# Patient Record
Sex: MALE | Race: WHITE
[De-identification: names, ages, dates, MRNs, and addresses within clinical notes are randomized per-mention and may not be internally consistent; named-entity substitution may affect disease eponyms.]

---

## 2019-01-14 ENCOUNTER — RESULT REVIEW (OUTPATIENT)
Age: 74
End: 2019-01-14

## 2019-01-14 ENCOUNTER — OUTPATIENT (OUTPATIENT)
Dept: OUTPATIENT SERVICES | Facility: HOSPITAL | Age: 74
LOS: 1 days | Discharge: HOME | End: 2019-01-14

## 2019-01-14 ENCOUNTER — TRANSCRIPTION ENCOUNTER (OUTPATIENT)
Age: 74
End: 2019-01-14

## 2019-01-14 VITALS
HEART RATE: 53 BPM | HEIGHT: 69 IN | SYSTOLIC BLOOD PRESSURE: 98 MMHG | DIASTOLIC BLOOD PRESSURE: 67 MMHG | WEIGHT: 169.98 LBS | RESPIRATION RATE: 20 BRPM | TEMPERATURE: 98 F

## 2019-01-14 VITALS
HEART RATE: 68 BPM | DIASTOLIC BLOOD PRESSURE: 57 MMHG | SYSTOLIC BLOOD PRESSURE: 98 MMHG | OXYGEN SATURATION: 100 % | RESPIRATION RATE: 18 BRPM

## 2019-01-14 DIAGNOSIS — Z98.890 OTHER SPECIFIED POSTPROCEDURAL STATES: Chronic | ICD-10-CM

## 2019-01-14 DIAGNOSIS — H26.9 UNSPECIFIED CATARACT: Chronic | ICD-10-CM

## 2019-01-14 NOTE — ASU PATIENT PROFILE, ADULT - PMH
BPH (benign prostatic hyperplasia)    CAD (coronary artery disease)    Hypercholesteremia    Kidney stones

## 2019-01-14 NOTE — ASU DISCHARGE PLAN (ADULT/PEDIATRIC). - NOTIFY
Inability to Tolerate Liquids or Foods/Bleeding that does not stop/Pain not relieved by Medications/Persistent Nausea and Vomiting/Fever greater than 101/Excessive Diarrhea

## 2019-01-15 LAB — SURGICAL PATHOLOGY STUDY: SIGNIFICANT CHANGE UP

## 2019-01-23 DIAGNOSIS — R93.2 ABNORMAL FINDINGS ON DIAGNOSTIC IMAGING OF LIVER AND BILIARY TRACT: ICD-10-CM

## 2019-01-23 DIAGNOSIS — K29.60 OTHER GASTRITIS WITHOUT BLEEDING: ICD-10-CM

## 2019-01-23 DIAGNOSIS — K44.9 DIAPHRAGMATIC HERNIA WITHOUT OBSTRUCTION OR GANGRENE: ICD-10-CM

## 2019-01-23 DIAGNOSIS — K20.9 ESOPHAGITIS, UNSPECIFIED: ICD-10-CM

## 2019-01-23 DIAGNOSIS — E78.00 PURE HYPERCHOLESTEROLEMIA, UNSPECIFIED: ICD-10-CM

## 2019-01-23 DIAGNOSIS — N28.1 CYST OF KIDNEY, ACQUIRED: ICD-10-CM

## 2019-11-20 ENCOUNTER — INPATIENT (INPATIENT)
Facility: HOSPITAL | Age: 74
LOS: 5 days | Discharge: HOME | End: 2019-11-26
Attending: HOSPITALIST | Admitting: HOSPITALIST
Payer: MEDICARE

## 2019-11-20 VITALS
HEART RATE: 45 BPM | SYSTOLIC BLOOD PRESSURE: 103 MMHG | DIASTOLIC BLOOD PRESSURE: 60 MMHG | OXYGEN SATURATION: 95 % | TEMPERATURE: 96 F | RESPIRATION RATE: 18 BRPM

## 2019-11-20 DIAGNOSIS — H26.9 UNSPECIFIED CATARACT: Chronic | ICD-10-CM

## 2019-11-20 DIAGNOSIS — Z98.890 OTHER SPECIFIED POSTPROCEDURAL STATES: Chronic | ICD-10-CM

## 2019-11-20 DIAGNOSIS — R00.1 BRADYCARDIA, UNSPECIFIED: ICD-10-CM

## 2019-11-20 PROBLEM — N20.0 CALCULUS OF KIDNEY: Chronic | Status: ACTIVE | Noted: 2019-01-14

## 2019-11-20 PROBLEM — I25.10 ATHEROSCLEROTIC HEART DISEASE OF NATIVE CORONARY ARTERY WITHOUT ANGINA PECTORIS: Chronic | Status: ACTIVE | Noted: 2019-01-14

## 2019-11-20 PROBLEM — E78.00 PURE HYPERCHOLESTEROLEMIA, UNSPECIFIED: Chronic | Status: ACTIVE | Noted: 2019-01-14

## 2019-11-20 PROBLEM — N40.0 BENIGN PROSTATIC HYPERPLASIA WITHOUT LOWER URINARY TRACT SYMPTOMS: Chronic | Status: ACTIVE | Noted: 2019-01-14

## 2019-11-20 LAB
ALBUMIN SERPL ELPH-MCNC: 4 G/DL — SIGNIFICANT CHANGE UP (ref 3.5–5.2)
ALP SERPL-CCNC: 75 U/L — SIGNIFICANT CHANGE UP (ref 30–115)
ALT FLD-CCNC: 15 U/L — SIGNIFICANT CHANGE UP (ref 0–41)
ANION GAP SERPL CALC-SCNC: 17 MMOL/L — HIGH (ref 7–14)
AST SERPL-CCNC: 18 U/L — SIGNIFICANT CHANGE UP (ref 0–41)
BILIRUB SERPL-MCNC: 1.7 MG/DL — HIGH (ref 0.2–1.2)
BUN SERPL-MCNC: 35 MG/DL — HIGH (ref 10–20)
CALCIUM SERPL-MCNC: 9.3 MG/DL — SIGNIFICANT CHANGE UP (ref 8.5–10.1)
CHLORIDE SERPL-SCNC: 102 MMOL/L — SIGNIFICANT CHANGE UP (ref 98–110)
CO2 SERPL-SCNC: 18 MMOL/L — SIGNIFICANT CHANGE UP (ref 17–32)
CREAT SERPL-MCNC: 2.1 MG/DL — HIGH (ref 0.7–1.5)
GLUCOSE SERPL-MCNC: 98 MG/DL — SIGNIFICANT CHANGE UP (ref 70–99)
HCT VFR BLD CALC: 44.5 % — SIGNIFICANT CHANGE UP (ref 42–52)
HGB BLD-MCNC: 15.2 G/DL — SIGNIFICANT CHANGE UP (ref 14–18)
MCHC RBC-ENTMCNC: 31.1 PG — HIGH (ref 27–31)
MCHC RBC-ENTMCNC: 34.2 G/DL — SIGNIFICANT CHANGE UP (ref 32–37)
MCV RBC AUTO: 91 FL — SIGNIFICANT CHANGE UP (ref 80–94)
NRBC # BLD: 0 /100 WBCS — SIGNIFICANT CHANGE UP (ref 0–0)
PLATELET # BLD AUTO: 197 K/UL — SIGNIFICANT CHANGE UP (ref 130–400)
POTASSIUM SERPL-MCNC: 4.8 MMOL/L — SIGNIFICANT CHANGE UP (ref 3.5–5)
POTASSIUM SERPL-SCNC: 4.8 MMOL/L — SIGNIFICANT CHANGE UP (ref 3.5–5)
PROT SERPL-MCNC: 6.5 G/DL — SIGNIFICANT CHANGE UP (ref 6–8)
RBC # BLD: 4.89 M/UL — SIGNIFICANT CHANGE UP (ref 4.7–6.1)
RBC # FLD: 13.1 % — SIGNIFICANT CHANGE UP (ref 11.5–14.5)
SODIUM SERPL-SCNC: 137 MMOL/L — SIGNIFICANT CHANGE UP (ref 135–146)
TROPONIN T SERPL-MCNC: <0.01 NG/ML — SIGNIFICANT CHANGE UP
TROPONIN T SERPL-MCNC: <0.01 NG/ML — SIGNIFICANT CHANGE UP
WBC # BLD: 10.07 K/UL — SIGNIFICANT CHANGE UP (ref 4.8–10.8)
WBC # FLD AUTO: 10.07 K/UL — SIGNIFICANT CHANGE UP (ref 4.8–10.8)

## 2019-11-20 PROCEDURE — 71045 X-RAY EXAM CHEST 1 VIEW: CPT | Mod: 26

## 2019-11-20 PROCEDURE — 99223 1ST HOSP IP/OBS HIGH 75: CPT | Mod: AI

## 2019-11-20 PROCEDURE — 71046 X-RAY EXAM CHEST 2 VIEWS: CPT | Mod: 26

## 2019-11-20 PROCEDURE — 70450 CT HEAD/BRAIN W/O DYE: CPT | Mod: 26

## 2019-11-20 PROCEDURE — 99285 EMERGENCY DEPT VISIT HI MDM: CPT

## 2019-11-20 PROCEDURE — 93880 EXTRACRANIAL BILAT STUDY: CPT | Mod: 26

## 2019-11-20 RX ORDER — ASPIRIN/CALCIUM CARB/MAGNESIUM 324 MG
81 TABLET ORAL DAILY
Refills: 0 | Status: DISCONTINUED | OUTPATIENT
Start: 2019-11-20 | End: 2019-11-20

## 2019-11-20 RX ORDER — LOSARTAN POTASSIUM 100 MG/1
25 TABLET, FILM COATED ORAL DAILY
Refills: 0 | Status: DISCONTINUED | OUTPATIENT
Start: 2019-11-20 | End: 2019-11-21

## 2019-11-20 RX ORDER — ATORVASTATIN CALCIUM 80 MG/1
40 TABLET, FILM COATED ORAL AT BEDTIME
Refills: 0 | Status: DISCONTINUED | OUTPATIENT
Start: 2019-11-20 | End: 2019-11-23

## 2019-11-20 RX ORDER — TAMSULOSIN HYDROCHLORIDE 0.4 MG/1
0.4 CAPSULE ORAL AT BEDTIME
Refills: 0 | Status: DISCONTINUED | OUTPATIENT
Start: 2019-11-20 | End: 2019-11-23

## 2019-11-20 RX ORDER — SODIUM CHLORIDE 9 MG/ML
1000 INJECTION INTRAMUSCULAR; INTRAVENOUS; SUBCUTANEOUS
Refills: 0 | Status: DISCONTINUED | OUTPATIENT
Start: 2019-11-20 | End: 2019-11-21

## 2019-11-20 RX ORDER — IRBESARTAN 75 MG/1
1 TABLET ORAL
Qty: 0 | Refills: 0 | DISCHARGE

## 2019-11-20 RX ORDER — SIMVASTATIN 20 MG/1
80 TABLET, FILM COATED ORAL AT BEDTIME
Refills: 0 | Status: DISCONTINUED | OUTPATIENT
Start: 2019-11-20 | End: 2019-11-20

## 2019-11-20 RX ORDER — FINASTERIDE 5 MG/1
5 TABLET, FILM COATED ORAL DAILY
Refills: 0 | Status: DISCONTINUED | OUTPATIENT
Start: 2019-11-20 | End: 2019-11-23

## 2019-11-20 RX ORDER — CLOPIDOGREL BISULFATE 75 MG/1
75 TABLET, FILM COATED ORAL DAILY
Refills: 0 | Status: DISCONTINUED | OUTPATIENT
Start: 2019-11-20 | End: 2019-11-23

## 2019-11-20 RX ORDER — ASPIRIN/CALCIUM CARB/MAGNESIUM 324 MG
81 TABLET ORAL
Refills: 0 | Status: DISCONTINUED | OUTPATIENT
Start: 2019-11-20 | End: 2019-11-23

## 2019-11-20 RX ORDER — MECLIZINE HCL 12.5 MG
25 TABLET ORAL ONCE
Refills: 0 | Status: COMPLETED | OUTPATIENT
Start: 2019-11-20 | End: 2019-11-20

## 2019-11-20 RX ADMIN — SODIUM CHLORIDE 50 MILLILITER(S): 9 INJECTION INTRAMUSCULAR; INTRAVENOUS; SUBCUTANEOUS at 15:33

## 2019-11-20 RX ADMIN — TAMSULOSIN HYDROCHLORIDE 0.4 MILLIGRAM(S): 0.4 CAPSULE ORAL at 21:14

## 2019-11-20 RX ADMIN — Medication 25 MILLIGRAM(S): at 10:50

## 2019-11-20 RX ADMIN — ATORVASTATIN CALCIUM 40 MILLIGRAM(S): 80 TABLET, FILM COATED ORAL at 21:14

## 2019-11-20 NOTE — H&P ADULT - HISTORY OF PRESENT ILLNESS
75 yo M hx of cardiac stents, HTN c/o dizziness since yesterday.  Patient states he has been feeling dizzy, described as lightheaded intermittently, occurs when he stands and walks. Better when laying down. Patient has similar symptoms a few weeks ago and saw his PMD and had has his BP meds decreased. 75 yo M hx of CAD/Cardiac Stents presented to ER following persistent dizziness since yesterday. Pt reports he had similar episodes 2 weeks ago, resulting in falls x 2. Denies head trauma. He was evaluated by his PMD, who subsequently decreased dose of Diovan from 80mg to 40mg/day. Pt reports onset of dizziness again yesterday and did not resolved, which prompted visit to ER.   Pt denies chest pain, SOB, dizziness, or additional associated complaints.  Pt currently denies any complaints. However HR 44-47 in ER. --115mmhg.      Cardiologist-Dr Navas, Last echo 2 months ago, reportedly unremarkable.

## 2019-11-20 NOTE — ED ADULT NURSE NOTE - NSIMPLEMENTINTERV_GEN_ALL_ED
Implemented All Fall with Harm Risk Interventions:  Pinch to call system. Call bell, personal items and telephone within reach. Instruct patient to call for assistance. Room bathroom lighting operational. Non-slip footwear when patient is off stretcher. Physically safe environment: no spills, clutter or unnecessary equipment. Stretcher in lowest position, wheels locked, appropriate side rails in place. Provide visual cue, wrist band, yellow gown, etc. Monitor gait and stability. Monitor for mental status changes and reorient to person, place, and time. Review medications for side effects contributing to fall risk. Reinforce activity limits and safety measures with patient and family. Provide visual clues: red socks.

## 2019-11-20 NOTE — H&P ADULT - NSHPLABSRESULTS_GEN_ALL_CORE
15.2   10.07 )-----------( 197      ( 20 Nov 2019 09:31 )             44.5       11-20    137  |  102  |  35<H>  ----------------------------<  98  4.8   |  18  |  2.1<H>    Ca    9.3      20 Nov 2019 09:31    TPro  6.5  /  Alb  4.0  /  TBili  1.7<H>  /  DBili  x   /  AST  18  /  ALT  15  /  AlkPhos  75  11-20              CARDIAC MARKERS ( 20 Nov 2019 09:31 )  x     / <0.01 ng/mL / x     / x     / x              < from: Xray Chest 1 View- PORTABLE-Urgent (11.20.19 @ 10:02) >    Impression:      No radiographic evidence of acute cardiopulmonary disease.        < end of copied text >      < from: CT Head No Cont (11.20.19 @ 09:57) >    IMPRESSION:     1.  No evidence of acute intracranial pathology.    2.  Dolichoectatic basilar artery is noted.        < end of copied text >

## 2019-11-20 NOTE — ED PROVIDER NOTE - NS ED ROS FT
Review of Systems    Constitutional: (-) fever, (-) chills  Eyes/ENT: (-) blurry vision, (-) epistaxis, (-) sore throat  Cardiovascular: (-) chest pain, (-) syncope  Respiratory: (-) cough, (-) shortness of breath  Gastrointestinal: (-) pain, (-) nausea, (-) vomiting, (-) diarrhea  Musculoskeletal: (-) neck pain, (-) back pain, (-) joint pain  Integumentary: (-) rash, (-) edema  Neurological: (-) headache, (-) altered mental status, (+) dizzy  Psychiatric: (-) hallucinations  Allergic/Immunologic: (-) pruritus

## 2019-11-20 NOTE — H&P ADULT - NSHPPHYSICALEXAM_GEN_ALL_CORE
Gen NAD, A&Ox3  CV +S1 and S2, RR  Resp +air entry B/L  Abd soft, NT, ND  Ext no edema, no CT      Vital Signs Last 24 Hrs  T(C): 36.9 (20 Nov 2019 13:00), Max: 37.2 (20 Nov 2019 09:38)  T(F): 98.5 (20 Nov 2019 13:00), Max: 98.9 (20 Nov 2019 09:38)  HR: 49 (20 Nov 2019 13:00) (44 - 49)  BP: 115/61 (20 Nov 2019 13:00) (103/60 - 115/61)  BP(mean): --  RR: 18 (20 Nov 2019 10:51) (17 - 18)  SpO2: 95% (20 Nov 2019 13:00) (95% - 96%)

## 2019-11-20 NOTE — ED PROVIDER NOTE - OBJECTIVE STATEMENT
75 yo M hx of cardiac stents, HTN c/o dizziness since yesterday.  Patient states he has been feeling dizzy, described as lightheaded intermittently, occurs when he stands and walks. Better when laying down. Patient has similar symptoms a few weeks ago and saw his PMD and had has his BP meds decreased. No CP, SOB, HA, n/v, or abdominal pains.

## 2019-11-20 NOTE — H&P ADULT - NSICDXPASTSURGICALHX_GEN_ALL_CORE_FT
PAST SURGICAL HISTORY:  Bilateral cataracts     History of lithotripsy     Status post cardiac surgery cardiac stents

## 2019-11-20 NOTE — ED PROVIDER NOTE - CLINICAL SUMMARY MEDICAL DECISION MAKING FREE TEXT BOX
pt presented with dizzyness/lightheadness.  ct head wnl. sinus damaris in the 40's, not orthostatic. admit to tele for neuro/cards further workup

## 2019-11-20 NOTE — ED PROVIDER NOTE - PHYSICAL EXAMINATION
Gen: Alert, NAD, well appearing  Head: NC, AT, PERRL, EOMI, normal lids/conjunctiva  ENT: normal hearing, patent oropharynx   Neck: +supple, no tenderness/meningismus,  Pulm: Bilateral BS, normal resp effort, no wheeze/stridor/retractions  CV: S1S2, damaris  Abd: soft, NT/ND  Mskel: no edema/erythema/cyanosis  Skin: no rash, warm/dry  Neuro: AAOx3, no sensory/motor deficits

## 2019-11-20 NOTE — H&P ADULT - NSICDXPASTMEDICALHX_GEN_ALL_CORE_FT
PAST MEDICAL HISTORY:  BPH (benign prostatic hyperplasia)     CAD (coronary artery disease)     Hypercholesteremia     Kidney stones

## 2019-11-20 NOTE — H&P ADULT - ASSESSMENT
Pt is 75 y/o male with Symptomatic Bradycardia/Hx Cardiac Stents    Admit to low risk telemetry  Cardiology Consult  Hold B Blocker  Continue ARB, however with parameters in place  Repeat ekg in am  Cont ASA q48h/Plavix Pt is 73 y/o male with Symptomatic Bradycardia/Dehydration/Hx Cardiac Stents    Admit to low risk telemetry  Cardiology Consult  Hold B Blocker  Continue ARB, however with parameters in place  Repeat ekg in am  Light IV Hydration  Monitor Labs  Cont ASA q48h/Plavix

## 2019-11-20 NOTE — ED PROVIDER NOTE - ATTENDING CONTRIBUTION TO CARE
73 yo M hx of cardiac stents, HTN here for eval of dizzyness/ pt reports dizzyness since yesterday. pt characterizes it as feeling lightheaded, which is better when walks. no dizzyness with neck mvt. pt recently had his bp meds decreased. CON: ao x 3, HENMT: neck supple,  CV: rrr, equal pulses b/l, RESP: cta b/l, GI:  soft, nontender, no rebound, no guarding, SKIN: no rash, MSK: no deformities, NEURO: cn2-12 intact, normal finger to nose, motor and sensation grossly intact Psychiatric: appropriate mood, appropriate affect

## 2019-11-21 LAB
ALBUMIN SERPL ELPH-MCNC: 4.3 G/DL — SIGNIFICANT CHANGE UP (ref 3.5–5.2)
ALP SERPL-CCNC: 78 U/L — SIGNIFICANT CHANGE UP (ref 30–115)
ALT FLD-CCNC: 14 U/L — SIGNIFICANT CHANGE UP (ref 0–41)
ANION GAP SERPL CALC-SCNC: 12 MMOL/L — SIGNIFICANT CHANGE UP (ref 7–14)
AST SERPL-CCNC: 18 U/L — SIGNIFICANT CHANGE UP (ref 0–41)
BILIRUB SERPL-MCNC: 1.8 MG/DL — HIGH (ref 0.2–1.2)
BUN SERPL-MCNC: 29 MG/DL — HIGH (ref 10–20)
CALCIUM SERPL-MCNC: 9.6 MG/DL — SIGNIFICANT CHANGE UP (ref 8.5–10.1)
CHLORIDE SERPL-SCNC: 106 MMOL/L — SIGNIFICANT CHANGE UP (ref 98–110)
CO2 SERPL-SCNC: 22 MMOL/L — SIGNIFICANT CHANGE UP (ref 17–32)
CREAT SERPL-MCNC: 1.4 MG/DL — SIGNIFICANT CHANGE UP (ref 0.7–1.5)
GAS PNL BLDA: SIGNIFICANT CHANGE UP
GLUCOSE SERPL-MCNC: 91 MG/DL — SIGNIFICANT CHANGE UP (ref 70–99)
HCT VFR BLD CALC: 46.3 % — SIGNIFICANT CHANGE UP (ref 42–52)
HGB BLD-MCNC: 15.7 G/DL — SIGNIFICANT CHANGE UP (ref 14–18)
LACTATE SERPL-SCNC: 1 MMOL/L — SIGNIFICANT CHANGE UP (ref 0.5–2.2)
MAGNESIUM SERPL-MCNC: 2.1 MG/DL — SIGNIFICANT CHANGE UP (ref 1.8–2.4)
MCHC RBC-ENTMCNC: 31.3 PG — HIGH (ref 27–31)
MCHC RBC-ENTMCNC: 33.9 G/DL — SIGNIFICANT CHANGE UP (ref 32–37)
MCV RBC AUTO: 92.2 FL — SIGNIFICANT CHANGE UP (ref 80–94)
NRBC # BLD: 0 /100 WBCS — SIGNIFICANT CHANGE UP (ref 0–0)
PLATELET # BLD AUTO: 189 K/UL — SIGNIFICANT CHANGE UP (ref 130–400)
POTASSIUM SERPL-MCNC: 4.8 MMOL/L — SIGNIFICANT CHANGE UP (ref 3.5–5)
POTASSIUM SERPL-SCNC: 4.8 MMOL/L — SIGNIFICANT CHANGE UP (ref 3.5–5)
PROT SERPL-MCNC: 6.7 G/DL — SIGNIFICANT CHANGE UP (ref 6–8)
RBC # BLD: 5.02 M/UL — SIGNIFICANT CHANGE UP (ref 4.7–6.1)
RBC # FLD: 13.1 % — SIGNIFICANT CHANGE UP (ref 11.5–14.5)
SODIUM SERPL-SCNC: 140 MMOL/L — SIGNIFICANT CHANGE UP (ref 135–146)
TROPONIN T SERPL-MCNC: <0.01 NG/ML — SIGNIFICANT CHANGE UP
WBC # BLD: 8.18 K/UL — SIGNIFICANT CHANGE UP (ref 4.8–10.8)
WBC # FLD AUTO: 8.18 K/UL — SIGNIFICANT CHANGE UP (ref 4.8–10.8)

## 2019-11-21 PROCEDURE — 99222 1ST HOSP IP/OBS MODERATE 55: CPT

## 2019-11-21 PROCEDURE — 99233 SBSQ HOSP IP/OBS HIGH 50: CPT

## 2019-11-21 RX ORDER — SODIUM CHLORIDE 9 MG/ML
1500 INJECTION INTRAMUSCULAR; INTRAVENOUS; SUBCUTANEOUS
Refills: 0 | Status: COMPLETED | OUTPATIENT
Start: 2019-11-21 | End: 2019-11-21

## 2019-11-21 RX ADMIN — LOSARTAN POTASSIUM 25 MILLIGRAM(S): 100 TABLET, FILM COATED ORAL at 06:22

## 2019-11-21 RX ADMIN — TAMSULOSIN HYDROCHLORIDE 0.4 MILLIGRAM(S): 0.4 CAPSULE ORAL at 21:46

## 2019-11-21 RX ADMIN — SODIUM CHLORIDE 50 MILLILITER(S): 9 INJECTION INTRAMUSCULAR; INTRAVENOUS; SUBCUTANEOUS at 10:21

## 2019-11-21 RX ADMIN — SODIUM CHLORIDE 125 MILLILITER(S): 9 INJECTION INTRAMUSCULAR; INTRAVENOUS; SUBCUTANEOUS at 12:49

## 2019-11-21 RX ADMIN — CLOPIDOGREL BISULFATE 75 MILLIGRAM(S): 75 TABLET, FILM COATED ORAL at 11:22

## 2019-11-21 RX ADMIN — Medication 81 MILLIGRAM(S): at 10:21

## 2019-11-21 RX ADMIN — ATORVASTATIN CALCIUM 40 MILLIGRAM(S): 80 TABLET, FILM COATED ORAL at 21:46

## 2019-11-21 RX ADMIN — FINASTERIDE 5 MILLIGRAM(S): 5 TABLET, FILM COATED ORAL at 11:23

## 2019-11-21 NOTE — CONSULT NOTE ADULT - SUBJECTIVE AND OBJECTIVE BOX
Neurology Consultation note    Name  RADHA RAMON    HPI:  75 yo M hx of CAD/Cardiac Stents presented to ER following persistent dizziness since yesterday. Pt reports he had similar episodes 2 weeks ago, resulting in falls x 2. Denies head trauma. He was evaluated by his PMD, who subsequently decreased dose of Diovan from 80mg to 40mg/day. Pt reports onset of dizziness again yesterday and did not resolved, which prompted visit to ER.   Pt denies chest pain, SOB, dizziness, or additional associated complaints.  Pt currently denies any complaints. However HR 44-47 in ER. --115mmhg.      Cardiologist-Dr Navas, Last echo 2 months ago, reportedly unremarkable. (20 Nov 2019 13:17)    NEURO  PATIENT IS A 73 YO MAN WITH HX AS ABOVE WHO PRESENTS WITH DIZZINESS IN SETTING OF BRADYCARDIA  PATIENT REPORTS       Vital Signs Last 24 Hrs  T(C): 36.2 (21 Nov 2019 05:17), Max: 37.2 (20 Nov 2019 09:38)  T(F): 97.2 (21 Nov 2019 05:17), Max: 98.9 (20 Nov 2019 09:38)  HR: 66 (21 Nov 2019 05:17) (44 - 66)  BP: 106/72 (21 Nov 2019 05:17) (103/60 - 130/73)  BP(mean): --  RR: 16 (21 Nov 2019 05:17) (16 - 18)  SpO2: 95% (20 Nov 2019 13:00) (95% - 96%)    Neurological Exam:   Mental status: Awake, alert and oriented x3.  Recent and remote memory intact.  Naming, repetition and comprehension intact.  Attention/concentration intact.  No dysarthria, no aphasia.  Fund of knowledge appropriate.    Cranial nerves: Pupils equally round and reactive to light, visual fields full, no nystagmus, extraocular muscles intact, V1 through V3 intact bilaterally and symmetric, face symmetric, hearing intact to finger rub, palate elevation symmetric, tongue was midline.  Motor:  MRC grading 5/5 b/l UE/LE.   strength 5/5.  Normal tone and bulk.  No abnormal movements.    Sensation: Intact to light touch, proprioception, and pinprick.   Coordination: No dysmetria on finger-to-nose and heel-to-shin.  No dysdiadokinesia.  Reflexes: 2+ in bilateral UE/LE, downgoing toes bilaterally. (-) Zuniga.      Medications  aspirin  chewable 81 milliGRAM(s) Oral <User Schedule>  atorvastatin 40 milliGRAM(s) Oral at bedtime  clopidogrel Tablet 75 milliGRAM(s) Oral daily  finasteride 5 milliGRAM(s) Oral daily  sodium chloride 0.9%. 1000 milliLiter(s) IV Continuous <Continuous>  tamsulosin 0.4 milliGRAM(s) Oral at bedtime      Lab  11-21    140  |  106  |  29<H>  ----------------------------<  91  4.8   |  22  |  1.4    Ca    9.6      21 Nov 2019 06:47  Mg     2.1     11-21    TPro  6.7  /  Alb  4.3  /  TBili  1.8<H>  /  DBili  x   /  AST  18  /  ALT  14  /  AlkPhos  78  11-21                          15.7   8.18  )-----------( 189      ( 21 Nov 2019 06:47 )             46.3     LIVER FUNCTIONS - ( 21 Nov 2019 06:47 )  Alb: 4.3 g/dL / Pro: 6.7 g/dL / ALK PHOS: 78 U/L / ALT: 14 U/L / AST: 18 U/L / GGT: x             2.1        Radiology  CT Head No Cont:   EXAM:  CT BRAIN            PROCEDURE DATE:  11/20/2019            INTERPRETATION:  Clinical History / Reason for exam: Dizziness.    Technique: Noncontrast head CT.  Contiguous unenhanced CT axial images of   the head from the base to the vertex with coronal and sagittal reformats.    Comparison: None available    Findings:    The ventricles and cortical sulci are compatible a moderate degree of   parenchymal volume loss.     There are patchy white matter lucencies consistent with mild chronic   microvascular change.     There is no acute intracranial hemorrhage, extra-axial fluid collection   or midline shift.  Gray-white matter differentiation is maintained.     Basilar artery is dolichoectatic and tortuous.    The visualized paranasal sinuses and mastoids are well-aerated.   Subcutaneous nodule within the left occipital scalp measuring about 1 cm   with punctate calcifications likely reflecting a sebaceous cyst.    IMPRESSION:     1.  No evidence of acute intracranial pathology.    2.  Dolichoectatic basilar artery is noted.        Assessment: 73 YO MAN WITH DIZZINESS IN THE SETTING OF BRADYCARDIA  EXAM IS  NODULE IN THE OCCIPITAL REGION IS IN THE SCALP AND IS A BENIGN CYST.  THERE IS NO CONCERN FOR NEUROLOGIC ETIOLOGY    Plan: Neurology Consultation note    Name  RADHA RAMON    HPI:  73 yo M hx of CAD/Cardiac Stents presented to ER following persistent dizziness since yesterday. Pt reports he had similar episodes 2 weeks ago, resulting in falls x 2. Denies head trauma. He was evaluated by his PMD, who subsequently decreased dose of Diovan from 80mg to 40mg/day. Pt reports onset of dizziness again yesterday and did not resolved, which prompted visit to ER.   Pt denies chest pain, SOB, dizziness, or additional associated complaints.  Pt currently denies any complaints. However HR 44-47 in ER. --115mmhg.      Cardiologist-Dr Navas, Last echo 2 months ago, reportedly unremarkable. (20 Nov 2019 13:17)    NEURO  PATIENT IS A 75 YO MAN WITH HX AS ABOVE WHO PRESENTS WITH DIZZINESS IN SETTING OF BRADYCARDIA  PATIENT REPORTS THAT HE FELT LIGHTHEADED AND PRE SYNCOPAL  DENIES VERTIGO  DENIES FOCAL NEURO COMPLAINTS  FEELS WELL AT PRESENT TIME      Vital Signs Last 24 Hrs  T(C): 36.2 (21 Nov 2019 05:17), Max: 37.2 (20 Nov 2019 09:38)  T(F): 97.2 (21 Nov 2019 05:17), Max: 98.9 (20 Nov 2019 09:38)  HR: 66 (21 Nov 2019 05:17) (44 - 66)  BP: 106/72 (21 Nov 2019 05:17) (103/60 - 130/73)  BP(mean): --  RR: 16 (21 Nov 2019 05:17) (16 - 18)  SpO2: 95% (20 Nov 2019 13:00) (95% - 96%)    Neurological Exam:   Mental status: Awake, alert and oriented x3.  Recent and remote memory intact.  Naming, repetition and comprehension intact.  Attention/concentration intact.  No dysarthria, no aphasia.  Fund of knowledge appropriate.    Cranial nerves: Pupils equally round and reactive to light, visual fields full, no nystagmus, extraocular muscles intact, V1 through V3 intact bilaterally and symmetric, face symmetric, hearing intact to finger rub, palate elevation symmetric, tongue was midline.  Motor:  MRC grading 5/5 b/l UE/LE.   strength 5/5.  Normal tone and bulk.  No abnormal movements.    Sensation: Intact to light touch, proprioception, and pinprick.   Coordination: No dysmetria on finger-to-nose and heel-to-shin.  No dysdiadokinesia.  Reflexes: 2+ in bilateral UE/LE, downgoing toes bilaterally. (-) Zuniga.      Medications  aspirin  chewable 81 milliGRAM(s) Oral <User Schedule>  atorvastatin 40 milliGRAM(s) Oral at bedtime  clopidogrel Tablet 75 milliGRAM(s) Oral daily  finasteride 5 milliGRAM(s) Oral daily  sodium chloride 0.9%. 1000 milliLiter(s) IV Continuous <Continuous>  tamsulosin 0.4 milliGRAM(s) Oral at bedtime      Lab  11-21    140  |  106  |  29<H>  ----------------------------<  91  4.8   |  22  |  1.4    Ca    9.6      21 Nov 2019 06:47  Mg     2.1     11-21    TPro  6.7  /  Alb  4.3  /  TBili  1.8<H>  /  DBili  x   /  AST  18  /  ALT  14  /  AlkPhos  78  11-21                          15.7   8.18  )-----------( 189      ( 21 Nov 2019 06:47 )             46.3     LIVER FUNCTIONS - ( 21 Nov 2019 06:47 )  Alb: 4.3 g/dL / Pro: 6.7 g/dL / ALK PHOS: 78 U/L / ALT: 14 U/L / AST: 18 U/L / GGT: x             2.1        Radiology  CT Head No Cont:   EXAM:  CT BRAIN            PROCEDURE DATE:  11/20/2019            INTERPRETATION:  Clinical History / Reason for exam: Dizziness.    Technique: Noncontrast head CT.  Contiguous unenhanced CT axial images of   the head from the base to the vertex with coronal and sagittal reformats.    Comparison: None available    Findings:    The ventricles and cortical sulci are compatible a moderate degree of   parenchymal volume loss.     There are patchy white matter lucencies consistent with mild chronic   microvascular change.     There is no acute intracranial hemorrhage, extra-axial fluid collection   or midline shift.  Gray-white matter differentiation is maintained.     Basilar artery is dolichoectatic and tortuous.    The visualized paranasal sinuses and mastoids are well-aerated.   Subcutaneous nodule within the left occipital scalp measuring about 1 cm   with punctate calcifications likely reflecting a sebaceous cyst.    IMPRESSION:     1.  No evidence of acute intracranial pathology.    2.  Dolichoectatic basilar artery is noted.        Assessment: 75 YO MAN WITH DIZZINESS IN THE SETTING OF BRADYCARDIA  EXAM IS NON FOCAL  DIZZINESS QUALITY NOT SUSPICIOUS FOR NEURO ETIOLOGY  NODULE IN THE OCCIPITAL REGION IS IN THE SCALP AND IS A BENIGN CYST.  THERE IS NO CONCERN FOR NEUROLOGIC ETIOLOGY    Plan:  NO FURTHER W/U NEEDED FROM MY STANDPOINT  PLEASE CALL WITH ANY QUESTIONS

## 2019-11-21 NOTE — CONSULT NOTE ADULT - SUBJECTIVE AND OBJECTIVE BOX
CARDIOLOGY CONSULT NOTE     CHIEF COMPLAINT/REASON FOR CONSULT:    HPI:  73 yo M hx of CAD/Cardiac Stents presented to ER following persistent dizziness since yesterday. Pt reports he had similar episodes 2 weeks ago, resulting in falls x 2. Denies head trauma. He was evaluated by his PMD, who subsequently decreased dose of Diovan from 80mg to 40mg/day. Pt reports onset of dizziness again yesterday and did not resolved, which prompted visit to ER.   Pt denies chest pain, SOB.         Cardiologist-Dr Mansfield, Last echo 2 months ago, reportedly unremarkable. (20 Nov 2019 13:17)      PAST MEDICAL & SURGICAL HISTORY:  Kidney stones  BPH (benign prostatic hyperplasia)  Hypercholesteremia  CAD (coronary artery disease)  History of lithotripsy  Bilateral cataracts  Status post cardiac surgery: cardiac stents      Cardiac Risks:   [x ]HTN, [ ] DM, [ ] Smoking, [ ] FH,  [ x] Lipids        MEDICATIONS:  MEDICATIONS  (STANDING):  aspirin  chewable 81 milliGRAM(s) Oral <User Schedule>  atorvastatin 40 milliGRAM(s) Oral at bedtime  clopidogrel Tablet 75 milliGRAM(s) Oral daily  finasteride 5 milliGRAM(s) Oral daily  sodium chloride 0.9%. 1000 milliLiter(s) (50 mL/Hr) IV Continuous <Continuous>  tamsulosin 0.4 milliGRAM(s) Oral at bedtime      FAMILY HISTORY:      SOCIAL HISTORY:      [ ] Marital status divourced  Allergies    No Known Allergies      	    REVIEW OF SYSTEMS:  CONSTITUTIONAL: No fever, weight loss, or fatigue  EYES: No eye pain, visual disturbances, or discharge  ENMT:  No difficulty hearing, tinnitus, vertigo; No sinus or throat pain  NECK: No pain or stiffness  RESPIRATORY: No cough, wheezing, chills or hemoptysis; No Shortness of Breath  CARDIOVASCULAR: See above  GASTROINTESTINAL: No abdominal or epigastric pain. No nausea, vomiting, or hematemesis; No diarrhea or constipation. No melena or hematochezia.  GENITOURINARY: No dysuria, frequency, hematuria, or incontinence  NEUROLOGICAL: No headaches, memory loss, loss of strength, numbness, or tremors  SKIN: No itching, burning, rashes, or lesions   	    PHYSICAL EXAM:  T(C): 36.2 (11-21-19 @ 05:17), Max: 36.9 (11-20-19 @ 13:00)  HR: 66 (11-21-19 @ 05:17) (44 - 66)  BP: 106/72 (11-21-19 @ 05:17) (106/72 - 130/73)  RR: 16 (11-21-19 @ 05:17) (16 - 18)  SpO2: 95% (11-20-19 @ 13:00) (95% - 96%)  Wt(kg): --  I&O's Summary      Appearance: Normal	  Psychiatry: A & O x 3, Mood & affect appropriate  HEENT:   Normal oral mucosa, PERRL, EOMI	  Lymphatic: No lymphadenopathy  Cardiovascular: Normal S1 S2,RRR, No JVD, No murmurs  Respiratory: Lungs clear to auscultation	  Gastrointestinal:  Soft, Non-tender, + BS	  Skin: No rashes, No ecchymoses, No cyanosis	  Neurologic: Non-focal  Extremities: Normal range of motion, No clubbing, cyanosis or edema  Vascular: Peripheral pulses palpable 2+ bilaterally      ECG:  	  < from: 12 Lead ECG (11.20.19 @ 09:00) >  Diagnosis Line Marked sinus bradycardia  Right bundle branch block  Abnormal ECG    Confirmed by FRANKLIN SUGGS TIAGO ([id number]) on 11/20/2019 1:00:48 PM    < end of copied text >    	  LABS:	 	    CARDIAC MARKERS:                                    15.7   8.18  )-----------( 189      ( 21 Nov 2019 06:47 )             46.3     11-21    140  |  106  |  29<H>  ----------------------------<  91  4.8   |  22  |  1.4    Ca    9.6      21 Nov 2019 06:47  Mg     2.1     11-21    TPro  6.7  /  Alb  4.3  /  TBili  1.8<H>  /  DBili  x   /  AST  18  /  ALT  14  /  AlkPhos  78  11-21

## 2019-11-21 NOTE — PROGRESS NOTE ADULT - ASSESSMENT
73yo M c PMHx HTN, CAD on DAPT, nephrolithiasis s/p urosurgical intervention remotely here with dizziness either secondary to borderline hypotension vs bradycardia that may be medication potentiated, oliguria/ VAISHNAVI vs CKD    c/w DAPT  hold parameters for antihypertensives  hold ccb/bblockers  echo- prelim report unremarkable, carotid duplex- unremarkable  orthostatic vs q shift- this morning was positive  give another 1.5L of IVF now  ambulate with assistance  bedside bladderscan has r/o post obstructive uropathy/ overflow  obtain outpatient Cr for baseline- today's cr is improving with gentle IVH  check blood gas r/o acidosis- compensated  follow CE's- r/o acs although presentation is not consistent with acs- negative thus far  tele for now, cardio miles mccarty, follows with Dr. Goncalves as outpatient  appreciated cardio recs. possible outpatient event monitor      #Progress Note Handoff    Pending (specify):  Consults________, Tests________, Test Results_______, Other____tele for events, repeat orthostatics after IVH_____    Family discussion: plan of care discussed with patient    Disposition: Home probably tomorrow morning if orthostatics negative after IVH with antihypertensives held

## 2019-11-21 NOTE — CONSULT NOTE ADULT - ASSESSMENT
Patient with hx stent. He had sensation light headed. He was bradycardic. Now not. Check ortho bp. Ambulate. Echo. Possible out patient event monitor. F/U Dr Mansfield

## 2019-11-21 NOTE — CHART NOTE - NSCHARTNOTEFT_GEN_A_CORE
Patient seen at bedside - resting comfortably.    Discussed with patient about current treatment plan - repeat orthostatics. Fluids on.  Echo done and resulted - grossly normal. Patient likely to be d/c later today or tomorrow AM.     Patient without questions or concerns at this time.     Patient encouraged to contact PA with any further questions or concerns.

## 2019-11-21 NOTE — PROGRESS NOTE ADULT - SUBJECTIVE AND OBJECTIVE BOX
TRISTEN RADHA  74y  Male      Patient is a 74y old  Male who presents with a chief complaint of Dizziness/Bradycardia (21 Nov 2019 09:56)      INTERVAL HPI/OVERNIGHT EVENTS: no recurrence of dizziness today when got up to use the restroom      REVIEW OF SYSTEMS:  as above  All other review of systems negative    T(C): 36.2 (11-21-19 @ 05:17), Max: 36.4 (11-20-19 @ 14:29)  HR: 66 (11-21-19 @ 05:17) (56 - 66)  BP: 106/72 (11-21-19 @ 05:17) (106/72 - 130/73)  RR: 16 (11-21-19 @ 05:17) (16 - 16)  SpO2: --  Wt(kg): --Vital Signs Last 24 Hrs  T(C): 36.2 (21 Nov 2019 05:17), Max: 36.4 (20 Nov 2019 14:29)  T(F): 97.2 (21 Nov 2019 05:17), Max: 97.6 (20 Nov 2019 14:29)  HR: 66 (21 Nov 2019 05:17) (56 - 66)  BP: 106/72 (21 Nov 2019 05:17) (106/72 - 130/73)  BP(mean): --  RR: 16 (21 Nov 2019 05:17) (16 - 16)  SpO2: --        PHYSICAL EXAM:  GENERAL: NAD  PSYCH: no agitation, baseline mentation  NERVOUS SYSTEM:  Alert & Oriented X3, no new focal deficits  PULMONARY: Clear to percussion bilaterally; No rales, rhonchi, wheezing, or rubs  CARDIOVASCULAR: Regular rate and rhythm; No murmurs, rubs, or gallops  GI: Soft, Nontender, Nondistended; Bowel sounds present  EXTREMITIES:  2+ Peripheral Pulses, No clubbing, cyanosis, or edema  SKIN a bit dry    Consultant(s) Notes Reviewed:  [x ] YES  [ ] NO    Discussed with Consultants/Other Providers [ x] YES     LABS                          15.7   8.18  )-----------( 189      ( 21 Nov 2019 06:47 )             46.3     11-21    140  |  106  |  29<H>  ----------------------------<  91  4.8   |  22  |  1.4    Ca    9.6      21 Nov 2019 06:47  Mg     2.1     11-21    TPro  6.7  /  Alb  4.3  /  TBili  1.8<H>  /  DBili  x   /  AST  18  /  ALT  14  /  AlkPhos  78  11-21    ABG - ( 21 Nov 2019 05:10 )  pH, Arterial: 7.47  pH, Blood: x     /  pCO2: 27    /  pO2: 66    / HCO3: 19    / Base Excess: -2.6  /  SaO2: 95                    Lactate Trend  11-21 @ 06:47 Lactate:1.0     CARDIAC MARKERS ( 21 Nov 2019 06:47 )  x     / <0.01 ng/mL / x     / x     / x      CARDIAC MARKERS ( 20 Nov 2019 15:34 )  x     / <0.01 ng/mL / x     / x     / x      CARDIAC MARKERS ( 20 Nov 2019 09:31 )  x     / <0.01 ng/mL / x     / x     / x          CAPILLARY BLOOD GLUCOSE            RADIOLOGY & ADDITIONAL TESTS:    Imaging Personally Reviewed:  [ ] YES  [ ] NO    HEALTH ISSUES - PROBLEM Dx:  Bradycardia: Bradycardia

## 2019-11-22 ENCOUNTER — TRANSCRIPTION ENCOUNTER (OUTPATIENT)
Age: 74
End: 2019-11-22

## 2019-11-22 LAB
ANION GAP SERPL CALC-SCNC: 15 MMOL/L — HIGH (ref 7–14)
BUN SERPL-MCNC: 20 MG/DL — SIGNIFICANT CHANGE UP (ref 10–20)
CALCIUM SERPL-MCNC: 9.2 MG/DL — SIGNIFICANT CHANGE UP (ref 8.5–10.1)
CHLORIDE SERPL-SCNC: 107 MMOL/L — SIGNIFICANT CHANGE UP (ref 98–110)
CO2 SERPL-SCNC: 20 MMOL/L — SIGNIFICANT CHANGE UP (ref 17–32)
CREAT SERPL-MCNC: 1.4 MG/DL — SIGNIFICANT CHANGE UP (ref 0.7–1.5)
GLUCOSE SERPL-MCNC: 82 MG/DL — SIGNIFICANT CHANGE UP (ref 70–99)
HCT VFR BLD CALC: 44.9 % — SIGNIFICANT CHANGE UP (ref 42–52)
HGB BLD-MCNC: 15.5 G/DL — SIGNIFICANT CHANGE UP (ref 14–18)
MAGNESIUM SERPL-MCNC: 1.9 MG/DL — SIGNIFICANT CHANGE UP (ref 1.8–2.4)
MCHC RBC-ENTMCNC: 31.4 PG — HIGH (ref 27–31)
MCHC RBC-ENTMCNC: 34.5 G/DL — SIGNIFICANT CHANGE UP (ref 32–37)
MCV RBC AUTO: 91.1 FL — SIGNIFICANT CHANGE UP (ref 80–94)
NRBC # BLD: 0 /100 WBCS — SIGNIFICANT CHANGE UP (ref 0–0)
PLATELET # BLD AUTO: 183 K/UL — SIGNIFICANT CHANGE UP (ref 130–400)
POTASSIUM SERPL-MCNC: 4 MMOL/L — SIGNIFICANT CHANGE UP (ref 3.5–5)
POTASSIUM SERPL-SCNC: 4 MMOL/L — SIGNIFICANT CHANGE UP (ref 3.5–5)
RBC # BLD: 4.93 M/UL — SIGNIFICANT CHANGE UP (ref 4.7–6.1)
RBC # FLD: 13.1 % — SIGNIFICANT CHANGE UP (ref 11.5–14.5)
SODIUM SERPL-SCNC: 142 MMOL/L — SIGNIFICANT CHANGE UP (ref 135–146)
WBC # BLD: 8.11 K/UL — SIGNIFICANT CHANGE UP (ref 4.8–10.8)
WBC # FLD AUTO: 8.11 K/UL — SIGNIFICANT CHANGE UP (ref 4.8–10.8)

## 2019-11-22 PROCEDURE — 99232 SBSQ HOSP IP/OBS MODERATE 35: CPT

## 2019-11-22 PROCEDURE — 99233 SBSQ HOSP IP/OBS HIGH 50: CPT

## 2019-11-22 RX ORDER — VALSARTAN 80 MG/1
0 TABLET ORAL
Qty: 0 | Refills: 0 | DISCHARGE

## 2019-11-22 RX ORDER — METOPROLOL TARTRATE 50 MG
1 TABLET ORAL
Qty: 0 | Refills: 0 | DISCHARGE

## 2019-11-22 RX ORDER — TAMSULOSIN HYDROCHLORIDE 0.4 MG/1
1 CAPSULE ORAL
Qty: 0 | Refills: 0 | DISCHARGE

## 2019-11-22 RX ADMIN — CLOPIDOGREL BISULFATE 75 MILLIGRAM(S): 75 TABLET, FILM COATED ORAL at 11:02

## 2019-11-22 RX ADMIN — ATORVASTATIN CALCIUM 40 MILLIGRAM(S): 80 TABLET, FILM COATED ORAL at 21:03

## 2019-11-22 RX ADMIN — FINASTERIDE 5 MILLIGRAM(S): 5 TABLET, FILM COATED ORAL at 11:02

## 2019-11-22 NOTE — PHYSICAL THERAPY INITIAL EVALUATION ADULT - GAIT DEVIATIONS NOTED, PT EVAL
decreased weight-shifting ability/decreased usama/stooped posture, dec heel strike/pushoff/decreased step length

## 2019-11-22 NOTE — DISCHARGE NOTE PROVIDER - CARE PROVIDERS DIRECT ADDRESSES
,DirectAddress_Unknown,DirectAddress_Unknown ,DirectAddress_Unknown,DirectAddress_Unknown,[email or web address]

## 2019-11-22 NOTE — DISCHARGE NOTE PROVIDER - NSDCCPCAREPLAN_GEN_ALL_CORE_FT
PRINCIPAL DISCHARGE DIAGNOSIS  Diagnosis: Orthostatic hypotension  Assessment and Plan of Treatment: Maintain Hydration  Continue with EUGENIO stockings  Follow Up Outpatient for possible Adrenal Insufficiency      SECONDARY DISCHARGE DIAGNOSES  Diagnosis: Bradycardia  Assessment and Plan of Treatment: Follow Up outpatient medicine PRINCIPAL DISCHARGE DIAGNOSIS  Diagnosis: Orthostatic hypotension  Assessment and Plan of Treatment: Maintain Hydration  Continue with thigh high EUGENIO stockings  pursure further outpatient workup for orthostatic hypotension  for example for possible Adrenal Insufficiency with your PMD  metoprolol and ARB were discontinued. follow with your pmd/ or cardiologist Dr. Goncalves      SECONDARY DISCHARGE DIAGNOSES  Diagnosis: BPH (benign prostatic hyperplasia)  Assessment and Plan of Treatment: on flomax and proscar. flomax may need to be discontinued if orthostatics persist despite thigh high compression stockings. outpatient pmd or urology follow up. monitor for urinary retention    Diagnosis: VAISHNAVI (acute kidney injury)  Assessment and Plan of Treatment: on suspected ckd 3. multifactorial. likely prerenal azotemia with possible component of post obstructive diuresis. improved after hydration, then patient ambulated and urinated    Diagnosis: Bradycardia  Assessment and Plan of Treatment: Follow Up outpatient medicine. metoprolol was held with resolution of bradycardia PRINCIPAL DISCHARGE DIAGNOSIS  Diagnosis: Orthostatic hypotension  Assessment and Plan of Treatment: Maintain Hydration  Continue with thigh high EUGENIO stockings  pursue further outpatient workup for orthostatic hypotension  for example for possible Adrenal Insufficiency with your PMD  metoprolol and ARB were discontinued. follow with your pmd/ or cardiologist Dr. Goncalves      SECONDARY DISCHARGE DIAGNOSES  Diagnosis: BPH (benign prostatic hyperplasia)  Assessment and Plan of Treatment: on flomax and proscar. flomax may need to be discontinued if orthostatics persist despite thigh high compression stockings. outpatient pmd or urology follow up. monitor for urinary retention    Diagnosis: VAISHNAVI (acute kidney injury)  Assessment and Plan of Treatment: on suspected ckd 3. multifactorial. likely prerenal azotemia with possible component of post obstructive diuresis. improved after hydration, then patient ambulated and urinated    Diagnosis: Bradycardia  Assessment and Plan of Treatment: Follow Up outpatient medicine. metoprolol was held with resolution of bradycardia PRINCIPAL DISCHARGE DIAGNOSIS  Diagnosis: Orthostatic hypotension  Assessment and Plan of Treatment: Maintain Hydration  Continue with thigh high EUGENIO stockings  pursue further outpatient workup for orthostatic hypotension  for example for possible Adrenal Insufficiency with your PMD  metoprolol and ARB were discontinued. follow with your pmd/ or cardiologist Dr. Goncalves      SECONDARY DISCHARGE DIAGNOSES  Diagnosis: Urinary retention  Assessment and Plan of Treatment: underwent inpatient cystoscopy with dilitation of identified urethral stricture    Diagnosis: BPH (benign prostatic hyperplasia)  Assessment and Plan of Treatment: on flomax and proscar.  monitor for urinary retention    Diagnosis: VAISHNAVI (acute kidney injury)  Assessment and Plan of Treatment: on suspected ckd 3. multifactorial. prerenal azotemia and post obstructive nephropathy. improved with hydration and cystoscopy/ with dilitation of urethral stricture. passed trial of voiding    Diagnosis: Bradycardia  Assessment and Plan of Treatment: Follow Up outpatient medicine. metoprolol was held with resolution of bradycardia PRINCIPAL DISCHARGE DIAGNOSIS  Diagnosis: Orthostatic hypotension  Assessment and Plan of Treatment: Maintain Hydration  Continue with thigh high EUGENIO stockings  pursue further outpatient workup for orthostatic hypotension  for example for possible Adrenal Insufficiency with your PMD  metoprolol and ARB were discontinued. follow with your pmd/ or cardiologist Dr. Goncalves      SECONDARY DISCHARGE DIAGNOSES  Diagnosis: Dysuria  Assessment and Plan of Treatment: dysuria after cystoscopy. patient requested urinalysis to rule out infection. UA a bit bloody, moderate bacteria, trace leukocyte esterace. will empirically treat a cystitis, although I am not sure if this is definitely infectious. course of oral cephalosporin. outpatient pmd and urologic follow up. patient is not septic    Diagnosis: Urinary retention  Assessment and Plan of Treatment: underwent inpatient cystoscopy with dilitation of identified urethral stricture    Diagnosis: BPH (benign prostatic hyperplasia)  Assessment and Plan of Treatment: on flomax and proscar.  monitor for urinary retention    Diagnosis: VAISHNAVI (acute kidney injury)  Assessment and Plan of Treatment: on suspected ckd 3. multifactorial. prerenal azotemia and post obstructive nephropathy. improved with hydration and cystoscopy/ with dilitation of urethral stricture. passed trial of voiding    Diagnosis: Bradycardia  Assessment and Plan of Treatment: Follow Up outpatient medicine. metoprolol was held with resolution of bradycardia

## 2019-11-22 NOTE — CONSULT NOTE ADULT - ASSESSMENT
A&P:  BPH  ON FLOMAX  URINARY  RETENTION  UNABLE  TO  INSERT MCGREGOR  CATHETER  FOR  CYSTOSCOPY, URETHRAL CATHETER  INSERTION  IN OR  NPO  HOLD  ASA /PLAVIX  WILL FOLLOW A&P:  BPH  ON FLOMAX  1. URINARY  RETENTION  UNABLE  TO  INSERT MCGREGOR  CATHETER    plan=   FOR  CYSTOSCOPY, URETHRAL CATHETER  INSERTION  IN OR  NPO  HOLD  ASA /PLAVIX  WILL FOLLOW

## 2019-11-22 NOTE — CONSULT NOTE ADULT - ATTENDING COMMENTS
I was physically present for the key portions of the evaluation and management [ E/M] service provided and agree with the plan which i have reviewed and edited where appropriate

## 2019-11-22 NOTE — PROGRESS NOTE ADULT - SUBJECTIVE AND OBJECTIVE BOX
DALINIMESH RADHA  74y  Male      Patient is a 74y old  Male who presents with a chief complaint of Dizziness/Bradycardia (22 Nov 2019 13:33)      INTERVAL HPI/OVERNIGHT EVENTS: feels better. was not dizzy when standing despite blood pressures being lower. still oliguric      REVIEW OF SYSTEMS:  as above  All other review of systems negative    T(C): 36.7 (11-22-19 @ 13:49), Max: 36.7 (11-21-19 @ 22:12)  HR: 82 (11-22-19 @ 13:49) (75 - 82)  BP: 138/87 (11-22-19 @ 13:49) (131/80 - 138/87)  RR: 16 (11-22-19 @ 13:49) (16 - 16)  SpO2: --  Wt(kg): --Vital Signs Last 24 Hrs  T(C): 36.7 (22 Nov 2019 13:49), Max: 36.7 (21 Nov 2019 22:12)  T(F): 98 (22 Nov 2019 13:49), Max: 98.1 (21 Nov 2019 22:12)  HR: 82 (22 Nov 2019 13:49) (75 - 82)  BP: 138/87 (22 Nov 2019 13:49) (131/80 - 138/87)  BP(mean): --  RR: 16 (22 Nov 2019 13:49) (16 - 16)  SpO2: --        PHYSICAL EXAM:  GENERAL: NAD  PSYCH: no agitation, baseline mentation  NERVOUS SYSTEM:  Alert & Oriented X3, no new focal deficits  PULMONARY: Clear to percussion bilaterally; No rales, rhonchi, wheezing, or rubs  CARDIOVASCULAR: Regular rate and rhythm; No murmurs, rubs, or gallops  GI: Soft, Nontender, Nondistended; Bowel sounds present  EXTREMITIES:  2+ Peripheral Pulses, No clubbing, cyanosis, or edema    Consultant(s) Notes Reviewed:  [x ] YES  [ ] NO    Discussed with Consultants/Other Providers [ x] YES     LABS                          15.5   8.11  )-----------( 183      ( 22 Nov 2019 11:01 )             44.9     11-22    142  |  107  |  20  ----------------------------<  82  4.0   |  20  |  1.4    Ca    9.2      22 Nov 2019 11:01  Mg     1.9     11-22    TPro  6.7  /  Alb  4.3  /  TBili  1.8<H>  /  DBili  x   /  AST  18  /  ALT  14  /  AlkPhos  78  11-21    ABG - ( 21 Nov 2019 05:10 )  pH, Arterial: 7.47  pH, Blood: x     /  pCO2: 27    /  pO2: 66    / HCO3: 19    / Base Excess: -2.6  /  SaO2: 95                    Lactate Trend  11-21 @ 06:47 Lactate:1.0     CARDIAC MARKERS ( 21 Nov 2019 06:47 )  x     / <0.01 ng/mL / x     / x     / x      CARDIAC MARKERS ( 20 Nov 2019 15:34 )  x     / <0.01 ng/mL / x     / x     / x          CAPILLARY BLOOD GLUCOSE            RADIOLOGY & ADDITIONAL TESTS:    Imaging Personally Reviewed:  [ ] YES  [ ] NO    HEALTH ISSUES - PROBLEM Dx:  Bradycardia: Bradycardia

## 2019-11-22 NOTE — DISCHARGE NOTE PROVIDER - HOSPITAL COURSE
To be completed by attending. 75yo M c PMHx HTN, CAD on DAPT, nephrolithiasis s/p urosurgical intervention remotely, BPH on flomax, here with dizziness and presyncope/ recurrent. Patient was initially found to be bradycardic but this resolved during inpatient stay- he received no rate control medications throughout his hospital stay. He was found to have acute kidney injury as well as profound orthostatic hypotension. We held his antihypertensives and hydrated patient intravenously. His creatinine improved to 1.4 from a value of 2.1 upon presentation to the hospital. Despite hydration, orthostatic hypotension persisted. Patient however responded favorably to thigh high compression stockings. He was evaluated by inhouse cardiologist who recommended patient to also pursue outpatient event monitor with his cardiologist Dr. Goncalves if dizziness episodes recur despite improvement in orthostatic hypotension. echocardiogram performed in-house was unremarkable with preserved EF, no obvious wall motion abnormality, no overt valvular disease. If orthostatics recur despite thigh high compression stockings patient may need to STOP flomax and monitor for recurrent urinary retention. He should follow with his urologist should he need to do this. 73yo M c PMHx HTN, CAD on DAPT, nephrolithiasis s/p urosurgical intervention remotely, BPH on flomax, here with dizziness and presyncope/ recurrent. Patient was initially found to be bradycardic but this resolved during inpatient stay- he received no rate control medications throughout his hospital stay. He was found to have acute kidney injury as well as profound orthostatic hypotension. We held his antihypertensives and hydrated patient intravenously. His creatinine improved to 1.4 from a value of 2.1 upon presentation to the hospital. Despite hydration, orthostatic hypotension persisted. Patient however responded favorably to thigh high compression stockings. He was evaluated by inhouse cardiologist who recommended patient to also pursue outpatient event monitor with his cardiologist Dr. Goncalves if dizziness episodes recur despite improvement in orthostatic hypotension. echocardiogram performed in-house was unremarkable with preserved EF, no obvious wall motion abnormality, no overt valvular disease. His hospital course was complicated by urinary retention. He required inpatient cystoscopy which identified urethral stricture which was dilated. He had subsequent hematuria which cleared. He then passed his voiding trial. He is now stable for outpatient follow up

## 2019-11-22 NOTE — DISCHARGE NOTE PROVIDER - NSDCMRMEDTOKEN_GEN_ALL_CORE_FT
aspirin 81 mg oral tablet, chewable: 1 tab(s) orally once a day  Coenzyme Q10 10 mg oral capsule: orally once a day  finasteride 5 mg oral tablet: 1 tab(s) orally once a day  Metoprolol Succinate ER 25 mg oral tablet, extended release: 1 tab(s) orally once a day  Plavix 75 mg oral tablet: 1 tab(s) orally once a day  simvastatin 80 mg oral tablet: 1 tab(s) orally once a day (at bedtime)  tamsulosin 0.4 mg oral capsule: 1 cap(s) orally once a day  valsartan 40 mg oral tablet: orally once a day aspirin 81 mg oral tablet, chewable: 1 tab(s) orally once a day  Coenzyme Q10 10 mg oral capsule: orally once a day  finasteride 5 mg oral tablet: 1 tab(s) orally once a day  Plavix 75 mg oral tablet: 1 tab(s) orally once a day  simvastatin 80 mg oral tablet: 1 tab(s) orally once a day (at bedtime)  tamsulosin 0.4 mg oral capsule: 1 cap(s) orally once a day (at bedtime)  - if you continue to have your blood pressures drop when you stand, then this medication will need to be STOPPED aspirin 81 mg oral tablet, chewable: 1 tab(s) orally once a day  cefpodoxime 200 mg oral tablet: 1 tab(s) orally every 12 hours  Coenzyme Q10 10 mg oral capsule: orally once a day  finasteride 5 mg oral tablet: 1 tab(s) orally once a day  Plavix 75 mg oral tablet: 1 tab(s) orally once a day  simvastatin 80 mg oral tablet: 1 tab(s) orally once a day (at bedtime)  tamsulosin 0.4 mg oral capsule: 1 cap(s) orally once a day (at bedtime)  - if you continue to have your blood pressures drop when you stand, then this medication will need to be STOPPED

## 2019-11-22 NOTE — CONSULT NOTE ADULT - SUBJECTIVE AND OBJECTIVE BOX
75 yo M hx of CAD/Cardiac Stents presented to ER following persistent dizziness, with  h/o  bph on flomax,with  decreased  urine  output , failed  attempted  blair  catheter  insertion  x  3 , for  cystoscopy, insertion of  urethral  catheter . in am  Pt denies chest pain, SOB, dizziness, or additional associated complaints.  Pt currently denies any complaints. However HR 44-47 in ER. --115mmhg.      Cardiologist-Dr Navas, Last echo 2 months ago, reportedly unremarkable.     Review of Systems:  Other Review of Systems: All other review of systems negative, except as noted in HPI	      Allergies and Intolerances:        Allergies:  	No Known Allergies:     Home Medications:   * Incomplete Medication History as of 20-Nov-2019 09:44 documented in Structured Notes  · 	simvastatin 80 mg oral tablet: Last Dose Taken:  , 1 tab(s) orally once a day (at bedtime)  · 	aspirin 81 mg oral tablet, chewable: Last Dose Taken:  , 1 tab(s) orally once a day  · 	Coenzyme Q10 10 mg oral capsule: Last Dose Taken:  , orally once a day  · 	finasteride 5 mg oral tablet: Last Dose Taken:  , 1 tab(s) orally once a day  · 	tamsulosin 0.4 mg oral capsule: Last Dose Taken:  , 1 cap(s) orally once a day  · 	Plavix 75 mg oral tablet: Last Dose Taken:  , 1 tab(s) orally once a day  · 	Metoprolol Succinate ER 25 mg oral tablet, extended release: Last Dose Taken:  , 1 tab(s) orally once a day  · 	valsartan 40 mg oral tablet: Last Dose Taken:  , orally once a day    .    Patient History:    Past Medical, Past Surgical, and Family History:  PAST MEDICAL HISTORY:  BPH (benign prostatic hyperplasia)     CAD (coronary artery disease)     Hypercholesteremia     Kidney stones.     PAST SURGICAL HISTORY:  Bilateral cataracts     History of lithotripsy     Status post cardiac surgery cardiac stents.     Social History:  Social History (marital status, living situation, occupation, tobacco use, alcohol and drug use, and sexual history): Denies ETOH, tobacco, drug use.	     Tobacco Screening:  · Core Measure Site	No	  · Has the patient used tobacco in the past 30 days?	No	    Risk Assessment:    Present on Admission:  Deep Venous Thrombosis	no	  Pulmonary Embolus	no	     Heart Failure:  Does this patient have a history of or has been diagnosed with heart failure? no.       Physical Exam:  Physical Exam: Gen NAD, A&Ox3  CV +S1 and S2, RR  Resp +air entry B/L  Abd soft, NT, ND  Ext no edema, no CT : + VOIDING SCANT-- BLADDER  SONO  800CC   Vital Signs Last 24 Hrs  T(C): 36.9 (20 Nov 2019 13:00), Max: 37.2 (20 Nov 2019 09:38)  T(F): 98.5 (20 Nov 2019 13:00), Max: 98.9 (20 Nov 2019 09:38)  HR: 49 (20 Nov 2019 13:00) (44 - 49)  BP: 115/61 (20 Nov 2019 13:00) (103/60 - 115/61)  BP(mean): --  RR: 18 (20 Nov 2019 10:51) (17 - 18) SpO2: 95% (20 Nov 2019 13:00) (95% - 96%) 75 yo M hx of CAD/Cardiac Stents presented to ER following persistent dizziness, with  h/o  bph on flomax,with  decreased  urine  output , failed  attempted  blair  catheter  insertion  x  3 , for  cystoscopy, insertion of  urethral  catheter . in am  Pt denies chest pain, SOB, dizziness, or additional associated complaints.  Pt currently denies any complaints.       Cardiologist-Dr Navas, Last echo 2 months ago, reportedly unremarkable.     Review of Systems:  Other Review of Systems: All other review of systems negative, except as noted in HPI	      Allergies and Intolerances:        Allergies:  	No Known Allergies:     Home Medications:   * Incomplete Medication History as of 20-Nov-2019 09:44 documented in Structured Notes  · 	simvastatin 80 mg oral tablet: Last Dose Taken:  , 1 tab(s) orally once a day (at bedtime)  · 	aspirin 81 mg oral tablet, chewable: Last Dose Taken:  , 1 tab(s) orally once a day  · 	Coenzyme Q10 10 mg oral capsule: Last Dose Taken:  , orally once a day  · 	finasteride 5 mg oral tablet: Last Dose Taken:  , 1 tab(s) orally once a day  · 	tamsulosin 0.4 mg oral capsule: Last Dose Taken:  , 1 cap(s) orally once a day  · 	Plavix 75 mg oral tablet: Last Dose Taken:  , 1 tab(s) orally once a day  · 	Metoprolol Succinate ER 25 mg oral tablet, extended release: Last Dose Taken:  , 1 tab(s) orally once a day  · 	valsartan 40 mg oral tablet: Last Dose Taken:  , orally once a day    .    Patient History:    Past Medical, Past Surgical, and Family History:  PAST MEDICAL HISTORY:  BPH (benign prostatic hyperplasia)     CAD (coronary artery disease)     Hypercholesteremia     Kidney stones.     PAST SURGICAL HISTORY:  Bilateral cataracts     History of lithotripsy     Status post cardiac surgery cardiac stents.     Social History:  Social History (marital status, living situation, occupation, tobacco use, alcohol and drug use, and sexual history): Denies ETOH, tobacco, drug use.	     Tobacco Screening:  · Core Measure Site	No	  · Has the patient used tobacco in the past 30 days?	No	    Risk Assessment:    Present on Admission:  Deep Venous Thrombosis	no	  Pulmonary Embolus	no	     Heart Failure:  Does this patient have a history of or has been diagnosed with heart failure? no.       Physical Exam:  Physical Exam: Gen NAD, A&Ox3  CV +S1 and S2, RR  Resp +air entry B/L  Abd soft, NT, ND  Ext no edema, no CT : + VOIDING SCANT-- BLADDER  SONO  800CC   Vital Signs  t 97.3 /72 HR 89 RR 16	                        15.5   8.11  )-----------( 183      ( 22 Nov 2019 11:01 )             44.9   11-22    142  |  107  |  20  ----------------------------<  82  4.0   |  20  |  1.4    Ca    9.2      22 Nov 2019 11:01  Mg     1.9     11-22    TPro  6.7  /  Alb  4.3  /  TBili  1.8<H>  /  DBili  x   /  AST  18  /  ALT  14  /  AlkPhos  78  11-21

## 2019-11-22 NOTE — DISCHARGE NOTE PROVIDER - CARE PROVIDER_API CALL
Joaquín Ko ()  Infectious Disease; Internal Medicine  [address] [place] [address]  Phone: [phone]  Fax: [phone]  Established Patient  Follow Up Time: 1 week    Ed Mansfield)  Cardiovascular Disease; Internal Medicine; Interventional Cardiology; Nuclear Cardiology  [address] [place] [address]  Phone: [phone]  Fax: [phone]  Follow Up Time: 1 month Joaquín Ko ()  Infectious Disease; Internal Medicine  [address] [place] [address]  Phone: [phone]  Fax: [phone]  Established Patient  Follow Up Time: 1 week    Ed Mansfield)  Cardiovascular Disease; Internal Medicine; Interventional Cardiology; Nuclear Cardiology  [address] [place] [address]  Phone: [phone]  Fax: [phone]  Follow Up Time: 1 month    Han Curiel)  Urology  [address] [place] [address]  Phone: [phone]  Fax: [phone]  Follow Up Time: 2 weeks

## 2019-11-22 NOTE — CHART NOTE - NSCHARTNOTEFT_GEN_A_CORE
Patient seen at bedside resting comfortably.    T(C): 36.7 (11-22-19 @ 13:49), Max: 36.7 (11-21-19 @ 22:12)  HR: 82 (11-22-19 @ 13:49) (75 - 82)  BP: 138/87 (11-22-19 @ 13:49) (131/80 - 138/87)  RR: 16 (11-22-19 @ 13:49) (16 - 16)    Pt as per RN as urinary retention with bladder scan post void.    Patient without questions of concerns at this time.    As per Dr Angel, ordered indwelling blair to ascertain output.    Patient encouraged to contact PA with any further questions of concerns.

## 2019-11-22 NOTE — DISCHARGE NOTE PROVIDER - PROVIDER TOKENS
PROVIDER:[TOKEN:[[id number]],FOLLOWUP:[1 week],ESTABLISHEDPATIENT:[T]],PROVIDER:[TOKEN:[[id number]],FOLLOWUP:[1 month]] PROVIDER:[TOKEN:[[id number]],FOLLOWUP:[1 week],ESTABLISHEDPATIENT:[T]],PROVIDER:[TOKEN:[[id number]],FOLLOWUP:[1 month]],PROVIDER:[TOKEN:[[id number]],FOLLOWUP:[2 weeks]]

## 2019-11-22 NOTE — PHYSICAL THERAPY INITIAL EVALUATION ADULT - GENERAL OBSERVATIONS, REHAB EVAL
14:05-14:35 Chart reviewed. Pt encountered supine in bed,  may be seen by Physical Therapist as confirmed with Nurse. Patient denied pain at rest and ready to walk' +tele

## 2019-11-22 NOTE — PROGRESS NOTE ADULT - ASSESSMENT
75yo M c PMHx HTN, CAD on DAPT, nephrolithiasis s/p urosurgical intervention remotely, BPH on flomax, here with dizziness and presyncope/ recurrent. Patient was initially found to be bradycardic but this resolved during inpatient stay- he received no rate control medications throughout his hospital stay. He was found to have acute kidney injury as well as profound orthostatic hypotension. We held his antihypertensives and hydrated patient intravenously. His creatinine improved to 1.4 from a value of 2.1 upon presentation to the hospital. Despite hydration, orthostatic hypotension persisted. Patient however responded favorably to thigh high compression stockings. He was evaluated by inhouse cardiologist who recommended patient to also pursue outpatient event monitor with his cardiologist Dr. Goncalves if dizziness episodes recur despite improvement in orthostatic hypotension. echocardiogram performed in-house was unremarkable with preserved EF, no obvious wall motion abnormality, no overt valvular disease. If orthostatics recur despite thigh high compression stockings patient may need to STOP flomax and monitor for recurrent urinary retention. He should follow with his urologist should he need to do this.        #Progress Note Handoff    Pending (specify):  Consults_________, Tests________, Test Results_______, Other___walk patient, repeat bladder scan for pvr after urinated, if <300cc,then discharge home_____    Family discussion: patient aao x3, aware of plan    Disposition: home probably today

## 2019-11-23 PROBLEM — Z00.00 ENCOUNTER FOR PREVENTIVE HEALTH EXAMINATION: Status: ACTIVE | Noted: 2019-11-23

## 2019-11-23 PROCEDURE — 99233 SBSQ HOSP IP/OBS HIGH 50: CPT

## 2019-11-23 PROCEDURE — 52281 CYSTOSCOPY AND TREATMENT: CPT

## 2019-11-23 RX ORDER — OXYCODONE AND ACETAMINOPHEN 5; 325 MG/1; MG/1
1 TABLET ORAL ONCE
Refills: 0 | Status: DISCONTINUED | OUTPATIENT
Start: 2019-11-23 | End: 2019-11-23

## 2019-11-23 RX ORDER — HYDROMORPHONE HYDROCHLORIDE 2 MG/ML
0.5 INJECTION INTRAMUSCULAR; INTRAVENOUS; SUBCUTANEOUS
Refills: 0 | Status: DISCONTINUED | OUTPATIENT
Start: 2019-11-23 | End: 2019-11-23

## 2019-11-23 RX ORDER — FINASTERIDE 5 MG/1
5 TABLET, FILM COATED ORAL DAILY
Refills: 0 | Status: DISCONTINUED | OUTPATIENT
Start: 2019-11-23 | End: 2019-11-26

## 2019-11-23 RX ORDER — ASPIRIN/CALCIUM CARB/MAGNESIUM 324 MG
81 TABLET ORAL DAILY
Refills: 0 | Status: DISCONTINUED | OUTPATIENT
Start: 2019-11-23 | End: 2019-11-26

## 2019-11-23 RX ORDER — ACETAMINOPHEN 500 MG
650 TABLET ORAL ONCE
Refills: 0 | Status: DISCONTINUED | OUTPATIENT
Start: 2019-11-23 | End: 2019-11-23

## 2019-11-23 RX ORDER — CLOPIDOGREL BISULFATE 75 MG/1
75 TABLET, FILM COATED ORAL DAILY
Refills: 0 | Status: DISCONTINUED | OUTPATIENT
Start: 2019-11-23 | End: 2019-11-26

## 2019-11-23 RX ORDER — SODIUM CHLORIDE 9 MG/ML
1000 INJECTION, SOLUTION INTRAVENOUS
Refills: 0 | Status: DISCONTINUED | OUTPATIENT
Start: 2019-11-23 | End: 2019-11-23

## 2019-11-23 RX ORDER — ATORVASTATIN CALCIUM 80 MG/1
40 TABLET, FILM COATED ORAL AT BEDTIME
Refills: 0 | Status: DISCONTINUED | OUTPATIENT
Start: 2019-11-23 | End: 2019-11-26

## 2019-11-23 RX ADMIN — ATORVASTATIN CALCIUM 40 MILLIGRAM(S): 80 TABLET, FILM COATED ORAL at 22:02

## 2019-11-23 RX ADMIN — SODIUM CHLORIDE 100 MILLILITER(S): 9 INJECTION, SOLUTION INTRAVENOUS at 12:56

## 2019-11-23 RX ADMIN — FINASTERIDE 5 MILLIGRAM(S): 5 TABLET, FILM COATED ORAL at 14:26

## 2019-11-23 RX ADMIN — CLOPIDOGREL BISULFATE 75 MILLIGRAM(S): 75 TABLET, FILM COATED ORAL at 18:16

## 2019-11-23 NOTE — PRE-ANESTHESIA EVALUATION ADULT - NSANTHOSAYNRD_GEN_A_CORE
No. NA screening performed.  STOP BANG Legend: 0-2 = LOW Risk; 3-4 = INTERMEDIATE Risk; 5-8 = HIGH Risk

## 2019-11-23 NOTE — PROGRESS NOTE ADULT - SUBJECTIVE AND OBJECTIVE BOX
S:  Pt has not urinated all night long and feels like his bladder is very full  O; Vital Signs Last 24 Hrs  T(C): 35.6 (23 Nov 2019 05:27), Max: 36.7 (22 Nov 2019 13:49)  T(F): 96.1 (23 Nov 2019 05:27), Max: 98 (22 Nov 2019 13:49)  HR: 90 (23 Nov 2019 05:27) (82 - 90)  BP: 131/86 (23 Nov 2019 05:27) (116/72 - 138/87)  BP(mean): --  RR: 16 (23 Nov 2019 05:27) (16 - 16)  SpO2: --      MEDICATIONS  (STANDING):  aspirin  chewable 81 milliGRAM(s) Oral <User Schedule>  atorvastatin 40 milliGRAM(s) Oral at bedtime  clopidogrel Tablet 75 milliGRAM(s) Oral daily  finasteride 5 milliGRAM(s) Oral daily  tamsulosin 0.4 milliGRAM(s) Oral at bedtime    MEDICATIONS  (PRN):    EXAM:  abd: soft, +suprapubic fullness and tenderness    LABS: pending

## 2019-11-23 NOTE — PROGRESS NOTE ADULT - ASSESSMENT
A&P:  BPH  ON FLOMAX    URINARY  RETENTION  UNABLE  TO  INSERT MCGREGOR  CATHETER  FOR  CYSTOSCOPY, URETHRAL CATHETER  INSERTION  IN OR THIS AM  NPO  ON ASA/PLAVIX  WILL FOLLOW A&P:  BPH  ON FLOMAX    1. URINARY  RETENTION  UNABLE  TO  INSERT MCGREGOR  CATHETER      FOR  CYSTOSCOPY, URETHRAL CATHETER  INSERTION  IN OR THIS AM  NPO  ON ASA/PLAVIX  WILL FOLLOW

## 2019-11-23 NOTE — PROGRESS NOTE ADULT - SUBJECTIVE AND OBJECTIVE BOX
75 yo M hx of CAD/Cardiac Stents presented to ER following persistent dizziness, with  h/o  bph on flomax,with  decreased  urine  output , failed  attempted  blair  catheter  insertion  x  3 S/P   cystoscopy,  insertion of  urethral  catheter . seen and  examined in bed, no complaints, blair catheter  in place , draining bloody  urine     V/S 75 yo M hx of CAD/Cardiac Stents presented to ER following persistent dizziness, with  h/o  bph on flomax,with  decreased  urine  output , failed  attempted  blair  catheter  insertion  x  3 S/P   cystoscopy,  insertion of  urethral  catheter . seen and  examined in bed, no complaints, blair catheter  in place , draining bloody  urine     V/S  T 98.7, /81,  HR  89, RR16  PE: ABD  + BS  SOFT    BLAIR  IN PLACE + HEMATURIA    A&P: 73 yo M hx of CAD/Cardiac Stents presented to ER following persistent dizziness, with  h/o  bph on flomax,with  decreased  urine  output , failed  attempted  blair  catheter  insertion  x  3 S/P   cystoscopy,  insertion of  urethral  catheter . seen and  examined in bed, no complaints, blair catheter  in place , draining bloody  urine     V/S  T 98.7, /81,  HR  89, RR16  PE: ABD  + BS  SOFT    BLAIR  IN PLACE + HEMATURIA    A&P: Urinary retention     Membranous urethral stricture   ·  PROCEDURES:  s/p  Cystoscopy, with urethral dilation ,insertion urethral catheter   reg  diet  ivf  pain mgmt    monitor  output  will follow

## 2019-11-23 NOTE — PROGRESS NOTE ADULT - ASSESSMENT
75yo M c PMHx HTN, CAD on DAPT, nephrolithiasis s/p urosurgical intervention remotely, BPH on flomax, here with dizziness and presyncope/ recurrent. Patient was initially found to be bradycardic but this resolved during inpatient stay- His home beta blocker was held. He was found to have acute kidney injury as well as profound orthostatic hypotension. We held his antihypertensives (ARB)/beta blocker and hydrated patient intravenously. His creatinine improved to 1.4 from a value of 2.1 upon presentation to the hospital. Despite hydration, orthostatic hypotension persisted. Patient however responded favorably to thigh high compression stockings. He was evaluated by inhouse cardiologist who recommended patient to also pursue outpatient event monitor with his cardiologist Dr. Goncalves if dizziness episodes recur despite improvement in orthostatic hypotension. echocardiogram performed in-house was unremarkable with preserved EF, no obvious wall motion abnormality, no overt valvular disease.    patient's course was complicated by urinary retention that was not improved with further ambulation and chronic home regimen of flomax and proscar. bedside attempts for blair insertion had failed  he noted some clots passing during his last urination yesterday  he is going to or for cystoscopy and blair placement  will stop flomax after blair is placed as flomax may have been exacerbated orthostasis- discussed with urosurgical team      #Progress Note Handoff    Pending (specify):  Consults_________, Tests________, Test Results_______, Other_ OR for blair, monitor urine output  Family discussion: patient aao x3, aware of plan    Disposition: home probably today

## 2019-11-23 NOTE — BRIEF OPERATIVE NOTE - NSICDXBRIEFPROCEDURE_GEN_ALL_CORE_FT
PROCEDURES:  Cystoscopy, with urethral dilation 23-Nov-2019 12:35:44 insertion urethral catheter Han LEHMAN

## 2019-11-23 NOTE — PROGRESS NOTE ADULT - SUBJECTIVE AND OBJECTIVE BOX
DALINIMESH RADHA  74y  Male      Patient is a 74y old  Male who presents with a chief complaint of Dizziness/Bradycardia (23 Nov 2019 08:44)      INTERVAL HPI/OVERNIGHT EVENTS: patient with PVR yesterday and subsequently no urination overnight. this morning having suprapubic discomfort unable to void, awaiting or for blair insertion. per patient last urination with some clots. bedside blair attempts x 3 failed yesterday evening      REVIEW OF SYSTEMS:  as above  All other review of systems negative    T(C): 35.6 (11-23-19 @ 11:23), Max: 36.7 (11-22-19 @ 13:49)  HR: 90 (11-23-19 @ 11:23) (82 - 90)  BP: 131/86 (11-23-19 @ 11:23) (116/72 - 138/87)  RR: 16 (11-23-19 @ 11:23) (16 - 16)  SpO2: 97% (11-23-19 @ 11:23) (97% - 97%)  Wt(kg): --Vital Signs Last 24 Hrs  T(C): 35.6 (23 Nov 2019 11:23), Max: 36.7 (22 Nov 2019 13:49)  T(F): 96.1 (23 Nov 2019 10:25), Max: 98 (22 Nov 2019 13:49)  HR: 90 (23 Nov 2019 11:23) (82 - 90)  BP: 131/86 (23 Nov 2019 11:23) (116/72 - 138/87)  BP(mean): --  RR: 16 (23 Nov 2019 11:23) (16 - 16)  SpO2: 97% (23 Nov 2019 11:23) (97% - 97%)        PHYSICAL EXAM:  GENERAL: uncomfortable  PSYCH: no agitation, baseline mentation  NERVOUS SYSTEM:  Alert & Oriented X3, no new focal deficits  PULMONARY: Clear to percussion bilaterally; No rales, rhonchi, wheezing, or rubs  CARDIOVASCULAR: Regular rate and rhythm; No murmurs, rubs, or gallops  GI: Soft, Nontender, Nondistended; Bowel sounds present   suprapubic fullness and tenderness  EXTREMITIES:  2+ Peripheral Pulses, No clubbing, cyanosis, or edema    Consultant(s) Notes Reviewed:  [x ] YES  [ ] NO    Discussed with Consultants/Other Providers [ x] YES     LABS                          15.5   8.11  )-----------( 183      ( 22 Nov 2019 11:01 )             44.9     11-22    142  |  107  |  20  ----------------------------<  82  4.0   |  20  |  1.4    Ca    9.2      22 Nov 2019 11:01  Mg     1.9     11-22            Lactate Trend  11-21 @ 06:47 Lactate:1.0         CAPILLARY BLOOD GLUCOSE            RADIOLOGY & ADDITIONAL TESTS:    Imaging Personally Reviewed:  [ ] YES  [ ] NO    HEALTH ISSUES - PROBLEM Dx:  Bradycardia: Bradycardia

## 2019-11-24 LAB
ALBUMIN SERPL ELPH-MCNC: 3.7 G/DL — SIGNIFICANT CHANGE UP (ref 3.5–5.2)
ALP SERPL-CCNC: 79 U/L — SIGNIFICANT CHANGE UP (ref 30–115)
ALT FLD-CCNC: 9 U/L — SIGNIFICANT CHANGE UP (ref 0–41)
ANION GAP SERPL CALC-SCNC: 12 MMOL/L — SIGNIFICANT CHANGE UP (ref 7–14)
AST SERPL-CCNC: 15 U/L — SIGNIFICANT CHANGE UP (ref 0–41)
BILIRUB SERPL-MCNC: 1.7 MG/DL — HIGH (ref 0.2–1.2)
BUN SERPL-MCNC: 19 MG/DL — SIGNIFICANT CHANGE UP (ref 10–20)
CALCIUM SERPL-MCNC: 9.2 MG/DL — SIGNIFICANT CHANGE UP (ref 8.5–10.1)
CHLORIDE SERPL-SCNC: 107 MMOL/L — SIGNIFICANT CHANGE UP (ref 98–110)
CO2 SERPL-SCNC: 22 MMOL/L — SIGNIFICANT CHANGE UP (ref 17–32)
CREAT SERPL-MCNC: 1.3 MG/DL — SIGNIFICANT CHANGE UP (ref 0.7–1.5)
GLUCOSE SERPL-MCNC: 92 MG/DL — SIGNIFICANT CHANGE UP (ref 70–99)
HCT VFR BLD CALC: 42.4 % — SIGNIFICANT CHANGE UP (ref 42–52)
HGB BLD-MCNC: 14.5 G/DL — SIGNIFICANT CHANGE UP (ref 14–18)
MAGNESIUM SERPL-MCNC: 1.7 MG/DL — LOW (ref 1.8–2.4)
MCHC RBC-ENTMCNC: 31.3 PG — HIGH (ref 27–31)
MCHC RBC-ENTMCNC: 34.2 G/DL — SIGNIFICANT CHANGE UP (ref 32–37)
MCV RBC AUTO: 91.4 FL — SIGNIFICANT CHANGE UP (ref 80–94)
NRBC # BLD: 0 /100 WBCS — SIGNIFICANT CHANGE UP (ref 0–0)
PLATELET # BLD AUTO: 204 K/UL — SIGNIFICANT CHANGE UP (ref 130–400)
POTASSIUM SERPL-MCNC: 4.7 MMOL/L — SIGNIFICANT CHANGE UP (ref 3.5–5)
POTASSIUM SERPL-SCNC: 4.7 MMOL/L — SIGNIFICANT CHANGE UP (ref 3.5–5)
PROT SERPL-MCNC: 6.1 G/DL — SIGNIFICANT CHANGE UP (ref 6–8)
RBC # BLD: 4.64 M/UL — LOW (ref 4.7–6.1)
RBC # FLD: 13.2 % — SIGNIFICANT CHANGE UP (ref 11.5–14.5)
SODIUM SERPL-SCNC: 141 MMOL/L — SIGNIFICANT CHANGE UP (ref 135–146)
WBC # BLD: 10.5 K/UL — SIGNIFICANT CHANGE UP (ref 4.8–10.8)
WBC # FLD AUTO: 10.5 K/UL — SIGNIFICANT CHANGE UP (ref 4.8–10.8)

## 2019-11-24 PROCEDURE — 76770 US EXAM ABDO BACK WALL COMP: CPT | Mod: 26

## 2019-11-24 PROCEDURE — 99233 SBSQ HOSP IP/OBS HIGH 50: CPT

## 2019-11-24 PROCEDURE — 99232 SBSQ HOSP IP/OBS MODERATE 35: CPT

## 2019-11-24 RX ADMIN — FINASTERIDE 5 MILLIGRAM(S): 5 TABLET, FILM COATED ORAL at 11:29

## 2019-11-24 RX ADMIN — CLOPIDOGREL BISULFATE 75 MILLIGRAM(S): 75 TABLET, FILM COATED ORAL at 11:29

## 2019-11-24 RX ADMIN — ATORVASTATIN CALCIUM 40 MILLIGRAM(S): 80 TABLET, FILM COATED ORAL at 21:52

## 2019-11-24 RX ADMIN — Medication 81 MILLIGRAM(S): at 11:29

## 2019-11-24 NOTE — PROGRESS NOTE ADULT - ASSESSMENT
73yo M c PMHx HTN, CAD on DAPT, nephrolithiasis s/p urosurgical intervention remotely, BPH on flomax, here with dizziness and presyncope/ recurrent. Patient was initially found to be bradycardic but this resolved during inpatient stay- His home beta blocker was held. He was found to have acute kidney injury as well as profound orthostatic hypotension. We held his antihypertensives (ARB)/beta blocker and hydrated patient intravenously. His creatinine improved to 1.4 from a value of 2.1 upon presentation to the hospital. Despite hydration, orthostatic hypotension persisted. Patient however responded favorably to thigh high compression stockings. He was evaluated by inhouse cardiologist who recommended patient to also pursue outpatient event monitor with his cardiologist Dr. Goncalves if dizziness episodes recur despite improvement in orthostatic hypotension. echocardiogram performed in-house was unremarkable with preserved EF, no obvious wall motion abnormality, no overt valvular disease.    patient's course was complicated by urinary retention. patient underwent emergency cystoscopy and OR blair placement for decompression. found to have urethral stricture  POD #1 uretheral stricture dilitation and blair insertion  urine output still bloody, monitor output, irrigate PRN  possible TOV when clears      #Progress Note Handoff    Pending (specify):  Consults_________, Tests________, Test Results_______, Other_ monitor urine output  Family discussion: patient aao x3, aware of plan    Disposition: home probably 24-48hrs

## 2019-11-24 NOTE — PROGRESS NOTE ADULT - SUBJECTIVE AND OBJECTIVE BOX
CHARGATITORADHA BEAVERS  74y  Male      Patient is a 74y old  Male who presents with a chief complaint of Dizziness/Bradycardia (24 Nov 2019 11:48)      INTERVAL HPI/OVERNIGHT EVENTS: feeling much better after blair/ stricture surgery. no dizziness today but has not walked yet      REVIEW OF SYSTEMS:  as above  All other review of systems negative    T(C): 36.7 (11-24-19 @ 05:44), Max: 37.1 (11-23-19 @ 13:57)  HR: 73 (11-24-19 @ 01:59) (73 - 94)  BP: 109/67 (11-24-19 @ 01:59) (109/67 - 135/77)  RR: 16 (11-24-19 @ 05:44) (16 - 19)  SpO2: 96% (11-23-19 @ 13:00) (95% - 96%)  Wt(kg): --Vital Signs Last 24 Hrs  T(C): 36.7 (24 Nov 2019 05:44), Max: 37.1 (23 Nov 2019 13:57)  T(F): 98 (24 Nov 2019 05:44), Max: 98.7 (23 Nov 2019 13:57)  HR: 73 (24 Nov 2019 01:59) (73 - 94)  BP: 109/67 (24 Nov 2019 01:59) (109/67 - 135/77)  BP(mean): --  RR: 16 (24 Nov 2019 05:44) (16 - 19)  SpO2: 96% (23 Nov 2019 13:00) (95% - 96%)      11-23-19 @ 07:01  -  11-24-19 @ 07:00  --------------------------------------------------------  IN: 100 mL / OUT: 1450 mL / NET: -1350 mL        PHYSICAL EXAM:  GENERAL: NAD  PSYCH: no agitation, baseline mentation  NERVOUS SYSTEM:  Alert & Oriented X3, no new focal deficits  PULMONARY: Clear to percussion bilaterally; No rales, rhonchi, wheezing, or rubs  CARDIOVASCULAR: Regular rate and rhythm; No murmurs, rubs, or gallops  GI: Soft, Nontender, Nondistended; Bowel sounds present   blair dark urine no pyuria/ bloody no clots  EXTREMITIES:  2+ Peripheral Pulses, No clubbing, cyanosis, or edema    Consultant(s) Notes Reviewed:  [x ] YES  [ ] NO    Discussed with Consultants/Other Providers [ x] YES     LABS                          14.5   10.50 )-----------( 204      ( 24 Nov 2019 07:04 )             42.4     11-24    141  |  107  |  19  ----------------------------<  92  4.7   |  22  |  1.3    Ca    9.2      24 Nov 2019 07:04  Mg     1.7     11-24    TPro  6.1  /  Alb  3.7  /  TBili  1.7<H>  /  DBili  x   /  AST  15  /  ALT  9   /  AlkPhos  79  11-24          Lactate Trend  11-21 @ 06:47 Lactate:1.0         CAPILLARY BLOOD GLUCOSE            RADIOLOGY & ADDITIONAL TESTS:    Imaging Personally Reviewed:  [ ] YES  [ ] NO    HEALTH ISSUES - PROBLEM Dx:  Bradycardia: Bradycardia          #Progress Note Handoff    Pending (specify):  Consults_________, Tests________, Test Results_______, Other_________    Family discussion:    Disposition: Home___/SNF___/Other________/Unknown at this time________

## 2019-11-24 NOTE — PROGRESS NOTE ADULT - ASSESSMENT
POD # 1 s/p urethral stricture, s/p dilatation & blair placement    Pt seen & examined with Dr. Curiel:   - pt still with dark colored urine (indicative of old blood) but not clear enough today for TOV   - will flush blair PRN and if urine is lighter in color by tomorrow morning, will remove blair for TOV   - will follow

## 2019-11-24 NOTE — CHART NOTE - NSCHARTNOTEFT_GEN_A_CORE
Pt still with hemturia. Blair flushed - minimal clots removed and urine now light pink in tubing. If urine remains light pink or yellow in AM, will D/C blair for TOV

## 2019-11-24 NOTE — PROGRESS NOTE ADULT - SUBJECTIVE AND OBJECTIVE BOX
S; Pt without complaints; blair in place with maroon colored urine in tubing. Resumed ASA & plavix today  O; Vital Signs Last 24 Hrs  T(C): 36.7 (24 Nov 2019 05:44), Max: 37.1 (23 Nov 2019 12:12)  T(F): 98 (24 Nov 2019 05:44), Max: 98.8 (23 Nov 2019 12:12)  HR: 73 (24 Nov 2019 01:59) (73 - 94)  BP: 109/67 (24 Nov 2019 01:59) (109/67 - 137/75)  BP(mean): 100 (23 Nov 2019 12:12) (100 - 100)  RR: 16 (24 Nov 2019 05:44) (15 - 19)  SpO2: 96% (23 Nov 2019 13:00) (95% - 96%)      I&O's Detail    23 Nov 2019 07:01  -  24 Nov 2019 07:00  --------------------------------------------------------  IN:    lactated ringers.: 100 mL  Total IN: 100 mL    OUT:    Indwelling Catheter - Urethral: 1450 mL  Total OUT: 1450 mL    Total NET: -1350 mL        MEDICATIONS  (STANDING):  aspirin  chewable 81 milliGRAM(s) Oral daily  atorvastatin 40 milliGRAM(s) Oral at bedtime  clopidogrel Tablet 75 milliGRAM(s) Oral daily  finasteride 5 milliGRAM(s) Oral daily    EXAM:    abd: soft nt/nd. Blair in place, maroon colored urine    Labs:                        14.5   10.50 )-----------( 204      ( 24 Nov 2019 07:04 )             42.4         11-24    141  |  107  |  19  ----------------------------<  92  4.7   |  22  |  1.3      Calcium, Total Serum: 9.2 mg/dL (11-24-19 @ 07:04)    RADIOLOGY:  US Kidney and Bladder (11.24.19 @ 09:50) >    IMPRESSION:  No hydronephrosis or stone in either kidney.    Bilateral renal cysts, stable.    Decompressed urinary bladder, collapsed around Blair catheter balloon.    Prostatomegaly (71 cc). S; 75 y/o male  == Pt without complaints; blair in place with maroon colored urine in tubing. Resumed ASA & plavix today  O; Vital Signs Last 24 Hrs  T(C): 36.7 (24 Nov 2019 05:44), Max: 37.1 (23 Nov 2019 12:12)  T(F): 98 (24 Nov 2019 05:44), Max: 98.8 (23 Nov 2019 12:12)  HR: 73 (24 Nov 2019 01:59) (73 - 94)  BP: 109/67 (24 Nov 2019 01:59) (109/67 - 137/75)  BP(mean): 100 (23 Nov 2019 12:12) (100 - 100)  RR: 16 (24 Nov 2019 05:44) (15 - 19)  SpO2: 96% (23 Nov 2019 13:00) (95% - 96%)      I&O's Detail    23 Nov 2019 07:01  -  24 Nov 2019 07:00  --------------------------------------------------------  IN:    lactated ringers.: 100 mL  Total IN: 100 mL    OUT:    Indwelling Catheter - Urethral: 1450 mL  Total OUT: 1450 mL    Total NET: -1350 mL        MEDICATIONS  (STANDING):  aspirin  chewable 81 milliGRAM(s) Oral daily  atorvastatin 40 milliGRAM(s) Oral at bedtime  clopidogrel Tablet 75 milliGRAM(s) Oral daily  finasteride 5 milliGRAM(s) Oral daily    EXAM:    abd: soft nt/nd. Blair in place, maroon colored urine    Labs:                        14.5   10.50 )-----------( 204      ( 24 Nov 2019 07:04 )             42.4         11-24    141  |  107  |  19  ----------------------------<  92  4.7   |  22  |  1.3      Calcium, Total Serum: 9.2 mg/dL (11-24-19 @ 07:04)    RADIOLOGY:  US Kidney and Bladder (11.24.19 @ 09:50) >    IMPRESSION:  No hydronephrosis or stone in either kidney.    Bilateral renal cysts, stable.    Decompressed urinary bladder, collapsed around Blair catheter balloon.    Prostatomegaly (71 cc).

## 2019-11-25 LAB
ANION GAP SERPL CALC-SCNC: 14 MMOL/L — SIGNIFICANT CHANGE UP (ref 7–14)
BUN SERPL-MCNC: 23 MG/DL — HIGH (ref 10–20)
CALCIUM SERPL-MCNC: 9.4 MG/DL — SIGNIFICANT CHANGE UP (ref 8.5–10.1)
CHLORIDE SERPL-SCNC: 103 MMOL/L — SIGNIFICANT CHANGE UP (ref 98–110)
CO2 SERPL-SCNC: 22 MMOL/L — SIGNIFICANT CHANGE UP (ref 17–32)
CREAT SERPL-MCNC: 1.3 MG/DL — SIGNIFICANT CHANGE UP (ref 0.7–1.5)
GLUCOSE SERPL-MCNC: 84 MG/DL — SIGNIFICANT CHANGE UP (ref 70–99)
HCT VFR BLD CALC: 44 % — SIGNIFICANT CHANGE UP (ref 42–52)
HGB BLD-MCNC: 15 G/DL — SIGNIFICANT CHANGE UP (ref 14–18)
MAGNESIUM SERPL-MCNC: 1.5 MG/DL — LOW (ref 1.8–2.4)
MCHC RBC-ENTMCNC: 31.1 PG — HIGH (ref 27–31)
MCHC RBC-ENTMCNC: 34.1 G/DL — SIGNIFICANT CHANGE UP (ref 32–37)
MCV RBC AUTO: 91.1 FL — SIGNIFICANT CHANGE UP (ref 80–94)
NRBC # BLD: 0 /100 WBCS — SIGNIFICANT CHANGE UP (ref 0–0)
PLATELET # BLD AUTO: 198 K/UL — SIGNIFICANT CHANGE UP (ref 130–400)
POTASSIUM SERPL-MCNC: 4.4 MMOL/L — SIGNIFICANT CHANGE UP (ref 3.5–5)
POTASSIUM SERPL-SCNC: 4.4 MMOL/L — SIGNIFICANT CHANGE UP (ref 3.5–5)
RBC # BLD: 4.83 M/UL — SIGNIFICANT CHANGE UP (ref 4.7–6.1)
RBC # FLD: 13.2 % — SIGNIFICANT CHANGE UP (ref 11.5–14.5)
SODIUM SERPL-SCNC: 139 MMOL/L — SIGNIFICANT CHANGE UP (ref 135–146)
WBC # BLD: 8.83 K/UL — SIGNIFICANT CHANGE UP (ref 4.8–10.8)
WBC # FLD AUTO: 8.83 K/UL — SIGNIFICANT CHANGE UP (ref 4.8–10.8)

## 2019-11-25 PROCEDURE — 99232 SBSQ HOSP IP/OBS MODERATE 35: CPT

## 2019-11-25 RX ADMIN — Medication 81 MILLIGRAM(S): at 11:26

## 2019-11-25 RX ADMIN — CLOPIDOGREL BISULFATE 75 MILLIGRAM(S): 75 TABLET, FILM COATED ORAL at 11:27

## 2019-11-25 RX ADMIN — ATORVASTATIN CALCIUM 40 MILLIGRAM(S): 80 TABLET, FILM COATED ORAL at 21:32

## 2019-11-25 RX ADMIN — FINASTERIDE 5 MILLIGRAM(S): 5 TABLET, FILM COATED ORAL at 11:26

## 2019-11-25 NOTE — PROGRESS NOTE ADULT - ASSESSMENT
75yo M c PMHx HTN, CAD on DAPT, nephrolithiasis s/p urosurgical intervention remotely, BPH on flomax, here with dizziness and presyncope/ recurrent. Patient was initially found to be bradycardic but this resolved during inpatient stay- His home beta blocker was held. He was found to have acute kidney injury as well as profound orthostatic hypotension. We held his antihypertensives (ARB)/beta blocker and hydrated patient intravenously. His creatinine improved to 1.4 from a value of 2.1 upon presentation to the hospital. Despite hydration, orthostatic hypotension persisted. Patient however responded favorably to thigh high compression stockings. He was evaluated by inhouse cardiologist who recommended patient to also pursue outpatient event monitor with his cardiologist Dr. Goncalves if dizziness episodes recur despite improvement in orthostatic hypotension. echocardiogram performed in-house was unremarkable with preserved EF, no obvious wall motion abnormality, no overt valvular disease.    patient's course was complicated by urinary retention. patient underwent emergency cystoscopy and OR blair placement for decompression. found to have urethral stricture  POD #2 uretheral stricture dilitation and blair insertion  urine more clear, trial of void underway  ambulating well with thigh high compression stockings today, no orthostatic symptoms      if voids well without PVR, then discharge home later today with outpatient pmd, urology, cardio follow ups

## 2019-11-25 NOTE — PROGRESS NOTE ADULT - SUBJECTIVE AND OBJECTIVE BOX
NUARMANDONIMESH RADHA  74y  Male      Patient is a 74y old  Male who presents with a chief complaint of Dizziness/Bradycardia (25 Nov 2019 08:59)      INTERVAL HPI/OVERNIGHT EVENTS: walking well, no dizziness. trial of void underway, no suprapubic pain noted      REVIEW OF SYSTEMS:  as above  All other review of systems negative    T(C): 35.8 (11-25-19 @ 14:34), Max: 36.9 (11-24-19 @ 21:58)  HR: 85 (11-25-19 @ 14:34) (69 - 85)  BP: 120/75 (11-25-19 @ 14:34) (111/59 - 120/75)  RR: 16 (11-25-19 @ 14:34) (16 - 16)  SpO2: --  Wt(kg): --Vital Signs Last 24 Hrs  T(C): 35.8 (25 Nov 2019 14:34), Max: 36.9 (24 Nov 2019 21:58)  T(F): 96.5 (25 Nov 2019 14:34), Max: 98.5 (24 Nov 2019 21:58)  HR: 85 (25 Nov 2019 14:34) (69 - 85)  BP: 120/75 (25 Nov 2019 14:34) (111/59 - 120/75)  BP(mean): --  RR: 16 (25 Nov 2019 14:34) (16 - 16)  SpO2: --      11-24-19 @ 07:01  -  11-25-19 @ 07:00  --------------------------------------------------------  IN: 680 mL / OUT: 1300 mL / NET: -620 mL        PHYSICAL EXAM:  GENERAL: NAD  PSYCH: no agitation, baseline mentation  NERVOUS SYSTEM:  Alert & Oriented X3, no new focal deficits  PULMONARY: Clear to percussion bilaterally; No rales, rhonchi, wheezing, or rubs  CARDIOVASCULAR: Regular rate and rhythm; No murmurs, rubs, or gallops  GI: Soft, Nontender, Nondistended; Bowel sounds present   no blair now, no suprapubic tenderness  EXTREMITIES:  2+ Peripheral Pulses, No clubbing, cyanosis, or edema    Consultant(s) Notes Reviewed:  [x ] YES  [ ] NO    Discussed with Consultants/Other Providers [ x] YES     LABS                          15.0   8.83  )-----------( 198      ( 25 Nov 2019 11:36 )             44.0     11-25    139  |  103  |  23<H>  ----------------------------<  84  4.4   |  22  |  1.3    Ca    9.4      25 Nov 2019 11:36  Mg     1.5     11-25    TPro  6.1  /  Alb  3.7  /  TBili  1.7<H>  /  DBili  x   /  AST  15  /  ALT  9   /  AlkPhos  79  11-24          Lactate Trend  11-21 @ 06:47 Lactate:1.0         CAPILLARY BLOOD GLUCOSE            RADIOLOGY & ADDITIONAL TESTS:    Imaging Personally Reviewed:  [ ] YES  [ ] NO    HEALTH ISSUES - PROBLEM Dx:  Bradycardia: Bradycardia

## 2019-11-25 NOTE — PROGRESS NOTE ADULT - ASSESSMENT
POD # 1 s/p urethral stricture, s/p dilatation & blair placement     - pt with improved hematuria: blair flushed by me -- pink tinged, no clots   - Blair removed by me for TOV   - pt instructed to get OOB/ambulate   - will follow

## 2019-11-25 NOTE — PROGRESS NOTE ADULT - SUBJECTIVE AND OBJECTIVE BOX
S; No new complaints. Garrison in place - pink tinged urine with few clots in tubing  O; Vital Signs Last 24 Hrs  T(C): 36.9 (25 Nov 2019 05:10), Max: 36.9 (24 Nov 2019 21:58)  T(F): 98.5 (25 Nov 2019 05:10), Max: 98.5 (24 Nov 2019 21:58)  HR: 69 (25 Nov 2019 05:10) (69 - 92)  BP: 111/59 (25 Nov 2019 05:10) (111/59 - 116/61)  BP(mean): --  RR: 16 (25 Nov 2019 05:10) (16 - 16)  SpO2: --    I&O's Detail    24 Nov 2019 07:01  -  25 Nov 2019 07:00  --------------------------------------------------------  IN:    Oral Fluid: 680 mL  Total IN: 680 mL    OUT:    Indwelling Catheter - Urethral: 1300 mL  Total OUT: 1300 mL    Total NET: -620 mL        EXAM:  abd: soft, NT/ND. Garrison in place, pink tinged urine

## 2019-11-26 ENCOUNTER — TRANSCRIPTION ENCOUNTER (OUTPATIENT)
Age: 74
End: 2019-11-26

## 2019-11-26 VITALS
TEMPERATURE: 97 F | HEART RATE: 72 BPM | RESPIRATION RATE: 16 BRPM | DIASTOLIC BLOOD PRESSURE: 88 MMHG | SYSTOLIC BLOOD PRESSURE: 167 MMHG

## 2019-11-26 LAB
APPEARANCE UR: CLEAR — SIGNIFICANT CHANGE UP
BACTERIA # UR AUTO: ABNORMAL
BILIRUB UR-MCNC: NEGATIVE — SIGNIFICANT CHANGE UP
COLOR SPEC: YELLOW — SIGNIFICANT CHANGE UP
DIFF PNL FLD: ABNORMAL
GLUCOSE UR QL: NEGATIVE MG/DL — SIGNIFICANT CHANGE UP
KETONES UR-MCNC: NEGATIVE — SIGNIFICANT CHANGE UP
LEUKOCYTE ESTERASE UR-ACNC: ABNORMAL
NITRITE UR-MCNC: NEGATIVE — SIGNIFICANT CHANGE UP
PH UR: 6 — SIGNIFICANT CHANGE UP (ref 5–8)
PROT UR-MCNC: ABNORMAL MG/DL
RBC CASTS # UR COMP ASSIST: ABNORMAL /HPF
SP GR SPEC: 1.01 — SIGNIFICANT CHANGE UP (ref 1.01–1.03)
UROBILINOGEN FLD QL: 0.2 MG/DL — SIGNIFICANT CHANGE UP (ref 0.2–0.2)
WBC UR QL: ABNORMAL /HPF

## 2019-11-26 PROCEDURE — 99233 SBSQ HOSP IP/OBS HIGH 50: CPT

## 2019-11-26 RX ORDER — CEFPODOXIME PROXETIL 100 MG
200 TABLET ORAL EVERY 12 HOURS
Refills: 0 | Status: DISCONTINUED | OUTPATIENT
Start: 2019-11-26 | End: 2019-11-26

## 2019-11-26 RX ORDER — PHENAZOPYRIDINE HCL 100 MG
200 TABLET ORAL ONCE
Refills: 0 | Status: COMPLETED | OUTPATIENT
Start: 2019-11-26 | End: 2019-11-26

## 2019-11-26 RX ORDER — CEFPODOXIME PROXETIL 100 MG
1 TABLET ORAL
Qty: 20 | Refills: 0
Start: 2019-11-26 | End: 2019-12-05

## 2019-11-26 RX ORDER — MAGNESIUM OXIDE 400 MG ORAL TABLET 241.3 MG
400 TABLET ORAL
Refills: 0 | Status: DISCONTINUED | OUTPATIENT
Start: 2019-11-26 | End: 2019-11-26

## 2019-11-26 RX ORDER — SODIUM CHLORIDE 0.65 %
1 AEROSOL, SPRAY (ML) NASAL EVERY 6 HOURS
Refills: 0 | Status: DISCONTINUED | OUTPATIENT
Start: 2019-11-26 | End: 2019-11-26

## 2019-11-26 RX ADMIN — CLOPIDOGREL BISULFATE 75 MILLIGRAM(S): 75 TABLET, FILM COATED ORAL at 13:18

## 2019-11-26 RX ADMIN — MAGNESIUM OXIDE 400 MG ORAL TABLET 400 MILLIGRAM(S): 241.3 TABLET ORAL at 13:18

## 2019-11-26 RX ADMIN — Medication 200 MILLIGRAM(S): at 13:18

## 2019-11-26 RX ADMIN — MAGNESIUM OXIDE 400 MG ORAL TABLET 400 MILLIGRAM(S): 241.3 TABLET ORAL at 09:45

## 2019-11-26 RX ADMIN — Medication 1 SPRAY(S): at 09:45

## 2019-11-26 RX ADMIN — FINASTERIDE 5 MILLIGRAM(S): 5 TABLET, FILM COATED ORAL at 13:18

## 2019-11-26 RX ADMIN — Medication 81 MILLIGRAM(S): at 13:18

## 2019-11-26 NOTE — PROGRESS NOTE ADULT - ATTENDING COMMENTS
Patient seen and examined independently of PA. My addendum supersedes PA notation. Case discussed with housestaff, nursing and patient
Patient seen and examined independently of PA. My addendum supersedes PA notation. Case discussed with housestaff, nursing and patient
Patient seen and examined independently of PA. My addendum supersedes PA notation. Case discussed with housestaff, nursing and patient    >30 minutes spent coordinating discharge planning, medicine reconciliation, follow up plan, and direct patient encounter  see discharge summary for further recommendation
Patient seen and examined independently of PA. My addendum supersedes resident notation. Case discussed with housestaff, nursing and patient
I was physically present for the key portions of the evaluation and management [ E/M] service provided and agree with the plan which i have reviewed and edited where appropriate     -anticipate TOV and discharge  tomorrow
I was physically present for the key portions of the evaluation and management [ E/M] service provided and agree with the plan which i have reviewed and edited where appropriate   informed consent obtained including not limiting to pain, bleeding, risks, postop course, success,, failures, catheters, stents,, UTI sepsis,, future procedures, transfusions,, alternatives, unforeseen complications such as MI, CVA, pulmonary embolus, DVT., secondary procedures,  post-op course, expectations, methodology  of the procedure.

## 2019-11-26 NOTE — PROGRESS NOTE ADULT - REASON FOR ADMISSION
Dizziness/Bradycardia

## 2019-11-26 NOTE — PROGRESS NOTE ADULT - ASSESSMENT
75yo M c PMHx HTN, CAD on DAPT, nephrolithiasis s/p urosurgical intervention remotely, BPH on flomax, here with dizziness and presyncope/ recurrent. Patient was initially found to be bradycardic but this resolved during inpatient stay- His home beta blocker was held. He was found to have acute kidney injury as well as profound orthostatic hypotension. We held his antihypertensives (ARB)/beta blocker and hydrated patient intravenously. His creatinine improved to 1.4 from a value of 2.1 upon presentation to the hospital. Despite hydration, orthostatic hypotension persisted. Patient however responded favorably to thigh high compression stockings. He was evaluated by inhouse cardiologist who recommended patient to also pursue outpatient event monitor with his cardiologist Dr. Goncalves if dizziness episodes recur despite improvement in orthostatic hypotension. echocardiogram performed in-house was unremarkable with preserved EF, no obvious wall motion abnormality, no overt valvular disease.    patient's course was complicated by urinary retention. patient underwent emergency cystoscopy and OR blair placement for decompression. found to have urethral stricture  POD #3 uretheral stricture dilitation and blair insertion  passed voiding trial  had a bit of dysuria, started on empiric antibiotic which patient will complete therapy orally at home  ambulating well with thigh high compression stockings today, no orthostatic symptoms    medically stable for discharge home today  will need outpatient follow up with his pmd Dr. Ko, and urologist Dr. Curiel

## 2019-11-26 NOTE — PROGRESS NOTE ADULT - SUBJECTIVE AND OBJECTIVE BOX
TRISTEN RADHA  74y  Male      Patient is a 74y old  Male who presents with a chief complaint of Dizziness/Bradycardia (2019 14:41)      INTERVAL HPI/OVERNIGHT EVENTS: dizziness resolved. mentions a bit of dysuria today. passed tov. walked with compression stockings, not dizzy      REVIEW OF SYSTEMS:  as above  All other review of systems negative    T(C): 35.6 (19 @ 05:57), Max: 36.1 (19 @ 22:00)  HR: 83 (19 @ 05:57) (64 - 85)  BP: 144/86 (19 @ 05:57) (120/75 - 171/89)  RR: 16 (19 @ 05:57) (16 - 16)  SpO2: --  Wt(kg): --Vital Signs Last 24 Hrs  T(C): 35.6 (2019 05:57), Max: 36.1 (2019 22:00)  T(F): 96.1 (2019 05:57), Max: 97 (2019 22:00)  HR: 83 (2019 05:57) (64 - 85)  BP: 144/86 (2019 05:57) (120/75 - 171/89)  BP(mean): --  RR: 16 (2019 05:57) (16 - 16)  SpO2: --      19 @ 07:01  -  19 @ 07:00  --------------------------------------------------------  IN: 200 mL / OUT: 755 mL / NET: -555 mL        PHYSICAL EXAM:  GENERAL: NAD  PSYCH: no agitation, baseline mentation  NERVOUS SYSTEM:  Alert & Oriented X3, no new focal deficits  PULMONARY: Clear to percussion bilaterally; No rales, rhonchi, wheezing, or rubs  CARDIOVASCULAR: Regular rate and rhythm; No murmurs, rubs, or gallops  GI: Soft, Nontender, Nondistended; Bowel sounds present   no blair, mild suprapubic tenderness  EXTREMITIES:  2+ Peripheral Pulses, No clubbing, cyanosis, or edema    Consultant(s) Notes Reviewed:  [x ] YES  [ ] NO    Discussed with Consultants/Other Providers [ x] YES     LABS                          15.0   8.83  )-----------( 198      ( 2019 11:36 )             44.0         139  |  103  |  23<H>  ----------------------------<  84  4.4   |  22  |  1.3    Ca    9.4      2019 11:36  Mg     1.5             Urinalysis Basic - ( 2019 07:19 )    Color: Yellow / Appearance: Clear / S.015 / pH: x  Gluc: x / Ketone: Negative  / Bili: Negative / Urobili: 0.2 mg/dL   Blood: x / Protein: Trace mg/dL / Nitrite: Negative   Leuk Esterase: Trace / RBC: 26-50 /HPF / WBC 10-25 /HPF   Sq Epi: x / Non Sq Epi: x / Bacteria: Moderate        Lactate Trend        CAPILLARY BLOOD GLUCOSE            RADIOLOGY & ADDITIONAL TESTS:    Imaging Personally Reviewed:  [ ] YES  [ ] NO    HEALTH ISSUES - PROBLEM Dx:  Bradycardia: Bradycardia

## 2019-11-26 NOTE — DISCHARGE NOTE NURSING/CASE MANAGEMENT/SOCIAL WORK - PATIENT PORTAL LINK FT
You can access the FollowMyHealth Patient Portal offered by [facility] by registering at the following website: [email or web address]. By joining [facility]’s FollowMyHealth portal, you will also be able to view your health information using other applications (apps) compatible with our system.

## 2019-11-27 ENCOUNTER — MESSAGE (OUTPATIENT)
Age: 74
End: 2019-11-27

## 2019-11-27 ENCOUNTER — EMERGENCY (EMERGENCY)
Facility: HOSPITAL | Age: 74
LOS: 0 days | Discharge: HOME | End: 2019-11-27
Attending: EMERGENCY MEDICINE | Admitting: EMERGENCY MEDICINE
Payer: MEDICARE

## 2019-11-27 VITALS
DIASTOLIC BLOOD PRESSURE: 87 MMHG | SYSTOLIC BLOOD PRESSURE: 133 MMHG | OXYGEN SATURATION: 96 % | HEART RATE: 100 BPM | RESPIRATION RATE: 18 BRPM | TEMPERATURE: 96 F

## 2019-11-27 VITALS
HEART RATE: 102 BPM | DIASTOLIC BLOOD PRESSURE: 91 MMHG | OXYGEN SATURATION: 98 % | TEMPERATURE: 97 F | RESPIRATION RATE: 20 BRPM | SYSTOLIC BLOOD PRESSURE: 143 MMHG

## 2019-11-27 DIAGNOSIS — H26.9 UNSPECIFIED CATARACT: Chronic | ICD-10-CM

## 2019-11-27 DIAGNOSIS — R19.8 OTHER SPECIFIED SYMPTOMS AND SIGNS INVOLVING THE DIGESTIVE SYSTEM AND ABDOMEN: ICD-10-CM

## 2019-11-27 DIAGNOSIS — Z98.890 OTHER SPECIFIED POSTPROCEDURAL STATES: Chronic | ICD-10-CM

## 2019-11-27 DIAGNOSIS — R33.9 RETENTION OF URINE, UNSPECIFIED: ICD-10-CM

## 2019-11-27 LAB
ANION GAP SERPL CALC-SCNC: 17 MMOL/L — HIGH (ref 7–14)
APPEARANCE UR: ABNORMAL
BACTERIA # UR AUTO: ABNORMAL
BILIRUB UR-MCNC: NEGATIVE — SIGNIFICANT CHANGE UP
BUN SERPL-MCNC: 16 MG/DL — SIGNIFICANT CHANGE UP (ref 10–20)
CALCIUM SERPL-MCNC: 9.5 MG/DL — SIGNIFICANT CHANGE UP (ref 8.5–10.1)
CHLORIDE SERPL-SCNC: 103 MMOL/L — SIGNIFICANT CHANGE UP (ref 98–110)
CO2 SERPL-SCNC: 19 MMOL/L — SIGNIFICANT CHANGE UP (ref 17–32)
COLOR SPEC: YELLOW — SIGNIFICANT CHANGE UP
CREAT SERPL-MCNC: 1.2 MG/DL — SIGNIFICANT CHANGE UP (ref 0.7–1.5)
DIFF PNL FLD: ABNORMAL
EPI CELLS # UR: ABNORMAL /HPF
GLUCOSE SERPL-MCNC: 107 MG/DL — HIGH (ref 70–99)
GLUCOSE UR QL: NEGATIVE MG/DL — SIGNIFICANT CHANGE UP
HCT VFR BLD CALC: 44.5 % — SIGNIFICANT CHANGE UP (ref 42–52)
HGB BLD-MCNC: 15.7 G/DL — SIGNIFICANT CHANGE UP (ref 14–18)
KETONES UR-MCNC: ABNORMAL
LEUKOCYTE ESTERASE UR-ACNC: NEGATIVE — SIGNIFICANT CHANGE UP
MCHC RBC-ENTMCNC: 31.3 PG — HIGH (ref 27–31)
MCHC RBC-ENTMCNC: 35.3 G/DL — SIGNIFICANT CHANGE UP (ref 32–37)
MCV RBC AUTO: 88.6 FL — SIGNIFICANT CHANGE UP (ref 80–94)
NITRITE UR-MCNC: NEGATIVE — SIGNIFICANT CHANGE UP
NRBC # BLD: 0 /100 WBCS — SIGNIFICANT CHANGE UP (ref 0–0)
PH UR: 6 — SIGNIFICANT CHANGE UP (ref 5–8)
PLATELET # BLD AUTO: 218 K/UL — SIGNIFICANT CHANGE UP (ref 130–400)
POTASSIUM SERPL-MCNC: 5 MMOL/L — SIGNIFICANT CHANGE UP (ref 3.5–5)
POTASSIUM SERPL-SCNC: 5 MMOL/L — SIGNIFICANT CHANGE UP (ref 3.5–5)
PROT UR-MCNC: 100 MG/DL
RBC # BLD: 5.02 M/UL — SIGNIFICANT CHANGE UP (ref 4.7–6.1)
RBC # FLD: 13.1 % — SIGNIFICANT CHANGE UP (ref 11.5–14.5)
RBC CASTS # UR COMP ASSIST: ABNORMAL /HPF
SODIUM SERPL-SCNC: 139 MMOL/L — SIGNIFICANT CHANGE UP (ref 135–146)
SP GR SPEC: 1.02 — SIGNIFICANT CHANGE UP (ref 1.01–1.03)
UROBILINOGEN FLD QL: 0.2 MG/DL — SIGNIFICANT CHANGE UP (ref 0.2–0.2)
WBC # BLD: 11.07 K/UL — HIGH (ref 4.8–10.8)
WBC # FLD AUTO: 11.07 K/UL — HIGH (ref 4.8–10.8)
WBC UR QL: ABNORMAL /HPF

## 2019-11-27 PROCEDURE — 51705 CHANGE OF BLADDER TUBE: CPT

## 2019-11-27 PROCEDURE — 99284 EMERGENCY DEPT VISIT MOD MDM: CPT | Mod: 25

## 2019-11-27 NOTE — ED PROVIDER NOTE - NS ED ROS FT
Review of Systems    Constitutional: (-) fever, (-) chills  Eyes/ENT: (-) blurry vision, (-) epistaxis, (-) sore throat  Cardiovascular: (-) chest pain, (-) syncope  Respiratory: (-) cough, (-) shortness of breath  Gastrointestinal: (+) abdominal fullness, (-) nausea, (-) vomiting, (-) diarrhea  : (+) urinary retention  Musculoskeletal: (-) neck pain, (-) back pain, (-) joint pain  Integumentary: (-) rash, (-) edema  Neurological: (-) headache, (-) altered mental status

## 2019-11-27 NOTE — ED PROVIDER NOTE - OBJECTIVE STATEMENT
73 yo M  c/o urinary retention. Patient had procedure last week to have blair placed by Dr. Curiel. Blair was removed 2 days ago and patient has been unable to urinate since 5pm yesterday. +abdominal fullness/discomfort.  No fevers, n/v, or back pains.

## 2019-11-27 NOTE — ED PROVIDER NOTE - CARE PROVIDER_API CALL
your PMD,   Phone: (   )    -  Fax: (   )    -  Follow Up Time: 1-3 Days    Han Curiel)  Urology  [address] [place] [address]  Phone: [phone]  Fax: [phone]  Follow Up Time: 1-3 Days

## 2019-11-27 NOTE — ED PROVIDER NOTE - PROVIDER TOKENS
FREE:[LAST:[your PMD],PHONE:[(   )    -],FAX:[(   )    -],FOLLOWUP:[1-3 Days]],PROVIDER:[TOKEN:[[id number]],FOLLOWUP:[1-3 Days]]

## 2019-11-27 NOTE — ED PROVIDER NOTE - PHYSICAL EXAMINATION
Gen: Alert, NAD, well appearing  Head: NC, AT, PERRL, EOMI, normal lids/conjunctiva  ENT: normal hearing, patent oropharynx  Neck: +supple, no tenderness/meningismus,  Pulm: Bilateral BS, normal resp effort, no wheeze/stridor/retractions  CV: RRR, no murmer  Abd: +suprapubic fullness/discomfort. No guarding or rebound  Mskel: no edema/erythema/cyanosis  Skin: no rash, warm/dry  Neuro: AAOx3, no sensory/motor deficits

## 2019-11-27 NOTE — ED PROVIDER NOTE - CLINICAL SUMMARY MEDICAL DECISION MAKING FREE TEXT BOX
Patient improved now asymptomatic at this time blair placed I will discharge patient tolerated well I will discharge for folllow up to urology

## 2019-11-27 NOTE — ED PROVIDER NOTE - PROGRESS NOTE DETAILS
Garrison placed, patient feels much better, abdomen soft, NT. 1500 cc urine output ATTENDING NOTE: 73 y/o M had a procedure done two days ago to open a stricter. Last night Pt was d/c from the hospital and is here today because he is still not able to urinate. Pt has no fevers, chills, n/v/d, cough, CP or SOB, but is c/o ABD fullness and pain. On exam: NCAT. PERRLA, EOMI. OP clear. Lungs CTAB. RRR, S1S2 noted. Radial pulses 2+ and equal, pedal pulses 2+ and equal. Abdomen soft, NT/ND, no rebound or guarding. FROM x4 extremities. No focal neuro deficits. Plan: Will place Garrison.

## 2019-11-27 NOTE — ED PROVIDER NOTE - ATTENDING CONTRIBUTION TO CARE
I was present for and supervised the key and critical aspects of the procedures performed during the care of the patient. ATTENDING NOTE: 73 y/o M had a procedure done two days ago to open a stricture. Last night Pt was d/c from the hospital and is here today because he is still not able to urinate. Pt has no fevers, chills, n/v/d, cough, CP or SOB, but is c/o ABD fullness and pain. On exam: NCAT. PERRLA, EOMI. OP clear. Lungs CTAB. RRR, S1S2 noted. Radial pulses 2+ and equal, pedal pulses 2+ and equal. Abdomen soft, NT/ND, no rebound or guarding. FROM x4 extremities. No focal neuro deficits. Plan: Will place Garrison.

## 2019-11-27 NOTE — ED PROVIDER NOTE - PATIENT PORTAL LINK FT
You can access the FollowMyHealth Patient Portal offered by [facility] by registering at the following website: [email or web address]. By joining [facility]’s FollowMyHealth portal, you will also be able to view your health information using other applications (apps) compatible with our system.

## 2019-11-27 NOTE — ED PROVIDER NOTE - NSFOLLOWUPINSTRUCTIONS_ED_ALL_ED_FT
Urinary Retention    Acute urinary retention is the temporary inability to urinate. This is a common problem in older men. As men age their prostates become larger and block the flow of urine from the bladder. If you are sent home with a blair catheter and a drainage system make sure to keep the drainage bag emptied and lower than your catheter. Keep the blair catheter in until you follow up with a urologist.    There are two main types of drainage bags. One is a large bag that usually is used at night. It has a good capacity that will allow you to sleep through the night without having to empty it. The second type is called a leg bag. It has a smaller capacity, so it needs to be emptied more frequently. However, the main advantage is that it can be attached by a leg strap and can go underneath your clothing, allowing you the freedom to move about or leave your home.     SEEK IMMEDIATE MEDICAL CARE IF YOU DEVELOP THE FOLLOWING SYMPTOMS: the catheter stops draining urine, the catheter falls out, abdominal pain, nausea/vomiting, or chills/fever.

## 2019-11-28 LAB
CULTURE RESULTS: NO GROWTH — SIGNIFICANT CHANGE UP
SPECIMEN SOURCE: SIGNIFICANT CHANGE UP

## 2019-12-02 ENCOUNTER — MESSAGE (OUTPATIENT)
Age: 74
End: 2019-12-02

## 2019-12-02 DIAGNOSIS — G93.0 CEREBRAL CYSTS: ICD-10-CM

## 2019-12-02 DIAGNOSIS — Z79.02 LONG TERM (CURRENT) USE OF ANTITHROMBOTICS/ANTIPLATELETS: ICD-10-CM

## 2019-12-02 DIAGNOSIS — I12.9 HYPERTENSIVE CHRONIC KIDNEY DISEASE WITH STAGE 1 THROUGH STAGE 4 CHRONIC KIDNEY DISEASE, OR UNSPECIFIED CHRONIC KIDNEY DISEASE: ICD-10-CM

## 2019-12-02 DIAGNOSIS — E86.0 DEHYDRATION: ICD-10-CM

## 2019-12-02 DIAGNOSIS — N40.1 BENIGN PROSTATIC HYPERPLASIA WITH LOWER URINARY TRACT SYMPTOMS: ICD-10-CM

## 2019-12-02 DIAGNOSIS — I95.1 ORTHOSTATIC HYPOTENSION: ICD-10-CM

## 2019-12-02 DIAGNOSIS — R00.1 BRADYCARDIA, UNSPECIFIED: ICD-10-CM

## 2019-12-02 DIAGNOSIS — N18.3 CHRONIC KIDNEY DISEASE, STAGE 3 (MODERATE): ICD-10-CM

## 2019-12-02 DIAGNOSIS — Z87.891 PERSONAL HISTORY OF NICOTINE DEPENDENCE: ICD-10-CM

## 2019-12-02 DIAGNOSIS — I25.10 ATHEROSCLEROTIC HEART DISEASE OF NATIVE CORONARY ARTERY WITHOUT ANGINA PECTORIS: ICD-10-CM

## 2019-12-02 DIAGNOSIS — N35.819 OTHER URETHRAL STRICTURE, MALE, UNSPECIFIED SITE: ICD-10-CM

## 2019-12-02 DIAGNOSIS — Z95.5 PRESENCE OF CORONARY ANGIOPLASTY IMPLANT AND GRAFT: ICD-10-CM

## 2019-12-02 DIAGNOSIS — H26.9 UNSPECIFIED CATARACT: ICD-10-CM

## 2019-12-02 DIAGNOSIS — R42 DIZZINESS AND GIDDINESS: ICD-10-CM

## 2019-12-02 DIAGNOSIS — N17.9 ACUTE KIDNEY FAILURE, UNSPECIFIED: ICD-10-CM

## 2019-12-03 ENCOUNTER — APPOINTMENT (OUTPATIENT)
Dept: UROLOGY | Facility: CLINIC | Age: 74
End: 2019-12-03
Payer: MEDICARE

## 2019-12-03 DIAGNOSIS — Z87.448 PERSONAL HISTORY OF OTHER DISEASES OF URINARY SYSTEM: ICD-10-CM

## 2019-12-03 PROCEDURE — 99213 OFFICE O/P EST LOW 20 MIN: CPT

## 2019-12-04 ENCOUNTER — APPOINTMENT (OUTPATIENT)
Dept: UROLOGY | Facility: CLINIC | Age: 74
End: 2019-12-04
Payer: MEDICARE

## 2019-12-04 PROCEDURE — 51703 INSERT BLADDER CATH COMPLEX: CPT

## 2019-12-04 PROCEDURE — 51798 US URINE CAPACITY MEASURE: CPT

## 2019-12-10 ENCOUNTER — APPOINTMENT (OUTPATIENT)
Dept: UROLOGY | Facility: CLINIC | Age: 74
End: 2019-12-10
Payer: MEDICARE

## 2019-12-10 PROCEDURE — 99213 OFFICE O/P EST LOW 20 MIN: CPT | Mod: 25

## 2019-12-10 PROCEDURE — 51798 US URINE CAPACITY MEASURE: CPT

## 2019-12-10 PROCEDURE — 51701 INSERT BLADDER CATHETER: CPT

## 2019-12-11 ENCOUNTER — MESSAGE (OUTPATIENT)
Age: 74
End: 2019-12-11

## 2020-01-21 ENCOUNTER — APPOINTMENT (OUTPATIENT)
Dept: UROLOGY | Facility: CLINIC | Age: 75
End: 2020-01-21
Payer: MEDICARE

## 2020-01-21 PROCEDURE — 93975 VASCULAR STUDY: CPT

## 2020-01-28 ENCOUNTER — APPOINTMENT (OUTPATIENT)
Dept: UROLOGY | Facility: CLINIC | Age: 75
End: 2020-01-28
Payer: MEDICARE

## 2020-01-28 PROCEDURE — 51798 US URINE CAPACITY MEASURE: CPT

## 2020-01-28 PROCEDURE — 99213 OFFICE O/P EST LOW 20 MIN: CPT

## 2020-02-04 ENCOUNTER — APPOINTMENT (OUTPATIENT)
Dept: UROLOGY | Facility: CLINIC | Age: 75
End: 2020-02-04

## 2020-07-28 ENCOUNTER — APPOINTMENT (OUTPATIENT)
Dept: UROLOGY | Facility: CLINIC | Age: 75
End: 2020-07-28
Payer: MEDICARE

## 2020-07-28 VITALS — TEMPERATURE: 98 F | BODY MASS INDEX: 25.83 KG/M2 | WEIGHT: 155 LBS | HEIGHT: 65 IN

## 2020-07-28 PROCEDURE — 99213 OFFICE O/P EST LOW 20 MIN: CPT

## 2020-07-28 PROCEDURE — 51798 US URINE CAPACITY MEASURE: CPT

## 2020-07-28 RX ORDER — ASPIRIN 81 MG
81 TABLET, DELAYED RELEASE (ENTERIC COATED) ORAL
Refills: 0 | Status: ACTIVE | COMMUNITY

## 2020-07-28 RX ORDER — UBIDECARENONE/VIT E ACET 100MG-5
CAPSULE ORAL
Refills: 0 | Status: ACTIVE | COMMUNITY

## 2021-02-16 ENCOUNTER — APPOINTMENT (OUTPATIENT)
Dept: UROLOGY | Facility: CLINIC | Age: 76
End: 2021-02-16
Payer: MEDICARE

## 2021-02-16 VITALS — WEIGHT: 155 LBS | TEMPERATURE: 97 F | HEIGHT: 65 IN | BODY MASS INDEX: 25.83 KG/M2

## 2021-02-16 PROCEDURE — 99214 OFFICE O/P EST MOD 30 MIN: CPT

## 2021-02-16 PROCEDURE — 99072 ADDL SUPL MATRL&STAF TM PHE: CPT

## 2021-06-30 ENCOUNTER — APPOINTMENT (OUTPATIENT)
Dept: UROLOGY | Facility: CLINIC | Age: 76
End: 2021-06-30
Payer: MEDICARE

## 2021-06-30 VITALS — WEIGHT: 155 LBS | HEIGHT: 69 IN | BODY MASS INDEX: 22.96 KG/M2

## 2021-06-30 PROCEDURE — 99213 OFFICE O/P EST LOW 20 MIN: CPT

## 2021-06-30 NOTE — HISTORY OF PRESENT ILLNESS
[Urinary Frequency] : urinary frequency [Nocturia] : nocturia [None] : None [FreeTextEntry1] : 75 y.o male   is here for a follow up for his elevated PSA. Pt states he is active. Pt reports he is voiding well- stream good.\par maintained on flomax and finasteride\par \par 2020 bladder scan- 93ml (pt voided 1 1/2 hrs ago)TRUS- 62gms\par * did not have PSA done\par \par 12/20 psa = 7.2    %fpsa = 11  // Bladder scan = 150cc --1 hr void. \par 5/21 psa = 11.6  // PSAD =.12 // UA = neg  [Urinary Retention] : no urinary retention [Urinary Urgency] : no urinary urgency [Straining] : no straining [Weak Stream] : no weak stream [Dysuria] : no dysuria [Hematuria - Gross] : no gross hematuria [Fever] : no fever

## 2021-06-30 NOTE — ASSESSMENT
[FreeTextEntry1] : 1. s/p AUR during hospitalization for dizziness\par 2. urethral stricture requiring , urethral dilation and catheter placement in the OR\par 3. New elevated psa\par 4. BPH \par \par \par Plan= \par -continue meds\par -Discussed options of PSA elevation.; Proceed with prostate biopsy, surveillance and repeat PSA in 6 months, or proceed with MP MRI and then decide on prostate biopsy. We discussed risks, benefits, presence of prostate carcinoma, pain, UTI, possible complications of each decision.\par Patient has elected to repeat PSA in 6 months. \par -Discussed PSA and PSAD results with pt\par -mpMRI --pt agrees\par -BMP\par -rto 4 weeks

## 2021-06-30 NOTE — PHYSICAL EXAM
[General Appearance - Well Developed] : well developed [General Appearance - Well Nourished] : well nourished [Normal Appearance] : normal appearance [Well Groomed] : well groomed [General Appearance - In No Acute Distress] : no acute distress [Abdomen Soft] : soft [Abdomen Tenderness] : non-tender [Costovertebral Angle Tenderness] : no ~M costovertebral angle tenderness [Urethral Meatus] : meatus normal [Scrotum] : the scrotum was normal [Skin Color & Pigmentation] : normal skin color and pigmentation [Edema] : no peripheral edema [] : no respiratory distress [Respiration, Rhythm And Depth] : normal respiratory rhythm and effort [Exaggerated Use Of Accessory Muscles For Inspiration] : no accessory muscle use [Oriented To Time, Place, And Person] : oriented to person, place, and time [Affect] : the affect was normal [Mood] : the mood was normal [Not Anxious] : not anxious [Normal Station and Gait] : the gait and station were normal for the patient's age [No Focal Deficits] : no focal deficits [No Palpable Adenopathy] : no palpable adenopathy

## 2021-06-30 NOTE — END OF VISIT
[FreeTextEntry3] : Patient notes was transcribed by ela East     under the supervision of Dr. Curiel.\par And I have   reviewed the patient's chart and agree that it alines  with my medical decisions.\par

## 2021-08-05 ENCOUNTER — RX RENEWAL (OUTPATIENT)
Age: 76
End: 2021-08-05

## 2021-08-19 ENCOUNTER — OUTPATIENT (OUTPATIENT)
Dept: OUTPATIENT SERVICES | Facility: HOSPITAL | Age: 76
LOS: 1 days | Discharge: HOME | End: 2021-08-19
Payer: MEDICARE

## 2021-08-19 ENCOUNTER — RESULT REVIEW (OUTPATIENT)
Age: 76
End: 2021-08-19

## 2021-08-19 DIAGNOSIS — Z98.890 OTHER SPECIFIED POSTPROCEDURAL STATES: Chronic | ICD-10-CM

## 2021-08-19 DIAGNOSIS — R97.20 ELEVATED PROSTATE SPECIFIC ANTIGEN [PSA]: ICD-10-CM

## 2021-08-19 DIAGNOSIS — H26.9 UNSPECIFIED CATARACT: Chronic | ICD-10-CM

## 2021-08-19 PROCEDURE — 72197 MRI PELVIS W/O & W/DYE: CPT | Mod: 26

## 2021-08-24 ENCOUNTER — APPOINTMENT (OUTPATIENT)
Dept: UROLOGY | Facility: CLINIC | Age: 76
End: 2021-08-24
Payer: MEDICARE

## 2021-08-24 VITALS — WEIGHT: 155 LBS | HEIGHT: 69 IN | BODY MASS INDEX: 22.96 KG/M2

## 2021-08-24 LAB
BILIRUB UR QL STRIP: NORMAL
COLLECTION METHOD: NORMAL
GLUCOSE UR-MCNC: NORMAL
HCG UR QL: 1 EU/DL
HGB UR QL STRIP.AUTO: NORMAL
KETONES UR-MCNC: NORMAL
LEUKOCYTE ESTERASE UR QL STRIP: NORMAL
NITRITE UR QL STRIP: NORMAL
PH UR STRIP: 7
PROT UR STRIP-MCNC: 30
SP GR UR STRIP: 1.02

## 2021-08-24 PROCEDURE — 99214 OFFICE O/P EST MOD 30 MIN: CPT

## 2021-08-30 ENCOUNTER — NON-APPOINTMENT (OUTPATIENT)
Age: 76
End: 2021-08-30

## 2021-08-30 LAB — BACTERIA UR CULT: NORMAL

## 2021-08-31 RX ORDER — ENEMA 19; 7 G/133ML; G/133ML
7-19 ENEMA RECTAL
Qty: 1 | Refills: 0 | Status: ACTIVE | COMMUNITY
Start: 2021-08-31 | End: 1900-01-01

## 2021-09-21 ENCOUNTER — LABORATORY RESULT (OUTPATIENT)
Age: 76
End: 2021-09-21

## 2021-09-21 ENCOUNTER — APPOINTMENT (OUTPATIENT)
Dept: UROLOGY | Facility: CLINIC | Age: 76
End: 2021-09-21
Payer: MEDICARE

## 2021-09-21 PROCEDURE — 76872 US TRANSRECTAL: CPT

## 2021-09-21 PROCEDURE — 55700: CPT

## 2021-10-05 ENCOUNTER — APPOINTMENT (OUTPATIENT)
Dept: UROLOGY | Facility: CLINIC | Age: 76
End: 2021-10-05
Payer: MEDICARE

## 2021-10-05 PROCEDURE — 99214 OFFICE O/P EST MOD 30 MIN: CPT

## 2021-10-11 ENCOUNTER — NON-APPOINTMENT (OUTPATIENT)
Age: 76
End: 2021-10-11

## 2021-10-12 ENCOUNTER — INPATIENT (INPATIENT)
Facility: HOSPITAL | Age: 76
LOS: 3 days | Discharge: HOME | End: 2021-10-16
Attending: INTERNAL MEDICINE | Admitting: INTERNAL MEDICINE
Payer: MEDICARE

## 2021-10-12 VITALS
RESPIRATION RATE: 18 BRPM | HEIGHT: 69 IN | WEIGHT: 154.98 LBS | OXYGEN SATURATION: 100 % | HEART RATE: 103 BPM | DIASTOLIC BLOOD PRESSURE: 83 MMHG | SYSTOLIC BLOOD PRESSURE: 113 MMHG

## 2021-10-12 DIAGNOSIS — Z98.890 OTHER SPECIFIED POSTPROCEDURAL STATES: Chronic | ICD-10-CM

## 2021-10-12 DIAGNOSIS — H26.9 UNSPECIFIED CATARACT: Chronic | ICD-10-CM

## 2021-10-12 LAB
ALBUMIN SERPL ELPH-MCNC: 4 G/DL — SIGNIFICANT CHANGE UP (ref 3.5–5.2)
ALP SERPL-CCNC: 91 U/L — SIGNIFICANT CHANGE UP (ref 30–115)
ALT FLD-CCNC: 9 U/L — SIGNIFICANT CHANGE UP (ref 0–41)
ANION GAP SERPL CALC-SCNC: 15 MMOL/L — HIGH (ref 7–14)
APTT BLD: 29.4 SEC — SIGNIFICANT CHANGE UP (ref 27–39.2)
AST SERPL-CCNC: 17 U/L — SIGNIFICANT CHANGE UP (ref 0–41)
BASE EXCESS BLDV CALC-SCNC: -4.1 MMOL/L — LOW (ref -2–3)
BASOPHILS # BLD AUTO: 0.02 K/UL — SIGNIFICANT CHANGE UP (ref 0–0.2)
BASOPHILS NFR BLD AUTO: 0.2 % — SIGNIFICANT CHANGE UP (ref 0–1)
BILIRUB SERPL-MCNC: 1.7 MG/DL — HIGH (ref 0.2–1.2)
BLD GP AB SCN SERPL QL: SIGNIFICANT CHANGE UP
BLD GP AB SCN SERPL QL: SIGNIFICANT CHANGE UP
BUN SERPL-MCNC: 26 MG/DL — HIGH (ref 10–20)
CA-I SERPL-SCNC: 1.24 MMOL/L — SIGNIFICANT CHANGE UP (ref 1.15–1.33)
CALCIUM SERPL-MCNC: 9.7 MG/DL — SIGNIFICANT CHANGE UP (ref 8.5–10.1)
CHLORIDE SERPL-SCNC: 105 MMOL/L — SIGNIFICANT CHANGE UP (ref 98–110)
CO2 SERPL-SCNC: 19 MMOL/L — SIGNIFICANT CHANGE UP (ref 17–32)
CREAT SERPL-MCNC: 1.4 MG/DL — SIGNIFICANT CHANGE UP (ref 0.7–1.5)
EOSINOPHIL # BLD AUTO: 0.04 K/UL — SIGNIFICANT CHANGE UP (ref 0–0.7)
EOSINOPHIL NFR BLD AUTO: 0.4 % — SIGNIFICANT CHANGE UP (ref 0–8)
GAS PNL BLDV: 137 MMOL/L — SIGNIFICANT CHANGE UP (ref 136–145)
GAS PNL BLDV: SIGNIFICANT CHANGE UP
GLUCOSE SERPL-MCNC: 116 MG/DL — HIGH (ref 70–99)
HCO3 BLDV-SCNC: 21 MMOL/L — LOW (ref 22–29)
HCT VFR BLD CALC: 39 % — LOW (ref 42–52)
HCT VFR BLD CALC: 39.1 % — LOW (ref 42–52)
HCT VFR BLD CALC: 44.6 % — SIGNIFICANT CHANGE UP (ref 42–52)
HCT VFR BLDA CALC: 47 % — SIGNIFICANT CHANGE UP (ref 39–51)
HGB BLD CALC-MCNC: 15.8 G/DL — SIGNIFICANT CHANGE UP (ref 12.6–17.4)
HGB BLD-MCNC: 13 G/DL — LOW (ref 14–18)
HGB BLD-MCNC: 13.4 G/DL — LOW (ref 14–18)
HGB BLD-MCNC: 15.1 G/DL — SIGNIFICANT CHANGE UP (ref 14–18)
IMM GRANULOCYTES NFR BLD AUTO: 0.5 % — HIGH (ref 0.1–0.3)
INR BLD: 1.05 RATIO — SIGNIFICANT CHANGE UP (ref 0.65–1.3)
LACTATE BLDV-MCNC: 2.3 MMOL/L — HIGH (ref 0.5–2)
LYMPHOCYTES # BLD AUTO: 0.9 K/UL — LOW (ref 1.2–3.4)
LYMPHOCYTES # BLD AUTO: 8.7 % — LOW (ref 20.5–51.1)
MAGNESIUM SERPL-MCNC: 1.9 MG/DL — SIGNIFICANT CHANGE UP (ref 1.8–2.4)
MCHC RBC-ENTMCNC: 30.8 PG — SIGNIFICANT CHANGE UP (ref 27–31)
MCHC RBC-ENTMCNC: 31 PG — SIGNIFICANT CHANGE UP (ref 27–31)
MCHC RBC-ENTMCNC: 31.6 PG — HIGH (ref 27–31)
MCHC RBC-ENTMCNC: 33.3 G/DL — SIGNIFICANT CHANGE UP (ref 32–37)
MCHC RBC-ENTMCNC: 33.9 G/DL — SIGNIFICANT CHANGE UP (ref 32–37)
MCHC RBC-ENTMCNC: 34.3 G/DL — SIGNIFICANT CHANGE UP (ref 32–37)
MCV RBC AUTO: 91 FL — SIGNIFICANT CHANGE UP (ref 80–94)
MCV RBC AUTO: 92.2 FL — SIGNIFICANT CHANGE UP (ref 80–94)
MCV RBC AUTO: 93.1 FL — SIGNIFICANT CHANGE UP (ref 80–94)
MONOCYTES # BLD AUTO: 0.85 K/UL — HIGH (ref 0.1–0.6)
MONOCYTES NFR BLD AUTO: 8.3 % — SIGNIFICANT CHANGE UP (ref 1.7–9.3)
NEUTROPHILS # BLD AUTO: 8.44 K/UL — HIGH (ref 1.4–6.5)
NEUTROPHILS NFR BLD AUTO: 81.9 % — HIGH (ref 42.2–75.2)
NRBC # BLD: 0 /100 WBCS — SIGNIFICANT CHANGE UP (ref 0–0)
NT-PROBNP SERPL-SCNC: 185 PG/ML — SIGNIFICANT CHANGE UP (ref 0–300)
PCO2 BLDV: 38 MMHG — LOW (ref 42–55)
PH BLDV: 7.35 — SIGNIFICANT CHANGE UP (ref 7.32–7.43)
PLATELET # BLD AUTO: 227 K/UL — SIGNIFICANT CHANGE UP (ref 130–400)
PLATELET # BLD AUTO: 240 K/UL — SIGNIFICANT CHANGE UP (ref 130–400)
PLATELET # BLD AUTO: 252 K/UL — SIGNIFICANT CHANGE UP (ref 130–400)
PO2 BLDV: 27 MMHG — SIGNIFICANT CHANGE UP
POTASSIUM BLDV-SCNC: 4.4 MMOL/L — SIGNIFICANT CHANGE UP (ref 3.5–5.1)
POTASSIUM SERPL-MCNC: 4.6 MMOL/L — SIGNIFICANT CHANGE UP (ref 3.5–5)
POTASSIUM SERPL-SCNC: 4.6 MMOL/L — SIGNIFICANT CHANGE UP (ref 3.5–5)
PROT SERPL-MCNC: 6.5 G/DL — SIGNIFICANT CHANGE UP (ref 6–8)
PROTHROM AB SERPL-ACNC: 12.1 SEC — SIGNIFICANT CHANGE UP (ref 9.95–12.87)
RBC # BLD: 4.19 M/UL — LOW (ref 4.7–6.1)
RBC # BLD: 4.24 M/UL — LOW (ref 4.7–6.1)
RBC # BLD: 4.9 M/UL — SIGNIFICANT CHANGE UP (ref 4.7–6.1)
RBC # FLD: 13.6 % — SIGNIFICANT CHANGE UP (ref 11.5–14.5)
RBC # FLD: 13.8 % — SIGNIFICANT CHANGE UP (ref 11.5–14.5)
RBC # FLD: 13.9 % — SIGNIFICANT CHANGE UP (ref 11.5–14.5)
SAO2 % BLDV: 48 % — SIGNIFICANT CHANGE UP
SARS-COV-2 RNA SPEC QL NAA+PROBE: SIGNIFICANT CHANGE UP
SODIUM SERPL-SCNC: 139 MMOL/L — SIGNIFICANT CHANGE UP (ref 135–146)
TROPONIN T SERPL-MCNC: <0.01 NG/ML — SIGNIFICANT CHANGE UP
WBC # BLD: 10.3 K/UL — SIGNIFICANT CHANGE UP (ref 4.8–10.8)
WBC # BLD: 13.35 K/UL — HIGH (ref 4.8–10.8)
WBC # BLD: 13.48 K/UL — HIGH (ref 4.8–10.8)
WBC # FLD AUTO: 10.3 K/UL — SIGNIFICANT CHANGE UP (ref 4.8–10.8)
WBC # FLD AUTO: 13.35 K/UL — HIGH (ref 4.8–10.8)
WBC # FLD AUTO: 13.48 K/UL — HIGH (ref 4.8–10.8)

## 2021-10-12 PROCEDURE — 71045 X-RAY EXAM CHEST 1 VIEW: CPT | Mod: 26

## 2021-10-12 PROCEDURE — 99223 1ST HOSP IP/OBS HIGH 75: CPT

## 2021-10-12 PROCEDURE — 99285 EMERGENCY DEPT VISIT HI MDM: CPT

## 2021-10-12 PROCEDURE — 74174 CTA ABD&PLVS W/CONTRAST: CPT | Mod: 26,MA

## 2021-10-12 PROCEDURE — 93010 ELECTROCARDIOGRAM REPORT: CPT

## 2021-10-12 PROCEDURE — 99222 1ST HOSP IP/OBS MODERATE 55: CPT

## 2021-10-12 RX ORDER — TAMSULOSIN HYDROCHLORIDE 0.4 MG/1
0.4 CAPSULE ORAL AT BEDTIME
Refills: 0 | Status: DISCONTINUED | OUTPATIENT
Start: 2021-10-12 | End: 2021-10-16

## 2021-10-12 RX ORDER — FINASTERIDE 5 MG/1
5 TABLET, FILM COATED ORAL DAILY
Refills: 0 | Status: DISCONTINUED | OUTPATIENT
Start: 2021-10-12 | End: 2021-10-16

## 2021-10-12 RX ORDER — ONDANSETRON 8 MG/1
4 TABLET, FILM COATED ORAL EVERY 8 HOURS
Refills: 0 | Status: DISCONTINUED | OUTPATIENT
Start: 2021-10-12 | End: 2021-10-16

## 2021-10-12 RX ORDER — ASPIRIN/CALCIUM CARB/MAGNESIUM 324 MG
1 TABLET ORAL
Qty: 0 | Refills: 0 | DISCHARGE

## 2021-10-12 RX ORDER — ACETAMINOPHEN 500 MG
650 TABLET ORAL EVERY 6 HOURS
Refills: 0 | Status: DISCONTINUED | OUTPATIENT
Start: 2021-10-12 | End: 2021-10-16

## 2021-10-12 RX ORDER — PANTOPRAZOLE SODIUM 20 MG/1
40 TABLET, DELAYED RELEASE ORAL EVERY 12 HOURS
Refills: 0 | Status: DISCONTINUED | OUTPATIENT
Start: 2021-10-12 | End: 2021-10-16

## 2021-10-12 RX ORDER — SODIUM CHLORIDE 9 MG/ML
1000 INJECTION INTRAMUSCULAR; INTRAVENOUS; SUBCUTANEOUS
Refills: 0 | Status: DISCONTINUED | OUTPATIENT
Start: 2021-10-12 | End: 2021-10-13

## 2021-10-12 RX ORDER — LANOLIN ALCOHOL/MO/W.PET/CERES
3 CREAM (GRAM) TOPICAL AT BEDTIME
Refills: 0 | Status: DISCONTINUED | OUTPATIENT
Start: 2021-10-12 | End: 2021-10-16

## 2021-10-12 RX ORDER — UBIDECARENONE 100 MG
0 CAPSULE ORAL
Qty: 0 | Refills: 0 | DISCHARGE

## 2021-10-12 RX ORDER — PANTOPRAZOLE SODIUM 20 MG/1
80 TABLET, DELAYED RELEASE ORAL ONCE
Refills: 0 | Status: COMPLETED | OUTPATIENT
Start: 2021-10-12 | End: 2021-10-12

## 2021-10-12 RX ORDER — ATORVASTATIN CALCIUM 80 MG/1
40 TABLET, FILM COATED ORAL AT BEDTIME
Refills: 0 | Status: DISCONTINUED | OUTPATIENT
Start: 2021-10-12 | End: 2021-10-16

## 2021-10-12 RX ORDER — INFLUENZA VIRUS VACCINE 15; 15; 15; 15 UG/.5ML; UG/.5ML; UG/.5ML; UG/.5ML
0.5 SUSPENSION INTRAMUSCULAR ONCE
Refills: 0 | Status: COMPLETED | OUTPATIENT
Start: 2021-10-12 | End: 2021-10-16

## 2021-10-12 RX ADMIN — SODIUM CHLORIDE 75 MILLILITER(S): 9 INJECTION INTRAMUSCULAR; INTRAVENOUS; SUBCUTANEOUS at 20:26

## 2021-10-12 RX ADMIN — PANTOPRAZOLE SODIUM 40 MILLIGRAM(S): 20 TABLET, DELAYED RELEASE ORAL at 18:38

## 2021-10-12 RX ADMIN — TAMSULOSIN HYDROCHLORIDE 0.4 MILLIGRAM(S): 0.4 CAPSULE ORAL at 21:53

## 2021-10-12 RX ADMIN — PANTOPRAZOLE SODIUM 80 MILLIGRAM(S): 20 TABLET, DELAYED RELEASE ORAL at 06:30

## 2021-10-12 RX ADMIN — ATORVASTATIN CALCIUM 40 MILLIGRAM(S): 80 TABLET, FILM COATED ORAL at 21:53

## 2021-10-12 RX ADMIN — SODIUM CHLORIDE 75 MILLILITER(S): 9 INJECTION INTRAMUSCULAR; INTRAVENOUS; SUBCUTANEOUS at 15:18

## 2021-10-12 NOTE — ED PROVIDER NOTE - OBJECTIVE STATEMENT
76yoM w/ pmhx of prostate Ca (recently diagnosed 1mo ago), cardiac stents, htn, dm, who present with rectal bleeding. for past 2 days he has been having intermittent loyda red blood pooling from his rectum. initially it was few drips of blood but now it has been pooling. he felt lightheaded. no cp sob abd pain. he does not remember when his last colonoscopy was.   per ems, he was hypotensive 80/60 in the field, 1unit of fluids was hung, still ongoing here in the ED, about 200cc received by time of arrival.

## 2021-10-12 NOTE — H&P ADULT - NSHPPHYSICALEXAM_GEN_ALL_CORE
PHYSICAL EXAM:  Vital Signs Last 24 Hrs  T(F): 98.4 (10-12-21 @ 11:41), Max: 98.4 (10-12-21 @ 11:41)  HR: 100 (10-12-21 @ 11:41) (100 - 103)  BP: 114/74 (10-12-21 @ 11:41)  RR: 16 (10-12-21 @ 11:41) (16 - 18)  SpO2: 95% (10-12-21 @ 11:41) (95% - 100%)    Constitutional: NAD, A&O x3. Non-diaphoretic, non-toxic appearing. Speaking in full sentences.   Eyes: PERRLA. EOMI. Sclera white.   Respiratory: +air entry, no rales, no rhonchi, no wheezes  Cardiovascular: +S1 and S2, regular rate and rhythm. No murmurs, rubs, gallops.  Gastrointestinal: +BS, soft, non-tender, not distended. No surgical scars. Old ecchymosis on chest and abdomen. No petechiae.   Extremities:  no edema, no calf tenderness. Old ecchymosis.   Vascular: +dorsal pedis and radial pulses, no extremity cyanosis  Neurological: sensation intact, ROM equal B/L, CN II-XII intact  Skin: no rashes, normal turgor

## 2021-10-12 NOTE — ED PROVIDER NOTE - CLINICAL SUMMARY MEDICAL DECISION MAKING FREE TEXT BOX
pt s/o to me from Dr. Parker- pt with history of HTN DM CAD PCI prostate CA presenting with rectal bleeding, BRBPR- labs imaging reviewed. BP improved. will admit

## 2021-10-12 NOTE — CONSULT NOTE ADULT - ATTENDING COMMENTS
Patient needs completion of GIB work up, including EGD and colonoscopy.  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

## 2021-10-12 NOTE — ED PROVIDER NOTE - NS ED ROS FT
Constitutional: No fever   Eyes:  No visual changes  Ears:  No hearing changes  Neck: No neck pain  Cardiac:  No chest pain  Respiratory:  No SOB   GI:  No abdominal pain, nausea, or vomiting +rectal bleeding  :  No dysuria  MS:  No back pain  Neuro:  No headache or weakness.  No LOC +lightheadedness  Skin:  No skin rash

## 2021-10-12 NOTE — ED PROVIDER NOTE - PHYSICAL EXAMINATION
CONSTITUTIONAL: Well-developed; well-nourished; in no acute distress.   SKIN: warm, dry  HEAD: Normocephalic  EYES: no conjunctival erythema, no sclera icterus  ENT: No nasal discharge; airway clear  NECK: non tender full rom  CARD: tachycardic regular rhythm   RESP: no wheezing rales, appears short of breath and tachypneic  ABD: soft non-tender non-distended  RECTAL: loyda red blood per rectum, mix of blood and urine soaked all over his underwear and home gown,   EXT: Normal ROM  NEURO: Alert, oriented, grossly unremarkable  PSYCH: Cooperative, appropriate

## 2021-10-12 NOTE — H&P ADULT - HISTORY OF PRESENT ILLNESS
Pt is a 75 yo M PMH Prostate CA (recently diagnosed s/p prostate bx), CAD s/p stents, HTN, DM, nephrolithiasis who presented with rectal bleeding x 1.5 days. Pt reports BRBPR since yesterday that has gradually gotten worse. Pt reports similar episode ~20 years ago which was attributed to hemorrhoids. Last colonoscopy was 3-5 years ago, which was negative. Pt was due for a colonoscopy last month, however was postponed due to new diagnosis of prostate CA. Pt underwent prostate Bx on 9/19 and has not started any treatment yet. Pt's urologist Dr. Curiel. Pt reports a mechanical fall about a week ago. Pt takes plavix daily- did not take medications this morning though. Pt lives with his children.     Patient was found to be hypotensive to 80/60 in the field but responded to 1 unit IVF. BP in ER was 113/83.     Denies syncope, lightheadedness, palpitations, CP, SOB, abdominal pain, N/V/D, HA, neck pain, leg pain/edema, fever/chills, cough, hematuria.

## 2021-10-12 NOTE — H&P ADULT - NSICDXPASTMEDICALHX_GEN_ALL_CORE_FT
PAST MEDICAL HISTORY:  BPH (benign prostatic hyperplasia)     CAD (coronary artery disease)     Diabetes mellitus     Hypercholesteremia     Hypertension     Kidney stones

## 2021-10-12 NOTE — H&P ADULT - ASSESSMENT
75 yo M PMH Prostate CA (recently diagnosed s/p prostate bx), CAD s/p stents, HTN, DM, nephrolithiasis admitted for GI bleed.     #GI Bleed  -f/u GI consult  -Last colonoscopy 3-5 years ago.   -H/H: 15.1/44.6  -BP 80/60 in the field. S/p 1 L bolus IVF. VSS now   -Monitor for bleeding. Plavix stopped. Pt's pharmacy called- only antiplatelet patient takes is Plavix. No anticoags  -daily CBC  -Plts, coags wnl   -Mom had hx of colon CA  -Keep active T/S  -< from: CT Angio Abdomen and Pelvis w/ IV Cont (10.12.21 @ 09:06) >  No definite site of active intraluminal arterial contrast extravasation is identified to localize a site of active GI bleeding.  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  Borderline urinary bladder wall thickening. Correlation with urinalysis can be made.  -Protonix 40 mg BID   -NPO   -DVT ppx held for now. SCDs    #Incidental 7 mm gastroduodenal artery pseudoaneurysm  -Vascular consult     #Prostate CA  -s/p bx 9/19. Has not started any treatment yet.   -Urologist: Dr. Curiel  -F/u dr. Curiel outpatient     #DM  -Not on any antihyperglycemics at home  -Serum glucose 116  -Will monitor     #HTN  -VSS     #CAD  -s/p stents 2018. Plavix stopped   -continue statin    #Prostate CA  #BPH   -Continue flomax, finasteride   -Monitor BP    D/w Dr. Manzo    75 yo M PMH Prostate CA (recently diagnosed s/p prostate bx), CAD s/p stents, HTN, DM, nephrolithiasis admitted for GI bleed.     #GI Bleed  -f/u GI consult  -Last colonoscopy 3-5 years ago.   -H/H: 15.1/44.6  -BP stable   -Monitor for bleeding. Plavix stopped. Pt's pharmacy called- only antiplatelet patient takes is Plavix. No anticoags  -daily CBC  -Plts, coags wnl   -Mom had hx of colon CA  -Keep active T/S  -< from: CT Angio Abdomen and Pelvis w/ IV Cont (10.12.21 @ 09:06) >  No definite site of active intraluminal arterial contrast extravasation is identified to localize a site of active GI bleeding.  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  Borderline urinary bladder wall thickening. Correlation with urinalysis can be made.  -Protonix 40 mg BID   -NPO   -DVT ppx held for now. SCDs    #Incidental 7 mm gastroduodenal artery pseudoaneurysm  -Vascular consult     #Prostate CA  -s/p bx 9/19. Has not started any treatment yet.   -Urologist: Dr. Curiel  -F/u dr. Curiel outpatient     #DM  -Not on any antihyperglycemics at home  -Serum glucose 116  -Will monitor     #HTN  -VSS     #CAD  -s/p stents 2018. Plavix stopped   -continue statin    #Prostate CA  #BPH   -Continue flomax, finasteride   -Monitor BP  #Progress Note Handoff  Pending (specify):  as above  Family discussion:  plan of care was discussed with patient   details.  all questions were answered.  seems to understand, and in agreement  Disposition:  unknown

## 2021-10-12 NOTE — ED ADULT TRIAGE NOTE - PAIN RATING/NUMBER SCALE (0-10): ACTIVITY
[place] ambulatory encounter  FAMILY PRACTICE PREOPERATIVE HISTORY AND PHYSICAL  FOR MEDICAL CLEARANCE    PRIMARY CARE PHYSICIAN:  Flakito Darling MD  REQUESTING PHYSICIAN:  Dr. Tu Stanford  LOCATION: [facility] [place]    CHIEF COMPLAINT:  Pre-Op Exam (right shoulder arthroplasty revision on 10.25.2019 with Dr. Tu Stanford at [facility] [place] [facility])      HISTORY OF PRESENT ILLNESS:  Jayden Macdonald is a 62 year old male who presented for preoperative medical clearance. The planned procedure is a right shoulder arthroplasty revision and is scheduled on 10/25/19.    1. Right shoulder osteoarthritis: The patient presents for preoperative clearance of this medical problem. Patient reports he is doing well overall. He reports no new symptoms. Patient has been evaluated by a surgeon and after careful consideration and evaluation, he will undergo surgical intervention.   2. Type 2 diabetes mellitus without complication, with long-term insulin use: The patient is here for follow-up on this medical problem. Patient states he is doing well. He continues with current treatment regimen of Janumet  mg twice daily, Lantus 55 units every morning and 50 units every evening, and Novolog 15 units with breakfast, 10 units with lunch, and 15 units with dinner. Patient denies any complaints with this regimen and states he is tolerating it well. He has no new concerns or symptoms at this time.   3. Hypertensive heart disease: The patient presents for a routine follow-up for this medical problem. He is doing well, clinically stable. Denies any chest pain, headache, nausea, or shortness of breath. He is tolerating current medication of lisinopril 20 mg daily and prazosin 2 mg nightly with no concerns. Patient has no new problems at this time.  4. Chronic back pain: The patient is here for follow-up on this medical problem. He is followed by pain management as well. Patient states he is doing well overall. He is tolerating Percocet   mg three times daily, cyclobenzaprine 10 mg three times daily, fentanyl 50 mcg/hr patch every 72 hours per pain management, and gabapentin 600 three times daily without concern. Patient denies any new concerns at this time.   5. Hypercholesterolemia: The patient is here for a follow-up on this chronic problem. Patient states that he is doing well. He is tolerating the current medication regimen of fenofibrate 145 mg daily, simvastatin 40 mg nightly, and Omega 3 Fatty Acids 2000 mg daily with no problems. Patient has no new concerns at this time.   6. Restless leg syndrome: The patient presents for follow-up on this medical problem. Patient states he is doing well. Patient is managing this condition well. He is tolerating ropinirole 2 mg nightly without concern. Patient denies any new problems at this time.  7. Mood disorder: The patient is here for follow-up on this medical problem. Patient states he is doing well and managing this condition with questions. He is tolerating current medication regimen of trazodone 150 mg nightly and duloxetine 60 mg daily without concern. Patient denies any new problems at this time.     ACTIVE PROBLEMS:  Patient Active Problem List   Diagnosis   • Essential hypertension   • Chronic pain   • Type II or unspecified type diabetes mellitus with neurological manifestations, uncontrolled(250.62) (CMS/HCC)   • COPD (chronic obstructive pulmonary disease) with chronic bronchitis (CMS/HCC)   • GERD (gastroesophageal reflux disease)   • History of lumbar spinal fusion   • Hypercholesterolemia   • Sleep apnea   • Umbilical hernia       PAST HISTORIES:  I have reviewed the past medical history, family history, social history, medications and allergies listed in the medical record as obtained by my nursing staff and support staff and agree with their documentation.  ALLERGIES:   Allergen Reactions   • Albuterol      TABLETS. OK TO HAVE INHALERS.    • Levaquin SWELLING   • Morphine Other  (See Comments)     Confusion     • Niacin (Antihyperlipidemic) Other (See Comments)     BODY FLUSHING     • Sulfa Antibiotics SWELLING     Current Outpatient Medications   Medication Sig Dispense Refill   • oxyCODONE-acetaminophen (PERCOCET)  MG per tablet Take 1 tablet by mouth 3 times daily as needed for Pain.  0   • docusate calcium (SURFAK) 240 MG capsule Take 1 capsule by mouth daily. 90 capsule 1   • gabapentin (NEURONTIN) 600 MG tablet Take 1 tablet by mouth 3 times daily. 270 tablet 0   • calcium-vitamin D (OSCAL-500) 500-200 MG-UNIT per tablet Take 1 tablet by mouth 2 times daily (with meals). 180 tablet 1   • sitaGLIPTIN-metFORMIN (JANUMET)  MG per tablet Take 1 tablet by mouth 2 times daily (with meals). 180 tablet 0   • aspirin 81 MG tablet Take 1 tablet by mouth daily. 90 tablet 0   • lisinopril (PRINIVIL,ZESTRIL) 40 MG tablet Take 0.5 tablets by mouth daily. 90 tablet 3   • DULoxetine (CYMBALTA) 60 MG capsule Take 60 mg by mouth daily.      • simvastatin (ZOCOR) 40 MG tablet Take 1 tablet by mouth nightly. 90 tablet 3   • fenofibrate (TRICOR) 145 MG tablet Take 1 tablet by mouth daily. 90 tablet 1   • rOPINIRole (REQUIP) 2 MG tablet Take 1 tablet by mouth nightly. 90 tablet 1   • ferrous sulfate 325 (65 FE) MG tablet Take 1 tablet by mouth daily (with breakfast). 90 tablet 1   • fexofenadine (ALLEGRA) 180 MG tablet Take 1 tablet by mouth daily. 90 tablet 1   • insulin glargine (LANTUS SOLOSTAR) 100 UNIT/ML pen-injector Inject 55 units subcutaneously every morning and 50 units in the evening. 15 mL 12   • mesalamine (LIALDA) 1.2 g EC tablet Take 1 tablet by mouth daily (with breakfast). 90 tablet 1   • Omega-3 Fatty Acids (OMEGA-3 FISH OIL) 1000 MG capsule Take 2 capsules by mouth daily. 180 capsule 1   • pantoprazole (PROTONIX) 40 MG tablet Take 1 tablet by mouth daily. 90 tablet 3   • traZODone (DESYREL) 100 MG tablet Take 1.5 tablets by mouth nightly. (Patient taking differently: Take  200 mg by mouth nightly. ) 135 tablet 1   • insulin aspart (NOVOLOG FLEXPEN) 100 UNIT/ML pen-injector Inject 15 units with breakfast, 10 units with lunch, and 15 units with dinner. 15 mL 3   • prazosin (MINIPRESS) 1 MG capsule Take 2 capsules by mouth nightly. 30 capsule 0   • Insulin Pen Needle 30G X 8 MM Misc Use to inject insulin 3 times daily. Remove needle cover(s) to expose needle before injecting. 100 each 6   • fluticasone (FLONASE) 50 MCG/ACT nasal spray Spray in each nostril daily as needed.      • fentaNYL (DURAGESIC) 50 MCG/HR patch Place 1 patch onto the skin every 72 hours. Indications: Chronic Pain Total of 62.5 mcg used     • cyclobenzaprine (FLEXERIL) 10 MG tablet Take 10 mg by mouth 3 times daily as needed for Muscle spasms.     • Multiple Vitamins-Minerals (MULTIVITAMIN PO) Take 1 tablet by mouth daily.      • Ipratropium-Albuterol (COMBIVENT IN) Inhale  into the lungs 3 times daily.       No current facility-administered medications for this visit.      Immunization History   Administered Date(s) Administered   • Influenza, Unspecified Formulation 10/18/2006, 10/01/2010, 10/01/2011   • Influenza, injectable, quadrivalent, preservative-free 10/23/2018, 10/16/2019   • Pneumococcal polysaccharide, adult, 23 valent 10/01/2008, 07/02/2018   • Shingles Zoster (Shingrix) 04/25/2019, 10/16/2019   • Tdap 05/31/2018     Past Medical History:   Diagnosis Date   • Anemia    • Anxiety    • Arm pain, chronic     LEFT   • Back pain, chronic    • Bronchitis    • Cataract     both eyes, left eye cataract was removed   • Claustrophobia    • COPD (chronic obstructive pulmonary disease) (CMS/[facility])    • Depressive disorder    • Diabetes mellitus (CMS/[facility])    • Essential (primary) hypertension    • Fracture    • GERD (gastroesophageal reflux disease)    • Hyperlipidemia    • Obesities, morbid (CMS/[facility])    • Pneumonia    • Positive PPD, 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   • PTSD (post-traumatic stress disorder)    • Seasonal allergies    •  Sleep apnea     uses BiPaP   • Therapeutic opioid-induced constipation (OIC)    • Tuberculosis     pt has tested positive in the past     Past Surgical History:   Procedure Laterality Date   • Back surgery      12 surgies    • Carpal tunnel release Right    • Cataract extraction w/  intraocular lens implant Left    • Cervical fusion     • Eye surgery Left     cataract extraction with ioli   • Inguinal hernia repair      Right side   • Joint replacement Left 04/01/2016    Reverse Total Shoulder   • Lung biopsy Left     results were ok per patient   • Shoulder surgery  05-    right   • Spinal fusion     • Total shoulder arthroplasty Bilateral    • Umbilical hernia repair     • Vasectomy       Social History     Tobacco Use   • Smoking status: Current Every Day Smoker     Packs/day: 1.00     Types: Cigarettes     Start date: 6/9/1975   • Smokeless tobacco: Never Used   • Tobacco comment: pt isn't ready to quit, 1-1.5 ppd depending on pain   Substance Use Topics   • Alcohol use: No     Alcohol/week: 0.0 standard drinks     Frequency: Never     Drinks per session: 1 or 2     Binge frequency: Never   • Drug use: No     Social History     Tobacco Use   Smoking Status Current Every Day Smoker   • Packs/day: 1.00   • Types: Cigarettes   • Start date: 6/9/1975   Smokeless Tobacco Never Used   Tobacco Comment    pt isn't ready to quit, 1-1.5 ppd depending on pain     Social History     Substance and Sexual Activity   Alcohol Use No   • Alcohol/week: 0.0 standard drinks   • Frequency: Never   • Drinks per session: 1 or 2   • Binge frequency: Never     Family History   Problem Relation Age of Onset   • Psychiatric Mother    • Cancer Father    • Hypertension Father    • Cancer Paternal Grandfather      Health Maintenance   Topic Date Due   • Shingles Vaccine (2 of 2) 06/20/2019   • Influenza Vaccine (1) 09/01/2019   • Diabetes A1C  11/08/2019   • Diabetes Foot Exam  04/25/2020   • Depression Screening  04/25/2020   •  Diabetes Eye Exam  05/31/2020   • DM/CKD GFR  07/22/2020   • Colorectal Cancer Screening-Colonoscopy  05/10/2026   • DTaP/Tdap/Td Vaccine (2 - Td) 05/31/2028   • Hepatitis C Screening  Completed   • Pneumococcal Vaccine 0-64  Completed   • Meningococcal Vaccine  Aged Out       SURGICAL/ANESTHESIA HISTORY:    []  YES    [x]  NO     []  UNKNOWN   History of problematic/difficult intubations.  []  YES    [x]  NO     []  UNKNOWN   History of prior anesthesia reactions.  []  YES    [x]  NO     []  UNKNOWN   Family history of anesthesia reactions.  []  YES    [x]  NO     []  UNKNOWN   History of bleeding or clotting disorders.  []  YES    [x]  NO     []  UNKNOWN   Family history of bleeding/clotting disorders.  []  YES    [x]  NO     []  UNKNOWN   Past history of blood transfusions.  []  YES    [x]  NO     []  UNKNOWN   History of exposure/treatment for hepatitis.  []  YES    [x]  NO     []  UNKNOWN   History of exposure to or treatment for TB.  []  YES    [x]  NO     []  UNKNOWN   History of AIDS related complex.    Information related to the above positive findings include: N/A.    ADVANCE DIRECTIVE:  not discussed    REVIEW OF SYSTEMS:  I have reviewed the ROS done by the MA and harshal.    PHYSICAL EXAM:  Physical Exam   Constitutional: He is oriented to person, place, and time and well-developed, well-nourished, and in no distress. No distress.   HENT:   Head: Normocephalic and atraumatic.   Mouth/Throat: Oropharynx is clear and moist.   Eyes: Conjunctivae are normal. Right eye exhibits no discharge. Left eye exhibits no discharge. No scleral icterus.   Neck: Neck supple. Normal carotid pulses, no hepatojugular reflux and no JVD present. Carotid bruit is not present. No tracheal deviation present. No thyromegaly present.   Cardiovascular: Normal rate, regular rhythm and intact distal pulses. Exam reveals no gallop and no friction rub.   No murmur heard.  Pulmonary/Chest: Effort normal and breath sounds normal. No  0 respiratory distress. He has no wheezes. He has no rales.   Abdominal: Soft. He exhibits no distension and no mass. There is no tenderness. There is no rebound and no guarding.   Lymphadenopathy:     He has no cervical adenopathy.   Neurological: He is alert and oriented to person, place, and time. No cranial nerve deficit.   Skin: Skin is warm and dry. He is not diaphoretic.   Psychiatric: Mood, memory, affect and judgment normal.       ECG RESULTS:  No change from previous: Normal Sinus Rhythm, Nonspecific T wave abnormality, Abnormal Electrocardiogram.     RADIOLOGY AND LAB RESULTS:  Reviewed per Epic.    ASSESSMENT:  This is a 62 year old male who is medically stable with no contraindications to the planned surgery pending review of the final results of all preoperative labs and diagnostic studies.  The anesthesia plan will be determined by the anesthesiologist in consultation with the surgeon.    DIAGNOSIS:  1. Need for influenza vaccination    2. Osteoarthritis of right shoulder, unspecified osteoarthritis type    3. Type 2 diabetes mellitus without complication, with long-term current use of insulin (CMS/[facility])    4. Hypertensive heart disease    5. Chronic back pain    6. Hypercholesterolemia    7. Restless legs syndrome    8. Mood disorder (CMS/[facility])    9. Need for shingles vaccine    10. Pre-operative clearance        PLAN:  Orders Placed This Encounter   • XR Chest PA and Lateral   • INFLUENZA QUADRIVALENT SPLIT PRES FREE 0.5 ML VACC, IM (FLULAVAL,FLUARIX,FLUZONE)   • ZOSTER SHINGLES RECOMBINANT ADJUVANTED IM(SHINGRIX)   • Glycohemoglobin   • CBC with Automated Differential   • COMPREHENSIVE METABOLIC PANEL   • Prothrombin Time   • URINALYSIS WITH MICRO & CULTURE IF INDICATED   • Electrocardiogram 12-Lead       Return as scheduled .    1. Perioperative management and restrictions as per the recommendation of the surgeon.  2. The patient is to avoid nonsteroidal anti-inflammatory drugs, aspirin and alcohol  for the week preceding surgery.  3. All other chronic medications are to be continued unless an alternative plan was advised.  4. Patient is to stop diabetes medications the night before and the day of surgery.   5. The following labs and images were ordered for this preoperative examination: complete blood count, complete metabolic panel, prothrombin time, urinalysis, glycohemoglobin, and electrocardiogram, and chest X-ray.  6. A copy of this note will be sent to Dr. Tu Stanford.    On 10/16/2019, Rustam MCMILLAN scribed the services personally performed by Flakito Darling MD.    The documentation recorded by the scribe accurately and completely reflects the service(s) I personally performed and the decisions made by me.  Flakito Darling MD

## 2021-10-12 NOTE — H&P ADULT - NSHPLABSRESULTS_GEN_ALL_CORE
15.1   10.30 )-----------( 227      ( 12 Oct 2021 06:45 )             44.6       10-12    139  |  105  |  26<H>  ----------------------------<  116<H>  4.6   |  19  |  1.4    Ca    9.7      12 Oct 2021 06:45  Mg     1.9     10-12    TPro  6.5  /  Alb  4.0  /  TBili  1.7<H>  /  DBili  x   /  AST  17  /  ALT  9   /  AlkPhos  91  10-12              PT/INR - ( 12 Oct 2021 06:45 )   PT: 12.10 sec;   INR: 1.05 ratio         PTT - ( 12 Oct 2021 06:45 )  PTT:29.4 sec    Lactate Trend      CARDIAC MARKERS ( 12 Oct 2021 06:45 )  x     / <0.01 ng/mL / x     / x     / x            CAPILLARY BLOOD GLUCOSE    < from: CT Angio Abdomen and Pelvis w/ IV Cont (10.12.21 @ 09:06) >    No definite site of active intraluminal arterial contrast extravasation is identified to localize a site of active GI bleeding.    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    Borderline urinary bladder wall thickening. Correlation with urinalysis can be made.    Additional findings as above.    < end of copied text >      < from: 12 Lead ECG (10.12.21 @ 06:12) >    Diagnosis Line Sinus tachycardia  Right bundle branch block  Abnormal ECG    < end of copied text >

## 2021-10-13 ENCOUNTER — TRANSCRIPTION ENCOUNTER (OUTPATIENT)
Age: 76
End: 2021-10-13

## 2021-10-13 LAB
ANION GAP SERPL CALC-SCNC: 12 MMOL/L — SIGNIFICANT CHANGE UP (ref 7–14)
BASOPHILS # BLD AUTO: 0.03 K/UL — SIGNIFICANT CHANGE UP (ref 0–0.2)
BASOPHILS NFR BLD AUTO: 0.3 % — SIGNIFICANT CHANGE UP (ref 0–1)
BUN SERPL-MCNC: 37 MG/DL — HIGH (ref 10–20)
CALCIUM SERPL-MCNC: 9 MG/DL — SIGNIFICANT CHANGE UP (ref 8.5–10.1)
CHLORIDE SERPL-SCNC: 112 MMOL/L — HIGH (ref 98–110)
CO2 SERPL-SCNC: 17 MMOL/L — SIGNIFICANT CHANGE UP (ref 17–32)
CREAT SERPL-MCNC: 1.4 MG/DL — SIGNIFICANT CHANGE UP (ref 0.7–1.5)
EOSINOPHIL # BLD AUTO: 0.08 K/UL — SIGNIFICANT CHANGE UP (ref 0–0.7)
EOSINOPHIL NFR BLD AUTO: 0.8 % — SIGNIFICANT CHANGE UP (ref 0–8)
GLUCOSE SERPL-MCNC: 106 MG/DL — HIGH (ref 70–99)
HCT VFR BLD CALC: 32.1 % — LOW (ref 42–52)
HCT VFR BLD CALC: 32.9 % — LOW (ref 42–52)
HCT VFR BLD CALC: 33.5 % — LOW (ref 42–52)
HGB BLD-MCNC: 11.1 G/DL — LOW (ref 14–18)
HGB BLD-MCNC: 11.2 G/DL — LOW (ref 14–18)
HGB BLD-MCNC: 11.6 G/DL — LOW (ref 14–18)
IMM GRANULOCYTES NFR BLD AUTO: 0.4 % — HIGH (ref 0.1–0.3)
LYMPHOCYTES # BLD AUTO: 0.98 K/UL — LOW (ref 1.2–3.4)
LYMPHOCYTES # BLD AUTO: 10.2 % — LOW (ref 20.5–51.1)
MCHC RBC-ENTMCNC: 31.2 PG — HIGH (ref 27–31)
MCHC RBC-ENTMCNC: 31.5 PG — HIGH (ref 27–31)
MCHC RBC-ENTMCNC: 31.8 PG — HIGH (ref 27–31)
MCHC RBC-ENTMCNC: 34 G/DL — SIGNIFICANT CHANGE UP (ref 32–37)
MCHC RBC-ENTMCNC: 34.6 G/DL — SIGNIFICANT CHANGE UP (ref 32–37)
MCHC RBC-ENTMCNC: 34.6 G/DL — SIGNIFICANT CHANGE UP (ref 32–37)
MCV RBC AUTO: 91.2 FL — SIGNIFICANT CHANGE UP (ref 80–94)
MCV RBC AUTO: 91.6 FL — SIGNIFICANT CHANGE UP (ref 80–94)
MCV RBC AUTO: 91.8 FL — SIGNIFICANT CHANGE UP (ref 80–94)
MONOCYTES # BLD AUTO: 1.1 K/UL — HIGH (ref 0.1–0.6)
MONOCYTES NFR BLD AUTO: 11.4 % — HIGH (ref 1.7–9.3)
NEUTROPHILS # BLD AUTO: 7.41 K/UL — HIGH (ref 1.4–6.5)
NEUTROPHILS NFR BLD AUTO: 76.9 % — HIGH (ref 42.2–75.2)
NRBC # BLD: 0 /100 WBCS — SIGNIFICANT CHANGE UP (ref 0–0)
PLATELET # BLD AUTO: 173 K/UL — SIGNIFICANT CHANGE UP (ref 130–400)
PLATELET # BLD AUTO: 181 K/UL — SIGNIFICANT CHANGE UP (ref 130–400)
PLATELET # BLD AUTO: 208 K/UL — SIGNIFICANT CHANGE UP (ref 130–400)
POTASSIUM SERPL-MCNC: 4.8 MMOL/L — SIGNIFICANT CHANGE UP (ref 3.5–5)
POTASSIUM SERPL-SCNC: 4.8 MMOL/L — SIGNIFICANT CHANGE UP (ref 3.5–5)
RBC # BLD: 3.52 M/UL — LOW (ref 4.7–6.1)
RBC # BLD: 3.59 M/UL — LOW (ref 4.7–6.1)
RBC # BLD: 3.65 M/UL — LOW (ref 4.7–6.1)
RBC # FLD: 13.7 % — SIGNIFICANT CHANGE UP (ref 11.5–14.5)
RBC # FLD: 13.8 % — SIGNIFICANT CHANGE UP (ref 11.5–14.5)
RBC # FLD: 13.8 % — SIGNIFICANT CHANGE UP (ref 11.5–14.5)
SODIUM SERPL-SCNC: 141 MMOL/L — SIGNIFICANT CHANGE UP (ref 135–146)
WBC # BLD: 12.48 K/UL — HIGH (ref 4.8–10.8)
WBC # BLD: 9.11 K/UL — SIGNIFICANT CHANGE UP (ref 4.8–10.8)
WBC # BLD: 9.64 K/UL — SIGNIFICANT CHANGE UP (ref 4.8–10.8)
WBC # FLD AUTO: 12.48 K/UL — HIGH (ref 4.8–10.8)
WBC # FLD AUTO: 9.11 K/UL — SIGNIFICANT CHANGE UP (ref 4.8–10.8)
WBC # FLD AUTO: 9.64 K/UL — SIGNIFICANT CHANGE UP (ref 4.8–10.8)

## 2021-10-13 PROCEDURE — 99291 CRITICAL CARE FIRST HOUR: CPT

## 2021-10-13 PROCEDURE — 99233 SBSQ HOSP IP/OBS HIGH 50: CPT

## 2021-10-13 RX ORDER — SODIUM CHLORIDE 9 MG/ML
1000 INJECTION INTRAMUSCULAR; INTRAVENOUS; SUBCUTANEOUS ONCE
Refills: 0 | Status: COMPLETED | OUTPATIENT
Start: 2021-10-13 | End: 2021-10-13

## 2021-10-13 RX ORDER — NOREPINEPHRINE BITARTRATE/D5W 8 MG/250ML
0.05 PLASTIC BAG, INJECTION (ML) INTRAVENOUS
Qty: 8 | Refills: 0 | Status: DISCONTINUED | OUTPATIENT
Start: 2021-10-13 | End: 2021-10-13

## 2021-10-13 RX ORDER — SODIUM CHLORIDE 9 MG/ML
1000 INJECTION, SOLUTION INTRAVENOUS
Refills: 0 | Status: DISCONTINUED | OUTPATIENT
Start: 2021-10-13 | End: 2021-10-15

## 2021-10-13 RX ORDER — SODIUM CHLORIDE 9 MG/ML
500 INJECTION INTRAMUSCULAR; INTRAVENOUS; SUBCUTANEOUS ONCE
Refills: 0 | Status: DISCONTINUED | OUTPATIENT
Start: 2021-10-13 | End: 2021-10-13

## 2021-10-13 RX ADMIN — SODIUM CHLORIDE 1000 MILLILITER(S): 9 INJECTION INTRAMUSCULAR; INTRAVENOUS; SUBCUTANEOUS at 09:32

## 2021-10-13 RX ADMIN — ONDANSETRON 4 MILLIGRAM(S): 8 TABLET, FILM COATED ORAL at 03:41

## 2021-10-13 RX ADMIN — SODIUM CHLORIDE 75 MILLILITER(S): 9 INJECTION, SOLUTION INTRAVENOUS at 20:15

## 2021-10-13 RX ADMIN — TAMSULOSIN HYDROCHLORIDE 0.4 MILLIGRAM(S): 0.4 CAPSULE ORAL at 21:54

## 2021-10-13 RX ADMIN — PANTOPRAZOLE SODIUM 40 MILLIGRAM(S): 20 TABLET, DELAYED RELEASE ORAL at 17:07

## 2021-10-13 RX ADMIN — FINASTERIDE 5 MILLIGRAM(S): 5 TABLET, FILM COATED ORAL at 17:07

## 2021-10-13 RX ADMIN — SODIUM CHLORIDE 75 MILLILITER(S): 9 INJECTION INTRAMUSCULAR; INTRAVENOUS; SUBCUTANEOUS at 17:07

## 2021-10-13 RX ADMIN — PANTOPRAZOLE SODIUM 40 MILLIGRAM(S): 20 TABLET, DELAYED RELEASE ORAL at 05:39

## 2021-10-13 RX ADMIN — ATORVASTATIN CALCIUM 40 MILLIGRAM(S): 80 TABLET, FILM COATED ORAL at 21:54

## 2021-10-13 NOTE — PROGRESS NOTE ADULT - ASSESSMENT
75 yo M PMH Prostate CA (recently diagnosed s/p prostate bx), CAD s/p stents, HTN, DM, nephrolithiasis admitted for GI bleed.     #GI Bleed  Per GI consult,   -f/u GI consult  -Last colonoscopy 3-5 years ago.   -H/H: 15.1/44.6  -BP stable   -Monitor for bleeding. Plavix stopped. Pt's pharmacy called- only antiplatelet patient takes is Plavix. No anticoags  -daily CBC  -Plts, coags wnl   -Mom had hx of colon CA  -Keep active T/S  -< from: CT Angio Abdomen and Pelvis w/ IV Cont (10.12.21 @ 09:06) >  No definite site of active intraluminal arterial contrast extravasation is identified to localize a site of active GI bleeding.  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  Borderline urinary bladder wall thickening. Correlation with urinalysis can be made.  -Protonix 40 mg BID   -NPO   -DVT ppx held for now. SCDs    #Incidental 7 mm gastroduodenal artery pseudoaneurysm  -Vascular consult     #Prostate CA  -s/p bx 9/19. Has not started any treatment yet.   -Urologist: Dr. Curiel  -F/u dr. Curiel outpatient     #DM  -Not on any antihyperglycemics at home  -Serum glucose 116  -Will monitor     #HTN  -VSS     #CAD  -s/p stents 2018. Plavix stopped   -continue statin    #Prostate CA  #BPH   -Continue flomax, finasteride   -Monitor BP  #Progress Note Handoff  Pending (specify):  as above  Family discussion:  plan of care was discussed with patient   details.  all questions were answered.  seems to understand, and in agreement  Disposition:  unknown   77 yo M PMH Prostate CA (recently diagnosed s/p prostate bx), CAD s/p stents, HTN, DM, nephrolithiasis admitted for GI bleed.     #GI Bleed  Per GI consult, cont monitoring Hg, possible colonoscopy inpatient  Hypotensive today morning to 80/50, . 1L bolus given, BP improved to 90/60.   - Cardio eval requested for risk stratification  - Hold Plavix  - NPO  - Monitor CBC q8h   -Keep active T/S  -< from: CT Angio Abdomen and Pelvis w/ IV Cont (10.12.21 @ 09:06) >  No definite site of active intraluminal arterial contrast extravasation is identified to localize a site of active GI bleeding.  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  Borderline urinary bladder wall thickening. Correlation with urinalysis can be made.  -IV Protonix 40 mg BID   - Transfer to ICU due to hypotension and persistent hematochezia    #Incidental 7 mm gastroduodenal artery pseudoaneurysm  -Vascular consult to see    #Prostate CA  -s/p bx 9/19. Has not started any treatment yet.   -Urologist: Dr. Curiel  -F/u dr. Curiel outpatient     #DM  -Not on any antihyperglycemics at home  -Serum glucose 116  -Will monitor     #HTN  -VSS     #CAD  -s/p stents 2018. Plavix stopped   -continue statin    #Prostate CA  #BPH   -Continue flomax, finasteride   -Monitor BP    #Progress Note Handoff  Pending (specify):   Family discussion:  Disposition:    77 yo M PMH Prostate CA (recently diagnosed s/p prostate bx), CAD s/p stents, HTN, DM, nephrolithiasis admitted for GI bleed.     #GI Bleed  Per GI consult, cont monitoring Hg, possible colonoscopy inpatient  Hypotensive today morning to 80/50, . 1L bolus given, BP improved to 90/60. 1U PRBC ordered and transfer approved to ICU for closer monitoring   - Cardio eval requested for risk stratification  - Hold Plavix  - NPO  - Monitor CBC q8h   -Keep active T/S  -< from: CT Angio Abdomen and Pelvis w/ IV Cont (10.12.21 @ 09:06) >  No definite site of active intraluminal arterial contrast extravasation is identified to localize a site of active GI bleeding.  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  Borderline urinary bladder wall thickening. Correlation with urinalysis can be made.  -IV Protonix 40 mg BID   - ICU transfer    #Incidental 7 mm gastroduodenal artery pseudoaneurysm  -Vascular consult to see    #Prostate CA  -s/p bx 9/19. Has not started any treatment yet.   -Urologist: Dr. Curiel  -F/u dr. Curiel outpatient     #DM  -Not on any antihyperglycemics at home  -Serum glucose 116  -Will monitor     #HTN  -VSS     #CAD  -s/p stents 2018. Plavix stopped   -continue statin    #Prostate CA  #BPH   -Continue flomax, finasteride   -Monitor BP    #Progress Note Handoff  Pending (specify): Monitoring Hg, possibly colonoscopy inpatient  Family discussion: d/w pt regarding transfer to ICU for closer monitoring  Disposition: Home

## 2021-10-13 NOTE — CONSULT NOTE ADULT - ATTENDING COMMENTS
Attending Statement: I have personally performed a face to face diagnostic evaluation on this patient. The patient is suffering from Hemorrhagic shock secondary to GIB.  I have reviewed the above note and agree with the history, exam and suggestions for care, except as I have noted in the text. I have amended the treatment plans as necessary.

## 2021-10-13 NOTE — PROGRESS NOTE ADULT - SUBJECTIVE AND OBJECTIVE BOX
TRISTENRADHA  76y, Male  Allergy: No Known Allergies    Hospital Day: 1d    Patient seen and examined earlier today. Hypotensive today AM, reported several episodes of BRBPR overnight. Patient himself states he feels fine.    PMH/PSH:  PAST MEDICAL & SURGICAL HISTORY:  CAD (coronary artery disease)    Hypercholesteremia    BPH (benign prostatic hyperplasia)    Kidney stones    Hypertension    Diabetes mellitus    Status post cardiac surgery  cardiac stents    Bilateral cataracts    History of lithotripsy        VITALS:  T(F): 96.6 (10-13-21 @ 05:15), Max: 98.7 (10-12-21 @ 15:07)  HR: 102 (10-13-21 @ 09:45)  BP: 99/64 (10-13-21 @ 09:45) (85/50 - 114/74)  RR: 16 (10-13-21 @ 05:15)  SpO2: 99% (10-13-21 @ 09:46)    TESTS & MEASUREMENTS:  Weight (Kg): 70.3 (10-12-21 @ 06:04)  BMI (kg/m2): 22.9 (10-12)                          11.6   12.48 )-----------( 208      ( 13 Oct 2021 08:32 )             33.5     PT/INR - ( 12 Oct 2021 06:45 )   PT: 12.10 sec;   INR: 1.05 ratio         PTT - ( 12 Oct 2021 06:45 )  PTT:29.4 sec  10-13    141  |  112<H>  |  37<H>  ----------------------------<  106<H>  4.8   |  17  |  1.4    Ca    9.0      13 Oct 2021 08:32  Mg     1.9     10-12    TPro  6.5  /  Alb  4.0  /  TBili  1.7<H>  /  DBili  x   /  AST  17  /  ALT  9   /  AlkPhos  91  10-12    LIVER FUNCTIONS - ( 12 Oct 2021 06:45 )  Alb: 4.0 g/dL / Pro: 6.5 g/dL / ALK PHOS: 91 U/L / ALT: 9 U/L / AST: 17 U/L / GGT: x           CARDIAC MARKERS ( 12 Oct 2021 06:45 )  x     / <0.01 ng/mL / x     / x     / x                RADIOLOGY & ADDITIONAL TESTS:    RECENT DIAGNOSTIC ORDERS:  Packed Red Cells Order:  1 Unit   Indication: Symptomatic Anemia - e.g. Chest Pain, Orthostasis, Tach  Infuse Unit : 2 Hours (10-13-21 @ 09:28)  COVID-19 Thomas Domain Antibody: AM Sched. Collection: 13-Oct-2021 04:30 (10-12-21 @ 15:04)  ABO Rh Confirmatory Specimen: 19:00  Addl Info: Conditional: ABO Rh Confirmatory Specimen (10-12-21 @ 12:56)  Diet, NPO:   Except Medications (10-12-21 @ 12:56)      MEDICATIONS:  MEDICATIONS  (STANDING):  atorvastatin 40 milliGRAM(s) Oral at bedtime  finasteride 5 milliGRAM(s) Oral daily  influenza   Vaccine 0.5 milliLiter(s) IntraMuscular once  pantoprazole  Injectable 40 milliGRAM(s) IV Push every 12 hours  sodium chloride 0.9%. 1000 milliLiter(s) (75 mL/Hr) IV Continuous <Continuous>  tamsulosin 0.4 milliGRAM(s) Oral at bedtime    MEDICATIONS  (PRN):  acetaminophen   Tablet .. 650 milliGRAM(s) Oral every 6 hours PRN Temp greater or equal to 38C (100.4F), Mild Pain (1 - 3)  aluminum hydroxide/magnesium hydroxide/simethicone Suspension 30 milliLiter(s) Oral every 4 hours PRN Dyspepsia  melatonin 3 milliGRAM(s) Oral at bedtime PRN Insomnia  ondansetron Injectable 4 milliGRAM(s) IV Push every 8 hours PRN Nausea and/or Vomiting      HOME MEDICATIONS:  finasteride 5 mg oral tablet (10-12)  Plavix 75 mg oral tablet (10-12)  simvastatin 80 mg oral tablet (10-12)  tamsulosin 0.4 mg oral capsule (10-12)      REVIEW OF SYSTEMS:  All other review of systems is negative unless indicated above.     PHYSICAL EXAM:  GENERAL: NAD  HEENT: No Swelling  CHEST/LUNG: Good air entry, No wheezing  HEART: RRR, No murmurs  ABDOMEN: Soft, Bowel sounds present  EXTREMITIES:  No clubbing         DALIRADHA BEAVERS  76y, Male  Allergy: No Known Allergies    Hospital Day: 1d    Patient seen and examined earlier today. Hypotensive today AM, reported several episodes of BRBPR overnight. Patient states he feels okay.    PMH/PSH:  PAST MEDICAL & SURGICAL HISTORY:  CAD (coronary artery disease)    Hypercholesteremia    BPH (benign prostatic hyperplasia)    Kidney stones    Hypertension    Diabetes mellitus    Status post cardiac surgery  cardiac stents    Bilateral cataracts    History of lithotripsy        VITALS:  T(F): 96.6 (10-13-21 @ 05:15), Max: 98.7 (10-12-21 @ 15:07)  HR: 102 (10-13-21 @ 09:45)  BP: 99/64 (10-13-21 @ 09:45) (85/50 - 114/74)  RR: 16 (10-13-21 @ 05:15)  SpO2: 99% (10-13-21 @ 09:46)    TESTS & MEASUREMENTS:  Weight (Kg): 70.3 (10-12-21 @ 06:04)  BMI (kg/m2): 22.9 (10-12)                          11.6   12.48 )-----------( 208      ( 13 Oct 2021 08:32 )             33.5     PT/INR - ( 12 Oct 2021 06:45 )   PT: 12.10 sec;   INR: 1.05 ratio         PTT - ( 12 Oct 2021 06:45 )  PTT:29.4 sec  10-13    141  |  112<H>  |  37<H>  ----------------------------<  106<H>  4.8   |  17  |  1.4    Ca    9.0      13 Oct 2021 08:32  Mg     1.9     10-12    TPro  6.5  /  Alb  4.0  /  TBili  1.7<H>  /  DBili  x   /  AST  17  /  ALT  9   /  AlkPhos  91  10-12    LIVER FUNCTIONS - ( 12 Oct 2021 06:45 )  Alb: 4.0 g/dL / Pro: 6.5 g/dL / ALK PHOS: 91 U/L / ALT: 9 U/L / AST: 17 U/L / GGT: x           CARDIAC MARKERS ( 12 Oct 2021 06:45 )  x     / <0.01 ng/mL / x     / x     / x                RADIOLOGY & ADDITIONAL TESTS:    RECENT DIAGNOSTIC ORDERS:  Packed Red Cells Order:  1 Unit   Indication: Symptomatic Anemia - e.g. Chest Pain, Orthostasis, Tach  Infuse Unit : 2 Hours (10-13-21 @ 09:28)  COVID-19 Thomas Domain Antibody: AM Sched. Collection: 13-Oct-2021 04:30 (10-12-21 @ 15:04)  ABO Rh Confirmatory Specimen: 19:00  Addl Info: Conditional: ABO Rh Confirmatory Specimen (10-12-21 @ 12:56)  Diet, NPO:   Except Medications (10-12-21 @ 12:56)      MEDICATIONS:  MEDICATIONS  (STANDING):  atorvastatin 40 milliGRAM(s) Oral at bedtime  finasteride 5 milliGRAM(s) Oral daily  influenza   Vaccine 0.5 milliLiter(s) IntraMuscular once  pantoprazole  Injectable 40 milliGRAM(s) IV Push every 12 hours  sodium chloride 0.9%. 1000 milliLiter(s) (75 mL/Hr) IV Continuous <Continuous>  tamsulosin 0.4 milliGRAM(s) Oral at bedtime    MEDICATIONS  (PRN):  acetaminophen   Tablet .. 650 milliGRAM(s) Oral every 6 hours PRN Temp greater or equal to 38C (100.4F), Mild Pain (1 - 3)  aluminum hydroxide/magnesium hydroxide/simethicone Suspension 30 milliLiter(s) Oral every 4 hours PRN Dyspepsia  melatonin 3 milliGRAM(s) Oral at bedtime PRN Insomnia  ondansetron Injectable 4 milliGRAM(s) IV Push every 8 hours PRN Nausea and/or Vomiting      HOME MEDICATIONS:  finasteride 5 mg oral tablet (10-12)  Plavix 75 mg oral tablet (10-12)  simvastatin 80 mg oral tablet (10-12)  tamsulosin 0.4 mg oral capsule (10-12)      REVIEW OF SYSTEMS:  All other review of systems is negative unless indicated above.     PHYSICAL EXAM:  GENERAL: NAD  HEENT: No Swelling  CHEST/LUNG: Good air entry, No wheezing  HEART: RRR, No murmurs  ABDOMEN: Soft, Bowel sounds present  EXTREMITIES:  No clubbing

## 2021-10-13 NOTE — CONSULT NOTE ADULT - ASSESSMENT
Imp:  Lower gi bleed, pt reports being on "plavix" like drug due to cardiac stents, if so, should be held  need cardiology input for planning  as for now  etiology possible diverticular vs hemorrhodis, rule out AVM's    keep npo tonight serial hemoglobins and T+C 1-2 units  iv access  iv fluids    will review last colonoscopy as pt will need repeat.  if hgb stable consider colonoscopy in 1 -2 days if stable. will consider electively if as well depending on hospital course  
IMPRESSION:    Hemorrhagic shock secondary to GIB  SP 1U PRBC  Gastroduodenal Artery Pseudoaneurysm 7mm  Recent diagnosis of Prostate CA SP biopsy      PLAN:    CNS:  Avoid depressants    HEENT: Oral care    PULMONARY:  HOB @ 45 degrees.  Aspiration precautions.    CARDIOVASCULAR:  D5NS @ 75cc/hr.  Target MAP 65.  Low dose Norepinephrine if needed.  Keep I = O.  ECHO.    GI: GI prophylaxis.  NPO for now.  Bowel regimen.    GI eval.   Possible IR if -ve EGD/Colonoscopy findings.    RENAL:  Check Lactic Acid.  Follow up lytes.  Correct as needed.  Monitor UO    INFECTIOUS DISEASE:   Monitor off ABX.  Follow up cultures    HEMATOLOGICAL:  Trend CBC q6h.  Transfuse if active bleed & hemodynamically unstable.  Keep Hb > 8.  SCDs for DVT prophylaxis.    ENDOCRINE:  Follow up FS.  Insulin protocol if needed.    MUSCULOSKELETAL:  bed rest    Monitor in MICU
75 yo M PMH Prostate CA (recently diagnosed s/p prostate bx), CAD s/p stents, HTN, DM, nephrolithiasis who presented with rectal bleeding x 1.5 days is consulted for 7 mm gastroduodenal artery pseudoaneurysm.    A/P:  -Will discuss with vascular

## 2021-10-13 NOTE — CONSULT NOTE ADULT - SUBJECTIVE AND OBJECTIVE BOX
77 yo M PMH Prostate CA (recently diagnosed s/p prostate bx), CAD s/p stents, HTN, DM, nephrolithiasis who presented with rectal bleeding x 1.5 days. Pt reports BRBPR since yesterday that has gradually gotten worse. Pt reports similar episode ~20 years ago which was attributed to hemorrhoids. Last colonoscopy was 3-5 years ago, which was negative. Pt was due for a colonoscopy last month, however was postponed due to new diagnosis of prostate CA. Pt underwent prostate Bx on 9/19 and has not started any treatment yet. Pt's urologist Dr. Curiel. Pt reports a mechanical fall about a week ago. Pt takes plavix daily- Last taken was yesterday.  Pt lives with his children. Pt denies abdominal pain, CP, SOB, N/V/D and palpitations.     CTA A/P showed:  No definite site of active intraluminal arterial contrast extravasation is identified to localize a site of active GI bleeding.  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  Borderline urinary bladder wall thickening. Correlation with urinalysis can be made.        Vital Signs Last 24 Hrs  T(C): 35.9 (12 Oct 2021 20:05), Max: 37.1 (12 Oct 2021 15:07)  T(F): 96.7 (12 Oct 2021 20:05), Max: 98.7 (12 Oct 2021 15:07)  HR: 103 (12 Oct 2021 20:05) (100 - 108)  BP: 106/64 (12 Oct 2021 20:05) (106/64 - 114/74)  BP(mean): --  RR: 16 (12 Oct 2021 20:05) (16 - 18)  SpO2: 97% (12 Oct 2021 15:07) (95% - 100%)      PHYSICAL EXAM:      Constitutional: NAD, A&O x3    Eyes: PERRLA, no conjuctivitis    Neck: no lymphadenopathy    Respiratory: +air entry, no rales, no rhonchi, no wheezes    Cardiovascular: +S1 and S2, regular rate and rhythm    Gastrointestinal: +BS, soft, non-tender, not distended    Extremities:  no edema, no calf tenderness    Vascular: +dorsal pedis and radial pulses, no extremity cyanosis    Neurological: sensation intact, ROM equal B/L, CN II-XII intact    Skin: no rashes, normal turgor                            13.0   13.35 )-----------( 240      ( 12 Oct 2021 21:22 )             39.0     10-12    139  |  105  |  26<H>  ----------------------------<  116<H>  4.6   |  19  |  1.4    Ca    9.7      12 Oct 2021 06:45  Mg     1.9     10-12    TPro  6.5  /  Alb  4.0  /  TBili  1.7<H>  /  DBili  x   /  AST  17  /  ALT  9   /  AlkPhos  91  10-12            PT/INR - ( 12 Oct 2021 06:45 )   PT: 12.10 sec;   INR: 1.05 ratio         PTT - ( 12 Oct 2021 06:45 )  PTT:29.4 sec  CARDIAC MARKERS ( 12 Oct 2021 06:45 )  x     / <0.01 ng/mL / x     / x     / x          CAPILLARY BLOOD GLUCOSE                      < from: CT Angio Abdomen and Pelvis w/ IV Cont (10.12.21 @ 09:06) >  IMPRESSION:    No definite site of active intraluminal arterial contrast extravasation is identified to localize a site of active GI bleeding.    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    Borderline urinary bladder wall thickening. Correlation with urinalysis can be made.    Additional findings as above.    --- End of Report ---    < end of copied text >    
Patient is a 76y old  Male who presents with a chief complaint of GI Bleed       HPI:  Pt is a 77 yo M PMH Prostate CA (recently diagnosed s/p prostate bx), CAD s/p stents, HTN, DM, nephrolithiasis who presented with rectal bleeding x 1.5 days. Pt reports BRBPR since yesterday that has gradually gotten worse. Pt reports similar episode ~20 years ago which was attributed to hemorrhoids. Last colonoscopy was 3-5 years ago, which was negative. Pt was due for a colonoscopy last month, however was postponed due to new diagnosis of prostate CA. Pt underwent prostate Bx on 9/19 and has not started any treatment yet. Pt's urologist Dr. Curiel. Pt reports a mechanical fall about a week ago. Pt takes plavix daily- did not take medications this morning though. Pt lives with his children.     Patient was found to be hypotensive to 80/60 in the field but responded to 1 unit IVF. BP in ER was 113/83.     Denies syncope, lightheadedness, palpitations, CP, SOB, abdominal pain, N/V/D, HA, neck pain, leg pain/edema, fever/chills, cough, hematuria.  (12 Oct 2021 12:16)      PAST MEDICAL & SURGICAL HISTORY:  CAD (coronary artery disease)    Hypercholesteremia    BPH (benign prostatic hyperplasia)    Kidney stones    Hypertension    Diabetes mellitus    Status post cardiac surgery  cardiac stents    Bilateral cataracts    History of lithotripsy        MEDICATIONS  (STANDING):  atorvastatin 40 milliGRAM(s) Oral at bedtime  finasteride 5 milliGRAM(s) Oral daily  influenza   Vaccine 0.5 milliLiter(s) IntraMuscular once  pantoprazole  Injectable 40 milliGRAM(s) IV Push every 12 hours  sodium chloride 0.9%. 1000 milliLiter(s) (75 mL/Hr) IV Continuous <Continuous>  tamsulosin 0.4 milliGRAM(s) Oral at bedtime    MEDICATIONS  (PRN):  acetaminophen   Tablet .. 650 milliGRAM(s) Oral every 6 hours PRN Temp greater or equal to 38C (100.4F), Mild Pain (1 - 3)  aluminum hydroxide/magnesium hydroxide/simethicone Suspension 30 milliLiter(s) Oral every 4 hours PRN Dyspepsia  melatonin 3 milliGRAM(s) Oral at bedtime PRN Insomnia  ondansetron Injectable 4 milliGRAM(s) IV Push every 8 hours PRN Nausea and/or Vomiting      Allergies    No Known Allergies            SOCIAL HISTORY: no tobacco or etoh    FAMILY HISTORY:  FH: colon cancer (Mother)        REVIEW OF SYSTEMS:    CONSTITUTIONAL: No weakness, fevers or chills  EYES/ENT: No visual changes;  No vertigo or throat pain   NECK: No pain or stiffness  RESPIRATORY: No cough, wheezing, hemoptysis; No shortness of breath  CARDIOVASCULAR: No chest pain or palpitations  GASTROINTESTINAL: No abdominal or epigastric pain. No nausea, vomiting, or hematemesis; No diarrhea or constipation. No melena or hematochezia.  GENITOURINARY: No dysuria, frequency or hematuria  NEUROLOGICAL: No numbness or weakness  SKIN: No itching, burning, rashes, or lesions   PSYCH: Normal mood and affect  All other review of systems is negative unless indicated above.    Vital Signs Last 24 Hrs  T(C): 37.1 (12 Oct 2021 15:07), Max: 37.1 (12 Oct 2021 15:07)  T(F): 98.7 (12 Oct 2021 15:07), Max: 98.7 (12 Oct 2021 15:07)  HR: 108 (12 Oct 2021 15:07) (100 - 108)  BP: 110/67 (12 Oct 2021 15:07) (110/67 - 114/74)  BP(mean): --  RR: 16 (12 Oct 2021 15:07) (16 - 18)  SpO2: 97% (12 Oct 2021 15:07) (95% - 100%)    PHYSICAL EXAM:    Constitutional: NAD, well-developed  HEENT: MMM  Neck: No LAD  Respiratory: CTAB  Cardiovascular: S1 and S2, RRR, no M/G/R  Gastrointestinal: BS+, soft, NT/ND  Extremities: No peripheral edema  Vascular: 2+ peripheral pulses  Neurological: A/O x 3, no focal deficits  Psychiatric: Normal mood, normal affect  Skin: No rashes    LABS:                        13.4   13.48 )-----------( 252      ( 12 Oct 2021 18:58 )             39.1     10-12    139  |  105  |  26<H>  ----------------------------<  116<H>  4.6   |  19  |  1.4    Ca    9.7      12 Oct 2021 06:45  Mg     1.9     10-12    TPro  6.5  /  Alb  4.0  /  TBili  1.7<H>  /  DBili  x   /  AST  17  /  ALT  9   /  AlkPhos  91  10-12    PT/INR - ( 12 Oct 2021 06:45 )   PT: 12.10 sec;   INR: 1.05 ratio         PTT - ( 12 Oct 2021 06:45 )  PTT:29.4 sec  LIVER FUNCTIONS - ( 12 Oct 2021 06:45 )  Alb: 4.0 g/dL / Pro: 6.5 g/dL / ALK PHOS: 91 U/L / ALT: 9 U/L / AST: 17 U/L / GGT: x             RADIOLOGY & ADDITIONAL STUDIES: CTA negative
Patient is a 76y old  Male who presents with a chief complaint of GI Bleed (13 Oct 2021 10:17)    Pt is a 75 yo M PMH Prostate CA (recently diagnosed s/p prostate bx), CAD s/p stents, HTN, DM, nephrolithiasis who presented with rectal bleeding x 1.5 days. Pt reports BRBPR since yesterday that has gradually gotten worse. Pt reports similar episode ~20 years ago which was attributed to hemorrhoids. Last colonoscopy was 3-5 years ago, which was negative. Pt was due for a colonoscopy last month, however was postponed due to new diagnosis of prostate CA. Pt underwent prostate Bx on 9/19 and has not started any treatment yet. Pt's urologist Dr. Curiel. Pt reports a mechanical fall about a week ago. Pt takes plavix daily- did not take medications this morning though. Pt lives with his children.   Patient was found to be hypotensive to 80/60 in the field but responded to 1 unit IVF. BP in ER was 113/83.   Denies syncope, lightheadedness, palpitations, CP, SOB, abdominal pain, N/V/D, HA, neck pain, leg pain/edema, fever/chills, cough, hematuria.    This morning patient was hypotensive and tachycardic.  Patient has been having bright red blood in rectum with clots.  Received 1L NS & 1U PRBC.    PAST MEDICAL & SURGICAL HISTORY:  CAD (coronary artery disease)  Hypercholesteremia  BPH (benign prostatic hyperplasia)  Kidney stones  Hypertension  Diabetes mellitus  Status post cardiac surgery  cardiac stents  Bilateral cataracts  History of lithotripsy    SOCIAL HX:   Non smoker   No EtOH abuse    No illicit drug use      FAMILY HISTORY:  FH: colon cancer (Mother)    Review Of Systems:   All ROS are negative except per HPI     Allergies  No Known Allergies    Intolerances    PHYSICAL EXAM  ICU Vital Signs Last 24 Hrs  T(C): 36.7 (13 Oct 2021 10:09), Max: 37.1 (12 Oct 2021 15:07)  T(F): 98.1 (13 Oct 2021 10:09), Max: 98.7 (12 Oct 2021 15:07)  HR: 95 (13 Oct 2021 11:23) (95 - 111)  BP: 92/51 (13 Oct 2021 11:23) (83/50 - 114/74)  BP(mean): 64 (13 Oct 2021 11:23) (63 - 64)  RR: 20 (13 Oct 2021 11:23) (16 - 20)  SpO2: 98% (13 Oct 2021 11:23) (95% - 99%)      CONSTITUTIONAL:  Well nourished.  NAD    ENT:   Airway patent,   Mouth with normal mucosa.   No thrush    EYES:   pupils equal,   round and reactive to light.    CARDIAC:   Normal rate,   Regular rhythm.    Heart sounds S1, S2.   No edema    RESPIRATORY:   No wheezing   Normal chest expansion  No use of accessory muscles    GASTROINTESTINAL:  Abdomen soft   Non-tender,   No guarding,   + BS    MUSCULOSKELETAL:   Range of motion is not limited,  No clubbing, cyanosis    NEUROLOGICAL:   Alert and oriented   No motor or sensory deficits.  Pertinent DTRs normal    SKIN:   Skin normal color for race,   Warm and dry  No evidence of rash.  Pale    PSYCHIATRIC:   Normal mood and affect.   No apparent risk to self or others.    HEME LYMPH:   No cervical  lymphadenopathy.  No inguinal lymphadenopathy            10-13-21 @ 07:01  -  10-13-21 @ 11:39  --------------------------------------------------------  IN:    sodium chloride 0.9%: 225 mL  Total IN: 225 mL    OUT:    Voided (mL): 100 mL  Total OUT: 100 mL    Total NET: 125 mL        LABS:                          11.6   12.48 )-----------( 208      ( 13 Oct 2021 08:32 )             33.5     141  |  112<H>  |  37<H>  ----------------------------<  106<H>  4.8   |  17  |  1.4    Ca    9.0      13 Oct 2021 08:32  Mg     1.9     10-12    TPro  6.5  /  Alb  4.0  /  TBili  1.7<H>  /  DBili  x   /  AST  17  /  ALT  9   /  AlkPhos  91  10-12    PT/INR - ( 12 Oct 2021 06:45 )   PT: 12.10 sec;   INR: 1.05 ratio    PTT - ( 12 Oct 2021 06:45 )  PTT:29.4 sec                                           CARDIAC MARKERS ( 12 Oct 2021 06:45 )  x     / <0.01 ng/mL / x     / x     / x        LIVER FUNCTIONS - ( 12 Oct 2021 06:45 )  Alb: 4.0 g/dL / Pro: 6.5 g/dL / ALK PHOS: 91 U/L / ALT: 9 U/L / AST: 17 U/L / GGT: x                                                X-Rays reviewed:                                                                                    ECHO    CXR interpreted by me:  no acute cardiopulmonary pathology    MEDICATIONS  (STANDING):  atorvastatin 40 milliGRAM(s) Oral at bedtime  finasteride 5 milliGRAM(s) Oral daily  influenza   Vaccine 0.5 milliLiter(s) IntraMuscular once  pantoprazole  Injectable 40 milliGRAM(s) IV Push every 12 hours  sodium chloride 0.9%. 1000 milliLiter(s) (75 mL/Hr) IV Continuous <Continuous>  tamsulosin 0.4 milliGRAM(s) Oral at bedtime    MEDICATIONS  (PRN):  acetaminophen   Tablet .. 650 milliGRAM(s) Oral every 6 hours PRN Temp greater or equal to 38C (100.4F), Mild Pain (1 - 3)  aluminum hydroxide/magnesium hydroxide/simethicone Suspension 30 milliLiter(s) Oral every 4 hours PRN Dyspepsia  melatonin 3 milliGRAM(s) Oral at bedtime PRN Insomnia  ondansetron Injectable 4 milliGRAM(s) IV Push every 8 hours PRN Nausea and/or Vomiting

## 2021-10-14 LAB
ANION GAP SERPL CALC-SCNC: 8 MMOL/L — SIGNIFICANT CHANGE UP (ref 7–14)
BASOPHILS # BLD AUTO: 0.04 K/UL — SIGNIFICANT CHANGE UP (ref 0–0.2)
BASOPHILS NFR BLD AUTO: 0.4 % — SIGNIFICANT CHANGE UP (ref 0–1)
BUN SERPL-MCNC: 28 MG/DL — HIGH (ref 10–20)
CALCIUM SERPL-MCNC: 8 MG/DL — LOW (ref 8.5–10.1)
CHLORIDE SERPL-SCNC: 115 MMOL/L — HIGH (ref 98–110)
CO2 SERPL-SCNC: 17 MMOL/L — SIGNIFICANT CHANGE UP (ref 17–32)
COVID-19 SPIKE DOMAIN AB INTERP: POSITIVE
COVID-19 SPIKE DOMAIN ANTIBODY RESULT: 88.7 U/ML — HIGH
CREAT SERPL-MCNC: 1.3 MG/DL — SIGNIFICANT CHANGE UP (ref 0.7–1.5)
EOSINOPHIL # BLD AUTO: 0.12 K/UL — SIGNIFICANT CHANGE UP (ref 0–0.7)
EOSINOPHIL NFR BLD AUTO: 1.3 % — SIGNIFICANT CHANGE UP (ref 0–8)
GLUCOSE SERPL-MCNC: 75 MG/DL — SIGNIFICANT CHANGE UP (ref 70–99)
HCT VFR BLD CALC: 31.2 % — LOW (ref 42–52)
HCT VFR BLD CALC: 31.8 % — LOW (ref 42–52)
HGB BLD-MCNC: 10.7 G/DL — LOW (ref 14–18)
HGB BLD-MCNC: 10.9 G/DL — LOW (ref 14–18)
IMM GRANULOCYTES NFR BLD AUTO: 0.6 % — HIGH (ref 0.1–0.3)
LYMPHOCYTES # BLD AUTO: 1.04 K/UL — LOW (ref 1.2–3.4)
LYMPHOCYTES # BLD AUTO: 11.1 % — LOW (ref 20.5–51.1)
MCHC RBC-ENTMCNC: 31.2 PG — HIGH (ref 27–31)
MCHC RBC-ENTMCNC: 31.3 PG — HIGH (ref 27–31)
MCHC RBC-ENTMCNC: 34.3 G/DL — SIGNIFICANT CHANGE UP (ref 32–37)
MCHC RBC-ENTMCNC: 34.3 G/DL — SIGNIFICANT CHANGE UP (ref 32–37)
MCV RBC AUTO: 91 FL — SIGNIFICANT CHANGE UP (ref 80–94)
MCV RBC AUTO: 91.4 FL — SIGNIFICANT CHANGE UP (ref 80–94)
MONOCYTES # BLD AUTO: 0.94 K/UL — HIGH (ref 0.1–0.6)
MONOCYTES NFR BLD AUTO: 10.1 % — HIGH (ref 1.7–9.3)
NEUTROPHILS # BLD AUTO: 7.14 K/UL — HIGH (ref 1.4–6.5)
NEUTROPHILS NFR BLD AUTO: 76.5 % — HIGH (ref 42.2–75.2)
NRBC # BLD: 0 /100 WBCS — SIGNIFICANT CHANGE UP (ref 0–0)
NRBC # BLD: 0 /100 WBCS — SIGNIFICANT CHANGE UP (ref 0–0)
PLATELET # BLD AUTO: 161 K/UL — SIGNIFICANT CHANGE UP (ref 130–400)
PLATELET # BLD AUTO: 172 K/UL — SIGNIFICANT CHANGE UP (ref 130–400)
POTASSIUM SERPL-MCNC: 4 MMOL/L — SIGNIFICANT CHANGE UP (ref 3.5–5)
POTASSIUM SERPL-SCNC: 4 MMOL/L — SIGNIFICANT CHANGE UP (ref 3.5–5)
RBC # BLD: 3.43 M/UL — LOW (ref 4.7–6.1)
RBC # BLD: 3.48 M/UL — LOW (ref 4.7–6.1)
RBC # FLD: 13.8 % — SIGNIFICANT CHANGE UP (ref 11.5–14.5)
RBC # FLD: 13.9 % — SIGNIFICANT CHANGE UP (ref 11.5–14.5)
SARS-COV-2 IGG+IGM SERPL QL IA: 88.7 U/ML — HIGH
SARS-COV-2 IGG+IGM SERPL QL IA: POSITIVE
SODIUM SERPL-SCNC: 140 MMOL/L — SIGNIFICANT CHANGE UP (ref 135–146)
WBC # BLD: 8.12 K/UL — SIGNIFICANT CHANGE UP (ref 4.8–10.8)
WBC # BLD: 9.34 K/UL — SIGNIFICANT CHANGE UP (ref 4.8–10.8)
WBC # FLD AUTO: 8.12 K/UL — SIGNIFICANT CHANGE UP (ref 4.8–10.8)
WBC # FLD AUTO: 9.34 K/UL — SIGNIFICANT CHANGE UP (ref 4.8–10.8)

## 2021-10-14 PROCEDURE — 99233 SBSQ HOSP IP/OBS HIGH 50: CPT

## 2021-10-14 RX ORDER — SOD SULF/SODIUM/NAHCO3/KCL/PEG
4000 SOLUTION, RECONSTITUTED, ORAL ORAL ONCE
Refills: 0 | Status: COMPLETED | OUTPATIENT
Start: 2021-10-14 | End: 2021-10-14

## 2021-10-14 RX ORDER — SODIUM CHLORIDE 9 MG/ML
1000 INJECTION INTRAMUSCULAR; INTRAVENOUS; SUBCUTANEOUS ONCE
Refills: 0 | Status: COMPLETED | OUTPATIENT
Start: 2021-10-14 | End: 2021-10-14

## 2021-10-14 RX ADMIN — Medication 20 MILLIGRAM(S): at 21:16

## 2021-10-14 RX ADMIN — FINASTERIDE 5 MILLIGRAM(S): 5 TABLET, FILM COATED ORAL at 12:10

## 2021-10-14 RX ADMIN — SODIUM CHLORIDE 75 MILLILITER(S): 9 INJECTION, SOLUTION INTRAVENOUS at 21:18

## 2021-10-14 RX ADMIN — SODIUM CHLORIDE 75 MILLILITER(S): 9 INJECTION, SOLUTION INTRAVENOUS at 05:51

## 2021-10-14 RX ADMIN — SODIUM CHLORIDE 1000 MILLILITER(S): 9 INJECTION INTRAMUSCULAR; INTRAVENOUS; SUBCUTANEOUS at 02:36

## 2021-10-14 RX ADMIN — ATORVASTATIN CALCIUM 40 MILLIGRAM(S): 80 TABLET, FILM COATED ORAL at 21:16

## 2021-10-14 RX ADMIN — TAMSULOSIN HYDROCHLORIDE 0.4 MILLIGRAM(S): 0.4 CAPSULE ORAL at 21:15

## 2021-10-14 RX ADMIN — PANTOPRAZOLE SODIUM 40 MILLIGRAM(S): 20 TABLET, DELAYED RELEASE ORAL at 05:51

## 2021-10-14 RX ADMIN — PANTOPRAZOLE SODIUM 40 MILLIGRAM(S): 20 TABLET, DELAYED RELEASE ORAL at 17:15

## 2021-10-14 RX ADMIN — Medication 4000 MILLILITER(S): at 16:22

## 2021-10-14 NOTE — PROGRESS NOTE ADULT - ASSESSMENT
77 yo M PMH Prostate CA (recently diagnosed s/p prostate bx), CAD s/p stents, HTN, DM, nephrolithiasis admitted for GI bleed.      - EGD negative   - upgraded to ICU yesterday for low BP   - NPO after midnight and prep for colonoscopy in am   - Protonix   - hold Plavix   - monitor H/H

## 2021-10-14 NOTE — PROGRESS NOTE ADULT - SUBJECTIVE AND OBJECTIVE BOX
Patient is a 76y old  Male who presents with a chief complaint of GI Bleed (14 Oct 2021 08:05)    Over Night Events:  No episodes of bleeding.  SP EGD gastritis    ROS:   All ROS are negative except HPI     PHYSICAL EXAM  ICU Vital Signs Last 24 Hrs  T(C): 36.5 (14 Oct 2021 07:02), Max: 36.7 (13 Oct 2021 10:09)  T(F): 97.7 (14 Oct 2021 07:02), Max: 98.1 (13 Oct 2021 10:09)  HR: 82 (14 Oct 2021 07:00) (73 - 106)  BP: 102/59 (14 Oct 2021 07:00) (76/50 - 124/65)  BP(mean): 75 (14 Oct 2021 07:00) (54 - 104)  RR: 23 (14 Oct 2021 07:02) (14 - 28)  SpO2: 95% (14 Oct 2021 07:00) (91% - 99%)      CONSTITUTIONAL:  Well nourished.  NAD    ENT:   Airway patent,   Mouth with normal mucosa.   No thrush    EYES:   pupils equal,   round and reactive to light.    CARDIAC:   Normal rate,   Regular rhythm.    Heart sounds S1, S2.   No edema    RESPIRATORY:   No wheezing   Normal chest expansion  No use of accessory muscles    GASTROINTESTINAL:  Abdomen soft   Non-tender,   No guarding,   + BS    MUSCULOSKELETAL:   Range of motion is not limited,  No clubbing, cyanosis    NEUROLOGICAL:   Alert and oriented   No motor or sensory deficits.  Pertinent DTRs normal    SKIN:   Skin normal color for race,   Warm and dry  No evidence of rash.    PSYCHIATRIC:   Normal mood and affect.   No apparent risk to self or others.    HEME LYMPH:   No cervical  lymphadenopathy.  No inguinal lymphadenopathy      10-13-21 @ 07:01  -  10-14-21 @ 07:00  --------------------------------------------------------  IN:    PRBCs (Packed Red Blood Cells): 319 mL    sodium chloride 0.45%: 675 mL    sodium chloride 0.9%: 825 mL    Sodium Chloride 0.9% Bolus: 1000 mL  Total IN: 2819 mL    OUT:    Voided (mL): 1545 mL  Total OUT: 1545 mL    Total NET: 1274 mL      LABS:                            10.7   8.12  )-----------( 161      ( 14 Oct 2021 06:42 )             31.2       141  |  112<H>  |  37<H>  ----------------------------<  106<H>  4.8   |  17  |  1.4    Ca    9.0      13 Oct 2021 08:32                                            MEDICATIONS  (STANDING):  atorvastatin 40 milliGRAM(s) Oral at bedtime  finasteride 5 milliGRAM(s) Oral daily  influenza   Vaccine 0.5 milliLiter(s) IntraMuscular once  pantoprazole  Injectable 40 milliGRAM(s) IV Push every 12 hours  sodium chloride 0.45%. 1000 milliLiter(s) (75 mL/Hr) IV Continuous <Continuous>  tamsulosin 0.4 milliGRAM(s) Oral at bedtime    MEDICATIONS  (PRN):  acetaminophen   Tablet .. 650 milliGRAM(s) Oral every 6 hours PRN Temp greater or equal to 38C (100.4F), Mild Pain (1 - 3)  aluminum hydroxide/magnesium hydroxide/simethicone Suspension 30 milliLiter(s) Oral every 4 hours PRN Dyspepsia  melatonin 3 milliGRAM(s) Oral at bedtime PRN Insomnia  ondansetron Injectable 4 milliGRAM(s) IV Push every 8 hours PRN Nausea and/or Vomiting

## 2021-10-14 NOTE — PROGRESS NOTE ADULT - ASSESSMENT
IMPRESSION:    Hemorrhagic shock secondary to GIB, resolved  Anemia secondary to GIB SP 1U PRBC SP EGD no acute UGIB, stable  Gastroduodenal Artery Pseudoaneurysm 7mm  Recent diagnosis of Prostate CA SP biopsy      PLAN:    CNS:  Avoid depressants    HEENT: Oral care    PULMONARY:  HOB @ 45 degrees.  Aspiration precautions.    CARDIOVASCULAR:  D5NS @ 75cc/hr.  Target MAP 65. Keep I = O.  ECHO.    GI: GI prophylaxis.  NPO for possible colonoscopy today or in AM.  Bowel regimen.    GI eval.   Possible IR if -ve EGD/Colonoscopy findings.    RENAL: Follow up lytes.  Correct as needed.  Monitor UO    INFECTIOUS DISEASE:   Monitor off ABX.  Follow up cultures    HEMATOLOGICAL:  Trend CBC.  Transfuse if active bleed & hemodynamically unstable.  Keep Hb > 8.  SCDs for DVT prophylaxis.    ENDOCRINE:  Follow up FS.  Insulin protocol if needed.    MUSCULOSKELETAL:  bed rest    Monitor in MICU IMPRESSION:    Hemorrhagic shock secondary to GIB, resolved  Anemia secondary to GIB SP 1U PRBC SP EGD no acute UGIB, stable  Gastroduodenal Artery Pseudoaneurysm 7mm  Recent diagnosis of Prostate CA SP biopsy      PLAN:    CNS:  Avoid depressants    HEENT: Oral care    PULMONARY:  HOB @ 45 degrees.  Aspiration precautions.    CARDIOVASCULAR:  D5-1/2 NS @ 75cc/hr.  Target MAP 65. Keep I = O.  ECHO.    GI: GI prophylaxis.  NPO for possible colonoscopy today or in AM.  Bowel regimen.    GI eval.   Possible IR if -ve EGD/Colonoscopy findings.    RENAL: Follow up lytes.  Correct as needed.  Monitor UO    INFECTIOUS DISEASE:   Monitor off ABX.  Follow up cultures    HEMATOLOGICAL:  Trend CBC.  Transfuse if active bleed & hemodynamically unstable.  Keep Hb > 8.  SCDs for DVT prophylaxis.    ENDOCRINE:  Follow up FS.  Insulin protocol if needed.    MUSCULOSKELETAL:  bed rest    Monitor in MICU IMPRESSION:    Hemorrhagic shock secondary to GIB, resolved  Anemia secondary to GIB SP 1U PRBC SP EGD no acute UGIB, stable  Gastroduodenal Artery Pseudoaneurysm 7mm  Recent diagnosis of Prostate CA SP biopsy      PLAN:    CNS:  Avoid depressants    HEENT: Oral care    PULMONARY:  HOB @ 45 degrees.  Aspiration precautions.    CARDIOVASCULAR:  D5-1/2 NS @ 75cc/hr.  Target MAP 65. Keep I = O.  ECHO.    GI: GI prophylaxis.  NPO for possible colonoscopy today or in AM.  Bowel regimen.      GI eval.     Possible IR if -ve EGD/Colonoscopy findings.    RENAL: Follow up lytes.  Correct as needed.  Monitor UO    INFECTIOUS DISEASE:   Monitor off ABX.  Follow up cultures    HEMATOLOGICAL:  Trend CBC.    Transfuse if active bleed & hemodynamically unstable.    Keep Hb > 8.    SCDs for DVT prophylaxis.    ENDOCRINE:  Follow up FS.  Insulin protocol if needed.    MUSCULOSKELETAL:  bed rest    Monitor in MICU

## 2021-10-14 NOTE — PROGRESS NOTE ADULT - SUBJECTIVE AND OBJECTIVE BOX
no new events overnight  status post EGD with no sign of gi bleeding from above  hgb 10.9          MEDICATIONS  (STANDING):  atorvastatin 40 milliGRAM(s) Oral at bedtime  bisacodyl 20 milliGRAM(s) Oral at bedtime  finasteride 5 milliGRAM(s) Oral daily  influenza   Vaccine 0.5 milliLiter(s) IntraMuscular once  pantoprazole  Injectable 40 milliGRAM(s) IV Push every 12 hours  sodium chloride 0.45%. 1000 milliLiter(s) (75 mL/Hr) IV Continuous <Continuous>  tamsulosin 0.4 milliGRAM(s) Oral at bedtime    MEDICATIONS  (PRN):  acetaminophen   Tablet .. 650 milliGRAM(s) Oral every 6 hours PRN Temp greater or equal to 38C (100.4F), Mild Pain (1 - 3)  aluminum hydroxide/magnesium hydroxide/simethicone Suspension 30 milliLiter(s) Oral every 4 hours PRN Dyspepsia  melatonin 3 milliGRAM(s) Oral at bedtime PRN Insomnia  ondansetron Injectable 4 milliGRAM(s) IV Push every 8 hours PRN Nausea and/or Vomiting          REVIEW OF SYSTEMS:    CONSTITUTIONAL: No weakness, fevers or chills  EYES/ENT: No visual changes;  No vertigo or throat pain   NECK: No pain or stiffness  RESPIRATORY: No cough, wheezing, hemoptysis; No shortness of breath  CARDIOVASCULAR: No chest pain or palpitations  GASTROINTESTINAL: No abdominal or epigastric pain. No nausea, vomiting, or hematemesis; No diarrhea or constipation. No melena or hematochezia.  GENITOURINARY: No dysuria, frequency or hematuria  NEUROLOGICAL: No numbness or weakness  SKIN: No itching, burning, rashes, or lesions   PSYCH: Normal mood and affect  All other review of systems is negative unless indicated above.    Vital Signs Last 24 Hrs  T(C): 36.2 (14 Oct 2021 19:05), Max: 36.5 (13 Oct 2021 23:00)  T(F): 97.1 (14 Oct 2021 19:05), Max: 97.7 (13 Oct 2021 23:00)  HR: 76 (14 Oct 2021 19:05) (73 - 92)  BP: 133/62 (14 Oct 2021 19:00) (77/43 - 133/75)  BP(mean): 89 (14 Oct 2021 19:00) (54 - 97)  RR: 25 (14 Oct 2021 19:05) (18 - 28)  SpO2: 97% (14 Oct 2021 19:05) (91% - 99%)    PHYSICAL EXAM:    Constitutional: NAD, well-developed  HEENT: MMM  Neck: No LAD  Respiratory: CTAB  Cardiovascular: S1 and S2, RRR, no M/G/R  Gastrointestinal: BS+, soft, NT/ND  Extremities: No peripheral edema  Vascular: 2+ peripheral pulses  Neurological: A/O x 3, no focal deficits  Psychiatric: Normal mood, normal affect  Skin: No rashes    LABS:                        10.9   9.34  )-----------( 172      ( 14 Oct 2021 19:15 )             31.8     10-14    140  |  115<H>  |  28<H>  ----------------------------<  75  4.0   |  17  |  1.3    Ca    8.0<L>      14 Oct 2021 06:42            RADIOLOGY & ADDITIONAL STUDIES:

## 2021-10-14 NOTE — PROGRESS NOTE ADULT - ASSESSMENT
IMP likely diverticular bleeding no active bleeding   on golyte prep now and old blood with PEG solution evacuating  for colonoscopy in AM  NPO at midnight

## 2021-10-14 NOTE — PROGRESS NOTE ADULT - SUBJECTIVE AND OBJECTIVE BOX
Patient is a 76y old  Male who presents with a chief complaint of GI Bleed (14 Oct 2021 08:15)      T(F): 97.4 (10-14-21 @ 11:00), Max: 98.1 (10-13-21 @ 19:13)  HR: 92 (10-14-21 @ 12:45)  BP: 105/68 (10-14-21 @ 12:45)  RR: 21 (10-14-21 @ 13:00)  SpO2: 93% (10-14-21 @ 12:45) (91% - 98%)    PHYSICAL EXAM:  GENERAL: NAD  HEAD:  Atraumatic, Normocephalic  EYES: EOMI, PERRLA, conjunctiva and sclera clear  NERVOUS SYSTEM:  Alert & Oriented X3, no focal deficits   CHEST/LUNG: Clear to percussion bilaterally; No rales, rhonchi, wheezing, or rubs  HEART: Regular rate and rhythm; No murmurs, rubs, or gallops  ABDOMEN: Soft, Nontender, Nondistended; Bowel sounds present  EXTREMITIES:  2+ Peripheral Pulses, No clubbing, cyanosis, or edema    LABS  10-14    140  |  115<H>  |  28<H>  ----------------------------<  75  4.0   |  17  |  1.3    Ca    8.0<L>      14 Oct 2021 06:42                            10.7   8.12  )-----------( 161      ( 14 Oct 2021 06:42 )             31.2       CARDIAC ENZYMES    Troponin T, Serum: <0.01 ng/mL (10-12-21 @ 06:45)    RADIOLOGY  < from: CT Angio Abdomen and Pelvis w/ IV Cont (10.12.21 @ 09:06) >  IMPRESSION:    No definite site of active intraluminal arterial contrast extravasation is identified to localize a site of active GI bleeding.    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    Borderline urinary bladder wall thickening. Correlation with urinalysis can be made.      < end of copied text >    MEDICATIONS  (STANDING):  atorvastatin 40 milliGRAM(s) Oral at bedtime  bisacodyl 20 milliGRAM(s) Oral at bedtime  finasteride 5 milliGRAM(s) Oral daily  influenza   Vaccine 0.5 milliLiter(s) IntraMuscular once  pantoprazole  Injectable 40 milliGRAM(s) IV Push every 12 hours  polyethylene glycol/electrolyte Solution. 4000 milliLiter(s) Oral once  sodium chloride 0.45%. 1000 milliLiter(s) (75 mL/Hr) IV Continuous <Continuous>  tamsulosin 0.4 milliGRAM(s) Oral at bedtime    MEDICATIONS  (PRN):  acetaminophen   Tablet .. 650 milliGRAM(s) Oral every 6 hours PRN Temp greater or equal to 38C (100.4F), Mild Pain (1 - 3)  aluminum hydroxide/magnesium hydroxide/simethicone Suspension 30 milliLiter(s) Oral every 4 hours PRN Dyspepsia  melatonin 3 milliGRAM(s) Oral at bedtime PRN Insomnia  ondansetron Injectable 4 milliGRAM(s) IV Push every 8 hours PRN Nausea and/or Vomiting

## 2021-10-14 NOTE — PROGRESS NOTE ADULT - SUBJECTIVE AND OBJECTIVE BOX
RADHA RAMON 76y Male  MRN#: [id number]   CODE STATUS :FULL CODE    Hospital Day: 2d    Pt is currently admitted with the primary diagnosis of GI Bleed with multiple BRRBPR   S/P 1U of RBC with Hgb remaining stable, S/P EGD 10/13 with findings of gastritis and  hiatal hernia  Will await further plan per GI                                             ----------------------------------------------------------  OBJECTIVE  PAST MEDICAL & SURGICAL HISTORY  CAD (coronary artery disease)    Hypercholesteremia    BPH (benign prostatic hyperplasia)    Kidney stones    Hypertension    Diabetes mellitus    Status post cardiac surgery  cardiac stents    Bilateral cataracts    History of lithotripsy                                              -----------------------------------------------------------  ALLERGIES:  No Known Allergies                                          ------------------------------------------------------------    HOME MEDICATIONS  Home Medications:  finasteride 5 mg oral tablet: 1 tab(s) orally once a day (12 Oct 2021 12:30)  Plavix 75 mg oral tablet: 1 tab(s) orally once a day (12 Oct 2021 12:30)  simvastatin 80 mg oral tablet: 1 tab(s) orally once a day (at bedtime) (12 Oct 2021 12:30)  tamsulosin 0.4 mg oral capsule: 1 cap(s) orally once a day (at bedtime)  - if you continue to have your blood pressures drop when you stand, then this medication will need to be STOPPED (12 Oct 2021 12:30)                           MEDICATIONS:  STANDING MEDICATIONS  atorvastatin 40 milliGRAM(s) Oral at bedtime  finasteride 5 milliGRAM(s) Oral daily  influenza   Vaccine 0.5 milliLiter(s) IntraMuscular once  pantoprazole  Injectable 40 milliGRAM(s) IV Push every 12 hours  sodium chloride 0.45%. 1000 milliLiter(s) IV Continuous <Continuous>  tamsulosin 0.4 milliGRAM(s) Oral at bedtime    PRN MEDICATIONS  acetaminophen   Tablet .. 650 milliGRAM(s) Oral every 6 hours PRN  aluminum hydroxide/magnesium hydroxide/simethicone Suspension 30 milliLiter(s) Oral every 4 hours PRN  melatonin 3 milliGRAM(s) Oral at bedtime PRN  ondansetron Injectable 4 milliGRAM(s) IV Push every 8 hours PRN                                            ------------------------------------------------------------  VITAL SIGNS: Last 24 Hours  T(C): 35.8 (14 Oct 2021 03:00), Max: 36.7 (13 Oct 2021 10:09)  T(F): 96.4 (14 Oct 2021 03:00), Max: 98.1 (13 Oct 2021 10:09)  HR: 82 (14 Oct 2021 07:00) (73 - 106)  BP: 102/59 (14 Oct 2021 07:00) (76/50 - 124/65)  BP(mean): 75 (14 Oct 2021 07:00) (54 - 104)  RR: 24 (14 Oct 2021 07:00) (14 - 28)  SpO2: 95% (14 Oct 2021 07:00) (91% - 99%)      10-13-21 @ 07:01  -  10-14-21 @ 07:00  --------------------------------------------------------  IN: 2819 mL / OUT: 1545 mL / NET: 1274 mL                                             --------------------------------------------------------------  LABS:                        10.7   8.12  )-----------( 161      ( 14 Oct 2021 06:42 )             31.2     10-13    141  |  112<H>  |  37<H>  ----------------------------<  106<H>  4.8   |  17  |  1.4    Ca    9.0      13 Oct 2021 08:32                            PHYSICAL EXAM:  General: NAD, resting comfortably in bed  LUNGS: CTA b/l, no adventitious sounds   HEART: S1/S2 appreciated RRR, no murmurs   ABDOMEN: Soft, NT, ND, +BS   EXT: Atraumatic, no LE edema, no skin color changes   NEURO: AO x 3, n o focal neurological deficits                                              --------------------------------------------------------------    ASSESSMENT & PLAN                       RADHA RAMON 76y Male  MRN#: [id number]   CODE STATUS :FULL CODE    Hospital Day: 2d    Pt is currently admitted with the primary diagnosis of GI Bleed with multiple Melana   S/P 1U of RBC with Hgb remaining stable, S/P EGD 10/13 with findings of gastritis and  hiatal hernia, Will await further plan per GI                                             ----------------------------------------------------------  OBJECTIVE  PAST MEDICAL & SURGICAL HISTORY  CAD (coronary artery disease)    Hypercholesteremia    BPH (benign prostatic hyperplasia)    Kidney stones    Hypertension    Diabetes mellitus    Status post cardiac surgery  cardiac stents    Bilateral cataracts    History of lithotripsy                                              -----------------------------------------------------------  ALLERGIES:  No Known Allergies                                          ------------------------------------------------------------    HOME MEDICATIONS  Home Medications:  finasteride 5 mg oral tablet: 1 tab(s) orally once a day (12 Oct 2021 12:30)  Plavix 75 mg oral tablet: 1 tab(s) orally once a day (12 Oct 2021 12:30)  simvastatin 80 mg oral tablet: 1 tab(s) orally once a day (at bedtime) (12 Oct 2021 12:30)  tamsulosin 0.4 mg oral capsule: 1 cap(s) orally once a day (at bedtime)  - if you continue to have your blood pressures drop when you stand, then this medication will need to be STOPPED (12 Oct 2021 12:30)                           MEDICATIONS:  STANDING MEDICATIONS  atorvastatin 40 milliGRAM(s) Oral at bedtime  finasteride 5 milliGRAM(s) Oral daily  influenza   Vaccine 0.5 milliLiter(s) IntraMuscular once  pantoprazole  Injectable 40 milliGRAM(s) IV Push every 12 hours  sodium chloride 0.45%. 1000 milliLiter(s) IV Continuous <Continuous>  tamsulosin 0.4 milliGRAM(s) Oral at bedtime    PRN MEDICATIONS  acetaminophen   Tablet .. 650 milliGRAM(s) Oral every 6 hours PRN  aluminum hydroxide/magnesium hydroxide/simethicone Suspension 30 milliLiter(s) Oral every 4 hours PRN  melatonin 3 milliGRAM(s) Oral at bedtime PRN  ondansetron Injectable 4 milliGRAM(s) IV Push every 8 hours PRN                                            ------------------------------------------------------------  VITAL SIGNS: Last 24 Hours  T(C): 35.8 (14 Oct 2021 03:00), Max: 36.7 (13 Oct 2021 10:09)  T(F): 96.4 (14 Oct 2021 03:00), Max: 98.1 (13 Oct 2021 10:09)  HR: 82 (14 Oct 2021 07:00) (73 - 106)  BP: 102/59 (14 Oct 2021 07:00) (76/50 - 124/65)  BP(mean): 75 (14 Oct 2021 07:00) (54 - 104)  RR: 24 (14 Oct 2021 07:00) (14 - 28)  SpO2: 95% (14 Oct 2021 07:00) (91% - 99%)      10-13-21 @ 07:01  -  10-14-21 @ 07:00  --------------------------------------------------------  IN: 2819 mL / OUT: 1545 mL / NET: 1274 mL                                             --------------------------------------------------------------  LABS:                        10.7   8.12  )-----------( 161      ( 14 Oct 2021 06:42 )             31.2     10-13    141  |  112<H>  |  37<H>  ----------------------------<  106<H>  4.8   |  17  |  1.4    Ca    9.0      13 Oct 2021 08:32                    PHYSICAL EXAM:  General: NAD, resting comfortably in bed  LUNGS: CTA b/l, no adventitious sounds   HEART: S1/S2 appreciated RRR, no murmurs   ABDOMEN: Soft, NT, ND, +BS   EXT: Atraumatic, no LE edema, no skin color changes   NEURO: AO x 3, n o focal neurological deficits                                              --------------------------------------------------------------    ASSESSMENT & PLAN    75 yo M PMH Prostate CA (recently diagnosed s/p prostate bx), CAD s/p stents, HTN, DM, nephrolithiasis admitted for GI bleed. Had Multiple episodes of melena and hypotensive to 80's systolic and was transferred to ICU       GI Bleed, Possibly LGIB   H/O External Hemorrhoids   -Multiple episodes of melena and hypotensive to 80's systolic  -CT Angio Abdomen and Pelvis w/ IV Cont: Unremarkable for Active Bleed  -S/P 1U RBC, Hemodynamically stable   -S/P EGD 10/13:  gastritis and  hiatal hernia  -c/w IV Protonix 40 mg BID, Keep Active T&S   -F/up with GI for Further recs   -Serial Cbc's     #Incidental 7 mm gastroduodenal artery pseudoaneurysm  -Vascular Following: Pending Plan     Prostate CA + BPH  -s/p bx 9/19. Has not started any treatment yet.   -Urologist: Dr. Curiel  -F/u dr. Curiel outpatient   -c/w Flomax and Finasteride     Type II DM   -Not on any antihyperglycemics at home  -FS BID, start insulin regimen if persistently above 180     HTN: BP is soft  -Hold antihypertensive during acute blood loss     #CAD  -s/p stents 2018. Plavix stopped   -continue statin    DVT PPX: SCD's   GI PPX: PPI  Full code   Dispo: from Home  -Pending Further recs from GI

## 2021-10-15 ENCOUNTER — TRANSCRIPTION ENCOUNTER (OUTPATIENT)
Age: 76
End: 2021-10-15

## 2021-10-15 LAB
ANION GAP SERPL CALC-SCNC: 12 MMOL/L — SIGNIFICANT CHANGE UP (ref 7–14)
BASOPHILS # BLD AUTO: 0.02 K/UL — SIGNIFICANT CHANGE UP (ref 0–0.2)
BASOPHILS # BLD AUTO: 0.03 K/UL — SIGNIFICANT CHANGE UP (ref 0–0.2)
BASOPHILS NFR BLD AUTO: 0.2 % — SIGNIFICANT CHANGE UP (ref 0–1)
BASOPHILS NFR BLD AUTO: 0.3 % — SIGNIFICANT CHANGE UP (ref 0–1)
BLD GP AB SCN SERPL QL: SIGNIFICANT CHANGE UP
BUN SERPL-MCNC: 17 MG/DL — SIGNIFICANT CHANGE UP (ref 10–20)
CALCIUM SERPL-MCNC: 8.3 MG/DL — LOW (ref 8.5–10.1)
CHLORIDE SERPL-SCNC: 107 MMOL/L — SIGNIFICANT CHANGE UP (ref 98–110)
CO2 SERPL-SCNC: 19 MMOL/L — SIGNIFICANT CHANGE UP (ref 17–32)
CREAT SERPL-MCNC: 1 MG/DL — SIGNIFICANT CHANGE UP (ref 0.7–1.5)
EOSINOPHIL # BLD AUTO: 0.14 K/UL — SIGNIFICANT CHANGE UP (ref 0–0.7)
EOSINOPHIL # BLD AUTO: 0.15 K/UL — SIGNIFICANT CHANGE UP (ref 0–0.7)
EOSINOPHIL NFR BLD AUTO: 1.5 % — SIGNIFICANT CHANGE UP (ref 0–8)
EOSINOPHIL NFR BLD AUTO: 1.7 % — SIGNIFICANT CHANGE UP (ref 0–8)
GLUCOSE BLDC GLUCOMTR-MCNC: 74 MG/DL — SIGNIFICANT CHANGE UP (ref 70–99)
GLUCOSE SERPL-MCNC: 66 MG/DL — LOW (ref 70–99)
HCT VFR BLD CALC: 31.4 % — LOW (ref 42–52)
HCT VFR BLD CALC: 33.6 % — LOW (ref 42–52)
HGB BLD-MCNC: 10.8 G/DL — LOW (ref 14–18)
HGB BLD-MCNC: 11.8 G/DL — LOW (ref 14–18)
IMM GRANULOCYTES NFR BLD AUTO: 0.3 % — SIGNIFICANT CHANGE UP (ref 0.1–0.3)
IMM GRANULOCYTES NFR BLD AUTO: 0.4 % — HIGH (ref 0.1–0.3)
INR BLD: 1.13 RATIO — SIGNIFICANT CHANGE UP (ref 0.65–1.3)
LYMPHOCYTES # BLD AUTO: 0.76 K/UL — LOW (ref 1.2–3.4)
LYMPHOCYTES # BLD AUTO: 0.86 K/UL — LOW (ref 1.2–3.4)
LYMPHOCYTES # BLD AUTO: 8.5 % — LOW (ref 20.5–51.1)
LYMPHOCYTES # BLD AUTO: 9.2 % — LOW (ref 20.5–51.1)
MCHC RBC-ENTMCNC: 31.4 PG — HIGH (ref 27–31)
MCHC RBC-ENTMCNC: 31.4 PG — HIGH (ref 27–31)
MCHC RBC-ENTMCNC: 34.4 G/DL — SIGNIFICANT CHANGE UP (ref 32–37)
MCHC RBC-ENTMCNC: 35.1 G/DL — SIGNIFICANT CHANGE UP (ref 32–37)
MCV RBC AUTO: 89.4 FL — SIGNIFICANT CHANGE UP (ref 80–94)
MCV RBC AUTO: 91.3 FL — SIGNIFICANT CHANGE UP (ref 80–94)
MONOCYTES # BLD AUTO: 0.79 K/UL — HIGH (ref 0.1–0.6)
MONOCYTES # BLD AUTO: 0.89 K/UL — HIGH (ref 0.1–0.6)
MONOCYTES NFR BLD AUTO: 8.5 % — SIGNIFICANT CHANGE UP (ref 1.7–9.3)
MONOCYTES NFR BLD AUTO: 9.9 % — HIGH (ref 1.7–9.3)
NEUTROPHILS # BLD AUTO: 7.09 K/UL — HIGH (ref 1.4–6.5)
NEUTROPHILS # BLD AUTO: 7.49 K/UL — HIGH (ref 1.4–6.5)
NEUTROPHILS NFR BLD AUTO: 79.2 % — HIGH (ref 42.2–75.2)
NEUTROPHILS NFR BLD AUTO: 80.3 % — HIGH (ref 42.2–75.2)
NRBC # BLD: 0 /100 WBCS — SIGNIFICANT CHANGE UP (ref 0–0)
NRBC # BLD: 0 /100 WBCS — SIGNIFICANT CHANGE UP (ref 0–0)
PLATELET # BLD AUTO: 169 K/UL — SIGNIFICANT CHANGE UP (ref 130–400)
PLATELET # BLD AUTO: 192 K/UL — SIGNIFICANT CHANGE UP (ref 130–400)
POTASSIUM SERPL-MCNC: 3.6 MMOL/L — SIGNIFICANT CHANGE UP (ref 3.5–5)
POTASSIUM SERPL-SCNC: 3.6 MMOL/L — SIGNIFICANT CHANGE UP (ref 3.5–5)
PROTHROM AB SERPL-ACNC: 13 SEC — HIGH (ref 9.95–12.87)
RBC # BLD: 3.44 M/UL — LOW (ref 4.7–6.1)
RBC # BLD: 3.76 M/UL — LOW (ref 4.7–6.1)
RBC # FLD: 13.7 % — SIGNIFICANT CHANGE UP (ref 11.5–14.5)
RBC # FLD: 13.7 % — SIGNIFICANT CHANGE UP (ref 11.5–14.5)
SODIUM SERPL-SCNC: 138 MMOL/L — SIGNIFICANT CHANGE UP (ref 135–146)
WBC # BLD: 8.96 K/UL — SIGNIFICANT CHANGE UP (ref 4.8–10.8)
WBC # BLD: 9.33 K/UL — SIGNIFICANT CHANGE UP (ref 4.8–10.8)
WBC # FLD AUTO: 8.96 K/UL — SIGNIFICANT CHANGE UP (ref 4.8–10.8)
WBC # FLD AUTO: 9.33 K/UL — SIGNIFICANT CHANGE UP (ref 4.8–10.8)

## 2021-10-15 PROCEDURE — 99232 SBSQ HOSP IP/OBS MODERATE 35: CPT

## 2021-10-15 PROCEDURE — 93306 TTE W/DOPPLER COMPLETE: CPT | Mod: 26

## 2021-10-15 PROCEDURE — 99233 SBSQ HOSP IP/OBS HIGH 50: CPT

## 2021-10-15 RX ORDER — SODIUM CHLORIDE 9 MG/ML
1000 INJECTION, SOLUTION INTRAVENOUS
Refills: 0 | Status: DISCONTINUED | OUTPATIENT
Start: 2021-10-15 | End: 2021-10-15

## 2021-10-15 RX ADMIN — TAMSULOSIN HYDROCHLORIDE 0.4 MILLIGRAM(S): 0.4 CAPSULE ORAL at 21:14

## 2021-10-15 RX ADMIN — PANTOPRAZOLE SODIUM 40 MILLIGRAM(S): 20 TABLET, DELAYED RELEASE ORAL at 17:47

## 2021-10-15 RX ADMIN — ATORVASTATIN CALCIUM 40 MILLIGRAM(S): 80 TABLET, FILM COATED ORAL at 21:14

## 2021-10-15 RX ADMIN — FINASTERIDE 5 MILLIGRAM(S): 5 TABLET, FILM COATED ORAL at 12:13

## 2021-10-15 RX ADMIN — PANTOPRAZOLE SODIUM 40 MILLIGRAM(S): 20 TABLET, DELAYED RELEASE ORAL at 05:48

## 2021-10-15 NOTE — PROGRESS NOTE ADULT - SUBJECTIVE AND OBJECTIVE BOX
Patient is a 76y old  Male who presents with a chief complaint of GI Bleed (15 Oct 2021 07:50)    Over Night Events:  No overnight events.  No episodes of bleeding.  Scheduled for colonoscopy today.    ROS:   All ROS are negative except HPI     PHYSICAL EXAM  ICU Vital Signs Last 24 Hrs  T(C): 36.1 (15 Oct 2021 07:10), Max: 36.4 (15 Oct 2021 05:00)  T(F): 97 (15 Oct 2021 07:10), Max: 97.6 (15 Oct 2021 05:00)  HR: 98 (15 Oct 2021 07:00) (73 - 98)  BP: 153/86 (15 Oct 2021 07:00) (105/68 - 168/87)  BP(mean): 113 (15 Oct 2021 07:00) (81 - 119)  RR: 20 (15 Oct 2021 07:10) (16 - 28)  SpO2: 96% (15 Oct 2021 07:00) (93% - 99%)      CONSTITUTIONAL:  Well nourished.  NAD    ENT:   Airway patent,   Mouth with normal mucosa.   No thrush    EYES:   pupils equal,   round and reactive to light.    CARDIAC:   Normal rate,   Regular rhythm.    Heart sounds S1, S2.   No edema    RESPIRATORY:   No wheezing   Normal chest expansion  No use of accessory muscles    GASTROINTESTINAL:  Abdomen soft   Non-tender,   No guarding,   + BS    MUSCULOSKELETAL:   Range of motion is not limited,  No clubbing, cyanosis    NEUROLOGICAL:   Alert and oriented   No motor or sensory deficits.  Pertinent DTRs normal    SKIN:   Skin normal color for race,   Warm and dry  No evidence of rash.    PSYCHIATRIC:   Normal mood and affect.   No apparent risk to self or others.    HEME LYMPH:   No cervical  lymphadenopathy.  No inguinal lymphadenopathy      10-14-21 @ 07:01  -  10-15-21 @ 07:00  --------------------------------------------------------  IN:    sodium chloride 0.45%: 1875 mL  Total IN: 1875 mL    OUT:    Voided (mL): 1300 mL  Total OUT: 1300 mL    Total NET: 575 mL      10-15-21 @ 07:01  -  10-15-21 @ 08:09  --------------------------------------------------------  IN:    sodium chloride 0.45%: 75 mL  Total IN: 75 mL    OUT:    Voided (mL): 200 mL  Total OUT: 200 mL    Total NET: -125 mL        LABS:                            10.8   8.96  )-----------( 169      ( 15 Oct 2021 05:44 )             31.4     140  |  115<H>  |  28<H>  ----------------------------<  75  4.0   |  17  |  1.3    Ca    8.0<L>      14 Oct 2021 06:42    PT/INR - ( 15 Oct 2021 05:44 )   PT: 13.00 sec;   INR: 1.13 ratio                                                   MEDICATIONS  (STANDING):  atorvastatin 40 milliGRAM(s) Oral at bedtime  bisacodyl 20 milliGRAM(s) Oral at bedtime  finasteride 5 milliGRAM(s) Oral daily  influenza   Vaccine 0.5 milliLiter(s) IntraMuscular once  pantoprazole  Injectable 40 milliGRAM(s) IV Push every 12 hours  sodium chloride 0.45%. 1000 milliLiter(s) (75 mL/Hr) IV Continuous <Continuous>  tamsulosin 0.4 milliGRAM(s) Oral at bedtime    MEDICATIONS  (PRN):  acetaminophen   Tablet .. 650 milliGRAM(s) Oral every 6 hours PRN Temp greater or equal to 38C (100.4F), Mild Pain (1 - 3)  aluminum hydroxide/magnesium hydroxide/simethicone Suspension 30 milliLiter(s) Oral every 4 hours PRN Dyspepsia  melatonin 3 milliGRAM(s) Oral at bedtime PRN Insomnia  ondansetron Injectable 4 milliGRAM(s) IV Push every 8 hours PRN Nausea and/or Vomiting   Patient is a 76y old  Male who presents with a chief complaint of GI Bleed (15 Oct 2021 07:50)    Over Night Events:  No overnight events.  No episodes of bleeding.  Scheduled for colonoscopy today.    ROS:   All ROS are negative except HPI     PHYSICAL EXAM  ICU Vital Signs Last 24 Hrs  T(C): 36.1 (15 Oct 2021 07:10), Max: 36.4 (15 Oct 2021 05:00)  T(F): 97 (15 Oct 2021 07:10), Max: 97.6 (15 Oct 2021 05:00)  HR: 98 (15 Oct 2021 07:00) (73 - 98)  BP: 153/86 (15 Oct 2021 07:00) (105/68 - 168/87)  BP(mean): 113 (15 Oct 2021 07:00) (81 - 119)  RR: 20 (15 Oct 2021 07:10) (16 - 28)  SpO2: 96% (15 Oct 2021 07:00) (93% - 99%)      CONSTITUTIONAL:  Well nourished.  NAD    ENT:   Airway patent,   Mouth with normal mucosa.   No thrush    EYES:   pupils equal,   round and reactive to light.    CARDIAC:   Normal rate,   Regular rhythm.    Heart sounds S1, S2.   No edema    RESPIRATORY:   No wheezing   Normal chest expansion  No use of accessory muscles    GASTROINTESTINAL:  Abdomen soft   Non-tender,   No guarding,   + BS    MUSCULOSKELETAL:   Range of motion is not limited,  No clubbing, cyanosis    NEUROLOGICAL:   Alert and oriented   No motor or sensory deficits.  Pertinent DTRs normal    SKIN:   Skin normal color for race,   Warm and dry  No evidence of rash.    PSYCHIATRIC:   Normal mood and affect.   No apparent risk to self or others.    HEME LYMPH:   No cervical  lymphadenopathy.  No inguinal lymphadenopathy      10-14-21 @ 07:01  -  10-15-21 @ 07:00  --------------------------------------------------------  IN:    sodium chloride 0.45%: 1875 mL  Total IN: 1875 mL    OUT:    Voided (mL): 1300 mL  Total OUT: 1300 mL    Total NET: 575 mL      10-15-21 @ 07:01  -  10-15-21 @ 08:09  --------------------------------------------------------  IN:    sodium chloride 0.45%: 75 mL  Total IN: 75 mL    OUT:    Voided (mL): 200 mL  Total OUT: 200 mL    Total NET: -125 mL        LABS:                            10.8   8.96  )-----------( 169      ( 15 Oct 2021 05:44 )             31.4     10-15    138  |  107  |  17  ----------------------------<  66<L>  3.6   |  19  |  1.0    Ca    8.3<L>      15 Oct 2021 05:44      PT/INR - ( 15 Oct 2021 05:44 )   PT: 13.00 sec;   INR: 1.13 ratio                                                   MEDICATIONS  (STANDING):  atorvastatin 40 milliGRAM(s) Oral at bedtime  bisacodyl 20 milliGRAM(s) Oral at bedtime  finasteride 5 milliGRAM(s) Oral daily  influenza   Vaccine 0.5 milliLiter(s) IntraMuscular once  pantoprazole  Injectable 40 milliGRAM(s) IV Push every 12 hours  sodium chloride 0.45%. 1000 milliLiter(s) (75 mL/Hr) IV Continuous <Continuous>  tamsulosin 0.4 milliGRAM(s) Oral at bedtime    MEDICATIONS  (PRN):  acetaminophen   Tablet .. 650 milliGRAM(s) Oral every 6 hours PRN Temp greater or equal to 38C (100.4F), Mild Pain (1 - 3)  aluminum hydroxide/magnesium hydroxide/simethicone Suspension 30 milliLiter(s) Oral every 4 hours PRN Dyspepsia  melatonin 3 milliGRAM(s) Oral at bedtime PRN Insomnia  ondansetron Injectable 4 milliGRAM(s) IV Push every 8 hours PRN Nausea and/or Vomiting

## 2021-10-15 NOTE — PROGRESS NOTE ADULT - ASSESSMENT
77 yo M PMH Prostate CA (recently diagnosed s/p prostate bx), CAD s/p stents, HTN, DM, nephrolithiasis admitted for GI bleed.      - EGD negative   - upgraded to ICU for low BP   - NPO  for colonoscopy    - Protonix   - hold Plavix   - monitor H/H

## 2021-10-15 NOTE — PROGRESS NOTE ADULT - SUBJECTIVE AND OBJECTIVE BOX
Patient is comfortable in bed, s/p colonoscopy prep overnight       T(F): 96.1 (10-15-21 @ 11:03), Max: 97.6 (10-15-21 @ 05:00)  HR: 90 (10-15-21 @ 11:00)  BP: 123/74 (10-15-21 @ 11:00)  RR: 21 (10-15-21 @ 11:03)  SpO2: 100% (10-15-21 @ 11:00) (93% - 100%)    PHYSICAL EXAM:  GENERAL: NAD  HEAD:  Atraumatic, Normocephalic  EYES: EOMI, PERRLA, conjunctiva and sclera clear  NERVOUS SYSTEM:  Alert & Oriented X3, no focal deficits   CHEST/LUNG: Clear to percussion bilaterally; No rales, rhonchi, wheezing, or rubs  HEART: Regular rate and rhythm; No murmurs, rubs, or gallops  ABDOMEN: Soft, Nontender, Nondistended; Bowel sounds present  EXTREMITIES:  2+ Peripheral Pulses, No clubbing, cyanosis, or edema    LABS  10-15    138  |  107  |  17  ----------------------------<  66<L>  3.6   |  19  |  1.0    Ca    8.3<L>      15 Oct 2021 05:44                          10.8   8.96  )-----------( 169      ( 15 Oct 2021 05:44 )             31.4     PT/INR - ( 15 Oct 2021 05:44 )   PT: 13.00 sec;   INR: 1.13 ratio          RADIOLOGY  < from: CT Angio Abdomen and Pelvis w/ IV Cont (10.12.21 @ 09:06) >  IMPRESSION:    No definite site of active intraluminal arterial contrast extravasation is identified to localize a site of active GI bleeding.    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    Borderline urinary bladder wall thickening. Correlation with urinalysis can be made.    Additional findings as above.      < end of copied text >    MEDICATIONS  (STANDING):  atorvastatin 40 milliGRAM(s) Oral at bedtime  bisacodyl 20 milliGRAM(s) Oral at bedtime  dextrose 5% + sodium chloride 0.9%. 1000 milliLiter(s) (75 mL/Hr) IV Continuous <Continuous>  finasteride 5 milliGRAM(s) Oral daily  influenza   Vaccine 0.5 milliLiter(s) IntraMuscular once  pantoprazole  Injectable 40 milliGRAM(s) IV Push every 12 hours  tamsulosin 0.4 milliGRAM(s) Oral at bedtime    MEDICATIONS  (PRN):  acetaminophen   Tablet .. 650 milliGRAM(s) Oral every 6 hours PRN Temp greater or equal to 38C (100.4F), Mild Pain (1 - 3)  aluminum hydroxide/magnesium hydroxide/simethicone Suspension 30 milliLiter(s) Oral every 4 hours PRN Dyspepsia  melatonin 3 milliGRAM(s) Oral at bedtime PRN Insomnia  ondansetron Injectable 4 milliGRAM(s) IV Push every 8 hours PRN Nausea and/or Vomiting

## 2021-10-15 NOTE — PROGRESS NOTE ADULT - ASSESSMENT
IMPRESSION:    Hemorrhagic shock secondary to GIB, resolved  Anemia secondary to GIB SP 1U PRBC SP EGD no acute UGIB, stable  Gastroduodenal Artery Pseudoaneurysm 7mm  Recent diagnosis of Prostate CA SP biopsy      PLAN:    CNS:  Avoid depressants    HEENT: Oral care    PULMONARY:  HOB @ 45 degrees.  Aspiration precautions.    CARDIOVASCULAR:  D5-1/2 NS @ 75cc/hr.  Target MAP 65. Keep I = O.  ECHO.    GI: GI prophylaxis.  NPO for colonoscopy today.  Bowel regimen.  GI FU.   Possible IR if -ve EGD/Colonoscopy findings.    RENAL: Follow up lytes.  Correct as needed.  Monitor UO    INFECTIOUS DISEASE:   Monitor off ABX.  Follow up cultures    HEMATOLOGICAL:  Trend CBC.  SCDs for DVT prophylaxis.  Transfuse if active bleed & hemodynamically unstable.  Keep Hb > 8.      ENDOCRINE:  Follow up FS.  Insulin protocol if needed.    MUSCULOSKELETAL:  OOB to chair    Monitor in MICU, possible downgrade IMPRESSION:    Hemorrhagic shock secondary to GIB, resolved  Anemia secondary to GIB SP 1U PRBC SP EGD no acute UGIB, stable  Gastroduodenal Artery Pseudoaneurysm 7mm  Recent diagnosis of Prostate CA SP biopsy      PLAN:    CNS:  Avoid depressants    HEENT: Oral care    PULMONARY:  HOB @ 45 degrees.  Aspiration precautions.    CARDIOVASCULAR:  D5-NS @ 75cc/hr.  Target MAP 65. Keep I = O.  ECHO.    GI: GI prophylaxis.  NPO for colonoscopy today.  Bowel regimen.  GI FU.   Possible IR if -ve EGD/Colonoscopy findings.    RENAL: Follow up lytes.  Correct as needed.  Monitor UO    INFECTIOUS DISEASE:   Monitor off ABX.  Follow up cultures    HEMATOLOGICAL:  Trend CBC.  SCDs for DVT prophylaxis.  Transfuse if active bleed & hemodynamically unstable.  Keep Hb > 8.      ENDOCRINE:  Follow up FS.  Insulin protocol if needed.    MUSCULOSKELETAL:  OOB to chair    Monitor in MICU, possible downgrade IMPRESSION:    Hemorrhagic shock secondary to GIB, resolved  Anemia secondary to GIB SP 1U PRBC SP EGD no acute UGIB, stable  Gastroduodenal Artery Pseudoaneurysm 7mm  Recent diagnosis of Prostate CA SP biopsy      PLAN:    CNS:  Avoid depressants    HEENT: Oral care    PULMONARY:  HOB @ 45 degrees.  Aspiration precautions.    CARDIOVASCULAR:  D5-NS @ 75cc/hr.  Target MAP 65. Keep I = O.  ECHO.    GI: GI prophylaxis.  NPO for colonoscopy today.  Bowel regimen.  GI FU.   Possible IR if -ve EGD/Colonoscopy findings.    RENAL: Follow up lytes.  Correct as needed.  Monitor UO    INFECTIOUS DISEASE:   Monitor off ABX.  Follow up cultures    HEMATOLOGICAL:  Trend CBC.  SCDs for DVT prophylaxis.  Transfuse if active bleed & hemodynamically unstable.  Keep Hb > 8.      ENDOCRINE:  Follow up FS.  Insulin protocol if needed.    MUSCULOSKELETAL:  OOB to chair    Monitor in MICU

## 2021-10-15 NOTE — PROGRESS NOTE ADULT - SUBJECTIVE AND OBJECTIVE BOX
RADHA RAMON 76y Male  MRN#: [id number]   CODE STATUS: FULL CODE     Hospital Day: 3d    Pt is currently admitted with the primary diagnosis of GI Bleed with multiple Melana. S/P 1U of RBC with Hgb remaining stable, S/P EGD 10/13 with findings of gastritis and hiatal hernia. Planned for colonoscopy today                                             ----------------------------------------------------------  OBJECTIVE  PAST MEDICAL & SURGICAL HISTORY  CAD (coronary artery disease)    Hypercholesteremia    BPH (benign prostatic hyperplasia)    Kidney stones    Hypertension    Diabetes mellitus    Status post cardiac surgery  cardiac stents    Bilateral cataracts    History of lithotripsy                                              -----------------------------------------------------------  ALLERGIES:  No Known Allergies                                            ------------------------------------------------------------    HOME MEDICATIONS  Home Medications:  finasteride 5 mg oral tablet: 1 tab(s) orally once a day (12 Oct 2021 12:30)  Plavix 75 mg oral tablet: 1 tab(s) orally once a day (12 Oct 2021 12:30)  simvastatin 80 mg oral tablet: 1 tab(s) orally once a day (at bedtime) (12 Oct 2021 12:30)  tamsulosin 0.4 mg oral capsule: 1 cap(s) orally once a day (at bedtime)  - if you continue to have your blood pressures drop when you stand, then this medication will need to be STOPPED (12 Oct 2021 12:30)                           MEDICATIONS:  STANDING MEDICATIONS  atorvastatin 40 milliGRAM(s) Oral at bedtime  bisacodyl 20 milliGRAM(s) Oral at bedtime  finasteride 5 milliGRAM(s) Oral daily  influenza   Vaccine 0.5 milliLiter(s) IntraMuscular once  pantoprazole  Injectable 40 milliGRAM(s) IV Push every 12 hours  sodium chloride 0.45%. 1000 milliLiter(s) IV Continuous <Continuous>  tamsulosin 0.4 milliGRAM(s) Oral at bedtime    PRN MEDICATIONS  acetaminophen   Tablet .. 650 milliGRAM(s) Oral every 6 hours PRN  aluminum hydroxide/magnesium hydroxide/simethicone Suspension 30 milliLiter(s) Oral every 4 hours PRN  melatonin 3 milliGRAM(s) Oral at bedtime PRN  ondansetron Injectable 4 milliGRAM(s) IV Push every 8 hours PRN                                            ------------------------------------------------------------  VITAL SIGNS: Last 24 Hours  T(C): 36.1 (15 Oct 2021 07:10), Max: 36.4 (15 Oct 2021 05:00)  T(F): 97 (15 Oct 2021 07:10), Max: 97.6 (15 Oct 2021 05:00)  HR: 98 (15 Oct 2021 07:00) (73 - 98)  BP: 153/86 (15 Oct 2021 07:00) (105/68 - 168/87)  BP(mean): 113 (15 Oct 2021 07:00) (81 - 119)  RR: 20 (15 Oct 2021 07:10) (16 - 28)  SpO2: 96% (15 Oct 2021 07:00) (93% - 99%)      10-14-21 @ 07:01  -  10-15-21 @ 07:00  --------------------------------------------------------  IN: 1875 mL / OUT: 1300 mL / NET: 575 mL    10-15-21 @ 07:01  -  10-15-21 @ 07:50  --------------------------------------------------------  IN: 75 mL / OUT: 200 mL / NET: -125 mL                                             --------------------------------------------------------------  LABS:                        10.9   9.34  )-----------( 172      ( 14 Oct 2021 19:15 )             31.8     10-14    140  |  115<H>  |  28<H>  ----------------------------<  75  4.0   |  17  |  1.3    Ca    8.0<L>      14 Oct 2021 06:42                                              --------------------------------------------------------------    PHYSICAL EXAM:  General: NAD, resting comfortably in bed  LUNGS: CTA b/l, no adventitious sounds   HEART: S1/S2 appreciated RRR, no murmurs   ABDOMEN: Soft, NT, ND, +BS   EXT: Atraumatic, no LE edema, no skin color changes   NEURO: AO x 3, n o focal neurological deficits                                            --------------------------------------------------------------    ASSESSMENT & PLAN    77 yo M PMH Prostate CA (recently diagnosed s/p prostate bx), CAD s/p stents, HTN, DM, nephrolithiasis admitted for GI bleed. Had Multiple episodes of melena and hypotensive to 80's systolic and was transferred to ICU       GI Bleed, Possibly LGIB   H/O External Hemorrhoids   -Multiple episodes of melena and hypotensive to 80's systolic  -CT Angio Abdomen and Pelvis w/ IV Cont: Unremarkable for Active Bleed  -S/P 1U RBC, Hemodynamically stable   -S/P EGD 10/13: gastritis and  hiatal hernia  -c/w IV Protonix 40 mg BID, Keep Active T&S   -colonoscopy today     #Incidental 7 mm gastroduodenal artery pseudoaneurysm  -Vascular Following: Pending Plan     Prostate CA + BPH  -s/p bx 9/19. Has not started any treatment yet.   -Urologist: Dr. Curiel  -F/u dr. Curiel outpatient   -c/w Flomax and Finasteride     Type II DM   -Not on any antihyperglycemics at home  -FS BID, start insulin regimen if persistently above 180     HTN: BP is soft  -Hold antihypertensive during acute blood loss     #CAD  -s/p stents 2018. Plavix stopped   -continue statin    DVT PPX: SCD's   GI PPX: PPI  Full code   Dispo: from Home  -Pending Further recs from GI

## 2021-10-15 NOTE — PROGRESS NOTE ADULT - ATTENDING COMMENTS
Attending Statement: I have personally performed a face to face diagnostic evaluation on this patient. The patient is suffering from Hemorrhagic shock secondary to GIB.  I have reviewed the above note and agree with the history, exam and suggestions for care, except as I have noted in the text. I have amended the treatment plans as necessary.
Attending Statement: I have personally performed a face to face diagnostic evaluation on this patient. The patient is suffering from Hemorrhagic shock secondary to GIB.  I have reviewed the above note and agree with the history, exam and suggestions for care, except as I have noted in the text. I have amended the treatment plans as necessary.

## 2021-10-16 ENCOUNTER — TRANSCRIPTION ENCOUNTER (OUTPATIENT)
Age: 76
End: 2021-10-16

## 2021-10-16 VITALS — HEART RATE: 108 BPM | SYSTOLIC BLOOD PRESSURE: 96 MMHG | OXYGEN SATURATION: 98 % | DIASTOLIC BLOOD PRESSURE: 63 MMHG

## 2021-10-16 LAB
ANION GAP SERPL CALC-SCNC: 13 MMOL/L — SIGNIFICANT CHANGE UP (ref 7–14)
BASOPHILS # BLD AUTO: 0.02 K/UL — SIGNIFICANT CHANGE UP (ref 0–0.2)
BASOPHILS NFR BLD AUTO: 0.2 % — SIGNIFICANT CHANGE UP (ref 0–1)
BUN SERPL-MCNC: 12 MG/DL — SIGNIFICANT CHANGE UP (ref 10–20)
CALCIUM SERPL-MCNC: 8.4 MG/DL — LOW (ref 8.5–10.1)
CHLORIDE SERPL-SCNC: 106 MMOL/L — SIGNIFICANT CHANGE UP (ref 98–110)
CO2 SERPL-SCNC: 20 MMOL/L — SIGNIFICANT CHANGE UP (ref 17–32)
CREAT SERPL-MCNC: 1.2 MG/DL — SIGNIFICANT CHANGE UP (ref 0.7–1.5)
EOSINOPHIL # BLD AUTO: 0.1 K/UL — SIGNIFICANT CHANGE UP (ref 0–0.7)
EOSINOPHIL NFR BLD AUTO: 1 % — SIGNIFICANT CHANGE UP (ref 0–8)
GLUCOSE SERPL-MCNC: 67 MG/DL — LOW (ref 70–99)
HCT VFR BLD CALC: 34.6 % — LOW (ref 42–52)
HGB BLD-MCNC: 12.2 G/DL — LOW (ref 14–18)
IMM GRANULOCYTES NFR BLD AUTO: 0.5 % — HIGH (ref 0.1–0.3)
LYMPHOCYTES # BLD AUTO: 0.67 K/UL — LOW (ref 1.2–3.4)
LYMPHOCYTES # BLD AUTO: 6.5 % — LOW (ref 20.5–51.1)
MCHC RBC-ENTMCNC: 31.7 PG — HIGH (ref 27–31)
MCHC RBC-ENTMCNC: 35.3 G/DL — SIGNIFICANT CHANGE UP (ref 32–37)
MCV RBC AUTO: 89.9 FL — SIGNIFICANT CHANGE UP (ref 80–94)
MONOCYTES # BLD AUTO: 0.9 K/UL — HIGH (ref 0.1–0.6)
MONOCYTES NFR BLD AUTO: 8.7 % — SIGNIFICANT CHANGE UP (ref 1.7–9.3)
NEUTROPHILS # BLD AUTO: 8.64 K/UL — HIGH (ref 1.4–6.5)
NEUTROPHILS NFR BLD AUTO: 83.1 % — HIGH (ref 42.2–75.2)
NRBC # BLD: 0 /100 WBCS — SIGNIFICANT CHANGE UP (ref 0–0)
PLATELET # BLD AUTO: 194 K/UL — SIGNIFICANT CHANGE UP (ref 130–400)
POTASSIUM SERPL-MCNC: 3.9 MMOL/L — SIGNIFICANT CHANGE UP (ref 3.5–5)
POTASSIUM SERPL-SCNC: 3.9 MMOL/L — SIGNIFICANT CHANGE UP (ref 3.5–5)
RBC # BLD: 3.85 M/UL — LOW (ref 4.7–6.1)
RBC # FLD: 13.8 % — SIGNIFICANT CHANGE UP (ref 11.5–14.5)
SODIUM SERPL-SCNC: 139 MMOL/L — SIGNIFICANT CHANGE UP (ref 135–146)
WBC # BLD: 10.38 K/UL — SIGNIFICANT CHANGE UP (ref 4.8–10.8)
WBC # FLD AUTO: 10.38 K/UL — SIGNIFICANT CHANGE UP (ref 4.8–10.8)

## 2021-10-16 PROCEDURE — 99232 SBSQ HOSP IP/OBS MODERATE 35: CPT

## 2021-10-16 PROCEDURE — 99238 HOSP IP/OBS DSCHRG MGMT 30/<: CPT

## 2021-10-16 RX ORDER — ASPIRIN/CALCIUM CARB/MAGNESIUM 324 MG
1 TABLET ORAL
Qty: 90 | Refills: 0
Start: 2021-10-16 | End: 2022-01-13

## 2021-10-16 RX ORDER — CLOPIDOGREL BISULFATE 75 MG/1
1 TABLET, FILM COATED ORAL
Qty: 0 | Refills: 0 | DISCHARGE

## 2021-10-16 RX ADMIN — INFLUENZA VIRUS VACCINE 0.5 MILLILITER(S): 15; 15; 15; 15 SUSPENSION INTRAMUSCULAR at 15:19

## 2021-10-16 RX ADMIN — FINASTERIDE 5 MILLIGRAM(S): 5 TABLET, FILM COATED ORAL at 15:09

## 2021-10-16 RX ADMIN — PANTOPRAZOLE SODIUM 40 MILLIGRAM(S): 20 TABLET, DELAYED RELEASE ORAL at 05:20

## 2021-10-16 NOTE — DISCHARGE NOTE PROVIDER - CARE PROVIDER_API CALL
Rajeev Munoz  GASTROENTEROLOGY  [address] [place] [address]  Phone: [phone]  Fax: [phone]  Follow Up Time: 2 weeks

## 2021-10-16 NOTE — DISCHARGE NOTE NURSING/CASE MANAGEMENT/SOCIAL WORK - PATIENT PORTAL LINK FT
You can access the FollowMyHealth Patient Portal offered by [facility] by registering at the following website: [email or web address]. By joining [employer]’s FollowMyHealth portal, you will also be able to view your health information using other applications (apps) compatible with our system.

## 2021-10-16 NOTE — DISCHARGE NOTE PROVIDER - HOSPITAL COURSE
77 yo M PMH Prostate CA (recently diagnosed s/p prostate bx), CAD s/p stents, HTN, DM, nephrolithiasis admitted for GI bleed. Hospital course was complicated by hypotension which required ICU upgrade. He  was stabilized with blood transfusion and IVF boluses. Eventually hg became stable and BP h EGD was negative. Colonoscopy was showing diverticulosis which was likely source of bleed. Pt is medically stable for discharge with outpatient GI follow up. 75 yo M PMH Prostate CA (recently diagnosed s/p prostate bx), CAD s/p stents, HTN, DM, nephrolithiasis admitted for GI bleed. Hospital course was complicated by hypotension which required ICU upgrade. He  was stabilized with blood transfusion and IVF boluses. GIB stopped and hg stable during ICU stay. EGD was negative. Colonoscopy was showing diverticulosis which was likely source of bleed. Pt is medically stable for discharge with outpatient GI follow up.

## 2021-10-16 NOTE — PROGRESS NOTE ADULT - PROVIDER SPECIALTY LIST ADULT
Critical Care
Hospitalist
Internal Medicine
Critical Care
Gastroenterology
Hospitalist
Internal Medicine
Hospitalist
Pulmonology

## 2021-10-16 NOTE — DISCHARGE NOTE NURSING/CASE MANAGEMENT/SOCIAL WORK - NSDCVIVACCINE_GEN_ALL_CORE_FT
influenza, injectable, quadrivalent, preservative free; 16-Oct-2021 15:19; Froilan Leung (RN); [employer]; [id number] (Exp. Date: 31-Jan-2022); IntraMuscular; Deltoid Right.; 0.5 milliLiter(s); VIS (VIS Published: 06-Aug-2021, VIS Presented: 16-Oct-2021);

## 2021-10-16 NOTE — DISCHARGE NOTE PROVIDER - NSDCMRMEDTOKEN_GEN_ALL_CORE_FT
finasteride 5 mg oral tablet: 1 tab(s) orally once a day  Plavix 75 mg oral tablet: 1 tab(s) orally once a day  simvastatin 80 mg oral tablet: 1 tab(s) orally once a day (at bedtime)  tamsulosin 0.4 mg oral capsule: 1 cap(s) orally once a day (at bedtime)  - if you continue to have your blood pressures drop when you stand, then this medication will need to be STOPPED   Aspirin Enteric Coated 81 mg oral delayed release tablet: 1 tab(s) orally once a day   finasteride 5 mg oral tablet: 1 tab(s) orally once a day  simvastatin 80 mg oral tablet: 1 tab(s) orally once a day (at bedtime)  tamsulosin 0.4 mg oral capsule: 1 cap(s) orally once a day (at bedtime)  - if you continue to have your blood pressures drop when you stand, then this medication will need to be STOPPED

## 2021-10-16 NOTE — PROGRESS NOTE ADULT - SUBJECTIVE AND OBJECTIVE BOX
Patient is a 76y old  Male who presents with a chief complaint of GI Bleed (15 Oct 2021 12:36)      Over Night Events:  Patient seen and examined.       ROS:  See HPI    PHYSICAL EXAM    ICU Vital Signs Last 24 Hrs  T(C): 36.6 (16 Oct 2021 05:00), Max: 36.6 (16 Oct 2021 05:00)  T(F): 97.9 (16 Oct 2021 05:00), Max: 97.9 (16 Oct 2021 05:00)  HR: 91 (16 Oct 2021 05:00) (76 - 98)  BP: 129/69 (16 Oct 2021 05:00) (110/59 - 153/86)  BP(mean): 93 (16 Oct 2021 05:00) (78 - 113)  ABP: --  ABP(mean): --  RR: 20 (16 Oct 2021 05:00) (17 - 22)  SpO2: 98% (16 Oct 2021 05:00) (94% - 100%)      General: awake   HEENT:  cinda             Lymph Nodes: NO cervical LN   Lungs: Bilateral BS  Cardiovascular: Regular   Abdomen: Soft, Positive BS  Extremities: No clubbing   Skin: warm   Neurological: no focal   Musculoskeletal: move all ext     I&O's Detail    14 Oct 2021 07:01  -  15 Oct 2021 07:00  --------------------------------------------------------  IN:    sodium chloride 0.45%: 1875 mL  Total IN: 1875 mL    OUT:    Voided (mL): 1300 mL  Total OUT: 1300 mL    Total NET: 575 mL      15 Oct 2021 07:01  -  16 Oct 2021 06:57  --------------------------------------------------------  IN:    dextrose 5% + sodium chloride 0.9%: 600 mL    sodium chloride 0.45%: 75 mL  Total IN: 675 mL    OUT:    Voided (mL): 2635 mL  Total OUT: 2635 mL    Total NET: -1960 mL          LABS:                          11.8   9.33  )-----------( 192      ( 15 Oct 2021 19:21 )             33.6         15 Oct 2021 05:44    138    |  107    |  17     ----------------------------<  66     3.6     |  19     |  1.0      Ca    8.3        15 Oct 2021 05:44                                               PT/INR - ( 15 Oct 2021 05:44 )   PT: 13.00 sec;   INR: 1.13 ratio                                                                                                                                                                                                 MEDICATIONS  (STANDING):  atorvastatin 40 milliGRAM(s) Oral at bedtime  finasteride 5 milliGRAM(s) Oral daily  influenza   Vaccine 0.5 milliLiter(s) IntraMuscular once  pantoprazole  Injectable 40 milliGRAM(s) IV Push every 12 hours  tamsulosin 0.4 milliGRAM(s) Oral at bedtime    MEDICATIONS  (PRN):  acetaminophen   Tablet .. 650 milliGRAM(s) Oral every 6 hours PRN Temp greater or equal to 38C (100.4F), Mild Pain (1 - 3)  aluminum hydroxide/magnesium hydroxide/simethicone Suspension 30 milliLiter(s) Oral every 4 hours PRN Dyspepsia  melatonin 3 milliGRAM(s) Oral at bedtime PRN Insomnia  ondansetron Injectable 4 milliGRAM(s) IV Push every 8 hours PRN Nausea and/or Vomiting          Xrays:  TLC:  OG:  ET tube:                                                                                       ECHO:  CAM ICU:

## 2021-10-16 NOTE — PROGRESS NOTE ADULT - ASSESSMENT
IMPRESSION:    Hemorrhagic shock secondary to GIB, resolved  Anemia secondary to GIB SP 1U PRBC SP EGD no acute UGIB, stable  Gastroduodenal Artery Pseudoaneurysm 7mm  Recent diagnosis of Prostate CA SP biopsy      PLAN:    CNS:  Avoid depressants    HEENT: Oral care    PULMONARY:  HOB @ 45 degrees.  Aspiration precautions.    CARDIOVASCULAR:  D5-NS @ 75cc/hr.  Target MAP 65. Keep I = O.  ECHO.    GI: GI prophylaxis.  NPO follow GI for colonoscopy result   GI FU.   Possible IR if -ve EGD/Colonoscopy findings.    RENAL: Follow up lytes.  Correct as needed.  Monitor UO    INFECTIOUS DISEASE:   Monitor off ABX.  Follow up cultures    HEMATOLOGICAL:  Trend CBC.  SCDs for DVT prophylaxis.  Transfuse if active bleed & hemodynamically unstable.  Keep Hb > 8.    follow morning h/h   ENDOCRINE:  Follow up FS.  Insulin protocol if needed.    MUSCULOSKELETAL:  OOB to chair    Monitor in MICU

## 2021-10-16 NOTE — DISCHARGE NOTE PROVIDER - NSDCCPCAREPLAN_GEN_ALL_CORE_FT
PRINCIPAL DISCHARGE DIAGNOSIS  Diagnosis: GI bleed  Assessment and Plan of Treatment: s/p EGD/colonoscopy this admission. Please follow up with Dr. Munoz within 2 weeks of discharge. Take aspirin 81mg once a day instead of plavix. Follow up with your cardiologist.

## 2021-10-17 NOTE — CHART NOTE - NSCHARTNOTEFT_GEN_A_CORE
spoke to the patient about the 7mm gda pseudo and in need of intervention   gi found diverticulosis source of bleeding   patient was discharged without notification to the vascular fellow   called the patient this morning regarding the intervention again will call office on monday to make an appointment
PACU ANESTHESIA ADMISSION NOTE      Procedure: EGD  Post op diagnosis:  Mild Gastritis, Hiatal Hernia    ____  Intubated  TV:______       Rate: ______      FiO2: ______    __x__  Patent Airway    __x__  Full return of protective reflexes    __x__  Full recovery from anesthesia / back to baseline     Vitals:   T:    98       R:     22             BP:   110/68               Sat:     99              P: 68      Mental Status:  __x__ Awake   _____ Alert   _____ Drowsy   _____ Sedated    Nausea/Vomiting:  __x__ NO  ______Yes,   See Post - Op Orders          Pain Scale (0-10):  _0____    Treatment: ____ None    ___x_ See Post - Op/PCA Orders    Post - Operative Fluids:   ____ Oral   ___x_ See Post - Op Orders    Plan: Discharge:   ____Home       _____Floor     ___x__Critical Care    _____  Other:_________________    Comments:

## 2021-10-22 DIAGNOSIS — Z79.02 LONG TERM (CURRENT) USE OF ANTITHROMBOTICS/ANTIPLATELETS: ICD-10-CM

## 2021-10-22 DIAGNOSIS — K44.9 DIAPHRAGMATIC HERNIA WITHOUT OBSTRUCTION OR GANGRENE: ICD-10-CM

## 2021-10-22 DIAGNOSIS — K62.5 HEMORRHAGE OF ANUS AND RECTUM: ICD-10-CM

## 2021-10-22 DIAGNOSIS — Z79.82 LONG TERM (CURRENT) USE OF ASPIRIN: ICD-10-CM

## 2021-10-22 DIAGNOSIS — E11.9 TYPE 2 DIABETES MELLITUS WITHOUT COMPLICATIONS: ICD-10-CM

## 2021-10-22 DIAGNOSIS — K29.70 GASTRITIS, UNSPECIFIED, WITHOUT BLEEDING: ICD-10-CM

## 2021-10-22 DIAGNOSIS — I72.8 ANEURYSM OF OTHER SPECIFIED ARTERIES: ICD-10-CM

## 2021-10-22 DIAGNOSIS — C61 MALIGNANT NEOPLASM OF PROSTATE: ICD-10-CM

## 2021-10-22 DIAGNOSIS — Z95.5 PRESENCE OF CORONARY ANGIOPLASTY IMPLANT AND GRAFT: ICD-10-CM

## 2021-10-22 DIAGNOSIS — I95.9 HYPOTENSION, UNSPECIFIED: ICD-10-CM

## 2021-10-22 DIAGNOSIS — I10 ESSENTIAL (PRIMARY) HYPERTENSION: ICD-10-CM

## 2021-10-22 DIAGNOSIS — I25.10 ATHEROSCLEROTIC HEART DISEASE OF NATIVE CORONARY ARTERY WITHOUT ANGINA PECTORIS: ICD-10-CM

## 2021-10-22 DIAGNOSIS — R57.8 OTHER SHOCK: ICD-10-CM

## 2021-10-22 DIAGNOSIS — K57.91 DIVERTICULOSIS OF INTESTINE, PART UNSPECIFIED, WITHOUT PERFORATION OR ABSCESS WITH BLEEDING: ICD-10-CM

## 2021-10-22 DIAGNOSIS — E78.00 PURE HYPERCHOLESTEROLEMIA, UNSPECIFIED: ICD-10-CM

## 2021-10-22 DIAGNOSIS — K64.3 FOURTH DEGREE HEMORRHOIDS: ICD-10-CM

## 2021-10-22 DIAGNOSIS — D50.0 IRON DEFICIENCY ANEMIA SECONDARY TO BLOOD LOSS (CHRONIC): ICD-10-CM

## 2021-10-25 ENCOUNTER — APPOINTMENT (OUTPATIENT)
Dept: VASCULAR SURGERY | Facility: CLINIC | Age: 76
End: 2021-10-25
Payer: MEDICARE

## 2021-10-25 VITALS — TEMPERATURE: 98 F | HEIGHT: 69.5 IN | BODY MASS INDEX: 22.15 KG/M2 | WEIGHT: 153 LBS

## 2021-10-25 VITALS — SYSTOLIC BLOOD PRESSURE: 132 MMHG | HEART RATE: 78 BPM | DIASTOLIC BLOOD PRESSURE: 88 MMHG

## 2021-10-25 PROCEDURE — 99214 OFFICE O/P EST MOD 30 MIN: CPT

## 2021-10-25 NOTE — END OF VISIT
[FreeTextEntry3] : 76 years old male with GDA pseudoaneurysm (7 mm) found on the CTA while being\par worked up for GIB. He was found to have diverticular bleeding. \par He is asymptomatic in the examination. I explained about the anatomy of his PSA\par and that because it is a PSA, it needs coiling.\par He expressed understanding and agrees to undergo the procedure.\par Scheduled for Celiac angiogram and coiling of GDA pseudoaneurysm next week.\par He had all work up including an echo (normal EF) last week.\par

## 2021-10-25 NOTE — ASSESSMENT
[FreeTextEntry1] : 75 y/o gentleman with 7 mm gastroduodenal artery pseudoaneurysm on CT scan on 10/12/21.\par \par I have offered him coil embolization for treatment and he is in agreement to proceed on 11/2/21.

## 2021-10-25 NOTE — HISTORY OF PRESENT ILLNESS
[FreeTextEntry1] : 75 y/o gentleman who was admitted for GI bleed, underwent EGD and colonoscopy and found to have diverticular bleed, bleeding resolved now, H/H was 12 / 34 on discharge, now on Aspirin 81 mg daily. He was found to have pseudoaneurysm of the gastroduodenal artery measuring 7 mm.

## 2021-10-30 ENCOUNTER — OUTPATIENT (OUTPATIENT)
Dept: OUTPATIENT SERVICES | Facility: HOSPITAL | Age: 76
LOS: 1 days | Discharge: HOME | End: 2021-10-30

## 2021-10-30 ENCOUNTER — LABORATORY RESULT (OUTPATIENT)
Age: 76
End: 2021-10-30

## 2021-10-30 DIAGNOSIS — Z98.890 OTHER SPECIFIED POSTPROCEDURAL STATES: Chronic | ICD-10-CM

## 2021-10-30 DIAGNOSIS — H26.9 UNSPECIFIED CATARACT: Chronic | ICD-10-CM

## 2021-10-30 DIAGNOSIS — Z11.59 ENCOUNTER FOR SCREENING FOR OTHER VIRAL DISEASES: ICD-10-CM

## 2021-11-02 ENCOUNTER — OUTPATIENT (OUTPATIENT)
Dept: OUTPATIENT SERVICES | Facility: HOSPITAL | Age: 76
LOS: 1 days | Discharge: HOME | End: 2021-11-02
Payer: MEDICARE

## 2021-11-02 VITALS
WEIGHT: 149.91 LBS | OXYGEN SATURATION: 99 % | TEMPERATURE: 98 F | HEART RATE: 89 BPM | RESPIRATION RATE: 14 BRPM | DIASTOLIC BLOOD PRESSURE: 75 MMHG | HEIGHT: 69 IN | SYSTOLIC BLOOD PRESSURE: 122 MMHG

## 2021-11-02 VITALS
SYSTOLIC BLOOD PRESSURE: 113 MMHG | RESPIRATION RATE: 17 BRPM | TEMPERATURE: 98 F | DIASTOLIC BLOOD PRESSURE: 72 MMHG | OXYGEN SATURATION: 98 % | HEART RATE: 98 BPM

## 2021-11-02 DIAGNOSIS — H26.9 UNSPECIFIED CATARACT: Chronic | ICD-10-CM

## 2021-11-02 DIAGNOSIS — Z98.890 OTHER SPECIFIED POSTPROCEDURAL STATES: Chronic | ICD-10-CM

## 2021-11-02 LAB — BLD GP AB SCN SERPL QL: SIGNIFICANT CHANGE UP

## 2021-11-02 PROCEDURE — 36247 INS CATH ABD/L-EXT ART 3RD: CPT | Mod: GC

## 2021-11-02 PROCEDURE — 37242 VASC EMBOLIZE/OCCLUDE ARTERY: CPT | Mod: GC

## 2021-11-02 PROCEDURE — 76937 US GUIDE VASCULAR ACCESS: CPT | Mod: 26,GC

## 2021-11-02 RX ORDER — MEPERIDINE HYDROCHLORIDE 50 MG/ML
12.5 INJECTION INTRAMUSCULAR; INTRAVENOUS; SUBCUTANEOUS
Refills: 0 | Status: DISCONTINUED | OUTPATIENT
Start: 2021-11-02 | End: 2021-11-02

## 2021-11-02 RX ORDER — SODIUM CHLORIDE 9 MG/ML
1000 INJECTION, SOLUTION INTRAVENOUS
Refills: 0 | Status: DISCONTINUED | OUTPATIENT
Start: 2021-11-02 | End: 2021-11-02

## 2021-11-02 RX ORDER — ONDANSETRON 8 MG/1
4 TABLET, FILM COATED ORAL ONCE
Refills: 0 | Status: DISCONTINUED | OUTPATIENT
Start: 2021-11-02 | End: 2021-11-02

## 2021-11-02 RX ORDER — HYDROMORPHONE HYDROCHLORIDE 2 MG/ML
0.5 INJECTION INTRAMUSCULAR; INTRAVENOUS; SUBCUTANEOUS
Refills: 0 | Status: DISCONTINUED | OUTPATIENT
Start: 2021-11-02 | End: 2021-11-02

## 2021-11-02 RX ORDER — HYDROMORPHONE HYDROCHLORIDE 2 MG/ML
1 INJECTION INTRAMUSCULAR; INTRAVENOUS; SUBCUTANEOUS
Refills: 0 | Status: DISCONTINUED | OUTPATIENT
Start: 2021-11-02 | End: 2021-11-02

## 2021-11-02 NOTE — BRIEF OPERATIVE NOTE - NSICDXBRIEFPROCEDURE_GEN_ALL_CORE_FT
PROCEDURES:  Aortogram, abdominal 02-Nov-2021 12:20:50  Peter Hughes  Selective catheterization of first-order branch of celiac vessel 02-Nov-2021 12:21:02  Peter Hughes

## 2021-11-02 NOTE — BRIEF OPERATIVE NOTE - OPERATION/FINDINGS
Pre-Op: Gastroduodenal artery pseudoaneurysm  Findings: 2mm GDA PSA  Procedure: Selective catheterization of second order visceral (celiac) artery w/ coil embolization of gastroduodenal artery PSA Pre-Op: Gastroduodenal artery pseudoaneurysm  Findings: 7 mm GDA PSA  Procedure: Selective catheterization of second order visceral (celiac) artery w/ coil embolization of gastroduodenal artery PSA

## 2021-11-02 NOTE — ASU PATIENT PROFILE, ADULT - NSICDXPASTSURGICALHX_GEN_ALL_CORE_FT
None known in lifetime PAST SURGICAL HISTORY:  Bilateral cataracts     History of lithotripsy     Status post cardiac surgery cardiac stents

## 2021-11-02 NOTE — CHART NOTE - NSCHARTNOTEFT_GEN_A_CORE
ANESTHESIA to PACU NOTE      ____ Intubated  TV:______       Rate: ______      FiO2: ______    _x___ Patent Airway    _x___ Full return of protective reflexes    ____ Full recovery from anesthesia / sedation to baseline status    Vitals:    BP 98/60  RR 15  O2sat. 97%  Temp: 36.6C      Mental Status:  _x___ Awake   _____ Alert   ___x__ Drowsy   _____ Sedated    Nausea/Vomiting: ____ Yes, See Post - Op Orders      _x___ No    Pain Scale (0-10): _____    Treatment: _x___ None    ____ See Post - Op/PCA Orders    Post - Operative Fluids:   ____ Oral   _x___ See Post - Op Orders    Plan:  Discharge to:   __x__Home       _____Floor      _____Critical Care    _____ Other:_________________    Comments: s/p general anesthesia with ETT. No anesthesia complications. Pt's condition is stable in PACU. Full report is given to PACU RN.

## 2021-11-02 NOTE — ASU DISCHARGE PLAN (ADULT/PEDIATRIC) - CARE PROVIDER_API CALL
Shahla Valencia (MD)  Surgery  [address] [place] [address]  Phone: [phone]  Fax: [phone]  Established Patient  Follow Up Time: 2 weeks

## 2021-11-02 NOTE — H&P PST ADULT - HISTORY OF PRESENT ILLNESS
75 yo M PMH Prostate CA (recently diagnosed s/p prostate bx), CAD s/p stents, HTN, DM, nephrolithiasis recently 10/12/21 admitted with rectal bleeding x 1.5 days. Pt reported BRBPR since yesterday that has gradually gotten worse. Pt reports similar episode ~20 years ago which was attributed to hemorrhoids. On admission his work up included CTA A/P revealing Incidental note is made of a 7 mm gastroduodenal artery pseudoaneurysm. Hospital course was complicated by hypotension which required ICU upgrade. He  was stabilized with blood transfusion and IVF boluses. GIB stopped and hg stable during ICU stay. EGD was negative. Colonoscopy was showing diverticulosis which was likely source of bleed.  Pt was discharged 10/16/21. Pt now presents for coil embolization of 7 mm gastroduodenal artery pseudoaneurysm.

## 2021-11-02 NOTE — H&P PST ADULT - ASSESSMENT
75 yo M PMH Prostate CA (recently diagnosed s/p prostate bx), CAD s/p stents, HTN, DM, nephrolithiasis recently 10/12/21 admitted with rectal bleeding x 1.5 days, found incidental 7 mm gastroduodenal artery pseudoaneurysm presents for coil embolization gastroduodenal artery pseudoaneurysm.    Keep NPO

## 2021-11-03 ENCOUNTER — NON-APPOINTMENT (OUTPATIENT)
Age: 76
End: 2021-11-03

## 2021-11-05 ENCOUNTER — EMERGENCY (EMERGENCY)
Facility: HOSPITAL | Age: 76
LOS: 0 days | Discharge: HOME | End: 2021-11-05
Attending: EMERGENCY MEDICINE | Admitting: EMERGENCY MEDICINE
Payer: MEDICARE

## 2021-11-05 VITALS
OXYGEN SATURATION: 99 % | HEIGHT: 69 IN | TEMPERATURE: 98 F | SYSTOLIC BLOOD PRESSURE: 100 MMHG | RESPIRATION RATE: 18 BRPM | DIASTOLIC BLOOD PRESSURE: 60 MMHG | HEART RATE: 105 BPM

## 2021-11-05 VITALS
HEART RATE: 88 BPM | DIASTOLIC BLOOD PRESSURE: 70 MMHG | SYSTOLIC BLOOD PRESSURE: 110 MMHG | TEMPERATURE: 98 F | OXYGEN SATURATION: 97 % | RESPIRATION RATE: 18 BRPM

## 2021-11-05 DIAGNOSIS — R06.6 HICCOUGH: ICD-10-CM

## 2021-11-05 DIAGNOSIS — R11.0 NAUSEA: ICD-10-CM

## 2021-11-05 DIAGNOSIS — R10.13 EPIGASTRIC PAIN: ICD-10-CM

## 2021-11-05 DIAGNOSIS — Z79.82 LONG TERM (CURRENT) USE OF ASPIRIN: ICD-10-CM

## 2021-11-05 DIAGNOSIS — H26.9 UNSPECIFIED CATARACT: Chronic | ICD-10-CM

## 2021-11-05 DIAGNOSIS — R63.0 ANOREXIA: ICD-10-CM

## 2021-11-05 DIAGNOSIS — Z98.890 OTHER SPECIFIED POSTPROCEDURAL STATES: Chronic | ICD-10-CM

## 2021-11-05 DIAGNOSIS — I10 ESSENTIAL (PRIMARY) HYPERTENSION: ICD-10-CM

## 2021-11-05 DIAGNOSIS — Z86.79 PERSONAL HISTORY OF OTHER DISEASES OF THE CIRCULATORY SYSTEM: ICD-10-CM

## 2021-11-05 DIAGNOSIS — I25.10 ATHEROSCLEROTIC HEART DISEASE OF NATIVE CORONARY ARTERY WITHOUT ANGINA PECTORIS: ICD-10-CM

## 2021-11-05 DIAGNOSIS — Z95.5 PRESENCE OF CORONARY ANGIOPLASTY IMPLANT AND GRAFT: ICD-10-CM

## 2021-11-05 DIAGNOSIS — E11.9 TYPE 2 DIABETES MELLITUS WITHOUT COMPLICATIONS: ICD-10-CM

## 2021-11-05 LAB
ALBUMIN SERPL ELPH-MCNC: 3.9 G/DL — SIGNIFICANT CHANGE UP (ref 3.5–5.2)
ALP SERPL-CCNC: 94 U/L — SIGNIFICANT CHANGE UP (ref 30–115)
ALT FLD-CCNC: 9 U/L — SIGNIFICANT CHANGE UP (ref 0–41)
ANION GAP SERPL CALC-SCNC: 14 MMOL/L — SIGNIFICANT CHANGE UP (ref 7–14)
AST SERPL-CCNC: 15 U/L — SIGNIFICANT CHANGE UP (ref 0–41)
BILIRUB SERPL-MCNC: 1.1 MG/DL — SIGNIFICANT CHANGE UP (ref 0.2–1.2)
BUN SERPL-MCNC: 16 MG/DL — SIGNIFICANT CHANGE UP (ref 10–20)
CALCIUM SERPL-MCNC: 9.2 MG/DL — SIGNIFICANT CHANGE UP (ref 8.5–10.1)
CHLORIDE SERPL-SCNC: 106 MMOL/L — SIGNIFICANT CHANGE UP (ref 98–110)
CO2 SERPL-SCNC: 19 MMOL/L — SIGNIFICANT CHANGE UP (ref 17–32)
CREAT SERPL-MCNC: 1.1 MG/DL — SIGNIFICANT CHANGE UP (ref 0.7–1.5)
GLUCOSE SERPL-MCNC: 96 MG/DL — SIGNIFICANT CHANGE UP (ref 70–99)
HCT VFR BLD CALC: 32.5 % — LOW (ref 42–52)
HGB BLD-MCNC: 10.7 G/DL — LOW (ref 14–18)
MCHC RBC-ENTMCNC: 30.2 PG — SIGNIFICANT CHANGE UP (ref 27–31)
MCHC RBC-ENTMCNC: 32.9 G/DL — SIGNIFICANT CHANGE UP (ref 32–37)
MCV RBC AUTO: 91.8 FL — SIGNIFICANT CHANGE UP (ref 80–94)
NRBC # BLD: 0 /100 WBCS — SIGNIFICANT CHANGE UP (ref 0–0)
PLATELET # BLD AUTO: 265 K/UL — SIGNIFICANT CHANGE UP (ref 130–400)
POTASSIUM SERPL-MCNC: 4.6 MMOL/L — SIGNIFICANT CHANGE UP (ref 3.5–5)
POTASSIUM SERPL-SCNC: 4.6 MMOL/L — SIGNIFICANT CHANGE UP (ref 3.5–5)
PROT SERPL-MCNC: 6.4 G/DL — SIGNIFICANT CHANGE UP (ref 6–8)
RBC # BLD: 3.54 M/UL — LOW (ref 4.7–6.1)
RBC # FLD: 13.6 % — SIGNIFICANT CHANGE UP (ref 11.5–14.5)
SODIUM SERPL-SCNC: 139 MMOL/L — SIGNIFICANT CHANGE UP (ref 135–146)
WBC # BLD: 9.07 K/UL — SIGNIFICANT CHANGE UP (ref 4.8–10.8)
WBC # FLD AUTO: 9.07 K/UL — SIGNIFICANT CHANGE UP (ref 4.8–10.8)

## 2021-11-05 PROCEDURE — 99284 EMERGENCY DEPT VISIT MOD MDM: CPT

## 2021-11-05 RX ORDER — BACLOFEN 100 %
10 POWDER (GRAM) MISCELLANEOUS ONCE
Refills: 0 | Status: COMPLETED | OUTPATIENT
Start: 2021-11-05 | End: 2021-11-05

## 2021-11-05 RX ORDER — SODIUM CHLORIDE 9 MG/ML
1000 INJECTION INTRAMUSCULAR; INTRAVENOUS; SUBCUTANEOUS
Refills: 0 | Status: DISCONTINUED | OUTPATIENT
Start: 2021-11-05 | End: 2021-11-05

## 2021-11-05 RX ORDER — CHLORPROMAZINE HCL 10 MG
25 TABLET ORAL ONCE
Refills: 0 | Status: COMPLETED | OUTPATIENT
Start: 2021-11-05 | End: 2021-11-05

## 2021-11-05 RX ADMIN — Medication 25 MILLIGRAM(S): at 16:05

## 2021-11-05 RX ADMIN — SODIUM CHLORIDE 125 MILLILITER(S): 9 INJECTION INTRAMUSCULAR; INTRAVENOUS; SUBCUTANEOUS at 14:26

## 2021-11-05 RX ADMIN — Medication 10 MILLIGRAM(S): at 14:24

## 2021-11-05 NOTE — ED PROVIDER NOTE - CARE PROVIDER_API CALL
Shahla Valencia (MD)  Surgery  [address] [place] [address]  Phone: [phone]  Fax: [phone]  Follow Up Time: 1-3 Days

## 2021-11-05 NOTE — CONSULT NOTE ADULT - SUBJECTIVE AND OBJECTIVE BOX
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 He went home the same day. Now pt presented to ED c/o hiccups and belching since the procedure.         PAST MEDICAL & SURGICAL HISTORY:  CAD (coronary artery disease)    Hypercholesteremia    BPH (benign prostatic hyperplasia)    Kidney stones    Hypertension    Diabetes mellitus    Status post cardiac surgery  cardiac stents    Bilateral cataracts    History of lithotripsy      No Known Allergies    Home Medications:  Co Q-10: orally once a day (02 Nov 2021 07:33)  finasteride 5 mg oral tablet: 1 tab(s) orally once a day (02 Nov 2021 07:33)  simvastatin 80 mg oral tablet: 1 tab(s) orally once a day (at bedtime) (02 Nov 2021 07:33)  tamsulosin 0.4 mg oral capsule: 1 cap(s) orally once a day (at bedtime)  - if you continue to have your blood pressures drop when you stand, then this medication will need to be STOPPED (02 Nov 2021 07:33)  Vitamin D2:  (02 Nov 2021 07:33)    No permtinent family history of PVD    REVIEW OF SYSTEMS:  GENERAL:                                         negative  SKIN:                                                 negative  OPTHALMOLOGIC:                          negative  ENMT:                                               negative  RESPIRATORY AND THORAX:        negative  CARDIOVASCULAR:                         see HPI  GASTROINTESTINAL:                       see HPI  NEPHROLOGY:                                  negative  MUSCULOSKELETAL:                       negative  NEUROLOGIC:                                   negative  PSYCHIATRIC:                                    negative  HEMATOLOGY/LYMPHATICS:         negative  ENDOCRINE:                                     negative  ALLERGIC/IMMUNOLOGIC:            negative    12 point ROS otherwise normal except as stated in HPI    PHYSICAL EXAM  Vital Signs Last 24 Hrs  T(C): 36.8 (05 Nov 2021 11:45), Max: 36.8 (05 Nov 2021 11:45)  T(F): 98.3 (05 Nov 2021 11:45), Max: 98.3 (05 Nov 2021 11:45)  HR: 105 (05 Nov 2021 11:45) (105 - 105)  BP: 100/60 (05 Nov 2021 11:45) (100/60 - 100/60)  BP(mean): --  RR: 18 (05 Nov 2021 11:45) (18 - 18)  SpO2: 99% (05 Nov 2021 11:45) (99% - 99%)    Appearance: Normal	  HEENT:   Normal oral mucosa, PERRL, EOMI	  Neck: Supple, - JVD;   Cardiovascular: Normal S1 S2, No JVD, No murmurs,   Respiratory: Lungs clear to auscultation, No Rales, Rhonchi, Wheezing	  Gastrointestinal:  Soft, Non-tender, positive BS	  Skin: No rashes, No ecchymoses, No cyanosis  Extremities: Normal range of motion, No clubbing, cyanosis or edema  Neurologic: Non-focal  Psychiatry: A & O x 3, Mood & affect appropriate          MEDICATIONS:   MEDICATIONS  (STANDING):  baclofen 10 milliGRAM(s) Oral Once    MEDICATIONS  (PRN):      LAB/STUDIES:                                IMAGING:

## 2021-11-05 NOTE — ED PROVIDER NOTE - PATIENT PORTAL LINK FT
You can access the FollowMyHealth Patient Portal offered by [facility] by registering at the following website: [email or web address]. By joining [employer]’s FollowMyHealth portal, you will also be able to view your health information using other applications (apps) compatible with our system.

## 2021-11-05 NOTE — CONSULT NOTE ADULT - ASSESSMENT
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 He went home the same day. Now pt presented to ED c/o hiccups and belching since the procedure.   Likely effect from anesthesia    Please, treat with Thorazine  consider dyspepsia management as well    Follow up as outpt with us if hiccups resolve today with treatment    SPECTRA [id number]

## 2021-11-05 NOTE — ED PROVIDER NOTE - NSFOLLOWUPINSTRUCTIONS_ED_ALL_ED_FT
- Please follow up with Dr Guadarrama office      What are hiccups?    Hiccups happen when your diaphragm, the muscle that separates your chest from your belly, suddenly contracts. When that happens, you take in air, and then your voice box closes shut, which is what causes the "hic" sound.    Most bouts of hiccups last less than 48 hours. Rarely, they can last longer. Hiccups that last longer than a month are called "intractable hiccups."    In most cases, hiccups go away on their own with no lasting effects. But in some people, they can last a long time and lead to problems such as poor nutrition, tiredness, and reduced appetite.    What causes hiccups?  The most common causes include:    ?Having an overly full stomach    ?Drinking soda or other fizzy drinks    ?Swallowing air (for example when you are chewing gum)    In rare situations, hiccups can have more serious causes, such as infections or irritation of a nerve in the neck or diaphragm. Intractable hiccups are unusual but could indicate another medical problem, such as acid reflux or cancer.    Is there anything I can do on my own to get rid of the hiccups?  Yes, there are a few things you can try:    ?Hold your breath for several seconds or longer    ?Breathe out against your closed mouth and nose (the way you might do on an airplane to pop your ears)    ?Stimulate the back of your throat, for example, by sipping cold water, gargling with water, or swallowing a teaspoon of sugar    ?Pull your knees up to your chest and lean forward    Should I see a doctor or nurse?  See your doctor or nurse if you have hiccups that last longer than 48 hours. He or she will ask about your symptoms and do an exam. Most cases of hiccups are harmless. But the doctor or nurse might want to do a few tests, depending on your symptoms, medical history, and what the exam shows.    How are hiccups treated?  If the self-help steps listed above do not work, there are a number of prescription medicines that can help. Acupuncture can also help some people. Surgery might be needed to treat some people with extreme symptoms who do not get better with other treatments, but this is rare.

## 2021-11-05 NOTE — ED PROVIDER NOTE - CLINICAL SUMMARY MEDICAL DECISION MAKING FREE TEXT BOX
Pt feeling better after thorazine and wants to go home. Seen by vascular surgery and cleared for d/c. Return precautions discussed.

## 2021-11-05 NOTE — ED PROVIDER NOTE - OBJECTIVE STATEMENT
75 yo M with PMH gastroduodenal artery pseudoaneurysm s/p coil embolization with Dr Guadarrama on 11/2/21, Prostate CA (recently diagnosed s/p prostate bx), CAD s/p stents, HTN, DM, nephrolithiasis, Diverticulosis who presents with hiccups and epigastric pain since surgery x3 days. Pt has been tolerating po but decreased appetite.  Pt called [facility] office and told to come to ER.  Pt denies other sx.  Pt denies n/v/d, chest pain, SOB, diaphoresis, hematochezia, hematuria, dysuria, back pain, headache, fevers, chills.

## 2021-11-05 NOTE — ED ADULT NURSE NOTE - CAS TRG GEN SKIN COLOR
Eduar Nelson MD at 11/18/2020 11:03 AM    Status: Signed      Spoke to Gastroenterology, Dr. Cecily Moe. Â   Patient currently undergoing workup for pancytopenia. Â   CBC and CMP were reviewed from 11/17/2020. Hemoglobin of 6.4. Hematocrit of 22.2. Â   Patient did have a type and screen, he is s/p 1 unit PRBC at 10 AM on 11/18/20. H&H repeated at 7.2/24. 3. Â   Patient scheduled for colonoscopy by Dr. Cecily Moe today. Â   Patient did test positive for COVID-19. Â   Spoke with Dr. Cecliy Moe for coordination of care. Planning on performing colonoscopy today, will plan to assess if there is acute blood loss from lower GI tract and treat accordingly. If possible source found, he will determine if hospitalization is needed. After speaking with Hospitalist service, as patient is asymptomatic with positive COVID 19 test, out patient treatment was recommended. Â   Unfortunately I can't see patient in the office for follow up but if no bleeding source is found, patient can have a stat CBC on 11/19/20 and will coordinate care based on results. Â   CBC ordered to be performed on 11/19/20. Please call patient later today after colonoscopy to schedule.
Patient is admitted.
Normal for race

## 2021-11-05 NOTE — ED ADULT TRIAGE NOTE - CHIEF COMPLAINT QUOTE
s/p Coil embolization of Gastroduodenal artery pseudoaneurysm on 11/2/21, patient c/o "hiccups" and abdominal pain x 3 days

## 2021-11-05 NOTE — CONSULT NOTE ADULT - CONSULT REQUESTED BY NAME
INDICATION: Shortness of breath, wheezing, sore throat.



COMPARISON: No relevant prior exams available on the [facility] PACS for comparison.



TECHNIQUE:  Dual energy PA and routine lateral views of the chest were obtained.



REPORT: Mild central airway wall thickening and perihilar streaky opacities. Negative for

peripheral alveolar consolidation. Negative for pleural effusion or pneumothorax. The

heart, pulmonary vasculature, and mediastinal contours are Unremarkable. 



IMPRESSION: 

#. The constellation of finding is most consistent with reactive airways disease. Negative

for peripheral alveolar consolidation to favor a bacterial pneumonia.



R4
ED

## 2021-11-05 NOTE — ED PROVIDER NOTE - ATTENDING CONTRIBUTION TO CARE
75yo man h/o recent gastroduodenal artery pseudoaneurysm coil embolization with Dr Waters (11/2/21), prostate CAD, HTN, DM, CAD s/p stent c/o hiccups and epigastric discomfort x 3 days; he was told he could expect hiccups after the procedure but they have persisted and pt is feeling increasing discomfort and nausea, poor appetite. He called Dr Guadarrama's office and was told to come to the ED. Otherwise feeling well, no fever or chills, chest pain, SOB. On exam he is nontoxic appearing but frequent hiccups noted, lungs CTA, CVS1S2 RRR abd soft, minimal epigastric tenderness on palpation, no peritoneal signs. Spoke with vascular, will try thorazine and reassess.

## 2021-11-05 NOTE — ED PROVIDER NOTE - NS ED ROS FT
Review of Systems:  CONSTITUTIONAL: No fever, No diaphoresis  SKIN: No rash  HEMATOLOGIC: No abnormal bleeding or bruising  RESPIRATORY: No shortness of breath, No cough  CARDIAC: No chest pain, No palpitations  GI: No abdominal pain, No nausea, No vomiting, No diarrhea, No constipation, No bright red blood per rectum or melena. No flank pain, + hiccups  : No dysuria, frequency, hematuria  MUSCULOSKELETAL: No joint paint, No swelling, No back pain  NEUROLOGIC: No numbness, No focal weakness, No headache, No dizziness  All other systems negative, unless specified in HPI

## 2021-11-05 NOTE — ED PROVIDER NOTE - PROGRESS NOTE DETAILS
AH - Vascular team notified, already aware of pt, will see pt AH - hiccups resolved with Thorazine, pt stable, no acute complaints, notified Vascular, okay with outpatient f/u with [facility];   Patient to be discharged from ED. Any available test results were discussed with patient and/or family. Verbal instructions given, including instructions to return to ED immediately for any new, worsening, or concerning symptoms. Strict return precautions given. Patient endorsed understanding. Written discharge instructions additionally given, including follow-up plan

## 2021-11-09 DIAGNOSIS — I72.8 ANEURYSM OF OTHER SPECIFIED ARTERIES: ICD-10-CM

## 2021-11-11 ENCOUNTER — APPOINTMENT (OUTPATIENT)
Dept: UROLOGY | Facility: CLINIC | Age: 76
End: 2021-11-11

## 2021-11-15 ENCOUNTER — EMERGENCY (EMERGENCY)
Facility: HOSPITAL | Age: 76
LOS: 0 days | Discharge: ANOTHER HOSPITAL | End: 2021-11-15
Attending: STUDENT IN AN ORGANIZED HEALTH CARE EDUCATION/TRAINING PROGRAM | Admitting: STUDENT IN AN ORGANIZED HEALTH CARE EDUCATION/TRAINING PROGRAM
Payer: MEDICARE

## 2021-11-15 VITALS
HEART RATE: 78 BPM | OXYGEN SATURATION: 96 % | SYSTOLIC BLOOD PRESSURE: 93 MMHG | RESPIRATION RATE: 18 BRPM | DIASTOLIC BLOOD PRESSURE: 64 MMHG

## 2021-11-15 VITALS
DIASTOLIC BLOOD PRESSURE: 48 MMHG | RESPIRATION RATE: 18 BRPM | SYSTOLIC BLOOD PRESSURE: 73 MMHG | WEIGHT: 169.98 LBS | OXYGEN SATURATION: 98 % | TEMPERATURE: 97 F | HEIGHT: 69 IN | HEART RATE: 80 BPM

## 2021-11-15 DIAGNOSIS — E11.9 TYPE 2 DIABETES MELLITUS WITHOUT COMPLICATIONS: ICD-10-CM

## 2021-11-15 DIAGNOSIS — N39.0 URINARY TRACT INFECTION, SITE NOT SPECIFIED: ICD-10-CM

## 2021-11-15 DIAGNOSIS — R51.9 HEADACHE, UNSPECIFIED: ICD-10-CM
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
## 2021-11-15 DIAGNOSIS — Z95.5 PRESENCE OF CORONARY ANGIOPLASTY IMPLANT AND GRAFT: ICD-10-CM

## 2021-11-15 DIAGNOSIS — Z20.822 CONTACT WITH AND (SUSPECTED) EXPOSURE TO COVID-19: ICD-10-CM

## 2021-11-15 DIAGNOSIS — Z87.442 PERSONAL HISTORY OF URINARY CALCULI: ICD-10-CM

## 2021-11-15 DIAGNOSIS — S02.2XXA FRACTURE OF NASAL BONES, INITIAL ENCOUNTER FOR CLOSED FRACTURE: ICD-10-CM

## 2021-11-15 DIAGNOSIS — H53.8 OTHER VISUAL DISTURBANCES: ICD-10-CM

## 2021-11-15 DIAGNOSIS — S05.32XA OCULAR LACERATION WITHOUT PROLAPSE OR LOSS OF INTRAOCULAR TISSUE, LEFT EYE, INITIAL ENCOUNTER: ICD-10-CM

## 2021-11-15 DIAGNOSIS — E78.00 PURE HYPERCHOLESTEROLEMIA, UNSPECIFIED: ICD-10-CM

## 2021-11-15 DIAGNOSIS — I10 ESSENTIAL (PRIMARY) HYPERTENSION: ICD-10-CM

## 2021-11-15 DIAGNOSIS — Z79.82 LONG TERM (CURRENT) USE OF ASPIRIN: ICD-10-CM

## 2021-11-15 DIAGNOSIS — S02.32XA FRACTURE OF ORBITAL FLOOR, LEFT SIDE, INITIAL ENCOUNTER FOR CLOSED FRACTURE: ICD-10-CM

## 2021-11-15 DIAGNOSIS — H26.9 UNSPECIFIED CATARACT: Chronic | ICD-10-CM
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
## 2021-11-15 DIAGNOSIS — I25.10 ATHEROSCLEROTIC HEART DISEASE OF NATIVE CORONARY ARTERY WITHOUT ANGINA PECTORIS: ICD-10-CM

## 2021-11-15 DIAGNOSIS — C61 MALIGNANT NEOPLASM OF PROSTATE: ICD-10-CM

## 2021-11-15 DIAGNOSIS — Z98.890 OTHER SPECIFIED POSTPROCEDURAL STATES: Chronic | ICD-10-CM

## 2021-11-15 DIAGNOSIS — Z23 ENCOUNTER FOR IMMUNIZATION: ICD-10-CM

## 2021-11-15 DIAGNOSIS — N40.0 BENIGN PROSTATIC HYPERPLASIA WITHOUT LOWER URINARY TRACT SYMPTOMS: ICD-10-CM

## 2021-11-15 LAB
ALBUMIN SERPL ELPH-MCNC: 3.6 G/DL — SIGNIFICANT CHANGE UP (ref 3.5–5.2)
ALP SERPL-CCNC: 111 U/L — SIGNIFICANT CHANGE UP (ref 30–115)
ALT FLD-CCNC: 9 U/L — SIGNIFICANT CHANGE UP (ref 0–41)
ANION GAP SERPL CALC-SCNC: 20 MMOL/L — HIGH (ref 7–14)
APPEARANCE UR: CLEAR — SIGNIFICANT CHANGE UP
APTT BLD: 31.8 SEC — SIGNIFICANT CHANGE UP (ref 27–39.2)
AST SERPL-CCNC: 16 U/L — SIGNIFICANT CHANGE UP (ref 0–41)
BACTERIA # UR AUTO: NEGATIVE — SIGNIFICANT CHANGE UP
BASOPHILS # BLD AUTO: 0.03 K/UL — SIGNIFICANT CHANGE UP (ref 0–0.2)
BASOPHILS NFR BLD AUTO: 0.2 % — SIGNIFICANT CHANGE UP (ref 0–1)
BILIRUB SERPL-MCNC: 0.8 MG/DL — SIGNIFICANT CHANGE UP (ref 0.2–1.2)
BILIRUB UR-MCNC: NEGATIVE — SIGNIFICANT CHANGE UP
BLD GP AB SCN SERPL QL: SIGNIFICANT CHANGE UP
BUN SERPL-MCNC: 13 MG/DL — SIGNIFICANT CHANGE UP (ref 10–20)
CALCIUM SERPL-MCNC: 8.7 MG/DL — SIGNIFICANT CHANGE UP (ref 8.5–10.1)
CHLORIDE SERPL-SCNC: 103 MMOL/L — SIGNIFICANT CHANGE UP (ref 98–110)
CK SERPL-CCNC: 46 U/L — SIGNIFICANT CHANGE UP (ref 0–225)
CO2 SERPL-SCNC: 14 MMOL/L — LOW (ref 17–32)
COLOR SPEC: SIGNIFICANT CHANGE UP
CREAT SERPL-MCNC: 1.2 MG/DL — SIGNIFICANT CHANGE UP (ref 0.7–1.5)
DIFF PNL FLD: ABNORMAL
EOSINOPHIL # BLD AUTO: 0.02 K/UL — SIGNIFICANT CHANGE UP (ref 0–0.7)
EOSINOPHIL NFR BLD AUTO: 0.1 % — SIGNIFICANT CHANGE UP (ref 0–8)
EPI CELLS # UR: 1 /HPF — SIGNIFICANT CHANGE UP (ref 0–5)
ETHANOL SERPL-MCNC: <10 MG/DL — SIGNIFICANT CHANGE UP
GAS PNL BLDV: SIGNIFICANT CHANGE UP
GLUCOSE SERPL-MCNC: 134 MG/DL — HIGH (ref 70–99)
GLUCOSE UR QL: NEGATIVE — SIGNIFICANT CHANGE UP
HCT VFR BLD CALC: 36.6 % — LOW (ref 42–52)
HGB BLD-MCNC: 12 G/DL — LOW (ref 14–18)
HYALINE CASTS # UR AUTO: 2 /LPF — SIGNIFICANT CHANGE UP (ref 0–7)
IMM GRANULOCYTES NFR BLD AUTO: 0.6 % — HIGH (ref 0.1–0.3)
INR BLD: 1.02 RATIO — SIGNIFICANT CHANGE UP (ref 0.65–1.3)
KETONES UR-MCNC: NEGATIVE — SIGNIFICANT CHANGE UP
LACTATE SERPL-SCNC: 2.3 MMOL/L — HIGH (ref 0.7–2)
LEUKOCYTE ESTERASE UR-ACNC: ABNORMAL
LIDOCAIN IGE QN: 14 U/L — SIGNIFICANT CHANGE UP (ref 7–60)
LYMPHOCYTES # BLD AUTO: 0.87 K/UL — LOW (ref 1.2–3.4)
LYMPHOCYTES # BLD AUTO: 6 % — LOW (ref 20.5–51.1)
MCHC RBC-ENTMCNC: 29.8 PG — SIGNIFICANT CHANGE UP (ref 27–31)
MCHC RBC-ENTMCNC: 32.8 G/DL — SIGNIFICANT CHANGE UP (ref 32–37)
MCV RBC AUTO: 90.8 FL — SIGNIFICANT CHANGE UP (ref 80–94)
MONOCYTES # BLD AUTO: 0.92 K/UL — HIGH (ref 0.1–0.6)
MONOCYTES NFR BLD AUTO: 6.4 % — SIGNIFICANT CHANGE UP (ref 1.7–9.3)
NEUTROPHILS # BLD AUTO: 12.47 K/UL — HIGH (ref 1.4–6.5)
NEUTROPHILS NFR BLD AUTO: 86.7 % — HIGH (ref 42.2–75.2)
NITRITE UR-MCNC: NEGATIVE — SIGNIFICANT CHANGE UP
NRBC # BLD: 0 /100 WBCS — SIGNIFICANT CHANGE UP (ref 0–0)
PH UR: 7 — SIGNIFICANT CHANGE UP (ref 5–8)
PLATELET # BLD AUTO: 351 K/UL — SIGNIFICANT CHANGE UP (ref 130–400)
POTASSIUM SERPL-MCNC: 4.4 MMOL/L — SIGNIFICANT CHANGE UP (ref 3.5–5)
POTASSIUM SERPL-SCNC: 4.4 MMOL/L — SIGNIFICANT CHANGE UP (ref 3.5–5)
PROT SERPL-MCNC: 5.9 G/DL — LOW (ref 6–8)
PROT UR-MCNC: SIGNIFICANT CHANGE UP
PROTHROM AB SERPL-ACNC: 11.7 SEC — SIGNIFICANT CHANGE UP (ref 9.95–12.87)
RBC # BLD: 4.03 M/UL — LOW (ref 4.7–6.1)
RBC # FLD: 13.2 % — SIGNIFICANT CHANGE UP (ref 11.5–14.5)
RBC CASTS # UR COMP ASSIST: 103 /HPF — HIGH (ref 0–4)
SARS-COV-2 RNA SPEC QL NAA+PROBE: SIGNIFICANT CHANGE UP
SODIUM SERPL-SCNC: 137 MMOL/L — SIGNIFICANT CHANGE UP (ref 135–146)
SP GR SPEC: 1.01 — SIGNIFICANT CHANGE UP (ref 1.01–1.03)
TROPONIN T SERPL-MCNC: <0.01 NG/ML — SIGNIFICANT CHANGE UP
UROBILINOGEN FLD QL: SIGNIFICANT CHANGE UP
WBC # BLD: 14.4 K/UL — HIGH (ref 4.8–10.8)
WBC # FLD AUTO: 14.4 K/UL — HIGH (ref 4.8–10.8)
WBC UR QL: 35 /HPF — HIGH (ref 0–5)

## 2021-11-15 PROCEDURE — 70486 CT MAXILLOFACIAL W/O DYE: CPT | Mod: 26,MA

## 2021-11-15 PROCEDURE — 70450 CT HEAD/BRAIN W/O DYE: CPT | Mod: 26,MA

## 2021-11-15 PROCEDURE — 71045 X-RAY EXAM CHEST 1 VIEW: CPT | Mod: 26

## 2021-11-15 PROCEDURE — 93010 ELECTROCARDIOGRAM REPORT: CPT

## 2021-11-15 PROCEDURE — 99291 CRITICAL CARE FIRST HOUR: CPT

## 2021-11-15 PROCEDURE — 72170 X-RAY EXAM OF PELVIS: CPT | Mod: 26

## 2021-11-15 PROCEDURE — 99284 EMERGENCY DEPT VISIT MOD MDM: CPT

## 2021-11-15 PROCEDURE — 74174 CTA ABD&PLVS W/CONTRAST: CPT | Mod: 26,MA

## 2021-11-15 PROCEDURE — 72125 CT NECK SPINE W/O DYE: CPT | Mod: 26,MA

## 2021-11-15 PROCEDURE — 71275 CT ANGIOGRAPHY CHEST: CPT | Mod: 26,MA

## 2021-11-15 RX ORDER — CEFEPIME 1 G/1
2000 INJECTION, POWDER, FOR SOLUTION INTRAMUSCULAR; INTRAVENOUS ONCE
Refills: 0 | Status: COMPLETED | OUTPATIENT
Start: 2021-11-15 | End: 2021-11-15

## 2021-11-15 RX ORDER — NOREPINEPHRINE BITARTRATE/D5W 8 MG/250ML
0.05 PLASTIC BAG, INJECTION (ML) INTRAVENOUS
Qty: 8 | Refills: 0 | Status: DISCONTINUED | OUTPATIENT
Start: 2021-11-15 | End: 2021-11-15

## 2021-11-15 RX ORDER — ONDANSETRON 8 MG/1
4 TABLET, FILM COATED ORAL ONCE
Refills: 0 | Status: DISCONTINUED | OUTPATIENT
Start: 2021-11-15 | End: 2021-11-15

## 2021-11-15 RX ORDER — SODIUM CHLORIDE 9 MG/ML
250 INJECTION INTRAMUSCULAR; INTRAVENOUS; SUBCUTANEOUS ONCE
Refills: 0 | Status: COMPLETED | OUTPATIENT
Start: 2021-11-15 | End: 2021-11-15

## 2021-11-15 RX ORDER — SODIUM CHLORIDE 9 MG/ML
2750 INJECTION, SOLUTION INTRAVENOUS ONCE
Refills: 0 | Status: COMPLETED | OUTPATIENT
Start: 2021-11-15 | End: 2021-11-15

## 2021-11-15 RX ORDER — TETANUS TOXOID, REDUCED DIPHTHERIA TOXOID AND ACELLULAR PERTUSSIS VACCINE, ADSORBED 5; 2.5; 8; 8; 2.5 [IU]/.5ML; [IU]/.5ML; UG/.5ML; UG/.5ML; UG/.5ML
0.5 SUSPENSION INTRAMUSCULAR ONCE
Refills: 0 | Status: COMPLETED | OUTPATIENT
Start: 2021-11-15 | End: 2021-11-15

## 2021-11-15 RX ADMIN — SODIUM CHLORIDE 250 MILLILITER(S): 9 INJECTION INTRAMUSCULAR; INTRAVENOUS; SUBCUTANEOUS at 07:00

## 2021-11-15 RX ADMIN — CEFEPIME 100 MILLIGRAM(S): 1 INJECTION, POWDER, FOR SOLUTION INTRAMUSCULAR; INTRAVENOUS at 11:21

## 2021-11-15 RX ADMIN — Medication 7.23 MICROGRAM(S)/KG/MIN: at 07:48

## 2021-11-15 RX ADMIN — SODIUM CHLORIDE 2750 MILLILITER(S): 9 INJECTION, SOLUTION INTRAVENOUS at 08:11

## 2021-11-15 RX ADMIN — TETANUS TOXOID, REDUCED DIPHTHERIA TOXOID AND ACELLULAR PERTUSSIS VACCINE, ADSORBED 0.5 MILLILITER(S): 5; 2.5; 8; 8; 2.5 SUSPENSION INTRAMUSCULAR at 07:31

## 2021-11-15 NOTE — ED ADULT TRIAGE NOTE - CHIEF COMPLAINT QUOTE
pt biba from home, sts he woke up to use the pt with laceration and ecchymosis with bleeding to left eye; pt is hypotensive in triage (73/48)

## 2021-11-15 NOTE — ED PROVIDER NOTE - ATTENDING CONTRIBUTION TO CARE
77 y/o m w/ pmhx of cataract surgery, Prostate CA, CAD s/p stents, HTN, DM, nephrolithiasis. GI bleed in octber of 2021 (EGD was negative. Colonoscopy was showing diverticulosis which was likely source of bleed), gastroduodenal artery pseudoaneurysm s/p coil embolization with Dr Guadarrama on 11/2/21, presents s/p trauma. reports mechanical fall tried to get out of bed and hit L side of face onto dress, falling to ground this morning. pt reports L eye pain with decreased vision. pain is sharp, constant, non - radiating, associated with swelling. pt called son to help him, no loc. on baby asa.  CODE TRAUMA.  Recalls all events. No fever, chills, n/v, cp, pleuritic cp, sob, palpitations, diaphoresis, cough, chest wall/rib pain, clavicular pain, ankle pain, knee pain, shoulder pain, wrist pain, no back pain, no hip pain, no ha/lh/dizziness, numbness/tingling, neck pain/ stiffness, abd pain,  melena/brbpr, urinary symptoms, edema, calf pain/swelling/erythema, sick contacts, recent travel . GCS 15.    On Exam:  Vital Signs: I have reviewed the initial vital signs.  Constitutional: WDWN in nad.  HEAD: No signs of basilar skull fracture.  Integumentary: No rash.   EYES: (+) L eye periorbital swelling and ecchymoses with traumatic hyphema noted obscuring pupil, and medial and lateral canthus lacerations- reports notices ligth to eye but unable to full see from L eye- decreased vision.   ENT: MMM. No rhinorrhea/otorrhea. No epistaxis,  No septal hematoma. No mastoid ecchymoses. No intraoral bleeding, No loose or cracked teeth, no active bleeding. No malocclusion. No TMJ pain.  NECK: Supple, non-tender, No palpable shelves or step-offs.  BACK: No spinous tenderness. No palpable shelves or step-offs.  Cardiovascular: RRR, radial pulses 2/4 b/l. dp and pt pulses 2/4/ b/l. No pain to palpation to chest wall.  Respiratory: BS present b/l, ctabl, no wheezing or crackles, no stridor. No pain to palpation to ribs b/l. No crepitus. No subq emphysema.   Gastrointestinal: BS present throughout all 4 quadrants, soft, nd, nt. no r/g.  RECTAL as noted by Resident: No melena or brbpr.  Musculoskeletal: FROM of all extremities. No bony tenderness. No pain to palpation to clavicles, no obvious bony deformities. No hip pain. No short leg. No internal or external rotation of LE  Neurologic: GCS 15. CN II-XII intact, no facial droop or slurring of speech. Motor 5/5 and sensation intact throughout upper and lower extremities. (-) Pronator. NIHSS 0.

## 2021-11-15 NOTE — ED PROVIDER NOTE - CARE PLAN
Assessment and plan of treatment:	Plan: CODE Trauma, EKG, CXR, pelvic xray, labs, pan scan, ct maxio facial, tdap, optho consult, reassess.   1 Principal Discharge DX:	Orbital floor fracture  Assessment and plan of treatment:	Plan: CODE Trauma, EKG, CXR, pelvic xray, labs, pan scan, ct maxio facial, tdap, optho consult, reassess.  Secondary Diagnosis:	Rupture of globe  Secondary Diagnosis:	Nasal fracture  Secondary Diagnosis:	Acute UTI

## 2021-11-15 NOTE — ED PROVIDER NOTE - OBJECTIVE STATEMENT
75 y/o M PMHx gastroduodenal artery pseudoaneurysm s/p coil embolization with Dr Guadarrama on 11/2/21, Prostate CA (recently diagnosed s/p prostate bx), CAD s/p stents, HTN, DM, nephrolithiasis, Diverticulosis, on ASA 81mg daily no other AC presents to ED s/p trip and fall hitting face onto dresser and falling on ground this morning. Pt c/o left eye pain, swelling and blood. No LOC. Pt hypotensive in triage, code trauma on arrival. Never smoker

## 2021-11-15 NOTE — ED PROVIDER NOTE - PLAN OF CARE
Plan: CODE Trauma, EKG, CXR, pelvic xray, labs, pan scan, ct maxio facial, tdap, optho consult, reassess.

## 2021-11-15 NOTE — CONSULT NOTE ADULT - ATTENDING COMMENTS
. 77 y/o male, S/P Fall.  Closed Head Injury.  Left Occular Globe Rupture.  Left Orbital Floor Fracture.  Left Nasal bone Fracture.  Hypotension.    PLAN:  CT chest, abdomen, pelvis were reviewed and shows no injury.  Patient placed on Levophed by ED to keep MAP>65.  Urgent OMFS evaluation was performed and recommended Unasyn  Urgent Ophthalmology evaluation was performed and recommended urgent transfer to [facility] as ophthalmologist there needs to do Urgent left eye exploration and possible repair of Left Occular Globe Rupture.  Discussed with ED Attending and recommended to arrange urgent transfer as per Ophthalmology recommendations.  Keep MAP>65. 77 y/o male, S/P Fall.  Closed Head Injury.  Left Eye Globe Rupture.  Left Orbital Floor Fracture.  Left Nasal bone Fracture.  Hypotension.    PLAN:  CT chest, abdomen, pelvis were reviewed and shows no injury.  Patient placed on Levophed by ED to keep MAP>65.  Urgent OMFS evaluation was performed and recommended Unasyn  Urgent Ophthalmology evaluation was performed and recommended urgent transfer to [facility] as ophthalmologist there needs to do Urgent left eye exploration and possible repair of Left Occular Globe Rupture.  Discussed with ED Attending and recommended to arrange urgent transfer as per Ophthalmology recommendations.  Keep MAP>65.

## 2021-11-15 NOTE — CONSULT NOTE ADULT - ASSESSMENT
ASSESSMENT:  76y Male  w/ PMHx of *** seen as (Code Trauma / Trauma Alert / Trauma Consult) s/p ****** with complaint of *** , external signs of trauma include *** . Trauma assessment in ED: ABCs intact , GCS 15 , AAOx3,  CORRAL.     Injuries identified:   -   -   -     PLAN:   - Trauma Labs: (CBC, BMP, Coags, T&S, UA, EtOH level)  Additional studies: EKG    Trauma Imaging to include the following:  - CXR, Pelvic Xray  - CT Head,  CT C-spine, CT Max/Face, CT Chest, CT Abd/Pelvis  - Extremity films: None    Additional consultations:  - Opthalmology     Disposition pending results of above labs and imaging  Above plan discussed with Trauma attending, Dr. Cardona  , patient, patient family, and ED team  --------------------------------------------------------------------------------------  11-15-21 @ 07:44 ASSESSMENT:  gastroduodenal artery pseudoaneurysm s/p coil embolization with Dr Guadarrama on 11/2/21, Prostate CA (recently diagnosed s/p prostate bx), CAD s/p stents, HTN, DM, nephrolithiasis, Diverticulosis, on ASA 81mg daily seen as Code Trauma s/p fall out of bed +HT, -LOC, -AC . Trauma assessment in ED: ABCs intact , GCS 15 , AAOx3,  CORRAL.     Injuries identified:   - Depressed left orbital floor fracture with globe rupture as well as intraocular hemorrhage    PLAN:   - Trauma Labs: (CBC, BMP, Coags, T&S, UA, EtOH level)  Additional studies: EKG    Trauma Imaging to include the following:  - CXR, Pelvic Xray  - CT Head,  CT C-spine, CT Max/Face, CT Chest, CT Abd/Pelvis  - Extremity films: None    Additional consultations:  - Opthalmology     ·	Cleared from trauma standpoint  ·	Opthalmology consult- left globe rupture, requesting transfer to [facility] for exploration and repair  ·	OMFS consult - unasyn 7 days, sinus precautions, defer to opthalmology for globe repair  ·	Transfer to [facility] per ED     Above plan discussed with Trauma attending, Dr. Cardona / Dr. Rodriguez, patient, patient family, and ED team  --------------------------------------------------------------------------------------  11-15-21 @ 07:44

## 2021-11-15 NOTE — ED PROCEDURE NOTE - POC US FAST ED US FINDINGS
retroperitoneal left anechoic structure, not continuous with spleen or kidney, enlarged prostate/No intra-abdominal or pericardial free fluid

## 2021-11-15 NOTE — CONSULT NOTE ADULT - SUBJECTIVE AND OBJECTIVE BOX
HPI: 76 M PMhx HTN, CAD s/p stent on ASA 81, prostate CA, h/o GI anyuerism presents to [facility] ED after fall and L eye trauma. Pt was getting up from bed around 4 am and fell when reaching for bedside table, hitting his head on the bedside table. No LOC. After trauma, pt called his son who helped him get back into bed and noticed he was bleeding from his eye and it was red and painful. Pt denies, nausea, vomiting, changes in mental status.    POHx:  - CEIOL OU >10 years ago  - no gtt      VA: 20/25 OD; LP OS  Pressure: Tpalp WNL OD; deferred OS  Pupils: RR OD; dilated OS; no APD by reverse  EOMs: full OD; -.5 supraduction OS      PLE:   Ext: WNL OD; ecchymosis OS 1.5 cm superficial laceration 3 cm below LLL medially; another laterally. Enophthalmos  L/L: WNL OD; ecchymosis OS  C/S: WNL OD; 360 hemorrhagic chemosis; irregular contour superotemporally   K: WNL OD; DM folds OS; Ryan negative  I: RR OD; poor view OS ? iris tear nasally  AC: WNL OD; 80% hyphema OS  L: PCIOL OD; poor view OS    DFE:  OD: c/d .3 s/p Macula flat; trace ERM; periphery WNL   OS: no view      CT: CT BRAIN:    There is no evidence for acute intracranial hemorrhage, mass effect or midline shift.    There is cortical volume loss. The basal cisterns are patent. There is no hydrocephalus.    There is no extra-axial collection.    The calvarium is intact, without depressed fracture.    There is similar dolichoectasia of the vertebrobasilar system.    The visualized mastoid air cells are clear.    MAXILLOFACIAL BONES:    There is left globe rupture with associated intraocular hemorrhage layering in the posterior chamber with mild enophthalmos as well as significant periorbital swelling. There is air noted in the region of the medial canthus possibly representing a laceration.    There is a left orbital floor fracture with approximately 8 mm inferior displacement with herniation of extraconal fat.    There is a nondisplaced fracture involving the base of the left nasal bone.    There is an air-fluid level within the left maxillary sinus likely representing hemorrhage. There is mild polypoid mucosal thickening of the right maxillary sinus.    There is a 1.3 cm subcutaneous nodule involving the right facial soft tissues likely representing a sebaceous cyst.      A/P    1. Likely ruptured globe OS. Traumatic. Cornea ryan negative but CT evidence of RG  and clinical evidence of fluid rush from supertemporal scleral region, along with abnormal appearance of globe, presume zone 3/posterior rupture. No e/o IOFB on CT.   VA LP with no APD by reverse; 80% hyphema and DM folds  Plan:  - NPO  - IV abx (cefepime per ED)  - COVID test  - antiemetics and pain control  - eye shield placed- maintain at all times  - 2 lower lid lacerations repaired with 6-0 plain gut at bedside  - transfer to [facility] ED for exploration and repair - discussed with petra Zuleta   - rest of care per ED       Reyna Nevarez PGY3  Discussed with Dr. GABRIEL Narayanan ([facility]), And JESSICA Degroot ([facility])       HPI: 76 M PMhx HTN, CAD s/p stent on ASA 81, prostate CA, h/o GI anyuerism presents to [facility] ED after fall and L eye trauma. Pt was getting up from bed around 4 am and fell when reaching for bedside table, hitting his head on the bedside table. No LOC. After trauma, pt called his son who helped him get back into bed and noticed he was bleeding from his eye and it was red and painful. Pt denies, nausea, vomiting, changes in mental status.    POHx:  - CEIOL OU >10 years ago  - no gtt      VA: 20/25 OD; LP OS  Pressure: Tpalp WNL OD; deferred OS  Pupils: RR OD; dilated OS; no APD by reverse  EOMs: full OD; -.5 supraduction OS      PLE:   Ext: WNL OD; ecchymosis OS 1.5 cm superficial laceration 3 cm below LLL medially; another laterally. Enophthalmos  L/L: WNL OD; ecchymosis OS  C/S: WNL OD; 360 hemorrhagic chemosis; irregular contour superotemporally   K: WNL OD; DM folds OS; Ryan negative  I: RR OD; poor view OS ? iris tear nasally  AC: WNL OD; 80% hyphema OS  L: PCIOL OD; poor view OS    DFE:  OD: c/d .3 s/p Macula flat; trace ERM; periphery WNL   OS: no view      CT: CT BRAIN:    There is no evidence for acute intracranial hemorrhage, mass effect or midline shift.    There is cortical volume loss. The basal cisterns are patent. There is no hydrocephalus.    There is no extra-axial collection.    The calvarium is intact, without depressed fracture.    There is similar dolichoectasia of the vertebrobasilar system.    The visualized mastoid air cells are clear.    MAXILLOFACIAL BONES:    There is left globe rupture with associated intraocular hemorrhage layering in the posterior chamber with mild enophthalmos as well as significant periorbital swelling. There is air noted in the region of the medial canthus possibly representing a laceration.    There is a left orbital floor fracture with approximately 8 mm inferior displacement with herniation of extraconal fat.    There is a nondisplaced fracture involving the base of the left nasal bone.    There is an air-fluid level within the left maxillary sinus likely representing hemorrhage. There is mild polypoid mucosal thickening of the right maxillary sinus.    There is a 1.3 cm subcutaneous nodule involving the right facial soft tissues likely representing a sebaceous cyst.      A/P    1. Likely ruptured globe OS. Traumatic. Cornea ryan negative but CT evidence of RG  and clinical evidence of fluid rush from supertemporal scleral region, along with abnormal appearance of globe, presume zone 3/posterior rupture. No e/o IOFB on CT.   VA LP with no APD by reverse; 80% hyphema and DM folds  Plan:  - NPO  - IV abx (cefepime per ED)  - COVID test  - antiemetics and pain control  - eye shield placed- maintain at all times  - 2 lower lid lacerations repaired with 6-0 plain gut at bedside  - transfer to [facility] ED for exploration and repair - discussed with petra Zuleta   - rest of care per ED     3. L orbital floor fracture  - no oculocardiac reflex, nausea or vomiting  - recommend fracture protocol with ice packs 20 min on 20 min off x 3 days, afrin nasal spray x3 days, elevate HOB no nose blowing and open  mouth sneezing  - further eval after RG repair.      Reyna Nevarez PGY3  Discussed with Dr. GABRIEL Narayanan ([facility]), And JESSICA Degroot ([facility])

## 2021-11-15 NOTE — CONSULT NOTE ADULT - SUBJECTIVE AND OBJECTIVE BOX
****************THIS IS AN INCOMPLETE NOTE / FULL NOTE TO FOLLOW ****************      TRAUMA ACTIVATION LEVEL:  CODE / ALERT  / CONSULT  ACTIVATED BY: EMS**  /  ED  INTUBATED: YES** / NO      MECHANISM OF INJURY:   [] Blunt     [] MVC	  [] Fall	  [] Pedestrian Struck	  [] Motorcycle     [] Assault     [] Bicycle collision    [] Sports injury    [] Penetrating    [] Gun Shot Wound      [] Stab Wound    GCS: 15 	E: 4	V: 5	M: 6    HPI:  76yM w/ PMHx of *** seen as (Code Trauma / Trauma Alert / Trauma Consult) s/p ******.  Trauma assessment in ED: ABCs intact , GCS 15 , AAOx3.        PAST MEDICAL & SURGICAL HISTORY:  CAD (coronary artery disease)    Hypercholesteremia    BPH (benign prostatic hyperplasia)    Kidney stones    Hypertension    Diabetes mellitus    Status post cardiac surgery  cardiac stents    Bilateral cataracts    History of lithotripsy        Allergies    No Known Allergies    Intolerances        Home Medications:  Co Q-10: orally once a day (02 Nov 2021 07:33)  finasteride 5 mg oral tablet: 1 tab(s) orally once a day (02 Nov 2021 07:33)  simvastatin 80 mg oral tablet: 1 tab(s) orally once a day (at bedtime) (02 Nov 2021 07:33)  tamsulosin 0.4 mg oral capsule: 1 cap(s) orally once a day (at bedtime)  - if you continue to have your blood pressures drop when you stand, then this medication will need to be STOPPED (02 Nov 2021 07:33)  Vitamin D2:  (02 Nov 2021 07:33)      ROS: 10-system review is otherwise negative except HPI above.      Primary Survey:    A - airway intact  B - bilateral breath sounds and good chest rise  C - palpable pulses in all extremities  D - GCS 15 on arrival, CORRAL  Exposure obtained    Vital Signs Last 24 Hrs  T(C): 36.7 (15 Nov 2021 07:34), Max: 36.7 (15 Nov 2021 07:34)  T(F): 98 (15 Nov 2021 07:34), Max: 98 (15 Nov 2021 07:34)  HR: 76 (15 Nov 2021 07:34) (76 - 82)  BP: 77/47 (15 Nov 2021 07:34) (73/48 - 82/44)  BP(mean): 62 (15 Nov 2021 07:19) (62 - 62)  RR: 18 (15 Nov 2021 07:34) (18 - 18)  SpO2: 95% (15 Nov 2021 07:34) (95% - 98%)    Secondary Survey:   General: NAD  HEENT: Normocephalic, left infraorbital 2cm laceration, surrounding ecchymosis, Left pupil 4cm, right pupil 2cm, PEERLA. no scalp lacerations   Neck: Soft, midline trachea. no c-spine tenderness  Chest: No chest wall tenderness, no subcutaneous emphysema   Cardiac: S1, S2, RRR  Respiratory: Bilateral breath sounds, clear and equal bilaterally  Abdomen: Soft, non-distended, non-tender, no rebound, no guarding.  Groin: Normal appearing, pelvis stable   Ext:  Moving b/l upper and lower extremities. Palpable Radial b/l UE, b/l DP palpable in LE.   Back: No T/L/S spine tenderness, No palpable runoff/stepoff/deformity  Rectal: No loyda blood, RADHA with good tone    FAST: Negative    ACCESS / DEVICES:  [x] Peripheral IV  [ ] Central Venous Line	[ ] R	[ ] L	[ ] IJ	[ ] Fem	[ ] SC	Placed:   [ ] Arterial Line		[ ] R	[ ] L	[ ] Fem	[ ] Rad	[ ] Ax	Placed:   [ ] PICC:					[ ] Mediport  [ ] Urinary Catheter,  Date Placed:   [ ] Chest tube: [ ] Right, [ ] Left  [ ] ROSENDO/Hakeem Drains    Labs:  CAPILLARY BLOOD GLUCOSE                              12.0   14.40 )-----------( 351      ( 15 Nov 2021 06:45 )             36.6       Auto Neutrophil %: 86.7 % (11-15-21 @ 06:45)  Auto Immature Granulocyte %: 0.6 % (11-15-21 @ 06:45)            LFTs:         Coags:     11.70  ----< 1.02    ( 15 Nov 2021 06:45 )     31.8                            RADIOLOGY & ADDITIONAL STUDIES:  ---------------------------------------------------------------------------------------   TRAUMA ACTIVATION LEVEL:  CODE / ALERT  / CONSULT  ACTIVATED BY: EMS**  /  ED  INTUBATED: YES** / NO      MECHANISM OF INJURY:   [] Blunt     [] MVC	  [x] Fall	  [] Pedestrian Struck	  [] Motorcycle     [] Assault     [] Bicycle collision    [] Sports injury    [] Penetrating    [] Gun Shot Wound      [] Stab Wound    GCS: 15 	E: 4	V: 5	M: 6  HPI:  76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  Patient states was getting out of bed, and hit his left eye on the corner of a night stand with associated vision changes in his left eye. Code trauma was called for hypotension upon arrival. Patients BP in trauma bay 80's/50's despite 1 L ivf.  Trauma assessment in ED: ABCs intact , GCS 15 , AAOx3. Fast negative. Patient received an additional 2L IVF's with improvement of BP and taken to CT - significant for left globe rupture with associated intraocular hemorrhage layering in the posterior chamber with mild enophthalmos as well as significant periorbital swelling. There is air noted in the region of the medial canthus possibly representing a laceration. There is a left orbital floor fracture with approximately 8 mm inferior displacement with herniation of extraconal fat. Denies any other injuries or pain.      PAST MEDICAL & SURGICAL HISTORY:  CAD (coronary artery disease)  Hypercholesteremia  BPH (benign prostatic hyperplasia)  Kidney stones  Hypertension  Diabetes mellitus  Status post cardiac surgery  cardiac stents  Bilateral cataracts  History of lithotripsy    Allergies  No Known Allergies  Intolerances    Home Medications:  Co Q-10: orally once a day (2021 07:33)  finasteride 5 mg oral tablet: 1 tab(s) orally once a day (2021 07:33)  simvastatin 80 mg oral tablet: 1 tab(s) orally once a day (at bedtime) (2021 07:33)  tamsulosin 0.4 mg oral capsule: 1 cap(s) orally once a day (at bedtime)  - if you continue to have your blood pressures drop when you stand, then this medication will need to be STOPPED (2021 07:33)  Vitamin D2:  (2021 07:33)    ROS: 10-system review is otherwise negative except HPI above.      Primary Survey:    A - airway intact  B - bilateral breath sounds and good chest rise  C - palpable pulses in all extremities  D - GCS 15 on arrival, CORRAL  Exposure obtained    Vital Signs Last 24 Hrs  T(C): 36.7 (15 Nov 2021 07:34), Max: 36.7 (15 Nov 2021 07:34)  T(F): 98 (15 Nov 2021 07:34), Max: 98 (15 Nov 2021 07:34)  HR: 76 (15 Nov 2021 07:34) (76 - 82)  BP: 77/47 (15 Nov 2021 07:34) (73/48 - 82/44)  BP(mean): 62 (15 Nov 2021 07:19) (62 - 62)  RR: 18 (15 Nov 2021 07:34) (18 - 18)  SpO2: 95% (15 Nov 2021 07:34) (95% - 98%)    Secondary Survey:   General: NAD  HEENT: Normocephalic, left infraorbital 2cm laceration, surrounding ecchymosis, Left pupil 4cm, right pupil 2cm, no scalp lacerations   Neck: Soft, midline trachea. no c-spine tenderness  Chest: No chest wall tenderness, no subcutaneous emphysema   Cardiac: S1, S2, RRR  Respiratory: Bilateral breath sounds, clear and equal bilaterally  Abdomen: Soft, non-distended, non-tender, no rebound, no guarding.  Groin: Normal appearing, pelvis stable   Ext:  Moving b/l upper and lower extremities. Palpable Radial b/l UE, b/l DP palpable in LE.   Back: No T/L/S spine tenderness, No palpable runoff/stepoff/deformity  Rectal: No loyda blood, RADHA with good tone    FAST: Negative    ACCESS / DEVICES:  [x] Peripheral IV  [ ] Central Venous Line	[ ] R	[ ] L	[ ] IJ	[ ] Fem	[ ] SC	Placed:   [ ] Arterial Line		[ ] R	[ ] L	[ ] Fem	[ ] Rad	[ ] Ax	Placed:   [ ] PICC:					[ ] Mediport  [ ] Urinary Catheter,  Date Placed:   [ ] Chest tube: [ ] Right, [ ] Left  [ ] ROSENDO/Hakeem Drains    Labs:  CAPILLARY BLOOD GLUCOSE                              12.0   14.40 )-----------( 351      ( 15 Nov 2021 06:45 )             36.6       11-15    137  |  103  |  13  ----------------------------<  134<H>  4.4   |  14<L>  |  1.2    Ca    8.7      15 Nov 2021 06:45    TPro  5.9<L>  /  Alb  3.6  /  TBili  0.8  /  DBili  x   /  AST  16  /  ALT  9   /  AlkPhos  111  11-15              Urinalysis Basic - ( 15 Nov 2021 09:56 )    Color: Light Yellow / Appearance: Clear / S.014 / pH: x  Gluc: x / Ketone: Negative  / Bili: Negative / Urobili: <2 mg/dL   Blood: x / Protein: Trace / Nitrite: Negative   Leuk Esterase: Small / RBC: 103 /HPF / WBC 35 /HPF   Sq Epi: x / Non Sq Epi: 1 /HPF / Bacteria: Negative        PT/INR - ( 15 Nov 2021 06:45 )   PT: 11.70 sec;   INR: 1.02 ratio         PTT - ( 15 Nov 2021 06:45 )  PTT:31.8 sec    Lactate Trend  11-15 @ 06:45 Lactate:2.3       CARDIAC MARKERS ( 15 Nov 2021 06:45 )  x     / <0.01 ng/mL / 46 U/L / x     / x            CAPILLARY BLOOD GLUCOSE        RADIOLOGY & ADDITIONAL STUDIES:  ---------------------------------------------------------------------------------------  < from: Xray Chest 1 View AP/PA (11.15.21 @ 07:13) >    Impression:    Hazy right lung opacification. Follow-up as needed.      < end of copied text >  < from: CT Head No Cont (11.15.21 @ 07:36) >  IMPRESSION:    CT HEAD:  No acute intracranial pathology or hemorrhage. No acute calvarial fracture.    Similar dolichoectasia of the vertebrobasilar system.    MAXILLOFACIAL BONES:  Depressed left orbital floor fracturewith globe rupture as well as intraocular hemorrhage.    Nondisplaced fracture at the base of the left nasal bone.    Left periorbital soft tissue swelling with air in the region of the medial canthus, correlate clinically to exclude laceration.    CT CERVICAL SPINE:  No acute fracture or subluxation.    < end of copied text >  < from: CT Angio Chest Aorta w/wo IV Cont (11.15.21 @ 07:37) >    IMPRESSION:  1.  No CT evidence for acute intrathoracic or abdominopelvic traumatic pathology  2.   Asymmetrical anterior bladder wall thickening, correlation with urinalysis and/or cystoscopy is recommended.      < end of copied text >

## 2021-11-15 NOTE — ED PROVIDER NOTE - NS ED ROS FT
Review of Systems:  CONSTITUTIONAL: No fever   SKIN: No rash  HEMATOLOGIC: No abnormal bleeding   EYES: +eye pain, +blurred vision  ENT: No sore throat, No neck pain, No rhinorrhea   RESPIRATORY: No shortness of breath, No cough  CARDIAC: No chest pain, No palpitations  GI: No abdominal pain, No nausea, No vomiting, No diarrhea, No constipation, No bright red blood per rectum or melena. No flank pain.   : No dysuria, frequency, hematuria.   MUSCULOSKELETAL: No joint paint, No swelling, No back pain  NEUROLOGIC: No numbness, No focal weakness, No headache, No dizziness  All other systems negative, unless specified in HPI

## 2021-11-15 NOTE — CONSULT NOTE ADULT - SUBJECTIVE AND OBJECTIVE BOX
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  Patient states was getting out of bed, and hit his left eye on the corner of a night stand with associated vision changes in his left eye. Code trauma was called for hypotension upon arrival. Patients BP in trauma bay 80's/50's despite 1 L ivf.  Trauma assessment in ED: ABCs intact , GCS 15 , AAOx3. Fast negative. Patient received an additional 2L IVF's with improvement of BP and taken to CT - significant for left globe rupture with associated intraocular hemorrhage layering in the posterior chamber with mild enophthalmos as well as significant periorbital swelling. There is air noted in the region of the medial canthus possibly representing a laceration. There is a left orbital floor fracture with approximately 8 mm inferior displacement with herniation of extraconal fat. Denies any other injuries or pain.    PAST MEDICAL & SURGICAL HISTORY:  CAD (coronary artery disease)  Hypercholesteremia  BPH (benign prostatic hyperplasia)  Kidney stones  Hypertension  Diabetes mellitus  Status post cardiac surgery  cardiac stents  Bilateral cataracts  History of lithotripsy      Home Medications:  Co Q-10: orally once a day (2021 07:33)  finasteride 5 mg oral tablet: 1 tab(s) orally once a day (2021 07:33)  simvastatin 80 mg oral tablet: 1 tab(s) orally once a day (at bedtime) (2021 07:33)  tamsulosin 0.4 mg oral capsule: 1 cap(s) orally once a day (at bedtime)  - if you continue to have your blood pressures drop when you stand, then this medication will need to be STOPPED (2021 07:33)  Vitamin D2:  (2021 07:33)      VITALS:  T(F): 98 (11-15-21 @ 07:34), Max: 98 (11-15-21 @ 07:34)  HR: 93 (11-15-21 @ 10:49) (76 - 93)  BP: 93/58 (11-15-21 @ 10:49) (73/48 - 105/60)  RR: 18 (11-15-21 @ 10:49) (18 - 18)  SpO2: 95% (11-15-21 @ 10:49) (94% - 98%)    PHYSICAL EXAM:  General: NAD, AAOx3, calm and cooperative  HEENT: Left infraorbital abrasion 2cm, surrounding erythema and ecchymosis, decreased visual acuity, dilated left pupil 6mm non-reactive to light, hyphema. Right orbit normal. No septal hematoma. Trachea ML, Neck supple  Cardiac: RRR S1, S2, no Murmurs, rubs or gallops  Respiratory: CTAB, normal respiratory effort, breath sounds equal BL, no wheeze, rhonchi or crackles  Abdomen: Soft, non-distended, non-tender, no rebound, no guarding. +BS.  Rectal: Good tone, +stool  Musculoskeletal: Strength 5/5 BL UE/LE, ROM intact, compartments soft  Neuro: Sensation grossly intact and equal throughout, no focal deficits  Vascular: Pulses 2+ throughout, extremities well perfused  Skin: Warm/dry, normal color, no jaundice    MEDICATIONS  (STANDING):  cefepime   IVPB 2000 milliGRAM(s) IV Intermittent once  norepinephrine Infusion 0.05 MICROgram(s)/kG/Min (7.23 mL/Hr) IV Continuous <Continuous>  ondansetron Injectable 4 milliGRAM(s) IV Push once    MEDICATIONS  (PRN):      LAB/STUDIES:                        12.0   14.40 )-----------( 351      ( 15 Nov 2021 06:45 )             36.6     -15    137  |  103  |  13  ----------------------------<  134<H>  4.4   |  14<L>  |  1.2    Ca    8.7      15 Nov 2021 06:45    TPro  5.9<L>  /  Alb  3.6  /  TBili  0.8  /  DBili  x   /  AST  16  /  ALT  9   /  AlkPhos  111  11-15    PT/INR - ( 15 Nov 2021 06:45 )   PT: 11.70 sec;   INR: 1.02 ratio         PTT - ( 15 Nov 2021 06:45 )  PTT:31.8 sec  LIVER FUNCTIONS - ( 15 Nov 2021 06:45 )  Alb: 3.6 g/dL / Pro: 5.9 g/dL / ALK PHOS: 111 U/L / ALT: 9 U/L / AST: 16 U/L / GGT: x           Urinalysis Basic - ( 15 Nov 2021 09:56 )    Color: Light Yellow / Appearance: Clear / S.014 / pH: x  Gluc: x / Ketone: Negative  / Bili: Negative / Urobili: <2 mg/dL   Blood: x / Protein: Trace / Nitrite: Negative   Leuk Esterase: Small / RBC: 103 /HPF / WBC 35 /HPF   Sq Epi: x / Non Sq Epi: 1 /HPF / Bacteria: Negative      CARDIAC MARKERS ( 15 Nov 2021 06:45 )  x     / <0.01 ng/mL / 46 U/L / x     / x          IMAGING:    < from: CT Maxillofacial No Cont (11.15.21 @ 07:36) >    MAXILLOFACIAL BONES:    There is left globe rupture with associated intraocular hemorrhage layering in the posterior chamber with mild enophthalmos as well as significant periorbital swelling. There is air noted in the region of the medial canthus possibly representing a laceration.    There is a left orbital floor fracture with approximately 8 mm inferior displacement with herniation of extraconal fat.    There is a nondisplaced fracture involving the base of the left nasal bone.    There is an air-fluid level within the left maxillary sinus likely representing hemorrhage. There is mild polypoid mucosal thickening of the right maxillary sinus.    There is a 1.3 cm subcutaneous nodule involving the right facial soft tissues likely representing a sebaceous cyst.      < end of copied text >    ACCESS DEVICES:  [x] Peripheral IV  [x] Central Venous Line	[ ] R	[ ] L	[ ] IJ	[ ] Fem	[ ] SC	Placed:   [ ] Arterial Line		[ ] R	[ ] L	[ ] Fem	[ ] Rad	[ ] Ax	Placed:   [ ] PICC:					[ ] Mediport  [ ] Urinary Catheter, Date Placed:

## 2021-11-15 NOTE — CONSULT NOTE ADULT - ASSESSMENT
ASSESSMENT:  76yM w/ PMHx as above who presented with traumatic left globe rupture with associated left orbital floor fracture. Physical exam findings, imaging, and labs as documented above.     PLAN:  - Patient would benefit from orbital floor plating - patient being transferred to [facility] for Opthalmic emergency   - Sinus precautions (HOB elevation, no nose blowing, no straws, sneezing with open mouth)  - Unasyn 7days    Above plan discussed with Attending Surgeon Dr. Tucker, patient, and Primary team  11-15-21 @ 11:16

## 2021-11-15 NOTE — ED PROVIDER NOTE - PROGRESS NOTE DETAILS
ED Attending EZRA Schmitz  Pt CODE TRAUMA, hypotensive s/p trauma, optho immediately made aware of pt and pt with inability to visualize from L eye with traumatic hypemea and lateral canthus laceration, e fast negative. gcs 15, pt being taken to ct. ED Attending EZRA Schmitz  Pt CODE TRAUMA, hypotensive s/p trauma, optho immediately made aware of pt and pt with inability to visualize from L eye with traumatic hypemea and medial and lateral canthus laceration, e fast negative. gcs 15, pt being taken to ct. Patient seen and evaluated.  Primary survery intact. GCS 15. VS reviewed. Bedside FAST exam done and negative.  Large bore IV placed. Portable CXR and pelvis x-rays show no acute traumatic pathology.  Secondary survery shows L eye traumatic injury.  Will send appropriate labs and getting appropriate trauma imaging.  Will follow up work up and recommendations from trauma. Patient seen and evaluated.  Primary survery intact. GCS 15. VS reviewed. Bedside FAST exam done and negative.  Large bore IV placed. Portable CXR and pelvis x-rays show no acute traumatic pathology.  Secondary survey shows L eye traumatic injury.  Will send appropriate labs and getting appropriate trauma imaging.  Will follow up work up and recommendations from trauma. ED Attending EZRA Schmitz  Pt endorsed to Dr. RUTHIE Jordan  Please follow up labs, imaging, trauma and optho consult, reassess. nildak: received signout from Dr Schmitz. mmp as above. s/p fall. currently in CT, pending ophtho eval. will closely monitor AG: received signout from Dr Florentino. Pt persistently hypotensive s/p 3L LR, peripheral levo started. CT w/ globe rupture, updated ophtho. Pending ophtho and further CT results. nildak: signed out to Dr Phan. pt with globe rupture on CT, ophtho eval pending. no other signs of traumatic injury so likely metabolic/medical etiololgoy fo hypotension. has AGMA/LA, given fluids. BP imprioving slightly on norepi. trop neg. dispo pending rpt CT requested by trauma for incomplete eval of spleen and ophtho recs. awake and alert. SR: s/o received from dr. singh, patient pending ophto eval, rpt ct and pending trauma re-eval. SR: rpt ct was not required as they were able to pull up the rest of the imaging - ophto resident evaluated patient, recommending antibiotics which we will be giving for UTI as well, requesting transfer to [facility] as optho attending dr. chavez does not perform these surgeries anymore. SR: spoke with dr. barnes, aware and agrees with transfer. Dr. guo spoke with ED at [facility] accepting dr. charo posey.

## 2021-11-15 NOTE — CONSULT NOTE ADULT - TIME BILLING
CODE TRAUMA. FALL OUT OF BED. HYPOTENSIVE IN TRAUMA BAY 70/30.. IMPROVED TO 85/50 WITH 1 LITER BOLUS. NO OBVIOUS SIGNS OF TRAUMA BESIDES SOME FACIAL ABRASIONS. ABCS INTACT. GCS 15. CORRAL.       CXR- NO PNEUMOTHORAX, LIKELY CHRONIC LUNG DISEASE  PXR- NO OBVIOUS FRACTURE  CT H/CS/CAP - LOOK GROSSLY NEGATIVE. FOLLOW UP FINAL READ

## 2021-11-15 NOTE — ED ADULT NURSE NOTE - OBJECTIVE STATEMENT
pt biba from home, sts he woke up to use the pt with laceration and ecchymosis with bleeding to left eye; pt is hypotensive code trauma activated

## 2021-11-15 NOTE — ED PROVIDER NOTE - CLINICAL SUMMARY MEDICAL DECISION MAKING FREE TEXT BOX
76 year old male w pmh of cataract sx , prostate ca ,  cad s/p stents, kidney stones & gi bleed biba s/p fall. Patient was a code trauma as he was hypotensive s/p fall. VS reviewed. LAbs imaging ekg obtained and reviewed. Tetanas and antibiotics given. Imaging demonstrated orbital fx w/ globe rupture, optho consulted recommended transfer to [facility], trauma made aware. Spoke with ED attending Dr. posey at [facility] who accepted the transfer.

## 2021-11-15 NOTE — ED PROVIDER NOTE - PHYSICAL EXAMINATION
CONSTITUTIONAL: Well-developed; well-nourished; in no acute distress.   SKIN: warm, dry  HEAD: Normocephalic; atraumatic.  EYES: +traumatic hyphema to left eye, unable to visualize left pupil, decreased vision, multiple lacerations to medial and lateral canthus and periorbital ecchymoses   ENT: No nasal discharge; airway clear.  NECK: Supple; non tender.  CARD: S1, S2 normal; Regular rate and rhythm.   RESP: No wheezes, rales or rhonchi.  ABD: soft ntnd.   EXT: Normal ROM.  No lower extremity edema.   LYMPH: No acute cervical adenopathy.  NEURO: Alert, oriented, grossly unremarkable. No FND.   PSYCH: Cooperative, appropriate.

## 2021-11-16 LAB
CULTURE RESULTS: SIGNIFICANT CHANGE UP
SPECIMEN SOURCE: SIGNIFICANT CHANGE UP

## 2021-11-23 ENCOUNTER — OUTPATIENT (OUTPATIENT)
Dept: OUTPATIENT SERVICES | Facility: HOSPITAL | Age: 76
LOS: 1 days | Discharge: HOME | End: 2021-11-23
Payer: MEDICARE

## 2021-11-23 ENCOUNTER — APPOINTMENT (OUTPATIENT)
Dept: OPHTHALMOLOGY | Facility: CLINIC | Age: 76
End: 2021-11-23

## 2021-11-23 DIAGNOSIS — H26.9 UNSPECIFIED CATARACT: Chronic | ICD-10-CM

## 2021-11-23 DIAGNOSIS — Z98.890 OTHER SPECIFIED POSTPROCEDURAL STATES: Chronic | ICD-10-CM

## 2021-11-23 PROCEDURE — 92004 COMPRE OPH EXAM NEW PT 1/>: CPT

## 2021-12-14 ENCOUNTER — OUTPATIENT (OUTPATIENT)
Dept: OUTPATIENT SERVICES | Facility: HOSPITAL | Age: 76
LOS: 1 days | Discharge: HOME | End: 2021-12-14
Payer: MEDICARE

## 2021-12-14 ENCOUNTER — APPOINTMENT (OUTPATIENT)
Dept: OPHTHALMOLOGY | Facility: CLINIC | Age: 76
End: 2021-12-14

## 2021-12-14 DIAGNOSIS — H21.02 HYPHEMA, LEFT EYE: ICD-10-CM

## 2021-12-14 DIAGNOSIS — Z98.890 OTHER SPECIFIED POSTPROCEDURAL STATES: Chronic | ICD-10-CM

## 2021-12-14 DIAGNOSIS — H26.9 UNSPECIFIED CATARACT: Chronic | ICD-10-CM

## 2021-12-14 PROCEDURE — 92012 INTRM OPH EXAM EST PATIENT: CPT

## 2022-01-01 ENCOUNTER — OUTPATIENT (OUTPATIENT)
Dept: OUTPATIENT SERVICES | Facility: HOSPITAL | Age: 77
LOS: 1 days | Discharge: HOME | End: 2022-01-01

## 2022-01-01 ENCOUNTER — APPOINTMENT (OUTPATIENT)
Dept: OPHTHALMOLOGY | Facility: CLINIC | Age: 77
End: 2022-01-01

## 2022-01-01 ENCOUNTER — NON-APPOINTMENT (OUTPATIENT)
Age: 77
End: 2022-01-01

## 2022-01-01 ENCOUNTER — APPOINTMENT (OUTPATIENT)
Dept: UROLOGY | Facility: CLINIC | Age: 77
End: 2022-01-01

## 2022-01-01 ENCOUNTER — OUTPATIENT (OUTPATIENT)
Dept: OUTPATIENT SERVICES | Facility: HOSPITAL | Age: 77
LOS: 1 days | Discharge: HOME | End: 2022-01-01
Payer: MEDICARE

## 2022-01-01 ENCOUNTER — INPATIENT (INPATIENT)
Facility: HOSPITAL | Age: 77
LOS: 6 days | Discharge: HOME HEALTH | End: 2022-05-10
Attending: INTERNAL MEDICINE | Admitting: INTERNAL MEDICINE
Payer: MEDICARE

## 2022-01-01 ENCOUNTER — APPOINTMENT (OUTPATIENT)
Dept: CARDIOLOGY | Facility: CLINIC | Age: 77
End: 2022-01-01
Payer: MEDICARE

## 2022-01-01 ENCOUNTER — TRANSCRIPTION ENCOUNTER (OUTPATIENT)
Age: 77
End: 2022-01-01

## 2022-01-01 ENCOUNTER — RX RENEWAL (OUTPATIENT)
Age: 77
End: 2022-01-01

## 2022-01-01 ENCOUNTER — APPOINTMENT (OUTPATIENT)
Dept: CARDIOLOGY | Facility: CLINIC | Age: 77
End: 2022-01-01

## 2022-01-01 VITALS
WEIGHT: 143 LBS | TEMPERATURE: 97.9 F | HEIGHT: 69 IN | HEART RATE: 86 BPM | SYSTOLIC BLOOD PRESSURE: 142 MMHG | OXYGEN SATURATION: 98 % | BODY MASS INDEX: 21.18 KG/M2 | RESPIRATION RATE: 14 BRPM | DIASTOLIC BLOOD PRESSURE: 89 MMHG

## 2022-01-01 VITALS
HEIGHT: 69 IN | WEIGHT: 143 LBS | DIASTOLIC BLOOD PRESSURE: 87 MMHG | SYSTOLIC BLOOD PRESSURE: 120 MMHG | BODY MASS INDEX: 21.18 KG/M2

## 2022-01-01 VITALS
DIASTOLIC BLOOD PRESSURE: 63 MMHG | HEIGHT: 69 IN | WEIGHT: 149.91 LBS | RESPIRATION RATE: 18 BRPM | SYSTOLIC BLOOD PRESSURE: 131 MMHG | OXYGEN SATURATION: 98 % | TEMPERATURE: 97 F | HEART RATE: 62 BPM

## 2022-01-01 VITALS — SYSTOLIC BLOOD PRESSURE: 122 MMHG | DIASTOLIC BLOOD PRESSURE: 76 MMHG

## 2022-01-01 VITALS — SYSTOLIC BLOOD PRESSURE: 141 MMHG | TEMPERATURE: 97 F | DIASTOLIC BLOOD PRESSURE: 67 MMHG | HEART RATE: 48 BPM

## 2022-01-01 VITALS
HEIGHT: 69 IN | BODY MASS INDEX: 20.59 KG/M2 | DIASTOLIC BLOOD PRESSURE: 90 MMHG | WEIGHT: 139 LBS | SYSTOLIC BLOOD PRESSURE: 126 MMHG

## 2022-01-01 DIAGNOSIS — E78.5 HYPERLIPIDEMIA, UNSPECIFIED: ICD-10-CM

## 2022-01-01 DIAGNOSIS — R97.20 ELEVATED PROSTATE, SPECIFIC ANTIGEN [PSA]: ICD-10-CM

## 2022-01-01 DIAGNOSIS — S02.32XD FRACTURE OF ORBITAL FLOOR, LEFT SIDE, SUBSEQUENT ENCOUNTER FOR FRACTURE WITH ROUTINE HEALING: ICD-10-CM

## 2022-01-01 DIAGNOSIS — H26.9 UNSPECIFIED CATARACT: Chronic | ICD-10-CM

## 2022-01-01 DIAGNOSIS — I21.4 NON-ST ELEVATION (NSTEMI) MYOCARDIAL INFARCTION: ICD-10-CM

## 2022-01-01 DIAGNOSIS — Z98.890 OTHER SPECIFIED POSTPROCEDURAL STATES: Chronic | ICD-10-CM

## 2022-01-01 DIAGNOSIS — N35.919 UNSPECIFIED URETHRAL STRICTURE, MALE, UNSPECIFIED SITE: ICD-10-CM

## 2022-01-01 DIAGNOSIS — R00.1 BRADYCARDIA, UNSPECIFIED: ICD-10-CM
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
## 2022-01-01 DIAGNOSIS — G93.41 METABOLIC ENCEPHALOPATHY: ICD-10-CM

## 2022-01-01 DIAGNOSIS — I25.10 ATHEROSCLEROTIC HEART DISEASE OF NATIVE CORONARY ARTERY WITHOUT ANGINA PECTORIS: ICD-10-CM

## 2022-01-01 DIAGNOSIS — Z95.5 PRESENCE OF CORONARY ANGIOPLASTY IMPLANT AND GRAFT: ICD-10-CM
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
## 2022-01-01 DIAGNOSIS — S52.92XA UNSPECIFIED FRACTURE OF LEFT FOREARM, INITIAL ENCOUNTER FOR CLOSED FRACTURE: ICD-10-CM

## 2022-01-01 DIAGNOSIS — N13.8 BENIGN PROSTATIC HYPERPLASIA WITH LOWER URINARY TRACT SYMPMS: ICD-10-CM

## 2022-01-01 DIAGNOSIS — Z87.891 PERSONAL HISTORY OF NICOTINE DEPENDENCE: ICD-10-CM
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
## 2022-01-01 DIAGNOSIS — N40.1 BENIGN PROSTATIC HYPERPLASIA WITH LOWER URINARY TRACT SYMPMS: ICD-10-CM

## 2022-01-01 DIAGNOSIS — B35.1 TINEA UNGUIUM: ICD-10-CM

## 2022-01-01 DIAGNOSIS — R41.0 DISORIENTATION, UNSPECIFIED: ICD-10-CM

## 2022-01-01 DIAGNOSIS — N40.0 BENIGN PROSTATIC HYPERPLASIA WITHOUT LOWER URINARY TRACT SYMPTOMS: ICD-10-CM

## 2022-01-01 DIAGNOSIS — C61 MALIGNANT NEOPLASM OF PROSTATE: ICD-10-CM

## 2022-01-01 DIAGNOSIS — N39.0 URINARY TRACT INFECTION, SITE NOT SPECIFIED: ICD-10-CM

## 2022-01-01 DIAGNOSIS — H21.02 HYPHEMA, LEFT EYE: ICD-10-CM

## 2022-01-01 DIAGNOSIS — Z91.81 HISTORY OF FALLING: ICD-10-CM

## 2022-01-01 DIAGNOSIS — I72.8 ANEURYSM OF OTHER SPECIFIED ARTERIES: ICD-10-CM

## 2022-01-01 LAB
A1C WITH ESTIMATED AVERAGE GLUCOSE RESULT: 5.8 % — HIGH (ref 4–5.6)
ALBUMIN SERPL ELPH-MCNC: 3.5 G/DL — SIGNIFICANT CHANGE UP (ref 3.5–5.2)
ALBUMIN SERPL ELPH-MCNC: 3.7 G/DL — SIGNIFICANT CHANGE UP (ref 3.5–5.2)
ALBUMIN SERPL ELPH-MCNC: 3.9 G/DL — SIGNIFICANT CHANGE UP (ref 3.5–5.2)
ALP SERPL-CCNC: 106 U/L — SIGNIFICANT CHANGE UP (ref 30–115)
ALP SERPL-CCNC: 130 U/L — HIGH (ref 30–115)
ALP SERPL-CCNC: 99 U/L — SIGNIFICANT CHANGE UP (ref 30–115)
ALT FLD-CCNC: 14 U/L — SIGNIFICANT CHANGE UP (ref 0–41)
ALT FLD-CCNC: 18 U/L — SIGNIFICANT CHANGE UP (ref 0–41)
ALT FLD-CCNC: 21 U/L — SIGNIFICANT CHANGE UP (ref 0–41)
ANION GAP SERPL CALC-SCNC: 10 MMOL/L — SIGNIFICANT CHANGE UP (ref 7–14)
ANION GAP SERPL CALC-SCNC: 12 MMOL/L — SIGNIFICANT CHANGE UP (ref 7–14)
ANION GAP SERPL CALC-SCNC: 12 MMOL/L — SIGNIFICANT CHANGE UP (ref 7–14)
ANION GAP SERPL CALC-SCNC: 13 MMOL/L — SIGNIFICANT CHANGE UP (ref 7–14)
ANION GAP SERPL CALC-SCNC: 13 MMOL/L — SIGNIFICANT CHANGE UP (ref 7–14)
ANION GAP SERPL CALC-SCNC: 14 MMOL/L — SIGNIFICANT CHANGE UP (ref 7–14)
ANION GAP SERPL CALC-SCNC: 15 MMOL/L — HIGH (ref 7–14)
ANION GAP SERPL CALC-SCNC: 18 MMOL/L — HIGH (ref 7–14)
APPEARANCE UR: CLEAR — SIGNIFICANT CHANGE UP
APTT BLD: 100.7 SEC — CRITICAL HIGH (ref 27–39.2)
APTT BLD: 32 SEC — SIGNIFICANT CHANGE UP (ref 27–39.2)
APTT BLD: 50.4 SEC — HIGH (ref 27–39.2)
APTT BLD: 57.2 SEC — HIGH (ref 27–39.2)
APTT BLD: 57.2 SEC — HIGH (ref 27–39.2)
APTT BLD: 65.5 SEC — HIGH (ref 27–39.2)
AST SERPL-CCNC: 24 U/L — SIGNIFICANT CHANGE UP (ref 0–41)
AST SERPL-CCNC: 25 U/L — SIGNIFICANT CHANGE UP (ref 0–41)
AST SERPL-CCNC: 57 U/L — HIGH (ref 0–41)
BACTERIA # UR AUTO: ABNORMAL
BASOPHILS # BLD AUTO: 0.02 K/UL — SIGNIFICANT CHANGE UP (ref 0–0.2)
BASOPHILS # BLD AUTO: 0.03 K/UL — SIGNIFICANT CHANGE UP (ref 0–0.2)
BASOPHILS # BLD AUTO: 0.04 K/UL — SIGNIFICANT CHANGE UP (ref 0–0.2)
BASOPHILS NFR BLD AUTO: 0.3 % — SIGNIFICANT CHANGE UP (ref 0–1)
BASOPHILS NFR BLD AUTO: 0.3 % — SIGNIFICANT CHANGE UP (ref 0–1)
BASOPHILS NFR BLD AUTO: 0.6 % — SIGNIFICANT CHANGE UP (ref 0–1)
BILIRUB SERPL-MCNC: 0.6 MG/DL — SIGNIFICANT CHANGE UP (ref 0.2–1.2)
BILIRUB SERPL-MCNC: 1 MG/DL — SIGNIFICANT CHANGE UP (ref 0.2–1.2)
BILIRUB SERPL-MCNC: 1.2 MG/DL — SIGNIFICANT CHANGE UP (ref 0.2–1.2)
BILIRUB UR-MCNC: NEGATIVE — SIGNIFICANT CHANGE UP
BUN SERPL-MCNC: 15 MG/DL — SIGNIFICANT CHANGE UP (ref 10–20)
BUN SERPL-MCNC: 17 MG/DL — SIGNIFICANT CHANGE UP (ref 10–20)
BUN SERPL-MCNC: 20 MG/DL — SIGNIFICANT CHANGE UP (ref 10–20)
BUN SERPL-MCNC: 20 MG/DL — SIGNIFICANT CHANGE UP (ref 10–20)
BUN SERPL-MCNC: 21 MG/DL — HIGH (ref 10–20)
BUN SERPL-MCNC: 22 MG/DL — HIGH (ref 10–20)
CALCIUM SERPL-MCNC: 8.7 MG/DL — SIGNIFICANT CHANGE UP (ref 8.5–10.1)
CALCIUM SERPL-MCNC: 9.1 MG/DL — SIGNIFICANT CHANGE UP (ref 8.5–10.1)
CALCIUM SERPL-MCNC: 9.1 MG/DL — SIGNIFICANT CHANGE UP (ref 8.5–10.1)
CALCIUM SERPL-MCNC: 9.3 MG/DL — SIGNIFICANT CHANGE UP (ref 8.5–10.1)
CALCIUM SERPL-MCNC: 9.5 MG/DL — SIGNIFICANT CHANGE UP (ref 8.5–10.1)
CALCIUM SERPL-MCNC: 9.8 MG/DL — SIGNIFICANT CHANGE UP (ref 8.5–10.1)
CHLORIDE SERPL-SCNC: 105 MMOL/L — SIGNIFICANT CHANGE UP (ref 98–110)
CHLORIDE SERPL-SCNC: 105 MMOL/L — SIGNIFICANT CHANGE UP (ref 98–110)
CHLORIDE SERPL-SCNC: 106 MMOL/L — SIGNIFICANT CHANGE UP (ref 98–110)
CHLORIDE SERPL-SCNC: 106 MMOL/L — SIGNIFICANT CHANGE UP (ref 98–110)
CHLORIDE SERPL-SCNC: 107 MMOL/L — SIGNIFICANT CHANGE UP (ref 98–110)
CHLORIDE SERPL-SCNC: 108 MMOL/L — SIGNIFICANT CHANGE UP (ref 98–110)
CHLORIDE SERPL-SCNC: 108 MMOL/L — SIGNIFICANT CHANGE UP (ref 98–110)
CHLORIDE SERPL-SCNC: 109 MMOL/L — SIGNIFICANT CHANGE UP (ref 98–110)
CHOLEST SERPL-MCNC: 142 MG/DL — SIGNIFICANT CHANGE UP
CK SERPL-CCNC: 157 U/L — SIGNIFICANT CHANGE UP (ref 0–225)
CK SERPL-CCNC: 97 U/L — SIGNIFICANT CHANGE UP (ref 0–225)
CO2 SERPL-SCNC: 16 MMOL/L — LOW (ref 17–32)
CO2 SERPL-SCNC: 17 MMOL/L — SIGNIFICANT CHANGE UP (ref 17–32)
CO2 SERPL-SCNC: 18 MMOL/L — SIGNIFICANT CHANGE UP (ref 17–32)
CO2 SERPL-SCNC: 18 MMOL/L — SIGNIFICANT CHANGE UP (ref 17–32)
CO2 SERPL-SCNC: 19 MMOL/L — SIGNIFICANT CHANGE UP (ref 17–32)
CO2 SERPL-SCNC: 20 MMOL/L — SIGNIFICANT CHANGE UP (ref 17–32)
CO2 SERPL-SCNC: 22 MMOL/L — SIGNIFICANT CHANGE UP (ref 17–32)
CO2 SERPL-SCNC: 24 MMOL/L — SIGNIFICANT CHANGE UP (ref 17–32)
COLOR SPEC: YELLOW — SIGNIFICANT CHANGE UP
COVID-19 NUCLEOCAPSID GAM AB INTERP: NEGATIVE — SIGNIFICANT CHANGE UP
COVID-19 NUCLEOCAPSID TOTAL GAM ANTIBODY RESULT: 0.06 INDEX — SIGNIFICANT CHANGE UP
CREAT SERPL-MCNC: 0.9 MG/DL — SIGNIFICANT CHANGE UP (ref 0.7–1.5)
CREAT SERPL-MCNC: 0.9 MG/DL — SIGNIFICANT CHANGE UP (ref 0.7–1.5)
CREAT SERPL-MCNC: 1.1 MG/DL — SIGNIFICANT CHANGE UP (ref 0.7–1.5)
CREAT SERPL-MCNC: 1.2 MG/DL — SIGNIFICANT CHANGE UP (ref 0.7–1.5)
CREAT SERPL-MCNC: 1.4 MG/DL — SIGNIFICANT CHANGE UP (ref 0.7–1.5)
CULTURE RESULTS: SIGNIFICANT CHANGE UP
DIFF PNL FLD: NEGATIVE — SIGNIFICANT CHANGE UP
EGFR: 51 ML/MIN/1.73M2 — LOW
EGFR: 62 ML/MIN/1.73M2 — SIGNIFICANT CHANGE UP
EGFR: 69 ML/MIN/1.73M2 — SIGNIFICANT CHANGE UP
EGFR: 87 ML/MIN/1.73M2 — SIGNIFICANT CHANGE UP
EGFR: 87 ML/MIN/1.73M2 — SIGNIFICANT CHANGE UP
EOSINOPHIL # BLD AUTO: 0.14 K/UL — SIGNIFICANT CHANGE UP (ref 0–0.7)
EOSINOPHIL # BLD AUTO: 0.14 K/UL — SIGNIFICANT CHANGE UP (ref 0–0.7)
EOSINOPHIL # BLD AUTO: 0.22 K/UL — SIGNIFICANT CHANGE UP (ref 0–0.7)
EOSINOPHIL NFR BLD AUTO: 1.5 % — SIGNIFICANT CHANGE UP (ref 0–8)
EOSINOPHIL NFR BLD AUTO: 1.9 % — SIGNIFICANT CHANGE UP (ref 0–8)
EOSINOPHIL NFR BLD AUTO: 3.4 % — SIGNIFICANT CHANGE UP (ref 0–8)
EPI CELLS # UR: 2 /HPF — SIGNIFICANT CHANGE UP (ref 0–5)
ESTIMATED AVERAGE GLUCOSE: 120 MG/DL — HIGH (ref 68–114)
FERRITIN SERPL-MCNC: 69 NG/ML — SIGNIFICANT CHANGE UP (ref 30–400)
GLUCOSE BLDC GLUCOMTR-MCNC: 99 MG/DL — SIGNIFICANT CHANGE UP (ref 70–99)
GLUCOSE SERPL-MCNC: 102 MG/DL — HIGH (ref 70–99)
GLUCOSE SERPL-MCNC: 102 MG/DL — HIGH (ref 70–99)
GLUCOSE SERPL-MCNC: 105 MG/DL — HIGH (ref 70–99)
GLUCOSE SERPL-MCNC: 107 MG/DL — HIGH (ref 70–99)
GLUCOSE SERPL-MCNC: 108 MG/DL — HIGH (ref 70–99)
GLUCOSE SERPL-MCNC: 110 MG/DL — HIGH (ref 70–99)
GLUCOSE SERPL-MCNC: 96 MG/DL — SIGNIFICANT CHANGE UP (ref 70–99)
GLUCOSE SERPL-MCNC: 99 MG/DL — SIGNIFICANT CHANGE UP (ref 70–99)
GLUCOSE UR QL: NEGATIVE — SIGNIFICANT CHANGE UP
HCT VFR BLD CALC: 35.5 % — LOW (ref 42–52)
HCT VFR BLD CALC: 36.2 % — LOW (ref 42–52)
HCT VFR BLD CALC: 36.5 % — LOW (ref 42–52)
HCT VFR BLD CALC: 37.9 % — LOW (ref 42–52)
HCT VFR BLD CALC: 38.7 % — LOW (ref 42–52)
HCT VFR BLD CALC: 39.9 % — LOW (ref 42–52)
HCT VFR BLD CALC: 40.2 % — LOW (ref 42–52)
HCT VFR BLD CALC: 40.3 % — LOW (ref 42–52)
HCT VFR BLD CALC: 40.6 % — LOW (ref 42–52)
HCT VFR BLD CALC: 41.5 % — LOW (ref 42–52)
HCT VFR BLD CALC: 43.4 % — SIGNIFICANT CHANGE UP (ref 42–52)
HDLC SERPL-MCNC: 50 MG/DL — SIGNIFICANT CHANGE UP
HGB BLD-MCNC: 11.8 G/DL — LOW (ref 14–18)
HGB BLD-MCNC: 11.8 G/DL — LOW (ref 14–18)
HGB BLD-MCNC: 12 G/DL — LOW (ref 14–18)
HGB BLD-MCNC: 12.3 G/DL — LOW (ref 14–18)
HGB BLD-MCNC: 12.9 G/DL — LOW (ref 14–18)
HGB BLD-MCNC: 13 G/DL — LOW (ref 14–18)
HGB BLD-MCNC: 13 G/DL — LOW (ref 14–18)
HGB BLD-MCNC: 13.3 G/DL — LOW (ref 14–18)
HGB BLD-MCNC: 13.4 G/DL — LOW (ref 14–18)
HGB BLD-MCNC: 13.6 G/DL — LOW (ref 14–18)
HGB BLD-MCNC: 13.7 G/DL — LOW (ref 14–18)
HYALINE CASTS # UR AUTO: 13 /LPF — HIGH (ref 0–7)
IMM GRANULOCYTES NFR BLD AUTO: 0.4 % — HIGH (ref 0.1–0.3)
IMM GRANULOCYTES NFR BLD AUTO: 0.5 % — HIGH (ref 0.1–0.3)
IMM GRANULOCYTES NFR BLD AUTO: 0.5 % — HIGH (ref 0.1–0.3)
INR BLD: 1.01 RATIO — SIGNIFICANT CHANGE UP (ref 0.65–1.3)
KETONES UR-MCNC: SIGNIFICANT CHANGE UP
LACTATE SERPL-SCNC: 1.3 MMOL/L — SIGNIFICANT CHANGE UP (ref 0.7–2)
LEUKOCYTE ESTERASE UR-ACNC: ABNORMAL
LIPID PNL WITH DIRECT LDL SERPL: 74 MG/DL — SIGNIFICANT CHANGE UP
LYMPHOCYTES # BLD AUTO: 0.83 K/UL — LOW (ref 1.2–3.4)
LYMPHOCYTES # BLD AUTO: 0.86 K/UL — LOW (ref 1.2–3.4)
LYMPHOCYTES # BLD AUTO: 0.99 K/UL — LOW (ref 1.2–3.4)
LYMPHOCYTES # BLD AUTO: 11.4 % — LOW (ref 20.5–51.1)
LYMPHOCYTES # BLD AUTO: 15.3 % — LOW (ref 20.5–51.1)
LYMPHOCYTES # BLD AUTO: 9.2 % — LOW (ref 20.5–51.1)
MAGNESIUM SERPL-MCNC: 1.7 MG/DL — LOW (ref 1.8–2.4)
MAGNESIUM SERPL-MCNC: 1.8 MG/DL — SIGNIFICANT CHANGE UP (ref 1.8–2.4)
MAGNESIUM SERPL-MCNC: 1.9 MG/DL — SIGNIFICANT CHANGE UP (ref 1.8–2.4)
MAGNESIUM SERPL-MCNC: 1.9 MG/DL — SIGNIFICANT CHANGE UP (ref 1.8–2.4)
MAGNESIUM SERPL-MCNC: 2 MG/DL — SIGNIFICANT CHANGE UP (ref 1.8–2.4)
MAGNESIUM SERPL-MCNC: 2.1 MG/DL — SIGNIFICANT CHANGE UP (ref 1.8–2.4)
MAGNESIUM SERPL-MCNC: 2.2 MG/DL — SIGNIFICANT CHANGE UP (ref 1.8–2.4)
MCHC RBC-ENTMCNC: 24.5 PG — LOW (ref 27–31)
MCHC RBC-ENTMCNC: 24.8 PG — LOW (ref 27–31)
MCHC RBC-ENTMCNC: 25 PG — LOW (ref 27–31)
MCHC RBC-ENTMCNC: 25.4 PG — LOW (ref 27–31)
MCHC RBC-ENTMCNC: 25.4 PG — LOW (ref 27–31)
MCHC RBC-ENTMCNC: 25.5 PG — LOW (ref 27–31)
MCHC RBC-ENTMCNC: 25.5 PG — LOW (ref 27–31)
MCHC RBC-ENTMCNC: 25.6 PG — LOW (ref 27–31)
MCHC RBC-ENTMCNC: 25.6 PG — LOW (ref 27–31)
MCHC RBC-ENTMCNC: 25.7 PG — LOW (ref 27–31)
MCHC RBC-ENTMCNC: 25.7 PG — LOW (ref 27–31)
MCHC RBC-ENTMCNC: 31.6 G/DL — LOW (ref 32–37)
MCHC RBC-ENTMCNC: 32 G/DL — SIGNIFICANT CHANGE UP (ref 32–37)
MCHC RBC-ENTMCNC: 32 G/DL — SIGNIFICANT CHANGE UP (ref 32–37)
MCHC RBC-ENTMCNC: 32.5 G/DL — SIGNIFICANT CHANGE UP (ref 32–37)
MCHC RBC-ENTMCNC: 32.6 G/DL — SIGNIFICANT CHANGE UP (ref 32–37)
MCHC RBC-ENTMCNC: 32.8 G/DL — SIGNIFICANT CHANGE UP (ref 32–37)
MCHC RBC-ENTMCNC: 32.9 G/DL — SIGNIFICANT CHANGE UP (ref 32–37)
MCHC RBC-ENTMCNC: 33.1 G/DL — SIGNIFICANT CHANGE UP (ref 32–37)
MCHC RBC-ENTMCNC: 33.2 G/DL — SIGNIFICANT CHANGE UP (ref 32–37)
MCHC RBC-ENTMCNC: 33.6 G/DL — SIGNIFICANT CHANGE UP (ref 32–37)
MCHC RBC-ENTMCNC: 33.6 G/DL — SIGNIFICANT CHANGE UP (ref 32–37)
MCV RBC AUTO: 75.7 FL — LOW (ref 80–94)
MCV RBC AUTO: 76.3 FL — LOW (ref 80–94)
MCV RBC AUTO: 76.4 FL — LOW (ref 80–94)
MCV RBC AUTO: 77.2 FL — LOW (ref 80–94)
MCV RBC AUTO: 77.4 FL — LOW (ref 80–94)
MCV RBC AUTO: 77.5 FL — LOW (ref 80–94)
MCV RBC AUTO: 77.6 FL — LOW (ref 80–94)
MCV RBC AUTO: 78.2 FL — LOW (ref 80–94)
MCV RBC AUTO: 78.2 FL — LOW (ref 80–94)
MCV RBC AUTO: 78.5 FL — LOW (ref 80–94)
MCV RBC AUTO: 79.5 FL — LOW (ref 80–94)
MONOCYTES # BLD AUTO: 0.64 K/UL — HIGH (ref 0.1–0.6)
MONOCYTES # BLD AUTO: 0.88 K/UL — HIGH (ref 0.1–0.6)
MONOCYTES # BLD AUTO: 1.02 K/UL — HIGH (ref 0.1–0.6)
MONOCYTES NFR BLD AUTO: 10.9 % — HIGH (ref 1.7–9.3)
MONOCYTES NFR BLD AUTO: 12.1 % — HIGH (ref 1.7–9.3)
MONOCYTES NFR BLD AUTO: 9.9 % — HIGH (ref 1.7–9.3)
NEUTROPHILS # BLD AUTO: 4.56 K/UL — SIGNIFICANT CHANGE UP (ref 1.4–6.5)
NEUTROPHILS # BLD AUTO: 5.4 K/UL — SIGNIFICANT CHANGE UP (ref 1.4–6.5)
NEUTROPHILS # BLD AUTO: 7.25 K/UL — HIGH (ref 1.4–6.5)
NEUTROPHILS NFR BLD AUTO: 70.3 % — SIGNIFICANT CHANGE UP (ref 42.2–75.2)
NEUTROPHILS NFR BLD AUTO: 73.9 % — SIGNIFICANT CHANGE UP (ref 42.2–75.2)
NEUTROPHILS NFR BLD AUTO: 77.6 % — HIGH (ref 42.2–75.2)
NITRITE UR-MCNC: POSITIVE
NON HDL CHOLESTEROL: 92 MG/DL — SIGNIFICANT CHANGE UP
NRBC # BLD: 0 /100 WBCS — SIGNIFICANT CHANGE UP (ref 0–0)
PH UR: 6 — SIGNIFICANT CHANGE UP (ref 5–8)
PLATELET # BLD AUTO: 202 K/UL — SIGNIFICANT CHANGE UP (ref 130–400)
PLATELET # BLD AUTO: 204 K/UL — SIGNIFICANT CHANGE UP (ref 130–400)
PLATELET # BLD AUTO: 210 K/UL — SIGNIFICANT CHANGE UP (ref 130–400)
PLATELET # BLD AUTO: 211 K/UL — SIGNIFICANT CHANGE UP (ref 130–400)
PLATELET # BLD AUTO: 213 K/UL — SIGNIFICANT CHANGE UP (ref 130–400)
PLATELET # BLD AUTO: 216 K/UL — SIGNIFICANT CHANGE UP (ref 130–400)
PLATELET # BLD AUTO: 228 K/UL — SIGNIFICANT CHANGE UP (ref 130–400)
PLATELET # BLD AUTO: 231 K/UL — SIGNIFICANT CHANGE UP (ref 130–400)
PLATELET # BLD AUTO: 241 K/UL — SIGNIFICANT CHANGE UP (ref 130–400)
PLATELET # BLD AUTO: 245 K/UL — SIGNIFICANT CHANGE UP (ref 130–400)
PLATELET # BLD AUTO: 249 K/UL — SIGNIFICANT CHANGE UP (ref 130–400)
POTASSIUM SERPL-MCNC: 4 MMOL/L — SIGNIFICANT CHANGE UP (ref 3.5–5)
POTASSIUM SERPL-MCNC: 4.1 MMOL/L — SIGNIFICANT CHANGE UP (ref 3.5–5)
POTASSIUM SERPL-MCNC: 4.2 MMOL/L — SIGNIFICANT CHANGE UP (ref 3.5–5)
POTASSIUM SERPL-MCNC: 4.3 MMOL/L — SIGNIFICANT CHANGE UP (ref 3.5–5)
POTASSIUM SERPL-MCNC: 4.4 MMOL/L — SIGNIFICANT CHANGE UP (ref 3.5–5)
POTASSIUM SERPL-MCNC: 5.6 MMOL/L — HIGH (ref 3.5–5)
POTASSIUM SERPL-SCNC: 4 MMOL/L — SIGNIFICANT CHANGE UP (ref 3.5–5)
POTASSIUM SERPL-SCNC: 4.1 MMOL/L — SIGNIFICANT CHANGE UP (ref 3.5–5)
POTASSIUM SERPL-SCNC: 4.2 MMOL/L — SIGNIFICANT CHANGE UP (ref 3.5–5)
POTASSIUM SERPL-SCNC: 4.3 MMOL/L — SIGNIFICANT CHANGE UP (ref 3.5–5)
POTASSIUM SERPL-SCNC: 4.4 MMOL/L — SIGNIFICANT CHANGE UP (ref 3.5–5)
POTASSIUM SERPL-SCNC: 5.6 MMOL/L — HIGH (ref 3.5–5)
PROCALCITONIN SERPL-MCNC: 0.16 NG/ML — HIGH (ref 0.02–0.1)
PROT SERPL-MCNC: 5.8 G/DL — LOW (ref 6–8)
PROT SERPL-MCNC: 6.4 G/DL — SIGNIFICANT CHANGE UP (ref 6–8)
PROT SERPL-MCNC: 7.2 G/DL — SIGNIFICANT CHANGE UP (ref 6–8)
PROT UR-MCNC: ABNORMAL
PROTHROM AB SERPL-ACNC: 11.6 SEC — SIGNIFICANT CHANGE UP (ref 9.95–12.87)
PSA SERPL-MCNC: 0.41 NG/ML
PSA SERPL-MCNC: 1.59 NG/ML
RBC # BLD: 4.6 M/UL — LOW (ref 4.7–6.1)
RBC # BLD: 4.63 M/UL — LOW (ref 4.7–6.1)
RBC # BLD: 4.67 M/UL — LOW (ref 4.7–6.1)
RBC # BLD: 4.83 M/UL — SIGNIFICANT CHANGE UP (ref 4.7–6.1)
RBC # BLD: 5.07 M/UL — SIGNIFICANT CHANGE UP (ref 4.7–6.1)
RBC # BLD: 5.11 M/UL — SIGNIFICANT CHANGE UP (ref 4.7–6.1)
RBC # BLD: 5.19 M/UL — SIGNIFICANT CHANGE UP (ref 4.7–6.1)
RBC # BLD: 5.21 M/UL — SIGNIFICANT CHANGE UP (ref 4.7–6.1)
RBC # BLD: 5.27 M/UL — SIGNIFICANT CHANGE UP (ref 4.7–6.1)
RBC # BLD: 5.43 M/UL — SIGNIFICANT CHANGE UP (ref 4.7–6.1)
RBC # BLD: 5.59 M/UL — SIGNIFICANT CHANGE UP (ref 4.7–6.1)
RBC # FLD: 18 % — HIGH (ref 11.5–14.5)
RBC # FLD: 18.1 % — HIGH (ref 11.5–14.5)
RBC # FLD: 18.4 % — HIGH (ref 11.5–14.5)
RBC # FLD: 18.4 % — HIGH (ref 11.5–14.5)
RBC # FLD: 18.5 % — HIGH (ref 11.5–14.5)
RBC # FLD: 18.8 % — HIGH (ref 11.5–14.5)
RBC # FLD: 18.8 % — HIGH (ref 11.5–14.5)
RBC # FLD: 18.9 % — HIGH (ref 11.5–14.5)
RBC # FLD: 18.9 % — HIGH (ref 11.5–14.5)
RBC # FLD: 19.1 % — HIGH (ref 11.5–14.5)
RBC # FLD: 19.2 % — HIGH (ref 11.5–14.5)
RBC CASTS # UR COMP ASSIST: 0 /HPF — SIGNIFICANT CHANGE UP (ref 0–4)
SARS-COV-2 IGG+IGM SERPL QL IA: 0.06 INDEX — SIGNIFICANT CHANGE UP
SARS-COV-2 IGG+IGM SERPL QL IA: NEGATIVE — SIGNIFICANT CHANGE UP
SARS-COV-2 RNA SPEC QL NAA+PROBE: SIGNIFICANT CHANGE UP
SARS-COV-2 RNA SPEC QL NAA+PROBE: SIGNIFICANT CHANGE UP
SODIUM SERPL-SCNC: 139 MMOL/L — SIGNIFICANT CHANGE UP (ref 135–146)
SODIUM SERPL-SCNC: 140 MMOL/L — SIGNIFICANT CHANGE UP (ref 135–146)
SP GR SPEC: 1.02 — SIGNIFICANT CHANGE UP (ref 1.01–1.03)
SPECIMEN SOURCE: SIGNIFICANT CHANGE UP
TESTOST SERPL-MCNC: 17.9 NG/DL
TESTOST SERPL-MCNC: 3.8 NG/DL
TRIGL SERPL-MCNC: 88 MG/DL — SIGNIFICANT CHANGE UP
TROPONIN T SERPL-MCNC: 2.53 NG/ML — CRITICAL HIGH
TROPONIN T SERPL-MCNC: 2.9 NG/ML — CRITICAL HIGH
TROPONIN T SERPL-MCNC: 2.96 NG/ML — CRITICAL HIGH
TROPONIN T SERPL-MCNC: 2.99 NG/ML — CRITICAL HIGH
TSH SERPL-MCNC: 0.71 UIU/ML — SIGNIFICANT CHANGE UP (ref 0.27–4.2)
UROBILINOGEN FLD QL: SIGNIFICANT CHANGE UP
WBC # BLD: 6.01 K/UL — SIGNIFICANT CHANGE UP (ref 4.8–10.8)
WBC # BLD: 6.48 K/UL — SIGNIFICANT CHANGE UP (ref 4.8–10.8)
WBC # BLD: 6.87 K/UL — SIGNIFICANT CHANGE UP (ref 4.8–10.8)
WBC # BLD: 6.91 K/UL — SIGNIFICANT CHANGE UP (ref 4.8–10.8)
WBC # BLD: 7.19 K/UL — SIGNIFICANT CHANGE UP (ref 4.8–10.8)
WBC # BLD: 7.29 K/UL — SIGNIFICANT CHANGE UP (ref 4.8–10.8)
WBC # BLD: 7.3 K/UL — SIGNIFICANT CHANGE UP (ref 4.8–10.8)
WBC # BLD: 7.35 K/UL — SIGNIFICANT CHANGE UP (ref 4.8–10.8)
WBC # BLD: 7.63 K/UL — SIGNIFICANT CHANGE UP (ref 4.8–10.8)
WBC # BLD: 7.84 K/UL — SIGNIFICANT CHANGE UP (ref 4.8–10.8)
WBC # BLD: 9.35 K/UL — SIGNIFICANT CHANGE UP (ref 4.8–10.8)
WBC # FLD AUTO: 6.01 K/UL — SIGNIFICANT CHANGE UP (ref 4.8–10.8)
WBC # FLD AUTO: 6.48 K/UL — SIGNIFICANT CHANGE UP (ref 4.8–10.8)
WBC # FLD AUTO: 6.87 K/UL — SIGNIFICANT CHANGE UP (ref 4.8–10.8)
WBC # FLD AUTO: 6.91 K/UL — SIGNIFICANT CHANGE UP (ref 4.8–10.8)
WBC # FLD AUTO: 7.19 K/UL — SIGNIFICANT CHANGE UP (ref 4.8–10.8)
WBC # FLD AUTO: 7.29 K/UL — SIGNIFICANT CHANGE UP (ref 4.8–10.8)
WBC # FLD AUTO: 7.3 K/UL — SIGNIFICANT CHANGE UP (ref 4.8–10.8)
WBC # FLD AUTO: 7.35 K/UL — SIGNIFICANT CHANGE UP (ref 4.8–10.8)
WBC # FLD AUTO: 7.63 K/UL — SIGNIFICANT CHANGE UP (ref 4.8–10.8)
WBC # FLD AUTO: 7.84 K/UL — SIGNIFICANT CHANGE UP (ref 4.8–10.8)
WBC # FLD AUTO: 9.35 K/UL — SIGNIFICANT CHANGE UP (ref 4.8–10.8)
WBC UR QL: 50 /HPF — HIGH (ref 0–5)

## 2022-01-01 PROCEDURE — 99223 1ST HOSP IP/OBS HIGH 75: CPT

## 2022-01-01 PROCEDURE — 73110 X-RAY EXAM OF WRIST: CPT | Mod: 26,LT

## 2022-01-01 PROCEDURE — 99222 1ST HOSP IP/OBS MODERATE 55: CPT

## 2022-01-01 PROCEDURE — 99231 SBSQ HOSP IP/OBS SF/LOW 25: CPT

## 2022-01-01 PROCEDURE — 93306 TTE W/DOPPLER COMPLETE: CPT | Mod: 26

## 2022-01-01 PROCEDURE — 99213 OFFICE O/P EST LOW 20 MIN: CPT

## 2022-01-01 PROCEDURE — 93010 ELECTROCARDIOGRAM REPORT: CPT

## 2022-01-01 PROCEDURE — 99221 1ST HOSP IP/OBS SF/LOW 40: CPT

## 2022-01-01 PROCEDURE — 93000 ELECTROCARDIOGRAM COMPLETE: CPT

## 2022-01-01 PROCEDURE — 71045 X-RAY EXAM CHEST 1 VIEW: CPT | Mod: 26

## 2022-01-01 PROCEDURE — 99233 SBSQ HOSP IP/OBS HIGH 50: CPT

## 2022-01-01 PROCEDURE — 92014 COMPRE OPH EXAM EST PT 1/>: CPT

## 2022-01-01 PROCEDURE — 93010 ELECTROCARDIOGRAM REPORT: CPT | Mod: 77

## 2022-01-01 PROCEDURE — 70486 CT MAXILLOFACIAL W/O DYE: CPT | Mod: 26,MA

## 2022-01-01 PROCEDURE — 99285 EMERGENCY DEPT VISIT HI MDM: CPT | Mod: 25

## 2022-01-01 PROCEDURE — 99291 CRITICAL CARE FIRST HOUR: CPT

## 2022-01-01 PROCEDURE — 99232 SBSQ HOSP IP/OBS MODERATE 35: CPT

## 2022-01-01 PROCEDURE — 72170 X-RAY EXAM OF PELVIS: CPT | Mod: 26

## 2022-01-01 PROCEDURE — 72125 CT NECK SPINE W/O DYE: CPT | Mod: 26,MA

## 2022-01-01 PROCEDURE — 93458 L HRT ARTERY/VENTRICLE ANGIO: CPT | Mod: 26

## 2022-01-01 PROCEDURE — 92134 CPTRZ OPH DX IMG PST SGM RTA: CPT | Mod: 26

## 2022-01-01 PROCEDURE — 70450 CT HEAD/BRAIN W/O DYE: CPT | Mod: 26,MA

## 2022-01-01 PROCEDURE — 12013 RPR F/E/E/N/L/M 2.6-5.0 CM: CPT

## 2022-01-01 RX ORDER — CEFTRIAXONE 500 MG/1
1000 INJECTION, POWDER, FOR SOLUTION INTRAMUSCULAR; INTRAVENOUS EVERY 24 HOURS
Refills: 0 | Status: DISCONTINUED | OUTPATIENT
Start: 2022-01-01 | End: 2022-01-01

## 2022-01-01 RX ORDER — TAMSULOSIN HYDROCHLORIDE 0.4 MG/1
0.4 CAPSULE ORAL
Refills: 0 | Status: COMPLETED | COMMUNITY
End: 2022-01-01

## 2022-01-01 RX ORDER — ASPIRIN/CALCIUM CARB/MAGNESIUM 324 MG
81 TABLET ORAL DAILY
Refills: 0 | Status: DISCONTINUED | OUTPATIENT
Start: 2022-01-01 | End: 2022-01-01

## 2022-01-01 RX ORDER — ASPIRIN/CALCIUM CARB/MAGNESIUM 324 MG
324 TABLET ORAL ONCE
Refills: 0 | Status: COMPLETED | OUTPATIENT
Start: 2022-01-01 | End: 2022-01-01

## 2022-01-01 RX ORDER — CLOPIDOGREL BISULFATE 75 MG/1
1 TABLET, FILM COATED ORAL
Qty: 30 | Refills: 0
Start: 2022-01-01 | End: 2022-01-01

## 2022-01-01 RX ORDER — MAGNESIUM SULFATE 500 MG/ML
2 VIAL (ML) INJECTION ONCE
Refills: 0 | Status: COMPLETED | OUTPATIENT
Start: 2022-01-01 | End: 2022-01-01

## 2022-01-01 RX ORDER — CEFPODOXIME PROXETIL 100 MG
1 TABLET ORAL
Qty: 0 | Refills: 0 | DISCHARGE

## 2022-01-01 RX ORDER — ENOXAPARIN SODIUM 100 MG/ML
65 INJECTION SUBCUTANEOUS EVERY 12 HOURS
Refills: 0 | Status: DISCONTINUED | OUTPATIENT
Start: 2022-01-01 | End: 2022-01-01

## 2022-01-01 RX ORDER — LISINOPRIL 2.5 MG/1
20 TABLET ORAL DAILY
Refills: 0 | Status: DISCONTINUED | OUTPATIENT
Start: 2022-01-01 | End: 2022-01-01

## 2022-01-01 RX ORDER — METOPROLOL TARTRATE 50 MG
25 TABLET ORAL
Refills: 0 | Status: DISCONTINUED | OUTPATIENT
Start: 2022-01-01 | End: 2022-01-01

## 2022-01-01 RX ORDER — ERYTHROMYCIN 5 MG/G
5 OINTMENT OPHTHALMIC
Qty: 3 | Refills: 0 | Status: DISCONTINUED | COMMUNITY
Start: 2022-01-26 | End: 2022-01-01

## 2022-01-01 RX ORDER — POLYETHYLENE GLYCOL 3350 17 G/17G
17 POWDER, FOR SOLUTION ORAL
Refills: 0 | Status: DISCONTINUED | OUTPATIENT
Start: 2022-01-01 | End: 2022-01-01

## 2022-01-01 RX ORDER — HEPARIN SODIUM 5000 [USP'U]/ML
INJECTION INTRAVENOUS; SUBCUTANEOUS
Qty: 25000 | Refills: 0 | Status: DISCONTINUED | OUTPATIENT
Start: 2022-01-01 | End: 2022-01-01

## 2022-01-01 RX ORDER — METOPROLOL TARTRATE 50 MG
12.5 TABLET ORAL EVERY 6 HOURS
Refills: 0 | Status: DISCONTINUED | OUTPATIENT
Start: 2022-01-01 | End: 2022-01-01

## 2022-01-01 RX ORDER — LISINOPRIL 20 MG/1
20 TABLET ORAL
Qty: 30 | Refills: 0 | Status: COMPLETED | COMMUNITY
Start: 2022-01-01 | End: 2022-01-01

## 2022-01-01 RX ORDER — TAMSULOSIN HYDROCHLORIDE 0.4 MG/1
0.4 CAPSULE ORAL
Qty: 90 | Refills: 3 | Status: ACTIVE | COMMUNITY
Start: 2020-07-28 | End: 1900-01-01

## 2022-01-01 RX ORDER — ASPIRIN 81 MG/1
81 TABLET, CHEWABLE ORAL
Qty: 30 | Refills: 0 | Status: ACTIVE | COMMUNITY
Start: 2022-01-01

## 2022-01-01 RX ORDER — OFLOXACIN 0.3 %
1 DROPS OPHTHALMIC (EYE) AT BEDTIME
Refills: 0 | Status: DISCONTINUED | OUTPATIENT
Start: 2022-01-01 | End: 2022-01-01

## 2022-01-01 RX ORDER — SODIUM CHLORIDE 9 MG/ML
1000 INJECTION INTRAMUSCULAR; INTRAVENOUS; SUBCUTANEOUS
Refills: 0 | Status: DISCONTINUED | OUTPATIENT
Start: 2022-01-01 | End: 2022-01-01

## 2022-01-01 RX ORDER — TAMSULOSIN HYDROCHLORIDE 0.4 MG/1
0.4 CAPSULE ORAL AT BEDTIME
Refills: 0 | Status: DISCONTINUED | OUTPATIENT
Start: 2022-01-01 | End: 2022-01-01

## 2022-01-01 RX ORDER — CEFPODOXIME PROXETIL 200 MG/1
200 TABLET, FILM COATED ORAL
Qty: 14 | Refills: 0 | Status: DISCONTINUED | COMMUNITY
Start: 2022-04-06 | End: 2022-01-01

## 2022-01-01 RX ORDER — SIMVASTATIN 80 MG/1
80 TABLET, FILM COATED ORAL
Qty: 90 | Refills: 3 | Status: ACTIVE | COMMUNITY
Start: 2022-01-01 | End: 1900-01-01

## 2022-01-01 RX ORDER — ATORVASTATIN CALCIUM 80 MG/1
80 TABLET, FILM COATED ORAL AT BEDTIME
Refills: 0 | Status: DISCONTINUED | OUTPATIENT
Start: 2022-01-01 | End: 2022-01-01

## 2022-01-01 RX ORDER — FINASTERIDE 5 MG/1
5 TABLET, FILM COATED ORAL
Refills: 0 | Status: DISCONTINUED | COMMUNITY
End: 2022-01-01

## 2022-01-01 RX ORDER — ATORVASTATIN CALCIUM 80 MG/1
1 TABLET, FILM COATED ORAL
Qty: 30 | Refills: 0
Start: 2022-01-01 | End: 2022-01-01

## 2022-01-01 RX ORDER — CEFAZOLIN SODIUM 1 G
1000 VIAL (EA) INJECTION ONCE
Refills: 0 | Status: COMPLETED | OUTPATIENT
Start: 2022-01-01 | End: 2022-01-01

## 2022-01-01 RX ORDER — HEPARIN SODIUM 5000 [USP'U]/ML
1000 INJECTION INTRAVENOUS; SUBCUTANEOUS
Qty: 25000 | Refills: 0 | Status: DISCONTINUED | OUTPATIENT
Start: 2022-01-01 | End: 2022-01-01

## 2022-01-01 RX ORDER — MORPHINE SULFATE 50 MG/1
2 CAPSULE, EXTENDED RELEASE ORAL EVERY 4 HOURS
Refills: 0 | Status: DISCONTINUED | OUTPATIENT
Start: 2022-01-01 | End: 2022-01-01

## 2022-01-01 RX ORDER — HEPARIN SODIUM 5000 [USP'U]/ML
4100 INJECTION INTRAVENOUS; SUBCUTANEOUS ONCE
Refills: 0 | Status: COMPLETED | OUTPATIENT
Start: 2022-01-01 | End: 2022-01-01

## 2022-01-01 RX ORDER — SIMVASTATIN 20 MG/1
80 TABLET, FILM COATED ORAL AT BEDTIME
Refills: 0 | Status: DISCONTINUED | OUTPATIENT
Start: 2022-01-01 | End: 2022-01-01

## 2022-01-01 RX ORDER — LISINOPRIL 2.5 MG/1
10 TABLET ORAL DAILY
Refills: 0 | Status: DISCONTINUED | OUTPATIENT
Start: 2022-01-01 | End: 2022-01-01

## 2022-01-01 RX ORDER — TAMSULOSIN HYDROCHLORIDE 0.4 MG/1
0.4 CAPSULE ORAL
Qty: 180 | Refills: 3 | Status: ACTIVE | COMMUNITY
Start: 2022-01-01 | End: 1900-01-01

## 2022-01-01 RX ORDER — ATROPINE SULFATE 10 MG/ML
1 SOLUTION OPHTHALMIC
Qty: 1 | Refills: 5 | Status: ACTIVE | COMMUNITY
Start: 2022-01-01 | End: 1900-01-01

## 2022-01-01 RX ORDER — SIMVASTATIN 40 MG/1
40 TABLET, FILM COATED ORAL
Refills: 0 | Status: COMPLETED | COMMUNITY
End: 2022-01-01

## 2022-01-01 RX ORDER — PREDNISOLONE SODIUM PHOSPHATE 1 %
1 DROPS OPHTHALMIC (EYE) EVERY 6 HOURS
Refills: 0 | Status: DISCONTINUED | OUTPATIENT
Start: 2022-01-01 | End: 2022-01-01

## 2022-01-01 RX ORDER — LANOLIN ALCOHOL/MO/W.PET/CERES
5 CREAM (GRAM) TOPICAL AT BEDTIME
Refills: 0 | Status: DISCONTINUED | OUTPATIENT
Start: 2022-01-01 | End: 2022-01-01

## 2022-01-01 RX ORDER — PREDNISOLONE ACETATE 10 MG/ML
1 SUSPENSION/ DROPS OPHTHALMIC TWICE DAILY
Qty: 1 | Refills: 5 | Status: ACTIVE | COMMUNITY
Start: 2022-01-01 | End: 1900-01-01

## 2022-01-01 RX ORDER — HEPARIN SODIUM 5000 [USP'U]/ML
5500 INJECTION INTRAVENOUS; SUBCUTANEOUS ONCE
Refills: 0 | Status: COMPLETED | OUTPATIENT
Start: 2022-01-01 | End: 2022-01-01

## 2022-01-01 RX ORDER — CHLORHEXIDINE GLUCONATE 213 G/1000ML
1 SOLUTION TOPICAL
Refills: 0 | Status: DISCONTINUED | OUTPATIENT
Start: 2022-01-01 | End: 2022-01-01

## 2022-01-01 RX ORDER — HALOPERIDOL DECANOATE 100 MG/ML
2 INJECTION INTRAMUSCULAR ONCE
Refills: 0 | Status: COMPLETED | OUTPATIENT
Start: 2022-01-01 | End: 2022-01-01

## 2022-01-01 RX ORDER — ATORVASTATIN CALCIUM 80 MG/1
80 TABLET, FILM COATED ORAL
Qty: 30 | Refills: 0 | Status: DISCONTINUED | COMMUNITY
Start: 2022-01-01 | End: 2022-01-01

## 2022-01-01 RX ORDER — ASPIRIN/CALCIUM CARB/MAGNESIUM 324 MG
1 TABLET ORAL
Qty: 30 | Refills: 0
Start: 2022-01-01 | End: 2022-01-01

## 2022-01-01 RX ORDER — CEFTRIAXONE 500 MG/1
1000 INJECTION, POWDER, FOR SOLUTION INTRAMUSCULAR; INTRAVENOUS EVERY 24 HOURS
Refills: 0 | Status: COMPLETED | OUTPATIENT
Start: 2022-01-01 | End: 2022-01-01

## 2022-01-01 RX ORDER — SIMVASTATIN 20 MG/1
1 TABLET, FILM COATED ORAL
Qty: 0 | Refills: 0 | DISCHARGE

## 2022-01-01 RX ORDER — QUETIAPINE FUMARATE 200 MG/1
25 TABLET, FILM COATED ORAL AT BEDTIME
Refills: 0 | Status: DISCONTINUED | OUTPATIENT
Start: 2022-01-01 | End: 2022-01-01

## 2022-01-01 RX ORDER — ERYTHROMYCIN 5 MG/G
5 OINTMENT OPHTHALMIC
Qty: 15 | Refills: 5 | Status: ACTIVE | COMMUNITY
Start: 2022-01-01 | End: 1900-01-01

## 2022-01-01 RX ORDER — SENNA PLUS 8.6 MG/1
1 TABLET ORAL DAILY
Refills: 0 | Status: DISCONTINUED | OUTPATIENT
Start: 2022-01-01 | End: 2022-01-01

## 2022-01-01 RX ORDER — CLOPIDOGREL BISULFATE 75 MG/1
75 TABLET, FILM COATED ORAL
Qty: 90 | Refills: 3 | Status: ACTIVE | COMMUNITY
Start: 1900-01-01 | End: 1900-01-01

## 2022-01-01 RX ORDER — PREDNISONE 10 MG/1
10 TABLET ORAL
Qty: 70 | Refills: 0 | Status: COMPLETED | COMMUNITY
Start: 2021-11-23 | End: 2022-01-01

## 2022-01-01 RX ORDER — ATROPINE SULFATE 1 %
1 DROPS OPHTHALMIC (EYE)
Refills: 0 | Status: DISCONTINUED | OUTPATIENT
Start: 2022-01-01 | End: 2022-01-01

## 2022-01-01 RX ORDER — HEPARIN SODIUM 5000 [USP'U]/ML
4000 INJECTION INTRAVENOUS; SUBCUTANEOUS EVERY 6 HOURS
Refills: 0 | Status: DISCONTINUED | OUTPATIENT
Start: 2022-01-01 | End: 2022-01-01

## 2022-01-01 RX ORDER — SIMVASTATIN 80 MG/1
80 TABLET, FILM COATED ORAL
Qty: 90 | Refills: 0 | Status: COMPLETED | COMMUNITY
Start: 2020-10-29 | End: 2022-01-01

## 2022-01-01 RX ORDER — LISINOPRIL 2.5 MG/1
1 TABLET ORAL
Qty: 30 | Refills: 0
Start: 2022-01-01 | End: 2022-01-01

## 2022-01-01 RX ORDER — HEPARIN SODIUM 5000 [USP'U]/ML
4100 INJECTION INTRAVENOUS; SUBCUTANEOUS EVERY 6 HOURS
Refills: 0 | Status: DISCONTINUED | OUTPATIENT
Start: 2022-01-01 | End: 2022-01-01

## 2022-01-01 RX ORDER — CLOPIDOGREL BISULFATE 75 MG/1
75 TABLET, FILM COATED ORAL DAILY
Refills: 0 | Status: DISCONTINUED | OUTPATIENT
Start: 2022-01-01 | End: 2022-01-01

## 2022-01-01 RX ORDER — DIAZEPAM 10 MG/1
10 TABLET ORAL
Qty: 1 | Refills: 0 | Status: DISCONTINUED | COMMUNITY
Start: 2021-08-31 | End: 2022-01-01

## 2022-01-01 RX ADMIN — Medication 1 DROP(S): at 05:20

## 2022-01-01 RX ADMIN — CEFTRIAXONE 100 MILLIGRAM(S): 500 INJECTION, POWDER, FOR SOLUTION INTRAMUSCULAR; INTRAVENOUS at 21:10

## 2022-01-01 RX ADMIN — CEFTRIAXONE 100 MILLIGRAM(S): 500 INJECTION, POWDER, FOR SOLUTION INTRAMUSCULAR; INTRAVENOUS at 23:23

## 2022-01-01 RX ADMIN — Medication 1 DROP(S): at 17:10

## 2022-01-01 RX ADMIN — Medication 25 GRAM(S): at 06:23

## 2022-01-01 RX ADMIN — SODIUM CHLORIDE 75 MILLILITER(S): 9 INJECTION INTRAMUSCULAR; INTRAVENOUS; SUBCUTANEOUS at 06:01

## 2022-01-01 RX ADMIN — CLOPIDOGREL BISULFATE 75 MILLIGRAM(S): 75 TABLET, FILM COATED ORAL at 11:47

## 2022-01-01 RX ADMIN — Medication 12.5 MILLIGRAM(S): at 05:24

## 2022-01-01 RX ADMIN — CHLORHEXIDINE GLUCONATE 1 APPLICATION(S): 213 SOLUTION TOPICAL at 05:25

## 2022-01-01 RX ADMIN — Medication 1 DROP(S): at 05:25

## 2022-01-01 RX ADMIN — ATORVASTATIN CALCIUM 80 MILLIGRAM(S): 80 TABLET, FILM COATED ORAL at 21:53

## 2022-01-01 RX ADMIN — HEPARIN SODIUM 5500 UNIT(S): 5000 INJECTION INTRAVENOUS; SUBCUTANEOUS at 14:18

## 2022-01-01 RX ADMIN — Medication 1 DROP(S): at 23:13

## 2022-01-01 RX ADMIN — Medication 1 DROP(S): at 17:19

## 2022-01-01 RX ADMIN — CHLORHEXIDINE GLUCONATE 1 APPLICATION(S): 213 SOLUTION TOPICAL at 05:24

## 2022-01-01 RX ADMIN — Medication 1 DROP(S): at 21:11

## 2022-01-01 RX ADMIN — Medication 1 DROP(S): at 23:37

## 2022-01-01 RX ADMIN — Medication 1 DROP(S): at 05:24

## 2022-01-01 RX ADMIN — Medication 1 DROP(S): at 23:40

## 2022-01-01 RX ADMIN — CHLORHEXIDINE GLUCONATE 1 APPLICATION(S): 213 SOLUTION TOPICAL at 06:02

## 2022-01-01 RX ADMIN — Medication 1 DROP(S): at 21:12

## 2022-01-01 RX ADMIN — Medication 1 DROP(S): at 05:23

## 2022-01-01 RX ADMIN — Medication 12.5 MILLIGRAM(S): at 17:19

## 2022-01-01 RX ADMIN — CLOPIDOGREL BISULFATE 75 MILLIGRAM(S): 75 TABLET, FILM COATED ORAL at 11:11

## 2022-01-01 RX ADMIN — Medication 81 MILLIGRAM(S): at 12:32

## 2022-01-01 RX ADMIN — CEFTRIAXONE 100 MILLIGRAM(S): 500 INJECTION, POWDER, FOR SOLUTION INTRAMUSCULAR; INTRAVENOUS at 00:04

## 2022-01-01 RX ADMIN — Medication 1 DROP(S): at 23:58

## 2022-01-01 RX ADMIN — CLOPIDOGREL BISULFATE 75 MILLIGRAM(S): 75 TABLET, FILM COATED ORAL at 18:27

## 2022-01-01 RX ADMIN — HEPARIN SODIUM 800 UNIT(S)/HR: 5000 INJECTION INTRAVENOUS; SUBCUTANEOUS at 15:59

## 2022-01-01 RX ADMIN — Medication 81 MILLIGRAM(S): at 12:46

## 2022-01-01 RX ADMIN — TAMSULOSIN HYDROCHLORIDE 0.4 MILLIGRAM(S): 0.4 CAPSULE ORAL at 21:19

## 2022-01-01 RX ADMIN — Medication 1 DROP(S): at 22:18

## 2022-01-01 RX ADMIN — POLYETHYLENE GLYCOL 3350 17 GRAM(S): 17 POWDER, FOR SOLUTION ORAL at 17:09

## 2022-01-01 RX ADMIN — ATORVASTATIN CALCIUM 80 MILLIGRAM(S): 80 TABLET, FILM COATED ORAL at 21:19

## 2022-01-01 RX ADMIN — ENOXAPARIN SODIUM 65 MILLIGRAM(S): 100 INJECTION SUBCUTANEOUS at 17:12

## 2022-01-01 RX ADMIN — Medication 1 DROP(S): at 21:10

## 2022-01-01 RX ADMIN — ENOXAPARIN SODIUM 65 MILLIGRAM(S): 100 INJECTION SUBCUTANEOUS at 05:02

## 2022-01-01 RX ADMIN — Medication 1 DROP(S): at 23:38

## 2022-01-01 RX ADMIN — Medication 100 MILLIGRAM(S): at 14:17

## 2022-01-01 RX ADMIN — SENNA PLUS 1 TABLET(S): 8.6 TABLET ORAL at 11:47

## 2022-01-01 RX ADMIN — Medication 1 DROP(S): at 06:02

## 2022-01-01 RX ADMIN — Medication 25 GRAM(S): at 09:19

## 2022-01-01 RX ADMIN — Medication 1 DROP(S): at 18:23

## 2022-01-01 RX ADMIN — Medication 5 MILLIGRAM(S): at 21:19

## 2022-01-01 RX ADMIN — CLOPIDOGREL BISULFATE 75 MILLIGRAM(S): 75 TABLET, FILM COATED ORAL at 11:27

## 2022-01-01 RX ADMIN — Medication 1 DROP(S): at 00:26

## 2022-01-01 RX ADMIN — Medication 12.5 MILLIGRAM(S): at 12:31

## 2022-01-01 RX ADMIN — CHLORHEXIDINE GLUCONATE 1 APPLICATION(S): 213 SOLUTION TOPICAL at 05:20

## 2022-01-01 RX ADMIN — Medication 81 MILLIGRAM(S): at 12:10

## 2022-01-01 RX ADMIN — Medication 1 DROP(S): at 06:31

## 2022-01-01 RX ADMIN — Medication 1 DROP(S): at 21:19

## 2022-01-01 RX ADMIN — Medication 1 DROP(S): at 11:11

## 2022-01-01 RX ADMIN — Medication 1 DROP(S): at 11:47

## 2022-01-01 RX ADMIN — Medication 1 DROP(S): at 18:09

## 2022-01-01 RX ADMIN — Medication 81 MILLIGRAM(S): at 11:13

## 2022-01-01 RX ADMIN — Medication 1 DROP(S): at 23:42

## 2022-01-01 RX ADMIN — Medication 1 DROP(S): at 17:11

## 2022-01-01 RX ADMIN — Medication 1 DROP(S): at 00:01

## 2022-01-01 RX ADMIN — Medication 1 DROP(S): at 21:34

## 2022-01-01 RX ADMIN — Medication 1 DROP(S): at 05:19

## 2022-01-01 RX ADMIN — Medication 81 MILLIGRAM(S): at 11:47

## 2022-01-01 RX ADMIN — Medication 5 MILLIGRAM(S): at 21:34

## 2022-01-01 RX ADMIN — Medication 1 DROP(S): at 23:23

## 2022-01-01 RX ADMIN — Medication 1 DROP(S): at 05:02

## 2022-01-01 RX ADMIN — CLOPIDOGREL BISULFATE 75 MILLIGRAM(S): 75 TABLET, FILM COATED ORAL at 12:10

## 2022-01-01 RX ADMIN — Medication 1 DROP(S): at 17:12

## 2022-01-01 RX ADMIN — Medication 12.5 MILLIGRAM(S): at 11:26

## 2022-01-01 RX ADMIN — Medication 1 DROP(S): at 23:41

## 2022-01-01 RX ADMIN — Medication 1 DROP(S): at 23:24

## 2022-01-01 RX ADMIN — Medication 5 MILLIGRAM(S): at 21:11

## 2022-01-01 RX ADMIN — ENOXAPARIN SODIUM 65 MILLIGRAM(S): 100 INJECTION SUBCUTANEOUS at 06:02

## 2022-01-01 RX ADMIN — ENOXAPARIN SODIUM 65 MILLIGRAM(S): 100 INJECTION SUBCUTANEOUS at 06:15

## 2022-01-01 RX ADMIN — CLOPIDOGREL BISULFATE 75 MILLIGRAM(S): 75 TABLET, FILM COATED ORAL at 12:32

## 2022-01-01 RX ADMIN — QUETIAPINE FUMARATE 25 MILLIGRAM(S): 200 TABLET, FILM COATED ORAL at 21:11

## 2022-01-01 RX ADMIN — HEPARIN SODIUM 1000 UNIT(S)/HR: 5000 INJECTION INTRAVENOUS; SUBCUTANEOUS at 02:52

## 2022-01-01 RX ADMIN — TAMSULOSIN HYDROCHLORIDE 0.4 MILLIGRAM(S): 0.4 CAPSULE ORAL at 21:11

## 2022-01-01 RX ADMIN — HEPARIN SODIUM 1000 UNIT(S)/HR: 5000 INJECTION INTRAVENOUS; SUBCUTANEOUS at 06:07

## 2022-01-01 RX ADMIN — Medication 1 DROP(S): at 12:48

## 2022-01-01 RX ADMIN — ATORVASTATIN CALCIUM 80 MILLIGRAM(S): 80 TABLET, FILM COATED ORAL at 21:34

## 2022-01-01 RX ADMIN — Medication 81 MILLIGRAM(S): at 11:11

## 2022-01-01 RX ADMIN — Medication 81 MILLIGRAM(S): at 11:26

## 2022-01-01 RX ADMIN — CHLORHEXIDINE GLUCONATE 1 APPLICATION(S): 213 SOLUTION TOPICAL at 05:01

## 2022-01-01 RX ADMIN — Medication 5 MILLIGRAM(S): at 21:20

## 2022-01-01 RX ADMIN — CEFTRIAXONE 100 MILLIGRAM(S): 500 INJECTION, POWDER, FOR SOLUTION INTRAMUSCULAR; INTRAVENOUS at 21:20

## 2022-01-01 RX ADMIN — SENNA PLUS 1 TABLET(S): 8.6 TABLET ORAL at 11:10

## 2022-01-01 RX ADMIN — Medication 1 DROP(S): at 17:24

## 2022-01-01 RX ADMIN — HEPARIN SODIUM 1000 UNIT(S)/HR: 5000 INJECTION INTRAVENOUS; SUBCUTANEOUS at 10:17

## 2022-01-01 RX ADMIN — HALOPERIDOL DECANOATE 2 MILLIGRAM(S): 100 INJECTION INTRAMUSCULAR at 21:10

## 2022-01-01 RX ADMIN — Medication 1 DROP(S): at 18:08

## 2022-01-01 RX ADMIN — HEPARIN SODIUM 1000 UNIT(S)/HR: 5000 INJECTION INTRAVENOUS; SUBCUTANEOUS at 21:55

## 2022-01-01 RX ADMIN — Medication 1 DROP(S): at 21:53

## 2022-01-01 RX ADMIN — Medication 1 DROP(S): at 12:10

## 2022-01-01 RX ADMIN — ENOXAPARIN SODIUM 65 MILLIGRAM(S): 100 INJECTION SUBCUTANEOUS at 18:39

## 2022-01-01 RX ADMIN — Medication 1 DROP(S): at 21:06

## 2022-01-01 RX ADMIN — HEPARIN SODIUM 4100 UNIT(S): 5000 INJECTION INTRAVENOUS; SUBCUTANEOUS at 06:13

## 2022-01-01 RX ADMIN — Medication 1 DROP(S): at 12:31

## 2022-01-01 RX ADMIN — Medication 324 MILLIGRAM(S): at 14:17

## 2022-01-01 RX ADMIN — HEPARIN SODIUM 1000 UNIT(S)/HR: 5000 INJECTION INTRAVENOUS; SUBCUTANEOUS at 12:55

## 2022-01-01 RX ADMIN — CEFTRIAXONE 100 MILLIGRAM(S): 500 INJECTION, POWDER, FOR SOLUTION INTRAMUSCULAR; INTRAVENOUS at 21:19

## 2022-01-01 RX ADMIN — Medication 1 DROP(S): at 11:26

## 2022-01-01 RX ADMIN — CLOPIDOGREL BISULFATE 75 MILLIGRAM(S): 75 TABLET, FILM COATED ORAL at 11:13

## 2022-01-01 RX ADMIN — Medication 25 MILLIGRAM(S): at 18:27

## 2022-01-01 RX ADMIN — Medication 1 DROP(S): at 12:09

## 2022-01-01 RX ADMIN — TAMSULOSIN HYDROCHLORIDE 0.4 MILLIGRAM(S): 0.4 CAPSULE ORAL at 21:34

## 2022-01-01 RX ADMIN — ATORVASTATIN CALCIUM 80 MILLIGRAM(S): 80 TABLET, FILM COATED ORAL at 21:11

## 2022-01-01 RX ADMIN — TAMSULOSIN HYDROCHLORIDE 0.4 MILLIGRAM(S): 0.4 CAPSULE ORAL at 22:18

## 2022-01-01 RX ADMIN — Medication 1 DROP(S): at 05:01

## 2022-01-01 RX ADMIN — CLOPIDOGREL BISULFATE 75 MILLIGRAM(S): 75 TABLET, FILM COATED ORAL at 12:47

## 2022-01-01 RX ADMIN — ENOXAPARIN SODIUM 65 MILLIGRAM(S): 100 INJECTION SUBCUTANEOUS at 18:24

## 2022-01-01 RX ADMIN — ENOXAPARIN SODIUM 65 MILLIGRAM(S): 100 INJECTION SUBCUTANEOUS at 21:12

## 2022-01-01 RX ADMIN — HEPARIN SODIUM 1200 UNIT(S)/HR: 5000 INJECTION INTRAVENOUS; SUBCUTANEOUS at 14:18

## 2022-01-25 ENCOUNTER — APPOINTMENT (OUTPATIENT)
Dept: OPHTHALMOLOGY | Facility: CLINIC | Age: 77
End: 2022-01-25

## 2022-01-25 ENCOUNTER — OUTPATIENT (OUTPATIENT)
Dept: OUTPATIENT SERVICES | Facility: HOSPITAL | Age: 77
LOS: 1 days | Discharge: HOME | End: 2022-01-25
Payer: MEDICARE

## 2022-01-25 DIAGNOSIS — Z98.890 OTHER SPECIFIED POSTPROCEDURAL STATES: Chronic | ICD-10-CM

## 2022-01-25 DIAGNOSIS — H26.9 UNSPECIFIED CATARACT: Chronic | ICD-10-CM

## 2022-01-25 PROCEDURE — 92012 INTRM OPH EXAM EST PATIENT: CPT | Mod: GC

## 2022-02-25 ENCOUNTER — RESULT REVIEW (OUTPATIENT)
Age: 77
End: 2022-02-25

## 2022-02-25 ENCOUNTER — OUTPATIENT (OUTPATIENT)
Dept: OUTPATIENT SERVICES | Facility: HOSPITAL | Age: 77
LOS: 1 days | Discharge: HOME | End: 2022-02-25
Payer: MEDICARE

## 2022-02-25 DIAGNOSIS — R07.9 CHEST PAIN, UNSPECIFIED: ICD-10-CM

## 2022-02-25 DIAGNOSIS — H26.9 UNSPECIFIED CATARACT: Chronic | ICD-10-CM

## 2022-02-25 DIAGNOSIS — Z98.890 OTHER SPECIFIED POSTPROCEDURAL STATES: Chronic | ICD-10-CM

## 2022-02-25 DIAGNOSIS — C61 MALIGNANT NEOPLASM OF PROSTATE: ICD-10-CM

## 2022-02-25 PROCEDURE — 71046 X-RAY EXAM CHEST 2 VIEWS: CPT | Mod: 26

## 2022-02-25 PROCEDURE — 74176 CT ABD & PELVIS W/O CONTRAST: CPT | Mod: 26

## 2022-03-02 ENCOUNTER — RESULT REVIEW (OUTPATIENT)
Age: 77
End: 2022-03-02

## 2022-03-02 ENCOUNTER — OUTPATIENT (OUTPATIENT)
Dept: OUTPATIENT SERVICES | Facility: HOSPITAL | Age: 77
LOS: 1 days | Discharge: HOME | End: 2022-03-02
Payer: MEDICARE

## 2022-03-02 DIAGNOSIS — H26.9 UNSPECIFIED CATARACT: Chronic | ICD-10-CM

## 2022-03-02 DIAGNOSIS — Z98.890 OTHER SPECIFIED POSTPROCEDURAL STATES: Chronic | ICD-10-CM

## 2022-03-02 DIAGNOSIS — C61 MALIGNANT NEOPLASM OF PROSTATE: ICD-10-CM

## 2022-03-02 PROCEDURE — 78803 RP LOCLZJ TUM SPECT 1 AREA: CPT | Mod: 26

## 2022-03-02 PROCEDURE — 78306 BONE IMAGING WHOLE BODY: CPT | Mod: 26

## 2022-03-22 ENCOUNTER — APPOINTMENT (OUTPATIENT)
Dept: UROLOGY | Facility: CLINIC | Age: 77
End: 2022-03-22
Payer: MEDICARE

## 2022-03-22 PROCEDURE — 96402 CHEMO HORMON ANTINEOPL SQ/IM: CPT

## 2022-03-22 PROCEDURE — 99214 OFFICE O/P EST MOD 30 MIN: CPT | Mod: 25

## 2022-03-22 RX ORDER — PREDNISOLONE ACETATE 10 MG/ML
1 SUSPENSION/ DROPS OPHTHALMIC
Qty: 5 | Refills: 0 | Status: ACTIVE | COMMUNITY
Start: 2022-01-25

## 2022-03-22 RX ORDER — OFLOXACIN 3 MG/ML
0.3 SOLUTION/ DROPS OPHTHALMIC
Qty: 10 | Refills: 0 | Status: ACTIVE | COMMUNITY
Start: 2021-11-23

## 2022-03-22 RX ORDER — ATROPINE SULFATE 10 MG/ML
1 SOLUTION OPHTHALMIC
Qty: 2 | Refills: 0 | Status: ACTIVE | COMMUNITY
Start: 2021-11-19

## 2022-03-23 LAB — PSA SERPL-MCNC: 13.5 NG/ML

## 2022-03-23 NOTE — ANESTHESIA FOLLOW-UP NOTE - NSINTERVIEW_GEN_ALL_CORE
See plan of care for details and updated goals.    MARIA GUADALUPE Sommer, [place]/L, CEAS-I  3/23/2022       
Interviewed and evaluated

## 2022-04-05 ENCOUNTER — LABORATORY RESULT (OUTPATIENT)
Age: 77
End: 2022-04-05

## 2022-04-06 ENCOUNTER — NON-APPOINTMENT (OUTPATIENT)
Age: 77
End: 2022-04-06

## 2022-04-06 LAB
APPEARANCE: ABNORMAL
BILIRUBIN URINE: NEGATIVE
BLOOD URINE: ABNORMAL
COLOR: YELLOW
GLUCOSE QUALITATIVE U: NEGATIVE
KETONES URINE: NEGATIVE
LEUKOCYTE ESTERASE URINE: ABNORMAL
NITRITE URINE: POSITIVE
PH URINE: 6.5
PROTEIN URINE: ABNORMAL
SPECIFIC GRAVITY URINE: 1.01
UROBILINOGEN URINE: NORMAL

## 2022-04-07 ENCOUNTER — TRANSCRIPTION ENCOUNTER (OUTPATIENT)
Age: 77
End: 2022-04-07

## 2022-04-07 ENCOUNTER — NON-APPOINTMENT (OUTPATIENT)
Age: 77
End: 2022-04-07

## 2022-04-07 ENCOUNTER — APPOINTMENT (OUTPATIENT)
Dept: UROLOGY | Facility: CLINIC | Age: 77
End: 2022-04-07

## 2022-04-07 ENCOUNTER — INPATIENT (INPATIENT)
Facility: HOSPITAL | Age: 77
LOS: 0 days | Discharge: HOME | End: 2022-04-08
Attending: UROLOGY | Admitting: UROLOGY
Payer: MEDICARE

## 2022-04-07 VITALS
RESPIRATION RATE: 18 BRPM | HEIGHT: 69 IN | HEART RATE: 67 BPM | TEMPERATURE: 97 F | DIASTOLIC BLOOD PRESSURE: 74 MMHG | SYSTOLIC BLOOD PRESSURE: 118 MMHG | WEIGHT: 149.91 LBS | OXYGEN SATURATION: 95 %

## 2022-04-07 DIAGNOSIS — N39.0 URINARY TRACT INFECTION, SITE NOT SPECIFIED: ICD-10-CM

## 2022-04-07 DIAGNOSIS — Z98.890 OTHER SPECIFIED POSTPROCEDURAL STATES: Chronic | ICD-10-CM

## 2022-04-07 DIAGNOSIS — H26.9 UNSPECIFIED CATARACT: Chronic | ICD-10-CM

## 2022-04-07 DIAGNOSIS — R33.9 RETENTION OF URINE, UNSPECIFIED: ICD-10-CM

## 2022-04-07 LAB
ALBUMIN SERPL ELPH-MCNC: 4.4 G/DL — SIGNIFICANT CHANGE UP (ref 3.5–5.2)
ALP SERPL-CCNC: 120 U/L — HIGH (ref 30–115)
ALT FLD-CCNC: 11 U/L — SIGNIFICANT CHANGE UP (ref 0–41)
ANION GAP SERPL CALC-SCNC: 16 MMOL/L — HIGH (ref 7–14)
APTT BLD: 34.8 SEC — SIGNIFICANT CHANGE UP (ref 27–39.2)
AST SERPL-CCNC: 26 U/L — SIGNIFICANT CHANGE UP (ref 0–41)
BASOPHILS # BLD AUTO: 0.04 K/UL — SIGNIFICANT CHANGE UP (ref 0–0.2)
BASOPHILS NFR BLD AUTO: 0.4 % — SIGNIFICANT CHANGE UP (ref 0–1)
BILIRUB SERPL-MCNC: 0.9 MG/DL — SIGNIFICANT CHANGE UP (ref 0.2–1.2)
BLD GP AB SCN SERPL QL: SIGNIFICANT CHANGE UP
BUN SERPL-MCNC: 29 MG/DL — HIGH (ref 10–20)
CALCIUM SERPL-MCNC: 10.4 MG/DL — HIGH (ref 8.5–10.1)
CHLORIDE SERPL-SCNC: 105 MMOL/L — SIGNIFICANT CHANGE UP (ref 98–110)
CO2 SERPL-SCNC: 22 MMOL/L — SIGNIFICANT CHANGE UP (ref 17–32)
CREAT SERPL-MCNC: 1.7 MG/DL — HIGH (ref 0.7–1.5)
EGFR: 41 ML/MIN/1.73M2 — LOW
EOSINOPHIL # BLD AUTO: 0.03 K/UL — SIGNIFICANT CHANGE UP (ref 0–0.7)
EOSINOPHIL NFR BLD AUTO: 0.3 % — SIGNIFICANT CHANGE UP (ref 0–8)
GLUCOSE BLDC GLUCOMTR-MCNC: 115 MG/DL — HIGH (ref 70–99)
GLUCOSE SERPL-MCNC: 111 MG/DL — HIGH (ref 70–99)
HCT VFR BLD CALC: 46.9 % — SIGNIFICANT CHANGE UP (ref 42–52)
HGB BLD-MCNC: 14.4 G/DL — SIGNIFICANT CHANGE UP (ref 14–18)
IMM GRANULOCYTES NFR BLD AUTO: 0.4 % — HIGH (ref 0.1–0.3)
INR BLD: 1.04 RATIO — SIGNIFICANT CHANGE UP (ref 0.65–1.3)
LYMPHOCYTES # BLD AUTO: 1.16 K/UL — LOW (ref 1.2–3.4)
LYMPHOCYTES # BLD AUTO: 11.8 % — LOW (ref 20.5–51.1)
MCHC RBC-ENTMCNC: 23.8 PG — LOW (ref 27–31)
MCHC RBC-ENTMCNC: 30.7 G/DL — LOW (ref 32–37)
MCV RBC AUTO: 77.6 FL — LOW (ref 80–94)
MONOCYTES # BLD AUTO: 1.11 K/UL — HIGH (ref 0.1–0.6)
MONOCYTES NFR BLD AUTO: 11.3 % — HIGH (ref 1.7–9.3)
NEUTROPHILS # BLD AUTO: 7.48 K/UL — HIGH (ref 1.4–6.5)
NEUTROPHILS NFR BLD AUTO: 75.8 % — HIGH (ref 42.2–75.2)
NRBC # BLD: 0 /100 WBCS — SIGNIFICANT CHANGE UP (ref 0–0)
PLATELET # BLD AUTO: 322 K/UL — SIGNIFICANT CHANGE UP (ref 130–400)
POTASSIUM SERPL-MCNC: 4.8 MMOL/L — SIGNIFICANT CHANGE UP (ref 3.5–5)
POTASSIUM SERPL-SCNC: 4.8 MMOL/L — SIGNIFICANT CHANGE UP (ref 3.5–5)
PROT SERPL-MCNC: 7.9 G/DL — SIGNIFICANT CHANGE UP (ref 6–8)
PROTHROM AB SERPL-ACNC: 11.9 SEC — SIGNIFICANT CHANGE UP (ref 9.95–12.87)
RBC # BLD: 6.04 M/UL — SIGNIFICANT CHANGE UP (ref 4.7–6.1)
RBC # FLD: 18.3 % — HIGH (ref 11.5–14.5)
SARS-COV-2 RNA SPEC QL NAA+PROBE: SIGNIFICANT CHANGE UP
SODIUM SERPL-SCNC: 143 MMOL/L — SIGNIFICANT CHANGE UP (ref 135–146)
WBC # BLD: 9.86 K/UL — SIGNIFICANT CHANGE UP (ref 4.8–10.8)
WBC # FLD AUTO: 9.86 K/UL — SIGNIFICANT CHANGE UP (ref 4.8–10.8)

## 2022-04-07 PROCEDURE — 99285 EMERGENCY DEPT VISIT HI MDM: CPT

## 2022-04-07 PROCEDURE — 99214 OFFICE O/P EST MOD 30 MIN: CPT | Mod: 1L

## 2022-04-07 PROCEDURE — 52000 CYSTOURETHROSCOPY: CPT

## 2022-04-07 PROCEDURE — 93010 ELECTROCARDIOGRAM REPORT: CPT

## 2022-04-07 RX ORDER — ONDANSETRON 8 MG/1
4 TABLET, FILM COATED ORAL ONCE
Refills: 0 | Status: DISCONTINUED | OUTPATIENT
Start: 2022-04-07 | End: 2022-04-08

## 2022-04-07 RX ORDER — HYDROMORPHONE HYDROCHLORIDE 2 MG/ML
0.5 INJECTION INTRAMUSCULAR; INTRAVENOUS; SUBCUTANEOUS
Refills: 0 | Status: DISCONTINUED | OUTPATIENT
Start: 2022-04-07 | End: 2022-04-08

## 2022-04-07 RX ORDER — SIMVASTATIN 20 MG/1
80 TABLET, FILM COATED ORAL ONCE
Refills: 0 | Status: DISCONTINUED | OUTPATIENT
Start: 2022-04-07 | End: 2022-04-07

## 2022-04-07 RX ORDER — TAMSULOSIN HYDROCHLORIDE 0.4 MG/1
0.4 CAPSULE ORAL AT BEDTIME
Refills: 0 | Status: DISCONTINUED | OUTPATIENT
Start: 2022-04-07 | End: 2022-04-08

## 2022-04-07 RX ORDER — SODIUM CHLORIDE 9 MG/ML
1000 INJECTION, SOLUTION INTRAVENOUS
Refills: 0 | Status: DISCONTINUED | OUTPATIENT
Start: 2022-04-07 | End: 2022-04-08

## 2022-04-07 RX ORDER — TAMSULOSIN HYDROCHLORIDE 0.4 MG/1
0.4 CAPSULE ORAL AT BEDTIME
Refills: 0 | Status: DISCONTINUED | OUTPATIENT
Start: 2022-04-07 | End: 2022-04-07

## 2022-04-07 RX ORDER — HYDROMORPHONE HYDROCHLORIDE 2 MG/ML
0.5 INJECTION INTRAMUSCULAR; INTRAVENOUS; SUBCUTANEOUS
Refills: 0 | Status: DISCONTINUED | OUTPATIENT
Start: 2022-04-07 | End: 2022-04-07

## 2022-04-07 RX ORDER — UBIDECARENONE 100 MG
0 CAPSULE ORAL
Qty: 0 | Refills: 0 | DISCHARGE

## 2022-04-07 RX ORDER — ONDANSETRON 8 MG/1
4 TABLET, FILM COATED ORAL ONCE
Refills: 0 | Status: DISCONTINUED | OUTPATIENT
Start: 2022-04-07 | End: 2022-04-07

## 2022-04-07 RX ORDER — PREDNISOLONE SODIUM PHOSPHATE 1 %
1 DROPS OPHTHALMIC (EYE) EVERY 6 HOURS
Refills: 0 | Status: DISCONTINUED | OUTPATIENT
Start: 2022-04-07 | End: 2022-04-08

## 2022-04-07 RX ORDER — ATROPINE SULFATE 1 %
1 DROPS OPHTHALMIC (EYE)
Refills: 0 | Status: DISCONTINUED | OUTPATIENT
Start: 2022-04-07 | End: 2022-04-08

## 2022-04-07 RX ORDER — ACETAMINOPHEN 500 MG
650 TABLET ORAL ONCE
Refills: 0 | Status: DISCONTINUED | OUTPATIENT
Start: 2022-04-07 | End: 2022-04-08

## 2022-04-07 RX ORDER — ATORVASTATIN CALCIUM 80 MG/1
40 TABLET, FILM COATED ORAL AT BEDTIME
Refills: 0 | Status: DISCONTINUED | OUTPATIENT
Start: 2022-04-07 | End: 2022-04-07

## 2022-04-07 RX ORDER — SODIUM CHLORIDE 9 MG/ML
1000 INJECTION, SOLUTION INTRAVENOUS
Refills: 0 | Status: DISCONTINUED | OUTPATIENT
Start: 2022-04-07 | End: 2022-04-07

## 2022-04-07 RX ORDER — FINASTERIDE 5 MG/1
1 TABLET, FILM COATED ORAL
Qty: 0 | Refills: 0 | DISCHARGE

## 2022-04-07 RX ORDER — ATORVASTATIN CALCIUM 80 MG/1
40 TABLET, FILM COATED ORAL AT BEDTIME
Refills: 0 | Status: DISCONTINUED | OUTPATIENT
Start: 2022-04-07 | End: 2022-04-08

## 2022-04-07 RX ORDER — ERYTHROMYCIN BASE 5 MG/GRAM
1 OINTMENT (GRAM) OPHTHALMIC (EYE)
Refills: 0 | Status: DISCONTINUED | OUTPATIENT
Start: 2022-04-07 | End: 2022-04-08

## 2022-04-07 RX ORDER — ACETAMINOPHEN 500 MG
650 TABLET ORAL ONCE
Refills: 0 | Status: DISCONTINUED | OUTPATIENT
Start: 2022-04-07 | End: 2022-04-07

## 2022-04-07 RX ORDER — ERGOCALCIFEROL 1.25 MG/1
0 CAPSULE ORAL
Qty: 0 | Refills: 0 | DISCHARGE

## 2022-04-07 RX ADMIN — TAMSULOSIN HYDROCHLORIDE 0.4 MILLIGRAM(S): 0.4 CAPSULE ORAL at 21:18

## 2022-04-07 RX ADMIN — Medication 1 APPLICATION(S): at 22:49

## 2022-04-07 RX ADMIN — SODIUM CHLORIDE 100 MILLILITER(S): 9 INJECTION, SOLUTION INTRAVENOUS at 16:46

## 2022-04-07 RX ADMIN — ATORVASTATIN CALCIUM 40 MILLIGRAM(S): 80 TABLET, FILM COATED ORAL at 21:17

## 2022-04-07 RX ADMIN — SODIUM CHLORIDE 100 MILLILITER(S): 9 INJECTION, SOLUTION INTRAVENOUS at 21:18

## 2022-04-07 RX ADMIN — Medication 1 DROP(S): at 22:00

## 2022-04-07 NOTE — DISCHARGE NOTE PROVIDER - NSDCFUSCHEDAPPT_GEN_ALL_CORE_FT
RADHA RAMON ; 04/26/2022 ; NPP Urology [facility]  RADHA RAMON ; 05/24/2022 ; NPP Ophthal  [address]

## 2022-04-07 NOTE — DISCHARGE NOTE PROVIDER - CARE PROVIDER_API CALL
Han Curiel)  Urology  [address] [place] [address]  Phone: [phone]  Fax: [phone]  Follow Up Time: 1 week

## 2022-04-07 NOTE — H&P ADULT - NSHPPHYSICALEXAM_GEN_ALL_CORE
Vital Signs Last 24 Hrs  T(C): 36.1 (07 Apr 2022 13:07), Max: 36.1 (07 Apr 2022 13:07)  T(F): 96.9 (07 Apr 2022 13:07), Max: 96.9 (07 Apr 2022 13:07)  HR: 67 (07 Apr 2022 13:07) (67 - 67)  BP: 118/74 (07 Apr 2022 13:07) (118/74 - 118/74)  RR: 18 (07 Apr 2022 13:07) (18 - 18)  SpO2: 95% (07 Apr 2022 13:07) (95% - 95%)    PHYSICAL EXAM:      Constitutional: A&Ox4  Respiratory: cta b/l  Cardiovascular: s1 s2 rrr  Gastrointestinal: soft nt  nd + bs no rebound or guarding  Genitourinary: no cva tenderness  Extremities: normal rom, no edema, calf tenderness  Neurological:no focal deficits  Skin: no rash

## 2022-04-07 NOTE — H&P ADULT - NSHPADDITIONALINFOADULT_GEN_ALL_CORE
Urethral stricture and urinary retention.  catheter could not be passed.  pt for cysto,dilation, catheter placement.  risks, benefits, alternatives discussed including failure, need for sp tube, infection, recurrence, cardiopulm side effects of anesthesia

## 2022-04-07 NOTE — PHYSICAL EXAM
[General Appearance - Well Developed] : well developed [General Appearance - Well Nourished] : well nourished [Normal Appearance] : normal appearance [Well Groomed] : well groomed [General Appearance - In No Acute Distress] : no acute distress [Abdomen Soft] : soft [Abdomen Tenderness] : non-tender [Costovertebral Angle Tenderness] : no ~M costovertebral angle tenderness [Urethral Meatus] : meatus normal [Scrotum] : the scrotum was normal [Skin Color & Pigmentation] : normal skin color and pigmentation [Edema] : no peripheral edema [] : no respiratory distress [Respiration, Rhythm And Depth] : normal respiratory rhythm and effort [Exaggerated Use Of Accessory Muscles For Inspiration] : no accessory muscle use [Oriented To Time, Place, And Person] : oriented to person, place, and time [Affect] : the affect was normal [Mood] : the mood was normal [Not Anxious] : not anxious [Normal Station and Gait] : the gait and station were normal for the patient's age [No Focal Deficits] : no focal deficits [No Palpable Adenopathy] : no palpable adenopathy [FreeTextEntry1] : Firmagon 240mg given deep sub-cut to bilateral LQ- pt tolerated well-  batch-[id number], exp- /5/2023//reducible ,large LIH [Penis Abnormality] : normal circumcised penis

## 2022-04-07 NOTE — ASSESSMENT
[FreeTextEntry1] : 1. Prostate Carcinoma,stage  pT2C ,GG4 - high risk (HR)\par 2. bladder stones  \par 3.  New acute urinary retention == assumably related to recurrence of urethral stricture.\par \par Plan:\par -Patient advised to go to [facility] emergency room.  Discussed case with Dr. Alvarez who is in the operating room suggesting the patient should require cystoscopy with dilation of urethral stricture and insertion of urethral catheter under general anesthesia.  He in fact agrees.  Patient was instructed to go to the emergency room immediately.  Patient understands the procedure as this was similarly performed in 2019 for the similar problem.\par -Patient will return to the office next week with indwelling Garrison catheter\par -He will resume his ADT therapy towards the end of April.\par \par \par ****I saw and examined patient for approximately 35 minutes which included urethral catheterization .  For urinary retention****\par

## 2022-04-07 NOTE — ED ADULT NURSE NOTE - NSIMPLEMENTINTERV_GEN_ALL_ED
none Implemented All Universal Safety Interventions:  [place] to call system. Call bell, personal items and telephone within reach. Instruct patient to call for assistance. Room bathroom lighting operational. Non-slip footwear when patient is off stretcher. Physically safe environment: no spills, clutter or unnecessary equipment. Stretcher in lowest position, wheels locked, appropriate side rails in place.

## 2022-04-07 NOTE — PATIENT PROFILE ADULT - FALL HARM RISK - HARM RISK INTERVENTIONS
Assistance with ambulation/Assistance OOB with selected safe patient handling equipment/Communicate Risk of Fall with Harm to all staff/Discuss with provider need for PT consult/Monitor gait and stability/Provide patient with walking aids - walker, cane, crutches/Reinforce activity limits and safety measures with patient and family/Sit up slowly, dangle for a short time, stand at bedside before walking/Tailored Fall Risk Interventions/Use of alarms - bed, chair and/or voice tab/Visual Cue: Yellow wristband and red socks/Bed in lowest position, wheels locked, appropriate side rails in place/Call bell, personal items and telephone in reach/Instruct patient to call for assistance before getting out of bed or chair/Non-slip footwear when patient is out of bed/[place] to call system/Physically safe environment - no spills, clutter or unnecessary equipment/Purposeful Proactive Rounding/Room/bathroom lighting operational, light cord in reach

## 2022-04-07 NOTE — ED PROVIDER NOTE - ATTENDING CONTRIBUTION TO CARE
77 yo M PMHx noted including prostate cancer presents from Urologist office secondary to unable to pass Garrison.  Pt has h/o stricture and required dilation in the past. P with decreased stream and uniary frequency, Plan is t o take pt to OR for Garrison placement, On exam pt in NAD AAO x 3, non toxic, abd soft nt nd, Lungs ct ab/l

## 2022-04-07 NOTE — DISCHARGE NOTE PROVIDER - NSDCCPCAREPLAN_GEN_ALL_CORE_FT
PRINCIPAL DISCHARGE DIAGNOSIS  Diagnosis: Ureteral stricture  Assessment and Plan of Treatment: STATUS POST DILATION BY CYSTOSCOPY. FOLLOW UP WITH DR LEHMAN FOR MCGREGOR CATH REMOVAL. MCGREGOR CATH CARE--EMPTY BAG DAILY AND AS NEEDED, CONTINUE ANTIBIOTICS FOR UTI, CALL DOCTOR IF FEVER, BLOOD IN URINE, ABDOMINAL PAIN.       PRINCIPAL DISCHARGE DIAGNOSIS  Diagnosis: Ureteral stricture  Assessment and Plan of Treatment: STATUS POST DILATION BY CYSTOSCOPY. FOLLOW UP WITH DR LEHMAN FOR MCGREGOR CATH REMOVAL. MCGREGOR CATH CARE--EMPTY BAG DAILY AND AS NEEDED, CONTINUE ANTIBIOTICS FOR UTI, CALL DOCTOR IF FEVER, BLOOD IN URINE, ABDOMINAL PAIN.      SECONDARY DISCHARGE DIAGNOSES  Diagnosis: Constipation  Assessment and Plan of Treatment: miralax daily as needed. Follow up with primary care doctor.    Diagnosis: Prostate cancer  Assessment and Plan of Treatment: Follow up with urology.

## 2022-04-07 NOTE — H&P ADULT - ASSESSMENT
Patient is a 77yo m with a pmhx as below including prostate cancer, currently being treated with hormone and radiation therapy, urethral stricture which has been dilated in past, whom is sent to ed By Dr Curiel from his office as he was unable to pass blair cath secondary to worsening urethral stricture. Patient c/o of urinary frequency, decrease stream, incontinence and urgency for the last week. Patient denies abd pain, fever, chills, hematuria, dysuria, or n/v. patient sent to ed for OR urethral dilation today. Pt ate or drank last nite.  -preop lab, ekg, covid  -npo  -OR today  -d/w Dr Kirkland

## 2022-04-07 NOTE — H&P ADULT - HISTORY OF PRESENT ILLNESS
Patient is a 75yo m with a pmhx as below including prostate cancer, currently being treated with hormone and radiation therapy, urethral stricture which has been dilated in past, whom is sent to ed By Dr Curiel from his office as he was unable to pass blair cath secondary to worsening urethral stricture. Patient c/o of urinary frequency, decrease stream, incontinemce, and urgency for the last week. Patient denies abd pain, fever, chills, hematuria, dysuria, or n/v.

## 2022-04-07 NOTE — DISCHARGE NOTE PROVIDER - NSDCMRMEDTOKEN_GEN_ALL_CORE_FT
cefpodoxime 200 mg oral tablet: 1 tab(s) orally every 12 hours  simvastatin 80 mg oral tablet: 1 tab(s) orally once a day (at bedtime)  tamsulosin 0.4 mg oral capsule: 1 cap(s) orally once a day (at bedtime)  - if you continue to have your blood pressures drop when you stand, then this medication will need to be STOPPED   atropine 1% ophthalmic solution: 1 drop(s) to each affected eye 2 times a day  cefpodoxime 200 mg oral tablet: 1 tab(s) orally every 12 hours  erythromycin 0.5% ophthalmic ointment: 1 application to each affected eye 2 times a day  prednisoLONE acetate 1% ophthalmic suspension: 1 drop(s) to each affected eye every 6 hours  simvastatin 80 mg oral tablet: 1 tab(s) orally once a day (at bedtime)  tamsulosin 0.4 mg oral capsule: 1 cap(s) orally once a day (at bedtime)  - if you continue to have your blood pressures drop when you stand, then this medication will need to be STOPPED   atropine 1% ophthalmic solution: 1 drop(s) to each affected eye 2 times a day  cefpodoxime 200 mg oral tablet: 1 tab(s) orally every 12 hours  erythromycin 0.5% ophthalmic ointment: 1 application to each affected eye 2 times a day  polyethylene glycol 3350 oral powder for reconstitution: 17 gram(s) orally once a day, As Needed for constipation  prednisoLONE acetate 1% ophthalmic suspension: 1 drop(s) to each affected eye every 6 hours  simvastatin 80 mg oral tablet: 1 tab(s) orally once a day (at bedtime)  tamsulosin 0.4 mg oral capsule: 1 cap(s) orally once a day (at bedtime)  - if you continue to have your blood pressures drop when you stand, then this medication will need to be STOPPED

## 2022-04-07 NOTE — ED PROVIDER NOTE - NS ED ROS FT
CONST: No fever, chills or bodyaches  EYES: No pain, redness, drainage or visual changes.  ENT: No ear pain or discharge, nasal discharge or congestion. No sore throat  CARD: No chest pain, palpitations  RESP: No SOB, cough, hemoptysis. No hx of asthma or COPD  GI: No abdominal pain, N/V/D  : (+) urinary retention  MS: No joint pain, back pain or extremity pain/injury  SKIN: No rashes  NEURO: No headache, dizziness, paresthesias or LOC

## 2022-04-07 NOTE — PRE-ANESTHESIA EVALUATION ADULT - NSANTHOSAYNRD_GEN_A_CORE
denies/No. NA screening performed.  STOP BANG Legend: 0-2 = LOW Risk; 3-4 = INTERMEDIATE Risk; 5-8 = HIGH Risk

## 2022-04-07 NOTE — ED PROVIDER NOTE - NS ED ATTENDING STATEMENT MOD
This was a shared visit with the BOYD. I reviewed and verified the documentation and independently performed the documented:

## 2022-04-07 NOTE — ED PROVIDER NOTE - PHYSICAL EXAMINATION
Physical Exam    Vital Signs: I have reviewed the initial vital signs.  Constitutional: well-nourished, appears stated age, no acute distress  Eyes: Conjunctiva pink, Sclera clear   Cardiovascular: S1 and S2, regular rate, regular rhythm, well-perfused extremities, radial pulses equal and 2+ b/l.   Respiratory: unlabored respiratory effort, clear to auscultation bilaterally no wheezing, rales and rhonchi. pt is speaking full sentences. no accessory muscle use.   Gastrointestinal: soft, non-tender, nondistended abdomen, no pulsatile mass, normal bowl sounds, no rebound, no guarding  Musculoskeletal: supple neck, no lower extremity edema, no calf tenderness  Integumentary: warm, dry, no rash  Neurologic: awake, alert, cranial nerves II-XII grossly intact, extremities’ motor and sensory functions grossly intact.   Psychiatric: appropriate mood, appropriate affect

## 2022-04-07 NOTE — ED PROVIDER NOTE - OBJECTIVE STATEMENT
75 y/o male with a PMH of gastroduodenal artery pseudoaneurysm s/p coil embolization with Dr Guadarrama on 11/2/21, Prostate CA (recently diagnosed s/p prostate bx), CAD s/p stents, HTN, DM, nephrolithiasis, and diverticulosis presents to the ED sent in by Dr. Curiel for urinary retention and need for blair catheter, but has ureteral stricture so will go to the OR with Dr. Kirkland. pt denies fever, chills, cough, abdominal pain, n/v/d/c, back pain, weakness, numbness, tingling, dizziness, headache, rashes, or recent trauma.

## 2022-04-07 NOTE — HISTORY OF PRESENT ILLNESS
[Urinary Frequency] : urinary frequency [Nocturia] : nocturia [None] : None [FreeTextEntry1] : 76 y.o male here s/p US guided TP biopsy on 9/21/2021.\par pathology= LT posterior medial base, targeted lesion, adenocarcinoma 3+4, 75% // LT posterior lateral apex, targeted lesion, adenocarcinoma Ernesto 4+4, 30% // LT anterior adenocarcinoma 4+3, 30%. \par See pathology report for details. \par maintained on flomax and finasteride\par denies hematruia or dysuria\par \par Here toady : Patient called reporting an increase in frequency and urgency with incontinence.  Previous to this he called with similar symptoms earlier this week and because of a positive urine analysis he was started on cephalosporin antibiotic for suspect bacteriuria.  We suggested he come into the office today suspecting urinary retention. \par -4 /2022 bladder scanning= greater than 252 cc.. 16 Slov[place] coudé catheter attempted however resistance met at mid urethra.\par \par all data reviewed:\par 2020 bladder scan- 93ml (pt voided 1 1/2 hrs ago)TRUS- 62gms\par \par 12/20 psa = 7.2    %fpsa = .11  *****on finasteride  // Bladder scan = 150cc --1 hr void. \par 5/21  psa = 11.6  // PSAD =.12  **** on fnasteride // UA = neg \par \par Bone scan- neg for met disease in the bone   3/2022\par ct abd/pelvic- neg for met disease- layering calculi in the urinary bladder- re demonstration of ant bladder wall thickening on the left   3/2022 [Urinary Retention] : no urinary retention [Urinary Urgency] : no urinary urgency [Straining] : no straining [Weak Stream] : no weak stream [Dysuria] : no dysuria [Hematuria - Gross] : no gross hematuria [Fever] : no fever

## 2022-04-07 NOTE — CHART NOTE - NSCHARTNOTEFT_GEN_A_CORE
PACU ANESTHESIA ADMISSION NOTE      Procedure: Cystoscopy  urethral dilation with scope      Post op diagnosis:  Urinary retention        ____  Intubated  TV:______       Rate: ______      FiO2: ______    _x___  Patent Airway    _x___  Full return of protective reflexes    _x___  Full recovery from anesthesia / back to baseline status    Vitals:  T(C): 37  HR: 62  BP: 145/72  RR: 20   SpO2: 95%     Mental Status:  _x___ Awake   _____ Alert   _____ Drowsy   _____ Sedated    Nausea/Vomiting:  _x___  NO       ______Yes,   See Post - Op Orders         Pain Scale (0-10):  __0___    Treatment: _x___ None    ____ See Post - Op/PCA Orders    Post - Operative Fluids:   __x__ Oral   ____ See Post - Op Orders    Plan: Discharge:   _x___Home       _____Floor     _____Critical Care    _____  Other:_________________    Comments:  No anesthesia issues or complications noted.  Discharge when criteria met.

## 2022-04-07 NOTE — DISCHARGE NOTE PROVIDER - HOSPITAL COURSE
Patient is a 77yo m with a pmhx as below including prostate cancer, currently being treated with hormone and radiation therapy, urethral stricture which has been dilated in past, whom is sent to ed By Dr Curiel from his office as he was unable to pass blair cath secondary to worsening urethral stricture. Patient c/o of urinary frequency, decrease stream, incontinemce, and urgency for the last week. Patient denies abd pain, fever, chills, hematuria, dysuria, or n/v.   PATIENT ADMITTED AND UNDERWENT urethral dilation with scope.  DC HOME WITH BLAIR CLINCALLY IMPROVED Patient is a 77yo m with a pmhx as below including prostate cancer, currently being treated with hormone and radiation therapy, urethral stricture which has been dilated in past, whom is sent to ed By Dr Curiel from his office as he was unable to pass blair cath secondary to worsening urethral stricture. Patient c/o of urinary frequency, decrease stream, incontinemce, and urgency for the last week. Patient denies abd pain, fever, chills, hematuria, dysuria, or n/v.   PATIENT ADMITTED AND UNDERWENT urethral dilation with scope.  DC HOME WITH BLAIR CLINCALLY IMPROVED    Pt seen and examined on day of dc.  Vital Signs Last 24 Hrs  T(F): 97.7 (08 Apr 2022 05:00), Max: 97.7 (08 Apr 2022 05:00)  HR: 89 (08 Apr 2022 05:00) (55 - 97)  BP: 138/72 (08 Apr 2022 05:00) (118/74 - 181/89)  RR: 18 (08 Apr 2022 05:00) (18 - 20)  SpO2: 97% (07 Apr 2022 17:15) (95% - 98%)    PHYSICAL EXAM:      Constitutional: A&Ox4  Respiratory: cta b/l  Cardiovascular: s1 s2 rrr  Gastrointestinal: soft nt  nd + bs no rebound or guarding  Genitourinary: no cva tenderness, blair in place draining clear urine  Extremities: normal rom, no edema, calf tenderness  Neurological:no focal deficits  Skin: no rash

## 2022-04-08 ENCOUNTER — TRANSCRIPTION ENCOUNTER (OUTPATIENT)
Age: 77
End: 2022-04-08

## 2022-04-08 VITALS
TEMPERATURE: 98 F | SYSTOLIC BLOOD PRESSURE: 138 MMHG | HEART RATE: 89 BPM | RESPIRATION RATE: 18 BRPM | DIASTOLIC BLOOD PRESSURE: 72 MMHG

## 2022-04-08 LAB
BACTERIA UR CULT: ABNORMAL
GLUCOSE BLDC GLUCOMTR-MCNC: 101 MG/DL — HIGH (ref 70–99)

## 2022-04-08 RX ORDER — ATROPINE SULFATE 1 %
1 DROPS OPHTHALMIC (EYE)
Qty: 0 | Refills: 0 | DISCHARGE
Start: 2022-04-08

## 2022-04-08 RX ORDER — PREDNISOLONE SODIUM PHOSPHATE 1 %
1 DROPS OPHTHALMIC (EYE)
Qty: 0 | Refills: 0 | DISCHARGE
Start: 2022-04-08

## 2022-04-08 RX ORDER — POLYETHYLENE GLYCOL 3350 17 G/17G
17 POWDER, FOR SOLUTION ORAL ONCE
Refills: 0 | Status: DISCONTINUED | OUTPATIENT
Start: 2022-04-08 | End: 2022-04-08

## 2022-04-08 RX ORDER — POLYETHYLENE GLYCOL 3350 17 G/17G
17 POWDER, FOR SOLUTION ORAL
Qty: 0 | Refills: 0 | DISCHARGE
Start: 2022-04-08

## 2022-04-08 RX ORDER — ERYTHROMYCIN BASE 5 MG/GRAM
1 OINTMENT (GRAM) OPHTHALMIC (EYE)
Qty: 0 | Refills: 0 | DISCHARGE
Start: 2022-04-08

## 2022-04-08 RX ADMIN — Medication 1 DROP(S): at 05:40

## 2022-04-08 RX ADMIN — SODIUM CHLORIDE 100 MILLILITER(S): 9 INJECTION, SOLUTION INTRAVENOUS at 05:41

## 2022-04-08 RX ADMIN — Medication 1 APPLICATION(S): at 05:40

## 2022-04-08 RX ADMIN — Medication 1 DROP(S): at 00:47

## 2022-04-08 NOTE — DISCHARGE NOTE NURSING/CASE MANAGEMENT/SOCIAL WORK - NSDCVIVACCINE_GEN_ALL_CORE_FT
influenza, injectable, quadrivalent, preservative free; 16-Oct-2021 15:19; Froilan Leung (RN); [employer]; [id number] (Exp. Date: 31-Jan-2022); IntraMuscular; Deltoid Right.; 0.5 milliLiter(s); VIS (VIS Published: 06-Aug-2021, VIS Presented: 16-Oct-2021);   Tdap; 15-Nov-2021 07:31; Behtanie Aguirre (RN); [employer]; [id number] (Exp. Date: 09-Sep-2023); IntraMuscular; Deltoid Left.; 0.5 milliLiter(s); VIS (VIS Published: 09-May-2013, VIS Presented: 15-Nov-2021);

## 2022-04-08 NOTE — DISCHARGE NOTE NURSING/CASE MANAGEMENT/SOCIAL WORK - PATIENT PORTAL LINK FT
You can access the FollowMyHealth Patient Portal offered by [facility] by registering at the following website: [email or web address]. By joining [email or web address] FollowMyHealth portal, you will also be able to view your health information using other applications (apps) compatible with our system.

## 2022-04-08 NOTE — DISCHARGE NOTE NURSING/CASE MANAGEMENT/SOCIAL WORK - NSDCPEFALRISK_GEN_ALL_CORE
For information on Fall & Injury Prevention, visit: [email or web address] OR  [email or web address] OR  https://www.cdc.gov/steadi/patient.html

## 2022-04-10 NOTE — ED PROVIDER NOTE - EKG #1 DATE/TIME
Principal Discharge DX:	Chest pain  Secondary Diagnosis:	Abnormal EKG  Secondary Diagnosis:	COVID-19   1 15-Nov-2021 06:30

## 2022-04-12 ENCOUNTER — NON-APPOINTMENT (OUTPATIENT)
Age: 77
End: 2022-04-12

## 2022-04-13 ENCOUNTER — APPOINTMENT (OUTPATIENT)
Dept: UROLOGY | Facility: CLINIC | Age: 77
End: 2022-04-13
Payer: MEDICARE

## 2022-04-13 VITALS — BODY MASS INDEX: 22.66 KG/M2 | HEIGHT: 69 IN | WEIGHT: 153 LBS

## 2022-04-13 DIAGNOSIS — E11.9 TYPE 2 DIABETES MELLITUS WITHOUT COMPLICATIONS: ICD-10-CM

## 2022-04-13 DIAGNOSIS — K59.00 CONSTIPATION, UNSPECIFIED: ICD-10-CM

## 2022-04-13 DIAGNOSIS — C61 MALIGNANT NEOPLASM OF PROSTATE: ICD-10-CM

## 2022-04-13 DIAGNOSIS — N13.5 CROSSING VESSEL AND STRICTURE OF URETER WITHOUT HYDRONEPHROSIS: ICD-10-CM

## 2022-04-13 DIAGNOSIS — R33.8 OTHER RETENTION OF URINE: ICD-10-CM

## 2022-04-13 DIAGNOSIS — I10 ESSENTIAL (PRIMARY) HYPERTENSION: ICD-10-CM

## 2022-04-13 DIAGNOSIS — N40.1 BENIGN PROSTATIC HYPERPLASIA WITH LOWER URINARY TRACT SYMPTOMS: ICD-10-CM

## 2022-04-13 PROCEDURE — 99213 OFFICE O/P EST LOW 20 MIN: CPT

## 2022-04-22 LAB — PSA SERPL-MCNC: 2.13 NG/ML

## 2022-04-26 ENCOUNTER — APPOINTMENT (OUTPATIENT)
Dept: UROLOGY | Facility: CLINIC | Age: 77
End: 2022-04-26
Payer: MEDICARE

## 2022-04-26 PROCEDURE — 99213 OFFICE O/P EST LOW 20 MIN: CPT

## 2022-05-03 NOTE — H&P ADULT - NSHPLABSRESULTS_GEN_ALL_CORE
LABS  05-03    139  |  105  |  17  ----------------------------<  110<H>  5.6<H>   |  24  |  1.4    Ca    9.8      03 May 2022 11:30  Mg     2.2     05-03    TPro  7.2  /  Alb  3.9  /  TBili  1.0  /  DBili  x   /  AST  57<H>  /  ALT  21  /  AlkPhos  106  05-03                          13.7   9.35  )-----------( 228      ( 03 May 2022 11:30 )             43.4       PT/INR - ( 03 May 2022 11:30 )   PT: 11.60 sec;   INR: 1.01 ratio         PTT - ( 03 May 2022 11:30 )  PTT:32.0 sec    CARDIAC ENZYMES    Troponin T, Serum (05.03.22 @ 11:30)   Troponin T, Serum: 2.53    < from: 12 Lead ECG (05.03.22 @ 11:44) >      Diagnosis Line Normal sinus rhythm  Right bundle branch block  Left anterior fascicular block  *** Bifascicular block ***  Anteroseptal infarct , age undetermined  Abnormal ECG    < end of copied text >    < from: Xray Wrist 3 Views, Left (05.03.22 @ 11:43) >      Impression:  Chronic appearing left distal radius fracture deformity with dorsal   angulation (no prior x-ray available). No definite acute fracture   identified.    < end of copied text >    < from: CT Head No Cont (05.03.22 @ 12:44) >    IMPRESSION:    CT HEAD:  No acute intracranial pathology or hemorrhage. No acute calvarial   fracture.    MAXILLOFACIAL BONES:  No evidence of acute maxillofacial fracture or soft tissue injury.    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    CT CERVICAL SPINE:  No acute fracture or subluxation.    < end of copied text >

## 2022-05-03 NOTE — ED ADULT TRIAGE NOTE - HISTORY OF COVID-19 VACCINATION
Procedure was performed by me.  Sterile technique was performed in the RLQ, lidocaine was used as a local anesthetic.  3 liters of chylous fluid removed for therapeutic purposes.  Pt tolerated the procedure well without immediate complications.  Please see radiologist report for details. F/u with PCP and/or ordering physician.      Vaccine status unknown

## 2022-05-03 NOTE — PATIENT PROFILE ADULT - FALL HARM RISK - HARM RISK INTERVENTIONS
Assistance with ambulation/Assistance OOB with selected safe patient handling equipment/Communicate Risk of Fall with Harm to all staff/Discuss with provider need for PT consult/Monitor gait and stability/Provide patient with walking aids - walker, cane, crutches/Reinforce activity limits and safety measures with patient and family/Sit up slowly, dangle for a short time, stand at bedside before walking/Tailored Fall Risk Interventions/Use of alarms - bed, chair and/or voice tab/Visual Cue: Yellow wristband and red socks/Bed in lowest position, wheels locked, appropriate side rails in place/Call bell, personal items and telephone in reach/Instruct patient to call for assistance before getting out of bed or chair/Non-slip footwear when patient is out of bed/[place] to call system/Physically safe environment - no spills, clutter or unnecessary equipment/Purposeful Proactive Rounding/Room/bathroom lighting operational, light cord in reach

## 2022-05-03 NOTE — ED PROVIDER NOTE - OBJECTIVE STATEMENT
Patient BIBA for fall 3 days ago, Hit head and left arm < C/o left wrist pain, family states patient refused to come to ED at time of fall, Unknown LOC, unknown down time. 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

## 2022-05-03 NOTE — CHART NOTE - NSCHARTNOTEFT_GEN_A_CORE
Code STEMI called in [facility], I responded immediately. History obtained from [facility] ED attending. Pt hx CAD . BPH, DM, HTN presenting with fall. Pt denies any chest pain. Found to have elevated trop, L wrist fx and ecchymosis of L eye. EKG and history discussed with interventional cardiologist. Code STEMI cancelled. Pt likely had prior MI. Medical therapy once cleared from trauma standpoint for any possible bleed.

## 2022-05-03 NOTE — PATIENT PROFILE ADULT - NSPROMEDSADMININFO_GEN_A_NUR
Therapy orders received.  Spoke with RN regarding current status who notes pt up ad jaxon with no mobility or ADL deficits currently.  Given that pt is functioning at baseline will complete orders at this time.    Win Ng DPT  Pager [phone]  E-mail [email or web address]     no concerns

## 2022-05-03 NOTE — ED PROVIDER NOTE - GASTROINTESTINAL NEGATIVE STATEMENT, MLM
[de-identified] : Mr. Galindo is a pleasant 69 -year-old gentleman here for follow up.  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 His postoperative course was notable for a wound infection, which resolved over time with antiobiotic and dressing changes. 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 Margins are negative, T1NxMx.\par \par He was seen on 10/8/20 at which time he reported his wound had healed entirely.  He had returned to baseline activities.  He was visiting his daughter in [place] and had a syncopal episode.  He was seen and evaluated at a local hospital, and was discharged the next day.  He denied any additional episodes.  He did report a bulge along the right side of his midline incision.\par \par He returned on 11/16/20 with concerns regarding an incisional hernia.  He noted a bulge to the right of his midline incision that was more prominent with standing.  He denied any obstructive symptoms.  We discussed options regarding his incisional hernia including observation and operative repair. He agreed to short term surveillance in 6 months with a repeat CT.\par \par I spoke to him on 11/30/20 and he reported nausea, migraines and dull epigastric pain improved with Tums.  I renewed his PPI and asked him to follow up with Dr. Mohr. \par \par Pertinent History:vilma Schrader is a pleasant 69 year-old male here for an initial consultation. He began to experience intermittent epigastric pain and on 1/6/20 Dr. Ryan Tripathi performed an EGD which revealed a large nodule in the gastric body. Biopsy was performed and pathology demonstrated a Gastric GIST.\par \par He was then referred for a CT of the abdomen and pelvis on 2/5/20 which demonstrated a 1.9 cm soft tissue attenuation mass along the lesser curvature of the stomach without a significant exophytic component. An EUS evaluation was performed by Dr. Eric Mohr on 2/7/20 and revealed a subepithelial lesion in the lesser curvature of the stomach. It was encountered at 5 cm distal to the gastroesophageal junction. It measured 17.5 mm x 12.3 mm in diameter. Differentials included carcinoid, GIST or leiomyoma. FNA was suspicious for neoplasm and favored a GIST. 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 He has a family history of breast cancer involving his mother. He does not smoke or drink alcohol.
no abdominal pain, no bloating, no constipation, no diarrhea, no nausea and no vomiting.

## 2022-05-03 NOTE — CONSULT NOTE ADULT - SUBJECTIVE AND OBJECTIVE BOX
HPI:  76 year old male with a past medical history of cad s/p stents, prostate ca,  BPH, hyperlipidemia and frequent falls - recent fall ~ one month ago with L orbital floor fracture and ruptured globe ...fell 2 days ago - slipped leaving the bathroom  and hurt L wrist with pain which didn't resolve so  he came to ED today for persistent L wrist pain -  No chest pain or shortness of breath (03 May 2022 14:18)      HPI-Cardiology   Pt evaluated at bedside.  Admitted to ICU. Radiology tests and hospital records, were reviewed, as well as previous notes on this patient.   Pt currently......      PAST MEDICAL & SURGICAL HISTORY  CAD (coronary artery disease)  Hypercholesteremia  BPH (benign prostatic hyperplasia)  Kidney stones  Prostate cancer  Status post cardiac surgery cardiac stents  Bilateral cataracts  History of lithotripsy      FAMILY HISTORY:  colon cancer (Mother)    SOCIAL HISTORY:  []smoker  []Alcohol - denies  []Drug - denies    ALLERGIES:  No Known Allergies      MEDICATIONS:  MEDICATIONS  (STANDING):  artificial  tears Solution 1 Drop(s) Left EYE four times a day  aspirin  chewable 81 milliGRAM(s) Oral daily  atropine 1% Solution 1 Drop(s) Left EYE two times a day  heparin  Infusion.  Unit(s)/Hr (8 mL/Hr) IV Continuous <Continuous>  metoprolol tartrate 25 milliGRAM(s) Oral two times a day  ofloxacin 0.3% Solution 1 Drop(s) Left EYE at bedtime  prednisoLONE acetate 1% Suspension 1 Drop(s) Left EYE every 6 hours  simvastatin Oral Tab/Cap - Peds 80 milliGRAM(s) Oral at bedtime  tamsulosin 0.4 milliGRAM(s) Oral at bedtime  MEDICATIONS  (PRN):  heparin   Injectable 4000 Unit(s) IV Push every 6 hours PRN For aPTT less than 40  morphine  - Injectable 2 milliGRAM(s) IV Push every 4 hours PRN Mild Pain (1 - 3)      HOME MEDICATIONS:  atropine 1% ophthalmic solution: 1 drop(s) to each affected eye 2 times a day (03 May 2022 14:38)  erythromycin 0.5% ophthalmic ointment: 1 application to each affected eye 2 times a day (03 May 2022 14:38)  polyethylene glycol 3350 oral powder for reconstitution: 17 gram(s) orally once a day, As Needed for constipation (03 May 2022 14:38)  prednisoLONE acetate 1% ophthalmic suspension: 1 drop(s) to each affected eye every 6 hours (03 May 2022 14:38)  simvastatin 80 mg oral tablet: 1 tab(s) orally once a day (at bedtime) (03 May 2022 14:38)  tamsulosin 0.4 mg oral capsule: 1 cap(s) orally once a day (at bedtime) (03 May 2022 14:38)      VITALS:   T(F): 97.3 (05-03 @ 10:47), Max: 97.3 (05-03 @ 10:47)  HR: 55 (05-03 @ 14:43) (55 - 62)  BP: 120/67 (05-03 @ 14:43) (120/67 - 134/74)  BP(mean): --  RR: 18 (05-03 @ 14:43) (18 - 18)  SpO2: 98% (05-03 @ 14:43) (98% - 99%)  I&O's Summary    REVIEW OF SYSTEMS:  CONSTITUTIONAL: No weakness, fevers or chills  EYES: No visual changes  ENT: No vertigo or throat pain   NECK: No pain or stiffness  RESPIRATORY: No cough, wheezing, hemoptysis; No shortness of breath  CARDIOVASCULAR: No chest pain or palpitations  GASTROINTESTINAL: No abdominal or epigastric pain. No nausea, vomiting, or hematemesis; No diarrhea or constipation. No melena or hematochezia.  GENITOURINARY: No dysuria, frequency or hematuria  NEUROLOGICAL: No numbness or weakness  SKIN: No itching, no rashes  MSK: no joint swelling or pain    PHYSICAL EXAM:  GEN: Not in acute distress, resting comfortably  NECK: no thyroid enlargement, no cervical adenopathy  LUNGS: Clear to auscultation bilaterally, no retractions, no nasal flaring  CARDIOVASCULAR: S1/S2 present, RRR , no murmurs or rubs, no carotid bruits, no JVD,  + PP bilaterally  GI: Soft, non-tender, non-distended, +BS  NEURO: patient is awake , alert and oriented, no weakness, no facial drooping  Vascular: No LE edema, pedal pulses 2+  SKIN: Intact    LABS:                        13.7   9.35  )-----------( 228      ( 03 May 2022 11:30 )             43.4   05-03  139  |  105  |  17  ----------------------------<  110<H>  5.6<H>   |  24  |  1.4  Ca    9.8      03 May 2022 11:30  Mg     2.2     05-03  TPro  7.2  /  Alb  3.9  /  TBili  1.0  /  DBili  x   /  AST  57<H>  /  ALT  21  /  AlkPhos  106  05-03  PT/INR - ( 03 May 2022 11:30 )   PT: 11.60 sec;   INR: 1.01 ratio    PTT - ( 03 May 2022 11:30 )  PTT:32.0 sec    Troponin T, Serum: 2.53 ng/mL *HH* (05-03-22 @ 11:30)  Creatine Kinase, Serum: 157 U/L (05-03-22 @ 11:30)  Lactate, Blood: 1.3 mmol/L (05-03-22 @ 11:30)  CARDIAC MARKERS ( 03 May 2022 11:30 )  x     / 2.53 ng/mL / 157 U/L / x     / x        RADIOLOGY:  tte< from: TTE Echo Complete w/o Contrast w/ Doppler (10.15.21 @ 10:52) >  Summary:   1. Left ventricular ejection fraction, by visual estimation, is 60 to 65%.   2. Moderate left ventricular hypertrophy.   3. Normal left atrial size.   4. There is no evidence of pericardial effusion.   5. Mild mitral annular calcification.   6. Mild mitral valve regurgitation.   7. With valsalva mod lv out flow gradient.   8. Sclerotic aortic valve with normal opening.  < end of copied text >    ecg< from: 12 Lead ECG (05.03.22 @ 11:44) >  Diagnosis Line Normal sinus rhythm  Right bundle branch block  Left anterior fascicular block  *** Bifascicular block ***  Anteroseptal infarct , age undetermined  Abnormal ECG  < end of copied text >    cxr< from: Xray Chest 1 View- PORTABLE-Urgent (05.03.22 @ 11:39) >  Impression:  No radiographic evidence of acute cardiopulmonary disease.  --- End of Report ---  < end of copied text >         HPI:  76 year old male with a past medical history of cad s/p stents, prostate ca,  BPH, hyperlipidemia and frequent falls - recent fall ~ one month ago with L orbital floor fracture and ruptured globe ...fell 2 days ago - slipped leaving the bathroom  and hurt L wrist with pain which didn't resolve so  he came to ED today for persistent L wrist pain -  No chest pain or shortness of breath (03 May 2022 14:18)      HPI-Cardiology   Pt evaluated at bedside. Pt was walking to the bathroom, slipped on a wet floor and fell. Sustained left supraorbital laceration and injured left wrist. Refused to come to the hospital right after the fall, but came only after 3 days since pain was not getting better. November 2021 pt fell and sustained right orbital fracture with ruptured globe, blind on left eye. Pt was found to have elevated troponin, ecg changes and left radial fracture in ED. He denies chest pains, palpitations dizziness or sob. Admitted to ICU. Placed on Heparin gtt, left forearm splinted. Radiology tests and hospital records, were reviewed, as well as previous notes on this patient.       PAST MEDICAL & SURGICAL HISTORY  CAD (coronary artery disease)  Hypercholesteremia  BPH (benign prostatic hyperplasia)  Kidney stones  Prostate cancer  Status post cardiac surgery cardiac stents  Bilateral cataracts  History of lithotripsy      FAMILY HISTORY:  colon cancer (Mother)    SOCIAL HISTORY:  []smoker  []Alcohol - denies  []Drug - denies    ALLERGIES:  No Known Allergies      MEDICATIONS:  MEDICATIONS  (STANDING):  artificial  tears Solution 1 Drop(s) Left EYE four times a day  aspirin  chewable 81 milliGRAM(s) Oral daily  atropine 1% Solution 1 Drop(s) Left EYE two times a day  heparin  Infusion.  Unit(s)/Hr (8 mL/Hr) IV Continuous <Continuous>  metoprolol tartrate 25 milliGRAM(s) Oral two times a day  ofloxacin 0.3% Solution 1 Drop(s) Left EYE at bedtime  prednisoLONE acetate 1% Suspension 1 Drop(s) Left EYE every 6 hours  simvastatin Oral Tab/Cap - Peds 80 milliGRAM(s) Oral at bedtime  tamsulosin 0.4 milliGRAM(s) Oral at bedtime  MEDICATIONS  (PRN):  heparin   Injectable 4000 Unit(s) IV Push every 6 hours PRN For aPTT less than 40  morphine  - Injectable 2 milliGRAM(s) IV Push every 4 hours PRN Mild Pain (1 - 3)      HOME MEDICATIONS:  atropine 1% ophthalmic solution: 1 drop(s) to each affected eye 2 times a day (03 May 2022 14:38)  erythromycin 0.5% ophthalmic ointment: 1 application to each affected eye 2 times a day (03 May 2022 14:38)  polyethylene glycol 3350 oral powder for reconstitution: 17 gram(s) orally once a day, As Needed for constipation (03 May 2022 14:38)  prednisoLONE acetate 1% ophthalmic suspension: 1 drop(s) to each affected eye every 6 hours (03 May 2022 14:38)  simvastatin 80 mg oral tablet: 1 tab(s) orally once a day (at bedtime) (03 May 2022 14:38)  tamsulosin 0.4 mg oral capsule: 1 cap(s) orally once a day (at bedtime) (03 May 2022 14:38)      VITALS:   T(F): 97.3 (05-03 @ 10:47), Max: 97.3 (05-03 @ 10:47)  HR: 55 (05-03 @ 14:43) (55 - 62)  BP: 120/67 (05-03 @ 14:43) (120/67 - 134/74)  BP(mean): --  RR: 18 (05-03 @ 14:43) (18 - 18)  SpO2: 98% (05-03 @ 14:43) (98% - 99%)  I&O's Summary    REVIEW OF SYSTEMS:  CONSTITUTIONAL: No weakness, fevers or chills  EYES: No visual changes  ENT: No vertigo or throat pain   NECK: No pain or stiffness  RESPIRATORY: No cough, wheezing, hemoptysis; No shortness of breath  CARDIOVASCULAR: see hpi  GASTROINTESTINAL: No abdominal or epigastric pain. No nausea, vomiting, or hematemesis; No diarrhea or constipation. No melena or hematochezia.  GENITOURINARY: No dysuria, frequency or hematuria  NEUROLOGICAL: No numbness or weakness  SKIN: No itching, no rashes  MSK: no joint swelling or pain    PHYSICAL EXAM:  GEN: Not in acute distress, resting comfortably  EYES: left periorbital ecchymosis and edema, left supraorbital repaired laceration  NECK: no thyroid enlargement, no cervical adenopathy  LUNGS: Clear to auscultation bilaterally, no retractions, no nasal flaring  CARDIOVASCULAR: S1/S2 present, RRR , no murmurs or rubs, no carotid bruits, no JVD,  + PP bilaterally  GI: Soft, non-tender, non-distended, +BS  NEURO: patient is awake , alert and oriented, no weakness, no facial drooping  Vascular: No LE edema, pedal pulses 2+  SKIN: Intact    LABS:                        13.7   9.35  )-----------( 228      ( 03 May 2022 11:30 )             43.4   05-03  139  |  105  |  17  ----------------------------<  110<H>  5.6<H>   |  24  |  1.4  Ca    9.8      03 May 2022 11:30  Mg     2.2     05-03  TPro  7.2  /  Alb  3.9  /  TBili  1.0  /  DBili  x   /  AST  57<H>  /  ALT  21  /  AlkPhos  106  05-03  PT/INR - ( 03 May 2022 11:30 )   PT: 11.60 sec;   INR: 1.01 ratio    PTT - ( 03 May 2022 11:30 )  PTT:32.0 sec  Lactate, Blood: 1.3 mmol/L (05-03-22 @ 11:30    Troponin T, Serum: 2.53 ng/mL *HH* (05-03-22 @ 11:30)  Creatine Kinase, Serum: 157 U/L (05-03-22 @ 11:30)  CARDIAC MARKERS ( 03 May 2022 11:30 )  x     / 2.53 ng/mL / 157 U/L / x     / x        RADIOLOGY:  tte< from: TTE Echo Complete w/o Contrast w/ Doppler (10.15.21 @ 10:52) >  Summary:   1. Left ventricular ejection fraction, by visual estimation, is 60 to 65%.   2. Moderate left ventricular hypertrophy.   3. Normal left atrial size.   4. There is no evidence of pericardial effusion.   5. Mild mitral annular calcification.   6. Mild mitral valve regurgitation.   7. With valsalva mod lv out flow gradient.   8. Sclerotic aortic valve with normal opening.  < end of copied text >    ecg< from: 12 Lead ECG (05.03.22 @ 11:44) >  Diagnosis Line Normal sinus rhythm  Right bundle branch block  Left anterior fascicular block  *** Bifascicular block ***  Anteroseptal infarct , age undetermined  Abnormal ECG  < end of copied text >    cxr< from: Xray Chest 1 View- PORTABLE-Urgent (05.03.22 @ 11:39) >  Impression:  No radiographic evidence of acute cardiopulmonary disease.  --- End of Report ---  < end of copied text >    ct spine< from: CT Cervical Spine No Cont (05.03.22 @ 12:52) >  IMPRESSION:  CT HEAD:  No acute intracranial pathology or hemorrhage. No acute calvarial   fracture.  MAXILLOFACIAL BONES:  No evidence of acute maxillofacial fracture or soft tissue injury.  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  CT CERVICAL SPINE:  No acute fracture or subluxation.  --- End of Report ---  < end of copied text >    xr wrist left< from: Xray Wrist 3 Views, Left (05.03.22 @ 11:43) >  Impression:  Chronic appearing left distal radius fracture deformity with dorsal   angulation (no prior x-ray available). No definite acute fracture   identified.  --- End of Report ---  < end of copied text >

## 2022-05-03 NOTE — H&P ADULT - HISTORY OF PRESENT ILLNESS
· 76 year old male with a past medical history of cad s/p stents, prostate ca,  BPH, hyperlipidemia and frequent falls - recent fall ~ one month ago with L orbital floor fracture and ruptured globe ...fell 2 days ago - slipped leaving the bathroom  and hurt L wrist with pain which didn't resolve so  he came to ED today for persistent L wrist pain -  No chest pain or shortness of breath

## 2022-05-03 NOTE — H&P ADULT - ASSESSMENT
76 year old male with a past medical history of cad s/p stents, prostate ca,  BPH, hyperlipidemia and frequent falls - recent fall ~ one month ago with L orbital floor fracture and ruptured globe ...fell 2 days ago - slipped leaving the bathroom  and hurt L wrist with pain which didn't resolve so  he came to ED today for persistent L wrist pain -  No chest pain or shortness of breath    acute MI  / L radial fracture s/p fall     - stay at [facility] for medical management as per cardiology team   - aspirin, statin and IV heparin - check ptt and adjust   - low dose b-blockers with BP parameters (held in past for low BP)   - check 2DECHO and repeat cardiac enzymes   - s/p splint of L wrist   - continue home eye drops and meds   - cardiology consult 76 year old male with a past medical history of cad s/p stents, prostate ca,  BPH, hyperlipidemia and frequent falls - recent fall ~ one month ago with L orbital floor fracture and ruptured globe ...fell 2 days ago - slipped leaving the bathroom  and hurt L wrist with pain which didn't resolve so  he came to ED today for persistent L wrist pain -  No chest pain or shortness of breath    acute MI  / L radial fracture s/p fall     - stay at [facility] for medical management as per cardiology team   - aspirin, statin and IV heparin - check ptt and adjust   - low dose b-blockers with BP parameters (held in past for low BP)   - check 2DECHO and repeat cardiac enzymes   - s/p splint of L wrist - analgesia - morphine prn   - continue home eye drops and meds   - cardiology consult

## 2022-05-03 NOTE — ED PROVIDER NOTE - ATTENDING APP SHARED VISIT CONTRIBUTION OF CARE
68-year-old male past medical history of diabetes, HTN, hypercholesterolemia, coronary artery disease, prostate cancer presents from home for evaluation of fall.  Patient complaining of left wrist pain.  Patient fell few days ago.  Refused to come at that time.  Patient currently with black eye telling me he has ruptured globe in November.  Patient denies neck pain or back pain, no chest pain no shortness of breath.  On exam patient in NAD, AAO x3, positive left-sided periorbital ecchymosis with laceration to left brow, with dried blood and scabbing, no midline vertebral tenderness, abdomen is soft nontender, no edema, lungs CTA B/L, equal air entry, hips nontender, knees nontender, positive tender with swelling to left wrist, positive pain with range of motion positive radial pulses intact

## 2022-05-03 NOTE — H&P ADULT - NSICDXPASTMEDICALHX_GEN_ALL_CORE_FT
PAST MEDICAL HISTORY:  BPH (benign prostatic hyperplasia)     CAD (coronary artery disease)     Hypercholesteremia     Kidney stones     Prostate cancer

## 2022-05-03 NOTE — ED PROCEDURE NOTE - NS ED ATTENDING STATEMENT MOD
This was a shared visit with the BOYD. I reviewed and verified the documentation and independently performed the documented:
This was a shared visit with the BOYD. I reviewed and verified the documentation and independently performed the documented:

## 2022-05-03 NOTE — ED PROVIDER NOTE - CLINICAL SUMMARY MEDICAL DECISION MAKING FREE TEXT BOX
Attending Statement: I have personally provided the amount of critical care time documented below excluding time spent on separate procedures.   Critical Care Time Spent (min) Must be 30 or more minutes to qualify: 45.    Pt with chief complaint of fall.  Given fall few days ago and question if patient was on ground further work up obtained.  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  Lab called for positive troponin.  I called STEMI at that time given pts high likelihood of lad lesion. I discussed case in detail with Dr Hamilton of cardiology who spoke with Dr Carrasco and rec medical management at [facility].  I discussed case with Dr Hollins of cardiology at  who rec CCU, anti-platelet therapy and Heparin. After offical reads of imaging treatment initiated,  Pt wound repaired and abx given since wound at least 3 days old.  Fracture placed in splint.

## 2022-05-03 NOTE — ED PROVIDER NOTE - CARE PLAN
Principal Discharge DX:	Acute MI  Secondary Diagnosis:	Fall  Secondary Diagnosis:	Radial fracture  Secondary Diagnosis:	Laceration of face  Secondary Diagnosis:	Contusion of face   1

## 2022-05-03 NOTE — CONSULT NOTE ADULT - ASSESSMENT
75yo male with hx CAD PCI x2 (2018), hld, bph, prostate cancer, orbital fracture presents to ED post fall Sunday with c/o left wrist pain. Found to have elevated troponin and ecg changes in ED, chronic appearing left radial fracture. Placed on Heparin gtt, left forearm splinted, left brow laceration repaired, admitted to ICU.    trop 2.53  ck 157  tte 10/2021: E 60-65%  ecg: nsr, bifascicular block, RBBB    Impression:  *nstemi    Plan:  - c/w Heparin gtt and titrate per protocol  - trend troponin, ck/ckmb  - TTE  - Start on Metoprolol 25mg bid with holding parameters  - c/w Simvastatin  - serial ecg  - ICU/telemetry  - high fall risk 77yo male with hx CAD PCI x2 (2018), hld, bph, prostate cancer, orbital fracture presents to ED post fall Sunday with c/o left wrist pain. Found to have elevated troponin and ecg changes in ED, chronic appearing left radial fracture. Placed on Heparin gtt, left forearm splinted, left brow laceration repaired, admitted to ICU.    trop 2.53  ck 157  tte 10/2021: E 60-65%  ecg: nsr, bifascicular block, RBBB    Impression:  *nstemi    Plan:  - c/w Heparin gtt and titrate per protocol  - trend troponin, ck/ckmb  - TTE  - Start on Metoprolol 25mg bid with holding parameters  - will add Plavix 75mg PO daily  - will changed Simvastatin to Atorvastatin 80mg daily, has more potent action  - serial ecg  - ICU/telemetry  - high fall risk  - when patient is stable, will need LHC, will discuss with patient    Discussed with

## 2022-05-03 NOTE — CONSULT NOTE ADULT - NS ATTEND AMEND GEN_ALL_CORE FT
Patient seen and examined. Pertinent labs, imaging and telemetry reviewed. I agree with the above:    Patient feeling well. Denies pain in his eye or wrist.   On further questioning, patient denies any CP, SOB, palpitations within the last few days or weeks. He also states he did not pass out when he fell.   Patient has stents in the past but unsure what vessels they were placed in. He has followed with Dr. Mansfield in the past.   EKG with NSR, RBBB, LAFB and also DESTINI in V3-5 and TWI, consistent with recent MI. Trop 2.5 initially and awaiting further trend.     77yo male with hx CAD PCI x2 (2018), hld, bph, prostate cancer, orbital fracture presents to ED post fall Sunday with c/o left wrist pain. Found to have elevated troponin and ecg changes in ED, chronic appearing left radial fracture.   -Treat as NSTEMI  -ASA, Plavix and heparin infusion.   -Continue to trend troponins.  -HR is borderline and can hold BB at this time.   -Hold ACE/ARB in setting of possible LHC this admission.  -Check TTE.  -Check TSH, lipids and HA1c.   -Change simvastatin to atorvastatin 80mg PO daily.   -LHC this admission, with clearance from orthopedics/trauma.   -Discussed with patient and son.

## 2022-05-03 NOTE — H&P ADULT - NSHPPHYSICALEXAM_GEN_ALL_CORE
PHYSICAL EXAM:  Vital Signs Last 24 Hrs  T(C): 36.3 (03 May 2022 10:47), Max: 36.3 (03 May 2022 10:47)  T(F): 97.3 (03 May 2022 10:47), Max: 97.3 (03 May 2022 10:47)  HR: 58 (03 May 2022 13:06) (58 - 62)  BP: 134/74 (03 May 2022 13:06) (131/63 - 134/74)  RR: 18 (03 May 2022 13:06) (18 - 18)  SpO2: 99% (03 May 2022 13:06) (98% - 99%)    GENERAL: NAD  HEAD:  L eye patch  EYES: R eye - EOMI, PERRLA, conjunctiva and sclera clear  ENMT: No tonsillar erythema, exudates, or enlargement; Moist mucous membranes, Good dentition, No lesions  NECK: Supple, No JVD, Normal thyroid  NERVOUS SYSTEM:  no focal deficits   CHEST/LUNG: Clear to percussion bilaterally; No rales, rhonchi, wheezing, or rubs  HEART: Regular rate and rhythm; No murmurs, rubs, or gallops  ABDOMEN: Soft, Nontender, Nondistended; Bowel sounds present  EXTREMITIES: L wrist splint  LYMPH: No lymphadenopathy noted  SKIN: No rashes or lesions

## 2022-05-03 NOTE — ED ADULT NURSE REASSESSMENT NOTE - NS ED NURSE REASSESS COMMENT FT1
Pt returned from CT scan- L wrist fractured and splint applied by ANUP Tejeda and elevated on a pillow. Wound over L eye sutured by ANUP Tejeda. Stemi code called and cardiology contacted pt will stay at this site for now

## 2022-05-03 NOTE — ED ADULT TRIAGE NOTE - PAIN: PRESENCE, MLM
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 Positive LVSI.     Recommendation:    I discussed the benefits and risks associated with the treatment, including all the potential acute side effects and possible late complications that can accompany the treatment as outlined on the consent form.    I informed ** that the intent of treatment is **.    Thank you for allowing me to see this very pleasant patient.  Please do not hesitate to contact me with any questions or concerns.  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 Positive LVSI.     The patient's clinical course began in early April of this year, with complaints of post coital bleeding. She was seen by GRANT Wall on 4/30/19. Cervical bleeding was noted on examination. PAP was unsatisfactory. She was then seen by Dr. Gold on 5/3/19. Her examination showed that the cervix was enlarged and extremely friable.There was a friable mass encompassing the majority of the cervix. There were abnormal vessels throughout the entirety of the cervix and the mass. Repeat PAP revealed atypical glandular cells, HPV positive. Cervical biopsy at 2 o'clock revealed endocervical adenocarcinoma in situ; cervix, 6 o'clock, biops; At least endocervical adenocarcinoma in situ, focally suspicious for     Invasion; cervix, 11  o'clock, biopsy: - Endocervical adenocarcinoma in situ.      She was seen in consultation by Dr. Teran on 5/15/19. Her examination revealed an enlarged cervix, 3-4 cm in diameter. The majority from the 12 oclock position around to the 8 oclock position was involved with friable, red tissue, concerning for exophytic tumor. Biopsy was taken at the 12 oclock position. On bimanual exam, there was noted to be no discrete evidence of parametrial involvement; however, she had quite a bit of discomfort at her left side so the left parametria was not fully able to be examined. Cytology at 1 o'clock revealed endocervical adenocarcinoma in situ. No definitive invasive adenocarcinoma was identified.    MRI of the pelvis on 5/16/19 showed 2.8 cm cervical mass without evidence for parametrial invasion or vaginal wall invasion. MRI stage 1b1. No pelvic lymphadenopathy was seen. PET/CT followed on 5/21/19 and showed the patient's biopsy-proven cervical cancer was intensely hypermetabolic. No discrete hypermetabolic lymph nodes were evident within the abdomen or pelvis.  Negative for FDG avid distant metastatic disease. There was strandy nodular opacity measuring approximately 8 mm maximal dimension within the left lower lobe of the lung that was not FDG avid. Chest CT performed on 5/28/19 showed a 9 x 5 mm oval low marginated lateral left lower lobe nodule corresponding to the PET CT finding, as well as several small punctate nodules that were adjacent to and peripheral to this nodule. No other indeterminate nodules were identified within the chest. There was no adenopathy or other evidence of metastatic disease.    The patient's case was discussed at the Gyn-Onc Case Conference on 5/29/19:  Dx: Stage 1B1 versus 1B2  Discussion: Reviewed recent MRI as well as PET scan, and CT scan of the chest. Upon further discussion the plan is to proceed with an exploratory laparotomy, radical abdominal hysterectomy, bilateral  salpingo-oophorectomy and sentinel lymph nodes as well as pelvic and aortic lymph node dissection secondary to size of the cervical mass.   Recommendations to be presented to patient: Plan will be to proceed with original discussion with the patient, to include open radical hysterectomy, bilateral salpingo-oophorectomy, sentinel nodes as well as pelvic and aortic lymph node dissection.    The patient was seen in consultation by Dr. Morrison and on 6/27/19, proceeded to the OR with him and Dr. Teran for rigid cystoscopy, bilateral ureteral catheter placement, exploratory laparotomy, radical abdominal hysterectomy, bilateral salpingectomy, bilateral pelvic sentinel lymph node dissection, bilateral total pelvic and para aortic lymph node dissection. 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 Tumor invades into the outer third of the cervical wall. Focal lymphovascular space invasion was identified. Invasive carcinoma was 3 mm from the 3 o'clock vaginal cuff margin. Parametrial margins were free of tumor. Bilateral fallopian tubes were uninvolved. Four right pelvic sentinel lymph nodes, two left pelvic sentinel lymph nodes, one right periaortic lymph nodes were negative for malignancy. One left periaortic lymph node showed no lymph node tissue and was benign.     The patient's case was discussed again at the Gyn-Onc Case Conference on 7/10/19:  Dx:  Stage 1B1 cervical cancer  Discussion: Invasive cancer 3 mm from the vaginal cuff margin. Positive LVSI. She falls into the high intermediate risk category. Discussed Chemo potentiated radiation thearpy   Recommendations to be presented to patient: Cisplatin based chemoradiation    She spoke to Dr. Teran on 7/10/19 and asked for a second look at pathology to confirm her diagnosis. On 7/16/19, she expressed that she may seek a second opinion, but did have chemo teaching for cisplatin on 7/16/19.     Pertinent  Family History:   Family History   Problem Relation Age of Onset   • Depression Mother    • Cancer Maternal Grandfather         colon cancer   • Diabetes Maternal Grandfather    • Stroke Maternal Grandfather    • Heart Other         Maternal cousin w/ heart defect       Obstetric History: G5, P4, L4    Current Pregnancy Status: NA  Patient with history of hysterectomy.             Previous Radiation: none    Implantable Cardiac Device:  none    Social Narrative: The patient is single and the mother of four children. She is a former smoker --quit I . She is employed by [employer].     Laboratory Data:  Pathology Report 2019  9:03 PM ACL   Name: ORQUIDEA LARSEN            MRN:     [id number]     /Age:1979 (Age: 39)             Visit#:  [id number]     Sex: F                             Pathology Report         Client: [facility]                         Submitting Physician: Maris Gold MD         Date Specimen Collected: 19           Accession #:  [id number]     Date Specimen Received:  19           Requisition #:[id number]     Date Reported:           5/10/2019 11:21    Location: [facility]           ______________________________________________________________________________     Pathologic Diagnosis :     A: Cervix, 2 o'clock, biopsy:     - Endocervical adenocarcinoma in situ.         B: Cervix, 6 o'clock, biopsy:     - At least endocervical adenocarcinoma in situ, focally suspicious for     invasion.         C: Cervix, 11 o'clock, biopsy:     - Endocervical adenocarcinoma in situ.         Rikki Escobar MD     ** Electronic Signature ([facility]) 5/10/2019 11:21 **     ______________________________________________________________________________               PAPHPV w Genotype Pats over 30 2019  9:03 PM ACL   Name: SUZIEORQUIDEATanvir            MRN:     [id number]     :  1979                     Visit#:   [id number]                             Cytology Report         Client: [facility]                         Submitting Physician:Maris Gold         Date Specimen Collected: 19            Accession #:  [id number]     Date Specimen Received:  19            Requisition #:[id number]     Date Reported:           5/10/2019 11:11     Location: [facility]                                 * Addendum Present *     ______________________________________________________________________________     Cytologic Interpretation :         Epithelial cell abnormality: GLANDULAR     ATYPICAL GLANDULAR CELLS.         Case reviewed by the pathologist.         For assistance in current consensus management guidelines, refer to the     American Society for Colposcopy and Cervical Pathology website at     www.asccp.org.         Satisfactory for evaluation.  Presence of endocervical/transformation zone     component.     Partially obscuring blood.     This specimen has been reprocessed using a glacial acetic acid wash due to the     quantity of blood present.             NATY Diggs([facility])     Ramez Choi MD            Pathology Report 05/15/2019  3:20 PM ACL   Name: ORQUIDEA LARSEN            MRN:     [id number]     /Age:1979 (Age: 39)             Visit#:  [id number]     Sex: F                             Pathology Report         Client: [facility]                         Submitting Physician: GRANT Elder         Additional Physician(s): Donna Fowler MD         Date Specimen Collected: 05/15/19           Accession #:  [id number]     Date Specimen Received:  05/15/19           Requisition #:[id number]     Date Reported:           2019 10:04    Location: [facility]           ______________________________________________________________________________     Pathologic Diagnosis :     Cervix, 1 o'clock, biopsy:      - Endocervical adenocarcinoma in situ.     - No definitive invasive adenocarcinoma identified.         Initial findings were communicated to Dr. Jeffry Fowler on 2019.         Natasha Gerber MD     ** Electronic Signature ([facility]) 2019 10:04 **     ______________________________________________________________________________            Pathology Report 2019 10:31 AM ACL   Name: ORQUIDEA LARSEN            MRN:     [id number]     /Age:1979 (Age: 39)             Visit#:  [id number]     Sex: F                             Pathology Report         Client: [facility]                         Submitting Physician: Donna Fowler MD         Date Specimen Collected: 19           Accession #:  [id number]     Date Specimen Received:  19               Date Reported:           2019 12:25    Location: [facility]                               * Amended *     ______________________________________________________________________________     Pathologic Diagnosis :                                  ** FINAL DIAGNOSIS **         A: Lymph node, right pelvic, sentinel #1; excision:     - One benign lymph node (0/1).         B: Lymph node, right pelvic, sentinel #2; excision:     - One benign lymph node (0/1).         C: Lymph node, left pelvic, sentinel #1; excision:     - One benign lymph node (0/1).         D: Lymph node, right pelvic; excision:     - Two benign lymph nodes (0/2).         E: Lymph node, right periaortic; excision:     -  One benign lymph node (0/1).         F: Lymph node, left pelvic; excision:     - One benign lymph node (0/1).         G: Lymph node, left periaortic; excision:     - Benign fibroadipose tissue with prominent vasculature.     - No lymph node present.         H: Bilateral fallopian tubes; bilateral salpingectomy:     - Two fallopian tubes without histologic abnormality.         I: Uterus, cervix, bilateral  parametria and upper vagina; radical     hysterectomy:     - Invasive well to focally moderately differentiated endocervical     adenocarcinoma, 3.5 cm (pT1b1); see synoptic summary.     - Tumor invades into the outer third of the cervical wall.     - Focal lymphovascular space invasion is identified.     - Invasive carcinoma is 3 mm from the 3 o'clock vaginal cuff margin.     - Parametrial margins are free of tumor.         Diagnosis Comment:     UTERINE CERVIX, Resection     SPECIMEN          Procedure:   Radical hysterectomy     TUMOR          Tumor Size:   3.5 Centimeters (cm)     Histologic Type:   Endocervical adenocarcinoma, usual type     Histologic Grade:   G1-2: Well to focally moderately differentiated     Tumor Extent          Depth of Stromal Invasion in Millimeters:    11 Millimeters (mm)                       Cervical wall thickness:   13 Millimeters (mm)                           Percent invasion:   85% (invasion into outer third of     cervical wall)     Horizontal Extent Longitudinal / Length of Stromal Invasion in Millimeters:       35 Millimeters (mm)     Horizontal Extent Circumferential / Width of Stromal Invasion in Millimeters:      25 Millimeters (mm)     Other Tissue / Organ Involvement:   Not identified     Accessory Findings          Lymphovascular Invasion:   Present     MARGINS          Ectocervical Margin:   Uninvolved by invasive carcinoma           Status of Intraepithelial Neoplasia at Margin:   Uninvolved by     intraepithelial neoplasia          Radial (Circumferential) Margin:   Uninvolved by invasive carcinoma           Distance of Invasive Carcinoma from Margin in Millimeters:   2     Millimeters (mm) at approximately 1 o'clock          Vaginal Cuff Margin:   Uninvolved by invasive carcinoma           Distance of Invasive Carcinoma from Margin in Millimeters:   3     Millimeters (mm) at 3 o'clock      Status of Intraepithelial Neoplasia at Margin:   Uninvolved by      intraepithelial neoplasia     Parametrial Margin:       Uninvolved by invasive carcinoma         LYMPH NODES          Regional Lymph Nodes:  All lymph nodes negative for tumor cells     Total Number of Node(s) Examined:   7     Number of [place] Nodes Examined:   3     PATHOLOGIC STAGE CLASSIFICATION (pTNM, AJCC 8th Edition)          Primary Tumor (pT):   pT1b1: Clinically visible lesion 4.0 cm or less in     greatest dimension     Regional Lymph Nodes (pN)          Category (pN):   pN0:  No regional lymph node metastasis     ADDITIONAL FINDINGS          Additional Pathologic Findings:   High-grade squamous intraepithelial     lesion (MAZIN 2 or 3)                                Endocervical adenocarcinoma in situ             Rikki Escobar MD     ** Electronic Signature ([facility]) 7/1/2019 12:25 **     ______________________________________________________________________________                Imaging Studies Summarized Below:   Pelvic MRI 5/16/19:    Impression:     1. 2.8 cm cervical mass without evidence for parametrial invasion or  vaginal wall invasion. MRI stage 1b1.     2. No pelvic lymphadenopathy.    PET/CT 5/21/19:    IMPRESSION:      1.  The patient's biopsy-proven cervical cancer is intensely  hypermetabolic.   Physiologic endometrial and ovarian activity, as above.   No discrete hypermetabolic lymph nodes are evident within the abdomen or  pelvis.  Negative for FDG avid distant metastatic disease.     2.  Strandy nodular opacity measuring approximately 8 mm maximal dimension  within the left lower lobe of the lung.  Could represent an irregular  parenchymal nodule versus atelectasis.  Not significantly FDG avid and not  further characterized.  Follow-up diagnostic chest CT may be considered to  confirm the persistence of this finding.    CT chest 5/28/19:    IMPRESSION:  9 x 5 mm oval low marginated lateral left lower lobe nodule is  again seen corresponding to the PET CT finding, as well as several  small  punctate nodules that are adjacent to an peripheral to this nodule. Overall  appearance questions inflammation as a likely etiology possibly a mucous  filled bronchus, although neoplasm is not entirely excluded. Further CT  follow-up is recommended     No other indeterminate nodules are identified within the chest. No  adenopathy or other evidence of metastatic disease.           denies pain/discomfort

## 2022-05-04 NOTE — PROVIDER CONTACT NOTE (CHANGE IN STATUS NOTIFICATION) - SITUATION
pt was aox3 at start of shift, Is now disoriented to place and situation, talking to self and restless

## 2022-05-04 NOTE — PROVIDER CONTACT NOTE (CHANGE IN STATUS NOTIFICATION) - ACTION/TREATMENT ORDERED:
No new orders, will monitor pt and neurological status more closely and escalate if needed
No new orders at this time, splint reapplied, will monitor patient closely

## 2022-05-04 NOTE — PROGRESS NOTE ADULT - SUBJECTIVE AND OBJECTIVE BOX
INTERVAL EVENTS: Patient currently waxing/waning mental status. Had become agitated overnight.   -Upon discussion of [facility] he states he would like to think about it and go home. I discussed risks and benefits and wishes to go home.   -Uptrending troponins to 2.99 this morning.     PAST MEDICAL & SURGICAL HISTORY:  CAD (coronary artery disease)    Hypercholesteremia    BPH (benign prostatic hyperplasia)    Kidney stones    Hypertension    Diabetes mellitus    Prostate cancer    Status post cardiac surgery  cardiac stents    Bilateral cataracts    History of lithotripsy        MEDICATIONS  (STANDING):  artificial  tears Solution 1 Drop(s) Left EYE four times a day  aspirin  chewable 81 milliGRAM(s) Oral daily  atorvastatin 80 milliGRAM(s) Oral at bedtime  atropine 1% Solution 1 Drop(s) Left EYE two times a day  clopidogrel Tablet 75 milliGRAM(s) Oral daily  heparin  Infusion. 1000 Unit(s)/Hr (10 mL/Hr) IV Continuous <Continuous>  ofloxacin 0.3% Solution 1 Drop(s) Left EYE at bedtime  prednisoLONE acetate 1% Suspension 1 Drop(s) Left EYE every 6 hours  tamsulosin 0.4 milliGRAM(s) Oral at bedtime    MEDICATIONS  (PRN):  heparin   Injectable 4000 Unit(s) IV Push every 6 hours PRN For aPTT less than 40  morphine  - Injectable 2 milliGRAM(s) IV Push every 4 hours PRN Mild Pain (1 - 3)      Vital Signs Last 24 Hrs  T(C): 36.7 (04 May 2022 07:01), Max: 37 (03 May 2022 19:18)  T(F): 98 (04 May 2022 07:01), Max: 98.6 (03 May 2022 19:18)  HR: 50 (04 May 2022 09:00) (44 - 62)  BP: 123/60 (04 May 2022 07:01) (104/59 - 142/67)  BP(mean): 86 (04 May 2022 07:01) (75 - 96)  RR: 22 (04 May 2022 09:00) (18 - 41)  SpO2: 97% (04 May 2022 09:00) (94% - 99%)     PHYSICAL EXAM:  GEN: Awake, alert. NAD.   HEENT: Left eye with patch over and dressing over laceration.   RESP: CTA b/l  CV: Bradycardic. Normal S1/S2. No m/r/g.  ABD: Soft. NT/ND. BS+  EXT: Warm. No edema, clubbing, or cyanosis.   NEURO: AAOx3, with waxing and waning.     LABS:                        13.3   7.29  )-----------( 245      ( 04 May 2022 05:29 )             40.2     05-04    140  |  106  |  17  ----------------------------<  102<H>  4.4   |  22  |  1.1    Ca    9.5      04 May 2022 05:29  Mg     2.2     05-03    TPro  6.4  /  Alb  3.7  /  TBili  1.2  /  DBili  x   /  AST  25  /  ALT  14  /  AlkPhos  99  05-04    CARDIAC MARKERS ( 04 May 2022 05:29 )  x     / 2.99 ng/mL / x     / x     / x      CARDIAC MARKERS ( 04 May 2022 02:20 )  x     / 2.96 ng/mL / x     / x     / x      CARDIAC MARKERS ( 03 May 2022 19:33 )  x     / 2.90 ng/mL / 97 U/L / x     / x      CARDIAC MARKERS ( 03 May 2022 11:30 )  x     / 2.53 ng/mL / 157 U/L / x     / x          PT/INR - ( 03 May 2022 11:30 )   PT: 11.60 sec;   INR: 1.01 ratio         PTT - ( 04 May 2022 08:30 )  PTT:57.2 sec    I&O's Summary    03 May 2022 07:01  -  04 May 2022 07:00  --------------------------------------------------------  IN: 92 mL / OUT: 0 mL / NET: 92 mL    04 May 2022 07:01  -  04 May 2022 10:35  --------------------------------------------------------  IN: 210 mL / OUT: 0 mL / NET: 210 mL      BNP  RADIOLOGY & ADDITIONAL STUDIES:    TELEMETRY: Sinus bradycardia    EKG:< from: 12 Lead ECG (05.03.22 @ 20:53) >  Sinus bradycardia  Left axis deviation  Anterolateral infarct , age undetermined  ST & T wave abnormality, consider inferior ischemia  Abnormal ECG    < end of copied text >

## 2022-05-04 NOTE — PROVIDER CONTACT NOTE (CHANGE IN STATUS NOTIFICATION) - BACKGROUND
pt s/p fall and on heparin gtt. had prior change in mental status that was notified to eusebio calvert

## 2022-05-04 NOTE — PROGRESS NOTE ADULT - ASSESSMENT
75yo male with hx CAD PCI x2 (2018), hld, bph, prostate cancer, orbital fracture presents to ED post fall Sunday with c/o left wrist pain. Found to have elevated troponin and ecg changes in ED, chronic appearing left radial fracture. Placed on Heparin gtt, left forearm splinted, left brow laceration repaired, admitted to ICU.    NSTEMI: Patient with evolving infarct on EKG with uptrending troponins. Patient should have LHC while inpatient due to possible complications of arrhythmias and structural damage. Patient wishes to go home but unclear if he has capacity to make medical decisions at this point.   -Continue with DAPT and heparin.   -Will need to discuss with son, Song, to aid in medical decision making.   -Continue Atorvastatin.   -Check TTE.   -Hold metoprolol due to bradycardia at this time.   -Continue to monitor on telemetry.   -If patient becomes amenable, will transfer to [facility] for LHC.     Discussed with ICU team and patient.

## 2022-05-04 NOTE — PROVIDER CONTACT NOTE (CHANGE IN STATUS NOTIFICATION) - BACKGROUND
pt aox3, but forgetful but no history of dementia noted. pt here on heparin gtt for acute MI, s/p fall with facial and arm fx.

## 2022-05-04 NOTE — PROGRESS NOTE ADULT - ASSESSMENT
76 year old male with a past medical history of cad s/p stents, prostate ca,  BPH, hyperlipidemia and frequent falls - recent fall ~ one month ago with L orbital floor fracture and ruptured globe ...fell 3 days ago - slipped leaving the bathroom  and hurt L wrist with pain which didn't resolve so  he came to ED  for persistent L wrist pain -  No chest pain or shortness of breath    acute MI  / L radial fracture s/p fall     - cardiac cath    - aspirin, statin and IV heparin - check ptt and adjust   -  b-blockers held secondary to low hr    - cardiac enzymes higher today    - s/p splint of L wrist - analgesia - morphine prn   - continue home eye drops and meds   - ortho consult

## 2022-05-04 NOTE — PROVIDER CONTACT NOTE (CHANGE IN STATUS NOTIFICATION) - ASSESSMENT
pt aox2 and extremely agitated. No focal neuro deficits identified, but assessment difficult due to agitation. Pt bradycardic, rest of VSS. finger stick of 99.

## 2022-05-04 NOTE — CONSULT NOTE ADULT - SUBJECTIVE AND OBJECTIVE BOX
HPI:  · 76 year old male with a past medical history of cad s/p stents, prostate ca,  BPH, hyperlipidemia and frequent falls - recent fall ~ one month ago with L orbital floor fracture and ruptured globe ...fell 2 days ago - slipped leaving the bathroom  and hurt L wrist with pain which didn't resolve so  he came to ED today for persistent L wrist pain -  No chest pain or shortness of breath   (03 May 2022 14:18)    The patient states  HPI:  · 76 year old male with a past medical history of cad s/p stents, prostate ca,  BPH, hyperlipidemia and frequent falls - recent fall ~ one month ago with L orbital floor fracture and ruptured globe ...fell 2 days ago - slipped leaving the bathroom  and hurt L wrist with pain which didn't resolve so  he came to ED today for persistent L wrist pain -  No chest pain or shortness of breath   (03 May 2022 14:18)    The patient states he fell weeks ago and injured his wrist, it doesn't really bother him now. he is unclear where he was seen for the wrist fracture.     PAST MEDICAL & SURGICAL HISTORY:  CAD (coronary artery disease)    Hypercholesteremia    BPH (benign prostatic hyperplasia)    Kidney stones    Prostate cancer    Status post cardiac surgery  cardiac stents    Bilateral cataracts    History of lithotripsy    MEDICATIONS  (STANDING):  artificial  tears Solution 1 Drop(s) Left EYE four times a day  aspirin  chewable 81 milliGRAM(s) Oral daily  atorvastatin 80 milliGRAM(s) Oral at bedtime  atropine 1% Solution 1 Drop(s) Left EYE two times a day  clopidogrel Tablet 75 milliGRAM(s) Oral daily  heparin  Infusion. 1000 Unit(s)/Hr (10 mL/Hr) IV Continuous <Continuous>  ofloxacin 0.3% Solution 1 Drop(s) Left EYE at bedtime  prednisoLONE acetate 1% Suspension 1 Drop(s) Left EYE every 6 hours  tamsulosin 0.4 milliGRAM(s) Oral at bedtime    MEDICATIONS  (PRN):  heparin   Injectable 4000 Unit(s) IV Push every 6 hours PRN For aPTT less than 40  morphine  - Injectable 2 milliGRAM(s) IV Push every 4 hours PRN Mild Pain (1 - 3)      < from: Xray Wrist 3 Views, Left (05.03.22 @ 11:43) >    ACC: [id number] EXAM:  XR WRIST COMP MIN 3 VIEWS LT                          PROCEDURE DATE:  05/03/2022          INTERPRETATION:  Clinical History / Reason for exam: Wrist injury.    Technique:  3 radiographs of the left wrist are obtained.    Comparison:  No studies are available for comparison.    Findings:  Chronic appearing left distal radius fracture deformity with dorsal   angulation. No definite acute fracture identified.  No radiographic evidence of acute fracture or dislocation.  Lunate  alignment is normal.    Impression:  Chronic appearing left distal radius fracture deformity with dorsal   angulation (no prior x-ray available). No definite acute fracture   identified.    --- End of Report ---            JEANIE MALONE MD; AttendingRadiologist  This document has been electronically signed. May  3 2022 11:39AM    < end of copied text >      XRAYS were reviewed by myself and show a subacute wrist fracture    On physical examination the patient is awake, alert and cooperative  Left wrist painless active ROM , no ttp , fingers warm sensate, good cap refill      Assessment: subacute wrist fx unclear how old it is as he has documented fall 4 weeks ago and pt unclear of when wrist was broken, however the patient has no pain and full rom    Plan:   recommend getting a removable cock up splint - can order from [facility]   orthopedic follow up after discharge

## 2022-05-04 NOTE — PROVIDER CONTACT NOTE (CHANGE IN STATUS NOTIFICATION) - ASSESSMENT
pt aox2 disoriented to place and situation and forgetful after reorientation. upper and lower extremity strength 5/5 bilaterally. only right eye is able to assess do to left eye injury/blindness, Right eye round and reactive to light. facial symmetry on smile, pt follows commands, denies pain. Restless, bradycardic, rest of VSS. CAM +. ANUP Nevarez notified and aware.

## 2022-05-04 NOTE — PROGRESS NOTE ADULT - SUBJECTIVE AND OBJECTIVE BOX
Patient was confused overnight and this am, denies chest pain or SOB this am      T(F): 97.9 (05-04-22 @ 13:04), Max: 98.6 (05-03-22 @ 19:18)  HR: 50 (05-04-22 @ 11:00)  BP: 138/69 (05-04-22 @ 11:00)  RR: 24 (05-04-22 @ 13:04)  SpO2: 95% (05-04-22 @ 11:00) (94% - 98%)    PHYSICAL EXAM:  GENERAL: NAD  HEAD:  Atraumatic, Normocephalic  EYES: EOMI, PERRLA, conjunctiva and sclera clear  NERVOUS SYSTEM:  no focal deficits   CHEST/LUNG: Clear to percussion bilaterally; No rales, rhonchi, wheezing, or rubs  HEART: Regular rate and rhythm; No murmurs, rubs, or gallops  ABDOMEN: Soft, Nontender, Nondistended; Bowel sounds present  EXTREMITIES:  L wrist splint    LABS  05-04    140  |  106  |  17  ----------------------------<  102<H>  4.4   |  22  |  1.1    Ca    9.5      04 May 2022 05:29  Mg     2.2     05-03    TPro  6.4  /  Alb  3.7  /  TBili  1.2  /  DBili  x   /  AST  25  /  ALT  14  /  AlkPhos  99  05-04                          13.3   7.29  )-----------( 245      ( 04 May 2022 05:29 )             40.2     PT/INR - ( 03 May 2022 11:30 )   PT: 11.60 sec;   INR: 1.01 ratio         PTT - ( 04 May 2022 08:30 )  PTT:57.2 sec    CARDIAC ENZYMES  Creatine Kinase, Serum: 97 (05-03 @ 19:33)  Creatine Kinase, Serum: 157 (05-03 @ 11:30)      Troponin T, Serum: 2.99 ng/mL (05-04-22 @ 05:29)  Troponin T, Serum: 2.96 ng/mL (05-04-22 @ 02:20)  Troponin T, Serum: 2.90 ng/mL (05-03-22 @ 19:33)  Troponin T, Serum: 2.53 ng/mL (05-03-22 @ 11:30)    < from: 12 Lead ECG (05.03.22 @ 20:53) >    Diagnosis Line Sinus bradycardia  Left axis deviation  Anterolateral infarct , age undetermined  ST & T wave abnormality, consider inferior ischemia  Abnormal ECG      < end of copied text >      RADIOLOGY  < from: CT Head No Cont (05.03.22 @ 12:44) >  IMPRESSION:    CT HEAD:  No acute intracranial pathology or hemorrhage. No acute calvarial   fracture.    MAXILLOFACIAL BONES:  No evidence of acute maxillofacial fracture or soft tissue injury.    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    CT CERVICAL SPINE:  No acute fracture or subluxation.    < end of copied text >  < from: Xray Wrist 3 Views, Left (05.03.22 @ 11:43) >  Impression:  Chronic appearing left distal radius fracture deformity with dorsal   angulation (no prior x-ray available). No definite acute fracture   identified.    < end of copied text >    MEDICATIONS  (STANDING):  artificial  tears Solution 1 Drop(s) Left EYE four times a day  aspirin  chewable 81 milliGRAM(s) Oral daily  atorvastatin 80 milliGRAM(s) Oral at bedtime  atropine 1% Solution 1 Drop(s) Left EYE two times a day  clopidogrel Tablet 75 milliGRAM(s) Oral daily  heparin  Infusion. 1000 Unit(s)/Hr (10 mL/Hr) IV Continuous <Continuous>  ofloxacin 0.3% Solution 1 Drop(s) Left EYE at bedtime  prednisoLONE acetate 1% Suspension 1 Drop(s) Left EYE every 6 hours  tamsulosin 0.4 milliGRAM(s) Oral at bedtime    MEDICATIONS  (PRN):  heparin   Injectable 4000 Unit(s) IV Push every 6 hours PRN For aPTT less than 40  morphine  - Injectable 2 milliGRAM(s) IV Push every 4 hours PRN Mild Pain (1 - 3)

## 2022-05-04 NOTE — PROGRESS NOTE ADULT - SUBJECTIVE AND OBJECTIVE BOX
Pt is an add on for LHC for today with .   Will set up a transport to  a patient now  Pt agrees and aware

## 2022-05-05 NOTE — BH CONSULTATION LIAISON ASSESSMENT NOTE - NSBHCHARTREVIEWVS_PSY_A_CORE FT
Vital Signs Last 24 Hrs  T(C): 36.3 (05 May 2022 08:00), Max: 36.7 (04 May 2022 15:00)  T(F): 97.4 (05 May 2022 08:00), Max: 98 (04 May 2022 15:00)  HR: 63 (05 May 2022 09:00) (43 - 95)  BP: 126/75 (05 May 2022 08:00) (104/61 - 166/75)  BP(mean): 96 (05 May 2022 08:00) (76 - 106)  RR: 38 (05 May 2022 09:00) (22 - 40)  SpO2: 96% (05 May 2022 08:00) (96% - 98%)

## 2022-05-05 NOTE — BH CONSULTATION LIAISON ASSESSMENT NOTE - CASE SUMMARY
76 year old male with no prior psychiatric history  with a past medical history of cad s/p stents, prostate ca, BPH, hyperlipidemia and frequent unwitnessed falls, he presented to [facility] for persistent Left wrist pain and eye edema following a fall at home, found to have elevated troponin, ecg changes and left healed radial fracture,,  he is transferred to [facility]  for resolving MI, pending Cath, psychiatry is consulted for agitation management and hallucinatory behavior.  On evaluation, patient was able to answer questions coherently occasionally with episode of fluctuation of consciousness. He was also actively hallucinating anf further described an entire event that was occurring in the room while the psychiatry team was interviewing him. He described a woman in the room that was reading a book and later opening an item. Patient even answered directly to that woman. He did not find the experiences as distress, they were not commanding in nature. At the time of the evaluation, the hallucination was more comforting in nature, but risk of agitation is very high. This behavior is all related to delirium, with possible underlying dementia (further dementia work up would be recommended following medical stabilization). These behaviors are not indicatives of a primary psychotic disorder. He is not depressed, he is not manic and he is not exhibiting any suicidal or homicidal ideation. Given the intensity of the hallucinatory behavior, patient is at high risk of agitation, particularly at night, would recommend standing seroquel 50mg po hs at night for now, while in the hospital for underlying delirium, no indication to discharge this patient on seroquel. If behavior worsens and patient became at harm risk to self or others as a result of the agitation, consider haldol 5mg po/IM q 12 hrs, prn, QTc on 5/5/22 is QTC Calculation(Bazett) 421 ms. If QTc >500ms, please hold all psychotropic medications.

## 2022-05-05 NOTE — PROGRESS NOTE ADULT - ATTENDING COMMENTS
Seen / examined and above reviewed.    CAD s/p PCI  Comorbidities as above.    Late presentation MI.  Transferred from [facility] for possible cath.    No chest pain.  Hemodynamics stable.  Compensated.  Moderately elevated troponin with minimal change.  EKG: Evolving anterior MI.  ECHO: Apical hypokinesis / overall preserved EF.    Recent fall / orbital trauma.    Overnight events noted.  Waxing / waning cognition consistent with delirium.  Baseline function reportedly OK.  Upon my exam this morning, patient oriented, recounts fall, and conversing with knowledge of current events upon my exam.    UA consistent with UTI.    - ASA / Plavix.  - Change Heparin to Lovenox (no immediate plan for cath).  - Contrast ECHO  - Empiric Abx for UTI pending cultures.  - Low-dose Haldol prn for agitation. Seen / examined and above reviewed.    CAD s/p PCI  Comorbidities as above.    Late presentation MI.  Transferred from [facility] for possible cath.    No chest pain.  Mild bradycardia / hemodynamics stable.  Compensated.  Moderately elevated troponin with minimal change.  EKG: Evolving anterior MI.  ECHO: Apical hypokinesis / overall preserved EF.    Recent fall / orbital trauma.    Overnight events noted.  Waxing / waning cognition consistent with delirium.  Baseline function reportedly OK.  Upon my exam this morning, patient oriented, recounts fall, and conversing with knowledge of current events upon my exam.    UA consistent with UTI.  Possible etiology of delirium.    - ASA / Plavix.  - Change Heparin to Lovenox (no immediate plan for cath).  - Contrast ECHO  - Empiric Abx for UTI pending cultures.  - Low-dose Haldol prn for agitation.  - Cath pending clinical course Seen / examined and above reviewed.    CAD s/p PCI  Comorbidities as above.    Late presentation MI.  Transferred from [facility] for possible cath.    No chest pain.  Mild bradycardia / hemodynamics stable.  Compensated.  Moderately elevated troponin with minimal change.  EKG: Evolving anterior MI.  ECHO: Apical hypokinesis / overall preserved EF.    Recent fall / orbital trauma.    Overnight events noted.  Waxing / waning cognition consistent with delirium.  Baseline function reportedly OK.  Upon my exam this morning, patient oriented, recounts fall, and conversing with knowledge of current events upon my exam.    UA consistent with UTI.  Possible etiology of delirium.    - ASA / Plavix.  - Change Heparin to Lovenox (no immediate plan for cath).  - Contrast ECHO  - Empiric Abx for UTI pending cultures.  - Low-dose Haldol prn for agitation.  - Cath pending clinical course    Critically ill patient with life threatening condition.

## 2022-05-05 NOTE — BH CONSULTATION LIAISON ASSESSMENT NOTE - NSBHCONSULTMEDPRNREASON_PSY_A_CORE
See MA's notes below, pt has met protocol, OV last month, entered as historical   severe agitation...

## 2022-05-05 NOTE — BH CONSULTATION LIAISON ASSESSMENT NOTE - SUMMARY
Pt. is a 76 year olf , , retired, living with son, grandchildren, and son with no psychiatric history. Pt. has no previous psychiatric admissions.On evaluation there is notable visual hallucinations and illusions that the patient expresses. There is also notable confusion as well. Pt. reports that he has trouble sleeping at night. Pt. was able to repeat two out of the three words said to him. At this time pt. is not exhibiting any psychosis. Pt. is not in an acute danger to himself or to others.     Will recommend Haldol 2mg IV PRN and Seroquel 50mg to be taken in the evening Pt. is a 76 year olf , , retired, living with son, grandchildren, and son with no psychiatric history. Pt. has no previous psychiatric admissions.On evaluation there is notable visual hallucinations and illusions that the patient expresses. There is also notable confusion as well. Pt. reports that he has trouble sleeping at night. Pt. was able to repeat two out of the three words said to him. At this time pt. is not exhibiting any psychosis. Pt. is not in an acute danger to himself or to others.     Will recommend Haldol 5mg q8h IV PRN and Seroquel 50mg to be taken in the evening Pt. is a 76 year olf , , retired, living with son, grandchildren, and son with no psychiatric history. Pt. has no previous psychiatric admissions. On evaluation there is notable visual hallucinations and illusions that the patient expresses. There is also notable confusion as well. Pt. reports that he has trouble sleeping at night. Pt. was able to repeat two out of the three words said to him. At this time pt. is not exhibiting any psychosis. Pt. is not in an acute danger to himself or to others.   For underlying agitation, we recommend standing seroquel 50mg po hs for now, given high risk of further agitation in evening.  If patient is not responding to seroquel and he remains a risk of harm to self and others and there is no contraindication to use haldol, such as prolonged QTc consider haldol 5mg po/IM  q12hrs prn.   -There is no indication for psychiatric 1:1 but patient will benefit from nursing 1:1 given active intense hallucinatory behavior.   No psychiatric contraindication to discharge this patient once medically stable Pt. is a 76 year olf , , retired, living with son, grandchildren, and son with no psychiatric history. Pt. has no previous psychiatric admissions. On evaluation there is notable visual hallucinations and illusions that the patient expresses. There is also notable confusion as well. Pt. reports that he has trouble sleeping at night. Pt. was able to repeat two out of the three words said to him. At this time pt. is not exhibiting any psychosis. Pt. is not in an acute danger to himself or to others.   For underlying agitation, we recommend standing seroquel 50mg po hs for now, given high risk of further agitation in evening.  If patient is not responding to seroquel and he remains a risk of harm to self and others and there is no contraindication to use haldol, such as prolonged QTc consider haldol 5mg po/IM  q12hrs prn.   -There is no indication for psychiatric 1:1 but patient will benefit from nursing 1:1 given active intense hallucinatory behaviors.   No psychiatric contraindication to discharge this patient once medically stable

## 2022-05-05 NOTE — BH CONSULTATION LIAISON ASSESSMENT NOTE - CURRENT MEDICATION
MEDICATIONS  (STANDING):  artificial  tears Solution 1 Drop(s) Left EYE four times a day  aspirin  chewable 81 milliGRAM(s) Oral daily  atorvastatin 80 milliGRAM(s) Oral at bedtime  atropine 1% Solution 1 Drop(s) Left EYE two times a day  chlorhexidine 4% Liquid 1 Application(s) Topical <User Schedule>  clopidogrel Tablet 75 milliGRAM(s) Oral daily  heparin  Infusion. 1000 Unit(s)/Hr (10 mL/Hr) IV Continuous <Continuous>  ofloxacin 0.3% Solution 1 Drop(s) Left EYE at bedtime  prednisoLONE acetate 1% Suspension 1 Drop(s) Left EYE every 6 hours  tamsulosin 0.4 milliGRAM(s) Oral at bedtime    MEDICATIONS  (PRN):  heparin   Injectable 4100 Unit(s) IV Push every 6 hours PRN For aPTT less than 40  morphine  - Injectable 2 milliGRAM(s) IV Push every 4 hours PRN Mild Pain (1 - 3)

## 2022-05-05 NOTE — BH CONSULTATION LIAISON ASSESSMENT NOTE - OTHER
blind in the left eye Pt. reports that there were other people in the room other than the Attending, resident, and 1:1. At the time of the evaluation, patient was clam, but overnight report stated patient was very agitated.  Impacted by active delirium.

## 2022-05-05 NOTE — PROGRESS NOTE ADULT - SUBJECTIVE AND OBJECTIVE BOX
LENGTH OF HOSPITAL STAY: 2d    CHIEF COMPLAINT:    Patient is a 76y old  Male who presents with a chief complaint of NSTEMI (04 May 2022 13:07)    OVERNIGHT EVENTS:    - pt paranoid, agitated overnight  - refused cath    FOLLOW UP:    - cath?    HOSPITAL COURSE:    Pt was found to have elevated troponin, ecg changes and left radial fracture in ED. He denies chest pains, palpitations dizziness or sob. Admitted to ICU. Placed on Heparin gtt, left forearm splinted.    HPI:    76 year old male with a past medical history of cad s/p stents, prostate ca,  BPH, hyperlipidemia and frequent falls - recent fall ~ one month ago with L orbital floor fracture and ruptured globe ...fell 2 days ago - slipped leaving the bathroom  and hurt L wrist with pain which didn't resolve so  he came to ED today for persistent L wrist pain -  No chest pain or shortness of breath   (03 May 2022 14:18)      ALLERGIES:    Allergies    No Known Allergies    Intolerances        PMHx:    PAST MEDICAL & SURGICAL HISTORY:  CAD (coronary artery disease)    Hypercholesteremia    BPH (benign prostatic hyperplasia)    Kidney stones    Prostate cancer    Status post cardiac surgery  cardiac stents    Bilateral cataracts    History of lithotripsy        SOCIAL Hx:    PHYSICAL EXAM:    Gen: NAD, resting in bed  HEENT: Normocephalic, atraumatic  Neck: supple, no lymphadenopathy  CV: Regular rate & regular rhythm  Lungs: decreased BS at bases, no fremitus  Abdomen: Soft, BS present  Ext: Warm, well perfused  Neuro: non focal, awake  Skin: no rash, no erythema   LENGTH OF HOSPITAL STAY: 2d    CHIEF COMPLAINT:    Patient is a 76y old  Male who presents with a chief complaint of NSTEMI (04 May 2022 13:07)    OVERNIGHT EVENTS:    - pt paranoid, agitated overnight; s/p haldol  - refused cath  - pt examined at bedside, denies CP, sob, villanueva, n/v/d; of note, pt was responding to internal stimuli - endorsed 4 other people being in the room with him (there was no one else in the room.)    FOLLOW UP:    - cath?    HOSPITAL COURSE:    ED [place]:  Pt was found to have elevated troponin, ecg changes and left radial fracture in ED. He denies chest pains, palpitations dizziness or sob. Admitted to ICU. Placed on Heparin gtt, left forearm splinted.    Pt was initially Code stemi, code stemi cancelled. Pt medically managed NSTEMI. Pt transferred to [place] for cath. Pt refused cath.     Pt stay complicated by ?acute on chronic dementia/delerium v underlying psych issue iso multiple recent falls. Pt was 1:1 sit w/ haldol overnight.       HPI:    76 year old male with a past medical history of cad s/p stents, prostate ca,  BPH, hyperlipidemia and frequent falls - recent fall ~ one month ago with L orbital floor fracture and ruptured globe ...fell 2 days ago - slipped leaving the bathroom  and hurt L wrist with pain which didn't resolve so  he came to ED today for persistent L wrist pain -  No chest pain or shortness of breath   (03 May 2022 14:18)      ALLERGIES:    No Known Allergies      PMHx:    CAD (coronary artery disease); Hypercholesteremia; BPH (benign prostatic hyperplasia); Kidney stones; Prostate cancer; Status post cardiac surgery; cardiac stents; Bilateral cataracts; History of lithotripsy    SOCIAL Hx:    - confused, pt's son denies hx dementia/behavioral issues, iso multiple falls    MEDICATIONS:  STANDING MEDICATIONS  artificial  tears Solution 1 Drop(s) Left EYE four times a day  aspirin  chewable 81 milliGRAM(s) Oral daily  atorvastatin 80 milliGRAM(s) Oral at bedtime  atropine 1% Solution 1 Drop(s) Left EYE two times a day  chlorhexidine 4% Liquid 1 Application(s) Topical <User Schedule>  clopidogrel Tablet 75 milliGRAM(s) Oral daily  heparin  Infusion. 1000 Unit(s)/Hr IV Continuous <Continuous>  ofloxacin 0.3% Solution 1 Drop(s) Left EYE at bedtime  prednisoLONE acetate 1% Suspension 1 Drop(s) Left EYE every 6 hours  tamsulosin 0.4 milliGRAM(s) Oral at bedtime    PRN MEDICATIONS  heparin   Injectable 4100 Unit(s) IV Push every 6 hours PRN  morphine  - Injectable 2 milliGRAM(s) IV Push every 4 hours PRN      LABS:                        13.4   7.63  )-----------( 241      ( 05 May 2022 05:15 )             39.9     05-05    139  |  105  |  20  ----------------------------<  107<H>  4.1   |  16<L>  |  1.1    Ca    9.5      05 May 2022 05:15  Mg     2.0     05-05    TPro  6.4  /  Alb  3.7  /  TBili  1.2  /  DBili  x   /  AST  25  /  ALT  14  /  AlkPhos  99  05-04    PT/INR - ( 03 May 2022 11:30 )   PT: 11.60 sec;   INR: 1.01 ratio         PTT - ( 05 May 2022 05:15 )  PTT:50.4 sec          CARDIAC MARKERS ( 04 May 2022 05:29 )  x     / 2.99 ng/mL / x     / x     / x      CARDIAC MARKERS ( 04 May 2022 02:20 )  x     / 2.96 ng/mL / x     / x     / x      CARDIAC MARKERS ( 03 May 2022 19:33 )  x     / 2.90 ng/mL / 97 U/L / x     / x      CARDIAC MARKERS ( 03 May 2022 11:30 )  x     / 2.53 ng/mL / 157 U/L / x     / x              VITALS:   T(F): 97.5  HR: 54  BP: 126/77  RR: 40  SpO2: 97%        PHYSICAL EXAM:    Gen: resting in bed in chair mode, restrained BL UE  HEENT: Normocephalic, left orbital ecchymosis, traumatic left eye,   Neck: supple, no lymphadenopathy  CV: Regular rate & regular rhythm, damaris  Lungs: decreased BS at bases, no fremitus  Abdomen: Soft, BS present, nontender  Ext: Warm, well perfused  Neuro: responding to internal stimuli, moves all extremities against resistance, LUE strength limited by pain, AOx2 (self and time, not situation)  Skin: no rash, no erythema   LENGTH OF HOSPITAL STAY: 2d    CHIEF COMPLAINT:    Patient is a 76y old  Male who presents with a chief complaint of NSTEMI (04 May 2022 13:07)    OVERNIGHT EVENTS:    - pt paranoid, agitated overnight; s/p haldol  - refused cath  - pt examined at bedside, denies CP, sob, villanueva, n/v/d; of note, pt was responding to internal stimuli - endorsed 4 other people being in the room with him (there was no one else in the room.)    FOLLOW UP:    - cath schedule    HOSPITAL COURSE:    ED [place]:  Pt was found to have elevated troponin, ecg changes and left healed radial fracture in ED. He denies chest pains, palpitations dizziness or sob. Admitted to ICU. Placed on Heparin gtt, left forearm splinted.    Pt was initially Code stemi w/ trops and st elevations, code stemi cancelled as ekg more indicative of a maturing MI. Pt medically managed for resolving MI. Pt transferred to [place] for cath.     Pt stay complicated by ?acute on chronic dementia/delerium iso multiple recent unwitnessed falls. Pt was 1:1 sit w/ haldol overnight.     Pt was visited by family at bedside.       HPI:    76 year old male with a past medical history of cad s/p stents, prostate ca,  BPH, hyperlipidemia and frequent unwitnessed falls     1 month ago pt family found pt awake and down w/ blood. Family wanted to call 911 but pt refused. Eventually family convinced pt to go to hospital considering significant eye swelling. He was transferred [facility] where he had surgery, as per pt he was intubated for surgery.    Recently pt slipped leaving the bathroom  and hurt L wrist with pain which didn't resolve. Again pt initially refused to go to hospital but came to ED today for persistent L wrist pain -  No chest pain or shortness of breath  (03 May 2022 14:18)    Pt lives in two family home with his adult adopted son Ousmane [phone]. Pt also has a brother in [facility] (Grzegorz) [phone] who isn't close with Ousmane. Rachelle Gold is a neice of both Grzegorz and pt from a 3rd () brother whom the pt endorses being very close w/ and trusting to make medical decisions.       ALLERGIES:    No Known Allergies      PMHx:    CAD (coronary artery disease); Hypercholesteremia; BPH (benign prostatic hyperplasia); Kidney stones; Prostate cancer; Status post cardiac surgery; cardiac stents; Bilateral cataracts; History of lithotripsy    SOCIAL Hx:    - confused, pt's son denies hx dementia/behavioral issues, iso multiple falls    MEDICATIONS:  STANDING MEDICATIONS  artificial  tears Solution 1 Drop(s) Left EYE four times a day  aspirin  chewable 81 milliGRAM(s) Oral daily  atorvastatin 80 milliGRAM(s) Oral at bedtime  atropine 1% Solution 1 Drop(s) Left EYE two times a day  chlorhexidine 4% Liquid 1 Application(s) Topical <User Schedule>  clopidogrel Tablet 75 milliGRAM(s) Oral daily  heparin  Infusion. 1000 Unit(s)/Hr IV Continuous <Continuous>  ofloxacin 0.3% Solution 1 Drop(s) Left EYE at bedtime  prednisoLONE acetate 1% Suspension 1 Drop(s) Left EYE every 6 hours  tamsulosin 0.4 milliGRAM(s) Oral at bedtime    PRN MEDICATIONS  heparin   Injectable 4100 Unit(s) IV Push every 6 hours PRN  morphine  - Injectable 2 milliGRAM(s) IV Push every 4 hours PRN      LABS:                        13.4   7.63  )-----------( 241      ( 05 May 2022 05:15 )             39.9     05-05    139  |  105  |  20  ----------------------------<  107<H>  4.1   |  16<L>  |  1.1    Ca    9.5      05 May 2022 05:15  Mg     2.0     05-    TPro  6.4  /  Alb  3.7  /  TBili  1.2  /  DBili  x   /  AST  25  /  ALT  14  /  AlkPhos  99  05-04    PT/INR - ( 03 May 2022 11:30 )   PT: 11.60 sec;   INR: 1.01 ratio         PTT - ( 05 May 2022 05:15 )  PTT:50.4 sec          CARDIAC MARKERS ( 04 May 2022 05:29 )  x     / 2.99 ng/mL / x     / x     / x      CARDIAC MARKERS ( 04 May 2022 02:20 )  x     / 2.96 ng/mL / x     / x     / x      CARDIAC MARKERS ( 03 May 2022 19:33 )  x     / 2.90 ng/mL / 97 U/L / x     / x      CARDIAC MARKERS ( 03 May 2022 11:30 )  x     / 2.53 ng/mL / 157 U/L / x     / x              VITALS:   T(F): 97.5  HR: 54  BP: 126/77  RR: 40  SpO2: 97%        PHYSICAL EXAM:    Gen: resting in bed in chair mode, restrained BL UE  HEENT: Normocephalic, left orbital ecchymosis, traumatic left eye,   Neck: supple, no lymphadenopathy  CV: Regular rate & regular rhythm, damaris  Lungs: decreased BS at bases, no fremitus  Abdomen: Soft, BS present, nontender  Ext: Warm, well perfused  Neuro: responding to internal stimuli, moves all extremities against resistance, LUE strength limited by pain, AOx2 (self and time, not situation)  Skin: no rash, no erythema   LENGTH OF HOSPITAL STAY: 2d    CHIEF COMPLAINT:    Patient is a 76y old  Male who presents with a chief complaint of NSTEMI (04 May 2022 13:07)    OVERNIGHT EVENTS:    - pt paranoid, agitated overnight; s/p haldol  - refused cath  - pt examined at bedside, denies CP, sob, villanueva, n/v/d; of note, pt was responding to internal stimuli - endorsed 4 other people being in the room with him (there was no one else in the room.)    HOSPITAL COURSE:    ED [place]:  Pt was found to have elevated troponin, ecg changes and left healed radial fracture in ED. He denies chest pains, palpitations dizziness or sob. Admitted to ICU. Placed on Heparin gtt, left forearm splinted.    Pt was initially Code stemi w/ trops and st elevations, code stemi cancelled as ekg more indicative of a maturing MI. Pt medically managed for resolving MI. Pt transferred to [place] for cath.     Pt stay complicated by ?acute on chronic dementia/delerium iso multiple recent unwitnessed falls. Pt was 1:1 sit w/ haldol overnight.     Pt was visited by family at bedside.       HPI:    76 year old male with a past medical history of cad s/p stents, prostate ca,  BPH, hyperlipidemia and frequent unwitnessed falls     1 month ago pt family found pt awake and down w/ blood. Family wanted to call 911 but pt refused. Eventually family convinced pt to go to hospital considering significant eye swelling. He was transferred [facility] where he had surgery, as per pt he was intubated for surgery.    Recently pt slipped leaving the bathroom  and hurt L wrist with pain which didn't resolve. Again pt initially refused to go to hospital but came to ED today for persistent L wrist pain -  No chest pain or shortness of breath  (03 May 2022 14:18)    Pt lives in two family home with his adult adopted son Ousmane [phone]. Pt also has a brother in [facility] (Grzegorz) [phone] who isn't close with Ousmane. Rachelle Gold is a neice of both Grzegorz and pt from a 3rd () brother whom the pt endorses being very close w/ and trusting to make medical decisions.       ALLERGIES:    No Known Allergies      PMHx:    CAD (coronary artery disease); Hypercholesteremia; BPH (benign prostatic hyperplasia); Kidney stones; Prostate cancer; Status post cardiac surgery; cardiac stents; Bilateral cataracts; History of lithotripsy    SOCIAL Hx:    - confused, pt's son denies hx dementia/behavioral issues, iso multiple falls    MEDICATIONS:  STANDING MEDICATIONS  artificial  tears Solution 1 Drop(s) Left EYE four times a day  aspirin  chewable 81 milliGRAM(s) Oral daily  atorvastatin 80 milliGRAM(s) Oral at bedtime  atropine 1% Solution 1 Drop(s) Left EYE two times a day  chlorhexidine 4% Liquid 1 Application(s) Topical <User Schedule>  clopidogrel Tablet 75 milliGRAM(s) Oral daily  heparin  Infusion. 1000 Unit(s)/Hr IV Continuous <Continuous>  ofloxacin 0.3% Solution 1 Drop(s) Left EYE at bedtime  prednisoLONE acetate 1% Suspension 1 Drop(s) Left EYE every 6 hours  tamsulosin 0.4 milliGRAM(s) Oral at bedtime    PRN MEDICATIONS  heparin   Injectable 4100 Unit(s) IV Push every 6 hours PRN  morphine  - Injectable 2 milliGRAM(s) IV Push every 4 hours PRN      LABS:                        13.4   7.63  )-----------( 241      ( 05 May 2022 05:15 )             39.9     05-05    139  |  105  |  20  ----------------------------<  107<H>  4.1   |  16<L>  |  1.1    Ca    9.5      05 May 2022 05:15  Mg     2.0     05-05    TPro  6.4  /  Alb  3.7  /  TBili  1.2  /  DBili  x   /  AST  25  /  ALT  14  /  AlkPhos  99  05-04    PT/INR - ( 03 May 2022 11:30 )   PT: 11.60 sec;   INR: 1.01 ratio         PTT - ( 05 May 2022 05:15 )  PTT:50.4 sec          CARDIAC MARKERS ( 04 May 2022 05:29 )  x     / 2.99 ng/mL / x     / x     / x      CARDIAC MARKERS ( 04 May 2022 02:20 )  x     / 2.96 ng/mL / x     / x     / x      CARDIAC MARKERS ( 03 May 2022 19:33 )  x     / 2.90 ng/mL / 97 U/L / x     / x      CARDIAC MARKERS ( 03 May 2022 11:30 )  x     / 2.53 ng/mL / 157 U/L / x     / x              VITALS:   T(F): 97.5  HR: 54  BP: 126/77  RR: 40  SpO2: 97%        PHYSICAL EXAM:    Gen: resting in bed in chair mode, restrained BL UE  HEENT: Normocephalic, left orbital ecchymosis, traumatic left eye,   Neck: supple, no lymphadenopathy  CV: Regular rate & regular rhythm, damaris  Lungs: decreased BS at bases, no fremitus  Abdomen: Soft, BS present, nontender  Ext: Warm, well perfused  Neuro: responding to internal stimuli, moves all extremities against resistance, LUE strength limited by pain, AOx2 (self and time, not situation)  Skin: no rash, no erythema

## 2022-05-05 NOTE — PROGRESS NOTE ADULT - ASSESSMENT
76 year old male with a past medical history of cad s/p stents, prostate ca,  BPH, hyperlipidemia and frequent falls - recent fall ~ one month ago with L orbital floor fracture and ruptured globe ...fell 2 days ago - slipped leaving the bathroom  and hurt L wrist with pain which didn't resolve so  he came to ED today for persistent L wrist pain -  No chest pain or shortness of breath    Impression:  NSTEMI  L Fx  Psychosis    HO BPH  HO HLD  HO recent falls w/ orbital fx and ruptured globe    CNS  - AOx2, haldol prn for agitation pending psych w/u, CT Head noncon negative for gross pathology    Cardio  - LDL 74  - ACS heparin gtt, f/u cath, tte 5/4 normal EF, normal diastolic function, damaris holding BB for now  - f/u cath    HEENT  - supportive care for left lac and left orbital fx c/b globe rupture    Pulm  - satting well RA, cxr no acute pathology     ID  - afebrile, wbc ~7.5    Endo  - TSH 0.71  - a1c pending    Heme  - f/u folate, b12, iron, ferr      GI      Renal    MSK    Dispo CCU  DVT ACS heparin gtt  Diet dash/tlc     - stay at [facility] for medical management as per cardiology team   - aspirin, statin and IV heparin - check ptt and adjust   - low dose b-blockers with BP parameters (held in past for low BP)   - check 2DECHO and repeat cardiac enzymes   - s/p splint of L wrist - analgesia - morphine prn   - continue home eye drops and meds   - cardiology consult 76 year old male with a past medical history of cad s/p stents, prostate ca,  BPH, hyperlipidemia and frequent falls - recent fall ~ one month ago with L orbital floor fracture and ruptured globe ...fell 2 days ago - slipped leaving the bathroom  and hurt L wrist with pain which didn't resolve so  he came to ED today for persistent L wrist pain -  No chest pain or shortness of breath    Impression:  NSTEMI  L Fx  Psychosis    HO BPH  HO HLD  HO recent falls w/ orbital fx and ruptured globe    CNS  - AOx2, haldol prn for agitation pending psych w/u, CT Head noncon negative for gross pathology    Cardio  - LDL 74  - ACS heparin gtt, f/u cath, tte 5/4 normal EF, normal diastolic function, damaris holding BB for now  - f/u cath    HEENT  - supportive care for left lac and left orbital fx c/b globe rupture    Pulm  - satting well RA, cxr no acute pathology     ID  - afebrile, wbc ~7.5    Endo  - TSH 0.71  - a1c pending    Heme  - f/u folate, b12, iron, ferr      GI  - feed    Renal  - watch is os, replete lytes    MSK  - pt    Dispo CCU  DVT ACS heparin gtt  Diet dash/tlc   76 year old male with a past medical history of cad s/p stents, prostate ca,  BPH, hyperlipidemia and frequent falls - recent fall ~ one month ago with L orbital floor fracture and ruptured globe ...fell 2 days ago - slipped leaving the bathroom  and hurt L wrist with pain which didn't resolve so  he came to ED today for persistent L wrist pain -  No chest pain or shortness of breath    Impression:  NSTEMI likely late presentation MI  L wrist Fx  Psychosis  HO BPH  HO HLD  HO recent falls w/ orbital fx and ruptured globe    CNS  - AOx2, haldol prn for agitation pending psych w/u, CT Head noncon negative for gross pathology    Cardio  - LDL 74  - tte 5/4 normal EF apical septal hypokinesis normal diastolic function, damaris holding BB for now  - Cath on hold until mental status improves  - Switch to Lovenox from heparin dirp    HEENT  - supportive care for left lac and left orbital fx c/b globe rupture    Pulm  - satting well RA, cxr no acute pathology     ID  - afebrile, wbc ~7.5  - UA positive 05/05, culture pending  - Start Ceftriaxone 05/05    Endo  - TSH 0.71  - a1c pending    Heme  - f/u folate, b12, iron, ferr      GI  - feed    Renal  - watch is os, replete lylevi    MSK  - pt    Dispo CCU  DVT ACS Lovenox  Diet dash/tlc   76 year old male with a past medical history of cad s/p stents, prostate ca,  BPH, hyperlipidemia and frequent falls - recent fall ~ one month ago with L orbital floor fracture and ruptured globe ...fell 2 days ago - slipped leaving the bathroom  and hurt L wrist with pain which didn't resolve so  he came to ED today for persistent L wrist pain -  No chest pain or shortness of breath    Impression:  NSTEMI likely late presentation MI  L wrist Fx  Psychosis  HO BPH  HO HLD  HO recent falls w/ orbital fx and ruptured globe    CNS  - AOx2, haldol prn for agitation pending psych w/u, CT Head noncon negative for gross pathology    Cardio  - LDL 74  - tte 5/4 normal EF apical septal hypokinesis normal diastolic function, damaris holding BB for now  - Cath on hold until mental status improves  - Switch to Lovenox from heparin dirp  - DAPT    HEENT  - supportive care for left lac and left orbital fx c/b globe rupture    Pulm  - satting well RA, cxr no acute pathology     ID  - afebrile, wbc ~7.5  - UA positive 05/05, culture pending  - Start Ceftriaxone 05/05    Endo  - TSH 0.71  - a1c pending    Heme  - f/u folate, b12, iron, ferr      GI  - feed    Renal  - watch is os, replete lylevi    MSK  - pt    Dispo CCU  DVT ACS Lovenox  Diet dash/tlc

## 2022-05-05 NOTE — BH CONSULTATION LIAISON ASSESSMENT NOTE - NSBHCONSULTFOLLOWAFTERCARE_PSY_A_CORE FT
Pt. can f/u as o/p to Outpatient Mental Health at [address] [place] [address]. [place] [address] [phone]. Please re-consult Psychiatry as needed/  Pt. can f/u as outpatient to Outpatient Mental Health at [address] [place] [address]. [place] [address] [phone]. Please re-consult Psychiatry as needed/

## 2022-05-05 NOTE — BH CONSULTATION LIAISON ASSESSMENT NOTE - DESCRIPTION
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 Pt. denies any illicit drug abuse or alcohol use. Pt. is  with children.

## 2022-05-05 NOTE — BH CONSULTATION LIAISON ASSESSMENT NOTE - HPI (INCLUDE ILLNESS QUALITY, SEVERITY, DURATION, TIMING, CONTEXT, MODIFYING FACTORS, ASSOCIATED SIGNS AND SYMPTOMS)
Pt. is a 76 year olf , , retired, living with son, grandchildren, and son with no psychiatric history. Psych was consulted for severe agitation. He denies any tyshawn, suicidal ideation nor suicidal attempt. He denies any homicidal ideation. However, visual hallucinations was notable as well as illusions. no drug/alcohol abuse.     Upon seeing patient, patient was lying in the hospital bed and appeared calm and very communicative. Patient was able to repeat two out of the three words that were said to him. Patient repeatedly mentioned a person showing him something on the wall. As per patient, he denies feeling depressed and also denies suicidal thoughts. Pt. denies feeling paranoid. Pt. denies any symptoms of acute tyshawn.   Pt. is a 76 year olf , , retired, living with son, grandchildren,  with no psychiatric history. Psych was consulted for severe agitation. He denies any tyshawn, suicidal ideation nor suicidal attempt. He denies any homicidal ideation. However, visual hallucinations was notable as well as illusions. no drug/alcohol abuse.     Upon seeing patient, patient was lying in the hospital bed and appeared calm and very communicative. Patient was able to repeat two out of the three words  with immediate recall, but delayed recall was 0/3.  He was observed to be actively hallucinating and further mentioned a person showing him something on the wall. As per patient. He denies feeling depressed and also denies suicidal thoughts. He had no fear about the events he reported in the room.  Pt. denies any symptoms of acute tyshawn.

## 2022-05-06 NOTE — PROGRESS NOTE ADULT - SUBJECTIVE AND OBJECTIVE BOX
LENGTH OF HOSPITAL STAY: 3d    CHIEF COMPLAINT:    Patient is a 76y old  Male who presents with a chief complaint of NSTEMI (04 May 2022 13:07)    OVERNIGHT EVENTS:    -     HOSPITAL COURSE:    [facility] [place]:  Pt was found to have elevated troponin, ecg changes and left healed radial fracture in ED. He denies chest pains, palpitations dizziness or sob. Admitted to ICU. Placed on Heparin gtt, left forearm splinted.    Pt was initially Code stemi w/ trops and st elevations, code stemi cancelled as ekg more indicative of a maturing MI. Pt medically managed for resolving MI. Pt transferred to [place] for cath.     Pt stay complicated by ?acute on chronic dementia/delerium 2/2 cystitis. Pt was 1:1 sit w/ haldol overnight. Seroquel and melatonin for sleep. Pt started on ceftriaxone.     Pt was visited by family at bedside.       HPI:    76 year old male with a past medical history of cad s/p stents, prostate ca,  BPH, hyperlipidemia and frequent unwitnessed falls     1 month ago pt family found pt awake and down w/ blood. Family wanted to call 911 but pt refused. Eventually family convinced pt to go to hospital considering significant eye swelling. He was transferred [facility] where he had surgery, as per pt he was intubated for surgery.    Recently pt slipped leaving the bathroom  and hurt L wrist with pain which didn't resolve. Again pt initially refused to go to hospital but came to ED today for persistent L wrist pain -  No chest pain or shortness of breath  (03 May 2022 14:18)    Pt lives in two family home with his adult adopted son Ousmane [phone]. Pt also has a brother in [facility] ([place]) [phone] who isn't close with Ousmane. Rachelle Gold is a neice of both Grzegorz and pt from a 3rd () brother whom the pt endorses being very close w/ and trusting to make medical decisions.       ALLERGIES:    No Known Allergies      PMHx:    CAD (coronary artery disease); Hypercholesteremia; BPH (benign prostatic hyperplasia); Kidney stones; Prostate cancer; Status post cardiac surgery; cardiac stents; Bilateral cataracts; History of lithotripsy    SOCIAL Hx:    - confused, pt's son denies hx dementia/behavioral issues, iso multiple falls  -  adult adopted son Ousmane [phone]. Pt also has a brother in [facility] ([place]) [phone]          PHYSICAL EXAM:    Gen: resting in bed in chair mode, restrained BL UE  HEENT: Normocephalic, left orbital ecchymosis, traumatic left eye,   Neck: supple, no lymphadenopathy  CV: Regular rate & regular rhythm, damaris  Lungs: decreased BS at bases, no fremitus  Abdomen: Soft, BS present, nontender  Ext: Warm, well perfused  Neuro:  moves all extremities against resistance,AOx2 (self and time, not situation)  Skin: no rash, no erythema   LENGTH OF HOSPITAL STAY: 3d    CHIEF COMPLAINT:    Patient is a 76y old  Male who presents with a chief complaint of NSTEMI (04 May 2022 13:07)    OVERNIGHT EVENTS:    - better night, slept for most of it  - pt examined at bedside, AOx2, improved w/ reorientation to AO3, calmer. Denies CP, SOB, ONEILL, n/v/d, urinary symptoms     HOSPITAL COURSE:    ED [facility]:  Pt was found to have elevated troponin, ecg changes and left healed radial fracture in ED. He denies chest pains, palpitations dizziness or sob. Admitted to ICU. Placed on Heparin gtt, left forearm splinted.    Pt was initially Code stemi w/ trops and st elevations, code stemi cancelled as ekg more indicative of a maturing MI. Pt medically managed for resolving MI. Pt transferred to [place] for cath.     Pt stay complicated by ?acute on chronic dementia/delerium 2/2 cystitis. Pt was 1:1 sit w/ haldol overnight. Seroquel and melatonin for sleep. Pt started on ceftriaxone.     Pt was visited by family at bedside.       HPI:    76 year old male with a past medical history of cad s/p stents, prostate ca,  BPH, hyperlipidemia and frequent unwitnessed falls     1 month ago pt family found pt awake and down w/ blood. Family wanted to call 911 but pt refused. Eventually family convinced pt to go to hospital considering significant eye swelling. He was transferred [facility] where he had surgery, as per pt he was intubated for surgery.    Recently pt slipped leaving the bathroom  and hurt L wrist with pain which didn't resolve. Again pt initially refused to go to hospital but came to ED today for persistent L wrist pain -  No chest pain or shortness of breath  (03 May 2022 14:18)    Pt lives in two family home with his adult adopted son Ousmane [phone]. Pt also has a brother in [facility] ([place]) [phone] who isn't close with Ousmane. Rachelle Gold is a neice of both [place] and pt from a 3rd () brother whom the pt endorses being very close w/ and trusting to make medical decisions.       ALLERGIES:    No Known Allergies      PMHx:    CAD (coronary artery disease); Hypercholesteremia; BPH (benign prostatic hyperplasia); Kidney stones; Prostate cancer; Status post cardiac surgery; cardiac stents; Bilateral cataracts; History of lithotripsy    SOCIAL Hx:    - confused, pt's son denies hx dementia/behavioral issues, iso multiple falls  - pt endorses nevincenzo Rachelle Asif [phone]  -  adult adopted son Ousmane [phone]. Pt also has a brother in [facility] ([place]) [phone],     MEDICATIONS:  STANDING MEDICATIONS  artificial  tears Solution 1 Drop(s) Left EYE four times a day  aspirin  chewable 81 milliGRAM(s) Oral daily  atorvastatin 80 milliGRAM(s) Oral at bedtime  atropine 1% Solution 1 Drop(s) Left EYE two times a day  cefTRIAXone   IVPB 1000 milliGRAM(s) IV Intermittent every 24 hours  chlorhexidine 4% Liquid 1 Application(s) Topical <User Schedule>  clopidogrel Tablet 75 milliGRAM(s) Oral daily  enoxaparin Injectable 65 milliGRAM(s) SubCutaneous every 12 hours  melatonin 5 milliGRAM(s) Oral at bedtime  ofloxacin 0.3% Solution 1 Drop(s) Left EYE at bedtime  prednisoLONE acetate 1% Suspension 1 Drop(s) Left EYE every 6 hours  QUEtiapine 25 milliGRAM(s) Oral at bedtime  tamsulosin 0.4 milliGRAM(s) Oral at bedtime    PRN MEDICATIONS  morphine  - Injectable 2 milliGRAM(s) IV Push every 4 hours PRN      LABS:                        13.0   6.87  )-----------( 216      ( 06 May 2022 05:20 )             38.7     05-06    139  |  106  |  20  ----------------------------<  108<H>  4.0   |  18  |  1.2    Ca    9.5      06 May 2022 05:20  Mg     2.1     05-06      PTT - ( 05 May 2022 11:56 )  PTT:65.5 sec  Urinalysis Basic - ( 05 May 2022 15:55 )    Color: Yellow / Appearance: Clear / S.021 / pH: x  Gluc: x / Ketone: Trace  / Bili: Negative / Urobili: <2 mg/dL   Blood: x / Protein: 30 mg/dL / Nitrite: Positive   Leuk Esterase: Large / RBC: 0 /HPF / WBC 50 /HPF   Sq Epi: x / Non Sq Epi: 2 /HPF / Bacteria: Occasional        VITALS:   T(F): 97.1  HR: 88  BP: 108/62  RR: 17  SpO2: 95%        PHYSICAL EXAM:    Gen: resting in bed in chair mode, restrained BL UE  HEENT: Normocephalic, left orbital ecchymosis, traumatic left eye,   Neck: supple, no lymphadenopathy  CV: Regular rate & regular rhythm, daamris  Lungs: decreased BS at bases, no fremitus  Abdomen: Soft, BS present, nontender  Ext: Warm, well perfused  Neuro:  moves all extremities against resistance,AOx2 (self and time, not situation)  Skin: no rash, no erythema   LENGTH OF HOSPITAL STAY: 3d    CHIEF COMPLAINT:    Patient is a 76y old  Male who presents with a chief complaint of NSTEMI (04 May 2022 13:07)    OVERNIGHT EVENTS:    - better night, slept for most of it  - pt examined at bedside, AOx2, improved w/ reorientation to AO3, calmer. Denies CP, SOB, ONEILL, n/v/d, urinary symptoms     HOSPITAL COURSE:    ED [facility]:  Pt was found to have elevated troponin, ecg changes and left healed radial fracture in ED. He denies chest pains, palpitations dizziness or sob. Admitted to ICU. Placed on Heparin gtt, left forearm splinted.    Pt was initially Code stemi w/ trops and st elevations, code stemi cancelled as ekg more indicative of a maturing MI. Pt medically managed for resolving MI. Pt transferred to [place] for cath.     Pt stay complicated by ?acute on chronic dementia/delerium 2/2 cystitis. Pt was 1:1 sit w/ haldol overnight. Seroquel and melatonin for sleep. Pt started on ceftriaxone.     TTEs reviewed by cardiology. Needs a very thorough evaluation of aortic valve and outflow tract.     Pt was visited by family at bedside.       HPI:    76 year old male with a past medical history of cad s/p stents, prostate ca,  BPH, hyperlipidemia and frequent unwitnessed falls     1 month ago pt family found pt awake and down w/ blood. Family wanted to call 911 but pt refused. Eventually family convinced pt to go to hospital considering significant eye swelling. He was transferred [facility] where he had surgery, as per pt he was intubated for surgery.    Recently pt slipped leaving the bathroom  and hurt L wrist with pain which didn't resolve. Again pt initially refused to go to hospital but came to ED today for persistent L wrist pain -  No chest pain or shortness of breath  (03 May 2022 14:18)    Pt lives in two family home with his adult adopted son Ousmane [phone]. Pt also has a brother in [facility] ([place]) [phone] who isn't close with Ousmane. Rachelle Gold is a neice of both [place] and pt from a 3rd () brother whom the pt endorses being very close w/ and trusting to make medical decisions.       ALLERGIES:    No Known Allergies      PMHx:    CAD (coronary artery disease); Hypercholesteremia; BPH (benign prostatic hyperplasia); Kidney stones; Prostate cancer; Status post cardiac surgery; cardiac stents; Bilateral cataracts; History of lithotripsy    SOCIAL Hx:    - confused, pt's son denies hx dementia/behavioral issues, iso multiple falls  - pt endorses sarah Gold [phone]  -  adult adopted son Ousmane [phone]. Pt also has a brother in [facility] ([place]) [phone],     MEDICATIONS:  STANDING MEDICATIONS  artificial  tears Solution 1 Drop(s) Left EYE four times a day  aspirin  chewable 81 milliGRAM(s) Oral daily  atorvastatin 80 milliGRAM(s) Oral at bedtime  atropine 1% Solution 1 Drop(s) Left EYE two times a day  cefTRIAXone   IVPB 1000 milliGRAM(s) IV Intermittent every 24 hours  chlorhexidine 4% Liquid 1 Application(s) Topical <User Schedule>  clopidogrel Tablet 75 milliGRAM(s) Oral daily  enoxaparin Injectable 65 milliGRAM(s) SubCutaneous every 12 hours  melatonin 5 milliGRAM(s) Oral at bedtime  ofloxacin 0.3% Solution 1 Drop(s) Left EYE at bedtime  prednisoLONE acetate 1% Suspension 1 Drop(s) Left EYE every 6 hours  QUEtiapine 25 milliGRAM(s) Oral at bedtime  tamsulosin 0.4 milliGRAM(s) Oral at bedtime    PRN MEDICATIONS  morphine  - Injectable 2 milliGRAM(s) IV Push every 4 hours PRN      LABS:                        13.0   6.87  )-----------( 216      ( 06 May 2022 05:20 )             38.7     -    139  |  106  |  20  ----------------------------<  108<H>  4.0   |  18  |  1.2    Ca    9.5      06 May 2022 05:20  Mg     2.1     -      PTT - ( 05 May 2022 11:56 )  PTT:65.5 sec  Urinalysis Basic - ( 05 May 2022 15:55 )    Color: Yellow / Appearance: Clear / S.021 / pH: x  Gluc: x / Ketone: Trace  / Bili: Negative / Urobili: <2 mg/dL   Blood: x / Protein: 30 mg/dL / Nitrite: Positive   Leuk Esterase: Large / RBC: 0 /HPF / WBC 50 /HPF   Sq Epi: x / Non Sq Epi: 2 /HPF / Bacteria: Occasional        VITALS:   T(F): 97.1  HR: 88  BP: 108/62  RR: 17  SpO2: 95%        PHYSICAL EXAM:    Gen: resting in bed in chair mode, restrained BL UE  HEENT: Normocephalic, left orbital ecchymosis, traumatic left eye,   Neck: supple, no lymphadenopathy  CV: Regular rate & regular rhythm, damaris  Lungs: decreased BS at bases, no fremitus  Abdomen: Soft, BS present, nontender  Ext: Warm, well perfused  Neuro:  moves all extremities against resistance, no droop, AOx3 w/ reorientation;  Skin: no rash, no erythema

## 2022-05-06 NOTE — PROGRESS NOTE ADULT - ATTENDING COMMENTS
Seen / examined and above reviewed.    CAD s/p PCI    Late presentation MI.  Transferred [facility] for [facility].  Apical hypokinesis / overall preserved LVSF.    UTI    Delirium improving.  No chest pain.  Mild bradycardia / hemodynamics stable.  Compensated.    - ASA / Plavix.  - Cont Lovenox.  - Cont Abx.  - Tentative cath Monday.

## 2022-05-06 NOTE — PROGRESS NOTE ADULT - ASSESSMENT
76 year old male with a past medical history of cad s/p stents, prostate ca,  BPH, hyperlipidemia and frequent falls - recent fall ~ one month ago with L orbital floor fracture and ruptured globe ...fell 2 days ago - slipped leaving the bathroom  and hurt L wrist with pain which didn't resolve so  he came to ED today for persistent L wrist pain -  No chest pain or shortness of breath    Impression:  NSTEMI likely late presentation MI  L wrist Fx  Psychosis  HO BPH  HO HLD  HO recent falls w/ orbital fx and ruptured globe    CNS  - AOx2, haldol prn for agitation pending psych w/u, CT Head noncon negative for gross pathology  - Seroquel 25 qhs, melatonin 5 qhs,    Cardio  - LDL 74  - tte 5/4 normal EF apical septal hypokinesis normal diastolic function, damaris holding BB for now  - Cath on hold until mental status improves  - Switch to Lovenox from heparin dirp  - DAPT    HEENT  - supportive care for left lac and left orbital fx c/b globe rupture    Pulm  - satting well RA, cxr no acute pathology     ID  - afebrile, wbc ~7.5  - UA positive 05/05, culture pending  - Start Ceftriaxone 05/05    Endo  - TSH 0.71  - a1c pending    Heme  - f/u folate, b12, iron, ferr    GI  - feed    Renal  - watch is os, replete kotes    MSK  - pt    Dispo CCU  DVT ACS Lovenox  Diet dash/tlc   76 year old male with a past medical history of cad s/p stents, prostate ca,  BPH, hyperlipidemia and frequent falls - recent fall ~ one month ago with L orbital floor fracture and ruptured globe ...fell 2 days ago - slipped leaving the bathroom  and hurt L wrist with pain which didn't resolve so  he came to ED today for persistent L wrist pain -  No chest pain or shortness of breath    Impression:  NSTEMI likely late presentation MI  L wrist Fx  Psychosis  HO BPH  HO HLD  HO recent falls w/ orbital fx and ruptured globe    CNS  - AOx2, haldol prn for agitation pending psych w/u, CT Head noncon negative for gross pathology  - Seroquel 25 qhs, melatonin 5 qhs,    Cardio  - LDL 74  - tte 5/4 normal EF apical septal hypokinesis normal diastolic function, damaris holding BB for now  - Cath on hold until mental status improves  - Switch to Lovenox from heparin dirp  - DAPT  - cath likely monday    HEENT  - supportive care for left lac and left orbital fx c/b globe rupture    Pulm  - satting well RA, cxr no acute pathology     ID  - afebrile, wbc ~7.5  - UA positive 05/05, culture pending  - Start Ceftriaxone 05/05    Endo  - TSH 0.71  - a1c pending    Heme  - f/u folate, b12, iron, ferr    GI  - feed    Renal  - watch is os, replete [place]    MSK  - pt    Dispo Step down unit  DVT ACS Lovenox  Diet dash/tlc

## 2022-05-06 NOTE — CONSULT NOTE ADULT - SUBJECTIVE AND OBJECTIVE BOX
Podiatry Consult Note    Subjective:    RADHA RAMON is a 76y year old Male seen at bedside with a chief complaint of  painful thickened, dystrophic, ingrowing and long toenails digits 1-5 bilaterally  and preventative foot examination. Patient is medically managed  by Medicine/Hospitalists.  Patient denies any history of trauma to both feet. Patient has no other pedal complaints.  Patient is experiencing pain while standing, walking and in shoe gear.     PMH: CAD (coronary artery disease)    Hypercholesteremia    BPH (benign prostatic hyperplasia)    Kidney stones    Hypertension    Diabetes mellitus    Prostate cancer     PAST MEDICAL & SURGICAL HISTORY:  CAD (coronary artery disease)    Hypercholesteremia    BPH (benign prostatic hyperplasia)    Kidney stones    Prostate cancer    Status post cardiac surgery  cardiac stents    Bilateral cataracts    History of lithotripsy      PSH:Status post cardiac surgery    Bilateral cataracts    History of lithotripsy      Allergies:No Known Allergies      Labs:                        13.0   6.87  )-----------( 216      ( 06 May 2022 05:20 )             38.7       WBC Trend  6.87 Date (05-06 @ 05:20)  7.84 Date (05-05 @ 11:00)  7.63 Date (05-05 @ 05:15)  7.29 Date (05-04 @ 05:29)  7.35 Date (05-04 @ 02:20)  7.19 Date (05-03 @ 20:23)  9.35 Date (05-03 @ 11:30)      Chem  05-06    139  |  106  |  20  ----------------------------<  108<H>  4.0   |  18  |  1.2    Ca    9.5      06 May 2022 05:20  Mg     2.1     05-06        T(F): 97.1 (05-05-22 @ 20:00), Max: 97.9 (05-05-22 @ 12:00)  HR: 90 (05-06-22 @ 06:00) (58 - 100)  BP: 108/62 (05-06-22 @ 06:00) (99/70 - 178/95)  RR: 18 (05-06-22 @ 06:00) (17 - 42)  SpO2: 95% (05-06-22 @ 02:00) (95% - 96%)  Wt(kg): --    Objective:   DERM:  Skin warm, dry and supple bilateral.  No open lesions or inter-digital macerations noted bilateral.   Toenails 1-5 Right and Left feet thickened, elongated, discolored, and dystrophic with subungual debris. There is pain upon palpation of all fungal and ingrowing nails 1-5 bilaterally.   VASC: Dorsalis Pedis and Posterior Tibial pulses 2/4.  Capillary re-fill time less than 3 seconds digits 1-5 bilateral.   NEURO: Protective sensation intact to the level of the digits bilateral.  MSK: Muscle strength 4/5 all major muscle groups bilateral. No structural abnormality, bilaterally    Assessment:   Bilateral dystrophic toenails consistent with  Onychomycosis.   Pain from Elongated nails, ingrowing, incurvated,  and dystrophic nails upon palpation of nails.  Xerosis of bilateral plantar feet.     Plan:   Discussed diagnosis and treatment with patient  Aseptic debridement and curettage of all fungal and ingrowing nails 1-5 bilateral with sterile nail nipper.  Discussed importance of daily foot examinations, drying of feet after bathing and proper shoe gear.  Patient Would Benefit From Periodic Debridements; Can Follow Up as Outpatient w/ Dr. Ojeda Post Discharge   Discussed Plan w/ Attending;     Spectra;

## 2022-05-06 NOTE — CONSULT NOTE ADULT - CONSULT REASON
elevated troponin, abnormal ecg
significant nail involvement ? contributing to frequent falls?
wrist fx

## 2022-05-07 NOTE — PHYSICAL THERAPY INITIAL EVALUATION ADULT - TINETTI GAIT TEST, REHAB EVAL
Based on Tinetti, pt is a high risk to fall. Siliq Counseling:  I discussed with the patient the risks of Siliq including but not limited to new or worsening depression, suicidal thoughts and behavior, immunosuppression, malignancy, posterior leukoencephalopathy syndrome, and serious infections.  The patient understands that monitoring is required including a PPD at baseline and must alert us or the primary physician if symptoms of infection or other concerning signs are noted. There is also a special program designed to monitor depression which is required with Siliq.

## 2022-05-07 NOTE — PROGRESS NOTE ADULT - ATTENDING COMMENTS
Seen / examined and above reviewed.    CAD s/p PCI    Late presentation MI.  [facility]red [facility] for [facility].  Apical hypokinesis / overall preserved LVSF.    UTI    Delirium improved.  No chest pain.  Mild bradycardia / hemodynamics stable.  Compensated.    - ASA / Plavix.  - Cont low-dose beta-blocker.  - Cont Lovenox.  - Cont Abx.  - Tentative cath Monday.

## 2022-05-07 NOTE — PHYSICAL THERAPY INITIAL EVALUATION ADULT - PERTINENT HX OF CURRENT PROBLEM, REHAB EVAL
76 year old male with a past medical history of cad s/p stents, prostate ca,  BPH, hyperlipidemia and frequent falls - recent fall ~ one month ago with L orbital floor fracture and ruptured globe ...fell 2 days ago - slipped leaving the bathroom  and hurt L wrist with pain which didn't resolve so  he came to ED

## 2022-05-07 NOTE — PROGRESS NOTE ADULT - SUBJECTIVE AND OBJECTIVE BOX
RADHA RAMON 76y Male  MRN#: [id number]   CODE STATUS: full code    Hospital Day: 4d    Pt is currently admitted with the primary diagnosis of NSTEMI    SUBJECTIVE  Overnight/Interval Events: No acute events overnight.    VITAL SIGNS: Last 24 Hours  T(C): 36.2 (07 May 2022 06:00), Max: 36.6 (06 May 2022 08:00)  T(F): 97.1 (07 May 2022 06:00), Max: 97.8 (06 May 2022 08:00)  HR: 46 (07 May 2022 06:00) (45 - 105)  BP: 123/66 (07 May 2022 06:00) (99/69 - 129/88)  BP(mean): 85 (06 May 2022 16:00) (78 - 104)  RR: 18 (07 May 2022 06:00) (17 - 124)  SpO2: 97% (06 May 2022 21:48) (95% - 98%)      22 @ 07:01  -  22 @ 07:00  --------------------------------------------------------  IN: 0 mL / OUT: 275 mL / NET: -275 mL                                              ----------------------------------------------------------  OBJECTIVE  PAST MEDICAL & SURGICAL HISTORY  CAD (coronary artery disease)    Hypercholesteremia    BPH (benign prostatic hyperplasia)    Kidney stones    Prostate cancer    Status post cardiac surgery  cardiac stents    Bilateral cataracts    History of lithotripsy                                              -----------------------------------------------------------  ALLERGIES:  No Known Allergies                                            ------------------------------------------------------------    HOME MEDICATIONS  Home Medications:  atropine 1% ophthalmic solution: 1 drop(s) to each affected eye 2 times a day (03 May 2022 14:38)  erythromycin 0.5% ophthalmic ointment: 1 application to each affected eye 2 times a day (03 May 2022 14:38)  polyethylene glycol 3350 oral powder for reconstitution: 17 gram(s) orally once a day, As Needed for constipation (03 May 2022 14:38)  prednisoLONE acetate 1% ophthalmic suspension: 1 drop(s) to each affected eye every 6 hours (03 May 2022 14:38)  simvastatin 80 mg oral tablet: 1 tab(s) orally once a day (at bedtime) (03 May 2022 14:38)  tamsulosin 0.4 mg oral capsule: 1 cap(s) orally once a day (at bedtime)  - if you continue to have your blood pressures drop when you stand, then this medication will need to be STOPPED (03 May 2022 14:38)                           MEDICATIONS:  STANDING MEDICATIONS  artificial  tears Solution 1 Drop(s) Left EYE four times a day  aspirin  chewable 81 milliGRAM(s) Oral daily  atorvastatin 80 milliGRAM(s) Oral at bedtime  atropine 1% Solution 1 Drop(s) Left EYE two times a day  cefTRIAXone   IVPB 1000 milliGRAM(s) IV Intermittent every 24 hours  chlorhexidine 4% Liquid 1 Application(s) Topical <User Schedule>  clopidogrel Tablet 75 milliGRAM(s) Oral daily  enoxaparin Injectable 65 milliGRAM(s) SubCutaneous every 12 hours  melatonin 5 milliGRAM(s) Oral at bedtime  metoprolol tartrate 12.5 milliGRAM(s) Oral every 6 hours  ofloxacin 0.3% Solution 1 Drop(s) Left EYE at bedtime  prednisoLONE acetate 1% Suspension 1 Drop(s) Left EYE every 6 hours  tamsulosin 0.4 milliGRAM(s) Oral at bedtime    PRN MEDICATIONS  morphine  - Injectable 2 milliGRAM(s) IV Push every 4 hours PRN                                             --------------------------------------------------------------  LABS:                        11.8   6.48  )-----------( 204      ( 07 May 2022 06:22 )             36.2         139  |  106  |  20  ----------------------------<  108<H>  4.0   |  18  |  1.2    Ca    9.5      06 May 2022 05:20  Mg     2.1           PTT - ( 05 May 2022 11:56 )  PTT:65.5 sec  Urinalysis Basic - ( 05 May 2022 15:55 )    Color: Yellow / Appearance: Clear / S.021 / pH: x  Gluc: x / Ketone: Trace  / Bili: Negative / Urobili: <2 mg/dL   Blood: x / Protein: 30 mg/dL / Nitrite: Positive   Leuk Esterase: Large / RBC: 0 /HPF / WBC 50 /HPF   Sq Epi: x / Non Sq Epi: 2 /HPF / Bacteria: Occasional              Culture - Blood (collected 05 May 2022 11:00)  Source: .Blood Blood  Preliminary Report (06 May 2022 19:02):    No growth to date.                                                    -------------------------------------------------------------  RADIOLOGY:    I have personally reviewed the last available Chest xray  CXR      CT                                              --------------------------------------------------------------    PHYSICAL EXAM:  Gen: resting in bed in chair mode, restrained BL UE  HEENT: Normocephalic, left orbital ecchymosis, traumatic left eye,   Neck: supple, no lymphadenopathy  CV: Regular rate & regular rhythm, damaris  Lungs: decreased BS at bases, no fremitus  Abdomen: Soft, BS present, nontender  Ext: Warm, well perfused  Neuro:  moves all extremities against resistance, no droop, AOx3 w/ reorientation;  Skin: no rash, no erythema                                         --------------------------------------------------------------    ASSESSMENT & PLAN    76 year old male with a past medical history of cad s/p stents, prostate ca,  BPH, hyperlipidemia and frequent falls - recent fall ~ one month ago with L orbital floor fracture and ruptured globe ...fell 2 days ago - slipped leaving the bathroom  and hurt L wrist with pain which didn't resolve so  he came to ED today for persistent L wrist pain -  No chest pain or shortness of breath    NSTEMI likely late presentation MI  L wrist Fx  Psychosis  HO BPH  HO HLD  HO recent falls w/ orbital fx and ruptured globe                                # DVT prophylaxis   # GI prophylaxis   # Diet   # Activity Score (AM-PAC)  # Code status   # Disposition      RADHA RAMON 76y Male  MRN#: [id number]   CODE STATUS: full code    Hospital Day: 4d    Pt is currently admitted with the primary diagnosis of NSTEMI    SUBJECTIVE  Overnight/Interval Events: No acute events overnight.    VITAL SIGNS: Last 24 Hours  T(C): 36.2 (07 May 2022 06:00), Max: 36.6 (06 May 2022 08:00)  T(F): 97.1 (07 May 2022 06:00), Max: 97.8 (06 May 2022 08:00)  HR: 46 (07 May 2022 06:00) (45 - 105)  BP: 123/66 (07 May 2022 06:00) (99/69 - 129/88)  BP(mean): 85 (06 May 2022 16:00) (78 - 104)  RR: 18 (07 May 2022 06:00) (17 - 124)  SpO2: 97% (06 May 2022 21:48) (95% - 98%)      22 @ 07:01  -  22 @ 07:00  --------------------------------------------------------  IN: 0 mL / OUT: 275 mL / NET: -275 mL                                              ----------------------------------------------------------  OBJECTIVE  PAST MEDICAL & SURGICAL HISTORY  CAD (coronary artery disease)    Hypercholesteremia    BPH (benign prostatic hyperplasia)    Kidney stones    Prostate cancer    Status post cardiac surgery  cardiac stents    Bilateral cataracts    History of lithotripsy                                              -----------------------------------------------------------  ALLERGIES:  No Known Allergies                                            ------------------------------------------------------------    HOME MEDICATIONS  Home Medications:  atropine 1% ophthalmic solution: 1 drop(s) to each affected eye 2 times a day (03 May 2022 14:38)  erythromycin 0.5% ophthalmic ointment: 1 application to each affected eye 2 times a day (03 May 2022 14:38)  polyethylene glycol 3350 oral powder for reconstitution: 17 gram(s) orally once a day, As Needed for constipation (03 May 2022 14:38)  prednisoLONE acetate 1% ophthalmic suspension: 1 drop(s) to each affected eye every 6 hours (03 May 2022 14:38)  simvastatin 80 mg oral tablet: 1 tab(s) orally once a day (at bedtime) (03 May 2022 14:38)  tamsulosin 0.4 mg oral capsule: 1 cap(s) orally once a day (at bedtime)  - if you continue to have your blood pressures drop when you stand, then this medication will need to be STOPPED (03 May 2022 14:38)                           MEDICATIONS:  STANDING MEDICATIONS  artificial  tears Solution 1 Drop(s) Left EYE four times a day  aspirin  chewable 81 milliGRAM(s) Oral daily  atorvastatin 80 milliGRAM(s) Oral at bedtime  atropine 1% Solution 1 Drop(s) Left EYE two times a day  cefTRIAXone   IVPB 1000 milliGRAM(s) IV Intermittent every 24 hours  chlorhexidine 4% Liquid 1 Application(s) Topical <User Schedule>  clopidogrel Tablet 75 milliGRAM(s) Oral daily  enoxaparin Injectable 65 milliGRAM(s) SubCutaneous every 12 hours  melatonin 5 milliGRAM(s) Oral at bedtime  metoprolol tartrate 12.5 milliGRAM(s) Oral every 6 hours  ofloxacin 0.3% Solution 1 Drop(s) Left EYE at bedtime  prednisoLONE acetate 1% Suspension 1 Drop(s) Left EYE every 6 hours  tamsulosin 0.4 milliGRAM(s) Oral at bedtime    PRN MEDICATIONS  morphine  - Injectable 2 milliGRAM(s) IV Push every 4 hours PRN                                             --------------------------------------------------------------  LABS:                        11.8   6.48  )-----------( 204      ( 07 May 2022 06:22 )             36.2         139  |  106  |  20  ----------------------------<  108<H>  4.0   |  18  |  1.2    Ca    9.5      06 May 2022 05:20  Mg     2.1           PTT - ( 05 May 2022 11:56 )  PTT:65.5 sec  Urinalysis Basic - ( 05 May 2022 15:55 )    Color: Yellow / Appearance: Clear / S.021 / pH: x  Gluc: x / Ketone: Trace  / Bili: Negative / Urobili: <2 mg/dL   Blood: x / Protein: 30 mg/dL / Nitrite: Positive   Leuk Esterase: Large / RBC: 0 /HPF / WBC 50 /HPF   Sq Epi: x / Non Sq Epi: 2 /HPF / Bacteria: Occasional              Culture - Blood (collected 05 May 2022 11:00)  Source: .Blood Blood  Preliminary Report (06 May 2022 19:02):    No growth to date.                                                    -------------------------------------------------------------  RADIOLOGY:    I have personally reviewed the last available Chest xray  CXR      CT                                              --------------------------------------------------------------    PHYSICAL EXAM:  Gen: resting in bed in chair mode, restrained BL UE  HEENT: Normocephalic, left orbital ecchymosis, traumatic left eye,   Neck: supple, no lymphadenopathy  CV: Regular rate & regular rhythm, damaris  Lungs: decreased BS at bases, no fremitus  Abdomen: Soft, BS present, nontender  Ext: Warm, well perfused  Neuro:  moves all extremities against resistance, no droop, AOx3 w/ reorientation;  Skin: no rash, no erythema                                         --------------------------------------------------------------    ASSESSMENT & PLAN    76 year old male with a past medical history of cad s/p stents, prostate ca,  BPH, hyperlipidemia and frequent falls - recent fall ~ one month ago with L orbital floor fracture and ruptured globe ...fell 2 days ago - slipped leaving the bathroom  and hurt L wrist with pain which didn't resolve so  he came to ED today for persistent L wrist pain -  No chest pain or shortness of breath    NSTEMI likely late presentation MI    - tte  normal EF apical septal hypokinesis normal diastolic function, damaris holding BB for now  - therapeutic lovenox 65mg BID  - DAPT with aspi  - cath likely monday    L wrist Fx  recent falls w/ orbital fx and ruptured globe    Psychosis    BPH    HLD                                    # DVT prophylaxis   # GI prophylaxis   # Diet   # Activity Score (AM-PAC)  # Code status   # Disposition      RADHA RAMON 76y Male  MRN#: [id number]   CODE STATUS: full code    Hospital Day: 4d    Pt is currently admitted with the primary diagnosis of NSTEMI    SUBJECTIVE  Overnight/Interval Events: Overnight patient was bradycardic with HR in 40s.    VITAL SIGNS: Last 24 Hours  T(C): 36.2 (07 May 2022 06:00), Max: 36.6 (06 May 2022 08:00)  T(F): 97.1 (07 May 2022 06:00), Max: 97.8 (06 May 2022 08:00)  HR: 46 (07 May 2022 06:00) (45 - 105)  BP: 123/66 (07 May 2022 06:00) (99/69 - 129/88)  BP(mean): 85 (06 May 2022 16:00) (78 - 104)  RR: 18 (07 May 2022 06:00) (17 - 124)  SpO2: 97% (06 May 2022 21:48) (95% - 98%)      22 @ 07:01  -  22 @ 07:00  --------------------------------------------------------  IN: 0 mL / OUT: 275 mL / NET: -275 mL                                              ----------------------------------------------------------  OBJECTIVE  PAST MEDICAL & SURGICAL HISTORY  CAD (coronary artery disease)    Hypercholesteremia    BPH (benign prostatic hyperplasia)    Kidney stones    Prostate cancer    Status post cardiac surgery  cardiac stents    Bilateral cataracts    History of lithotripsy                                              -----------------------------------------------------------  ALLERGIES:  No Known Allergies                                            ------------------------------------------------------------    HOME MEDICATIONS  Home Medications:  atropine 1% ophthalmic solution: 1 drop(s) to each affected eye 2 times a day (03 May 2022 14:38)  erythromycin 0.5% ophthalmic ointment: 1 application to each affected eye 2 times a day (03 May 2022 14:38)  polyethylene glycol 3350 oral powder for reconstitution: 17 gram(s) orally once a day, As Needed for constipation (03 May 2022 14:38)  prednisoLONE acetate 1% ophthalmic suspension: 1 drop(s) to each affected eye every 6 hours (03 May 2022 14:38)  simvastatin 80 mg oral tablet: 1 tab(s) orally once a day (at bedtime) (03 May 2022 14:38)  tamsulosin 0.4 mg oral capsule: 1 cap(s) orally once a day (at bedtime)  - if you continue to have your blood pressures drop when you stand, then this medication will need to be STOPPED (03 May 2022 14:38)                           MEDICATIONS:  STANDING MEDICATIONS  artificial  tears Solution 1 Drop(s) Left EYE four times a day  aspirin  chewable 81 milliGRAM(s) Oral daily  atorvastatin 80 milliGRAM(s) Oral at bedtime  atropine 1% Solution 1 Drop(s) Left EYE two times a day  cefTRIAXone   IVPB 1000 milliGRAM(s) IV Intermittent every 24 hours  chlorhexidine 4% Liquid 1 Application(s) Topical <User Schedule>  clopidogrel Tablet 75 milliGRAM(s) Oral daily  enoxaparin Injectable 65 milliGRAM(s) SubCutaneous every 12 hours  melatonin 5 milliGRAM(s) Oral at bedtime  metoprolol tartrate 12.5 milliGRAM(s) Oral every 6 hours  ofloxacin 0.3% Solution 1 Drop(s) Left EYE at bedtime  prednisoLONE acetate 1% Suspension 1 Drop(s) Left EYE every 6 hours  tamsulosin 0.4 milliGRAM(s) Oral at bedtime    PRN MEDICATIONS  morphine  - Injectable 2 milliGRAM(s) IV Push every 4 hours PRN                                             --------------------------------------------------------------  LABS:                        11.8   6.48  )-----------( 204      ( 07 May 2022 06:22 )             36.2         139  |  106  |  20  ----------------------------<  108<H>  4.0   |  18  |  1.2    Ca    9.5      06 May 2022 05:20  Mg     2.1           PTT - ( 05 May 2022 11:56 )  PTT:65.5 sec  Urinalysis Basic - ( 05 May 2022 15:55 )    Color: Yellow / Appearance: Clear / S.021 / pH: x  Gluc: x / Ketone: Trace  / Bili: Negative / Urobili: <2 mg/dL   Blood: x / Protein: 30 mg/dL / Nitrite: Positive   Leuk Esterase: Large / RBC: 0 /HPF / WBC 50 /HPF   Sq Epi: x / Non Sq Epi: 2 /HPF / Bacteria: Occasional              Culture - Blood (collected 05 May 2022 11:00)  Source: .Blood Blood  Preliminary Report (06 May 2022 19:02):    No growth to date.                                                    -------------------------------------------------------------  RADIOLOGY:    CXR 5/7: unremarkable                                            --------------------------------------------------------------    PHYSICAL EXAM:  Gen: no acute distress  HEENT: Normocephalic, left orbital ecchymosis, traumatic left eye,   Neck: supple, no lymphadenopathy  CV: bradycardic  Lungs: decreased BS at bases, no fremitus  Abdomen: Soft, BS present, nontender  Ext: Warm, well perfused  Neuro:  AOx3, moves all extremities against resistance,  Skin: no rash, no erythema                                         --------------------------------------------------------------    ASSESSMENT & PLAN    76 year old male with a past medical history of cad s/p stents, prostate ca,  BPH, hyperlipidemia and frequent falls - recent fall ~ one month ago with L orbital floor fracture and ruptured globe ...fell 2 days ago - slipped leaving the bathroom  and hurt L wrist with pain which didn't resolve so  he came to ED today for persistent L wrist pain -  No chest pain or shortness of breath    NSTEMI likely late presentation MI    - tte  normal EF apical septal hypokinesis normal diastolic function, damaris holding BB for now  - therapeutic lovenox 65mg BID  - DAPT with aspirin and plavix  - cath likely monday  - lopressor 12.5mg PO q6h  - atorvastatin 80mg PO qhs    L wrist Fx  recent falls w/ orbital fx and ruptured globe  ortho f/u on discharge    Psychosis  -seen by behavioral health   - if patient is agitated, haldol 5mg po/IM q 12 hrs, prn    BPH  -c/w tamsulosin    HLD  -atorvastatin 80mg    # DVT prophylaxis: lovenox 65mg q12h  # GI prophylaxis   # Diet: DASH  # Activity Score (AM-PAC)  # Code status: full code  # Disposition: acute, possible cath     RADHA RAMON 76y Male  MRN#: [id number]   CODE STATUS: full code    Hospital Day: 4d    Pt is currently admitted with the primary diagnosis of NSTEMI    SUBJECTIVE  Overnight/Interval Events: Overnight patient was bradycardic with HR in 40s. Patient seen and examined at bedside. No new complaints. He has a 1:1 and is at baseline mental status.    VITAL SIGNS: Last 24 Hours  T(C): 36.2 (07 May 2022 06:00), Max: 36.6 (06 May 2022 08:00)  T(F): 97.1 (07 May 2022 06:00), Max: 97.8 (06 May 2022 08:00)  HR: 46 (07 May 2022 06:00) (45 - 105)  BP: 123/66 (07 May 2022 06:00) (99/69 - 129/88)  BP(mean): 85 (06 May 2022 16:00) (78 - 104)  RR: 18 (07 May 2022 06:00) (17 - 124)  SpO2: 97% (06 May 2022 21:48) (95% - 98%)      22 @ 07:01  -  22 @ 07:00  --------------------------------------------------------  IN: 0 mL / OUT: 275 mL / NET: -275 mL                                              ----------------------------------------------------------  OBJECTIVE  PAST MEDICAL & SURGICAL HISTORY  CAD (coronary artery disease)    Hypercholesteremia    BPH (benign prostatic hyperplasia)    Kidney stones    Prostate cancer    Status post cardiac surgery  cardiac stents    Bilateral cataracts    History of lithotripsy                                              -----------------------------------------------------------  ALLERGIES:  No Known Allergies                                            ------------------------------------------------------------    HOME MEDICATIONS  Home Medications:  atropine 1% ophthalmic solution: 1 drop(s) to each affected eye 2 times a day (03 May 2022 14:38)  erythromycin 0.5% ophthalmic ointment: 1 application to each affected eye 2 times a day (03 May 2022 14:38)  polyethylene glycol 3350 oral powder for reconstitution: 17 gram(s) orally once a day, As Needed for constipation (03 May 2022 14:38)  prednisoLONE acetate 1% ophthalmic suspension: 1 drop(s) to each affected eye every 6 hours (03 May 2022 14:38)  simvastatin 80 mg oral tablet: 1 tab(s) orally once a day (at bedtime) (03 May 2022 14:38)  tamsulosin 0.4 mg oral capsule: 1 cap(s) orally once a day (at bedtime)  - if you continue to have your blood pressures drop when you stand, then this medication will need to be STOPPED (03 May 2022 14:38)                           MEDICATIONS:  STANDING MEDICATIONS  artificial  tears Solution 1 Drop(s) Left EYE four times a day  aspirin  chewable 81 milliGRAM(s) Oral daily  atorvastatin 80 milliGRAM(s) Oral at bedtime  atropine 1% Solution 1 Drop(s) Left EYE two times a day  cefTRIAXone   IVPB 1000 milliGRAM(s) IV Intermittent every 24 hours  chlorhexidine 4% Liquid 1 Application(s) Topical <User Schedule>  clopidogrel Tablet 75 milliGRAM(s) Oral daily  enoxaparin Injectable 65 milliGRAM(s) SubCutaneous every 12 hours  melatonin 5 milliGRAM(s) Oral at bedtime  metoprolol tartrate 12.5 milliGRAM(s) Oral every 6 hours  ofloxacin 0.3% Solution 1 Drop(s) Left EYE at bedtime  prednisoLONE acetate 1% Suspension 1 Drop(s) Left EYE every 6 hours  tamsulosin 0.4 milliGRAM(s) Oral at bedtime    PRN MEDICATIONS  morphine  - Injectable 2 milliGRAM(s) IV Push every 4 hours PRN                                             --------------------------------------------------------------  LABS:                        11.8   6.48  )-----------( 204      ( 07 May 2022 06:22 )             36.2         139  |  106  |  20  ----------------------------<  108<H>  4.0   |  18  |  1.2    Ca    9.5      06 May 2022 05:20  Mg     2.1           PTT - ( 05 May 2022 11:56 )  PTT:65.5 sec  Urinalysis Basic - ( 05 May 2022 15:55 )    Color: Yellow / Appearance: Clear / S.021 / pH: x  Gluc: x / Ketone: Trace  / Bili: Negative / Urobili: <2 mg/dL   Blood: x / Protein: 30 mg/dL / Nitrite: Positive   Leuk Esterase: Large / RBC: 0 /HPF / WBC 50 /HPF   Sq Epi: x / Non Sq Epi: 2 /HPF / Bacteria: Occasional              Culture - Blood (collected 05 May 2022 11:00)  Source: .Blood Blood  Preliminary Report (06 May 2022 19:02):    No growth to date.                                                    -------------------------------------------------------------  RADIOLOGY:    CXR 5/7: unremarkable                                            --------------------------------------------------------------    PHYSICAL EXAM:  Gen: no acute distress  HEENT: Normocephalic, left orbital ecchymosis, traumatic left eye,   Neck: supple, no lymphadenopathy  CV: bradycardic  Lungs: decreased BS at bases, no fremitus  Abdomen: Soft, BS present, nontender  Ext: Warm, well perfused  Neuro:  AOx3, moves all extremities against resistance,  Skin: no rash, no erythema                                         --------------------------------------------------------------    ASSESSMENT & PLAN    76 year old male with a past medical history of cad s/p stents, prostate ca,  BPH, hyperlipidemia and frequent falls - recent fall ~ one month ago with L orbital floor fracture and ruptured globe ...fell 2 days ago - slipped leaving the bathroom  and hurt L wrist with pain which didn't resolve so  he came to ED today for persistent L wrist pain -  No chest pain or shortness of breath    NSTEMI likely late presentation MI    - tte  normal EF apical septal hypokinesis normal diastolic function, damaris holding BB for now  - therapeutic lovenox 65mg BID  - DAPT with aspirin and plavix  - cath likely monday  - lopressor 12.5mg PO q6h  - atorvastatin 80mg PO qhs    L wrist Fx  recent falls w/ orbital fx and ruptured globe  ortho f/u on discharge    Psychosis  -seen by behavioral health   - if patient is agitated, haldol 5mg po/IM q 12 hrs, prn    BPH  -c/w tamsulosin    HLD  -atorvastatin 80mg    # DVT prophylaxis: lovenox 65mg q12h  # GI prophylaxis   # Diet: DASH  # Activity Score (AM-PAC)  # Code status: full code  # Disposition: acute, possible cath Monday, npo after MN      RADHA RAMON 76y Male  MRN#: [id number]   CODE STATUS: full code    Hospital Day: 4d    Pt is currently admitted with the primary diagnosis of NSTEMI    SUBJECTIVE  Overnight/Interval Events: Overnight patient was bradycardic with HR in 40s sinus. Patient seen and examined at bedside. No new complaints. He has a 1:1 and is at baseline mental status.    VITAL SIGNS: Last 24 Hours  T(C): 36.2 (07 May 2022 06:00), Max: 36.6 (06 May 2022 08:00)  T(F): 97.1 (07 May 2022 06:00), Max: 97.8 (06 May 2022 08:00)  HR: 46 (07 May 2022 06:00) (45 - 105)  BP: 123/66 (07 May 2022 06:00) (99/69 - 129/88)  BP(mean): 85 (06 May 2022 16:00) (78 - 104)  RR: 18 (07 May 2022 06:00) (17 - 124)  SpO2: 97% (06 May 2022 21:48) (95% - 98%)      22 @ 07:01  -  22 @ 07:00  --------------------------------------------------------  IN: 0 mL / OUT: 275 mL / NET: -275 mL                                              ----------------------------------------------------------  OBJECTIVE  PAST MEDICAL & SURGICAL HISTORY  CAD (coronary artery disease)    Hypercholesteremia    BPH (benign prostatic hyperplasia)    Kidney stones    Prostate cancer    Status post cardiac surgery  cardiac stents    Bilateral cataracts    History of lithotripsy                                              -----------------------------------------------------------  ALLERGIES:  No Known Allergies                                            ------------------------------------------------------------    HOME MEDICATIONS  Home Medications:  atropine 1% ophthalmic solution: 1 drop(s) to each affected eye 2 times a day (03 May 2022 14:38)  erythromycin 0.5% ophthalmic ointment: 1 application to each affected eye 2 times a day (03 May 2022 14:38)  polyethylene glycol 3350 oral powder for reconstitution: 17 gram(s) orally once a day, As Needed for constipation (03 May 2022 14:38)  prednisoLONE acetate 1% ophthalmic suspension: 1 drop(s) to each affected eye every 6 hours (03 May 2022 14:38)  simvastatin 80 mg oral tablet: 1 tab(s) orally once a day (at bedtime) (03 May 2022 14:38)  tamsulosin 0.4 mg oral capsule: 1 cap(s) orally once a day (at bedtime)  - if you continue to have your blood pressures drop when you stand, then this medication will need to be STOPPED (03 May 2022 14:38)                           MEDICATIONS:  STANDING MEDICATIONS  artificial  tears Solution 1 Drop(s) Left EYE four times a day  aspirin  chewable 81 milliGRAM(s) Oral daily  atorvastatin 80 milliGRAM(s) Oral at bedtime  atropine 1% Solution 1 Drop(s) Left EYE two times a day  cefTRIAXone   IVPB 1000 milliGRAM(s) IV Intermittent every 24 hours  chlorhexidine 4% Liquid 1 Application(s) Topical <User Schedule>  clopidogrel Tablet 75 milliGRAM(s) Oral daily  enoxaparin Injectable 65 milliGRAM(s) SubCutaneous every 12 hours  melatonin 5 milliGRAM(s) Oral at bedtime  metoprolol tartrate 12.5 milliGRAM(s) Oral every 6 hours  ofloxacin 0.3% Solution 1 Drop(s) Left EYE at bedtime  prednisoLONE acetate 1% Suspension 1 Drop(s) Left EYE every 6 hours  tamsulosin 0.4 milliGRAM(s) Oral at bedtime    PRN MEDICATIONS  morphine  - Injectable 2 milliGRAM(s) IV Push every 4 hours PRN                                             --------------------------------------------------------------  LABS:                        11.8   6.48  )-----------( 204      ( 07 May 2022 06:22 )             36.2         139  |  106  |  20  ----------------------------<  108<H>  4.0   |  18  |  1.2    Ca    9.5      06 May 2022 05:20  Mg     2.1           PTT - ( 05 May 2022 11:56 )  PTT:65.5 sec  Urinalysis Basic - ( 05 May 2022 15:55 )    Color: Yellow / Appearance: Clear / S.021 / pH: x  Gluc: x / Ketone: Trace  / Bili: Negative / Urobili: <2 mg/dL   Blood: x / Protein: 30 mg/dL / Nitrite: Positive   Leuk Esterase: Large / RBC: 0 /HPF / WBC 50 /HPF   Sq Epi: x / Non Sq Epi: 2 /HPF / Bacteria: Occasional              Culture - Blood (collected 05 May 2022 11:00)  Source: .Blood Blood  Preliminary Report (06 May 2022 19:02):    No growth to date.                                                    -------------------------------------------------------------  RADIOLOGY:    CXR 5/7: unremarkable                                            --------------------------------------------------------------    PHYSICAL EXAM:  Gen: no acute distress  HEENT: Normocephalic, left orbital ecchymosis, traumatic left eye,   Neck: supple, no lymphadenopathy  CV: bradycardic  Lungs: decreased BS at bases, no fremitus  Abdomen: Soft, BS present, nontender  Ext: Warm, well perfused  Neuro:  AOx3, moves all extremities against resistance,  Skin: no rash, no erythema                                         --------------------------------------------------------------    ASSESSMENT & PLAN    76 year old male with a past medical history of cad s/p stents, prostate ca,  BPH, hyperlipidemia and frequent falls - recent fall ~ one month ago with L orbital floor fracture and ruptured globe ...fell 2 days ago - slipped leaving the bathroom  and hurt L wrist with pain which didn't resolve so  he came to ED today for persistent L wrist pain -  No chest pain or shortness of breath    NSTEMI likely late presentation MI    - tte  normal EF apical septal hypokinesis normal diastolic function, damaris holding BB for now  - therapeutic lovenox 65mg BID  - DAPT with aspirin and plavix  - cath likely monday  - lopressor 12.5mg PO q6h  - atorvastatin 80mg PO qhs    L wrist Fx  recent falls w/ orbital fx and ruptured globe  ortho f/u on discharge    Psychosis  -seen by behavioral health   - if patient is agitated, haldol 5mg po/IM q 12 hrs, prn    BPH  -c/w tamsulosin    HLD  -atorvastatin 80mg    # DVT prophylaxis: lovenox 65mg q12h  # GI prophylaxis   # Diet: DASH  # Activity Score (AM-PAC)  # Code status: full code  # Disposition: acute, possible cath Monday, npo after MN

## 2022-05-08 NOTE — PROGRESS NOTE ADULT - SUBJECTIVE AND OBJECTIVE BOX
Delirium      Patient is a 76y old  Male who presents with a chief complaint of NSTEMI (07 May 2022 07:39)    HPI:  · 76 year old male with a past medical history of cad s/p stents, prostate ca,  BPH, hyperlipidemia and frequent falls - recent fall ~ one month ago with L orbital floor fracture and ruptured globe ...fell 2 days ago - slipped leaving the bathroom  and hurt L wrist with pain which didn't resolve so  he came to ED today for persistent L wrist pain -  No chest pain or shortness of breath   (03 May 2022 14:18)       INTERVAL HPI/OVERNIGHT EVENTS:   No overnight events   Afebrile, hemodynamically stable     Subjective:    ICU Vital Signs Last 24 Hrs  T(C): 36.9 (08 May 2022 08:03), Max: 37.1 (08 May 2022 06:00)  T(F): 98.5 (08 May 2022 08:03), Max: 98.7 (08 May 2022 06:00)  HR: 50 (08 May 2022 08:03) (45 - 55)  BP: 136/66 (08 May 2022 08:03) (107/63 - 164/74)  BP(mean): --  ABP: --  ABP(mean): --  RR: 18 (08 May 2022 08:03) (18 - 19)  SpO2: 96% (08 May 2022 04:50) (96% - 97%)    I&O's Summary    07 May 2022 07:01  -  08 May 2022 07:00  --------------------------------------------------------  IN: 380 mL / OUT: 700 mL / NET: -320 mL          Daily     Daily Weight in k.7 (08 May 2022 06:00)    Adult Advanced Hemodynamics Last 24 Hrs  CVP(mm Hg): --  CVP(cm H2O): --  CO: --  CI: --  PA: --  PA(mean): --  PCWP: --  SVR: --  SVRI: --  PVR: --  PVRI: --    EKG/Telemetry Events:  No events noted    MEDICATIONS  (STANDING):  artificial  tears Solution 1 Drop(s) Left EYE four times a day  aspirin  chewable 81 milliGRAM(s) Oral daily  atorvastatin 80 milliGRAM(s) Oral at bedtime  atropine 1% Solution 1 Drop(s) Left EYE two times a day  chlorhexidine 4% Liquid 1 Application(s) Topical <User Schedule>  clopidogrel Tablet 75 milliGRAM(s) Oral daily  enoxaparin Injectable 65 milliGRAM(s) SubCutaneous every 12 hours  melatonin 5 milliGRAM(s) Oral at bedtime  metoprolol tartrate 12.5 milliGRAM(s) Oral every 6 hours  ofloxacin 0.3% Solution 1 Drop(s) Left EYE at bedtime  prednisoLONE acetate 1% Suspension 1 Drop(s) Left EYE every 6 hours  tamsulosin 0.4 milliGRAM(s) Oral at bedtime    MEDICATIONS  (PRN):  morphine  - Injectable 2 milliGRAM(s) IV Push every 4 hours PRN Mild Pain (1 - 3)      PHYSICAL EXAM:  Gen: resting in bed in chair mode, restrained BL UE  HEENT: Normocephalic, left orbital ecchymosis, traumatic left eye,   Neck: supple, no lymphadenopathy  CV: Regular rate & regular rhythm, damaris  Lungs: decreased BS at bases, no fremitus  Abdomen: Soft, BS present, nontender  Ext: Warm, well perfused  Neuro:  moves all extremities against resistance, no droop, AOx3 w/ reorientation;  Skin: no rash, no erythema      LABS:                        12.3   6.01  )-----------( 210      ( 08 May 2022 04:30 )             37.9     05-08    140  |  108  |  22<H>  ----------------------------<  105<H>  4.2   |  19  |  1.1    Ca    9.1      08 May 2022 04:30  Mg     1.8     05-08          CAPILLARY BLOOD GLUCOSE                      RADIOLOGY & ADDITIONAL TESTS:  CXR:        Care Discussed with Consultants/Other Providers [ x] YES  [ ] NO         Delirium      Patient is a 76y old  Male who presents with a chief complaint of NSTEMI (07 May 2022 07:39)    HPI:  · 76 year old male with a past medical history of cad s/p stents, prostate ca,  BPH, hyperlipidemia and frequent falls - recent fall ~ one month ago with L orbital floor fracture and ruptured globe ...fell 2 days ago - slipped leaving the bathroom  and hurt L wrist with pain which didn't resolve so  he came to ED today for persistent L wrist pain -  No chest pain or shortness of breath   (03 May 2022 14:18)       INTERVAL HPI/OVERNIGHT EVENTS:   No overnight events   Afebrile, hemodynamically stable     Subjective:    ICU Vital Signs Last 24 Hrs  T(C): 36.9 (08 May 2022 08:03), Max: 37.1 (08 May 2022 06:00)  T(F): 98.5 (08 May 2022 08:03), Max: 98.7 (08 May 2022 06:00)  HR: 50 (08 May 2022 08:03) (45 - 55)  BP: 136/66 (08 May 2022 08:03) (107/63 - 164/74)  BP(mean): --  ABP: --  ABP(mean): --  RR: 18 (08 May 2022 08:03) (18 - 19)  SpO2: 96% (08 May 2022 04:50) (96% - 97%)    I&O's Summary    07 May 2022 07:01  -  08 May 2022 07:00  --------------------------------------------------------  IN: 380 mL / OUT: 700 mL / NET: -320 mL          Daily     Daily Weight in k.7 (08 May 2022 06:00)    Adult Advanced Hemodynamics Last 24 Hrs  CVP(mm Hg): --  CVP(cm H2O): --  CO: --  CI: --  PA: --  PA(mean): --  PCWP: --  SVR: --  SVRI: --  PVR: --  PVRI: --    EKG/Telemetry Events:  No events noted    MEDICATIONS  (STANDING):  artificial  tears Solution 1 Drop(s) Left EYE four times a day  aspirin  chewable 81 milliGRAM(s) Oral daily  atorvastatin 80 milliGRAM(s) Oral at bedtime  atropine 1% Solution 1 Drop(s) Left EYE two times a day  chlorhexidine 4% Liquid 1 Application(s) Topical <User Schedule>  clopidogrel Tablet 75 milliGRAM(s) Oral daily  enoxaparin Injectable 65 milliGRAM(s) SubCutaneous every 12 hours  melatonin 5 milliGRAM(s) Oral at bedtime  metoprolol tartrate 12.5 milliGRAM(s) Oral every 6 hours  ofloxacin 0.3% Solution 1 Drop(s) Left EYE at bedtime  prednisoLONE acetate 1% Suspension 1 Drop(s) Left EYE every 6 hours  tamsulosin 0.4 milliGRAM(s) Oral at bedtime    MEDICATIONS  (PRN):  morphine  - Injectable 2 milliGRAM(s) IV Push every 4 hours PRN Mild Pain (1 - 3)      PHYSICAL EXAM:  Gen: resting in bed in chair mode, restrained BL UE  HEENT: Normocephalic, left orbital ecchymosis, traumatic left eye,   Neck: supple, no lymphadenopathy  CV: Regular rate & regular rhythm, damaris  Lungs: decreased BS at bases, no fremitus  Abdomen: Soft, BS present, nontender  Ext: Warm, well perfused  Neuro:  moves all extremities against resistance, no droop, AOx3 w/ reorientation;  Skin: no rash, no erythema      LABS:                        12.3   6.01  )-----------( 210      ( 08 May 2022 04:30 )             37.9     05-08    140  |  108  |  22<H>  ----------------------------<  105<H>  4.2   |  19  |  1.1    Ca    9.1      08 May 2022 04:30  Mg     1.8     -            RADIOLOGY & ADDITIONAL TESTS:      ACC: [id number] EXAM:  ECHO TTE WITH CON COMP W DOPP                          PROCEDURE DATE:  2022          INTERPRETATION:   [address] [address]                Phone: [phone].   TRANSTHORACIC ECHOCARDIOGRAM REPORT        Patient Name:   RADHA RAMON Accession #: [id number]  Medical Rec #:  [id number]         Height:      69.0 in 175.3 cm  YOB: 1945         Weight:150.0 lb 68.04 kg  Patient Age:    76 years          BSA:         1.83 m²  Patient Gender: M                 BP:          138/69 mmHg      Date of Exam:        2022 11:25:45 AM  Referring Physician: [id number] ED UNASSIGNED  Sonographer:         Pratibha Smith  Reading Physician:   Bj Hollins.    Procedure:     2D Echo/Doppler/Color Doppler Complete.  Indications:   R94.31 - Abnormal Electrocardiogram [ECG] [EKG]  Diagnosis:     R94.31 - Abnormal Electrocardiogram [ECG] [EKG  Study Details: Technically difficult study.        Summary:   1. Normal global left ventricular systolic function.   2. Left ventricular ejection fraction, by visual estimation, is 60 to   65%.   3. Normal left ventricular diastolic function.   4. Apical septal segment is abnormal as described above.   5. Mild left ventricular hypertrophy.   6. Normal left atrial size.   7. Normal right atrial size.   8. Sclerotic aortic valve with normal opening.   9. Mild aortic regurgitation.  10. Mild mitral valve regurgitation.  11. Mild tricuspid regurgitation.  12. Normal pulmonary artery pressure.  13. There is no evidence of pericardial effusion.    PHYSICIAN INTERPRETATION:  Left Ventricle: The left ventricular internal cavity size is normal.   There is mild left ventricular hypertrophy. Global LV systolic function   was normal. Left ventricular ejection fraction, by visual estimation, is   60 to 65%. Spectral Doppler shows normal pattern of LV diastolic filling.   Normal LV filling pressures.      LV Wall Scoring:  The apical septal segment is hypokinetic. All remaining scored segments   are  normal.    Right Ventricle: Normal right ventricular size and function. The right   ventricular size is normal. RV wall thickness is normal. RV systolic   function is normal. TV S' 0.1 m/s.  Left Atrium: Normal left atrial size. Intact intra-atrial septum without   shunt.  Right Atrium: Normal right atrial size.  Pericardium: There is no evidence of pericardial effusion.  Mitral Valve: Structurally normal mitral valve, with normal leaflet   excursion. Peak transmitral mean gradient equals 0.7 mmHg, calculated   mitral valve area by pressure half time equals 2.34 cm² consistent with   No evidence of mitral stenosis. No evidence of mitral stenosis. Mild   mitral valve regurgitation is seen. Mitral valve mean gradient is 0.7   mmHg consistent with normal mitral stenosis.  Tricuspid Valve: Structurally normal tricuspid valve, with normal leaflet   excursion. Mild tricuspid regurgitation is visualized.  Aortic Valve: Normal trileaflet aortic valve with normal opening. No   evidence of aortic stenosis. Sclerotic aortic valve with normal opening.   Mild aortic valve regurgitation is seen.  Aorta: The aortic root is normal in size and structure. The ascending   aorta was not well visualized.  Pulmonary Artery: Normal pulmonary artery pressure.  Venous: The inferior vena cava is normal. The inferior vena cava was   normal sized, with respiratory size variation greater than 50%.      2D AND M-MODE MEASUREMENTS(normal ranges within parentheses):  Aorta/Left Atrium:             Normal  AoV Cusp Separation: 1.68 cm (1.5-2.6)  Left Atrium (Mmode): 4.06 cm (1.9-4.0)  Right Ventricle:  TAPSE:           1.50 cm    SPECTRAL DOPPLER ANALYSIS:  LV DIASTOLIC FUNCTION:  MV Peak E: 0.55 m/s Decel Time: 325 msec  MV Peak A: 0.51 m/s  E/A Ratio: 1.06    Aortic Valve:  AoV VMax:    1.23 m/s  AoV VTI:     0.32 m  AoV Pk Grad: 6.0 mmHg  AoV Mn Grad: 4.3 mmHg    LVOT Vmax: 0.99 m/s  LVOT VTI:  0.22 m    Mitral Valve:  MV VMax:    0.78 m/s MV P1/2 Time: 94.21 msec  MV Mn Grad: 0.7 mmHg MV Area, PHT: 2.34 cm²    Tricuspid Valve and PA/RV Systolic Pressure: TR Max Velocity: 2.68 m/s RA   Pressure: 3 mmHg RVSP/PASP: 31.8 mmHg      [id number] Bj Hollins, Electronically signed on 2022 at 5:49:30 PM            *** Final ***        Care Discussed with Consultants/Other Providers [ x] YES  [ ] NO         Delirium      Patient is a 76y old  Male who presents with a chief complaint of NSTEMI (07 May 2022 07:39)    HPI:  · 76 year old male with a past medical history of cad s/p stents, prostate ca,  BPH, hyperlipidemia and frequent falls - recent fall ~ one month ago with L orbital floor fracture and ruptured globe ...fell 2 days ago - slipped leaving the bathroom  and hurt L wrist with pain which didn't resolve so  he came to ED today for persistent L wrist pain -  No chest pain or shortness of breath   (03 May 2022 14:18)       INTERVAL HPI/OVERNIGHT EVENTS:   No overnight events   Afebrile, hemodynamically stable     Subjective:    ICU Vital Signs Last 24 Hrs  T(C): 36.9 (08 May 2022 08:03), Max: 37.1 (08 May 2022 06:00)  T(F): 98.5 (08 May 2022 08:03), Max: 98.7 (08 May 2022 06:00)  HR: 50 (08 May 2022 08:03) (45 - 55)  BP: 136/66 (08 May 2022 08:03) (107/63 - 164/74)  BP(mean): --  ABP: --  ABP(mean): --  RR: 18 (08 May 2022 08:03) (18 - 19)  SpO2: 96% (08 May 2022 04:50) (96% - 97%)    I&O's Summary    07 May 2022 07:01  -  08 May 2022 07:00  --------------------------------------------------------  IN: 380 mL / OUT: 700 mL / NET: -320 mL          Daily     Daily Weight in k.7 (08 May 2022 06:00)    Adult Advanced Hemodynamics Last 24 Hrs  CVP(mm Hg): --  CVP(cm H2O): --  CO: --  CI: --  PA: --  PA(mean): --  PCWP: --  SVR: --  SVRI: --  PVR: --  PVRI: --    EKG/Telemetry Events:  Sinus bradycardia    MEDICATIONS  (STANDING):  artificial  tears Solution 1 Drop(s) Left EYE four times a day  aspirin  chewable 81 milliGRAM(s) Oral daily  atorvastatin 80 milliGRAM(s) Oral at bedtime  atropine 1% Solution 1 Drop(s) Left EYE two times a day  chlorhexidine 4% Liquid 1 Application(s) Topical <User Schedule>  clopidogrel Tablet 75 milliGRAM(s) Oral daily  enoxaparin Injectable 65 milliGRAM(s) SubCutaneous every 12 hours  melatonin 5 milliGRAM(s) Oral at bedtime  metoprolol tartrate 12.5 milliGRAM(s) Oral every 6 hours  ofloxacin 0.3% Solution 1 Drop(s) Left EYE at bedtime  prednisoLONE acetate 1% Suspension 1 Drop(s) Left EYE every 6 hours  tamsulosin 0.4 milliGRAM(s) Oral at bedtime    MEDICATIONS  (PRN):  morphine  - Injectable 2 milliGRAM(s) IV Push every 4 hours PRN Mild Pain (1 - 3)      PHYSICAL EXAM:  Gen: resting in bed in chair mode, restrained BL UE  HEENT: Normocephalic, left orbital ecchymosis, traumatic left eye,   Neck: supple, no lymphadenopathy  CV: Regular rate & regular rhythm, damaris  Lungs: decreased BS at bases, no fremitus  Abdomen: Soft, BS present, nontender  Ext: Warm, well perfused  Neuro:  moves all extremities against resistance, no droop, AOx3 w/ reorientation;  Skin: no rash, no erythema      LABS:                        12.3   6.01  )-----------( 210      ( 08 May 2022 04:30 )             37.9     05-08    140  |  108  |  22<H>  ----------------------------<  105<H>  4.2   |  19  |  1.1    Ca    9.1      08 May 2022 04:30  Mg     1.8     05-08            RADIOLOGY & ADDITIONAL TESTS:      ACC: [id number] EXAM:  ECHO TTE WITH CON COMP W DOPP                          PROCEDURE DATE:  2022          INTERPRETATION:   [address] [address]                Phone: [phone].   TRANSTHORACIC ECHOCARDIOGRAM REPORT        Patient Name:   RADHA RAMON Accession #: [id number]  Medical Rec #:  [id number]         Height:      69.0 in 175.3 cm  YOB: 1945         Weight:150.0 lb 68.04 kg  Patient Age:    76 years          BSA:         1.83 m²  Patient Gender: M                 BP:          138/69 mmHg      Date of Exam:        2022 11:25:45 AM  Referring Physician: [id number] ED UNASSIGNED  Sonographer:         Pratibha Smith  Reading Physician:   Bj Hollins.    Procedure:     2D Echo/Doppler/Color Doppler Complete.  Indications:   R94.31 - Abnormal Electrocardiogram [ECG] [EKG]  Diagnosis:     R94.31 - Abnormal Electrocardiogram [ECG] [EKG  Study Details: Technically difficult study.        Summary:   1. Normal global left ventricular systolic function.   2. Left ventricular ejection fraction, by visual estimation, is 60 to   65%.   3. Normal left ventricular diastolic function.   4. Apical septal segment is abnormal as described above.   5. Mild left ventricular hypertrophy.   6. Normal left atrial size.   7. Normal right atrial size.   8. Sclerotic aortic valve with normal opening.   9. Mild aortic regurgitation.  10. Mild mitral valve regurgitation.  11. Mild tricuspid regurgitation.  12. Normal pulmonary artery pressure.  13. There is no evidence of pericardial effusion.    PHYSICIAN INTERPRETATION:  Left Ventricle: The left ventricular internal cavity size is normal.   There is mild left ventricular hypertrophy. Global LV systolic function   was normal. Left ventricular ejection fraction, by visual estimation, is   60 to 65%. Spectral Doppler shows normal pattern of LV diastolic filling.   Normal LV filling pressures.      LV Wall Scoring:  The apical septal segment is hypokinetic. All remaining scored segments   are  normal.    Right Ventricle: Normal right ventricular size and function. The right   ventricular size is normal. RV wall thickness is normal. RV systolic   function is normal. TV S' 0.1 m/s.  Left Atrium: Normal left atrial size. Intact intra-atrial septum without   shunt.  Right Atrium: Normal right atrial size.  Pericardium: There is no evidence of pericardial effusion.  Mitral Valve: Structurally normal mitral valve, with normal leaflet   excursion. Peak transmitral mean gradient equals 0.7 mmHg, calculated   mitral valve area by pressure half time equals 2.34 cm² consistent with   No evidence of mitral stenosis. No evidence of mitral stenosis. Mild   mitral valve regurgitation is seen. Mitral valve mean gradient is 0.7   mmHg consistent with normal mitral stenosis.  Tricuspid Valve: Structurally normal tricuspid valve, with normal leaflet   excursion. Mild tricuspid regurgitation is visualized.  Aortic Valve: Normal trileaflet aortic valve with normal opening. No   evidence of aortic stenosis. Sclerotic aortic valve with normal opening.   Mild aortic valve regurgitation is seen.  Aorta: The aortic root is normal in size and structure. The ascending   aorta was not well visualized.  Pulmonary Artery: Normal pulmonary artery pressure.  Venous: The inferior vena cava is normal. The inferior vena cava was   normal sized, with respiratory size variation greater than 50%.      2D AND M-MODE MEASUREMENTS(normal ranges within parentheses):  Aorta/Left Atrium:             Normal  AoV Cusp Separation: 1.68 cm (1.5-2.6)  Left Atrium (Mmode): 4.06 cm (1.9-4.0)  Right Ventricle:  TAPSE:           1.50 cm    SPECTRAL DOPPLER ANALYSIS:  LV DIASTOLIC FUNCTION:  MV Peak E: 0.55 m/s Decel Time: 325 msec  MV Peak A: 0.51 m/s  E/A Ratio: 1.06    Aortic Valve:  AoV VMax:    1.23 m/s  AoV VTI:     0.32 m  AoV Pk Grad: 6.0 mmHg  AoV Mn Grad: 4.3 mmHg    LVOT Vmax: 0.99 m/s  LVOT VTI:  0.22 m    Mitral Valve:  MV VMax:    0.78 m/s MV P1/2 Time: 94.21 msec  MV Mn Grad: 0.7 mmHg MV Area, PHT: 2.34 cm²    Tricuspid Valve and PA/RV Systolic Pressure: TR Max Velocity: 2.68 m/s RA   Pressure: 3 mmHg RVSP/PASP: 31.8 mmHg      [id number] Bj Hollins, Electronically signed on 2022 at 5:49:30 PM            *** Final ***        Care Discussed with Consultants/Other Providers [ x] YES  [ ] NO

## 2022-05-08 NOTE — PROGRESS NOTE ADULT - ASSESSMENT
76 year old male with a past medical history of cad s/p stents, prostate ca,  BPH, hyperlipidemia and frequent falls - recent fall ~ one month ago with L orbital floor fracture and ruptured globe ...fell 2 days ago - slipped leaving the bathroom  and hurt L wrist with pain which didn't resolve so  he came to ED today for persistent L wrist pain -  No chest pain or shortness of breath    Impression:  NSTEMI likely late presentation MI  L wrist Fx  Psychosis  HO BPH  HO HLD  HO recent falls w/ orbital fx and ruptured globe    CNS  -  AOx3, CT Head non-con negative for gross pathology  -  seen by behavioral health 5/5  - if patient is agitated, haldol 5mg po/IM q 12 hrs, prn      Cardiovascular  - tte 5/4 normal EF apical septal hypokinesis normal diastolic function  - therapeutic lovenox 65mg BID for NSTEMI  - DAPT with aspirin and plavix  - cath likely monday NPO after MN, IVF  - lopressor 12.5mg PO q6h  - atorvastatin 80mg PO qhs      HEENT  - supportive care for left lac and left orbital fx c/b globe rupture    Pulm  - satting well RA  - cxr no acute pathology     ID  - afebrile, wbc ~WNL  - UA positive 05/05, culture negative   - S/P Ceftriaxone 05/05-05/08    Endo  - TSH 0.71  - a1c 5.8%    Heme  - Therapeutic Lovenox hold for procedure tomorrow    GI  - feeding as tolerated    Renal  - F/U Lytes correct PRN    MSK  - PT as tolerated     Dispo Step down unit  DVT ACS Lovenox  Diet dash/tlc     76 year old male with a past medical history of cad s/p stents, prostate ca,  BPH, hyperlipidemia and frequent falls - recent fall ~ one month ago with L orbital floor fracture and ruptured globe ...fell 2 days ago - slipped leaving the bathroom  and hurt L wrist with pain which didn't resolve so  he came to ED today for persistent L wrist pain -  No chest pain or shortness of breath    Impression:  NSTEMI likely late presentation MI  L wrist Fx  Psychosis  HO BPH  HO HLD  HO recent falls w/ orbital fx and ruptured globe    CNS  -  AOx3, CT Head non-con negative for gross pathology  -  seen by behavioral health 5/5  - if patient is agitated, haldol 5mg po/IM q 12 hrs, prn      Cardiovascular  - tte 5/4 normal EF apical septal hypokinesis normal diastolic function  - therapeutic lovenox 65mg BID for NSTEMI, hold am dose for procedure tomorrow  - DAPT with aspirin and plavix  - cath likely monday NPO after MN, IVF  - lopressor 12.5mg PO q6h  - atorvastatin 80mg PO qhs      HEENT  - supportive care for left lac and left orbital fx c/b globe rupture    Pulm  - satting well RA  - cxr no acute pathology     ID  - afebrile, wbc ~WNL  - UA positive 05/05, culture negative   - S/P Ceftriaxone 05/05-05/08    Endo  - TSH 0.71  - a1c 5.8%    Heme  - Therapeutic Lovenox hold for procedure tomorrow    GI  - feeding as tolerated    Renal  - F/U Lytes correct PRN    MSK  - PT as tolerated     Dispo Step down unit  DVT ACS Lovenox  Diet dash/tlc     76 year old male with a past medical history of cad s/p stents, prostate ca,  BPH, hyperlipidemia and frequent falls - recent fall ~ one month ago with L orbital floor fracture and ruptured globe ...fell 2 days ago - slipped leaving the bathroom  and hurt L wrist with pain which didn't resolve so  he came to ED today for persistent L wrist pain -  No chest pain or shortness of breath    Impression:  NSTEMI likely late presentation MI  L wrist Fx  Psychosis  HO BPH  HO HLD  HO recent falls w/ orbital fx and ruptured globe    CNS  -  AOx3, CT Head non-con negative for gross pathology  -  seen by behavioral health 5/5  - if patient is agitated, haldol 5mg po/IM q 12 hrs, prn      Cardiovascular  - tte 5/4 normal EF apical septal hypokinesis normal diastolic function  - therapeutic lovenox 65mg BID for NSTEMI, hold am dose for procedure tomorrow  - DAPT with aspirin and plavix  - cath likely monday NPO after MN, IVF  - lopressor 12.5mg PO q6h  - atorvastatin 80mg PO qhs      HEENT  - supportive care for left lac and left orbital fx c/b globe rupture    Pulm  - satting well RA  - cxr no acute pathology     ID  - afebrile, wbc ~WNL  - UA positive 05/05, culture negative   - C/W Ceftriaxone 05/05-05/10    Endo  - TSH 0.71  - a1c 5.8%    Heme  - Therapeutic Lovenox hold for procedure tomorrow    GI  - feeding as tolerated    Renal  - F/U Lytes correct PRN    MSK  - PT as tolerated     Dispo Step down unit  DVT ACS Lovenox  Diet dash/tlc

## 2022-05-08 NOTE — PROGRESS NOTE ADULT - ATTENDING COMMENTS
Seen / examined and above reviewed.    CAD s/p PCI    Late presentation MI.  Transferred from [facility] for possible cath.  Apical hypokinesis / overall preserved LVSF.    UTI    Delirium improved.  No chest pain.  Mild bradycardia / hemodynamics stable.  Compensated.    - ASA / Plavix.  - Cont Metoprolol.  - Last dose Lovenox tonight.  - Complete Abx.  - Cath Monday.

## 2022-05-08 NOTE — CHART NOTE - NSCHARTNOTEFT_GEN_A_CORE
Pre cath note:    Indication:  [ ] STEMI                [ x] NSTEMI                 [ ] Acute coronary syndrome                     [ ]Unstable Angina   [ ] high risk  [ ] intermediate risk  [ ] low risk                     [ ] Stable Angina     non-invasive testing:                          Date:                     result: [ ] high risk  [ ] intermediate risk  [ ] low risk    Anti- Anginal medications:                    [ ] not used                       [x ] used                   [ ] not used but strong indication not to use    Ejection Fraction                   [ ] <29            [ ] 30-39%   [ ] 40-49%     [x ]>50%                            Heart Failure NYHA Class (1-4):                       CHF                   [ ] Active (within last 14 days on meds   [ ] Chronic (on meds but no exacerbation)    COPD                   [ ] mild (on chronic bronchodilators)  [ ] moderate (on chronic steroid therapy)      [ ] severe (indication for home O2 or PACO2 >50)    Other risk factors:                       [ x] Previous CAD with stents                    [ ] CVA/ stroke                    [ ] carotid stent/ CEA                    [ ] PVD/PAD- (arterial aneurysm, non-palpable pulses, tortuous vessel with inability to insert catheter, infra-renal dissection, renal or subclavian artery stenosis)                    [ ] diabetic                    [ ] previous CABG                    [ ] Renal Failure                           12.3   6.01  )-----------( 210      ( 08 May 2022 04:30 )             37.9     05-08    140  |  108  |  22<H>  ----------------------------<  105<H>  4.2   |  19  |  1.1    Ca    9.1      08 May 2022 04:30  Mg     1.8     05-08                           -Patient Schedule for LHC/PCI tomorrow   -Keep NPO after midnight  -Hold Anticoagulation prior to Cath in the morning   -Start IV Fluids NS as ordered

## 2022-05-09 NOTE — PROGRESS NOTE ADULT - SUBJECTIVE AND OBJECTIVE BOX
Delirium      Patient is a 76y old  Male who presents with a chief complaint of NSTEMI (08 May 2022 08:49)    HPI:  · 76 year old male with a past medical history of cad s/p stents, prostate ca,  BPH, hyperlipidemia and frequent falls - recent fall ~ one month ago with L orbital floor fracture and ruptured globe ...fell 2 days ago - slipped leaving the bathroom  and hurt L wrist with pain which didn't resolve so  he came to ED today for persistent L wrist pain -  No chest pain or shortness of breath   (03 May 2022 14:18)       INTERVAL HPI/OVERNIGHT EVENTS:   No overnight events   Afebrile, hemodynamically stable     Subjective:    ICU Vital Signs Last 24 Hrs  T(C): 36.3 (09 May 2022 04:00), Max: 36.3 (09 May 2022 04:00)  T(F): 97.3 (09 May 2022 04:00), Max: 97.3 (09 May 2022 04:00)  HR: 87 (09 May 2022 04:00) (48 - 87)  BP: 136/76 (09 May 2022 04:00) (120/67 - 161/70)  BP(mean): --  ABP: --  ABP(mean): --  RR: 18 (09 May 2022 04:00) (18 - 18)  SpO2: 97% (09 May 2022 04:00) (96% - 97%)    I&O's Summary    08 May 2022 07:01  -  09 May 2022 07:00  --------------------------------------------------------  IN: 1100 mL / OUT: 500 mL / NET: 600 mL    09 May 2022 07:01  -  09 May 2022 10:08  --------------------------------------------------------  IN: 275 mL / OUT: 0 mL / NET: 275 mL          Daily     Daily Weight in k.2 (09 May 2022 04:00)    Adult Advanced Hemodynamics Last 24 Hrs  CVP(mm Hg): --  CVP(cm H2O): --  CO: --  CI: --  PA: --  PA(mean): --  PCWP: --  SVR: --  SVRI: --  PVR: --  PVRI: --    EKG/Telemetry Events:  Sinus bradycardia ON when he sleeps    MEDICATIONS  (STANDING):  artificial  tears Solution 1 Drop(s) Left EYE four times a day  aspirin  chewable 81 milliGRAM(s) Oral daily  atorvastatin 80 milliGRAM(s) Oral at bedtime  atropine 1% Solution 1 Drop(s) Left EYE two times a day  cefTRIAXone   IVPB 1000 milliGRAM(s) IV Intermittent every 24 hours  chlorhexidine 4% Liquid 1 Application(s) Topical <User Schedule>  clopidogrel Tablet 75 milliGRAM(s) Oral daily  melatonin 5 milliGRAM(s) Oral at bedtime  metoprolol tartrate 12.5 milliGRAM(s) Oral every 6 hours  ofloxacin 0.3% Solution 1 Drop(s) Left EYE at bedtime  polyethylene glycol 3350 17 Gram(s) Oral two times a day  prednisoLONE acetate 1% Suspension 1 Drop(s) Left EYE every 6 hours  senna 1 Tablet(s) Oral daily  sodium chloride 0.9%. 1000 milliLiter(s) (75 mL/Hr) IV Continuous <Continuous>  tamsulosin 0.4 milliGRAM(s) Oral at bedtime    MEDICATIONS  (PRN):  morphine  - Injectable 2 milliGRAM(s) IV Push every 4 hours PRN Mild Pain (1 - 3)      PHYSICAL EXAM:  Gen: resting in bed NAD  HEENT: Normocephalic, left orbital ecchymosis, traumatic left eye,   Neck: supple, no lymphadenopathy  CV: Regular rate & regular rhythm, damaris  Lungs: decreased BS at bases, no fremitus  Abdomen: Soft, BS present, nontender  Ext: Warm, well perfused  Neuro:  moves all extremities  AOx3   Skin: no rash, no erythema    LABS:                        12.0   6.91  )-----------( 213      ( 09 May 2022 05:50 )             36.5     -    139  |  107  |  15  ----------------------------<  96  4.0   |  20  |  0.9    Ca    9.1      09 May 2022 05:50  Mg     1.7     -            RADIOLOGY & ADDITIONAL TESTS:      ACC: [id number] EXAM:  XR CHEST PORTABLE ROUTINE 1V                          PROCEDURE DATE:  2022          INTERPRETATION:  STUDY INDICATION: f/u    TECHNIQUE:  Portable frontal view of the chest obtained.    COMPARISON: 2022    FINDINGS/  IMPRESSION:    No focal consolidation, pneumothorax or pleural effusion.    Stable cardiomediastinal silhouette.    Unchanged osseous structures.    --- End of Report ---            MIQUEL ROBERSON MD; Attending Radiologist  This document has been electronically signed. May  7 2022 10:16PM        Care Discussed with Consultants/Other Providers [ x] YES  [ ] NO

## 2022-05-09 NOTE — CHART NOTE - NSCHARTNOTEFT_GEN_A_CORE
PRE-OP DIAGNOSIS:    NSTEMI    PROCEDURE: Coronary angiogram, [facility]      Attending:  Dr. Ramírez     Interventional Fellow:  Dr. Workman       Consent:      [x] Patient     [] Family Member     []  Used        Anesthesia:     [] General     [x] Sedation     [x] Local        Access & Closure:     [x] 6Fr  right Radial Artery -> D stat      IV Contrast: 40    mL        Intervention:   none    Implants:   none     FINDINGS:    Hemodynamics: Hemodynamic assessment demonstrates normal LVEDP.     Coronary circulation: The coronary circulation is right dominant. There was no angiographic evidence for occlusive coronary artery disease.     Left main: Angiography showed mild atherosclerosis with no flow limiting lesions.       LAD: The vessel was large sized. Proximal LAD: There was a tubular 40- 50 % stenosis. Mid LAD: Angiography showed mild atherosclerosis. Patent prior stent. Distal LAD: Angiography showed no evidence of disease. 1st diagonal: Angiography showed no evidence of disease.     Proximal circumflex: The vessel was large sized. Angiography showed no evidence of disease. Distal circumflex: The vessel was small sized. Angiography showed no evidence of disease. 1st obtuse marginal: The vessel was large sized. Angiography showed no evidence of disease.     Ramus intermedius: The vessel was large sized. Angiography showed no evidence of disease.     RCA: The vessel was very large sized (dominant). Proximal RCA: There was a diffuse 30 % stenosis. Mid RCA: There was a tubular 30 % stenosis. Distal RCA: The vessel was ectatic. Angiography showed no evidence of disease. Right PDA: Angiography showed no evidence of disease. Right posterolateral segment: Angiography showed no evidence of disease.        ESTIMATED BLOOD LOSS: < 30 mL        CONDITION:     [x] Good     [] Fair     [] Critical        SPECIMEN REMOVED: N/A       POST-OP DIAGNOSIS:    Non-obstructive coronary artery disease.        PLAN OF CARE:   IV NS  150 cc/hr x 4 hrs   Medical therapy and risk factor modification PRE-OP DIAGNOSIS:    NSTEMI    PROCEDURE: Coronary angiogram, [facility]      Attending:  Dr. Ramírez     Interventional Fellow:  Dr. Workman       Consent:      [x] Patient     [] Family Member     []  Used        Anesthesia:     [] General     [x] Sedation     [x] Local        Access & Closure:     [x] 6Fr  right Radial Artery -> D stat      IV Contrast: 40    mL        Intervention:   none    Implants:   none     FINDINGS:    Hemodynamics: Hemodynamic assessment demonstrates normal LVEDP.     Coronary circulation: The coronary circulation is right dominant. There was no angiographic evidence for occlusive coronary artery disease.     Left main: Angiography showed mild atherosclerosis with no flow limiting lesions.       LAD: The vessel was large sized. Proximal LAD: There was a tubular 40- 50 % stenosis. Mid LAD: Angiography showed mild atherosclerosis. Patent prior stent. Distal LAD: Angiography showed no evidence of disease. 1st diagonal: Angiography showed no evidence of disease.     Proximal circumflex: The vessel was large sized. Angiography showed no evidence of disease. Distal circumflex: The vessel was small sized. Angiography showed no evidence of disease. 1st obtuse marginal: The vessel was large sized. Angiography showed no evidence of disease.     Ramus intermedius: The vessel was large sized. Angiography showed no evidence of disease.     RCA: The vessel was very large sized (dominant). Proximal RCA: There was a diffuse 30 % stenosis. Mid RCA: There was a tubular 30 % stenosis. Distal RCA: The vessel was ectatic. Angiography showed no evidence of disease. Right PDA: Angiography showed no evidence of disease. Right posterolateral segment: Angiography showed no evidence of disease.        ESTIMATED BLOOD LOSS: < 30 mL        CONDITION:     [x] Good     [] Fair     [] Critical        SPECIMEN REMOVED: N/A       POST-OP DIAGNOSIS:    Non-obstructive coronary artery disease.        PLAN OF CARE:   IV NS  150 cc/hr x 4 hrs   Medical therapy and risk factor modification  Dc planning

## 2022-05-09 NOTE — PROGRESS NOTE ADULT - ASSESSMENT
76 year old male with a past medical history of cad s/p stents, prostate ca,  BPH, hyperlipidemia and frequent falls - recent fall ~ one month ago with L orbital floor fracture and ruptured globe ...fell 2 days ago - slipped leaving the bathroom  and hurt L wrist with pain which didn't resolve so  he came to ED today for persistent L wrist pain -  No chest pain or shortness of breath    Impression:  NSTEMI likely late presentation MI  L wrist Fx  Psychosis  HO BPH  HO HLD  HO recent falls w/ orbital fx and ruptured globe    CNS  -  AOx3, CT Head non-con negative for gross pathology  -  seen by behavioral health 5/5  - if patient is agitated, haldol 5mg po/IM q 12 hrs, prn      Cardiovascular  - tte 5/4 normal EF apical septal hypokinesis normal diastolic function  - therapeutic lovenox 65mg BID for NSTEMI, on hold  for procedure today  - DAPT with aspirin and plavix  - cath today  - lopressor 12.5mg PO q6h  - atorvastatin 80mg PO qhs      HEENT  - supportive care for left lac and left orbital fx c/b globe rupture    Pulm  - satting well RA  - cxr no acute pathology     ID  - afebrile, wbc ~WNL  - UA positive 05/05, culture negative   - C/W Ceftriaxone 05/05-05/10    Endo  - TSH 0.71  - a1c 5.8%    Heme  - Therapeutic Lovenox hold for procedure today     GI  - feeding as tolerated    Renal  - F/U Lytes correct PRN    MSK  - PT as tolerated     Dispo Step down unit  DVT ACS Lovenox  Diet dash/tlc

## 2022-05-09 NOTE — PROGRESS NOTE ADULT - ATTENDING COMMENTS
Patient with h/o CAD s/p PCI, BPH, HLD admitted with h/o recent fall, wrist and orbital fracture. Ischemic changes and positive troponin incidentally found. Treated with heparin gtt and DAPT. Cath held last week due to AMS, likely due to UTI, improved on Abx. Plan for coronary angiogram today.       Continue DAPT and statin   Keep NPO for coronary angiogram today   Sinus damaris to 40 when sleeping - asymptomatic    Abx for UTI - monitor cultures   Check iron profile   Start lisinopril 10 mg from   PT evaluation Patient with h/o CAD s/p PCI, BPH, HLD admitted with h/o recent fall, wrist and orbital fracture. Ischemic changes and positive troponin incidentally found. Treated with heparin gtt and DAPT. Cath held last week due to AMS, likely due to UTI, improved on Abx. Plan for coronary angiogram today.       Continue DAPT and statin   Keep NPO for coronary angiogram today   Sinus damaris to 40 when sleeping - asymptomatic    Abx for UTI - monitor cultures   Check iron profile   Start lisinopril 10 mg from tomorrow  PT evaluation

## 2022-05-10 NOTE — DISCHARGE NOTE PROVIDER - NSDCCPCAREPLAN_GEN_ALL_CORE_FT
PRINCIPAL DISCHARGE DIAGNOSIS  Diagnosis: Acute MI  Assessment and Plan of Treatment: A heart attack occurs when blood flow to the heart muscle is interrupted. This deprives the heart muscle of  oxygen, causing tissue damage or tissue death. Common treatments include lifestyle changes, oxygen,  medicines, and surgery. Steps to Take: Rest until your doctor says it is okay to return to work or other activities, Take all medicines as prescribed by your doctor. ACE inhibitors, and antiplatelet therapy.  Diet: Eat a heart-healthy diet, Limit your intake of fat, cholesterol, and sodium. Foods such as ice cream, cheese, baked goods, and red meat are not the best choices. Increase your intake of whole grains, fish, fruits, vegetables, and nuts. Consume alcohol in moderation: one to two drinks per day for men, one drink per day for women. Discuss supplements with your doctor. Your doctor may refer you to dietician to advise you on meal planning. Physical Activity: The American Heart Association recommends at least 30 minutes of exercise daily, or at least 3-4 times per week, for patients who have had a heart attack.   Have your cholesterol checked regularly. Get regular medical check-ups.Control your blood pressure. Eat a healthful diet, one that is low in saturated fat and rich in whole grains, fruits, and vegetables.Have a regular, low impact exercise program. Maintain a healthy weight.Manage stress through activities such as yoga, meditation, and counseling. If you have diabetes, maintain good control of your condition.  . Call for Medical Help Right Away If Any of the Following Occurs Call for medical help right away if you have symptoms of another heart attack, including: Chest pain, which may feel like a crushing weight on your chest A sense of fullness, squeezing, or pressure in the chest  Anxiety, especially feeling a sense of doom or panic without apparent reason Rapid, irregular heartbeat Pain, tingling or numbness in the left shoulder and arm, the neck or jaw, or the right arm Sweating Nausea or vomiting Indigestion or heartburn Lightheadedness, weak

## 2022-05-10 NOTE — PROGRESS NOTE ADULT - ASSESSMENT
76 year old male with a past medical history of cad s/p stents, prostate ca,  BPH, hyperlipidemia and frequent falls - recent fall ~ one month ago with L orbital floor fracture and ruptured globe ...fell 2 days ago - slipped leaving the bathroom  and hurt L wrist with pain which didn't resolve so  he came to ED today for persistent L wrist pain -  No chest pain or shortness of breath    Impression:  NSTEMI likely late presentation MI  L wrist Fx  Psychosis  HO BPH  HO HLD  HO recent falls w/ orbital fx and ruptured globe    CNS  -  AOx3, CT Head non-con negative for gross pathology  -  seen by behavioral health 5/5        Cardiovascular  - tte 5/4 normal EF apical septal hypokinesis normal diastolic function  - DAPT with aspirin and plavix  -Cath showed non-obsturctive CAD  - lopressor 12.5mg PO q6h  - atorvastatin 80mg PO qhs      HEENT  - supportive care for left lac and left orbital fx c/b globe rupture    Pulm  - satting well RA  - cxr no acute pathology     ID  - afebrile, wbc ~WNL  - UA positive 05/05, culture negative   - C/W Ceftriaxone 05/05-05/10    Endo  - TSH 0.71  - a1c 5.8%    Heme  - DVT PPX    GI  - feeding as tolerated    Renal  - F/U Lytes correct PRN    MSK  - PT as tolerated     Dispo Step down unit  DVT ACS Lovenox  Diet dash/tlc     76 year old male with a past medical history of cad s/p stents, prostate ca,  BPH, hyperlipidemia and frequent falls - recent fall ~ one month ago with L orbital floor fracture and ruptured globe ...fell 2 days ago - slipped leaving the bathroom  and hurt L wrist with pain which didn't resolve so  he came to ED today for persistent L wrist pain -  No chest pain or shortness of breath    Impression:  NSTEMI likely late presentation MI  L wrist Fx  Psychosis  HO BPH  HO HLD  HO recent falls w/ orbital fx and ruptured globe    CNS  -  AOx3, CT Head non-con negative for gross pathology  -  seen by behavioral health 5/5        Cardiovascular  - tte 5/4 normal EF apical septal hypokinesis normal diastolic function  - DAPT with aspirin and plavix  -Cath showed non-obsturctive CAD  - lopressor 12.5mg PO q6h  - atorvastatin 80mg PO qhs  - Cardiac catheterization showed non-obstructive CAD  - C/W medical management  - Close F/U with Dr. Ramírez Outpatient      HEENT  - supportive care for left lac and left orbital fx c/b globe rupture    Pulm  - satting well RA  - cxr no acute pathology     ID  - afebrile, wbc ~WNL  - UA positive 05/05,   - D/P Ceftriaxone 05/05-05/10    Endo  - TSH 0.71  - a1c 5.8%    Heme  - DVT PPX    GI  - feeding as tolerated    Renal  - F/U Lytes correct PRN    MSK  - PT as tolerated     Dispo Step down unit  DVT ACS Lovenox  Diet dash/tlc

## 2022-05-10 NOTE — DISCHARGE NOTE PROVIDER - CARE PROVIDER_API CALL
Tamir Narayanan)  Ophthalmology  [address] [place] [address]  Phone: [phone]  Fax: [phone]  Scheduled Appointment: 05/24/2022    Ian Ramírez (MD)  Cardiovascular Disease; Internal Medicine; Interventional Cardiology; Nuclear Cardiology  [address] [place] [address], [address]  [place] [address]  Phone: [phone]  Fax: [phone]  Follow Up Time: 1 week    Joaquín Ko (DO)  Infectious Disease; Internal Medicine  [address] [place] [id number]  Phone: [phone]  Fax: [phone]  Follow Up Time: 2 weeks

## 2022-05-10 NOTE — DISCHARGE NOTE NURSING/CASE MANAGEMENT/SOCIAL WORK - PATIENT PORTAL LINK FT
You can access the FollowMyHealth Patient Portal offered by [facility] by registering at the following website: [email or web address]. By joining [employer]’s FollowMyHealth portal, you will also be able to view your health information using other applications (apps) compatible with our system.

## 2022-05-10 NOTE — PROGRESS NOTE ADULT - SUBJECTIVE AND OBJECTIVE BOX
Delirium      Patient is a 76y old  Male who presents with a chief complaint of NSTEMI (09 May 2022 10:07)    HPI:  · 76 year old male with a past medical history of cad s/p stents, prostate ca,  BPH, hyperlipidemia and frequent falls - recent fall ~ one month ago with L orbital floor fracture and ruptured globe ...fell 2 days ago - slipped leaving the bathroom  and hurt L wrist with pain which didn't resolve so  he came to ED today for persistent L wrist pain -  No chest pain or shortness of breath   (03 May 2022 14:18)       INTERVAL HPI/OVERNIGHT EVENTS:   No overnight events   Afebrile, hemodynamically stable     Subjective:    ICU Vital Signs Last 24 Hrs  T(C): 36.2 (10 May 2022 11:13), Max: 36.8 (09 May 2022 20:55)  T(F): 97.1 (10 May 2022 11:), Max: 98.3 (09 May 2022 20:55)  HR: 48 (10 May 2022 11:) (44 - 53)  BP: 141/67 (10 May 2022 11:13) (125/57 - 153/72)  BP(mean): --  ABP: --  ABP(mean): --  RR: 18 (10 May 2022 08:00) (18 - 18)  SpO2: 97% (10 May 2022 08:00) (97% - 98%)    I&O's Summary    09 May 2022 07:01  -  10 May 2022 07:00  --------------------------------------------------------  IN: 600 mL / OUT: 1800 mL / NET: -1200 mL    10 May 2022 07:01  -  10 May 2022 11:37  --------------------------------------------------------  IN: 280 mL / OUT: 200 mL / NET: 80 mL          Daily     Daily Weight in k.6 (10 May 2022 04:34)    Adult Advanced Hemodynamics Last 24 Hrs  CVP(mm Hg): --  CVP(cm H2O): --  CO: --  CI: --  PA: --  PA(mean): --  PCWP: --  SVR: --  SVRI: --  PVR: --  PVRI: --    EKG/Telemetry Events:  Sinus bradycardia noted ON    MEDICATIONS  (STANDING):  artificial  tears Solution 1 Drop(s) Left EYE four times a day  aspirin  chewable 81 milliGRAM(s) Oral daily  atorvastatin 80 milliGRAM(s) Oral at bedtime  atropine 1% Solution 1 Drop(s) Left EYE two times a day  cefTRIAXone   IVPB 1000 milliGRAM(s) IV Intermittent every 24 hours  chlorhexidine 4% Liquid 1 Application(s) Topical <User Schedule>  clopidogrel Tablet 75 milliGRAM(s) Oral daily  lisinopril 10 milliGRAM(s) Oral daily  melatonin 5 milliGRAM(s) Oral at bedtime  metoprolol tartrate 12.5 milliGRAM(s) Oral every 6 hours  ofloxacin 0.3% Solution 1 Drop(s) Left EYE at bedtime  polyethylene glycol 3350 17 Gram(s) Oral two times a day  prednisoLONE acetate 1% Suspension 1 Drop(s) Left EYE every 6 hours  senna 1 Tablet(s) Oral daily  sodium chloride 0.9%. 1000 milliLiter(s) (150 mL/Hr) IV Continuous <Continuous>  tamsulosin 0.4 milliGRAM(s) Oral at bedtime    MEDICATIONS  (PRN):  morphine  - Injectable 2 milliGRAM(s) IV Push every 4 hours PRN Mild Pain (1 - 3)      PHYSICAL EXAM:  Gen: resting in bed NAD  HEENT: Normocephalic, left orbital ecchymosis, traumatic left eye,   Neck: supple, no lymphadenopathy  CV: Regular rate & regular rhythm, damaris  Lungs: decreased BS at bases, no fremitus  Abdomen: Soft, BS present, nontender  Ext: Warm, well perfused  Neuro:  moves all extremities  AOx3   Skin: no rash, no erythema      LABS:                        11.8   7.30  )-----------( 211      ( 10 May 2022 06:30 )             35.5     05-10    139  |  109  |  17  ----------------------------<  102<H>  4.3   |  17  |  0.9    Ca    8.7      10 May 2022 06:30  Mg     1.9     05-10    TPro  5.8<L>  /  Alb  3.5  /  TBili  0.6  /  DBili  x   /  AST  24  /  ALT  18  /  AlkPhos  130<H>  05-10    LIVER FUNCTIONS - ( 10 May 2022 06:30 )  Alb: 3.5 g/dL / Pro: 5.8 g/dL / ALK PHOS: 130 U/L / ALT: 18 U/L / AST: 24 U/L / GGT: x               RADIOLOGY & ADDITIONAL TESTS:      ACC: [id number] EXAM:  ECHO TTE W CON  FU LTD                          PROCEDURE DATE:  2022          INTERPRETATION:   [place] [address]           Phone: [phone].   TRANSTHORACIC ECHOCARDIOGRAM REPORT        Patient Name:   RADHA RAMON Accession #: [id number]  Medical Rec #:  [id number]         Height:      69.0 in 175.3 cm  YOB: 1945         Weight:      145.0 lb 65.77 kg  Patient Age:    76 years          BSA:         1.80 m²  Patient Gender: M                 BP:          170/80 mmHg      Date of Exam:        2022 6:13:09 AM  Referring Physician: [id number] ED UNASSIGNED  Sonographer:         Moni Barber  Reading Physician:   Jerome Estrada MD.    Procedure:     Limited Echocardiogram with Contrast.  Indications:   I20.0 - Unstable Angina  Diagnosis:     Unstable angina - I20.0  Study Details: Technically limited study. Study was limited to lumason   contrast                 evaluation of LV systolic function.        Summary:   1. Normal global left ventricular systolic function.   2. LV Ejection Fraction by Lea's Method with a biplane EF of 74 %.   3. Spectral Doppler shows impaired relaxation pattern of left   ventricular myocardial filling (Grade I diastolic dysfunction).   4. Mild mitral valve regurgitation.   5. Mild thickening of the anterior and posterior mitral valve leaflets.    PHYSICIAN INTERPRETATION:  Left Ventricle: The left ventricular internal cavity size is normal. Left   ventricular wall thickness is normal. Global LV systolic function was   normal. Spectral Doppler shows impaired relaxation pattern of left   ventricular myocardial filling (Grade I diastolic dysfunction).      LV Wall Scoring:  All segments are normal.    Mitral Valve: Mild thickening of the anterior and posterior mitral valve   leaflets. Mild mitral valve regurgitation is seen.  Aortic Valve: The aortic valve is trileaflet. No evidence of aortic   stenosis.  SPECTRAL DOPPLER ANALYSIS:    [id number] Jerome Estrada MD, Electronically signed on 2022 at   9:07:01 AM            *** Final ***      Care Discussed with Consultants/Other Providers [ x] YES  [ ] NO

## 2022-05-10 NOTE — DISCHARGE NOTE PROVIDER - PROVIDER TOKENS
PROVIDER:[TOKEN:[[id number]],SCHEDULEDAPPT:[05/24/2022]],PROVIDER:[TOKEN:[[id number]],FOLLOWUP:[1 week]],PROVIDER:[TOKEN:[[id number]],FOLLOWUP:[2 weeks]]

## 2022-05-10 NOTE — DISCHARGE NOTE NURSING/CASE MANAGEMENT/SOCIAL WORK - NSDCPEFALRISK_GEN_ALL_CORE
For information on Fall & Injury Prevention, visit: [email or web address] OR  [email or web address] OR  https://www.cdc.gov/steadi/patient.html

## 2022-05-10 NOTE — DISCHARGE NOTE PROVIDER - NSDCFUSCHEDAPPT_GEN_ALL_CORE_FT
Tamir Narayanan  [facility]  Ophthal  [address]  Scheduled Appointment: 05/24/2022    [facility]  Urology [address]  Scheduled Appointment: 07/26/2022

## 2022-05-10 NOTE — DISCHARGE NOTE PROVIDER - NSDCMRMEDTOKEN_GEN_ALL_CORE_FT
atropine 1% ophthalmic solution: 1 drop(s) to each affected eye 2 times a day  erythromycin 0.5% ophthalmic ointment: 1 application to each affected eye 2 times a day  polyethylene glycol 3350 oral powder for reconstitution: 17 gram(s) orally once a day, As Needed for constipation  prednisoLONE acetate 1% ophthalmic suspension: 1 drop(s) to each affected eye every 6 hours  simvastatin 80 mg oral tablet: 1 tab(s) orally once a day (at bedtime)  tamsulosin 0.4 mg oral capsule: 1 cap(s) orally once a day (at bedtime)  - if you continue to have your blood pressures drop when you stand, then this medication will need to be STOPPED   aspirin 81 mg oral tablet, chewable: 1 tab(s) orally once a day  atorvastatin 80 mg oral tablet: 1 tab(s) orally once a day (at bedtime)  atropine 1% ophthalmic solution: 1 drop(s) to each affected eye 2 times a day  clopidogrel 75 mg oral tablet: 1 tab(s) orally once a day  erythromycin 0.5% ophthalmic ointment: 1 application to each affected eye 2 times a day  lisinopril 20 mg oral tablet: 1 tab(s) orally once a day  polyethylene glycol 3350 oral powder for reconstitution: 17 gram(s) orally once a day, As Needed for constipation  prednisoLONE acetate 1% ophthalmic suspension: 1 drop(s) to each affected eye every 6 hours  tamsulosin 0.4 mg oral capsule: 1 cap(s) orally once a day (at bedtime)  - if you continue to have your blood pressures drop when you stand, then this medication will need to be STOPPED

## 2022-05-10 NOTE — DISCHARGE NOTE NURSING/CASE MANAGEMENT/SOCIAL WORK - NSDCVIVACCINE_GEN_ALL_CORE_FT
influenza, injectable, quadrivalent, preservative free; 16-Oct-2021 15:19; Froilan Leung (RN); [employer]; [id number] (Exp. Date: 31-Jan-2022); IntraMuscular; Deltoid Right.; 0.5 milliLiter(s); VIS (VIS Published: 06-Aug-2021, VIS Presented: 16-Oct-2021);   Tdap; 15-Nov-2021 07:31; Bethanie Aguirre (RN); [employer]; [id number] (Exp. Date: 09-Sep-2023); IntraMuscular; Deltoid Left.; 0.5 milliLiter(s); VIS (VIS Published: 09-May-2013, VIS Presented: 15-Nov-2021);

## 2022-05-10 NOTE — DISCHARGE NOTE PROVIDER - HOSPITAL COURSE
· 76 year old male with a past medical history of cad s/p stents, prostate ca,  BPH, hyperlipidemia and frequent falls - recent fall ~ one month ago with L orbital floor fracture and ruptured globe ...fell 2 days ago - slipped leaving the bathroom  and hurt L wrist with pain which didn't resolve so  he came to ED today for persistent L wrist pain -  No chest pain or shortness of breath    76 year old male with a past medical history of cad s/p stents, prostate ca,  BPH, hyperlipidemia and frequent falls - recent fall ~ one month ago with L orbital floor fracture and ruptured globe ...fell 2 days ago - slipped leaving the bathroom  and hurt L wrist with pain which didn't resolve so  he came to ED today for persistent L wrist pain -  No chest pain or shortness of breath    Impression:  NSTEMI likely late presentation MI  L wrist Fx  Psychosis  HO BPH  HO HLD  HO recent falls w/ orbital fx and ruptured globe    CNS  -  AOx3, CT Head non-con negative for gross pathology  -  seen by behavioral health 5/5  - if patient is agitated, haldol 5mg po/IM q 12 hrs, prn      Cardiovascular  - tte 5/4 normal EF apical septal hypokinesis normal diastolic function  - therapeutic lovenox 65mg BID for NSTEMI, on hold  for procedure today  - DAPT with aspirin and plavix  - cath today  - lopressor 12.5mg PO q6h  - atorvastatin 80mg PO qhs      HEENT  - supportive care for left lac and left orbital fx c/b globe rupture    Pulm  - satting well RA  - cxr no acute pathology     ID  - afebrile, wbc ~WNL  - UA positive 05/05, culture negative   - C/W Ceftriaxone 05/05-05/10    Endo  - TSH 0.71  - a1c 5.8%    Heme  - Therapeutic Lovenox hold for procedure today     GI  - feeding as tolerated    Renal  - F/U Lytes correct PRN    MSK  - PT as tolerated     Dispo Step down unit  DVT ACS Lovenox  Diet dash/tlc      Attestation Statements:   Attestation Statements:  I have personally seen and examined the patient.  I fully participated in the care of this patient.  I have made amendments to the documentation where necessary, and agree with the history, physical exam, and plan as documented by the Resident and Fellow.     Patient with h/o CAD s/p PCI, BPH, HLD admitted with h/o recent fall, wrist and orbital fracture. Ischemic changes and positive troponin incidentally found. Treated with heparin gtt and DAPT. Cath held last week due to AMS, likely due to UTI, improved on Abx. Plan for coronary angiogram today.       Continue DAPT and statin   Keep NPO for coronary angiogram today   Sinus damaris to 40 when sleeping - asymptomatic    Abx for UTI - monitor cultures   Check iron profile   Start lisinopril 10 mg from tomorrow  PT evaluation.        HPI  · 76 year old male with a past medical history of cad s/p stents, prostate ca,  BPH, hyperlipidemia and frequent falls - recent fall ~ one month ago with L orbital floor fracture and ruptured globe ...fell 2 days ago - slipped leaving the bathroom  and hurt L wrist with pain which didn't resolve so  he came to ED today for persistent L wrist pain -  No chest pain or shortness of breath.    Hospital course:   In the ED patient was found to have troponemia and EKG changes, the troponemia trended down and the EKG also showed Q waves likely due to late presentation of the MI. The patient also had an Echocardiogram which showed normal EF apical septal hypokinesis normal diastolic function.    Patient had a cardiac catheterization on 5/9/22 which showed non-obstructive CAD so he will be discharged home with medical therapies for CAD and close follow up outpatient with cardiology.     The patient also had UTI while hospitalized that was treated with ceftriaxone IV with resolution of symptoms.

## 2022-05-10 NOTE — DISCHARGE NOTE PROVIDER - CARE PROVIDERS DIRECT ADDRESSES
,DirectAddress_Unknown,[email or web address],DirectAddress_Unknown

## 2022-05-19 PROBLEM — C61 MALIGNANT NEOPLASM OF PROSTATE: Chronic | Status: ACTIVE | Noted: 2022-01-01

## 2022-05-25 NOTE — ED PROCEDURE NOTE - ATTENDING CONTRIBUTION TO CARE
Patient had ablation 05/24.. requesting sooner appointment at either location. 1st avail we scheduled 06/28. Please advise   
Sent message to PSR to call and reschedule patient for [place] office June 1, 2022 @ 4:00  
I was present for and supervised the key/critical aspects of the procedures performed during the care of the patient.    I personally supervised the study.  I reviewed the images and interpretation by the resident/ACP and have edited where appropriate.
I was present for and supervised the key/critical aspects of the procedures performed during the care of the patient.    I personally supervised the study.  I reviewed the images and interpretation by the resident/ACP and have edited where appropriate.

## 2022-06-01 NOTE — CDI QUERY NOTE - NSCDIOTHERTXTBX_GEN_ALL_CORE_HH
Documentation:  ** 5/5 PN CCU: Pt stay complicated by ?acute on chronic dementia/delerium iso multiple recent unwitnessed falls. Pt was 1:1 sit w/ haldol overnight.      - Assessment: CNS          AOx2, haldol prn for agitation pending psych w/u, CT Head noncon negative for gross pathology     - Attestation: Overnight events noted.  Waxing / waning cognition consistent with delirium.  Baseline function reportedly OK.  Upon my exam this morning, patient oriented, recounts fall, and conversing with knowledge of current events upon my exam.          UA consistent with UTI.  Possible etiology of delirium.    ** 5/5  Consultation:     - Summary:          At this time pt. is not exhibiting any psychosis. Pt. is not in an acute danger to himself or to others.          For underlying agitation, we recommend standing seroquel 50mg po hs for now, given high risk of further agitation in evening.    - Differential: -Delirium, multifactorial    ** 5/9 PN CCU: Attestation: Cath held last week due to AMS, likely due to UTI, improved on Abx.     Orders:   Medication:     ** 5/4: Haldol 2mg intramuscular once     ** 5/5-6: Seroquel 25 mg oral at bedtime  Antimicrobial:     ** 5/5-10: Ceftriaxone 1000 mg IV / 24 Hr. Indication: Genitourinary infection, uncomplicated (cystitis)                                                       Query:  Based on your clinical judgment and consideration of these clinical indicators, please clarify if delirium can be further specified as:  • Metabolic encephalopathy associated with UTI	  • Other (please specify).  • Unable to further specify delirium

## 2022-06-06 NOTE — PHYSICAL EXAM
[Well Developed] : well developed [Well Nourished] : well nourished [No Acute Distress] : no acute distress [Normal Conjunctiva] : normal conjunctiva [Normal Venous Pressure] : normal venous pressure [5th Left ICS - MCL] : palpated at the 5th LICS in the midclavicular line [Normal] : normal [No Precordial Heave] : no precordial heave was noted [Normal Rate] : normal [Rhythm Regular] : regular [Normal S1] : normal S1 [Normal S2] : normal S2 [No Murmur] : no murmurs heard [No Pitting Edema] : no pitting edema present [2+] : left 2+ [Clear Lung Fields] : clear lung fields [Good Air Entry] : good air entry [No Respiratory Distress] : no respiratory distress  [Soft] : abdomen soft [Non Tender] : non-tender [Normal Bowel Sounds] : normal bowel sounds [Normal Gait] : normal gait [No Edema] : no edema [No Varicosities] : no varicosities [No Rash] : no rash [Moves all extremities] : moves all extremities [No Focal Deficits] : no focal deficits [Alert and Oriented] : alert and oriented [de-identified] : Left orbit with hematoma and sutures on superior portion. CDI.

## 2022-06-06 NOTE — REASON FOR VISIT
[Other: ____] : [unfilled] [FreeTextEntry1] : Diagnostic Tests:\par --------------------\par EKG: \par 06/06/22-NSR. RBBB, LAFB. Anteroseptal MI. \par 05/03/22-NSR. RBBB, LAFB. Anteroseptal MI, recent. \par --------------------\par Echo:\par 05/06/22-TTE with contrast: EF 74%. Grade I DD. Mild MR. \par 05/04/22-TTE: EF 60-65%. Apical hypokinesis. Mild LVH. Sclerotic AV. Mild AI, MR, TR. \par --------------------\par Cath:\par 05/09/22-LHC: LM mild. pLAD 40-50%, mLAD mild, dLAD none. D1 none. LCx no dz. pRCA 30%, mRCA 30%, dRCA ectatic.

## 2022-06-06 NOTE — ASSESSMENT
[FreeTextEntry1] : CAD: Prior PCI to LAD. LHC revealed patent stent and mild nonobstructive disease in LAD and RCA. \par -Continue with ASA 81mg PO daily and plavix 75mg PO daily.\par -Patient to call with med list for reconciliation. \par -Hold ACEi/ARB and beta blocker due to dizziness and significant history of falls. \par -Will follow with routine TTE and stress. \par \par HTN: BP at goal per ACC/AHA 2018 guidelines\par -Continue to monitor off meds. \par -Discussed therapeutic lifestyle changes to promote continued BP control. \par \par HLD: , TG 88, HDL 50, LDL 74 (05/22)\par -At goal, continue with atorvastatin 80mg PO daily.\par  -Discussed therapeutic lifestyle changes to promote improved lipid metabolism. \par \par Prediabetes: HA1c 5.8% (05/22)\par -Continue to monitor.\par -Diet and exercise discussed for better glycemic control. \par \par Falls: Mostly mechanical in nature. \par -Will continue with assistive devices to ambulate. \par \par Follow up in 3 months\par

## 2022-06-06 NOTE — HISTORY OF PRESENT ILLNESS
[FreeTextEntry1] : Mr. Segura is a 78yo M with PMHx of CAD s/p PCI, HLD, prostate CA, BPH and frequent falls who presents to establish care. His PMD is Dr. Joaquín Ko. He was recently hospitalized for a fall and lingering left wrist pain. Found to have fractured wrist and old left orbital fracture. Labs revealed troponin elevation 2.53 with uptrend to 3. EKG suggestive of anteroseptal MI, likely recent with RWMA on TTE. Patient was sent to [facility] for LHC which was delayed due to delirium in setting of probable UTI. LHC done which showed mild, nonobstructive disease. Patient is accompanied by his son. He has been feeling well since discharge. He had continued taking lisinopril which made him feel dizzy/lightheaded so he stopped taking it. Denies CP, SOB, palpitations. No further falls since he was discharged.

## 2022-07-01 NOTE — BRIEF OPERATIVE NOTE - SPECIMENS
[address]    PATIENT'S NAME: Chauncey Ellis   ATTENDING PHYSICIAN: Rhianna Cardenas M.D. PATIENT ACCOUNT#:   [de-identified]    LOCATION:  [address] [place] [address]  MEDICAL RECORD #:   [id number]       YOB: 1993  ADMISSION DATE:       2022      DISCHARGE DATE:  2022    DISCHARGE SUMMARY      DISCHARGE DIAGNOSES:    1. Intrauterine pregnancy at approximately 37-0/7 weeks with the patient presenting for induction of labor. However, when the patient presented to the labor and delivery area, the patient was noted to be daron; therefore, the patient is presenting for augmentation of her labor. 2.   The patient is status post an intrauterine fetal demise. 3.   Patient has MTHFR.  4.   Fetal intolerance to labor. 5.   Delivery of a viable male infant. 6.   Right paratubal cyst.      PROCEDURES:   2022, primary low segment transverse  section and removal of right paratubal cyst.    HISTORY AND PHYSICAL:  Please see dictated history and physical for details. LABORATORY DATA:  Please see chart for details. Please note that the patient had ABO, Rh (blood type) done on 2022 at [facility].  The results of these tests revealed that the patient's ABO blood type is A and the patient's Rh blood type is positive. HOSPITAL COURSE:  Please see chart for details leading up to the  section. On 2022, a primary low segment transverse  section and removal of right paratubal cyst was done on the patient. Please see operative report for details. Postoperatively, the patient did well. For prophylactic reasons postoperatively, the patient was to receive Ancef 2 g IV piggyback q.8 h. x3 doses. The patient's diet was advanced as tolerated. The patient's activity was advanced as tolerated. The patient received appropriate analgesics during her hospital stay. The patient received 1 dose of Venofer during her postop course.   When the patient was seen on 2022, the patient was doing fine and the patient desired to go home on 2022. In view of this, discharge instructions were reviewed with the patient. DISCHARGE INSTRUCTIONS:  Some of the discharge instructions that were reviewed with the patient include the followin. Medications:  Continue taking prenatal vitamin DHA as prenatal vitamin with DHA 1 tablet p.o. daily. Continue taking vitamin D as vitamin D 1000 international units p.o. daily. Continue taking Slow Fe as Slow Fe 1 tablet p.o. daily. Continue taking Metanx as Metanx 1 tablet p.o. daily. Continue taking enteric-coated baby aspirin as enteric-coated baby aspirin 81 mg 1 tablet p.o. daily. Continue taking probiotic as probiotic 1 tablet p.o. daily. Continue taking Lexapro as a Lexapro 20 mg 1 tablet p.o. daily. 2.   Diet:  General diet with increased iron stores as tolerates. 3.   Activity:  Pelvic rest, avoid strenuous activity, keep bowel movements soft, etc.  Call if noted increased vaginal bleeding, pain, fever, etc.  Remove the Steri-Strips that we are covering on the incisional area in approximately 1 week or p.r.n.    4.   Followup:  Call our office for a followup appointment on 2022 or p.r.n. Follow up with PCP as directed by her PCP.      Dictated By Lora Swift M.D.  d: 2022 22:57:33  t: 2022 18:02:41  Job [id number]  [facility]/ none

## 2022-09-06 PROBLEM — I72.8: Status: ACTIVE | Noted: 2021-10-25

## 2022-09-06 PROBLEM — Z87.891 FORMER SMOKER: Status: ACTIVE | Noted: 2021-10-25

## 2022-09-06 PROBLEM — R97.20 ELEVATED PSA: Status: ACTIVE | Noted: 2021-02-16

## 2022-09-06 PROBLEM — N40.1 BPH WITH OBSTRUCTION/LOWER URINARY TRACT SYMPTOMS: Status: ACTIVE | Noted: 2019-12-03

## 2022-09-06 NOTE — ASSESSMENT
[FreeTextEntry1] : CAD: Prior PCI to LAD. LHC revealed patent stent and mild nonobstructive disease in LAD and RCA. \par -Continue with ASA 81mg PO daily and plavix 75mg PO daily.\par -Patient to call with med list for reconciliation. \par -Hold ACEi/ARB and beta blocker due to dizziness and significant history of falls. \par -Will follow with routine TTE and stress. \par \par HTN: BP at goal per ACC/AHA 2018 guidelines\par -Continue to monitor off meds. \par -Discussed therapeutic lifestyle changes to promote continued BP control. \par \par HLD: , TG 88, HDL 50, LDL 74 (05/22)\par -At goal, continue with atorvastatin 80mg PO daily.\par  -Discussed therapeutic lifestyle changes to promote improved lipid metabolism. \par \par Prediabetes: HA1c 5.8% (05/22)\par -Continue to monitor.\par -Diet and exercise discussed for better glycemic control. \par \par Falls: Mostly mechanical in nature. \par -Will continue with assistive devices to ambulate. \par \par Follow up in 4 months\par

## 2022-09-06 NOTE — HISTORY OF PRESENT ILLNESS
[FreeTextEntry1] : Mr. Segura is a 76yo M with PMHx of CAD s/p PCI, HLD, prostate CA, BPH and frequent falls who presents to establish care. His PMD is Dr. Joaquín Ko. He was recently hospitalized for a fall and lingering left wrist pain. Found to have fractured wrist and old left orbital fracture. Labs revealed troponin elevation 2.53 with uptrend to 3. EKG suggestive of anteroseptal MI, likely recent with RWMA on TTE. Patient was sent to [facility] for LHC which was delayed due to delirium in setting of probable UTI. LHC done which showed mild, nonobstructive disease. Patient is accompanied by his son. He has been feeling well since discharge. He had continued taking lisinopril which made him feel dizzy/lightheaded so he stopped taking it. Denies CP, SOB, palpitations. No further falls since he was discharged.

## 2022-09-06 NOTE — PHYSICAL EXAM
[Well Developed] : well developed [Well Nourished] : well nourished [No Acute Distress] : no acute distress [Normal Conjunctiva] : normal conjunctiva [Normal Venous Pressure] : normal venous pressure [5th Left ICS - MCL] : palpated at the 5th LICS in the midclavicular line [Normal] : normal [No Precordial Heave] : no precordial heave was noted [Normal Rate] : normal [Rhythm Regular] : regular [Normal S1] : normal S1 [Normal S2] : normal S2 [No Murmur] : no murmurs heard [No Pitting Edema] : no pitting edema present [2+] : left 2+ [Clear Lung Fields] : clear lung fields [Good Air Entry] : good air entry [No Respiratory Distress] : no respiratory distress  [Soft] : abdomen soft [Non Tender] : non-tender [Normal Bowel Sounds] : normal bowel sounds [Normal Gait] : normal gait [No Edema] : no edema [No Varicosities] : no varicosities [No Rash] : no rash [Moves all extremities] : moves all extremities [No Focal Deficits] : no focal deficits [Alert and Oriented] : alert and oriented [de-identified] : Left orbit with enucleation. Left upper orbit with some suture material.

## 2022-10-25 PROBLEM — N35.919 STRICTURE OF URETHRA: Status: ACTIVE | Noted: 2019-12-03

## 2022-12-02 NOTE — ED PROCEDURE NOTE - SUBCUTANEOUS SUTURE
2/interrupted Purse String (Simple) Text: Given the location of the defect and the characteristics of the surrounding skin a purse string closure was deemed most appropriate.  Undermining was performed circumfirentially around the surgical defect.  A purse string suture was then placed and tightened.

## 2023-01-01 ENCOUNTER — APPOINTMENT (OUTPATIENT)
Dept: UROLOGY | Facility: CLINIC | Age: 78
End: 2023-01-01

## 2023-01-01 ENCOUNTER — TRANSCRIPTION ENCOUNTER (OUTPATIENT)
Age: 78
End: 2023-01-01

## 2023-01-01 ENCOUNTER — APPOINTMENT (OUTPATIENT)
Dept: CARDIOLOGY | Facility: CLINIC | Age: 78
End: 2023-01-01
Payer: MEDICARE

## 2023-01-01 ENCOUNTER — APPOINTMENT (OUTPATIENT)
Dept: UROLOGY | Facility: CLINIC | Age: 78
End: 2023-01-01
Payer: MEDICARE

## 2023-01-01 ENCOUNTER — INPATIENT (INPATIENT)
Facility: HOSPITAL | Age: 78
LOS: 38 days | DRG: 3 | End: 2023-04-28
Attending: ORTHOPAEDIC SURGERY | Admitting: HOSPITALIST
Payer: MEDICARE

## 2023-01-01 VITALS
DIASTOLIC BLOOD PRESSURE: 85 MMHG | HEART RATE: 113 BPM | HEIGHT: 67 IN | TEMPERATURE: 97 F | SYSTOLIC BLOOD PRESSURE: 121 MMHG | RESPIRATION RATE: 18 BRPM | WEIGHT: 139.99 LBS | OXYGEN SATURATION: 99 %

## 2023-01-01 VITALS — OXYGEN SATURATION: 97 % | HEART RATE: 98 BPM | RESPIRATION RATE: 30 BRPM

## 2023-01-01 VITALS
DIASTOLIC BLOOD PRESSURE: 70 MMHG | WEIGHT: 143 LBS | BODY MASS INDEX: 21.18 KG/M2 | SYSTOLIC BLOOD PRESSURE: 112 MMHG | HEIGHT: 69 IN

## 2023-01-01 DIAGNOSIS — C61 MALIGNANT NEOPLASM OF PROSTATE: ICD-10-CM

## 2023-01-01 DIAGNOSIS — A41.9 SEPSIS, UNSPECIFIED ORGANISM: ICD-10-CM

## 2023-01-01 DIAGNOSIS — N21.0 CALCULUS IN BLADDER: ICD-10-CM

## 2023-01-01 DIAGNOSIS — I25.10 ATHEROSCLEROTIC HEART DISEASE OF NATIVE CORONARY ARTERY W/OUT ANGINA PECTORIS: ICD-10-CM

## 2023-01-01 DIAGNOSIS — I77.819 AORTIC ECTASIA, UNSPECIFIED SITE: ICD-10-CM

## 2023-01-01 DIAGNOSIS — J44.9 CHRONIC OBSTRUCTIVE PULMONARY DISEASE, UNSPECIFIED: ICD-10-CM

## 2023-01-01 DIAGNOSIS — H57.12 BLINDNESS, ONE EYE, UNSPECIFIED EYE: ICD-10-CM

## 2023-01-01 DIAGNOSIS — E78.00 PURE HYPERCHOLESTEROLEMIA, UNSPECIFIED: ICD-10-CM

## 2023-01-01 DIAGNOSIS — R73.03 PREDIABETES.: ICD-10-CM

## 2023-01-01 DIAGNOSIS — Z95.5 PRESENCE OF CORONARY ANGIOPLASTY IMPLANT AND GRAFT: ICD-10-CM

## 2023-01-01 DIAGNOSIS — H54.40 BLINDNESS, ONE EYE, UNSPECIFIED EYE: ICD-10-CM

## 2023-01-01 DIAGNOSIS — J96.01 ACUTE RESPIRATORY FAILURE WITH HYPOXIA: ICD-10-CM

## 2023-01-01 DIAGNOSIS — Z98.890 OTHER SPECIFIED POSTPROCEDURAL STATES: Chronic | ICD-10-CM

## 2023-01-01 DIAGNOSIS — I10 ESSENTIAL (PRIMARY) HYPERTENSION: ICD-10-CM

## 2023-01-01 DIAGNOSIS — Z51.5 ENCOUNTER FOR PALLIATIVE CARE: ICD-10-CM

## 2023-01-01 DIAGNOSIS — Z71.89 OTHER SPECIFIED COUNSELING: ICD-10-CM

## 2023-01-01 DIAGNOSIS — H26.9 UNSPECIFIED CATARACT: Chronic | ICD-10-CM

## 2023-01-01 DIAGNOSIS — R52 PAIN, UNSPECIFIED: ICD-10-CM

## 2023-01-01 DIAGNOSIS — R39.9 UNSPECIFIED SYMPTOMS AND SIGNS INVOLVING THE GENITOURINARY SYSTEM: ICD-10-CM

## 2023-01-01 DIAGNOSIS — J96.20 ACUTE AND CHRONIC RESPIRATORY FAILURE, UNSPECIFIED WHETHER WITH HYPOXIA OR HYPERCAPNIA: ICD-10-CM

## 2023-01-01 DIAGNOSIS — S72.001A FRACTURE OF UNSPECIFIED PART OF NECK OF RIGHT FEMUR, INITIAL ENCOUNTER FOR CLOSED FRACTURE: ICD-10-CM

## 2023-01-01 LAB
-  AMIKACIN: SIGNIFICANT CHANGE UP
-  AZTREONAM: SIGNIFICANT CHANGE UP
-  CEFEPIME: SIGNIFICANT CHANGE UP
-  CEFTAZIDIME: SIGNIFICANT CHANGE UP
-  CIPROFLOXACIN: SIGNIFICANT CHANGE UP
-  GENTAMICIN: SIGNIFICANT CHANGE UP
-  IMIPENEM: SIGNIFICANT CHANGE UP
-  LEVOFLOXACIN: SIGNIFICANT CHANGE UP
-  MEROPENEM: SIGNIFICANT CHANGE UP
-  PIPERACILLIN/TAZOBACTAM: SIGNIFICANT CHANGE UP
-  TOBRAMYCIN: SIGNIFICANT CHANGE UP
A1C WITH ESTIMATED AVERAGE GLUCOSE RESULT: 5 % — SIGNIFICANT CHANGE UP (ref 4–5.6)
ALBUMIN FLD-MCNC: 0.8 G/DL — SIGNIFICANT CHANGE UP
ALBUMIN SERPL ELPH-MCNC: 1.9 G/DL — LOW (ref 3.5–5.2)
ALBUMIN SERPL ELPH-MCNC: 2 G/DL — LOW (ref 3.5–5.2)
ALBUMIN SERPL ELPH-MCNC: 2.1 G/DL — LOW (ref 3.5–5.2)
ALBUMIN SERPL ELPH-MCNC: 2.1 G/DL — LOW (ref 3.5–5.2)
ALBUMIN SERPL ELPH-MCNC: 2.2 G/DL — LOW (ref 3.5–5.2)
ALBUMIN SERPL ELPH-MCNC: 2.3 G/DL — LOW (ref 3.5–5.2)
ALBUMIN SERPL ELPH-MCNC: 2.6 G/DL — LOW (ref 3.5–5.2)
ALBUMIN SERPL ELPH-MCNC: 3.4 G/DL — LOW (ref 3.5–5.2)
ALBUMIN SERPL ELPH-MCNC: 4.1 G/DL — SIGNIFICANT CHANGE UP (ref 3.5–5.2)
ALBUMIN SERPL ELPH-MCNC: 4.4 G/DL
ALP BLD-CCNC: 128 U/L
ALP SERPL-CCNC: 113 U/L — SIGNIFICANT CHANGE UP (ref 30–115)
ALP SERPL-CCNC: 117 U/L — HIGH (ref 30–115)
ALP SERPL-CCNC: 123 U/L — HIGH (ref 30–115)
ALP SERPL-CCNC: 124 U/L — HIGH (ref 30–115)
ALP SERPL-CCNC: 125 U/L — HIGH (ref 30–115)
ALP SERPL-CCNC: 133 U/L — HIGH (ref 30–115)
ALP SERPL-CCNC: 137 U/L — HIGH (ref 30–115)
ALP SERPL-CCNC: 141 U/L — HIGH (ref 30–115)
ALP SERPL-CCNC: 144 U/L — HIGH (ref 30–115)
ALP SERPL-CCNC: 167 U/L — HIGH (ref 30–115)
ALP SERPL-CCNC: 175 U/L — HIGH (ref 30–115)
ALP SERPL-CCNC: 184 U/L — HIGH (ref 30–115)
ALP SERPL-CCNC: 94 U/L — SIGNIFICANT CHANGE UP (ref 30–115)
ALT FLD-CCNC: 10 U/L — SIGNIFICANT CHANGE UP (ref 0–41)
ALT FLD-CCNC: 10 U/L — SIGNIFICANT CHANGE UP (ref 0–41)
ALT FLD-CCNC: 11 U/L — SIGNIFICANT CHANGE UP (ref 0–41)
ALT FLD-CCNC: 11 U/L — SIGNIFICANT CHANGE UP (ref 0–41)
ALT FLD-CCNC: 14 U/L — SIGNIFICANT CHANGE UP (ref 0–41)
ALT FLD-CCNC: 23 U/L — SIGNIFICANT CHANGE UP (ref 0–41)
ALT FLD-CCNC: 36 U/L — SIGNIFICANT CHANGE UP (ref 0–41)
ALT FLD-CCNC: 36 U/L — SIGNIFICANT CHANGE UP (ref 0–41)
ALT FLD-CCNC: 39 U/L — SIGNIFICANT CHANGE UP (ref 0–41)
ALT FLD-CCNC: 39 U/L — SIGNIFICANT CHANGE UP (ref 0–41)
ALT FLD-CCNC: 43 U/L — HIGH (ref 0–41)
ALT FLD-CCNC: 50 U/L — HIGH (ref 0–41)
ALT FLD-CCNC: 77 U/L — HIGH (ref 0–41)
ALT SERPL-CCNC: 9 U/L
ANION GAP SERPL CALC-SCNC: 10 MMOL/L — SIGNIFICANT CHANGE UP (ref 7–14)
ANION GAP SERPL CALC-SCNC: 11 MMOL/L — SIGNIFICANT CHANGE UP (ref 7–14)
ANION GAP SERPL CALC-SCNC: 12 MMOL/L — SIGNIFICANT CHANGE UP (ref 7–14)
ANION GAP SERPL CALC-SCNC: 13 MMOL/L — SIGNIFICANT CHANGE UP (ref 7–14)
ANION GAP SERPL CALC-SCNC: 13 MMOL/L — SIGNIFICANT CHANGE UP (ref 7–14)
ANION GAP SERPL CALC-SCNC: 15 MMOL/L — HIGH (ref 7–14)
ANION GAP SERPL CALC-SCNC: 16 MMOL/L — HIGH (ref 7–14)
ANION GAP SERPL CALC-SCNC: 17 MMOL/L
ANION GAP SERPL CALC-SCNC: 4 MMOL/L — LOW (ref 7–14)
ANION GAP SERPL CALC-SCNC: 5 MMOL/L — LOW (ref 7–14)
ANION GAP SERPL CALC-SCNC: 6 MMOL/L — LOW (ref 7–14)
ANION GAP SERPL CALC-SCNC: 7 MMOL/L — SIGNIFICANT CHANGE UP (ref 7–14)
ANION GAP SERPL CALC-SCNC: 8 MMOL/L — SIGNIFICANT CHANGE UP (ref 7–14)
ANION GAP SERPL CALC-SCNC: 9 MMOL/L — SIGNIFICANT CHANGE UP (ref 7–14)
ANISOCYTOSIS BLD QL: SLIGHT — SIGNIFICANT CHANGE UP
APPEARANCE UR: CLEAR — SIGNIFICANT CHANGE UP
APPEARANCE UR: CLEAR — SIGNIFICANT CHANGE UP
APTT BLD: 125.8 SEC — CRITICAL HIGH (ref 27–39.2)
APTT BLD: 24.4 SEC — LOW (ref 27–39.2)
APTT BLD: 27.2 SEC — SIGNIFICANT CHANGE UP (ref 27–39.2)
APTT BLD: 27.6 SEC — SIGNIFICANT CHANGE UP (ref 27–39.2)
APTT BLD: 28.6 SEC — SIGNIFICANT CHANGE UP (ref 27–39.2)
APTT BLD: 28.7 SEC — SIGNIFICANT CHANGE UP (ref 27–39.2)
APTT BLD: 29.5 SEC — SIGNIFICANT CHANGE UP (ref 27–39.2)
APTT BLD: 29.6 SEC — SIGNIFICANT CHANGE UP (ref 27–39.2)
APTT BLD: 30.4 SEC — SIGNIFICANT CHANGE UP (ref 27–39.2)
APTT BLD: 71.1 SEC — CRITICAL HIGH (ref 27–39.2)
APTT BLD: 72.5 SEC — CRITICAL HIGH (ref 27–39.2)
APTT BLD: 79.9 SEC — CRITICAL HIGH (ref 27–39.2)
APTT BLD: 96.8 SEC — CRITICAL HIGH (ref 27–39.2)
AST SERPL-CCNC: 17 U/L
AST SERPL-CCNC: 18 U/L — SIGNIFICANT CHANGE UP (ref 0–41)
AST SERPL-CCNC: 18 U/L — SIGNIFICANT CHANGE UP (ref 0–41)
AST SERPL-CCNC: 19 U/L — SIGNIFICANT CHANGE UP (ref 0–41)
AST SERPL-CCNC: 19 U/L — SIGNIFICANT CHANGE UP (ref 0–41)
AST SERPL-CCNC: 20 U/L — SIGNIFICANT CHANGE UP (ref 0–41)
AST SERPL-CCNC: 27 U/L — SIGNIFICANT CHANGE UP (ref 0–41)
AST SERPL-CCNC: 30 U/L — SIGNIFICANT CHANGE UP (ref 0–41)
AST SERPL-CCNC: 33 U/L — SIGNIFICANT CHANGE UP (ref 0–41)
AST SERPL-CCNC: 34 U/L — SIGNIFICANT CHANGE UP (ref 0–41)
AST SERPL-CCNC: 36 U/L — SIGNIFICANT CHANGE UP (ref 0–41)
AST SERPL-CCNC: 37 U/L — SIGNIFICANT CHANGE UP (ref 0–41)
AST SERPL-CCNC: 48 U/L — HIGH (ref 0–41)
AST SERPL-CCNC: 74 U/L — HIGH (ref 0–41)
B PERT IGG+IGM PNL SER: CLEAR — SIGNIFICANT CHANGE UP
BASE EXCESS BLDA CALC-SCNC: -1.1 MMOL/L — SIGNIFICANT CHANGE UP (ref -2–3)
BASE EXCESS BLDA CALC-SCNC: -2.6 MMOL/L — LOW (ref -2–3)
BASE EXCESS BLDA CALC-SCNC: -3.2 MMOL/L — LOW (ref -2–3)
BASE EXCESS BLDA CALC-SCNC: -3.4 MMOL/L — LOW (ref -2–3)
BASE EXCESS BLDA CALC-SCNC: -3.6 MMOL/L — LOW (ref -2–3)
BASE EXCESS BLDA CALC-SCNC: -4.4 MMOL/L — LOW (ref -2–3)
BASE EXCESS BLDA CALC-SCNC: -6.2 MMOL/L — LOW (ref -2–3)
BASE EXCESS BLDA CALC-SCNC: -7.3 MMOL/L — LOW (ref -2–3)
BASE EXCESS BLDA CALC-SCNC: 0.5 MMOL/L — SIGNIFICANT CHANGE UP (ref -2–3)
BASE EXCESS BLDA CALC-SCNC: 0.8 MMOL/L — SIGNIFICANT CHANGE UP (ref -2–3)
BASE EXCESS BLDA CALC-SCNC: 1.6 MMOL/L — SIGNIFICANT CHANGE UP (ref -2–3)
BASE EXCESS BLDV CALC-SCNC: -7.1 MMOL/L — LOW (ref -2–3)
BASOPHILS # BLD AUTO: 0 K/UL — SIGNIFICANT CHANGE UP (ref 0–0.2)
BASOPHILS # BLD AUTO: 0.01 K/UL — SIGNIFICANT CHANGE UP (ref 0–0.2)
BASOPHILS # BLD AUTO: 0.02 K/UL — SIGNIFICANT CHANGE UP (ref 0–0.2)
BASOPHILS # BLD AUTO: 0.03 K/UL — SIGNIFICANT CHANGE UP (ref 0–0.2)
BASOPHILS # BLD AUTO: 0.04 K/UL
BASOPHILS # BLD AUTO: 0.09 K/UL — SIGNIFICANT CHANGE UP (ref 0–0.2)
BASOPHILS NFR BLD AUTO: 0 % — SIGNIFICANT CHANGE UP (ref 0–1)
BASOPHILS NFR BLD AUTO: 0.1 % — SIGNIFICANT CHANGE UP (ref 0–1)
BASOPHILS NFR BLD AUTO: 0.2 % — SIGNIFICANT CHANGE UP (ref 0–1)
BASOPHILS NFR BLD AUTO: 0.6 %
BASOPHILS NFR BLD AUTO: 0.9 % — SIGNIFICANT CHANGE UP (ref 0–1)
BILIRUB DIRECT SERPL-MCNC: 0.3 MG/DL — SIGNIFICANT CHANGE UP (ref 0–0.3)
BILIRUB DIRECT SERPL-MCNC: 0.4 MG/DL — HIGH (ref 0–0.3)
BILIRUB DIRECT SERPL-MCNC: 0.5 MG/DL — HIGH (ref 0–0.3)
BILIRUB DIRECT SERPL-MCNC: 0.7 MG/DL — HIGH (ref 0–0.3)
BILIRUB INDIRECT FLD-MCNC: 0.3 MG/DL — SIGNIFICANT CHANGE UP (ref 0.2–1.2)
BILIRUB INDIRECT FLD-MCNC: 0.6 MG/DL — SIGNIFICANT CHANGE UP (ref 0.2–1.2)
BILIRUB INDIRECT FLD-MCNC: 0.6 MG/DL — SIGNIFICANT CHANGE UP (ref 0.2–1.2)
BILIRUB INDIRECT FLD-MCNC: 0.8 MG/DL — SIGNIFICANT CHANGE UP (ref 0.2–1.2)
BILIRUB SERPL-MCNC: 0.5 MG/DL — SIGNIFICANT CHANGE UP (ref 0.2–1.2)
BILIRUB SERPL-MCNC: 0.5 MG/DL — SIGNIFICANT CHANGE UP (ref 0.2–1.2)
BILIRUB SERPL-MCNC: 0.6 MG/DL — SIGNIFICANT CHANGE UP (ref 0.2–1.2)
BILIRUB SERPL-MCNC: 0.7 MG/DL — SIGNIFICANT CHANGE UP (ref 0.2–1.2)
BILIRUB SERPL-MCNC: 0.8 MG/DL
BILIRUB SERPL-MCNC: 0.8 MG/DL — SIGNIFICANT CHANGE UP (ref 0.2–1.2)
BILIRUB SERPL-MCNC: 0.8 MG/DL — SIGNIFICANT CHANGE UP (ref 0.2–1.2)
BILIRUB SERPL-MCNC: 0.9 MG/DL — SIGNIFICANT CHANGE UP (ref 0.2–1.2)
BILIRUB SERPL-MCNC: 1.1 MG/DL — SIGNIFICANT CHANGE UP (ref 0.2–1.2)
BILIRUB SERPL-MCNC: 1.4 MG/DL — HIGH (ref 0.2–1.2)
BILIRUB SERPL-MCNC: 1.4 MG/DL — HIGH (ref 0.2–1.2)
BILIRUB SERPL-MCNC: 1.5 MG/DL — HIGH (ref 0.2–1.2)
BILIRUB SERPL-MCNC: 1.5 MG/DL — HIGH (ref 0.2–1.2)
BILIRUB SERPL-MCNC: 1.7 MG/DL — HIGH (ref 0.2–1.2)
BILIRUB UR-MCNC: NEGATIVE — SIGNIFICANT CHANGE UP
BILIRUB UR-MCNC: NEGATIVE — SIGNIFICANT CHANGE UP
BLD GP AB SCN SERPL QL: SIGNIFICANT CHANGE UP
BUN SERPL-MCNC: 16 MG/DL
BUN SERPL-MCNC: 23 MG/DL — HIGH (ref 10–20)
BUN SERPL-MCNC: 24 MG/DL — HIGH (ref 10–20)
BUN SERPL-MCNC: 24 MG/DL — HIGH (ref 10–20)
BUN SERPL-MCNC: 25 MG/DL — HIGH (ref 10–20)
BUN SERPL-MCNC: 25 MG/DL — HIGH (ref 10–20)
BUN SERPL-MCNC: 26 MG/DL — HIGH (ref 10–20)
BUN SERPL-MCNC: 27 MG/DL — HIGH (ref 10–20)
BUN SERPL-MCNC: 28 MG/DL — HIGH (ref 10–20)
BUN SERPL-MCNC: 29 MG/DL — HIGH (ref 10–20)
BUN SERPL-MCNC: 30 MG/DL — HIGH (ref 10–20)
BUN SERPL-MCNC: 30 MG/DL — HIGH (ref 10–20)
BUN SERPL-MCNC: 31 MG/DL — HIGH (ref 10–20)
BUN SERPL-MCNC: 31 MG/DL — HIGH (ref 10–20)
BUN SERPL-MCNC: 32 MG/DL — HIGH (ref 10–20)
BUN SERPL-MCNC: 32 MG/DL — HIGH (ref 10–20)
BUN SERPL-MCNC: 33 MG/DL — HIGH (ref 10–20)
BUN SERPL-MCNC: 33 MG/DL — HIGH (ref 10–20)
BUN SERPL-MCNC: 35 MG/DL — HIGH (ref 10–20)
BUN SERPL-MCNC: 38 MG/DL — HIGH (ref 10–20)
BUN SERPL-MCNC: 38 MG/DL — HIGH (ref 10–20)
BUN SERPL-MCNC: 40 MG/DL — HIGH (ref 10–20)
BUN SERPL-MCNC: 40 MG/DL — HIGH (ref 10–20)
BUN SERPL-MCNC: 43 MG/DL — HIGH (ref 10–20)
BUN SERPL-MCNC: 43 MG/DL — HIGH (ref 10–20)
BUN SERPL-MCNC: 44 MG/DL — HIGH (ref 10–20)
BUN SERPL-MCNC: 45 MG/DL — HIGH (ref 10–20)
BUN SERPL-MCNC: 45 MG/DL — HIGH (ref 10–20)
BUN SERPL-MCNC: 46 MG/DL — HIGH (ref 10–20)
BUN SERPL-MCNC: 47 MG/DL — HIGH (ref 10–20)
BUN SERPL-MCNC: 52 MG/DL — HIGH (ref 10–20)
BUN SERPL-MCNC: 55 MG/DL — HIGH (ref 10–20)
BUN SERPL-MCNC: 55 MG/DL — HIGH (ref 10–20)
BUN SERPL-MCNC: 56 MG/DL — HIGH (ref 10–20)
BUN SERPL-MCNC: 58 MG/DL — HIGH (ref 10–20)
BUN SERPL-MCNC: 63 MG/DL — CRITICAL HIGH (ref 10–20)
BUN SERPL-MCNC: 63 MG/DL — CRITICAL HIGH (ref 10–20)
BUN SERPL-MCNC: 68 MG/DL — CRITICAL HIGH (ref 10–20)
BUN SERPL-MCNC: 70 MG/DL — CRITICAL HIGH (ref 10–20)
BUN SERPL-MCNC: 71 MG/DL — CRITICAL HIGH (ref 10–20)
BUN SERPL-MCNC: 72 MG/DL — CRITICAL HIGH (ref 10–20)
BUN SERPL-MCNC: 73 MG/DL — CRITICAL HIGH (ref 10–20)
BUN SERPL-MCNC: 74 MG/DL — CRITICAL HIGH (ref 10–20)
BUN SERPL-MCNC: 75 MG/DL — CRITICAL HIGH (ref 10–20)
BUN SERPL-MCNC: 77 MG/DL — CRITICAL HIGH (ref 10–20)
BUN SERPL-MCNC: 78 MG/DL — CRITICAL HIGH (ref 10–20)
BUN SERPL-MCNC: 78 MG/DL — CRITICAL HIGH (ref 10–20)
BUN SERPL-MCNC: 79 MG/DL — CRITICAL HIGH (ref 10–20)
BUN SERPL-MCNC: 82 MG/DL — CRITICAL HIGH (ref 10–20)
BUN SERPL-MCNC: 82 MG/DL — CRITICAL HIGH (ref 10–20)
C DIFF BY PCR RESULT: DETECTED
CA-I BLD-SCNC: 5.3 MG/DL — SIGNIFICANT CHANGE UP (ref 4.5–5.6)
CALCIUM SERPL-MCNC: 10 MG/DL
CALCIUM SERPL-MCNC: 7.1 MG/DL — LOW (ref 8.4–10.5)
CALCIUM SERPL-MCNC: 7.2 MG/DL — LOW (ref 8.4–10.5)
CALCIUM SERPL-MCNC: 7.3 MG/DL — LOW (ref 8.4–10.4)
CALCIUM SERPL-MCNC: 7.4 MG/DL — LOW (ref 8.4–10.5)
CALCIUM SERPL-MCNC: 7.5 MG/DL — LOW (ref 8.4–10.4)
CALCIUM SERPL-MCNC: 7.8 MG/DL — LOW (ref 8.4–10.4)
CALCIUM SERPL-MCNC: 7.8 MG/DL — LOW (ref 8.4–10.4)
CALCIUM SERPL-MCNC: 7.8 MG/DL — LOW (ref 8.4–10.5)
CALCIUM SERPL-MCNC: 7.9 MG/DL — LOW (ref 8.4–10.4)
CALCIUM SERPL-MCNC: 7.9 MG/DL — LOW (ref 8.4–10.4)
CALCIUM SERPL-MCNC: 7.9 MG/DL — LOW (ref 8.4–10.5)
CALCIUM SERPL-MCNC: 8 MG/DL — LOW (ref 8.4–10.5)
CALCIUM SERPL-MCNC: 8 MG/DL — LOW (ref 8.4–10.5)
CALCIUM SERPL-MCNC: 8.1 MG/DL — LOW (ref 8.4–10.5)
CALCIUM SERPL-MCNC: 8.2 MG/DL — LOW (ref 8.4–10.4)
CALCIUM SERPL-MCNC: 8.2 MG/DL — LOW (ref 8.4–10.5)
CALCIUM SERPL-MCNC: 8.3 MG/DL — LOW (ref 8.4–10.4)
CALCIUM SERPL-MCNC: 8.3 MG/DL — LOW (ref 8.4–10.5)
CALCIUM SERPL-MCNC: 8.4 MG/DL — SIGNIFICANT CHANGE UP (ref 8.4–10.4)
CALCIUM SERPL-MCNC: 8.4 MG/DL — SIGNIFICANT CHANGE UP (ref 8.4–10.5)
CALCIUM SERPL-MCNC: 8.4 MG/DL — SIGNIFICANT CHANGE UP (ref 8.4–10.5)
CALCIUM SERPL-MCNC: 8.5 MG/DL — SIGNIFICANT CHANGE UP (ref 8.4–10.4)
CALCIUM SERPL-MCNC: 8.5 MG/DL — SIGNIFICANT CHANGE UP (ref 8.4–10.5)
CALCIUM SERPL-MCNC: 8.6 MG/DL — SIGNIFICANT CHANGE UP (ref 8.4–10.4)
CALCIUM SERPL-MCNC: 8.6 MG/DL — SIGNIFICANT CHANGE UP (ref 8.4–10.5)
CALCIUM SERPL-MCNC: 8.7 MG/DL — SIGNIFICANT CHANGE UP (ref 8.4–10.5)
CALCIUM SERPL-MCNC: 8.7 MG/DL — SIGNIFICANT CHANGE UP (ref 8.4–10.5)
CALCIUM SERPL-MCNC: 9.1 MG/DL — SIGNIFICANT CHANGE UP (ref 8.4–10.4)
CALCIUM SERPL-MCNC: 9.1 MG/DL — SIGNIFICANT CHANGE UP (ref 8.4–10.5)
CALCIUM SERPL-MCNC: 9.3 MG/DL — SIGNIFICANT CHANGE UP (ref 8.4–10.5)
CALCIUM SERPL-MCNC: 9.6 MG/DL — SIGNIFICANT CHANGE UP (ref 8.4–10.5)
CHLORIDE SERPL-SCNC: 102 MMOL/L — SIGNIFICANT CHANGE UP (ref 98–110)
CHLORIDE SERPL-SCNC: 104 MMOL/L — SIGNIFICANT CHANGE UP (ref 98–110)
CHLORIDE SERPL-SCNC: 106 MMOL/L
CHLORIDE SERPL-SCNC: 106 MMOL/L — SIGNIFICANT CHANGE UP (ref 98–110)
CHLORIDE SERPL-SCNC: 107 MMOL/L — SIGNIFICANT CHANGE UP (ref 98–110)
CHLORIDE SERPL-SCNC: 108 MMOL/L — SIGNIFICANT CHANGE UP (ref 98–110)
CHLORIDE SERPL-SCNC: 108 MMOL/L — SIGNIFICANT CHANGE UP (ref 98–110)
CHLORIDE SERPL-SCNC: 109 MMOL/L — SIGNIFICANT CHANGE UP (ref 98–110)
CHLORIDE SERPL-SCNC: 110 MMOL/L — SIGNIFICANT CHANGE UP (ref 98–110)
CHLORIDE SERPL-SCNC: 111 MMOL/L — HIGH (ref 98–110)
CHLORIDE SERPL-SCNC: 112 MMOL/L — HIGH (ref 98–110)
CHLORIDE SERPL-SCNC: 113 MMOL/L — HIGH (ref 98–110)
CHLORIDE SERPL-SCNC: 114 MMOL/L — HIGH (ref 98–110)
CHLORIDE SERPL-SCNC: 115 MMOL/L — HIGH (ref 98–110)
CHLORIDE SERPL-SCNC: 116 MMOL/L — HIGH (ref 98–110)
CHLORIDE SERPL-SCNC: 116 MMOL/L — HIGH (ref 98–110)
CHOLEST SERPL-MCNC: 154 MG/DL
CK MB CFR SERPL CALC: 1.5 NG/ML — SIGNIFICANT CHANGE UP (ref 0.6–6.3)
CK MB CFR SERPL CALC: 1.7 NG/ML — SIGNIFICANT CHANGE UP (ref 0.6–6.3)
CK MB CFR SERPL CALC: 2.5 NG/ML — SIGNIFICANT CHANGE UP (ref 0.6–6.3)
CK MB CFR SERPL CALC: 2.9 NG/ML — SIGNIFICANT CHANGE UP (ref 0.6–6.3)
CK MB CFR SERPL CALC: 3.2 NG/ML — SIGNIFICANT CHANGE UP (ref 0.6–6.3)
CK MB CFR SERPL CALC: 3.4 NG/ML — SIGNIFICANT CHANGE UP (ref 0.6–6.3)
CK MB CFR SERPL CALC: 3.6 NG/ML — SIGNIFICANT CHANGE UP (ref 0.6–6.3)
CK MB CFR SERPL CALC: 4.6 NG/ML — SIGNIFICANT CHANGE UP (ref 0.6–6.3)
CK SERPL-CCNC: 126 U/L — SIGNIFICANT CHANGE UP (ref 0–225)
CK SERPL-CCNC: 141 U/L — SIGNIFICANT CHANGE UP (ref 0–225)
CK SERPL-CCNC: 188 U/L — SIGNIFICANT CHANGE UP (ref 0–225)
CK SERPL-CCNC: 231 U/L — HIGH (ref 0–225)
CK SERPL-CCNC: 264 U/L — HIGH (ref 0–225)
CK SERPL-CCNC: 338 U/L — HIGH (ref 0–225)
CK SERPL-CCNC: 358 U/L — HIGH (ref 0–225)
CK SERPL-CCNC: 423 U/L — HIGH (ref 0–225)
CK SERPL-CCNC: 455 U/L — HIGH (ref 0–225)
CK SERPL-CCNC: 79 U/L — SIGNIFICANT CHANGE UP (ref 0–225)
CK SERPL-CCNC: 83 U/L — SIGNIFICANT CHANGE UP (ref 0–225)
CO2 SERPL-SCNC: 15 MMOL/L — LOW (ref 17–32)
CO2 SERPL-SCNC: 17 MMOL/L — SIGNIFICANT CHANGE UP (ref 17–32)
CO2 SERPL-SCNC: 18 MMOL/L — SIGNIFICANT CHANGE UP (ref 17–32)
CO2 SERPL-SCNC: 19 MMOL/L
CO2 SERPL-SCNC: 19 MMOL/L — SIGNIFICANT CHANGE UP (ref 17–32)
CO2 SERPL-SCNC: 19 MMOL/L — SIGNIFICANT CHANGE UP (ref 17–32)
CO2 SERPL-SCNC: 20 MMOL/L — SIGNIFICANT CHANGE UP (ref 17–32)
CO2 SERPL-SCNC: 21 MMOL/L — SIGNIFICANT CHANGE UP (ref 17–32)
CO2 SERPL-SCNC: 22 MMOL/L — SIGNIFICANT CHANGE UP (ref 17–32)
CO2 SERPL-SCNC: 23 MMOL/L — SIGNIFICANT CHANGE UP (ref 17–32)
CO2 SERPL-SCNC: 24 MMOL/L — SIGNIFICANT CHANGE UP (ref 17–32)
CO2 SERPL-SCNC: 25 MMOL/L — SIGNIFICANT CHANGE UP (ref 17–32)
CO2 SERPL-SCNC: 26 MMOL/L — SIGNIFICANT CHANGE UP (ref 17–32)
CO2 SERPL-SCNC: 27 MMOL/L — SIGNIFICANT CHANGE UP (ref 17–32)
CO2 SERPL-SCNC: 28 MMOL/L — SIGNIFICANT CHANGE UP (ref 17–32)
CO2 SERPL-SCNC: 28 MMOL/L — SIGNIFICANT CHANGE UP (ref 17–32)
COLOR FLD: SIGNIFICANT CHANGE UP
COLOR SPEC: YELLOW — SIGNIFICANT CHANGE UP
COLOR SPEC: YELLOW — SIGNIFICANT CHANGE UP
CREAT ?TM UR-MCNC: 53 MG/DL — SIGNIFICANT CHANGE UP
CREAT ?TM UR-MCNC: 68 MG/DL — SIGNIFICANT CHANGE UP
CREAT ?TM UR-MCNC: 70 MG/DL — SIGNIFICANT CHANGE UP
CREAT ?TM UR-MCNC: 99 MG/DL — SIGNIFICANT CHANGE UP
CREAT SERPL-MCNC: 0.6 MG/DL — LOW (ref 0.7–1.5)
CREAT SERPL-MCNC: 0.7 MG/DL — SIGNIFICANT CHANGE UP (ref 0.7–1.5)
CREAT SERPL-MCNC: 0.8 MG/DL — SIGNIFICANT CHANGE UP (ref 0.7–1.5)
CREAT SERPL-MCNC: 0.9 MG/DL — SIGNIFICANT CHANGE UP (ref 0.7–1.5)
CREAT SERPL-MCNC: 1 MG/DL — SIGNIFICANT CHANGE UP (ref 0.7–1.5)
CREAT SERPL-MCNC: 1.1 MG/DL
CREAT SERPL-MCNC: 1.1 MG/DL — SIGNIFICANT CHANGE UP (ref 0.7–1.5)
CREAT SERPL-MCNC: 1.2 MG/DL — SIGNIFICANT CHANGE UP (ref 0.7–1.5)
CREAT SERPL-MCNC: 1.2 MG/DL — SIGNIFICANT CHANGE UP (ref 0.7–1.5)
CREAT SERPL-MCNC: 1.3 MG/DL — SIGNIFICANT CHANGE UP (ref 0.7–1.5)
CREAT SERPL-MCNC: 1.3 MG/DL — SIGNIFICANT CHANGE UP (ref 0.7–1.5)
CREAT SERPL-MCNC: 1.4 MG/DL — SIGNIFICANT CHANGE UP (ref 0.7–1.5)
CREAT SERPL-MCNC: 1.4 MG/DL — SIGNIFICANT CHANGE UP (ref 0.7–1.5)
CREAT SERPL-MCNC: 1.5 MG/DL — SIGNIFICANT CHANGE UP (ref 0.7–1.5)
CREAT SERPL-MCNC: 1.6 MG/DL — HIGH (ref 0.7–1.5)
CREAT SERPL-MCNC: 1.7 MG/DL — HIGH (ref 0.7–1.5)
CREAT SERPL-MCNC: 1.8 MG/DL — HIGH (ref 0.7–1.5)
CULTURE RESULTS: SIGNIFICANT CHANGE UP
DIFF PNL FLD: ABNORMAL
DIFF PNL FLD: ABNORMAL
EGFR: 38 ML/MIN/1.73M2 — LOW
EGFR: 40 ML/MIN/1.73M2 — LOW
EGFR: 44 ML/MIN/1.73M2 — LOW
EGFR: 47 ML/MIN/1.73M2 — LOW
EGFR: 51 ML/MIN/1.73M2 — LOW
EGFR: 51 ML/MIN/1.73M2 — LOW
EGFR: 56 ML/MIN/1.73M2 — LOW
EGFR: 56 ML/MIN/1.73M2 — LOW
EGFR: 62 ML/MIN/1.73M2 — SIGNIFICANT CHANGE UP
EGFR: 62 ML/MIN/1.73M2 — SIGNIFICANT CHANGE UP
EGFR: 68 ML/MIN/1.73M2
EGFR: 68 ML/MIN/1.73M2 — SIGNIFICANT CHANGE UP
EGFR: 77 ML/MIN/1.73M2 — SIGNIFICANT CHANGE UP
EGFR: 87 ML/MIN/1.73M2 — SIGNIFICANT CHANGE UP
EGFR: 90 ML/MIN/1.73M2 — SIGNIFICANT CHANGE UP
EGFR: 94 ML/MIN/1.73M2 — SIGNIFICANT CHANGE UP
EGFR: 98 ML/MIN/1.73M2 — SIGNIFICANT CHANGE UP
EOSINOPHIL # BLD AUTO: 0 K/UL — SIGNIFICANT CHANGE UP (ref 0–0.7)
EOSINOPHIL # BLD AUTO: 0.01 K/UL — SIGNIFICANT CHANGE UP (ref 0–0.7)
EOSINOPHIL # BLD AUTO: 0.02 K/UL — SIGNIFICANT CHANGE UP (ref 0–0.7)
EOSINOPHIL # BLD AUTO: 0.02 K/UL — SIGNIFICANT CHANGE UP (ref 0–0.7)
EOSINOPHIL # BLD AUTO: 0.03 K/UL — SIGNIFICANT CHANGE UP (ref 0–0.7)
EOSINOPHIL # BLD AUTO: 0.03 K/UL — SIGNIFICANT CHANGE UP (ref 0–0.7)
EOSINOPHIL # BLD AUTO: 0.06 K/UL — SIGNIFICANT CHANGE UP (ref 0–0.7)
EOSINOPHIL # BLD AUTO: 0.09 K/UL — SIGNIFICANT CHANGE UP (ref 0–0.7)
EOSINOPHIL # BLD AUTO: 0.1 K/UL — SIGNIFICANT CHANGE UP (ref 0–0.7)
EOSINOPHIL # BLD AUTO: 0.15 K/UL
EOSINOPHIL NFR BLD AUTO: 0 % — SIGNIFICANT CHANGE UP (ref 0–8)
EOSINOPHIL NFR BLD AUTO: 0.1 % — SIGNIFICANT CHANGE UP (ref 0–8)
EOSINOPHIL NFR BLD AUTO: 0.2 % — SIGNIFICANT CHANGE UP (ref 0–8)
EOSINOPHIL NFR BLD AUTO: 0.2 % — SIGNIFICANT CHANGE UP (ref 0–8)
EOSINOPHIL NFR BLD AUTO: 0.3 % — SIGNIFICANT CHANGE UP (ref 0–8)
EOSINOPHIL NFR BLD AUTO: 0.7 % — SIGNIFICANT CHANGE UP (ref 0–8)
EOSINOPHIL NFR BLD AUTO: 0.9 % — SIGNIFICANT CHANGE UP (ref 0–8)
EOSINOPHIL NFR BLD AUTO: 1.1 % — SIGNIFICANT CHANGE UP (ref 0–8)
EOSINOPHIL NFR BLD AUTO: 2.2 %
ESTIMATED AVERAGE GLUCOSE: 105 MG/DL
ESTIMATED AVERAGE GLUCOSE: 97 MG/DL — SIGNIFICANT CHANGE UP (ref 68–114)
FLUAV AG NPH QL: SIGNIFICANT CHANGE UP
FLUBV AG NPH QL: SIGNIFICANT CHANGE UP
FLUID INTAKE SUBSTANCE CLASS: SIGNIFICANT CHANGE UP
FOLATE SERPL-MCNC: 2.9 NG/ML — LOW
FUNGITELL: <31 PG/ML — SIGNIFICANT CHANGE UP
GAS PNL BLDA: SIGNIFICANT CHANGE UP
GAS PNL BLDV: SIGNIFICANT CHANGE UP
GGT SERPL-CCNC: 84 U/L — HIGH (ref 1–40)
GLUCOSE BLDC GLUCOMTR-MCNC: 102 MG/DL — HIGH (ref 70–99)
GLUCOSE BLDC GLUCOMTR-MCNC: 104 MG/DL — HIGH (ref 70–99)
GLUCOSE BLDC GLUCOMTR-MCNC: 105 MG/DL — HIGH (ref 70–99)
GLUCOSE BLDC GLUCOMTR-MCNC: 106 MG/DL — HIGH (ref 70–99)
GLUCOSE BLDC GLUCOMTR-MCNC: 108 MG/DL — HIGH (ref 70–99)
GLUCOSE BLDC GLUCOMTR-MCNC: 108 MG/DL — HIGH (ref 70–99)
GLUCOSE BLDC GLUCOMTR-MCNC: 109 MG/DL — HIGH (ref 70–99)
GLUCOSE BLDC GLUCOMTR-MCNC: 110 MG/DL — HIGH (ref 70–99)
GLUCOSE BLDC GLUCOMTR-MCNC: 111 MG/DL — HIGH (ref 70–99)
GLUCOSE BLDC GLUCOMTR-MCNC: 112 MG/DL — HIGH (ref 70–99)
GLUCOSE BLDC GLUCOMTR-MCNC: 114 MG/DL — HIGH (ref 70–99)
GLUCOSE BLDC GLUCOMTR-MCNC: 114 MG/DL — HIGH (ref 70–99)
GLUCOSE BLDC GLUCOMTR-MCNC: 117 MG/DL — HIGH (ref 70–99)
GLUCOSE BLDC GLUCOMTR-MCNC: 118 MG/DL — HIGH (ref 70–99)
GLUCOSE BLDC GLUCOMTR-MCNC: 120 MG/DL — HIGH (ref 70–99)
GLUCOSE BLDC GLUCOMTR-MCNC: 121 MG/DL — HIGH (ref 70–99)
GLUCOSE BLDC GLUCOMTR-MCNC: 121 MG/DL — HIGH (ref 70–99)
GLUCOSE BLDC GLUCOMTR-MCNC: 122 MG/DL — HIGH (ref 70–99)
GLUCOSE BLDC GLUCOMTR-MCNC: 122 MG/DL — HIGH (ref 70–99)
GLUCOSE BLDC GLUCOMTR-MCNC: 123 MG/DL — HIGH (ref 70–99)
GLUCOSE BLDC GLUCOMTR-MCNC: 123 MG/DL — HIGH (ref 70–99)
GLUCOSE BLDC GLUCOMTR-MCNC: 124 MG/DL — HIGH (ref 70–99)
GLUCOSE BLDC GLUCOMTR-MCNC: 125 MG/DL — HIGH (ref 70–99)
GLUCOSE BLDC GLUCOMTR-MCNC: 125 MG/DL — HIGH (ref 70–99)
GLUCOSE BLDC GLUCOMTR-MCNC: 126 MG/DL — HIGH (ref 70–99)
GLUCOSE BLDC GLUCOMTR-MCNC: 128 MG/DL — HIGH (ref 70–99)
GLUCOSE BLDC GLUCOMTR-MCNC: 128 MG/DL — HIGH (ref 70–99)
GLUCOSE BLDC GLUCOMTR-MCNC: 129 MG/DL — HIGH (ref 70–99)
GLUCOSE BLDC GLUCOMTR-MCNC: 131 MG/DL — HIGH (ref 70–99)
GLUCOSE BLDC GLUCOMTR-MCNC: 131 MG/DL — HIGH (ref 70–99)
GLUCOSE BLDC GLUCOMTR-MCNC: 132 MG/DL — HIGH (ref 70–99)
GLUCOSE BLDC GLUCOMTR-MCNC: 133 MG/DL — HIGH (ref 70–99)
GLUCOSE BLDC GLUCOMTR-MCNC: 133 MG/DL — HIGH (ref 70–99)
GLUCOSE BLDC GLUCOMTR-MCNC: 134 MG/DL — HIGH (ref 70–99)
GLUCOSE BLDC GLUCOMTR-MCNC: 134 MG/DL — HIGH (ref 70–99)
GLUCOSE BLDC GLUCOMTR-MCNC: 135 MG/DL — HIGH (ref 70–99)
GLUCOSE BLDC GLUCOMTR-MCNC: 137 MG/DL — HIGH (ref 70–99)
GLUCOSE BLDC GLUCOMTR-MCNC: 138 MG/DL — HIGH (ref 70–99)
GLUCOSE BLDC GLUCOMTR-MCNC: 138 MG/DL — HIGH (ref 70–99)
GLUCOSE BLDC GLUCOMTR-MCNC: 140 MG/DL — HIGH (ref 70–99)
GLUCOSE BLDC GLUCOMTR-MCNC: 140 MG/DL — HIGH (ref 70–99)
GLUCOSE BLDC GLUCOMTR-MCNC: 142 MG/DL — HIGH (ref 70–99)
GLUCOSE BLDC GLUCOMTR-MCNC: 143 MG/DL — HIGH (ref 70–99)
GLUCOSE BLDC GLUCOMTR-MCNC: 144 MG/DL — HIGH (ref 70–99)
GLUCOSE BLDC GLUCOMTR-MCNC: 145 MG/DL — HIGH (ref 70–99)
GLUCOSE BLDC GLUCOMTR-MCNC: 145 MG/DL — HIGH (ref 70–99)
GLUCOSE BLDC GLUCOMTR-MCNC: 146 MG/DL — HIGH (ref 70–99)
GLUCOSE BLDC GLUCOMTR-MCNC: 147 MG/DL — HIGH (ref 70–99)
GLUCOSE BLDC GLUCOMTR-MCNC: 148 MG/DL — HIGH (ref 70–99)
GLUCOSE BLDC GLUCOMTR-MCNC: 149 MG/DL — HIGH (ref 70–99)
GLUCOSE BLDC GLUCOMTR-MCNC: 150 MG/DL — HIGH (ref 70–99)
GLUCOSE BLDC GLUCOMTR-MCNC: 150 MG/DL — HIGH (ref 70–99)
GLUCOSE BLDC GLUCOMTR-MCNC: 152 MG/DL — HIGH (ref 70–99)
GLUCOSE BLDC GLUCOMTR-MCNC: 153 MG/DL — HIGH (ref 70–99)
GLUCOSE BLDC GLUCOMTR-MCNC: 154 MG/DL — HIGH (ref 70–99)
GLUCOSE BLDC GLUCOMTR-MCNC: 155 MG/DL — HIGH (ref 70–99)
GLUCOSE BLDC GLUCOMTR-MCNC: 156 MG/DL — HIGH (ref 70–99)
GLUCOSE BLDC GLUCOMTR-MCNC: 156 MG/DL — HIGH (ref 70–99)
GLUCOSE BLDC GLUCOMTR-MCNC: 157 MG/DL — HIGH (ref 70–99)
GLUCOSE BLDC GLUCOMTR-MCNC: 158 MG/DL — HIGH (ref 70–99)
GLUCOSE BLDC GLUCOMTR-MCNC: 158 MG/DL — HIGH (ref 70–99)
GLUCOSE BLDC GLUCOMTR-MCNC: 159 MG/DL — HIGH (ref 70–99)
GLUCOSE BLDC GLUCOMTR-MCNC: 160 MG/DL — HIGH (ref 70–99)
GLUCOSE BLDC GLUCOMTR-MCNC: 160 MG/DL — HIGH (ref 70–99)
GLUCOSE BLDC GLUCOMTR-MCNC: 161 MG/DL — HIGH (ref 70–99)
GLUCOSE BLDC GLUCOMTR-MCNC: 163 MG/DL — HIGH (ref 70–99)
GLUCOSE BLDC GLUCOMTR-MCNC: 164 MG/DL — HIGH (ref 70–99)
GLUCOSE BLDC GLUCOMTR-MCNC: 166 MG/DL — HIGH (ref 70–99)
GLUCOSE BLDC GLUCOMTR-MCNC: 167 MG/DL — HIGH (ref 70–99)
GLUCOSE BLDC GLUCOMTR-MCNC: 167 MG/DL — HIGH (ref 70–99)
GLUCOSE BLDC GLUCOMTR-MCNC: 168 MG/DL — HIGH (ref 70–99)
GLUCOSE BLDC GLUCOMTR-MCNC: 169 MG/DL — HIGH (ref 70–99)
GLUCOSE BLDC GLUCOMTR-MCNC: 169 MG/DL — HIGH (ref 70–99)
GLUCOSE BLDC GLUCOMTR-MCNC: 171 MG/DL — HIGH (ref 70–99)
GLUCOSE BLDC GLUCOMTR-MCNC: 171 MG/DL — HIGH (ref 70–99)
GLUCOSE BLDC GLUCOMTR-MCNC: 172 MG/DL — HIGH (ref 70–99)
GLUCOSE BLDC GLUCOMTR-MCNC: 173 MG/DL — HIGH (ref 70–99)
GLUCOSE BLDC GLUCOMTR-MCNC: 174 MG/DL — HIGH (ref 70–99)
GLUCOSE BLDC GLUCOMTR-MCNC: 174 MG/DL — HIGH (ref 70–99)
GLUCOSE BLDC GLUCOMTR-MCNC: 175 MG/DL — HIGH (ref 70–99)
GLUCOSE BLDC GLUCOMTR-MCNC: 176 MG/DL — HIGH (ref 70–99)
GLUCOSE BLDC GLUCOMTR-MCNC: 177 MG/DL — HIGH (ref 70–99)
GLUCOSE BLDC GLUCOMTR-MCNC: 178 MG/DL — HIGH (ref 70–99)
GLUCOSE BLDC GLUCOMTR-MCNC: 181 MG/DL — HIGH (ref 70–99)
GLUCOSE BLDC GLUCOMTR-MCNC: 183 MG/DL — HIGH (ref 70–99)
GLUCOSE BLDC GLUCOMTR-MCNC: 183 MG/DL — HIGH (ref 70–99)
GLUCOSE BLDC GLUCOMTR-MCNC: 184 MG/DL — HIGH (ref 70–99)
GLUCOSE BLDC GLUCOMTR-MCNC: 185 MG/DL — HIGH (ref 70–99)
GLUCOSE BLDC GLUCOMTR-MCNC: 187 MG/DL — HIGH (ref 70–99)
GLUCOSE BLDC GLUCOMTR-MCNC: 189 MG/DL — HIGH (ref 70–99)
GLUCOSE BLDC GLUCOMTR-MCNC: 191 MG/DL — HIGH (ref 70–99)
GLUCOSE BLDC GLUCOMTR-MCNC: 192 MG/DL — HIGH (ref 70–99)
GLUCOSE BLDC GLUCOMTR-MCNC: 193 MG/DL — HIGH (ref 70–99)
GLUCOSE BLDC GLUCOMTR-MCNC: 193 MG/DL — HIGH (ref 70–99)
GLUCOSE BLDC GLUCOMTR-MCNC: 195 MG/DL — HIGH (ref 70–99)
GLUCOSE BLDC GLUCOMTR-MCNC: 196 MG/DL — HIGH (ref 70–99)
GLUCOSE BLDC GLUCOMTR-MCNC: 199 MG/DL — HIGH (ref 70–99)
GLUCOSE BLDC GLUCOMTR-MCNC: 199 MG/DL — HIGH (ref 70–99)
GLUCOSE BLDC GLUCOMTR-MCNC: 200 MG/DL — HIGH (ref 70–99)
GLUCOSE BLDC GLUCOMTR-MCNC: 201 MG/DL — HIGH (ref 70–99)
GLUCOSE BLDC GLUCOMTR-MCNC: 202 MG/DL — HIGH (ref 70–99)
GLUCOSE BLDC GLUCOMTR-MCNC: 202 MG/DL — HIGH (ref 70–99)
GLUCOSE BLDC GLUCOMTR-MCNC: 206 MG/DL — HIGH (ref 70–99)
GLUCOSE BLDC GLUCOMTR-MCNC: 209 MG/DL — HIGH (ref 70–99)
GLUCOSE BLDC GLUCOMTR-MCNC: 89 MG/DL — SIGNIFICANT CHANGE UP (ref 70–99)
GLUCOSE BLDC GLUCOMTR-MCNC: 90 MG/DL — SIGNIFICANT CHANGE UP (ref 70–99)
GLUCOSE BLDC GLUCOMTR-MCNC: 98 MG/DL — SIGNIFICANT CHANGE UP (ref 70–99)
GLUCOSE BLDC GLUCOMTR-MCNC: 98 MG/DL — SIGNIFICANT CHANGE UP (ref 70–99)
GLUCOSE FLD-MCNC: 145 MG/DL — SIGNIFICANT CHANGE UP
GLUCOSE SERPL-MCNC: 101 MG/DL — HIGH (ref 70–99)
GLUCOSE SERPL-MCNC: 104 MG/DL — HIGH (ref 70–99)
GLUCOSE SERPL-MCNC: 106 MG/DL — HIGH (ref 70–99)
GLUCOSE SERPL-MCNC: 114 MG/DL — HIGH (ref 70–99)
GLUCOSE SERPL-MCNC: 116 MG/DL — HIGH (ref 70–99)
GLUCOSE SERPL-MCNC: 117 MG/DL — HIGH (ref 70–99)
GLUCOSE SERPL-MCNC: 118 MG/DL — HIGH (ref 70–99)
GLUCOSE SERPL-MCNC: 118 MG/DL — HIGH (ref 70–99)
GLUCOSE SERPL-MCNC: 121 MG/DL — HIGH (ref 70–99)
GLUCOSE SERPL-MCNC: 125 MG/DL — HIGH (ref 70–99)
GLUCOSE SERPL-MCNC: 125 MG/DL — HIGH (ref 70–99)
GLUCOSE SERPL-MCNC: 127 MG/DL — HIGH (ref 70–99)
GLUCOSE SERPL-MCNC: 128 MG/DL — HIGH (ref 70–99)
GLUCOSE SERPL-MCNC: 129 MG/DL — HIGH (ref 70–99)
GLUCOSE SERPL-MCNC: 130 MG/DL — HIGH (ref 70–99)
GLUCOSE SERPL-MCNC: 131 MG/DL — HIGH (ref 70–99)
GLUCOSE SERPL-MCNC: 132 MG/DL — HIGH (ref 70–99)
GLUCOSE SERPL-MCNC: 134 MG/DL — HIGH (ref 70–99)
GLUCOSE SERPL-MCNC: 135 MG/DL — HIGH (ref 70–99)
GLUCOSE SERPL-MCNC: 136 MG/DL — HIGH (ref 70–99)
GLUCOSE SERPL-MCNC: 137 MG/DL — HIGH (ref 70–99)
GLUCOSE SERPL-MCNC: 137 MG/DL — HIGH (ref 70–99)
GLUCOSE SERPL-MCNC: 139 MG/DL — HIGH (ref 70–99)
GLUCOSE SERPL-MCNC: 141 MG/DL — HIGH (ref 70–99)
GLUCOSE SERPL-MCNC: 143 MG/DL — HIGH (ref 70–99)
GLUCOSE SERPL-MCNC: 145 MG/DL — HIGH (ref 70–99)
GLUCOSE SERPL-MCNC: 145 MG/DL — HIGH (ref 70–99)
GLUCOSE SERPL-MCNC: 146 MG/DL — HIGH (ref 70–99)
GLUCOSE SERPL-MCNC: 148 MG/DL — HIGH (ref 70–99)
GLUCOSE SERPL-MCNC: 149 MG/DL — HIGH (ref 70–99)
GLUCOSE SERPL-MCNC: 150 MG/DL — HIGH (ref 70–99)
GLUCOSE SERPL-MCNC: 151 MG/DL — HIGH (ref 70–99)
GLUCOSE SERPL-MCNC: 157 MG/DL — HIGH (ref 70–99)
GLUCOSE SERPL-MCNC: 158 MG/DL — HIGH (ref 70–99)
GLUCOSE SERPL-MCNC: 158 MG/DL — HIGH (ref 70–99)
GLUCOSE SERPL-MCNC: 159 MG/DL — HIGH (ref 70–99)
GLUCOSE SERPL-MCNC: 161 MG/DL — HIGH (ref 70–99)
GLUCOSE SERPL-MCNC: 163 MG/DL — HIGH (ref 70–99)
GLUCOSE SERPL-MCNC: 164 MG/DL — HIGH (ref 70–99)
GLUCOSE SERPL-MCNC: 166 MG/DL — HIGH (ref 70–99)
GLUCOSE SERPL-MCNC: 166 MG/DL — HIGH (ref 70–99)
GLUCOSE SERPL-MCNC: 168 MG/DL — HIGH (ref 70–99)
GLUCOSE SERPL-MCNC: 173 MG/DL — HIGH (ref 70–99)
GLUCOSE SERPL-MCNC: 173 MG/DL — HIGH (ref 70–99)
GLUCOSE SERPL-MCNC: 174 MG/DL — HIGH (ref 70–99)
GLUCOSE SERPL-MCNC: 182 MG/DL — HIGH (ref 70–99)
GLUCOSE SERPL-MCNC: 195 MG/DL — HIGH (ref 70–99)
GLUCOSE SERPL-MCNC: 215 MG/DL — HIGH (ref 70–99)
GLUCOSE SERPL-MCNC: 239 MG/DL — HIGH (ref 70–99)
GLUCOSE SERPL-MCNC: 91 MG/DL — SIGNIFICANT CHANGE UP (ref 70–99)
GLUCOSE SERPL-MCNC: 93 MG/DL
GLUCOSE SERPL-MCNC: 96 MG/DL — SIGNIFICANT CHANGE UP (ref 70–99)
GLUCOSE SERPL-MCNC: 97 MG/DL — SIGNIFICANT CHANGE UP (ref 70–99)
GLUCOSE SERPL-MCNC: 99 MG/DL — SIGNIFICANT CHANGE UP (ref 70–99)
GLUCOSE UR QL: NEGATIVE — SIGNIFICANT CHANGE UP
GLUCOSE UR QL: NEGATIVE — SIGNIFICANT CHANGE UP
GRAM STN FLD: SIGNIFICANT CHANGE UP
HBA1C MFR BLD HPLC: 5.3 %
HCO3 BLDA-SCNC: 19 MMOL/L — LOW (ref 21–28)
HCO3 BLDA-SCNC: 19 MMOL/L — LOW (ref 21–28)
HCO3 BLDA-SCNC: 20 MMOL/L — LOW (ref 21–28)
HCO3 BLDA-SCNC: 21 MMOL/L — SIGNIFICANT CHANGE UP (ref 21–28)
HCO3 BLDA-SCNC: 22 MMOL/L — SIGNIFICANT CHANGE UP (ref 21–28)
HCO3 BLDA-SCNC: 23 MMOL/L — SIGNIFICANT CHANGE UP (ref 21–28)
HCO3 BLDA-SCNC: 23 MMOL/L — SIGNIFICANT CHANGE UP (ref 21–28)
HCO3 BLDA-SCNC: 24 MMOL/L — SIGNIFICANT CHANGE UP (ref 21–28)
HCO3 BLDA-SCNC: 25 MMOL/L — SIGNIFICANT CHANGE UP (ref 21–28)
HCO3 BLDA-SCNC: 25 MMOL/L — SIGNIFICANT CHANGE UP (ref 21–28)
HCO3 BLDA-SCNC: 26 MMOL/L — SIGNIFICANT CHANGE UP (ref 21–28)
HCO3 BLDV-SCNC: 17 MMOL/L — LOW (ref 22–29)
HCT VFR BLD CALC: 22 % — LOW (ref 42–52)
HCT VFR BLD CALC: 22.2 % — LOW (ref 42–52)
HCT VFR BLD CALC: 22.6 % — LOW (ref 42–52)
HCT VFR BLD CALC: 22.7 % — LOW (ref 42–52)
HCT VFR BLD CALC: 22.7 % — LOW (ref 42–52)
HCT VFR BLD CALC: 22.8 % — LOW (ref 42–52)
HCT VFR BLD CALC: 23 % — LOW (ref 42–52)
HCT VFR BLD CALC: 23.2 % — LOW (ref 42–52)
HCT VFR BLD CALC: 23.2 % — LOW (ref 42–52)
HCT VFR BLD CALC: 23.3 % — LOW (ref 42–52)
HCT VFR BLD CALC: 23.4 % — LOW (ref 42–52)
HCT VFR BLD CALC: 23.5 % — LOW (ref 42–52)
HCT VFR BLD CALC: 23.6 % — LOW (ref 42–52)
HCT VFR BLD CALC: 23.6 % — LOW (ref 42–52)
HCT VFR BLD CALC: 23.9 % — LOW (ref 42–52)
HCT VFR BLD CALC: 24 % — LOW (ref 42–52)
HCT VFR BLD CALC: 24.5 % — LOW (ref 42–52)
HCT VFR BLD CALC: 24.5 % — LOW (ref 42–52)
HCT VFR BLD CALC: 24.9 % — LOW (ref 42–52)
HCT VFR BLD CALC: 25.3 % — LOW (ref 42–52)
HCT VFR BLD CALC: 25.4 % — LOW (ref 42–52)
HCT VFR BLD CALC: 25.5 % — LOW (ref 42–52)
HCT VFR BLD CALC: 25.6 % — LOW (ref 42–52)
HCT VFR BLD CALC: 25.9 % — LOW (ref 42–52)
HCT VFR BLD CALC: 26 % — LOW (ref 42–52)
HCT VFR BLD CALC: 26.8 % — LOW (ref 42–52)
HCT VFR BLD CALC: 27 % — LOW (ref 42–52)
HCT VFR BLD CALC: 27.2 % — LOW (ref 42–52)
HCT VFR BLD CALC: 27.4 % — LOW (ref 42–52)
HCT VFR BLD CALC: 27.5 % — LOW (ref 42–52)
HCT VFR BLD CALC: 27.8 % — LOW (ref 42–52)
HCT VFR BLD CALC: 27.8 % — LOW (ref 42–52)
HCT VFR BLD CALC: 28.4 % — LOW (ref 42–52)
HCT VFR BLD CALC: 28.5 % — LOW (ref 42–52)
HCT VFR BLD CALC: 28.8 % — LOW (ref 42–52)
HCT VFR BLD CALC: 28.9 % — LOW (ref 42–52)
HCT VFR BLD CALC: 29.3 % — LOW (ref 42–52)
HCT VFR BLD CALC: 29.6 % — LOW (ref 42–52)
HCT VFR BLD CALC: 29.8 % — LOW (ref 42–52)
HCT VFR BLD CALC: 29.9 % — LOW (ref 42–52)
HCT VFR BLD CALC: 30.2 % — LOW (ref 42–52)
HCT VFR BLD CALC: 30.5 % — LOW (ref 42–52)
HCT VFR BLD CALC: 30.6 % — LOW (ref 42–52)
HCT VFR BLD CALC: 30.8 % — LOW (ref 42–52)
HCT VFR BLD CALC: 31.5 % — LOW (ref 42–52)
HCT VFR BLD CALC: 31.5 % — LOW (ref 42–52)
HCT VFR BLD CALC: 31.7 % — LOW (ref 42–52)
HCT VFR BLD CALC: 32.1 % — LOW (ref 42–52)
HCT VFR BLD CALC: 32.2 % — LOW (ref 42–52)
HCT VFR BLD CALC: 32.9 % — LOW (ref 42–52)
HCT VFR BLD CALC: 35.4 % — LOW (ref 42–52)
HCT VFR BLD CALC: 42.3 %
HDLC SERPL-MCNC: 50 MG/DL
HGB BLD-MCNC: 10.1 G/DL — LOW (ref 14–18)
HGB BLD-MCNC: 10.2 G/DL — LOW (ref 14–18)
HGB BLD-MCNC: 10.2 G/DL — LOW (ref 14–18)
HGB BLD-MCNC: 10.7 G/DL — LOW (ref 14–18)
HGB BLD-MCNC: 11 G/DL — LOW (ref 14–18)
HGB BLD-MCNC: 12.2 G/DL — LOW (ref 14–18)
HGB BLD-MCNC: 14.5 G/DL
HGB BLD-MCNC: 6.6 G/DL — CRITICAL LOW (ref 14–18)
HGB BLD-MCNC: 6.8 G/DL — CRITICAL LOW (ref 14–18)
HGB BLD-MCNC: 6.9 G/DL — CRITICAL LOW (ref 14–18)
HGB BLD-MCNC: 7 G/DL — LOW (ref 14–18)
HGB BLD-MCNC: 7.1 G/DL — LOW (ref 14–18)
HGB BLD-MCNC: 7.2 G/DL — LOW (ref 14–18)
HGB BLD-MCNC: 7.4 G/DL — LOW (ref 14–18)
HGB BLD-MCNC: 7.4 G/DL — LOW (ref 14–18)
HGB BLD-MCNC: 7.5 G/DL — LOW (ref 14–18)
HGB BLD-MCNC: 7.6 G/DL — LOW (ref 14–18)
HGB BLD-MCNC: 7.7 G/DL — LOW (ref 14–18)
HGB BLD-MCNC: 7.7 G/DL — LOW (ref 14–18)
HGB BLD-MCNC: 7.8 G/DL — LOW (ref 14–18)
HGB BLD-MCNC: 7.8 G/DL — LOW (ref 14–18)
HGB BLD-MCNC: 8 G/DL — LOW (ref 14–18)
HGB BLD-MCNC: 8.1 G/DL — LOW (ref 14–18)
HGB BLD-MCNC: 8.1 G/DL — LOW (ref 14–18)
HGB BLD-MCNC: 8.2 G/DL — LOW (ref 14–18)
HGB BLD-MCNC: 8.2 G/DL — LOW (ref 14–18)
HGB BLD-MCNC: 8.4 G/DL — LOW (ref 14–18)
HGB BLD-MCNC: 8.5 G/DL — LOW (ref 14–18)
HGB BLD-MCNC: 8.6 G/DL — LOW (ref 14–18)
HGB BLD-MCNC: 8.6 G/DL — LOW (ref 14–18)
HGB BLD-MCNC: 8.7 G/DL — LOW (ref 14–18)
HGB BLD-MCNC: 8.8 G/DL — LOW (ref 14–18)
HGB BLD-MCNC: 8.8 G/DL — LOW (ref 14–18)
HGB BLD-MCNC: 8.9 G/DL — LOW (ref 14–18)
HGB BLD-MCNC: 9 G/DL — LOW (ref 14–18)
HGB BLD-MCNC: 9 G/DL — LOW (ref 14–18)
HGB BLD-MCNC: 9.1 G/DL — LOW (ref 14–18)
HGB BLD-MCNC: 9.3 G/DL — LOW (ref 14–18)
HGB BLD-MCNC: 9.4 G/DL — LOW (ref 14–18)
HGB BLD-MCNC: 9.5 G/DL — LOW (ref 14–18)
HGB BLD-MCNC: 9.6 G/DL — LOW (ref 14–18)
HGB BLD-MCNC: 9.7 G/DL — LOW (ref 14–18)
HGB BLD-MCNC: 9.7 G/DL — LOW (ref 14–18)
HGB BLD-MCNC: 9.8 G/DL — LOW (ref 14–18)
HGB BLD-MCNC: 9.8 G/DL — LOW (ref 14–18)
HOROWITZ INDEX BLDA+IHG-RTO: 100 — SIGNIFICANT CHANGE UP
HOROWITZ INDEX BLDA+IHG-RTO: 40 — SIGNIFICANT CHANGE UP
HOROWITZ INDEX BLDA+IHG-RTO: 40 — SIGNIFICANT CHANGE UP
HYPOCHROMIA BLD QL: SIGNIFICANT CHANGE UP
HYPOCHROMIA BLD QL: SLIGHT — SIGNIFICANT CHANGE UP
IMM GRANULOCYTES NFR BLD AUTO: 0.3 %
IMM GRANULOCYTES NFR BLD AUTO: 0.3 % — SIGNIFICANT CHANGE UP (ref 0.1–0.3)
IMM GRANULOCYTES NFR BLD AUTO: 0.4 % — HIGH (ref 0.1–0.3)
IMM GRANULOCYTES NFR BLD AUTO: 0.5 % — HIGH (ref 0.1–0.3)
IMM GRANULOCYTES NFR BLD AUTO: 0.6 % — HIGH (ref 0.1–0.3)
IMM GRANULOCYTES NFR BLD AUTO: 0.7 % — HIGH (ref 0.1–0.3)
IMM GRANULOCYTES NFR BLD AUTO: 0.8 % — HIGH (ref 0.1–0.3)
IMM GRANULOCYTES NFR BLD AUTO: 0.9 % — HIGH (ref 0.1–0.3)
IMM GRANULOCYTES NFR BLD AUTO: 1 % — HIGH (ref 0.1–0.3)
IMM GRANULOCYTES NFR BLD AUTO: 1.1 % — HIGH (ref 0.1–0.3)
IMM GRANULOCYTES NFR BLD AUTO: 1.2 % — HIGH (ref 0.1–0.3)
IMM GRANULOCYTES NFR BLD AUTO: 1.2 % — HIGH (ref 0.1–0.3)
IMM GRANULOCYTES NFR BLD AUTO: 1.3 % — HIGH (ref 0.1–0.3)
INR BLD: 1.02 RATIO — SIGNIFICANT CHANGE UP (ref 0.65–1.3)
INR BLD: 1.03 RATIO — SIGNIFICANT CHANGE UP (ref 0.65–1.3)
INR BLD: 1.03 RATIO — SIGNIFICANT CHANGE UP (ref 0.65–1.3)
INR BLD: 1.05 RATIO — SIGNIFICANT CHANGE UP (ref 0.65–1.3)
INR BLD: 1.08 RATIO — SIGNIFICANT CHANGE UP (ref 0.65–1.3)
INR BLD: 1.09 RATIO — SIGNIFICANT CHANGE UP (ref 0.65–1.3)
INR BLD: 1.15 RATIO — SIGNIFICANT CHANGE UP (ref 0.65–1.3)
INR BLD: 1.18 RATIO — SIGNIFICANT CHANGE UP (ref 0.65–1.3)
INR BLD: 1.31 RATIO — HIGH (ref 0.65–1.3)
KETONES UR-MCNC: NEGATIVE — SIGNIFICANT CHANGE UP
KETONES UR-MCNC: SIGNIFICANT CHANGE UP
LACTATE BLDV-MCNC: 3.1 MMOL/L — HIGH (ref 0.5–2)
LACTATE SERPL-SCNC: 2.1 MMOL/L — HIGH (ref 0.7–2)
LACTATE SERPL-SCNC: 2.2 MMOL/L — HIGH (ref 0.7–2)
LDH SERPL L TO P-CCNC: 130 U/L — SIGNIFICANT CHANGE UP
LDH SERPL L TO P-CCNC: 231 U/L — SIGNIFICANT CHANGE UP (ref 50–242)
LDLC SERPL CALC-MCNC: 77 MG/DL
LEUKOCYTE ESTERASE UR-ACNC: NEGATIVE — SIGNIFICANT CHANGE UP
LEUKOCYTE ESTERASE UR-ACNC: NEGATIVE — SIGNIFICANT CHANGE UP
LYMPHOCYTES # BLD AUTO: 0 % — LOW (ref 20.5–51.1)
LYMPHOCYTES # BLD AUTO: 0 K/UL — LOW (ref 1.2–3.4)
LYMPHOCYTES # BLD AUTO: 0.1 K/UL — LOW (ref 1.2–3.4)
LYMPHOCYTES # BLD AUTO: 0.11 K/UL — LOW (ref 1.2–3.4)
LYMPHOCYTES # BLD AUTO: 0.13 K/UL — LOW (ref 1.2–3.4)
LYMPHOCYTES # BLD AUTO: 0.14 K/UL — LOW (ref 1.2–3.4)
LYMPHOCYTES # BLD AUTO: 0.15 K/UL — LOW (ref 1.2–3.4)
LYMPHOCYTES # BLD AUTO: 0.17 K/UL — LOW (ref 1.2–3.4)
LYMPHOCYTES # BLD AUTO: 0.18 K/UL — LOW (ref 1.2–3.4)
LYMPHOCYTES # BLD AUTO: 0.18 K/UL — LOW (ref 1.2–3.4)
LYMPHOCYTES # BLD AUTO: 0.2 K/UL — LOW (ref 1.2–3.4)
LYMPHOCYTES # BLD AUTO: 0.21 K/UL — LOW (ref 1.2–3.4)
LYMPHOCYTES # BLD AUTO: 0.22 K/UL — LOW (ref 1.2–3.4)
LYMPHOCYTES # BLD AUTO: 0.22 K/UL — LOW (ref 1.2–3.4)
LYMPHOCYTES # BLD AUTO: 0.23 K/UL — LOW (ref 1.2–3.4)
LYMPHOCYTES # BLD AUTO: 0.24 K/UL — LOW (ref 1.2–3.4)
LYMPHOCYTES # BLD AUTO: 0.25 K/UL — LOW (ref 1.2–3.4)
LYMPHOCYTES # BLD AUTO: 0.28 K/UL — LOW (ref 1.2–3.4)
LYMPHOCYTES # BLD AUTO: 0.29 K/UL — LOW (ref 1.2–3.4)
LYMPHOCYTES # BLD AUTO: 0.3 K/UL — LOW (ref 1.2–3.4)
LYMPHOCYTES # BLD AUTO: 0.31 K/UL — LOW (ref 1.2–3.4)
LYMPHOCYTES # BLD AUTO: 0.31 K/UL — LOW (ref 1.2–3.4)
LYMPHOCYTES # BLD AUTO: 0.38 K/UL — LOW (ref 1.2–3.4)
LYMPHOCYTES # BLD AUTO: 0.41 K/UL — LOW (ref 1.2–3.4)
LYMPHOCYTES # BLD AUTO: 0.44 K/UL — LOW (ref 1.2–3.4)
LYMPHOCYTES # BLD AUTO: 0.52 K/UL
LYMPHOCYTES # BLD AUTO: 0.8 % — LOW (ref 20.5–51.1)
LYMPHOCYTES # BLD AUTO: 0.9 % — LOW (ref 20.5–51.1)
LYMPHOCYTES # BLD AUTO: 0.9 % — LOW (ref 20.5–51.1)
LYMPHOCYTES # BLD AUTO: 1 % — LOW (ref 20.5–51.1)
LYMPHOCYTES # BLD AUTO: 1.1 % — LOW (ref 20.5–51.1)
LYMPHOCYTES # BLD AUTO: 1.2 % — LOW (ref 20.5–51.1)
LYMPHOCYTES # BLD AUTO: 1.4 % — LOW (ref 20.5–51.1)
LYMPHOCYTES # BLD AUTO: 1.5 % — LOW (ref 20.5–51.1)
LYMPHOCYTES # BLD AUTO: 1.6 % — LOW (ref 20.5–51.1)
LYMPHOCYTES # BLD AUTO: 1.7 % — LOW (ref 20.5–51.1)
LYMPHOCYTES # BLD AUTO: 1.8 % — LOW (ref 20.5–51.1)
LYMPHOCYTES # BLD AUTO: 1.9 % — LOW (ref 20.5–51.1)
LYMPHOCYTES # BLD AUTO: 2 % — LOW (ref 20.5–51.1)
LYMPHOCYTES # BLD AUTO: 2.1 % — LOW (ref 20.5–51.1)
LYMPHOCYTES # BLD AUTO: 2.1 % — LOW (ref 20.5–51.1)
LYMPHOCYTES # BLD AUTO: 2.5 % — LOW (ref 20.5–51.1)
LYMPHOCYTES # BLD AUTO: 2.6 % — LOW (ref 20.5–51.1)
LYMPHOCYTES # BLD AUTO: 2.7 % — LOW (ref 20.5–51.1)
LYMPHOCYTES # BLD AUTO: 3 % — LOW (ref 20.5–51.1)
LYMPHOCYTES # BLD AUTO: 3.9 % — LOW (ref 20.5–51.1)
LYMPHOCYTES # BLD AUTO: 3.9 % — LOW (ref 20.5–51.1)
LYMPHOCYTES # BLD AUTO: 4.2 % — LOW (ref 20.5–51.1)
LYMPHOCYTES # FLD: 5 — SIGNIFICANT CHANGE UP
LYMPHOCYTES NFR BLD AUTO: 7.6 %
MACROCYTES BLD QL: SIGNIFICANT CHANGE UP
MACROCYTES BLD QL: SIGNIFICANT CHANGE UP
MAGNESIUM SERPL-MCNC: 1.2 MG/DL — LOW (ref 1.8–2.4)
MAGNESIUM SERPL-MCNC: 1.8 MG/DL — SIGNIFICANT CHANGE UP (ref 1.8–2.4)
MAGNESIUM SERPL-MCNC: 1.8 MG/DL — SIGNIFICANT CHANGE UP (ref 1.8–2.4)
MAGNESIUM SERPL-MCNC: 1.9 MG/DL — SIGNIFICANT CHANGE UP (ref 1.8–2.4)
MAGNESIUM SERPL-MCNC: 2 MG/DL — SIGNIFICANT CHANGE UP (ref 1.8–2.4)
MAGNESIUM SERPL-MCNC: 2.1 MG/DL — SIGNIFICANT CHANGE UP (ref 1.8–2.4)
MAGNESIUM SERPL-MCNC: 2.2 MG/DL — SIGNIFICANT CHANGE UP (ref 1.8–2.4)
MAGNESIUM SERPL-MCNC: 2.3 MG/DL — SIGNIFICANT CHANGE UP (ref 1.8–2.4)
MAN DIFF?: NORMAL
MANUAL SMEAR VERIFICATION: SIGNIFICANT CHANGE UP
MCHC RBC-ENTMCNC: 29.5 G/DL — LOW (ref 32–37)
MCHC RBC-ENTMCNC: 29.5 G/DL — LOW (ref 32–37)
MCHC RBC-ENTMCNC: 29.7 G/DL — LOW (ref 32–37)
MCHC RBC-ENTMCNC: 29.7 G/DL — LOW (ref 32–37)
MCHC RBC-ENTMCNC: 29.7 PG — SIGNIFICANT CHANGE UP (ref 27–31)
MCHC RBC-ENTMCNC: 29.7 PG — SIGNIFICANT CHANGE UP (ref 27–31)
MCHC RBC-ENTMCNC: 29.8 G/DL — LOW (ref 32–37)
MCHC RBC-ENTMCNC: 29.8 PG — SIGNIFICANT CHANGE UP (ref 27–31)
MCHC RBC-ENTMCNC: 29.8 PG — SIGNIFICANT CHANGE UP (ref 27–31)
MCHC RBC-ENTMCNC: 29.9 PG — SIGNIFICANT CHANGE UP (ref 27–31)
MCHC RBC-ENTMCNC: 30 G/DL — LOW (ref 32–37)
MCHC RBC-ENTMCNC: 30 PG — SIGNIFICANT CHANGE UP (ref 27–31)
MCHC RBC-ENTMCNC: 30.1 G/DL — LOW (ref 32–37)
MCHC RBC-ENTMCNC: 30.1 PG — SIGNIFICANT CHANGE UP (ref 27–31)
MCHC RBC-ENTMCNC: 30.2 PG — SIGNIFICANT CHANGE UP (ref 27–31)
MCHC RBC-ENTMCNC: 30.3 G/DL — LOW (ref 32–37)
MCHC RBC-ENTMCNC: 30.3 PG — SIGNIFICANT CHANGE UP (ref 27–31)
MCHC RBC-ENTMCNC: 30.4 G/DL — LOW (ref 32–37)
MCHC RBC-ENTMCNC: 30.4 PG — SIGNIFICANT CHANGE UP (ref 27–31)
MCHC RBC-ENTMCNC: 30.5 PG
MCHC RBC-ENTMCNC: 30.5 PG — SIGNIFICANT CHANGE UP (ref 27–31)
MCHC RBC-ENTMCNC: 30.6 G/DL — LOW (ref 32–37)
MCHC RBC-ENTMCNC: 30.6 PG — SIGNIFICANT CHANGE UP (ref 27–31)
MCHC RBC-ENTMCNC: 30.6 PG — SIGNIFICANT CHANGE UP (ref 27–31)
MCHC RBC-ENTMCNC: 30.7 G/DL — LOW (ref 32–37)
MCHC RBC-ENTMCNC: 30.7 G/DL — LOW (ref 32–37)
MCHC RBC-ENTMCNC: 30.7 PG — SIGNIFICANT CHANGE UP (ref 27–31)
MCHC RBC-ENTMCNC: 30.8 G/DL — LOW (ref 32–37)
MCHC RBC-ENTMCNC: 30.8 G/DL — LOW (ref 32–37)
MCHC RBC-ENTMCNC: 30.8 PG — SIGNIFICANT CHANGE UP (ref 27–31)
MCHC RBC-ENTMCNC: 30.9 G/DL — LOW (ref 32–37)
MCHC RBC-ENTMCNC: 30.9 PG — SIGNIFICANT CHANGE UP (ref 27–31)
MCHC RBC-ENTMCNC: 31 G/DL — LOW (ref 32–37)
MCHC RBC-ENTMCNC: 31 G/DL — LOW (ref 32–37)
MCHC RBC-ENTMCNC: 31 PG — SIGNIFICANT CHANGE UP (ref 27–31)
MCHC RBC-ENTMCNC: 31.1 G/DL — LOW (ref 32–37)
MCHC RBC-ENTMCNC: 31.1 PG — HIGH (ref 27–31)
MCHC RBC-ENTMCNC: 31.2 G/DL — LOW (ref 32–37)
MCHC RBC-ENTMCNC: 31.2 PG — HIGH (ref 27–31)
MCHC RBC-ENTMCNC: 31.3 G/DL — LOW (ref 32–37)
MCHC RBC-ENTMCNC: 31.3 PG — HIGH (ref 27–31)
MCHC RBC-ENTMCNC: 31.4 G/DL — LOW (ref 32–37)
MCHC RBC-ENTMCNC: 31.4 G/DL — LOW (ref 32–37)
MCHC RBC-ENTMCNC: 31.5 G/DL — LOW (ref 32–37)
MCHC RBC-ENTMCNC: 31.6 G/DL — LOW (ref 32–37)
MCHC RBC-ENTMCNC: 31.6 G/DL — LOW (ref 32–37)
MCHC RBC-ENTMCNC: 31.6 PG — HIGH (ref 27–31)
MCHC RBC-ENTMCNC: 31.8 G/DL — LOW (ref 32–37)
MCHC RBC-ENTMCNC: 32 G/DL — SIGNIFICANT CHANGE UP (ref 32–37)
MCHC RBC-ENTMCNC: 32.3 G/DL — SIGNIFICANT CHANGE UP (ref 32–37)
MCHC RBC-ENTMCNC: 32.3 G/DL — SIGNIFICANT CHANGE UP (ref 32–37)
MCHC RBC-ENTMCNC: 32.4 G/DL — SIGNIFICANT CHANGE UP (ref 32–37)
MCHC RBC-ENTMCNC: 32.6 G/DL — SIGNIFICANT CHANGE UP (ref 32–37)
MCHC RBC-ENTMCNC: 32.6 G/DL — SIGNIFICANT CHANGE UP (ref 32–37)
MCHC RBC-ENTMCNC: 33 G/DL — SIGNIFICANT CHANGE UP (ref 32–37)
MCHC RBC-ENTMCNC: 33.1 G/DL — SIGNIFICANT CHANGE UP (ref 32–37)
MCHC RBC-ENTMCNC: 33.2 G/DL — SIGNIFICANT CHANGE UP (ref 32–37)
MCHC RBC-ENTMCNC: 33.2 G/DL — SIGNIFICANT CHANGE UP (ref 32–37)
MCHC RBC-ENTMCNC: 33.3 G/DL — SIGNIFICANT CHANGE UP (ref 32–37)
MCHC RBC-ENTMCNC: 33.4 G/DL — SIGNIFICANT CHANGE UP (ref 32–37)
MCHC RBC-ENTMCNC: 33.6 G/DL — SIGNIFICANT CHANGE UP (ref 32–37)
MCHC RBC-ENTMCNC: 33.8 G/DL — SIGNIFICANT CHANGE UP (ref 32–37)
MCHC RBC-ENTMCNC: 34.2 G/DL — SIGNIFICANT CHANGE UP (ref 32–37)
MCHC RBC-ENTMCNC: 34.3 G/DL
MCHC RBC-ENTMCNC: 34.5 G/DL — SIGNIFICANT CHANGE UP (ref 32–37)
MCV RBC AUTO: 100 FL — HIGH (ref 80–94)
MCV RBC AUTO: 100.3 FL — HIGH (ref 80–94)
MCV RBC AUTO: 100.3 FL — HIGH (ref 80–94)
MCV RBC AUTO: 100.4 FL — HIGH (ref 80–94)
MCV RBC AUTO: 100.7 FL — HIGH (ref 80–94)
MCV RBC AUTO: 100.7 FL — HIGH (ref 80–94)
MCV RBC AUTO: 101.7 FL — HIGH (ref 80–94)
MCV RBC AUTO: 102.2 FL — HIGH (ref 80–94)
MCV RBC AUTO: 102.2 FL — HIGH (ref 80–94)
MCV RBC AUTO: 102.6 FL — HIGH (ref 80–94)
MCV RBC AUTO: 102.9 FL — HIGH (ref 80–94)
MCV RBC AUTO: 103.3 FL — HIGH (ref 80–94)
MCV RBC AUTO: 103.8 FL — HIGH (ref 80–94)
MCV RBC AUTO: 104.5 FL — HIGH (ref 80–94)
MCV RBC AUTO: 88.9 FL
MCV RBC AUTO: 89.2 FL — SIGNIFICANT CHANGE UP (ref 80–94)
MCV RBC AUTO: 90 FL — SIGNIFICANT CHANGE UP (ref 80–94)
MCV RBC AUTO: 90.3 FL — SIGNIFICANT CHANGE UP (ref 80–94)
MCV RBC AUTO: 90.8 FL — SIGNIFICANT CHANGE UP (ref 80–94)
MCV RBC AUTO: 90.9 FL — SIGNIFICANT CHANGE UP (ref 80–94)
MCV RBC AUTO: 91.2 FL — SIGNIFICANT CHANGE UP (ref 80–94)
MCV RBC AUTO: 91.3 FL — SIGNIFICANT CHANGE UP (ref 80–94)
MCV RBC AUTO: 91.4 FL — SIGNIFICANT CHANGE UP (ref 80–94)
MCV RBC AUTO: 92.2 FL — SIGNIFICANT CHANGE UP (ref 80–94)
MCV RBC AUTO: 92.4 FL — SIGNIFICANT CHANGE UP (ref 80–94)
MCV RBC AUTO: 92.6 FL — SIGNIFICANT CHANGE UP (ref 80–94)
MCV RBC AUTO: 92.9 FL — SIGNIFICANT CHANGE UP (ref 80–94)
MCV RBC AUTO: 93.2 FL — SIGNIFICANT CHANGE UP (ref 80–94)
MCV RBC AUTO: 93.6 FL — SIGNIFICANT CHANGE UP (ref 80–94)
MCV RBC AUTO: 93.6 FL — SIGNIFICANT CHANGE UP (ref 80–94)
MCV RBC AUTO: 93.9 FL — SIGNIFICANT CHANGE UP (ref 80–94)
MCV RBC AUTO: 93.9 FL — SIGNIFICANT CHANGE UP (ref 80–94)
MCV RBC AUTO: 95.4 FL — HIGH (ref 80–94)
MCV RBC AUTO: 95.5 FL — HIGH (ref 80–94)
MCV RBC AUTO: 95.5 FL — HIGH (ref 80–94)
MCV RBC AUTO: 95.7 FL — HIGH (ref 80–94)
MCV RBC AUTO: 95.8 FL — HIGH (ref 80–94)
MCV RBC AUTO: 95.9 FL — HIGH (ref 80–94)
MCV RBC AUTO: 96.1 FL — HIGH (ref 80–94)
MCV RBC AUTO: 96.1 FL — HIGH (ref 80–94)
MCV RBC AUTO: 96.2 FL — HIGH (ref 80–94)
MCV RBC AUTO: 96.5 FL — HIGH (ref 80–94)
MCV RBC AUTO: 96.8 FL — HIGH (ref 80–94)
MCV RBC AUTO: 96.8 FL — HIGH (ref 80–94)
MCV RBC AUTO: 97 FL — HIGH (ref 80–94)
MCV RBC AUTO: 97.9 FL — HIGH (ref 80–94)
MCV RBC AUTO: 98.3 FL — HIGH (ref 80–94)
MCV RBC AUTO: 98.3 FL — HIGH (ref 80–94)
MCV RBC AUTO: 98.4 FL — HIGH (ref 80–94)
MCV RBC AUTO: 99.1 FL — HIGH (ref 80–94)
MCV RBC AUTO: 99.1 FL — HIGH (ref 80–94)
MCV RBC AUTO: 99.2 FL — HIGH (ref 80–94)
MCV RBC AUTO: 99.6 FL — HIGH (ref 80–94)
MCV RBC AUTO: 99.6 FL — HIGH (ref 80–94)
METAMYELOCYTES # FLD: 0.9 % — HIGH (ref 0–0)
METHOD TYPE: SIGNIFICANT CHANGE UP
MONOCYTES # BLD AUTO: 0.09 K/UL — LOW (ref 0.1–0.6)
MONOCYTES # BLD AUTO: 0.36 K/UL — SIGNIFICANT CHANGE UP (ref 0.1–0.6)
MONOCYTES # BLD AUTO: 0.43 K/UL — SIGNIFICANT CHANGE UP (ref 0.1–0.6)
MONOCYTES # BLD AUTO: 0.47 K/UL — SIGNIFICANT CHANGE UP (ref 0.1–0.6)
MONOCYTES # BLD AUTO: 0.48 K/UL — SIGNIFICANT CHANGE UP (ref 0.1–0.6)
MONOCYTES # BLD AUTO: 0.5 K/UL — SIGNIFICANT CHANGE UP (ref 0.1–0.6)
MONOCYTES # BLD AUTO: 0.52 K/UL — SIGNIFICANT CHANGE UP (ref 0.1–0.6)
MONOCYTES # BLD AUTO: 0.54 K/UL — SIGNIFICANT CHANGE UP (ref 0.1–0.6)
MONOCYTES # BLD AUTO: 0.55 K/UL — SIGNIFICANT CHANGE UP (ref 0.1–0.6)
MONOCYTES # BLD AUTO: 0.55 K/UL — SIGNIFICANT CHANGE UP (ref 0.1–0.6)
MONOCYTES # BLD AUTO: 0.56 K/UL — SIGNIFICANT CHANGE UP (ref 0.1–0.6)
MONOCYTES # BLD AUTO: 0.61 K/UL — HIGH (ref 0.1–0.6)
MONOCYTES # BLD AUTO: 0.62 K/UL — HIGH (ref 0.1–0.6)
MONOCYTES # BLD AUTO: 0.64 K/UL — HIGH (ref 0.1–0.6)
MONOCYTES # BLD AUTO: 0.64 K/UL — HIGH (ref 0.1–0.6)
MONOCYTES # BLD AUTO: 0.65 K/UL
MONOCYTES # BLD AUTO: 0.65 K/UL — HIGH (ref 0.1–0.6)
MONOCYTES # BLD AUTO: 0.65 K/UL — HIGH (ref 0.1–0.6)
MONOCYTES # BLD AUTO: 0.66 K/UL — HIGH (ref 0.1–0.6)
MONOCYTES # BLD AUTO: 0.69 K/UL — HIGH (ref 0.1–0.6)
MONOCYTES # BLD AUTO: 0.69 K/UL — HIGH (ref 0.1–0.6)
MONOCYTES # BLD AUTO: 0.86 K/UL — HIGH (ref 0.1–0.6)
MONOCYTES # BLD AUTO: 0.92 K/UL — HIGH (ref 0.1–0.6)
MONOCYTES # BLD AUTO: 0.92 K/UL — HIGH (ref 0.1–0.6)
MONOCYTES # BLD AUTO: 0.94 K/UL — HIGH (ref 0.1–0.6)
MONOCYTES # BLD AUTO: 0.95 K/UL — HIGH (ref 0.1–0.6)
MONOCYTES # BLD AUTO: 0.99 K/UL — HIGH (ref 0.1–0.6)
MONOCYTES # BLD AUTO: 1.04 K/UL — HIGH (ref 0.1–0.6)
MONOCYTES # BLD AUTO: 1.09 K/UL — HIGH (ref 0.1–0.6)
MONOCYTES # BLD AUTO: 1.14 K/UL — HIGH (ref 0.1–0.6)
MONOCYTES # BLD AUTO: 1.21 K/UL — HIGH (ref 0.1–0.6)
MONOCYTES # BLD AUTO: 1.28 K/UL — HIGH (ref 0.1–0.6)
MONOCYTES # BLD AUTO: 1.31 K/UL — HIGH (ref 0.1–0.6)
MONOCYTES # BLD AUTO: 1.38 K/UL — HIGH (ref 0.1–0.6)
MONOCYTES NFR BLD AUTO: 0.9 % — LOW (ref 1.7–9.3)
MONOCYTES NFR BLD AUTO: 10.9 % — HIGH (ref 1.7–9.3)
MONOCYTES NFR BLD AUTO: 12.1 % — HIGH (ref 1.7–9.3)
MONOCYTES NFR BLD AUTO: 3.4 % — SIGNIFICANT CHANGE UP (ref 1.7–9.3)
MONOCYTES NFR BLD AUTO: 3.5 % — SIGNIFICANT CHANGE UP (ref 1.7–9.3)
MONOCYTES NFR BLD AUTO: 4.2 % — SIGNIFICANT CHANGE UP (ref 1.7–9.3)
MONOCYTES NFR BLD AUTO: 4.3 % — SIGNIFICANT CHANGE UP (ref 1.7–9.3)
MONOCYTES NFR BLD AUTO: 4.5 % — SIGNIFICANT CHANGE UP (ref 1.7–9.3)
MONOCYTES NFR BLD AUTO: 4.5 % — SIGNIFICANT CHANGE UP (ref 1.7–9.3)
MONOCYTES NFR BLD AUTO: 4.7 % — SIGNIFICANT CHANGE UP (ref 1.7–9.3)
MONOCYTES NFR BLD AUTO: 4.8 % — SIGNIFICANT CHANGE UP (ref 1.7–9.3)
MONOCYTES NFR BLD AUTO: 5.1 % — SIGNIFICANT CHANGE UP (ref 1.7–9.3)
MONOCYTES NFR BLD AUTO: 5.2 % — SIGNIFICANT CHANGE UP (ref 1.7–9.3)
MONOCYTES NFR BLD AUTO: 5.3 % — SIGNIFICANT CHANGE UP (ref 1.7–9.3)
MONOCYTES NFR BLD AUTO: 5.5 % — SIGNIFICANT CHANGE UP (ref 1.7–9.3)
MONOCYTES NFR BLD AUTO: 5.6 % — SIGNIFICANT CHANGE UP (ref 1.7–9.3)
MONOCYTES NFR BLD AUTO: 5.8 % — SIGNIFICANT CHANGE UP (ref 1.7–9.3)
MONOCYTES NFR BLD AUTO: 5.9 % — SIGNIFICANT CHANGE UP (ref 1.7–9.3)
MONOCYTES NFR BLD AUTO: 6 % — SIGNIFICANT CHANGE UP (ref 1.7–9.3)
MONOCYTES NFR BLD AUTO: 6.1 % — SIGNIFICANT CHANGE UP (ref 1.7–9.3)
MONOCYTES NFR BLD AUTO: 6.4 % — SIGNIFICANT CHANGE UP (ref 1.7–9.3)
MONOCYTES NFR BLD AUTO: 6.5 % — SIGNIFICANT CHANGE UP (ref 1.7–9.3)
MONOCYTES NFR BLD AUTO: 7 % — SIGNIFICANT CHANGE UP (ref 1.7–9.3)
MONOCYTES NFR BLD AUTO: 7.1 % — SIGNIFICANT CHANGE UP (ref 1.7–9.3)
MONOCYTES NFR BLD AUTO: 7.1 % — SIGNIFICANT CHANGE UP (ref 1.7–9.3)
MONOCYTES NFR BLD AUTO: 7.4 % — SIGNIFICANT CHANGE UP (ref 1.7–9.3)
MONOCYTES NFR BLD AUTO: 7.5 % — SIGNIFICANT CHANGE UP (ref 1.7–9.3)
MONOCYTES NFR BLD AUTO: 7.7 % — SIGNIFICANT CHANGE UP (ref 1.7–9.3)
MONOCYTES NFR BLD AUTO: 9.6 %
MONOCYTES NFR BLD AUTO: 9.6 % — HIGH (ref 1.7–9.3)
MONOS+MACROS # FLD: 46 % — SIGNIFICANT CHANGE UP
MRSA PCR RESULT.: NEGATIVE — SIGNIFICANT CHANGE UP
NEUTROPHILS # BLD AUTO: 10.2 K/UL — HIGH (ref 1.4–6.5)
NEUTROPHILS # BLD AUTO: 10.43 K/UL — HIGH (ref 1.4–6.5)
NEUTROPHILS # BLD AUTO: 10.47 K/UL — HIGH (ref 1.4–6.5)
NEUTROPHILS # BLD AUTO: 10.8 K/UL — HIGH (ref 1.4–6.5)
NEUTROPHILS # BLD AUTO: 11.36 K/UL — HIGH (ref 1.4–6.5)
NEUTROPHILS # BLD AUTO: 11.64 K/UL — HIGH (ref 1.4–6.5)
NEUTROPHILS # BLD AUTO: 11.69 K/UL — HIGH (ref 1.4–6.5)
NEUTROPHILS # BLD AUTO: 11.81 K/UL — HIGH (ref 1.4–6.5)
NEUTROPHILS # BLD AUTO: 11.84 K/UL — HIGH (ref 1.4–6.5)
NEUTROPHILS # BLD AUTO: 11.96 K/UL — HIGH (ref 1.4–6.5)
NEUTROPHILS # BLD AUTO: 12.15 K/UL — HIGH (ref 1.4–6.5)
NEUTROPHILS # BLD AUTO: 12.2 K/UL — HIGH (ref 1.4–6.5)
NEUTROPHILS # BLD AUTO: 12.63 K/UL — HIGH (ref 1.4–6.5)
NEUTROPHILS # BLD AUTO: 12.99 K/UL — HIGH (ref 1.4–6.5)
NEUTROPHILS # BLD AUTO: 13.28 K/UL — HIGH (ref 1.4–6.5)
NEUTROPHILS # BLD AUTO: 14.32 K/UL — HIGH (ref 1.4–6.5)
NEUTROPHILS # BLD AUTO: 14.98 K/UL — HIGH (ref 1.4–6.5)
NEUTROPHILS # BLD AUTO: 15.57 K/UL — HIGH (ref 1.4–6.5)
NEUTROPHILS # BLD AUTO: 16.05 K/UL — HIGH (ref 1.4–6.5)
NEUTROPHILS # BLD AUTO: 16.14 K/UL — HIGH (ref 1.4–6.5)
NEUTROPHILS # BLD AUTO: 17.19 K/UL — HIGH (ref 1.4–6.5)
NEUTROPHILS # BLD AUTO: 23.56 K/UL — HIGH (ref 1.4–6.5)
NEUTROPHILS # BLD AUTO: 5.42 K/UL
NEUTROPHILS # BLD AUTO: 5.51 K/UL — SIGNIFICANT CHANGE UP (ref 1.4–6.5)
NEUTROPHILS # BLD AUTO: 7.24 K/UL — HIGH (ref 1.4–6.5)
NEUTROPHILS # BLD AUTO: 7.47 K/UL — HIGH (ref 1.4–6.5)
NEUTROPHILS # BLD AUTO: 7.56 K/UL — HIGH (ref 1.4–6.5)
NEUTROPHILS # BLD AUTO: 8.66 K/UL — HIGH (ref 1.4–6.5)
NEUTROPHILS # BLD AUTO: 8.76 K/UL — HIGH (ref 1.4–6.5)
NEUTROPHILS # BLD AUTO: 9.3 K/UL — HIGH (ref 1.4–6.5)
NEUTROPHILS # BLD AUTO: 9.42 K/UL — HIGH (ref 1.4–6.5)
NEUTROPHILS # BLD AUTO: 9.48 K/UL — HIGH (ref 1.4–6.5)
NEUTROPHILS # BLD AUTO: 9.81 K/UL — HIGH (ref 1.4–6.5)
NEUTROPHILS # BLD AUTO: 9.91 K/UL — HIGH (ref 1.4–6.5)
NEUTROPHILS NFR BLD AUTO: 63.2 % — SIGNIFICANT CHANGE UP (ref 42.2–75.2)
NEUTROPHILS NFR BLD AUTO: 79.7 %
NEUTROPHILS NFR BLD AUTO: 82.9 % — HIGH (ref 42.2–75.2)
NEUTROPHILS NFR BLD AUTO: 83.2 % — HIGH (ref 42.2–75.2)
NEUTROPHILS NFR BLD AUTO: 86.5 % — HIGH (ref 42.2–75.2)
NEUTROPHILS NFR BLD AUTO: 88.2 % — HIGH (ref 42.2–75.2)
NEUTROPHILS NFR BLD AUTO: 89.6 % — HIGH (ref 42.2–75.2)
NEUTROPHILS NFR BLD AUTO: 89.9 % — HIGH (ref 42.2–75.2)
NEUTROPHILS NFR BLD AUTO: 89.9 % — HIGH (ref 42.2–75.2)
NEUTROPHILS NFR BLD AUTO: 90 % — HIGH (ref 42.2–75.2)
NEUTROPHILS NFR BLD AUTO: 90.2 % — HIGH (ref 42.2–75.2)
NEUTROPHILS NFR BLD AUTO: 90.6 % — HIGH (ref 42.2–75.2)
NEUTROPHILS NFR BLD AUTO: 90.7 % — HIGH (ref 42.2–75.2)
NEUTROPHILS NFR BLD AUTO: 90.9 % — HIGH (ref 42.2–75.2)
NEUTROPHILS NFR BLD AUTO: 91.1 % — HIGH (ref 42.2–75.2)
NEUTROPHILS NFR BLD AUTO: 91.3 % — HIGH (ref 42.2–75.2)
NEUTROPHILS NFR BLD AUTO: 91.3 % — HIGH (ref 42.2–75.2)
NEUTROPHILS NFR BLD AUTO: 91.4 % — HIGH (ref 42.2–75.2)
NEUTROPHILS NFR BLD AUTO: 91.5 % — HIGH (ref 42.2–75.2)
NEUTROPHILS NFR BLD AUTO: 91.9 % — HIGH (ref 42.2–75.2)
NEUTROPHILS NFR BLD AUTO: 92 % — HIGH (ref 42.2–75.2)
NEUTROPHILS NFR BLD AUTO: 92.1 % — HIGH (ref 42.2–75.2)
NEUTROPHILS NFR BLD AUTO: 92.5 % — HIGH (ref 42.2–75.2)
NEUTROPHILS NFR BLD AUTO: 92.5 % — HIGH (ref 42.2–75.2)
NEUTROPHILS NFR BLD AUTO: 93.1 % — HIGH (ref 42.2–75.2)
NEUTROPHILS NFR BLD AUTO: 93.2 % — HIGH (ref 42.2–75.2)
NEUTROPHILS NFR BLD AUTO: 93.2 % — HIGH (ref 42.2–75.2)
NEUTROPHILS NFR BLD AUTO: 93.4 % — HIGH (ref 42.2–75.2)
NEUTROPHILS NFR BLD AUTO: 93.6 % — HIGH (ref 42.2–75.2)
NEUTROPHILS NFR BLD AUTO: 93.7 % — HIGH (ref 42.2–75.2)
NEUTROPHILS NFR BLD AUTO: 95.6 % — HIGH (ref 42.2–75.2)
NEUTROPHILS NFR BLD AUTO: 95.6 % — HIGH (ref 42.2–75.2)
NEUTROPHILS-BODY FLUID: 49 % — SIGNIFICANT CHANGE UP
NEUTS BAND # BLD: 30.8 % — HIGH (ref 0–6)
NITRITE UR-MCNC: NEGATIVE — SIGNIFICANT CHANGE UP
NITRITE UR-MCNC: NEGATIVE — SIGNIFICANT CHANGE UP
NONHDLC SERPL-MCNC: 104 MG/DL
NRBC # BLD: 0 /100 WBCS — SIGNIFICANT CHANGE UP (ref 0–0)
ORGANISM # SPEC MICROSCOPIC CNT: SIGNIFICANT CHANGE UP
ORGANISM # SPEC MICROSCOPIC CNT: SIGNIFICANT CHANGE UP
OVALOCYTES BLD QL SMEAR: SLIGHT — SIGNIFICANT CHANGE UP
PCO2 BLDA: 29 MMHG — LOW (ref 35–48)
PCO2 BLDA: 33 MMHG — LOW (ref 35–48)
PCO2 BLDA: 33 MMHG — LOW (ref 35–48)
PCO2 BLDA: 34 MMHG — LOW (ref 35–48)
PCO2 BLDA: 36 MMHG — SIGNIFICANT CHANGE UP (ref 35–48)
PCO2 BLDA: 37 MMHG — SIGNIFICANT CHANGE UP (ref 35–48)
PCO2 BLDA: 39 MMHG — SIGNIFICANT CHANGE UP (ref 35–48)
PCO2 BLDA: 39 MMHG — SIGNIFICANT CHANGE UP (ref 35–48)
PCO2 BLDA: 43 MMHG — SIGNIFICANT CHANGE UP (ref 35–48)
PCO2 BLDA: 47 MMHG — SIGNIFICANT CHANGE UP (ref 35–48)
PCO2 BLDA: 49 MMHG — HIGH (ref 35–48)
PCO2 BLDV: 28 MMHG — LOW (ref 42–55)
PH BLDA: 7.29 — LOW (ref 7.35–7.45)
PH BLDA: 7.29 — LOW (ref 7.35–7.45)
PH BLDA: 7.3 — LOW (ref 7.35–7.45)
PH BLDA: 7.34 — LOW (ref 7.35–7.45)
PH BLDA: 7.36 — SIGNIFICANT CHANGE UP (ref 7.35–7.45)
PH BLDA: 7.39 — SIGNIFICANT CHANGE UP (ref 7.35–7.45)
PH BLDA: 7.39 — SIGNIFICANT CHANGE UP (ref 7.35–7.45)
PH BLDA: 7.43 — SIGNIFICANT CHANGE UP (ref 7.35–7.45)
PH BLDA: 7.43 — SIGNIFICANT CHANGE UP (ref 7.35–7.45)
PH BLDA: 7.44 — SIGNIFICANT CHANGE UP (ref 7.35–7.45)
PH BLDA: 7.48 — HIGH (ref 7.35–7.45)
PH BLDV: 7.38 — SIGNIFICANT CHANGE UP (ref 7.32–7.43)
PH FLD: 7.7 — SIGNIFICANT CHANGE UP
PH UR: 5.5 — SIGNIFICANT CHANGE UP (ref 5–8)
PH UR: 5.5 — SIGNIFICANT CHANGE UP (ref 5–8)
PHOSPHATE SERPL-MCNC: 1.3 MG/DL — LOW (ref 2.1–4.9)
PHOSPHATE SERPL-MCNC: 1.6 MG/DL — LOW (ref 2.1–4.9)
PHOSPHATE SERPL-MCNC: 1.9 MG/DL — LOW (ref 2.1–4.9)
PHOSPHATE SERPL-MCNC: 2 MG/DL — LOW (ref 2.1–4.9)
PHOSPHATE SERPL-MCNC: 2.1 MG/DL — SIGNIFICANT CHANGE UP (ref 2.1–4.9)
PHOSPHATE SERPL-MCNC: 2.1 MG/DL — SIGNIFICANT CHANGE UP (ref 2.1–4.9)
PHOSPHATE SERPL-MCNC: 2.3 MG/DL — SIGNIFICANT CHANGE UP (ref 2.1–4.9)
PHOSPHATE SERPL-MCNC: 2.5 MG/DL — SIGNIFICANT CHANGE UP (ref 2.1–4.9)
PHOSPHATE SERPL-MCNC: 2.5 MG/DL — SIGNIFICANT CHANGE UP (ref 2.1–4.9)
PHOSPHATE SERPL-MCNC: 2.7 MG/DL — SIGNIFICANT CHANGE UP (ref 2.1–4.9)
PHOSPHATE SERPL-MCNC: 2.7 MG/DL — SIGNIFICANT CHANGE UP (ref 2.1–4.9)
PHOSPHATE SERPL-MCNC: 2.8 MG/DL — SIGNIFICANT CHANGE UP (ref 2.1–4.9)
PHOSPHATE SERPL-MCNC: 2.9 MG/DL — SIGNIFICANT CHANGE UP (ref 2.1–4.9)
PHOSPHATE SERPL-MCNC: 3 MG/DL — SIGNIFICANT CHANGE UP (ref 2.1–4.9)
PHOSPHATE SERPL-MCNC: 3.1 MG/DL — SIGNIFICANT CHANGE UP (ref 2.1–4.9)
PHOSPHATE SERPL-MCNC: 3.1 MG/DL — SIGNIFICANT CHANGE UP (ref 2.1–4.9)
PHOSPHATE SERPL-MCNC: 3.2 MG/DL — SIGNIFICANT CHANGE UP (ref 2.1–4.9)
PHOSPHATE SERPL-MCNC: 3.3 MG/DL — SIGNIFICANT CHANGE UP (ref 2.1–4.9)
PHOSPHATE SERPL-MCNC: 3.3 MG/DL — SIGNIFICANT CHANGE UP (ref 2.1–4.9)
PHOSPHATE SERPL-MCNC: 3.4 MG/DL — SIGNIFICANT CHANGE UP (ref 2.1–4.9)
PHOSPHATE SERPL-MCNC: 3.5 MG/DL — SIGNIFICANT CHANGE UP (ref 2.1–4.9)
PHOSPHATE SERPL-MCNC: 3.5 MG/DL — SIGNIFICANT CHANGE UP (ref 2.1–4.9)
PHOSPHATE SERPL-MCNC: 3.6 MG/DL — SIGNIFICANT CHANGE UP (ref 2.1–4.9)
PHOSPHATE SERPL-MCNC: 3.7 MG/DL — SIGNIFICANT CHANGE UP (ref 2.1–4.9)
PHOSPHATE SERPL-MCNC: 3.8 MG/DL — SIGNIFICANT CHANGE UP (ref 2.1–4.9)
PHOSPHATE SERPL-MCNC: 3.8 MG/DL — SIGNIFICANT CHANGE UP (ref 2.1–4.9)
PHOSPHATE SERPL-MCNC: 3.9 MG/DL — SIGNIFICANT CHANGE UP (ref 2.1–4.9)
PHOSPHATE SERPL-MCNC: 4.5 MG/DL — SIGNIFICANT CHANGE UP (ref 2.1–4.9)
PHOSPHATE SERPL-MCNC: 4.6 MG/DL — SIGNIFICANT CHANGE UP (ref 2.1–4.9)
PLAT MORPH BLD: NORMAL — SIGNIFICANT CHANGE UP
PLATELET # BLD AUTO: 135 K/UL — SIGNIFICANT CHANGE UP (ref 130–400)
PLATELET # BLD AUTO: 139 K/UL — SIGNIFICANT CHANGE UP (ref 130–400)
PLATELET # BLD AUTO: 142 K/UL — SIGNIFICANT CHANGE UP (ref 130–400)
PLATELET # BLD AUTO: 163 K/UL — SIGNIFICANT CHANGE UP (ref 130–400)
PLATELET # BLD AUTO: 165 K/UL — SIGNIFICANT CHANGE UP (ref 130–400)
PLATELET # BLD AUTO: 167 K/UL — SIGNIFICANT CHANGE UP (ref 130–400)
PLATELET # BLD AUTO: 169 K/UL — SIGNIFICANT CHANGE UP (ref 130–400)
PLATELET # BLD AUTO: 171 K/UL — SIGNIFICANT CHANGE UP (ref 130–400)
PLATELET # BLD AUTO: 173 K/UL — SIGNIFICANT CHANGE UP (ref 130–400)
PLATELET # BLD AUTO: 177 K/UL — SIGNIFICANT CHANGE UP (ref 130–400)
PLATELET # BLD AUTO: 179 K/UL — SIGNIFICANT CHANGE UP (ref 130–400)
PLATELET # BLD AUTO: 180 K/UL — SIGNIFICANT CHANGE UP (ref 130–400)
PLATELET # BLD AUTO: 180 K/UL — SIGNIFICANT CHANGE UP (ref 130–400)
PLATELET # BLD AUTO: 184 K/UL — SIGNIFICANT CHANGE UP (ref 130–400)
PLATELET # BLD AUTO: 187 K/UL — SIGNIFICANT CHANGE UP (ref 130–400)
PLATELET # BLD AUTO: 187 K/UL — SIGNIFICANT CHANGE UP (ref 130–400)
PLATELET # BLD AUTO: 188 K/UL — SIGNIFICANT CHANGE UP (ref 130–400)
PLATELET # BLD AUTO: 188 K/UL — SIGNIFICANT CHANGE UP (ref 130–400)
PLATELET # BLD AUTO: 189 K/UL — SIGNIFICANT CHANGE UP (ref 130–400)
PLATELET # BLD AUTO: 190 K/UL — SIGNIFICANT CHANGE UP (ref 130–400)
PLATELET # BLD AUTO: 194 K/UL — SIGNIFICANT CHANGE UP (ref 130–400)
PLATELET # BLD AUTO: 194 K/UL — SIGNIFICANT CHANGE UP (ref 130–400)
PLATELET # BLD AUTO: 195 K/UL — SIGNIFICANT CHANGE UP (ref 130–400)
PLATELET # BLD AUTO: 199 K/UL — SIGNIFICANT CHANGE UP (ref 130–400)
PLATELET # BLD AUTO: 199 K/UL — SIGNIFICANT CHANGE UP (ref 130–400)
PLATELET # BLD AUTO: 204 K/UL — SIGNIFICANT CHANGE UP (ref 130–400)
PLATELET # BLD AUTO: 205 K/UL — SIGNIFICANT CHANGE UP (ref 130–400)
PLATELET # BLD AUTO: 206 K/UL — SIGNIFICANT CHANGE UP (ref 130–400)
PLATELET # BLD AUTO: 207 K/UL — SIGNIFICANT CHANGE UP (ref 130–400)
PLATELET # BLD AUTO: 209 K/UL — SIGNIFICANT CHANGE UP (ref 130–400)
PLATELET # BLD AUTO: 211 K/UL — SIGNIFICANT CHANGE UP (ref 130–400)
PLATELET # BLD AUTO: 213 K/UL — SIGNIFICANT CHANGE UP (ref 130–400)
PLATELET # BLD AUTO: 214 K/UL — SIGNIFICANT CHANGE UP (ref 130–400)
PLATELET # BLD AUTO: 225 K/UL — SIGNIFICANT CHANGE UP (ref 130–400)
PLATELET # BLD AUTO: 227 K/UL — SIGNIFICANT CHANGE UP (ref 130–400)
PLATELET # BLD AUTO: 228 K/UL — SIGNIFICANT CHANGE UP (ref 130–400)
PLATELET # BLD AUTO: 239 K/UL
PLATELET # BLD AUTO: 242 K/UL — SIGNIFICANT CHANGE UP (ref 130–400)
PLATELET # BLD AUTO: 243 K/UL — SIGNIFICANT CHANGE UP (ref 130–400)
PLATELET # BLD AUTO: 246 K/UL — SIGNIFICANT CHANGE UP (ref 130–400)
PLATELET # BLD AUTO: 247 K/UL — SIGNIFICANT CHANGE UP (ref 130–400)
PLATELET # BLD AUTO: 248 K/UL — SIGNIFICANT CHANGE UP (ref 130–400)
PLATELET # BLD AUTO: 250 K/UL — SIGNIFICANT CHANGE UP (ref 130–400)
PLATELET # BLD AUTO: 254 K/UL — SIGNIFICANT CHANGE UP (ref 130–400)
PLATELET # BLD AUTO: 259 K/UL — SIGNIFICANT CHANGE UP (ref 130–400)
PLATELET # BLD AUTO: 267 K/UL — SIGNIFICANT CHANGE UP (ref 130–400)
PLATELET # BLD AUTO: 271 K/UL — SIGNIFICANT CHANGE UP (ref 130–400)
PLATELET # BLD AUTO: 273 K/UL — SIGNIFICANT CHANGE UP (ref 130–400)
PLATELET # BLD AUTO: 273 K/UL — SIGNIFICANT CHANGE UP (ref 130–400)
PLATELET # BLD AUTO: 278 K/UL — SIGNIFICANT CHANGE UP (ref 130–400)
PLATELET # BLD AUTO: 280 K/UL — SIGNIFICANT CHANGE UP (ref 130–400)
PLATELET # BLD AUTO: 283 K/UL — SIGNIFICANT CHANGE UP (ref 130–400)
PLATELET # BLD AUTO: 284 K/UL — SIGNIFICANT CHANGE UP (ref 130–400)
PLATELET # BLD AUTO: 288 K/UL — SIGNIFICANT CHANGE UP (ref 130–400)
PLATELET # BLD AUTO: 288 K/UL — SIGNIFICANT CHANGE UP (ref 130–400)
PLATELET # BLD AUTO: 290 K/UL — SIGNIFICANT CHANGE UP (ref 130–400)
PMV BLD: 10.1 FL — SIGNIFICANT CHANGE UP (ref 7.4–10.4)
PMV BLD: 10.2 FL — SIGNIFICANT CHANGE UP (ref 7.4–10.4)
PMV BLD: 10.2 FL — SIGNIFICANT CHANGE UP (ref 7.4–10.4)
PMV BLD: 10.3 FL — SIGNIFICANT CHANGE UP (ref 7.4–10.4)
PMV BLD: 10.4 FL — SIGNIFICANT CHANGE UP (ref 7.4–10.4)
PMV BLD: 10.5 FL — HIGH (ref 7.4–10.4)
PMV BLD: 10.6 FL — HIGH (ref 7.4–10.4)
PMV BLD: 10.6 FL — HIGH (ref 7.4–10.4)
PMV BLD: 10.7 FL — HIGH (ref 7.4–10.4)
PMV BLD: 10.8 FL — HIGH (ref 7.4–10.4)
PMV BLD: 10.9 FL — HIGH (ref 7.4–10.4)
PMV BLD: 10.9 FL — HIGH (ref 7.4–10.4)
PMV BLD: 11.2 FL — HIGH (ref 7.4–10.4)
PMV BLD: 11.2 FL — HIGH (ref 7.4–10.4)
PMV BLD: 11.4 FL — HIGH (ref 7.4–10.4)
PMV BLD: 9.7 FL — SIGNIFICANT CHANGE UP (ref 7.4–10.4)
PMV BLD: 9.7 FL — SIGNIFICANT CHANGE UP (ref 7.4–10.4)
PMV BLD: 9.8 FL — SIGNIFICANT CHANGE UP (ref 7.4–10.4)
PMV BLD: 9.9 FL — SIGNIFICANT CHANGE UP (ref 7.4–10.4)
PO2 BLDA: 111 MMHG — HIGH (ref 83–108)
PO2 BLDA: 119 MMHG — HIGH (ref 83–108)
PO2 BLDA: 122 MMHG — HIGH (ref 83–108)
PO2 BLDA: 135 MMHG — HIGH (ref 83–108)
PO2 BLDA: 150 MMHG — HIGH (ref 83–108)
PO2 BLDA: 196 MMHG — HIGH (ref 83–108)
PO2 BLDA: 207 MMHG — HIGH (ref 83–108)
PO2 BLDA: 412 MMHG — HIGH (ref 83–108)
PO2 BLDA: 77 MMHG — LOW (ref 83–108)
PO2 BLDA: 90 MMHG — SIGNIFICANT CHANGE UP (ref 83–108)
PO2 BLDA: 94 MMHG — SIGNIFICANT CHANGE UP (ref 83–108)
PO2 BLDV: 416 MMHG — SIGNIFICANT CHANGE UP
POIKILOCYTOSIS BLD QL AUTO: SLIGHT — SIGNIFICANT CHANGE UP
POIKILOCYTOSIS BLD QL AUTO: SLIGHT — SIGNIFICANT CHANGE UP
POLYCHROMASIA BLD QL SMEAR: SLIGHT — SIGNIFICANT CHANGE UP
POTASSIUM SERPL-MCNC: 3.3 MMOL/L — LOW (ref 3.5–5)
POTASSIUM SERPL-MCNC: 3.3 MMOL/L — LOW (ref 3.5–5)
POTASSIUM SERPL-MCNC: 3.5 MMOL/L — SIGNIFICANT CHANGE UP (ref 3.5–5)
POTASSIUM SERPL-MCNC: 3.7 MMOL/L — SIGNIFICANT CHANGE UP (ref 3.5–5)
POTASSIUM SERPL-MCNC: 3.7 MMOL/L — SIGNIFICANT CHANGE UP (ref 3.5–5)
POTASSIUM SERPL-MCNC: 3.8 MMOL/L — SIGNIFICANT CHANGE UP (ref 3.5–5)
POTASSIUM SERPL-MCNC: 3.8 MMOL/L — SIGNIFICANT CHANGE UP (ref 3.5–5)
POTASSIUM SERPL-MCNC: 3.9 MMOL/L — SIGNIFICANT CHANGE UP (ref 3.5–5)
POTASSIUM SERPL-MCNC: 4 MMOL/L — SIGNIFICANT CHANGE UP (ref 3.5–5)
POTASSIUM SERPL-MCNC: 4.1 MMOL/L — SIGNIFICANT CHANGE UP (ref 3.5–5)
POTASSIUM SERPL-MCNC: 4.2 MMOL/L — SIGNIFICANT CHANGE UP (ref 3.5–5)
POTASSIUM SERPL-MCNC: 4.3 MMOL/L — SIGNIFICANT CHANGE UP (ref 3.5–5)
POTASSIUM SERPL-MCNC: 4.4 MMOL/L — SIGNIFICANT CHANGE UP (ref 3.5–5)
POTASSIUM SERPL-MCNC: 4.5 MMOL/L — SIGNIFICANT CHANGE UP (ref 3.5–5)
POTASSIUM SERPL-MCNC: 4.6 MMOL/L — SIGNIFICANT CHANGE UP (ref 3.5–5)
POTASSIUM SERPL-MCNC: 4.6 MMOL/L — SIGNIFICANT CHANGE UP (ref 3.5–5)
POTASSIUM SERPL-MCNC: 4.7 MMOL/L — SIGNIFICANT CHANGE UP (ref 3.5–5)
POTASSIUM SERPL-MCNC: 4.8 MMOL/L — SIGNIFICANT CHANGE UP (ref 3.5–5)
POTASSIUM SERPL-MCNC: 4.9 MMOL/L — SIGNIFICANT CHANGE UP (ref 3.5–5)
POTASSIUM SERPL-MCNC: 5 MMOL/L — SIGNIFICANT CHANGE UP (ref 3.5–5)
POTASSIUM SERPL-MCNC: 5.2 MMOL/L — HIGH (ref 3.5–5)
POTASSIUM SERPL-MCNC: 5.4 MMOL/L — HIGH (ref 3.5–5)
POTASSIUM SERPL-MCNC: 5.4 MMOL/L — HIGH (ref 3.5–5)
POTASSIUM SERPL-MCNC: 5.7 MMOL/L — HIGH (ref 3.5–5)
POTASSIUM SERPL-SCNC: 3.3 MMOL/L — LOW (ref 3.5–5)
POTASSIUM SERPL-SCNC: 3.3 MMOL/L — LOW (ref 3.5–5)
POTASSIUM SERPL-SCNC: 3.5 MMOL/L — SIGNIFICANT CHANGE UP (ref 3.5–5)
POTASSIUM SERPL-SCNC: 3.7 MMOL/L — SIGNIFICANT CHANGE UP (ref 3.5–5)
POTASSIUM SERPL-SCNC: 3.7 MMOL/L — SIGNIFICANT CHANGE UP (ref 3.5–5)
POTASSIUM SERPL-SCNC: 3.8 MMOL/L — SIGNIFICANT CHANGE UP (ref 3.5–5)
POTASSIUM SERPL-SCNC: 3.8 MMOL/L — SIGNIFICANT CHANGE UP (ref 3.5–5)
POTASSIUM SERPL-SCNC: 3.9 MMOL/L — SIGNIFICANT CHANGE UP (ref 3.5–5)
POTASSIUM SERPL-SCNC: 4 MMOL/L — SIGNIFICANT CHANGE UP (ref 3.5–5)
POTASSIUM SERPL-SCNC: 4.1 MMOL/L
POTASSIUM SERPL-SCNC: 4.1 MMOL/L — SIGNIFICANT CHANGE UP (ref 3.5–5)
POTASSIUM SERPL-SCNC: 4.2 MMOL/L — SIGNIFICANT CHANGE UP (ref 3.5–5)
POTASSIUM SERPL-SCNC: 4.3 MMOL/L — SIGNIFICANT CHANGE UP (ref 3.5–5)
POTASSIUM SERPL-SCNC: 4.4 MMOL/L — SIGNIFICANT CHANGE UP (ref 3.5–5)
POTASSIUM SERPL-SCNC: 4.5 MMOL/L — SIGNIFICANT CHANGE UP (ref 3.5–5)
POTASSIUM SERPL-SCNC: 4.6 MMOL/L — SIGNIFICANT CHANGE UP (ref 3.5–5)
POTASSIUM SERPL-SCNC: 4.6 MMOL/L — SIGNIFICANT CHANGE UP (ref 3.5–5)
POTASSIUM SERPL-SCNC: 4.7 MMOL/L — SIGNIFICANT CHANGE UP (ref 3.5–5)
POTASSIUM SERPL-SCNC: 4.8 MMOL/L — SIGNIFICANT CHANGE UP (ref 3.5–5)
POTASSIUM SERPL-SCNC: 4.9 MMOL/L — SIGNIFICANT CHANGE UP (ref 3.5–5)
POTASSIUM SERPL-SCNC: 5 MMOL/L — SIGNIFICANT CHANGE UP (ref 3.5–5)
POTASSIUM SERPL-SCNC: 5.2 MMOL/L — HIGH (ref 3.5–5)
POTASSIUM SERPL-SCNC: 5.4 MMOL/L — HIGH (ref 3.5–5)
POTASSIUM SERPL-SCNC: 5.4 MMOL/L — HIGH (ref 3.5–5)
POTASSIUM SERPL-SCNC: 5.7 MMOL/L — HIGH (ref 3.5–5)
PROCALCITONIN SERPL-MCNC: 0.32 NG/ML — HIGH (ref 0.02–0.1)
PROCALCITONIN SERPL-MCNC: 0.97 NG/ML — HIGH (ref 0.02–0.1)
PROLACTIN SERPL-MCNC: 11.4 NG/ML — SIGNIFICANT CHANGE UP (ref 4.1–18.4)
PROT FLD-MCNC: 1.5 G/DL — SIGNIFICANT CHANGE UP
PROT SERPL-MCNC: 4 G/DL — LOW (ref 6–8)
PROT SERPL-MCNC: 4.1 G/DL — LOW (ref 6–8)
PROT SERPL-MCNC: 4.3 G/DL — LOW (ref 6–8)
PROT SERPL-MCNC: 4.4 G/DL — LOW (ref 6–8)
PROT SERPL-MCNC: 4.5 G/DL — LOW (ref 6–8)
PROT SERPL-MCNC: 4.5 G/DL — LOW (ref 6–8)
PROT SERPL-MCNC: 5.8 G/DL — LOW (ref 6–8)
PROT SERPL-MCNC: 6 G/DL — SIGNIFICANT CHANGE UP (ref 6–8)
PROT SERPL-MCNC: 6.1 G/DL — SIGNIFICANT CHANGE UP (ref 6–8)
PROT SERPL-MCNC: 6.5 G/DL — SIGNIFICANT CHANGE UP (ref 6–8)
PROT SERPL-MCNC: 7.2 G/DL
PROT UR-MCNC: ABNORMAL
PROT UR-MCNC: ABNORMAL
PROTHROM AB SERPL-ACNC: 11.6 SEC — SIGNIFICANT CHANGE UP (ref 9.95–12.87)
PROTHROM AB SERPL-ACNC: 11.7 SEC — SIGNIFICANT CHANGE UP (ref 9.95–12.87)
PROTHROM AB SERPL-ACNC: 11.8 SEC — SIGNIFICANT CHANGE UP (ref 9.95–12.87)
PROTHROM AB SERPL-ACNC: 12 SEC — SIGNIFICANT CHANGE UP (ref 9.95–12.87)
PROTHROM AB SERPL-ACNC: 12.3 SEC — SIGNIFICANT CHANGE UP (ref 9.95–12.87)
PROTHROM AB SERPL-ACNC: 12.5 SEC — SIGNIFICANT CHANGE UP (ref 9.95–12.87)
PROTHROM AB SERPL-ACNC: 13.2 SEC — HIGH (ref 9.95–12.87)
PROTHROM AB SERPL-ACNC: 13.5 SEC — HIGH (ref 9.95–12.87)
PROTHROM AB SERPL-ACNC: 15.1 SEC — HIGH (ref 9.95–12.87)
PSA SERPL-MCNC: 0.23 NG/ML
RBC # BLD: 2.22 M/UL — LOW (ref 4.7–6.1)
RBC # BLD: 2.25 M/UL — LOW (ref 4.7–6.1)
RBC # BLD: 2.27 M/UL — LOW (ref 4.7–6.1)
RBC # BLD: 2.28 M/UL — LOW (ref 4.7–6.1)
RBC # BLD: 2.31 M/UL — LOW (ref 4.7–6.1)
RBC # BLD: 2.31 M/UL — LOW (ref 4.7–6.1)
RBC # BLD: 2.32 M/UL — LOW (ref 4.7–6.1)
RBC # BLD: 2.33 M/UL — LOW (ref 4.7–6.1)
RBC # BLD: 2.34 M/UL — LOW (ref 4.7–6.1)
RBC # BLD: 2.35 M/UL — LOW (ref 4.7–6.1)
RBC # BLD: 2.35 M/UL — LOW (ref 4.7–6.1)
RBC # BLD: 2.37 M/UL — LOW (ref 4.7–6.1)
RBC # BLD: 2.38 M/UL — LOW (ref 4.7–6.1)
RBC # BLD: 2.43 M/UL — LOW (ref 4.7–6.1)
RBC # BLD: 2.44 M/UL — LOW (ref 4.7–6.1)
RBC # BLD: 2.44 M/UL — LOW (ref 4.7–6.1)
RBC # BLD: 2.46 M/UL — LOW (ref 4.7–6.1)
RBC # BLD: 2.46 M/UL — LOW (ref 4.7–6.1)
RBC # BLD: 2.51 M/UL — LOW (ref 4.7–6.1)
RBC # BLD: 2.53 M/UL — LOW (ref 4.7–6.1)
RBC # BLD: 2.54 M/UL — LOW (ref 4.7–6.1)
RBC # BLD: 2.59 M/UL — LOW (ref 4.7–6.1)
RBC # BLD: 2.6 M/UL — LOW (ref 4.7–6.1)
RBC # BLD: 2.64 M/UL — LOW (ref 4.7–6.1)
RBC # BLD: 2.65 M/UL — LOW (ref 4.7–6.1)
RBC # BLD: 2.65 M/UL — LOW (ref 4.7–6.1)
RBC # BLD: 2.66 M/UL — LOW (ref 4.7–6.1)
RBC # BLD: 2.68 M/UL — LOW (ref 4.7–6.1)
RBC # BLD: 2.76 M/UL — LOW (ref 4.7–6.1)
RBC # BLD: 2.77 M/UL — LOW (ref 4.7–6.1)
RBC # BLD: 2.81 M/UL — LOW (ref 4.7–6.1)
RBC # BLD: 2.83 M/UL — LOW (ref 4.7–6.1)
RBC # BLD: 2.84 M/UL — LOW (ref 4.7–6.1)
RBC # BLD: 2.84 M/UL — LOW (ref 4.7–6.1)
RBC # BLD: 2.85 M/UL — LOW (ref 4.7–6.1)
RBC # BLD: 2.85 M/UL — LOW (ref 4.7–6.1)
RBC # BLD: 2.88 M/UL — LOW (ref 4.7–6.1)
RBC # BLD: 2.92 M/UL — LOW (ref 4.7–6.1)
RBC # BLD: 2.95 M/UL — LOW (ref 4.7–6.1)
RBC # BLD: 3.02 M/UL — LOW (ref 4.7–6.1)
RBC # BLD: 3.04 M/UL — LOW (ref 4.7–6.1)
RBC # BLD: 3.07 M/UL — LOW (ref 4.7–6.1)
RBC # BLD: 3.08 M/UL — LOW (ref 4.7–6.1)
RBC # BLD: 3.11 M/UL — LOW (ref 4.7–6.1)
RBC # BLD: 3.14 M/UL — LOW (ref 4.7–6.1)
RBC # BLD: 3.17 M/UL — LOW (ref 4.7–6.1)
RBC # BLD: 3.22 M/UL — LOW (ref 4.7–6.1)
RBC # BLD: 3.23 M/UL — LOW (ref 4.7–6.1)
RBC # BLD: 3.26 M/UL — LOW (ref 4.7–6.1)
RBC # BLD: 3.26 M/UL — LOW (ref 4.7–6.1)
RBC # BLD: 3.3 M/UL — LOW (ref 4.7–6.1)
RBC # BLD: 3.41 M/UL — LOW (ref 4.7–6.1)
RBC # BLD: 3.43 M/UL — LOW (ref 4.7–6.1)
RBC # BLD: 3.57 M/UL — LOW (ref 4.7–6.1)
RBC # BLD: 3.97 M/UL — LOW (ref 4.7–6.1)
RBC # BLD: 4.76 M/UL
RBC # FLD: 13.2 %
RBC # FLD: 13.9 % — SIGNIFICANT CHANGE UP (ref 11.5–14.5)
RBC # FLD: 14 % — SIGNIFICANT CHANGE UP (ref 11.5–14.5)
RBC # FLD: 14.3 % — SIGNIFICANT CHANGE UP (ref 11.5–14.5)
RBC # FLD: 14.4 % — SIGNIFICANT CHANGE UP (ref 11.5–14.5)
RBC # FLD: 14.5 % — SIGNIFICANT CHANGE UP (ref 11.5–14.5)
RBC # FLD: 14.6 % — HIGH (ref 11.5–14.5)
RBC # FLD: 14.7 % — HIGH (ref 11.5–14.5)
RBC # FLD: 14.7 % — HIGH (ref 11.5–14.5)
RBC # FLD: 14.8 % — HIGH (ref 11.5–14.5)
RBC # FLD: 15.6 % — HIGH (ref 11.5–14.5)
RBC # FLD: 15.7 % — HIGH (ref 11.5–14.5)
RBC # FLD: 16 % — HIGH (ref 11.5–14.5)
RBC # FLD: 16.1 % — HIGH (ref 11.5–14.5)
RBC # FLD: 16.6 % — HIGH (ref 11.5–14.5)
RBC # FLD: 17 % — HIGH (ref 11.5–14.5)
RBC # FLD: 17.1 % — HIGH (ref 11.5–14.5)
RBC # FLD: 17.3 % — HIGH (ref 11.5–14.5)
RBC # FLD: 17.4 % — HIGH (ref 11.5–14.5)
RBC # FLD: 17.4 % — HIGH (ref 11.5–14.5)
RBC # FLD: 17.5 % — HIGH (ref 11.5–14.5)
RBC # FLD: 17.6 % — HIGH (ref 11.5–14.5)
RBC # FLD: 17.7 % — HIGH (ref 11.5–14.5)
RBC # FLD: 17.8 % — HIGH (ref 11.5–14.5)
RBC # FLD: 17.8 % — HIGH (ref 11.5–14.5)
RBC # FLD: 17.9 % — HIGH (ref 11.5–14.5)
RBC # FLD: 18 % — HIGH (ref 11.5–14.5)
RBC # FLD: 18.1 % — HIGH (ref 11.5–14.5)
RBC # FLD: 18.2 % — HIGH (ref 11.5–14.5)
RBC # FLD: 18.3 % — HIGH (ref 11.5–14.5)
RBC # FLD: 18.4 % — HIGH (ref 11.5–14.5)
RBC # FLD: 18.5 % — HIGH (ref 11.5–14.5)
RBC # FLD: 18.6 % — HIGH (ref 11.5–14.5)
RBC # FLD: 18.6 % — HIGH (ref 11.5–14.5)
RBC # FLD: 18.8 % — HIGH (ref 11.5–14.5)
RBC # FLD: 19 % — HIGH (ref 11.5–14.5)
RBC # FLD: 19.5 % — HIGH (ref 11.5–14.5)
RBC # FLD: 19.6 % — HIGH (ref 11.5–14.5)
RBC BLD AUTO: ABNORMAL
RCV VOL RI: 0 /UL — SIGNIFICANT CHANGE UP (ref 0–0)
RSV RNA NPH QL NAA+NON-PROBE: SIGNIFICANT CHANGE UP
SAO2 % BLDA: 100 % — HIGH (ref 94–98)
SAO2 % BLDA: 97.8 % — SIGNIFICANT CHANGE UP (ref 94–98)
SAO2 % BLDA: 98.2 % — HIGH (ref 94–98)
SAO2 % BLDA: 99.4 % — HIGH (ref 94–98)
SAO2 % BLDA: 99.6 % — HIGH (ref 94–98)
SAO2 % BLDA: 99.8 % — HIGH (ref 94–98)
SAO2 % BLDV: 100 % — SIGNIFICANT CHANGE UP
SARS-COV-2 RNA SPEC QL NAA+PROBE: SIGNIFICANT CHANGE UP
SARS-COV-2 RNA SPEC QL NAA+PROBE: SIGNIFICANT CHANGE UP
SODIUM SERPL-SCNC: 135 MMOL/L — SIGNIFICANT CHANGE UP (ref 135–146)
SODIUM SERPL-SCNC: 135 MMOL/L — SIGNIFICANT CHANGE UP (ref 135–146)
SODIUM SERPL-SCNC: 136 MMOL/L — SIGNIFICANT CHANGE UP (ref 135–146)
SODIUM SERPL-SCNC: 138 MMOL/L — SIGNIFICANT CHANGE UP (ref 135–146)
SODIUM SERPL-SCNC: 139 MMOL/L — SIGNIFICANT CHANGE UP (ref 135–146)
SODIUM SERPL-SCNC: 140 MMOL/L — SIGNIFICANT CHANGE UP (ref 135–146)
SODIUM SERPL-SCNC: 141 MMOL/L — SIGNIFICANT CHANGE UP (ref 135–146)
SODIUM SERPL-SCNC: 142 MMOL/L
SODIUM SERPL-SCNC: 142 MMOL/L — SIGNIFICANT CHANGE UP (ref 135–146)
SODIUM SERPL-SCNC: 143 MMOL/L — SIGNIFICANT CHANGE UP (ref 135–146)
SODIUM SERPL-SCNC: 144 MMOL/L — SIGNIFICANT CHANGE UP (ref 135–146)
SODIUM SERPL-SCNC: 145 MMOL/L — SIGNIFICANT CHANGE UP (ref 135–146)
SODIUM SERPL-SCNC: 145 MMOL/L — SIGNIFICANT CHANGE UP (ref 135–146)
SODIUM SERPL-SCNC: 146 MMOL/L — SIGNIFICANT CHANGE UP (ref 135–146)
SODIUM SERPL-SCNC: 146 MMOL/L — SIGNIFICANT CHANGE UP (ref 135–146)
SODIUM SERPL-SCNC: 147 MMOL/L — HIGH (ref 135–146)
SODIUM SERPL-SCNC: 147 MMOL/L — HIGH (ref 135–146)
SODIUM SERPL-SCNC: 148 MMOL/L — HIGH (ref 135–146)
SODIUM SERPL-SCNC: 150 MMOL/L — HIGH (ref 135–146)
SODIUM UR-SCNC: 20 MMOL/L — SIGNIFICANT CHANGE UP
SODIUM UR-SCNC: 24 MMOL/L — SIGNIFICANT CHANGE UP
SODIUM UR-SCNC: 24 MMOL/L — SIGNIFICANT CHANGE UP
SODIUM UR-SCNC: <20 MMOL/L — SIGNIFICANT CHANGE UP
SP GR SPEC: 1.02 — SIGNIFICANT CHANGE UP (ref 1.01–1.03)
SP GR SPEC: 1.02 — SIGNIFICANT CHANGE UP (ref 1.01–1.03)
SPECIMEN SOURCE: SIGNIFICANT CHANGE UP
TOTAL NUCLEATED CELL COUNT, BODY FLUID: 227 /UL — SIGNIFICANT CHANGE UP
TRIGL SERPL-MCNC: 137 MG/DL
TRIGL SERPL-MCNC: 89 MG/DL — SIGNIFICANT CHANGE UP
TROPONIN T SERPL-MCNC: 0.08 NG/ML — CRITICAL HIGH
TROPONIN T SERPL-MCNC: 0.09 NG/ML — CRITICAL HIGH
TROPONIN T SERPL-MCNC: <0.01 NG/ML — SIGNIFICANT CHANGE UP
TSH SERPL-ACNC: 0.92 UIU/ML
TUBE TYPE: SIGNIFICANT CHANGE UP
UROBILINOGEN FLD QL: SIGNIFICANT CHANGE UP
UROBILINOGEN FLD QL: SIGNIFICANT CHANGE UP
UUN UR-MCNC: 1027 MG/DL — SIGNIFICANT CHANGE UP
VANCOMYCIN TROUGH SERPL-MCNC: 14.7 UG/ML — HIGH (ref 5–10)
VANCOMYCIN TROUGH SERPL-MCNC: 14.7 UG/ML — HIGH (ref 5–10)
VANCOMYCIN TROUGH SERPL-MCNC: 15 UG/ML — HIGH (ref 5–10)
VANCOMYCIN TROUGH SERPL-MCNC: 34.7 UG/ML — HIGH (ref 5–10)
VIT B1 SERPL-MCNC: 123.2 NMOL/L — SIGNIFICANT CHANGE UP (ref 66.5–200)
WBC # BLD: 10.06 K/UL — SIGNIFICANT CHANGE UP (ref 4.8–10.8)
WBC # BLD: 10.11 K/UL — SIGNIFICANT CHANGE UP (ref 4.8–10.8)
WBC # BLD: 10.16 K/UL — SIGNIFICANT CHANGE UP (ref 4.8–10.8)
WBC # BLD: 10.31 K/UL — SIGNIFICANT CHANGE UP (ref 4.8–10.8)
WBC # BLD: 10.31 K/UL — SIGNIFICANT CHANGE UP (ref 4.8–10.8)
WBC # BLD: 10.37 K/UL — SIGNIFICANT CHANGE UP (ref 4.8–10.8)
WBC # BLD: 10.41 K/UL — SIGNIFICANT CHANGE UP (ref 4.8–10.8)
WBC # BLD: 10.46 K/UL — SIGNIFICANT CHANGE UP (ref 4.8–10.8)
WBC # BLD: 10.6 K/UL — SIGNIFICANT CHANGE UP (ref 4.8–10.8)
WBC # BLD: 10.82 K/UL — HIGH (ref 4.8–10.8)
WBC # BLD: 10.89 K/UL — HIGH (ref 4.8–10.8)
WBC # BLD: 11.1 K/UL — HIGH (ref 4.8–10.8)
WBC # BLD: 11.32 K/UL — HIGH (ref 4.8–10.8)
WBC # BLD: 11.39 K/UL — HIGH (ref 4.8–10.8)
WBC # BLD: 11.45 K/UL — HIGH (ref 4.8–10.8)
WBC # BLD: 11.45 K/UL — HIGH (ref 4.8–10.8)
WBC # BLD: 11.75 K/UL — HIGH (ref 4.8–10.8)
WBC # BLD: 11.85 K/UL — HIGH (ref 4.8–10.8)
WBC # BLD: 12.04 K/UL — HIGH (ref 4.8–10.8)
WBC # BLD: 12.11 K/UL — HIGH (ref 4.8–10.8)
WBC # BLD: 12.18 K/UL — HIGH (ref 4.8–10.8)
WBC # BLD: 12.42 K/UL — HIGH (ref 4.8–10.8)
WBC # BLD: 12.67 K/UL — HIGH (ref 4.8–10.8)
WBC # BLD: 12.82 K/UL — HIGH (ref 4.8–10.8)
WBC # BLD: 12.89 K/UL — HIGH (ref 4.8–10.8)
WBC # BLD: 13.08 K/UL — HIGH (ref 4.8–10.8)
WBC # BLD: 13.08 K/UL — HIGH (ref 4.8–10.8)
WBC # BLD: 13.1 K/UL — HIGH (ref 4.8–10.8)
WBC # BLD: 13.19 K/UL — HIGH (ref 4.8–10.8)
WBC # BLD: 13.48 K/UL — HIGH (ref 4.8–10.8)
WBC # BLD: 13.5 K/UL — HIGH (ref 4.8–10.8)
WBC # BLD: 13.69 K/UL — HIGH (ref 4.8–10.8)
WBC # BLD: 14 K/UL — HIGH (ref 4.8–10.8)
WBC # BLD: 14.43 K/UL — HIGH (ref 4.8–10.8)
WBC # BLD: 14.44 K/UL — HIGH (ref 4.8–10.8)
WBC # BLD: 14.5 K/UL — HIGH (ref 4.8–10.8)
WBC # BLD: 14.97 K/UL — HIGH (ref 4.8–10.8)
WBC # BLD: 15.66 K/UL — HIGH (ref 4.8–10.8)
WBC # BLD: 15.67 K/UL — HIGH (ref 4.8–10.8)
WBC # BLD: 15.77 K/UL — HIGH (ref 4.8–10.8)
WBC # BLD: 16.56 K/UL — HIGH (ref 4.8–10.8)
WBC # BLD: 17.46 K/UL — HIGH (ref 4.8–10.8)
WBC # BLD: 17.95 K/UL — HIGH (ref 4.8–10.8)
WBC # BLD: 18.94 K/UL — HIGH (ref 4.8–10.8)
WBC # BLD: 25.18 K/UL — HIGH (ref 4.8–10.8)
WBC # BLD: 4.44 K/UL — LOW (ref 4.8–10.8)
WBC # BLD: 4.47 K/UL — LOW (ref 4.8–10.8)
WBC # BLD: 6.13 K/UL — SIGNIFICANT CHANGE UP (ref 4.8–10.8)
WBC # BLD: 7.87 K/UL — SIGNIFICANT CHANGE UP (ref 4.8–10.8)
WBC # BLD: 7.97 K/UL — SIGNIFICANT CHANGE UP (ref 4.8–10.8)
WBC # BLD: 8.24 K/UL — SIGNIFICANT CHANGE UP (ref 4.8–10.8)
WBC # BLD: 8.47 K/UL — SIGNIFICANT CHANGE UP (ref 4.8–10.8)
WBC # BLD: 8.48 K/UL — SIGNIFICANT CHANGE UP (ref 4.8–10.8)
WBC # BLD: 8.7 K/UL — SIGNIFICANT CHANGE UP (ref 4.8–10.8)
WBC # BLD: 9.11 K/UL — SIGNIFICANT CHANGE UP (ref 4.8–10.8)
WBC # BLD: 9.24 K/UL — SIGNIFICANT CHANGE UP (ref 4.8–10.8)
WBC # BLD: 9.27 K/UL — SIGNIFICANT CHANGE UP (ref 4.8–10.8)
WBC # FLD AUTO: 10.06 K/UL — SIGNIFICANT CHANGE UP (ref 4.8–10.8)
WBC # FLD AUTO: 10.11 K/UL — SIGNIFICANT CHANGE UP (ref 4.8–10.8)
WBC # FLD AUTO: 10.16 K/UL — SIGNIFICANT CHANGE UP (ref 4.8–10.8)
WBC # FLD AUTO: 10.31 K/UL — SIGNIFICANT CHANGE UP (ref 4.8–10.8)
WBC # FLD AUTO: 10.31 K/UL — SIGNIFICANT CHANGE UP (ref 4.8–10.8)
WBC # FLD AUTO: 10.37 K/UL — SIGNIFICANT CHANGE UP (ref 4.8–10.8)
WBC # FLD AUTO: 10.41 K/UL — SIGNIFICANT CHANGE UP (ref 4.8–10.8)
WBC # FLD AUTO: 10.46 K/UL — SIGNIFICANT CHANGE UP (ref 4.8–10.8)
WBC # FLD AUTO: 10.6 K/UL — SIGNIFICANT CHANGE UP (ref 4.8–10.8)
WBC # FLD AUTO: 10.82 K/UL — HIGH (ref 4.8–10.8)
WBC # FLD AUTO: 10.89 K/UL — HIGH (ref 4.8–10.8)
WBC # FLD AUTO: 11.1 K/UL — HIGH (ref 4.8–10.8)
WBC # FLD AUTO: 11.32 K/UL — HIGH (ref 4.8–10.8)
WBC # FLD AUTO: 11.39 K/UL — HIGH (ref 4.8–10.8)
WBC # FLD AUTO: 11.45 K/UL — HIGH (ref 4.8–10.8)
WBC # FLD AUTO: 11.45 K/UL — HIGH (ref 4.8–10.8)
WBC # FLD AUTO: 11.75 K/UL — HIGH (ref 4.8–10.8)
WBC # FLD AUTO: 11.85 K/UL — HIGH (ref 4.8–10.8)
WBC # FLD AUTO: 12.04 K/UL — HIGH (ref 4.8–10.8)
WBC # FLD AUTO: 12.11 K/UL — HIGH (ref 4.8–10.8)
WBC # FLD AUTO: 12.18 K/UL — HIGH (ref 4.8–10.8)
WBC # FLD AUTO: 12.42 K/UL — HIGH (ref 4.8–10.8)
WBC # FLD AUTO: 12.67 K/UL — HIGH (ref 4.8–10.8)
WBC # FLD AUTO: 12.82 K/UL — HIGH (ref 4.8–10.8)
WBC # FLD AUTO: 12.89 K/UL — HIGH (ref 4.8–10.8)
WBC # FLD AUTO: 13.08 K/UL — HIGH (ref 4.8–10.8)
WBC # FLD AUTO: 13.08 K/UL — HIGH (ref 4.8–10.8)
WBC # FLD AUTO: 13.1 K/UL — HIGH (ref 4.8–10.8)
WBC # FLD AUTO: 13.19 K/UL — HIGH (ref 4.8–10.8)
WBC # FLD AUTO: 13.48 K/UL — HIGH (ref 4.8–10.8)
WBC # FLD AUTO: 13.5 K/UL — HIGH (ref 4.8–10.8)
WBC # FLD AUTO: 13.69 K/UL — HIGH (ref 4.8–10.8)
WBC # FLD AUTO: 14 K/UL — HIGH (ref 4.8–10.8)
WBC # FLD AUTO: 14.43 K/UL — HIGH (ref 4.8–10.8)
WBC # FLD AUTO: 14.44 K/UL — HIGH (ref 4.8–10.8)
WBC # FLD AUTO: 14.5 K/UL — HIGH (ref 4.8–10.8)
WBC # FLD AUTO: 14.97 K/UL — HIGH (ref 4.8–10.8)
WBC # FLD AUTO: 15.66 K/UL — HIGH (ref 4.8–10.8)
WBC # FLD AUTO: 15.67 K/UL — HIGH (ref 4.8–10.8)
WBC # FLD AUTO: 15.77 K/UL — HIGH (ref 4.8–10.8)
WBC # FLD AUTO: 16.56 K/UL — HIGH (ref 4.8–10.8)
WBC # FLD AUTO: 17.46 K/UL — HIGH (ref 4.8–10.8)
WBC # FLD AUTO: 17.95 K/UL — HIGH (ref 4.8–10.8)
WBC # FLD AUTO: 18.94 K/UL — HIGH (ref 4.8–10.8)
WBC # FLD AUTO: 25.18 K/UL — HIGH (ref 4.8–10.8)
WBC # FLD AUTO: 4.44 K/UL — LOW (ref 4.8–10.8)
WBC # FLD AUTO: 4.47 K/UL — LOW (ref 4.8–10.8)
WBC # FLD AUTO: 6.13 K/UL — SIGNIFICANT CHANGE UP (ref 4.8–10.8)
WBC # FLD AUTO: 6.8 K/UL
WBC # FLD AUTO: 7.87 K/UL — SIGNIFICANT CHANGE UP (ref 4.8–10.8)
WBC # FLD AUTO: 7.97 K/UL — SIGNIFICANT CHANGE UP (ref 4.8–10.8)
WBC # FLD AUTO: 8.24 K/UL — SIGNIFICANT CHANGE UP (ref 4.8–10.8)
WBC # FLD AUTO: 8.47 K/UL — SIGNIFICANT CHANGE UP (ref 4.8–10.8)
WBC # FLD AUTO: 8.48 K/UL — SIGNIFICANT CHANGE UP (ref 4.8–10.8)
WBC # FLD AUTO: 8.7 K/UL — SIGNIFICANT CHANGE UP (ref 4.8–10.8)
WBC # FLD AUTO: 9.11 K/UL — SIGNIFICANT CHANGE UP (ref 4.8–10.8)
WBC # FLD AUTO: 9.24 K/UL — SIGNIFICANT CHANGE UP (ref 4.8–10.8)
WBC # FLD AUTO: 9.27 K/UL — SIGNIFICANT CHANGE UP (ref 4.8–10.8)

## 2023-01-01 PROCEDURE — 74018 RADEX ABDOMEN 1 VIEW: CPT | Mod: 26

## 2023-01-01 PROCEDURE — 74018 RADEX ABDOMEN 1 VIEW: CPT

## 2023-01-01 PROCEDURE — 99291 CRITICAL CARE FIRST HOUR: CPT

## 2023-01-01 PROCEDURE — 71045 X-RAY EXAM CHEST 1 VIEW: CPT | Mod: 26

## 2023-01-01 PROCEDURE — 94760 N-INVAS EAR/PLS OXIMETRY 1: CPT

## 2023-01-01 PROCEDURE — 94003 VENT MGMT INPAT SUBQ DAY: CPT

## 2023-01-01 PROCEDURE — 71045 X-RAY EXAM CHEST 1 VIEW: CPT | Mod: 26,77

## 2023-01-01 PROCEDURE — 93010 ELECTROCARDIOGRAM REPORT: CPT

## 2023-01-01 PROCEDURE — 85730 THROMBOPLASTIN TIME PARTIAL: CPT

## 2023-01-01 PROCEDURE — 74177 CT ABD & PELVIS W/CONTRAST: CPT | Mod: 26

## 2023-01-01 PROCEDURE — 83615 LACTATE (LD) (LDH) ENZYME: CPT

## 2023-01-01 PROCEDURE — 99233 SBSQ HOSP IP/OBS HIGH 50: CPT

## 2023-01-01 PROCEDURE — P9011: CPT

## 2023-01-01 PROCEDURE — 99232 SBSQ HOSP IP/OBS MODERATE 35: CPT

## 2023-01-01 PROCEDURE — 99291 CRITICAL CARE FIRST HOUR: CPT | Mod: 24,25

## 2023-01-01 PROCEDURE — 82330 ASSAY OF CALCIUM: CPT

## 2023-01-01 PROCEDURE — 51703 INSERT BLADDER CATH COMPLEX: CPT

## 2023-01-01 PROCEDURE — 87449 NOS EACH ORGANISM AG IA: CPT

## 2023-01-01 PROCEDURE — 73552 X-RAY EXAM OF FEMUR 2/>: CPT | Mod: 26,RT

## 2023-01-01 PROCEDURE — 99223 1ST HOSP IP/OBS HIGH 75: CPT

## 2023-01-01 PROCEDURE — 87102 FUNGUS ISOLATION CULTURE: CPT

## 2023-01-01 PROCEDURE — 93306 TTE W/DOPPLER COMPLETE: CPT | Mod: 26

## 2023-01-01 PROCEDURE — 36415 COLL VENOUS BLD VENIPUNCTURE: CPT

## 2023-01-01 PROCEDURE — 70450 CT HEAD/BRAIN W/O DYE: CPT | Mod: 26

## 2023-01-01 PROCEDURE — C1713: CPT

## 2023-01-01 PROCEDURE — 73502 X-RAY EXAM HIP UNI 2-3 VIEWS: CPT | Mod: RT

## 2023-01-01 PROCEDURE — 85027 COMPLETE CBC AUTOMATED: CPT

## 2023-01-01 PROCEDURE — 76937 US GUIDE VASCULAR ACCESS: CPT | Mod: 26,59

## 2023-01-01 PROCEDURE — 80053 COMPREHEN METABOLIC PANEL: CPT

## 2023-01-01 PROCEDURE — 83986 ASSAY PH BODY FLUID NOS: CPT

## 2023-01-01 PROCEDURE — 87040 BLOOD CULTURE FOR BACTERIA: CPT

## 2023-01-01 PROCEDURE — L8699: CPT

## 2023-01-01 PROCEDURE — 83735 ASSAY OF MAGNESIUM: CPT

## 2023-01-01 PROCEDURE — 74177 CT ABD & PELVIS W/CONTRAST: CPT | Mod: 26,MA

## 2023-01-01 PROCEDURE — 84100 ASSAY OF PHOSPHORUS: CPT

## 2023-01-01 PROCEDURE — 93970 EXTREMITY STUDY: CPT

## 2023-01-01 PROCEDURE — 94002 VENT MGMT INPAT INIT DAY: CPT

## 2023-01-01 PROCEDURE — 74174 CTA ABD&PLVS W/CONTRAST: CPT | Mod: 26

## 2023-01-01 PROCEDURE — 36556 INSERT NON-TUNNEL CV CATH: CPT

## 2023-01-01 PROCEDURE — 86985 SPLIT BLOOD OR PRODUCTS: CPT

## 2023-01-01 PROCEDURE — 99291 CRITICAL CARE FIRST HOUR: CPT | Mod: 25

## 2023-01-01 PROCEDURE — 99285 EMERGENCY DEPT VISIT HI MDM: CPT | Mod: 25

## 2023-01-01 PROCEDURE — P9047: CPT

## 2023-01-01 PROCEDURE — 82945 GLUCOSE OTHER FLUID: CPT

## 2023-01-01 PROCEDURE — 84540 ASSAY OF URINE/UREA-N: CPT

## 2023-01-01 PROCEDURE — 73501 X-RAY EXAM HIP UNI 1 VIEW: CPT | Mod: RT

## 2023-01-01 PROCEDURE — 80202 ASSAY OF VANCOMYCIN: CPT

## 2023-01-01 PROCEDURE — 86923 COMPATIBILITY TEST ELECTRIC: CPT

## 2023-01-01 PROCEDURE — 71045 X-RAY EXAM CHEST 1 VIEW: CPT

## 2023-01-01 PROCEDURE — 76770 US EXAM ABDO BACK WALL COMP: CPT | Mod: 26

## 2023-01-01 PROCEDURE — 95714 VEEG EA 12-26 HR UNMNTR: CPT

## 2023-01-01 PROCEDURE — 76870 US EXAM SCROTUM: CPT

## 2023-01-01 PROCEDURE — 93970 EXTREMITY STUDY: CPT | Mod: 26

## 2023-01-01 PROCEDURE — 70450 CT HEAD/BRAIN W/O DYE: CPT

## 2023-01-01 PROCEDURE — 76705 ECHO EXAM OF ABDOMEN: CPT | Mod: 26

## 2023-01-01 PROCEDURE — 82977 ASSAY OF GGT: CPT

## 2023-01-01 PROCEDURE — 82803 BLOOD GASES ANY COMBINATION: CPT

## 2023-01-01 PROCEDURE — 84484 ASSAY OF TROPONIN QUANT: CPT

## 2023-01-01 PROCEDURE — 43246 EGD PLACE GASTROSTOMY TUBE: CPT

## 2023-01-01 PROCEDURE — 84145 PROCALCITONIN (PCT): CPT

## 2023-01-01 PROCEDURE — 86900 BLOOD TYPING SEROLOGIC ABO: CPT

## 2023-01-01 PROCEDURE — 85025 COMPLETE CBC W/AUTO DIFF WBC: CPT

## 2023-01-01 PROCEDURE — 83036 HEMOGLOBIN GLYCOSYLATED A1C: CPT

## 2023-01-01 PROCEDURE — 82746 ASSAY OF FOLIC ACID SERUM: CPT

## 2023-01-01 PROCEDURE — 82962 GLUCOSE BLOOD TEST: CPT

## 2023-01-01 PROCEDURE — P9045: CPT

## 2023-01-01 PROCEDURE — 85018 HEMOGLOBIN: CPT

## 2023-01-01 PROCEDURE — P9059: CPT

## 2023-01-01 PROCEDURE — 93306 TTE W/DOPPLER COMPLETE: CPT

## 2023-01-01 PROCEDURE — 93010 ELECTROCARDIOGRAM REPORT: CPT | Mod: 76

## 2023-01-01 PROCEDURE — 87075 CULTR BACTERIA EXCEPT BLOOD: CPT

## 2023-01-01 PROCEDURE — 83605 ASSAY OF LACTIC ACID: CPT

## 2023-01-01 PROCEDURE — 86901 BLOOD TYPING SEROLOGIC RH(D): CPT

## 2023-01-01 PROCEDURE — 84157 ASSAY OF PROTEIN OTHER: CPT

## 2023-01-01 PROCEDURE — 31730 INTRO WINDPIPE WIRE/TUBE: CPT

## 2023-01-01 PROCEDURE — 88112 CYTOPATH CELL ENHANCE TECH: CPT | Mod: 26

## 2023-01-01 PROCEDURE — P9016: CPT

## 2023-01-01 PROCEDURE — 74174 CTA ABD&PLVS W/CONTRAST: CPT

## 2023-01-01 PROCEDURE — 51702 INSERT TEMP BLADDER CATH: CPT

## 2023-01-01 PROCEDURE — C1769: CPT

## 2023-01-01 PROCEDURE — 76705 ECHO EXAM OF ABDOMEN: CPT

## 2023-01-01 PROCEDURE — 72170 X-RAY EXAM OF PELVIS: CPT | Mod: 26

## 2023-01-01 PROCEDURE — 84295 ASSAY OF SERUM SODIUM: CPT

## 2023-01-01 PROCEDURE — U0003: CPT

## 2023-01-01 PROCEDURE — 71045 X-RAY EXAM CHEST 1 VIEW: CPT | Mod: 26,76

## 2023-01-01 PROCEDURE — 94640 AIRWAY INHALATION TREATMENT: CPT

## 2023-01-01 PROCEDURE — 87205 SMEAR GRAM STAIN: CPT

## 2023-01-01 PROCEDURE — 72125 CT NECK SPINE W/O DYE: CPT | Mod: 26,MA

## 2023-01-01 PROCEDURE — U0005: CPT

## 2023-01-01 PROCEDURE — 99213 OFFICE O/P EST LOW 20 MIN: CPT | Mod: 25

## 2023-01-01 PROCEDURE — 84146 ASSAY OF PROLACTIN: CPT

## 2023-01-01 PROCEDURE — 82570 ASSAY OF URINE CREATININE: CPT

## 2023-01-01 PROCEDURE — 87186 SC STD MICRODIL/AGAR DIL: CPT

## 2023-01-01 PROCEDURE — 88305 TISSUE EXAM BY PATHOLOGIST: CPT | Mod: 26

## 2023-01-01 PROCEDURE — 87493 C DIFF AMPLIFIED PROBE: CPT

## 2023-01-01 PROCEDURE — 86850 RBC ANTIBODY SCREEN: CPT

## 2023-01-01 PROCEDURE — 93005 ELECTROCARDIOGRAM TRACING: CPT

## 2023-01-01 PROCEDURE — 99213 OFFICE O/P EST LOW 20 MIN: CPT

## 2023-01-01 PROCEDURE — 95700 EEG CONT REC W/VID EEG TECH: CPT

## 2023-01-01 PROCEDURE — 89051 BODY FLUID CELL COUNT: CPT

## 2023-01-01 PROCEDURE — P9100: CPT

## 2023-01-01 PROCEDURE — 87640 STAPH A DNA AMP PROBE: CPT

## 2023-01-01 PROCEDURE — 80076 HEPATIC FUNCTION PANEL: CPT

## 2023-01-01 PROCEDURE — 71275 CT ANGIOGRAPHY CHEST: CPT | Mod: 26

## 2023-01-01 PROCEDURE — 81001 URINALYSIS AUTO W/SCOPE: CPT

## 2023-01-01 PROCEDURE — 99221 1ST HOSP IP/OBS SF/LOW 40: CPT | Mod: 25

## 2023-01-01 PROCEDURE — 88305 TISSUE EXAM BY PATHOLOGIST: CPT

## 2023-01-01 PROCEDURE — ZZZZZ: CPT

## 2023-01-01 PROCEDURE — 84300 ASSAY OF URINE SODIUM: CPT

## 2023-01-01 PROCEDURE — P9037: CPT

## 2023-01-01 PROCEDURE — 87641 MR-STAPH DNA AMP PROBE: CPT

## 2023-01-01 PROCEDURE — 36430 TRANSFUSION BLD/BLD COMPNT: CPT

## 2023-01-01 PROCEDURE — 71260 CT THORAX DX C+: CPT | Mod: 26,MA

## 2023-01-01 PROCEDURE — 76870 US EXAM SCROTUM: CPT | Mod: 26

## 2023-01-01 PROCEDURE — 84132 ASSAY OF SERUM POTASSIUM: CPT

## 2023-01-01 PROCEDURE — 93010 ELECTROCARDIOGRAM REPORT: CPT | Mod: 77

## 2023-01-01 PROCEDURE — 87070 CULTURE OTHR SPECIMN AEROBIC: CPT

## 2023-01-01 PROCEDURE — 84478 ASSAY OF TRIGLYCERIDES: CPT

## 2023-01-01 PROCEDURE — 82550 ASSAY OF CK (CPK): CPT

## 2023-01-01 PROCEDURE — P9040: CPT

## 2023-01-01 PROCEDURE — 80048 BASIC METABOLIC PNL TOTAL CA: CPT

## 2023-01-01 PROCEDURE — 88112 CYTOPATH CELL ENHANCE TECH: CPT

## 2023-01-01 PROCEDURE — 76770 US EXAM ABDO BACK WALL COMP: CPT

## 2023-01-01 PROCEDURE — 36620 INSERTION CATHETER ARTERY: CPT

## 2023-01-01 PROCEDURE — 85014 HEMATOCRIT: CPT

## 2023-01-01 PROCEDURE — 96402 CHEMO HORMON ANTINEOPL SQ/IM: CPT

## 2023-01-01 PROCEDURE — 70450 CT HEAD/BRAIN W/O DYE: CPT | Mod: 26,MA

## 2023-01-01 PROCEDURE — 36556 INSERT NON-TUNNEL CV CATH: CPT | Mod: LT

## 2023-01-01 PROCEDURE — 84425 ASSAY OF VITAMIN B-1: CPT

## 2023-01-01 PROCEDURE — 74177 CT ABD & PELVIS W/CONTRAST: CPT

## 2023-01-01 PROCEDURE — C1889: CPT

## 2023-01-01 PROCEDURE — C9113: CPT

## 2023-01-01 PROCEDURE — 73502 X-RAY EXAM HIP UNI 2-3 VIEWS: CPT | Mod: 26,RT

## 2023-01-01 PROCEDURE — 73501 X-RAY EXAM HIP UNI 1 VIEW: CPT | Mod: 26,RT

## 2023-01-01 PROCEDURE — 85610 PROTHROMBIN TIME: CPT

## 2023-01-01 PROCEDURE — 82042 OTHER SOURCE ALBUMIN QUAN EA: CPT

## 2023-01-01 PROCEDURE — 99222 1ST HOSP IP/OBS MODERATE 55: CPT

## 2023-01-01 PROCEDURE — 82553 CREATINE MB FRACTION: CPT

## 2023-01-01 PROCEDURE — 71275 CT ANGIOGRAPHY CHEST: CPT

## 2023-01-01 RX ORDER — MAGNESIUM SULFATE 500 MG/ML
1 VIAL (ML) INJECTION ONCE
Refills: 0 | Status: COMPLETED | OUTPATIENT
Start: 2023-01-01 | End: 2023-01-01

## 2023-01-01 RX ORDER — FENTANYL CITRATE 50 UG/ML
0.5 INJECTION INTRAVENOUS
Qty: 2500 | Refills: 0 | Status: DISCONTINUED | OUTPATIENT
Start: 2023-01-01 | End: 2023-01-01

## 2023-01-01 RX ORDER — POTASSIUM CHLORIDE 20 MEQ
20 PACKET (EA) ORAL
Refills: 0 | Status: COMPLETED | OUTPATIENT
Start: 2023-01-01 | End: 2023-01-01

## 2023-01-01 RX ORDER — HYDROCORTISONE 20 MG
100 TABLET ORAL ONCE
Refills: 0 | Status: COMPLETED | OUTPATIENT
Start: 2023-01-01 | End: 2023-01-01

## 2023-01-01 RX ORDER — DEXMEDETOMIDINE HYDROCHLORIDE IN 0.9% SODIUM CHLORIDE 4 UG/ML
0.1 INJECTION INTRAVENOUS
Qty: 400 | Refills: 0 | Status: DISCONTINUED | OUTPATIENT
Start: 2023-01-01 | End: 2023-01-01

## 2023-01-01 RX ORDER — HEPARIN SODIUM 5000 [USP'U]/ML
5000 INJECTION INTRAVENOUS; SUBCUTANEOUS EVERY 8 HOURS
Refills: 0 | Status: DISCONTINUED | OUTPATIENT
Start: 2023-01-01 | End: 2023-01-01

## 2023-01-01 RX ORDER — SODIUM CHLORIDE 9 MG/ML
250 INJECTION, SOLUTION INTRAVENOUS ONCE
Refills: 0 | Status: DISCONTINUED | OUTPATIENT
Start: 2023-01-01 | End: 2023-01-01

## 2023-01-01 RX ORDER — MIDAZOLAM HYDROCHLORIDE 1 MG/ML
2 INJECTION, SOLUTION INTRAMUSCULAR; INTRAVENOUS EVERY 4 HOURS
Refills: 0 | Status: DISCONTINUED | OUTPATIENT
Start: 2023-01-01 | End: 2023-01-01

## 2023-01-01 RX ORDER — LABETALOL HCL 100 MG
5 TABLET ORAL ONCE
Refills: 0 | Status: DISCONTINUED | OUTPATIENT
Start: 2023-01-01 | End: 2023-01-01

## 2023-01-01 RX ORDER — SODIUM CHLORIDE 9 MG/ML
250 INJECTION, SOLUTION INTRAVENOUS ONCE
Refills: 0 | Status: COMPLETED | OUTPATIENT
Start: 2023-01-01 | End: 2023-01-01

## 2023-01-01 RX ORDER — CHLORHEXIDINE GLUCONATE 213 G/1000ML
15 SOLUTION TOPICAL EVERY 12 HOURS
Refills: 0 | Status: DISCONTINUED | OUTPATIENT
Start: 2023-01-01 | End: 2023-01-01

## 2023-01-01 RX ORDER — ATORVASTATIN CALCIUM 80 MG/1
80 TABLET, FILM COATED ORAL AT BEDTIME
Refills: 0 | Status: DISCONTINUED | OUTPATIENT
Start: 2023-01-01 | End: 2023-01-01

## 2023-01-01 RX ORDER — SODIUM CHLORIDE 9 MG/ML
500 INJECTION, SOLUTION INTRAVENOUS ONCE
Refills: 0 | Status: COMPLETED | OUTPATIENT
Start: 2023-01-01 | End: 2023-01-01

## 2023-01-01 RX ORDER — POTASSIUM CHLORIDE 20 MEQ
40 PACKET (EA) ORAL ONCE
Refills: 0 | Status: DISCONTINUED | OUTPATIENT
Start: 2023-01-01 | End: 2023-01-01

## 2023-01-01 RX ORDER — PIPERACILLIN AND TAZOBACTAM 4; .5 G/20ML; G/20ML
3.38 INJECTION, POWDER, LYOPHILIZED, FOR SOLUTION INTRAVENOUS ONCE
Refills: 0 | Status: COMPLETED | OUTPATIENT
Start: 2023-01-01 | End: 2023-01-01

## 2023-01-01 RX ORDER — CEFAZOLIN SODIUM 1 G
2000 VIAL (EA) INJECTION EVERY 8 HOURS
Refills: 0 | Status: COMPLETED | OUTPATIENT
Start: 2023-01-01 | End: 2023-01-01

## 2023-01-01 RX ORDER — SODIUM CHLORIDE 9 MG/ML
1000 INJECTION, SOLUTION INTRAVENOUS
Refills: 0 | Status: DISCONTINUED | OUTPATIENT
Start: 2023-01-01 | End: 2023-01-01

## 2023-01-01 RX ORDER — VASOPRESSIN 20 [USP'U]/ML
0.02 INJECTION INTRAVENOUS
Qty: 40 | Refills: 0 | Status: DISCONTINUED | OUTPATIENT
Start: 2023-01-01 | End: 2023-01-01

## 2023-01-01 RX ORDER — POTASSIUM PHOSPHATE, MONOBASIC POTASSIUM PHOSPHATE, DIBASIC 236; 224 MG/ML; MG/ML
30 INJECTION, SOLUTION INTRAVENOUS ONCE
Refills: 0 | Status: COMPLETED | OUTPATIENT
Start: 2023-01-01 | End: 2023-01-01

## 2023-01-01 RX ORDER — MIDAZOLAM HYDROCHLORIDE 1 MG/ML
2 INJECTION, SOLUTION INTRAMUSCULAR; INTRAVENOUS ONCE
Refills: 0 | Status: DISCONTINUED | OUTPATIENT
Start: 2023-01-01 | End: 2023-01-01

## 2023-01-01 RX ORDER — POTASSIUM CHLORIDE 20 MEQ
20 PACKET (EA) ORAL ONCE
Refills: 0 | Status: COMPLETED | OUTPATIENT
Start: 2023-01-01 | End: 2023-01-01

## 2023-01-01 RX ORDER — FUROSEMIDE 40 MG
40 TABLET ORAL ONCE
Refills: 0 | Status: COMPLETED | OUTPATIENT
Start: 2023-01-01 | End: 2023-01-01

## 2023-01-01 RX ORDER — BUMETANIDE 0.25 MG/ML
2 INJECTION INTRAMUSCULAR; INTRAVENOUS EVERY 12 HOURS
Refills: 0 | Status: DISCONTINUED | OUTPATIENT
Start: 2023-01-01 | End: 2023-01-01

## 2023-01-01 RX ORDER — VASOPRESSIN 20 [USP'U]/ML
0.03 INJECTION INTRAVENOUS
Qty: 40 | Refills: 0 | Status: DISCONTINUED | OUTPATIENT
Start: 2023-01-01 | End: 2023-01-01

## 2023-01-01 RX ORDER — FENTANYL CITRATE 50 UG/ML
1 INJECTION INTRAVENOUS
Qty: 2500 | Refills: 0 | Status: DISCONTINUED | OUTPATIENT
Start: 2023-01-01 | End: 2023-01-01

## 2023-01-01 RX ORDER — BUMETANIDE 0.25 MG/ML
2 INJECTION INTRAMUSCULAR; INTRAVENOUS ONCE
Refills: 0 | Status: COMPLETED | OUTPATIENT
Start: 2023-01-01 | End: 2023-01-01

## 2023-01-01 RX ORDER — ALBUMIN HUMAN 25 %
250 VIAL (ML) INTRAVENOUS EVERY 6 HOURS
Refills: 0 | Status: DISCONTINUED | OUTPATIENT
Start: 2023-01-01 | End: 2023-01-01

## 2023-01-01 RX ORDER — CASPOFUNGIN ACETATE 7 MG/ML
INJECTION, POWDER, LYOPHILIZED, FOR SOLUTION INTRAVENOUS
Refills: 0 | Status: DISCONTINUED | OUTPATIENT
Start: 2023-01-01 | End: 2023-01-01

## 2023-01-01 RX ORDER — SENNA PLUS 8.6 MG/1
2 TABLET ORAL AT BEDTIME
Refills: 0 | Status: DISCONTINUED | OUTPATIENT
Start: 2023-01-01 | End: 2023-01-01

## 2023-01-01 RX ORDER — FENTANYL CITRATE 50 UG/ML
25 INJECTION INTRAVENOUS
Refills: 0 | Status: DISCONTINUED | OUTPATIENT
Start: 2023-01-01 | End: 2023-01-01

## 2023-01-01 RX ORDER — OLANZAPINE 15 MG/1
5 TABLET, FILM COATED ORAL ONCE
Refills: 0 | Status: COMPLETED | OUTPATIENT
Start: 2023-01-01 | End: 2023-01-01

## 2023-01-01 RX ORDER — MIDODRINE HYDROCHLORIDE 2.5 MG/1
10 TABLET ORAL EVERY 8 HOURS
Refills: 0 | Status: DISCONTINUED | OUTPATIENT
Start: 2023-01-01 | End: 2023-01-01

## 2023-01-01 RX ORDER — SODIUM POLYSTYRENE SULFONATE 4.1 MEQ/G
30 POWDER, FOR SUSPENSION ORAL ONCE
Refills: 0 | Status: COMPLETED | OUTPATIENT
Start: 2023-01-01 | End: 2023-01-01

## 2023-01-01 RX ORDER — INSULIN LISPRO 100/ML
VIAL (ML) SUBCUTANEOUS EVERY 6 HOURS
Refills: 0 | Status: DISCONTINUED | OUTPATIENT
Start: 2023-01-01 | End: 2023-01-01

## 2023-01-01 RX ORDER — POTASSIUM PHOSPHATE, MONOBASIC POTASSIUM PHOSPHATE, DIBASIC 236; 224 MG/ML; MG/ML
15 INJECTION, SOLUTION INTRAVENOUS ONCE
Refills: 0 | Status: COMPLETED | OUTPATIENT
Start: 2023-01-01 | End: 2023-01-01

## 2023-01-01 RX ORDER — MIDAZOLAM HYDROCHLORIDE 1 MG/ML
1 INJECTION, SOLUTION INTRAMUSCULAR; INTRAVENOUS ONCE
Refills: 0 | Status: DISCONTINUED | OUTPATIENT
Start: 2023-01-01 | End: 2023-01-01

## 2023-01-01 RX ORDER — ALBUMIN HUMAN 25 %
250 VIAL (ML) INTRAVENOUS ONCE
Refills: 0 | Status: COMPLETED | OUTPATIENT
Start: 2023-01-01 | End: 2023-01-01

## 2023-01-01 RX ORDER — PHENYLEPHRINE HYDROCHLORIDE 10 MG/ML
0.1 INJECTION INTRAVENOUS
Qty: 160 | Refills: 0 | Status: DISCONTINUED | OUTPATIENT
Start: 2023-01-01 | End: 2023-01-01

## 2023-01-01 RX ORDER — VANCOMYCIN HCL 1 G
1000 VIAL (EA) INTRAVENOUS ONCE
Refills: 0 | Status: COMPLETED | OUTPATIENT
Start: 2023-01-01 | End: 2023-01-01

## 2023-01-01 RX ORDER — ALBUMIN HUMAN 25 %
50 VIAL (ML) INTRAVENOUS ONCE
Refills: 0 | Status: COMPLETED | OUTPATIENT
Start: 2023-01-01 | End: 2023-01-01

## 2023-01-01 RX ORDER — ACETAMINOPHEN 500 MG
650 TABLET ORAL EVERY 6 HOURS
Refills: 0 | Status: DISCONTINUED | OUTPATIENT
Start: 2023-01-01 | End: 2023-01-01

## 2023-01-01 RX ORDER — ALBUTEROL 90 UG/1
2.5 AEROSOL, METERED ORAL EVERY 8 HOURS
Refills: 0 | Status: DISCONTINUED | OUTPATIENT
Start: 2023-01-01 | End: 2023-01-01

## 2023-01-01 RX ORDER — SODIUM ZIRCONIUM CYCLOSILICATE 10 G/10G
5 POWDER, FOR SUSPENSION ORAL EVERY 8 HOURS
Refills: 0 | Status: DISCONTINUED | OUTPATIENT
Start: 2023-01-01 | End: 2023-01-01

## 2023-01-01 RX ORDER — POTASSIUM CHLORIDE 20 MEQ
40 PACKET (EA) ORAL ONCE
Refills: 0 | Status: COMPLETED | OUTPATIENT
Start: 2023-01-01 | End: 2023-01-01

## 2023-01-01 RX ORDER — HEPARIN SODIUM 5000 [USP'U]/ML
2500 INJECTION INTRAVENOUS; SUBCUTANEOUS ONCE
Refills: 0 | Status: COMPLETED | OUTPATIENT
Start: 2023-01-01 | End: 2023-01-01

## 2023-01-01 RX ORDER — AMLODIPINE BESYLATE 2.5 MG/1
5 TABLET ORAL DAILY
Refills: 0 | Status: DISCONTINUED | OUTPATIENT
Start: 2023-01-01 | End: 2023-01-01

## 2023-01-01 RX ORDER — MORPHINE SULFATE 50 MG/1
2 CAPSULE, EXTENDED RELEASE ORAL ONCE
Refills: 0 | Status: DISCONTINUED | OUTPATIENT
Start: 2023-01-01 | End: 2023-01-01

## 2023-01-01 RX ORDER — MAGNESIUM SULFATE 500 MG/ML
2 VIAL (ML) INJECTION ONCE
Refills: 0 | Status: DISCONTINUED | OUTPATIENT
Start: 2023-01-01 | End: 2023-01-01

## 2023-01-01 RX ORDER — MORPHINE SULFATE 50 MG/1
4 CAPSULE, EXTENDED RELEASE ORAL EVERY 6 HOURS
Refills: 0 | Status: DISCONTINUED | OUTPATIENT
Start: 2023-01-01 | End: 2023-01-01

## 2023-01-01 RX ORDER — DEXMEDETOMIDINE HYDROCHLORIDE IN 0.9% SODIUM CHLORIDE 4 UG/ML
0.05 INJECTION INTRAVENOUS
Qty: 200 | Refills: 0 | Status: DISCONTINUED | OUTPATIENT
Start: 2023-01-01 | End: 2023-01-01

## 2023-01-01 RX ORDER — SODIUM CHLORIDE 9 MG/ML
500 INJECTION, SOLUTION INTRAVENOUS ONCE
Refills: 0 | Status: DISCONTINUED | OUTPATIENT
Start: 2023-01-01 | End: 2023-01-01

## 2023-01-01 RX ORDER — NOREPINEPHRINE BITARTRATE/D5W 8 MG/250ML
0.05 PLASTIC BAG, INJECTION (ML) INTRAVENOUS
Qty: 8 | Refills: 0 | Status: DISCONTINUED | OUTPATIENT
Start: 2023-01-01 | End: 2023-01-01

## 2023-01-01 RX ORDER — FUROSEMIDE 40 MG
40 TABLET ORAL ONCE
Refills: 0 | Status: DISCONTINUED | OUTPATIENT
Start: 2023-01-01 | End: 2023-01-01

## 2023-01-01 RX ORDER — MORPHINE SULFATE 50 MG/1
4 CAPSULE, EXTENDED RELEASE ORAL ONCE
Refills: 0 | Status: DISCONTINUED | OUTPATIENT
Start: 2023-01-01 | End: 2023-01-01

## 2023-01-01 RX ORDER — DEXTROSE 50 % IN WATER 50 %
50 SYRINGE (ML) INTRAVENOUS ONCE
Refills: 0 | Status: COMPLETED | OUTPATIENT
Start: 2023-01-01 | End: 2023-01-01

## 2023-01-01 RX ORDER — CASPOFUNGIN ACETATE 7 MG/ML
50 INJECTION, POWDER, LYOPHILIZED, FOR SOLUTION INTRAVENOUS EVERY 24 HOURS
Refills: 0 | Status: DISCONTINUED | OUTPATIENT
Start: 2023-01-01 | End: 2023-01-01

## 2023-01-01 RX ORDER — GUAIFENESIN/DEXTROMETHORPHAN 600MG-30MG
10 TABLET, EXTENDED RELEASE 12 HR ORAL EVERY 4 HOURS
Refills: 0 | Status: DISCONTINUED | OUTPATIENT
Start: 2023-01-01 | End: 2023-01-01

## 2023-01-01 RX ORDER — ENOXAPARIN SODIUM 100 MG/ML
40 INJECTION SUBCUTANEOUS EVERY 24 HOURS
Refills: 0 | Status: COMPLETED | OUTPATIENT
Start: 2023-01-01 | End: 2023-01-01

## 2023-01-01 RX ORDER — DEXTROSE 50 % IN WATER 50 %
25 SYRINGE (ML) INTRAVENOUS ONCE
Refills: 0 | Status: DISCONTINUED | OUTPATIENT
Start: 2023-01-01 | End: 2023-01-01

## 2023-01-01 RX ORDER — FENTANYL CITRATE 50 UG/ML
50 INJECTION INTRAVENOUS ONCE
Refills: 0 | Status: DISCONTINUED | OUTPATIENT
Start: 2023-01-01 | End: 2023-01-01

## 2023-01-01 RX ORDER — CHLORHEXIDINE GLUCONATE 213 G/1000ML
1 SOLUTION TOPICAL
Refills: 0 | Status: DISCONTINUED | OUTPATIENT
Start: 2023-01-01 | End: 2023-01-01

## 2023-01-01 RX ORDER — ACETAMINOPHEN 500 MG
1000 TABLET ORAL ONCE
Refills: 0 | Status: COMPLETED | OUTPATIENT
Start: 2023-01-01 | End: 2023-01-01

## 2023-01-01 RX ORDER — PANTOPRAZOLE SODIUM 20 MG/1
40 TABLET, DELAYED RELEASE ORAL EVERY 12 HOURS
Refills: 0 | Status: DISCONTINUED | OUTPATIENT
Start: 2023-01-01 | End: 2023-01-01

## 2023-01-01 RX ORDER — ONDANSETRON 8 MG/1
4 TABLET, FILM COATED ORAL EVERY 8 HOURS
Refills: 0 | Status: DISCONTINUED | OUTPATIENT
Start: 2023-01-01 | End: 2023-01-01

## 2023-01-01 RX ORDER — FENTANYL CITRATE 50 UG/ML
25 INJECTION INTRAVENOUS EVERY 4 HOURS
Refills: 0 | Status: DISCONTINUED | OUTPATIENT
Start: 2023-01-01 | End: 2023-01-01

## 2023-01-01 RX ORDER — NOREPINEPHRINE BITARTRATE/D5W 8 MG/250ML
0.01 PLASTIC BAG, INJECTION (ML) INTRAVENOUS
Qty: 16 | Refills: 0 | Status: DISCONTINUED | OUTPATIENT
Start: 2023-01-01 | End: 2023-01-01

## 2023-01-01 RX ORDER — POTASSIUM CHLORIDE 20 MEQ
20 PACKET (EA) ORAL
Refills: 0 | Status: DISCONTINUED | OUTPATIENT
Start: 2023-01-01 | End: 2023-01-01

## 2023-01-01 RX ORDER — INSULIN LISPRO 100/ML
VIAL (ML) SUBCUTANEOUS
Refills: 0 | Status: DISCONTINUED | OUTPATIENT
Start: 2023-01-01 | End: 2023-01-01

## 2023-01-01 RX ORDER — OLANZAPINE 15 MG/1
5 TABLET, FILM COATED ORAL EVERY 12 HOURS
Refills: 0 | Status: DISCONTINUED | OUTPATIENT
Start: 2023-01-01 | End: 2023-01-01

## 2023-01-01 RX ORDER — CEFEPIME 1 G/1
1000 INJECTION, POWDER, FOR SOLUTION INTRAMUSCULAR; INTRAVENOUS EVERY 12 HOURS
Refills: 0 | Status: COMPLETED | OUTPATIENT
Start: 2023-01-01 | End: 2023-01-01

## 2023-01-01 RX ORDER — VASOPRESSIN 20 [USP'U]/ML
0.04 INJECTION INTRAVENOUS
Qty: 40 | Refills: 0 | Status: DISCONTINUED | OUTPATIENT
Start: 2023-01-01 | End: 2023-01-01

## 2023-01-01 RX ORDER — SODIUM ZIRCONIUM CYCLOSILICATE 10 G/10G
5 POWDER, FOR SUSPENSION ORAL EVERY 8 HOURS
Refills: 0 | Status: COMPLETED | OUTPATIENT
Start: 2023-01-01 | End: 2023-01-01

## 2023-01-01 RX ORDER — ASPIRIN/CALCIUM CARB/MAGNESIUM 324 MG
81 TABLET ORAL DAILY
Refills: 0 | Status: DISCONTINUED | OUTPATIENT
Start: 2023-01-01 | End: 2023-01-01

## 2023-01-01 RX ORDER — FENTANYL CITRATE 50 UG/ML
25 INJECTION INTRAVENOUS ONCE
Refills: 0 | Status: DISCONTINUED | OUTPATIENT
Start: 2023-01-01 | End: 2023-01-01

## 2023-01-01 RX ORDER — DEXMEDETOMIDINE HYDROCHLORIDE IN 0.9% SODIUM CHLORIDE 4 UG/ML
0.2 INJECTION INTRAVENOUS
Qty: 200 | Refills: 0 | Status: DISCONTINUED | OUTPATIENT
Start: 2023-01-01 | End: 2023-01-01

## 2023-01-01 RX ORDER — FUROSEMIDE 40 MG
120 TABLET ORAL ONCE
Refills: 0 | Status: COMPLETED | OUTPATIENT
Start: 2023-01-01 | End: 2023-01-01

## 2023-01-01 RX ORDER — MIDODRINE HYDROCHLORIDE 2.5 MG/1
5 TABLET ORAL EVERY 8 HOURS
Refills: 0 | Status: DISCONTINUED | OUTPATIENT
Start: 2023-01-01 | End: 2023-01-01

## 2023-01-01 RX ORDER — MIDODRINE HYDROCHLORIDE 2.5 MG/1
5 TABLET ORAL ONCE
Refills: 0 | Status: COMPLETED | OUTPATIENT
Start: 2023-01-01 | End: 2023-01-01

## 2023-01-01 RX ORDER — INSULIN HUMAN 100 [IU]/ML
10 INJECTION, SOLUTION SUBCUTANEOUS ONCE
Refills: 0 | Status: COMPLETED | OUTPATIENT
Start: 2023-01-01 | End: 2023-01-01

## 2023-01-01 RX ORDER — FUROSEMIDE 40 MG
20 TABLET ORAL ONCE
Refills: 0 | Status: COMPLETED | OUTPATIENT
Start: 2023-01-01 | End: 2023-01-01

## 2023-01-01 RX ORDER — LANOLIN ALCOHOL/MO/W.PET/CERES
3 CREAM (GRAM) TOPICAL AT BEDTIME
Refills: 0 | Status: DISCONTINUED | OUTPATIENT
Start: 2023-01-01 | End: 2023-01-01

## 2023-01-01 RX ORDER — CISATRACURIUM BESYLATE 2 MG/ML
10 INJECTION INTRAVENOUS ONCE
Refills: 0 | Status: COMPLETED | OUTPATIENT
Start: 2023-01-01 | End: 2023-01-01

## 2023-01-01 RX ORDER — SODIUM CHLORIDE 9 MG/ML
1000 INJECTION, SOLUTION INTRAVENOUS
Refills: 0 | Status: ACTIVE | OUTPATIENT
Start: 2023-01-01 | End: 2024-02-19

## 2023-01-01 RX ORDER — FENTANYL CITRATE 50 UG/ML
2.2 INJECTION INTRAVENOUS
Qty: 2500 | Refills: 0 | Status: DISCONTINUED | OUTPATIENT
Start: 2023-01-01 | End: 2023-01-01

## 2023-01-01 RX ORDER — VANCOMYCIN HCL 1 G
1000 VIAL (EA) INTRAVENOUS EVERY 12 HOURS
Refills: 0 | Status: DISCONTINUED | OUTPATIENT
Start: 2023-01-01 | End: 2023-01-01

## 2023-01-01 RX ORDER — SODIUM CHLORIDE 9 MG/ML
1000 INJECTION, SOLUTION INTRAVENOUS ONCE
Refills: 0 | Status: COMPLETED | OUTPATIENT
Start: 2023-01-01 | End: 2023-01-01

## 2023-01-01 RX ORDER — ATROPINE SULFATE 1 %
1 DROPS OPHTHALMIC (EYE)
Refills: 0 | Status: DISCONTINUED | OUTPATIENT
Start: 2023-01-01 | End: 2023-01-01

## 2023-01-01 RX ORDER — CISATRACURIUM BESYLATE 2 MG/ML
2.62 INJECTION INTRAVENOUS
Qty: 200 | Refills: 0 | Status: DISCONTINUED | OUTPATIENT
Start: 2023-01-01 | End: 2023-01-01

## 2023-01-01 RX ORDER — TRAZODONE HCL 50 MG
25 TABLET ORAL EVERY 12 HOURS
Refills: 0 | Status: DISCONTINUED | OUTPATIENT
Start: 2023-01-01 | End: 2023-01-01

## 2023-01-01 RX ORDER — TAMSULOSIN HYDROCHLORIDE 0.4 MG/1
1 CAPSULE ORAL
Qty: 0 | Refills: 0 | DISCHARGE

## 2023-01-01 RX ORDER — ALBUMIN HUMAN 25 %
50 VIAL (ML) INTRAVENOUS EVERY 6 HOURS
Refills: 0 | Status: COMPLETED | OUTPATIENT
Start: 2023-01-01 | End: 2023-01-01

## 2023-01-01 RX ORDER — PIPERACILLIN AND TAZOBACTAM 4; .5 G/20ML; G/20ML
3.38 INJECTION, POWDER, LYOPHILIZED, FOR SOLUTION INTRAVENOUS EVERY 8 HOURS
Refills: 0 | Status: DISCONTINUED | OUTPATIENT
Start: 2023-01-01 | End: 2023-01-01

## 2023-01-01 RX ORDER — FENTANYL CITRATE 50 UG/ML
25 INJECTION INTRAVENOUS ONCE
Refills: 0 | Status: COMPLETED | OUTPATIENT
Start: 2023-01-01 | End: 2023-01-01

## 2023-01-01 RX ORDER — VASOPRESSIN 20 [USP'U]/ML
0.01 INJECTION INTRAVENOUS
Qty: 40 | Refills: 0 | Status: DISCONTINUED | OUTPATIENT
Start: 2023-01-01 | End: 2023-01-01

## 2023-01-01 RX ORDER — MIDODRINE HYDROCHLORIDE 2.5 MG/1
10 TABLET ORAL
Refills: 0 | Status: DISCONTINUED | OUTPATIENT
Start: 2023-01-01 | End: 2023-01-01

## 2023-01-01 RX ORDER — FUROSEMIDE 40 MG
20 TABLET ORAL EVERY 12 HOURS
Refills: 0 | Status: DISCONTINUED | OUTPATIENT
Start: 2023-01-01 | End: 2023-01-01

## 2023-01-01 RX ORDER — IPRATROPIUM/ALBUTEROL SULFATE 18-103MCG
3 AEROSOL WITH ADAPTER (GRAM) INHALATION EVERY 6 HOURS
Refills: 0 | Status: DISCONTINUED | OUTPATIENT
Start: 2023-01-01 | End: 2023-01-01

## 2023-01-01 RX ORDER — FENTANYL CITRATE 50 UG/ML
2 INJECTION INTRAVENOUS
Qty: 2500 | Refills: 0 | Status: DISCONTINUED | OUTPATIENT
Start: 2023-01-01 | End: 2023-01-01

## 2023-01-01 RX ORDER — ATROPINE SULFATE 0.1 MG/ML
1 SYRINGE (ML) INJECTION ONCE
Refills: 0 | Status: COMPLETED | OUTPATIENT
Start: 2023-01-01 | End: 2023-01-01

## 2023-01-01 RX ORDER — ROBINUL 0.2 MG/ML
0.2 INJECTION INTRAMUSCULAR; INTRAVENOUS EVERY 6 HOURS
Refills: 0 | Status: DISCONTINUED | OUTPATIENT
Start: 2023-01-01 | End: 2023-01-01

## 2023-01-01 RX ORDER — NICARDIPINE HYDROCHLORIDE 30 MG/1
5 CAPSULE, EXTENDED RELEASE ORAL
Qty: 40 | Refills: 0 | Status: DISCONTINUED | OUTPATIENT
Start: 2023-01-01 | End: 2023-01-01

## 2023-01-01 RX ORDER — LANOLIN ALCOHOL/MO/W.PET/CERES
5 CREAM (GRAM) TOPICAL AT BEDTIME
Refills: 0 | Status: DISCONTINUED | OUTPATIENT
Start: 2023-01-01 | End: 2023-01-01

## 2023-01-01 RX ORDER — VANCOMYCIN HCL 1 G
750 VIAL (EA) INTRAVENOUS EVERY 12 HOURS
Refills: 0 | Status: DISCONTINUED | OUTPATIENT
Start: 2023-01-01 | End: 2023-01-01

## 2023-01-01 RX ORDER — NOREPINEPHRINE BITARTRATE/D5W 8 MG/250ML
0.01 PLASTIC BAG, INJECTION (ML) INTRAVENOUS
Qty: 8 | Refills: 0 | Status: DISCONTINUED | OUTPATIENT
Start: 2023-01-01 | End: 2023-01-01

## 2023-01-01 RX ORDER — MORPHINE SULFATE 50 MG/1
1 CAPSULE, EXTENDED RELEASE ORAL
Refills: 0 | Status: DISCONTINUED | OUTPATIENT
Start: 2023-01-01 | End: 2023-01-01

## 2023-01-01 RX ORDER — DOXAZOSIN MESYLATE 4 MG
4 TABLET ORAL AT BEDTIME
Refills: 0 | Status: DISCONTINUED | OUTPATIENT
Start: 2023-01-01 | End: 2023-01-01

## 2023-01-01 RX ORDER — MAGNESIUM SULFATE 500 MG/ML
2 VIAL (ML) INJECTION ONCE
Refills: 0 | Status: COMPLETED | OUTPATIENT
Start: 2023-01-01 | End: 2023-01-01

## 2023-01-01 RX ORDER — TRAZODONE HCL 50 MG
50 TABLET ORAL AT BEDTIME
Refills: 0 | Status: DISCONTINUED | OUTPATIENT
Start: 2023-01-01 | End: 2023-01-01

## 2023-01-01 RX ORDER — PREDNISOLONE SODIUM PHOSPHATE 1 %
1 DROPS OPHTHALMIC (EYE) EVERY 6 HOURS
Refills: 0 | Status: DISCONTINUED | OUTPATIENT
Start: 2023-01-01 | End: 2023-01-01

## 2023-01-01 RX ORDER — METOCLOPRAMIDE HCL 10 MG
5 TABLET ORAL EVERY 8 HOURS
Refills: 0 | Status: DISCONTINUED | OUTPATIENT
Start: 2023-01-01 | End: 2023-01-01

## 2023-01-01 RX ORDER — VANCOMYCIN HCL 1 G
125 VIAL (EA) INTRAVENOUS EVERY 6 HOURS
Refills: 0 | Status: DISCONTINUED | OUTPATIENT
Start: 2023-01-01 | End: 2023-01-01

## 2023-01-01 RX ORDER — SOD SULF/SODIUM/NAHCO3/KCL/PEG
4000 SOLUTION, RECONSTITUTED, ORAL ORAL ONCE
Refills: 0 | Status: COMPLETED | OUTPATIENT
Start: 2023-01-01 | End: 2023-01-01

## 2023-01-01 RX ORDER — ALBUTEROL 90 UG/1
2.5 AEROSOL, METERED ORAL EVERY 6 HOURS
Refills: 0 | Status: DISCONTINUED | OUTPATIENT
Start: 2023-01-01 | End: 2023-01-01

## 2023-01-01 RX ORDER — ALBUTEROL 90 UG/1
1 AEROSOL, METERED ORAL EVERY 6 HOURS
Refills: 0 | Status: DISCONTINUED | OUTPATIENT
Start: 2023-01-01 | End: 2023-01-01

## 2023-01-01 RX ORDER — ERYTHROMYCIN ETHYLSUCCINATE 400 MG
250 TABLET ORAL ONCE
Refills: 0 | Status: COMPLETED | OUTPATIENT
Start: 2023-01-01 | End: 2023-01-01

## 2023-01-01 RX ORDER — CALCIUM GLUCONATE 100 MG/ML
2 VIAL (ML) INTRAVENOUS ONCE
Refills: 0 | Status: COMPLETED | OUTPATIENT
Start: 2023-01-01 | End: 2023-01-01

## 2023-01-01 RX ORDER — VASOPRESSIN 20 [USP'U]/ML
0.03 INJECTION INTRAVENOUS
Qty: 40 | Refills: 0 | Status: COMPLETED | OUTPATIENT
Start: 2023-01-01 | End: 2024-02-19

## 2023-01-01 RX ORDER — BUMETANIDE 0.25 MG/ML
1 INJECTION INTRAMUSCULAR; INTRAVENOUS
Refills: 0 | Status: DISCONTINUED | OUTPATIENT
Start: 2023-01-01 | End: 2023-01-01

## 2023-01-01 RX ORDER — DEXMEDETOMIDINE HYDROCHLORIDE IN 0.9% SODIUM CHLORIDE 4 UG/ML
0.05 INJECTION INTRAVENOUS
Qty: 400 | Refills: 0 | Status: DISCONTINUED | OUTPATIENT
Start: 2023-01-01 | End: 2023-01-01

## 2023-01-01 RX ORDER — OLANZAPINE 15 MG/1
10 TABLET, FILM COATED ORAL EVERY 12 HOURS
Refills: 0 | Status: DISCONTINUED | OUTPATIENT
Start: 2023-01-01 | End: 2023-01-01

## 2023-01-01 RX ORDER — KETAMINE HYDROCHLORIDE 100 MG/ML
0.25 INJECTION INTRAMUSCULAR; INTRAVENOUS
Qty: 1000 | Refills: 0 | Status: DISCONTINUED | OUTPATIENT
Start: 2023-01-01 | End: 2023-01-01

## 2023-01-01 RX ORDER — CISATRACURIUM BESYLATE 2 MG/ML
3 INJECTION INTRAVENOUS
Qty: 200 | Refills: 0 | Status: DISCONTINUED | OUTPATIENT
Start: 2023-01-01 | End: 2023-01-01

## 2023-01-01 RX ORDER — HEPARIN SODIUM 5000 [USP'U]/ML
1000 INJECTION INTRAVENOUS; SUBCUTANEOUS
Qty: 25000 | Refills: 0 | Status: DISCONTINUED | OUTPATIENT
Start: 2023-01-01 | End: 2023-01-01

## 2023-01-01 RX ORDER — LIDOCAINE 4 G/100G
15 CREAM TOPICAL ONCE
Refills: 0 | Status: COMPLETED | OUTPATIENT
Start: 2023-01-01 | End: 2023-01-01

## 2023-01-01 RX ORDER — MIDAZOLAM HYDROCHLORIDE 1 MG/ML
0.02 INJECTION, SOLUTION INTRAMUSCULAR; INTRAVENOUS
Qty: 100 | Refills: 0 | Status: DISCONTINUED | OUTPATIENT
Start: 2023-01-01 | End: 2023-01-01

## 2023-01-01 RX ORDER — SODIUM CHLORIDE 9 MG/ML
4 INJECTION INTRAMUSCULAR; INTRAVENOUS; SUBCUTANEOUS EVERY 12 HOURS
Refills: 0 | Status: DISCONTINUED | OUTPATIENT
Start: 2023-01-01 | End: 2023-01-01

## 2023-01-01 RX ORDER — CASPOFUNGIN ACETATE 7 MG/ML
70 INJECTION, POWDER, LYOPHILIZED, FOR SOLUTION INTRAVENOUS ONCE
Refills: 0 | Status: COMPLETED | OUTPATIENT
Start: 2023-01-01 | End: 2023-01-01

## 2023-01-01 RX ORDER — NOREPINEPHRINE BITARTRATE/D5W 8 MG/250ML
0.05 PLASTIC BAG, INJECTION (ML) INTRAVENOUS
Qty: 16 | Refills: 0 | Status: DISCONTINUED | OUTPATIENT
Start: 2023-01-01 | End: 2023-01-01

## 2023-01-01 RX ORDER — FENTANYL CITRATE 50 UG/ML
100 INJECTION INTRAVENOUS ONCE
Refills: 0 | Status: DISCONTINUED | OUTPATIENT
Start: 2023-01-01 | End: 2023-01-01

## 2023-01-01 RX ORDER — GLUCAGON INJECTION, SOLUTION 0.5 MG/.1ML
1 INJECTION, SOLUTION SUBCUTANEOUS ONCE
Refills: 0 | Status: DISCONTINUED | OUTPATIENT
Start: 2023-01-01 | End: 2023-01-01

## 2023-01-01 RX ORDER — FENTANYL CITRATE 50 UG/ML
12.5 INJECTION INTRAVENOUS ONCE
Refills: 0 | Status: DISCONTINUED | OUTPATIENT
Start: 2023-01-01 | End: 2023-01-01

## 2023-01-01 RX ORDER — ACETYLCYSTEINE 200 MG/ML
4 VIAL (ML) MISCELLANEOUS EVERY 4 HOURS
Refills: 0 | Status: DISCONTINUED | OUTPATIENT
Start: 2023-01-01 | End: 2023-01-01

## 2023-01-01 RX ORDER — CLOPIDOGREL BISULFATE 75 MG/1
75 TABLET, FILM COATED ORAL DAILY
Refills: 0 | Status: DISCONTINUED | OUTPATIENT
Start: 2023-01-01 | End: 2023-01-01

## 2023-01-01 RX ORDER — SODIUM CHLORIDE 9 MG/ML
10 INJECTION INTRAMUSCULAR; INTRAVENOUS; SUBCUTANEOUS
Refills: 0 | Status: DISCONTINUED | OUTPATIENT
Start: 2023-01-01 | End: 2023-01-01

## 2023-01-01 RX ORDER — ERYTHROMYCIN BASE 5 MG/GRAM
1 OINTMENT (GRAM) OPHTHALMIC (EYE)
Refills: 0 | Status: DISCONTINUED | OUTPATIENT
Start: 2023-01-01 | End: 2023-01-01

## 2023-01-01 RX ORDER — ALBUMIN HUMAN 25 %
250 VIAL (ML) INTRAVENOUS ONCE
Refills: 0 | Status: DISCONTINUED | OUTPATIENT
Start: 2023-01-01 | End: 2023-01-01

## 2023-01-01 RX ORDER — ERYTHROMYCIN ETHYLSUCCINATE 400 MG
TABLET ORAL
Refills: 0 | Status: DISCONTINUED | OUTPATIENT
Start: 2023-01-01 | End: 2023-01-01

## 2023-01-01 RX ORDER — BUMETANIDE 0.25 MG/ML
1 INJECTION INTRAMUSCULAR; INTRAVENOUS EVERY 12 HOURS
Refills: 0 | Status: DISCONTINUED | OUTPATIENT
Start: 2023-01-01 | End: 2023-01-01

## 2023-01-01 RX ORDER — HEPARIN SODIUM 5000 [USP'U]/ML
11 INJECTION INTRAVENOUS; SUBCUTANEOUS
Qty: 25000 | Refills: 0 | Status: DISCONTINUED | OUTPATIENT
Start: 2023-01-01 | End: 2023-01-01

## 2023-01-01 RX ORDER — DEXTROSE 50 % IN WATER 50 %
15 SYRINGE (ML) INTRAVENOUS ONCE
Refills: 0 | Status: DISCONTINUED | OUTPATIENT
Start: 2023-01-01 | End: 2023-01-01

## 2023-01-01 RX ORDER — DEXMEDETOMIDINE HYDROCHLORIDE IN 0.9% SODIUM CHLORIDE 4 UG/ML
1 INJECTION INTRAVENOUS
Qty: 400 | Refills: 0 | Status: ACTIVE | OUTPATIENT
Start: 2023-01-01 | End: 2024-03-01

## 2023-01-01 RX ORDER — ALBUTEROL 90 UG/1
1 AEROSOL, METERED ORAL EVERY 8 HOURS
Refills: 0 | Status: DISCONTINUED | OUTPATIENT
Start: 2023-01-01 | End: 2023-01-01

## 2023-01-01 RX ORDER — POLYETHYLENE GLYCOL 3350 17 G/17G
17 POWDER, FOR SOLUTION ORAL DAILY
Refills: 0 | Status: DISCONTINUED | OUTPATIENT
Start: 2023-01-01 | End: 2023-01-01

## 2023-01-01 RX ORDER — TRAZODONE HCL 50 MG
25 TABLET ORAL AT BEDTIME
Refills: 0 | Status: DISCONTINUED | OUTPATIENT
Start: 2023-01-01 | End: 2023-01-01

## 2023-01-01 RX ORDER — DEXTROSE 50 % IN WATER 50 %
12.5 SYRINGE (ML) INTRAVENOUS ONCE
Refills: 0 | Status: DISCONTINUED | OUTPATIENT
Start: 2023-01-01 | End: 2023-01-01

## 2023-01-01 RX ORDER — FLUCONAZOLE 150 MG/1
400 TABLET ORAL EVERY 24 HOURS
Refills: 0 | Status: DISCONTINUED | OUTPATIENT
Start: 2023-01-01 | End: 2023-01-01

## 2023-01-01 RX ORDER — FAMOTIDINE 10 MG/ML
20 INJECTION INTRAVENOUS DAILY
Refills: 0 | Status: DISCONTINUED | OUTPATIENT
Start: 2023-01-01 | End: 2023-01-01

## 2023-01-01 RX ORDER — HEPARIN SODIUM 5000 [USP'U]/ML
1100 INJECTION INTRAVENOUS; SUBCUTANEOUS
Qty: 25000 | Refills: 0 | Status: DISCONTINUED | OUTPATIENT
Start: 2023-01-01 | End: 2023-01-01

## 2023-01-01 RX ORDER — HYDROCORTISONE 20 MG
50 TABLET ORAL EVERY 6 HOURS
Refills: 0 | Status: DISCONTINUED | OUTPATIENT
Start: 2023-01-01 | End: 2023-01-01

## 2023-01-01 RX ORDER — PANTOPRAZOLE SODIUM 20 MG/1
40 TABLET, DELAYED RELEASE ORAL DAILY
Refills: 0 | Status: DISCONTINUED | OUTPATIENT
Start: 2023-01-01 | End: 2023-01-01

## 2023-01-01 RX ORDER — TAMSULOSIN HYDROCHLORIDE 0.4 MG/1
0.4 CAPSULE ORAL EVERY 12 HOURS
Refills: 0 | Status: DISCONTINUED | OUTPATIENT
Start: 2023-01-01 | End: 2023-01-01

## 2023-01-01 RX ORDER — LIDOCAINE HCL 20 MG/ML
5 VIAL (ML) INJECTION ONCE
Refills: 0 | Status: COMPLETED | OUTPATIENT
Start: 2023-01-01 | End: 2023-01-01

## 2023-01-01 RX ORDER — FINASTERIDE 5 MG/1
5 TABLET, FILM COATED ORAL
Qty: 90 | Refills: 3 | Status: DISCONTINUED | COMMUNITY
Start: 2020-07-28 | End: 2023-01-01

## 2023-01-01 RX ORDER — FUROSEMIDE 40 MG
80 TABLET ORAL ONCE
Refills: 0 | Status: COMPLETED | OUTPATIENT
Start: 2023-01-01 | End: 2023-01-01

## 2023-01-01 RX ORDER — DEXMEDETOMIDINE HYDROCHLORIDE IN 0.9% SODIUM CHLORIDE 4 UG/ML
1 INJECTION INTRAVENOUS
Qty: 400 | Refills: 0 | Status: DISCONTINUED | OUTPATIENT
Start: 2023-01-01 | End: 2023-01-01

## 2023-01-01 RX ORDER — PROPOFOL 10 MG/ML
5 INJECTION, EMULSION INTRAVENOUS
Qty: 1000 | Refills: 0 | Status: DISCONTINUED | OUTPATIENT
Start: 2023-01-01 | End: 2023-01-01

## 2023-01-01 RX ORDER — ACETYLCYSTEINE 200 MG/ML
4 VIAL (ML) MISCELLANEOUS EVERY 8 HOURS
Refills: 0 | Status: DISCONTINUED | OUTPATIENT
Start: 2023-01-01 | End: 2023-01-01

## 2023-01-01 RX ORDER — PHENYLEPHRINE HYDROCHLORIDE 10 MG/ML
0.1 INJECTION INTRAVENOUS
Qty: 40 | Refills: 0 | Status: DISCONTINUED | OUTPATIENT
Start: 2023-01-01 | End: 2023-01-01

## 2023-01-01 RX ORDER — ERYTHROMYCIN ETHYLSUCCINATE 400 MG
250 TABLET ORAL EVERY 8 HOURS
Refills: 0 | Status: DISCONTINUED | OUTPATIENT
Start: 2023-01-01 | End: 2023-01-01

## 2023-01-01 RX ORDER — MIDAZOLAM HYDROCHLORIDE 1 MG/ML
1 INJECTION, SOLUTION INTRAMUSCULAR; INTRAVENOUS ONCE
Refills: 0 | Status: COMPLETED | OUTPATIENT
Start: 2023-01-01 | End: 2023-01-01

## 2023-01-01 RX ADMIN — Medication 5 MILLIGRAM(S): at 05:29

## 2023-01-01 RX ADMIN — Medication 125 MILLIGRAM(S): at 17:13

## 2023-01-01 RX ADMIN — Medication 3: at 23:30

## 2023-01-01 RX ADMIN — ALBUTEROL 1 PUFF(S): 90 AEROSOL, METERED ORAL at 15:08

## 2023-01-01 RX ADMIN — Medication 1 DROP(S): at 17:18

## 2023-01-01 RX ADMIN — ALBUTEROL 1 PUFF(S): 90 AEROSOL, METERED ORAL at 20:03

## 2023-01-01 RX ADMIN — Medication 10 MILLILITER(S): at 04:17

## 2023-01-01 RX ADMIN — MIDAZOLAM HYDROCHLORIDE 2 MILLIGRAM(S): 1 INJECTION, SOLUTION INTRAMUSCULAR; INTRAVENOUS at 11:08

## 2023-01-01 RX ADMIN — HEPARIN SODIUM 11 UNIT(S)/HR: 5000 INJECTION INTRAVENOUS; SUBCUTANEOUS at 22:50

## 2023-01-01 RX ADMIN — Medication 1 DROP(S): at 19:19

## 2023-01-01 RX ADMIN — Medication 4 MILLIGRAM(S): at 21:59

## 2023-01-01 RX ADMIN — Medication 81 MILLIGRAM(S): at 11:08

## 2023-01-01 RX ADMIN — ALBUTEROL 1 PUFF(S): 90 AEROSOL, METERED ORAL at 09:01

## 2023-01-01 RX ADMIN — Medication 10 MILLILITER(S): at 05:13

## 2023-01-01 RX ADMIN — Medication 4 MILLIGRAM(S): at 21:44

## 2023-01-01 RX ADMIN — ALBUTEROL 1 PUFF(S): 90 AEROSOL, METERED ORAL at 07:28

## 2023-01-01 RX ADMIN — MORPHINE SULFATE 2 MILLIGRAM(S): 50 CAPSULE, EXTENDED RELEASE ORAL at 05:01

## 2023-01-01 RX ADMIN — Medication 1 DROP(S): at 05:06

## 2023-01-01 RX ADMIN — Medication 2: at 17:58

## 2023-01-01 RX ADMIN — ATORVASTATIN CALCIUM 80 MILLIGRAM(S): 80 TABLET, FILM COATED ORAL at 22:47

## 2023-01-01 RX ADMIN — HEPARIN SODIUM 5000 UNIT(S): 5000 INJECTION INTRAVENOUS; SUBCUTANEOUS at 05:13

## 2023-01-01 RX ADMIN — Medication 10 MILLILITER(S): at 23:51

## 2023-01-01 RX ADMIN — CLOPIDOGREL BISULFATE 75 MILLIGRAM(S): 75 TABLET, FILM COATED ORAL at 11:52

## 2023-01-01 RX ADMIN — Medication 50 MILLIEQUIVALENT(S): at 07:16

## 2023-01-01 RX ADMIN — Medication 1 DROP(S): at 12:11

## 2023-01-01 RX ADMIN — CLOPIDOGREL BISULFATE 75 MILLIGRAM(S): 75 TABLET, FILM COATED ORAL at 11:17

## 2023-01-01 RX ADMIN — Medication 1 DROP(S): at 05:11

## 2023-01-01 RX ADMIN — Medication 1 DROP(S): at 06:08

## 2023-01-01 RX ADMIN — Medication 650 MILLIGRAM(S): at 18:30

## 2023-01-01 RX ADMIN — Medication 1 DROP(S): at 06:22

## 2023-01-01 RX ADMIN — ALBUTEROL 1 PUFF(S): 90 AEROSOL, METERED ORAL at 02:33

## 2023-01-01 RX ADMIN — MIDODRINE HYDROCHLORIDE 10 MILLIGRAM(S): 2.5 TABLET ORAL at 17:32

## 2023-01-01 RX ADMIN — Medication 0: at 23:51

## 2023-01-01 RX ADMIN — Medication 81 MILLIGRAM(S): at 11:28

## 2023-01-01 RX ADMIN — Medication 81 MILLIGRAM(S): at 11:40

## 2023-01-01 RX ADMIN — CHLORHEXIDINE GLUCONATE 1 APPLICATION(S): 213 SOLUTION TOPICAL at 05:47

## 2023-01-01 RX ADMIN — CLOPIDOGREL BISULFATE 75 MILLIGRAM(S): 75 TABLET, FILM COATED ORAL at 11:37

## 2023-01-01 RX ADMIN — Medication 81 MILLIGRAM(S): at 18:30

## 2023-01-01 RX ADMIN — Medication 50 MILLILITER(S): at 01:50

## 2023-01-01 RX ADMIN — HEPARIN SODIUM 5000 UNIT(S): 5000 INJECTION INTRAVENOUS; SUBCUTANEOUS at 05:47

## 2023-01-01 RX ADMIN — CISATRACURIUM BESYLATE 10 MILLIGRAM(S): 2 INJECTION INTRAVENOUS at 19:23

## 2023-01-01 RX ADMIN — Medication 3: at 23:35

## 2023-01-01 RX ADMIN — Medication 4 MILLIGRAM(S): at 21:39

## 2023-01-01 RX ADMIN — SODIUM CHLORIDE 4 MILLILITER(S): 9 INJECTION INTRAMUSCULAR; INTRAVENOUS; SUBCUTANEOUS at 08:17

## 2023-01-01 RX ADMIN — Medication 1 DROP(S): at 01:42

## 2023-01-01 RX ADMIN — ALBUTEROL 1 PUFF(S): 90 AEROSOL, METERED ORAL at 02:17

## 2023-01-01 RX ADMIN — CLOPIDOGREL BISULFATE 75 MILLIGRAM(S): 75 TABLET, FILM COATED ORAL at 12:38

## 2023-01-01 RX ADMIN — FENTANYL CITRATE 100 MICROGRAM(S): 50 INJECTION INTRAVENOUS at 17:10

## 2023-01-01 RX ADMIN — DEXMEDETOMIDINE HYDROCHLORIDE IN 0.9% SODIUM CHLORIDE 1.59 MICROGRAM(S)/KG/HR: 4 INJECTION INTRAVENOUS at 21:42

## 2023-01-01 RX ADMIN — FENTANYL CITRATE 12.7 MICROGRAM(S)/KG/HR: 50 INJECTION INTRAVENOUS at 08:13

## 2023-01-01 RX ADMIN — ATORVASTATIN CALCIUM 80 MILLIGRAM(S): 80 TABLET, FILM COATED ORAL at 21:10

## 2023-01-01 RX ADMIN — ALBUTEROL 1 PUFF(S): 90 AEROSOL, METERED ORAL at 00:12

## 2023-01-01 RX ADMIN — INSULIN HUMAN 10 UNIT(S): 100 INJECTION, SOLUTION SUBCUTANEOUS at 06:48

## 2023-01-01 RX ADMIN — Medication 125 MILLIGRAM(S): at 23:52

## 2023-01-01 RX ADMIN — Medication 10 MILLILITER(S): at 21:33

## 2023-01-01 RX ADMIN — Medication 2.98 MICROGRAM(S)/KG/MIN: at 22:17

## 2023-01-01 RX ADMIN — Medication 10 MILLILITER(S): at 21:38

## 2023-01-01 RX ADMIN — Medication 10 MILLILITER(S): at 10:28

## 2023-01-01 RX ADMIN — FENTANYL CITRATE 12.7 MICROGRAM(S)/KG/HR: 50 INJECTION INTRAVENOUS at 06:00

## 2023-01-01 RX ADMIN — MIDODRINE HYDROCHLORIDE 10 MILLIGRAM(S): 2.5 TABLET ORAL at 21:19

## 2023-01-01 RX ADMIN — SODIUM CHLORIDE 3000 MILLILITER(S): 9 INJECTION, SOLUTION INTRAVENOUS at 11:40

## 2023-01-01 RX ADMIN — Medication 2: at 07:00

## 2023-01-01 RX ADMIN — PIPERACILLIN AND TAZOBACTAM 25 GRAM(S): 4; .5 INJECTION, POWDER, LYOPHILIZED, FOR SOLUTION INTRAVENOUS at 06:00

## 2023-01-01 RX ADMIN — CEFEPIME 100 MILLIGRAM(S): 1 INJECTION, POWDER, FOR SOLUTION INTRAMUSCULAR; INTRAVENOUS at 17:25

## 2023-01-01 RX ADMIN — ALBUTEROL 1 PUFF(S): 90 AEROSOL, METERED ORAL at 16:14

## 2023-01-01 RX ADMIN — Medication 1 DROP(S): at 17:28

## 2023-01-01 RX ADMIN — Medication 125 MILLIGRAM(S): at 17:26

## 2023-01-01 RX ADMIN — Medication 1.19 MICROGRAM(S)/KG/MIN: at 07:34

## 2023-01-01 RX ADMIN — PANTOPRAZOLE SODIUM 40 MILLIGRAM(S): 20 TABLET, DELAYED RELEASE ORAL at 12:58

## 2023-01-01 RX ADMIN — Medication 1 DROP(S): at 18:32

## 2023-01-01 RX ADMIN — Medication 1 DROP(S): at 12:40

## 2023-01-01 RX ADMIN — MIDODRINE HYDROCHLORIDE 5 MILLIGRAM(S): 2.5 TABLET ORAL at 09:01

## 2023-01-01 RX ADMIN — CEFEPIME 100 MILLIGRAM(S): 1 INJECTION, POWDER, FOR SOLUTION INTRAMUSCULAR; INTRAVENOUS at 05:55

## 2023-01-01 RX ADMIN — SENNA PLUS 2 TABLET(S): 8.6 TABLET ORAL at 21:17

## 2023-01-01 RX ADMIN — PROPOFOL 1.91 MICROGRAM(S)/KG/MIN: 10 INJECTION, EMULSION INTRAVENOUS at 22:42

## 2023-01-01 RX ADMIN — Medication 250 MILLIGRAM(S): at 18:32

## 2023-01-01 RX ADMIN — MIDODRINE HYDROCHLORIDE 10 MILLIGRAM(S): 2.5 TABLET ORAL at 22:04

## 2023-01-01 RX ADMIN — HEPARIN SODIUM 5000 UNIT(S): 5000 INJECTION INTRAVENOUS; SUBCUTANEOUS at 13:35

## 2023-01-01 RX ADMIN — MORPHINE SULFATE 2 MILLIGRAM(S): 50 CAPSULE, EXTENDED RELEASE ORAL at 05:27

## 2023-01-01 RX ADMIN — FENTANYL CITRATE 100 MICROGRAM(S): 50 INJECTION INTRAVENOUS at 17:38

## 2023-01-01 RX ADMIN — ATORVASTATIN CALCIUM 80 MILLIGRAM(S): 80 TABLET, FILM COATED ORAL at 23:56

## 2023-01-01 RX ADMIN — CHLORHEXIDINE GLUCONATE 15 MILLILITER(S): 213 SOLUTION TOPICAL at 05:47

## 2023-01-01 RX ADMIN — Medication 20 MILLIGRAM(S): at 16:51

## 2023-01-01 RX ADMIN — CHLORHEXIDINE GLUCONATE 15 MILLILITER(S): 213 SOLUTION TOPICAL at 05:24

## 2023-01-01 RX ADMIN — CHLORHEXIDINE GLUCONATE 1 APPLICATION(S): 213 SOLUTION TOPICAL at 05:24

## 2023-01-01 RX ADMIN — Medication 4 MILLIGRAM(S): at 21:14

## 2023-01-01 RX ADMIN — PHENYLEPHRINE HYDROCHLORIDE 1.19 MICROGRAM(S)/KG/MIN: 10 INJECTION INTRAVENOUS at 22:39

## 2023-01-01 RX ADMIN — SODIUM CHLORIDE 3000 MILLILITER(S): 9 INJECTION, SOLUTION INTRAVENOUS at 09:41

## 2023-01-01 RX ADMIN — Medication 650 MILLIGRAM(S): at 00:16

## 2023-01-01 RX ADMIN — Medication 125 MILLIGRAM(S): at 23:08

## 2023-01-01 RX ADMIN — CHLORHEXIDINE GLUCONATE 1 APPLICATION(S): 213 SOLUTION TOPICAL at 05:12

## 2023-01-01 RX ADMIN — Medication 10 MILLILITER(S): at 07:16

## 2023-01-01 RX ADMIN — PIPERACILLIN AND TAZOBACTAM 25 GRAM(S): 4; .5 INJECTION, POWDER, LYOPHILIZED, FOR SOLUTION INTRAVENOUS at 06:40

## 2023-01-01 RX ADMIN — CHLORHEXIDINE GLUCONATE 1 APPLICATION(S): 213 SOLUTION TOPICAL at 06:39

## 2023-01-01 RX ADMIN — MIDAZOLAM HYDROCHLORIDE 1 MILLIGRAM(S): 1 INJECTION, SOLUTION INTRAMUSCULAR; INTRAVENOUS at 02:00

## 2023-01-01 RX ADMIN — HEPARIN SODIUM 5000 UNIT(S): 5000 INJECTION INTRAVENOUS; SUBCUTANEOUS at 13:08

## 2023-01-01 RX ADMIN — KETAMINE HYDROCHLORIDE 1.59 MG/KG/HR: 100 INJECTION INTRAMUSCULAR; INTRAVENOUS at 17:22

## 2023-01-01 RX ADMIN — HEPARIN SODIUM 5000 UNIT(S): 5000 INJECTION INTRAVENOUS; SUBCUTANEOUS at 14:24

## 2023-01-01 RX ADMIN — Medication 1 DROP(S): at 06:26

## 2023-01-01 RX ADMIN — MIDODRINE HYDROCHLORIDE 10 MILLIGRAM(S): 2.5 TABLET ORAL at 11:20

## 2023-01-01 RX ADMIN — Medication 63.75 MILLIMOLE(S): at 23:49

## 2023-01-01 RX ADMIN — Medication 4 MILLIGRAM(S): at 22:19

## 2023-01-01 RX ADMIN — Medication 1 DROP(S): at 17:19

## 2023-01-01 RX ADMIN — HEPARIN SODIUM 5000 UNIT(S): 5000 INJECTION INTRAVENOUS; SUBCUTANEOUS at 21:46

## 2023-01-01 RX ADMIN — Medication 5 MILLIGRAM(S): at 15:12

## 2023-01-01 RX ADMIN — CHLORHEXIDINE GLUCONATE 15 MILLILITER(S): 213 SOLUTION TOPICAL at 17:21

## 2023-01-01 RX ADMIN — Medication 1 DROP(S): at 05:56

## 2023-01-01 RX ADMIN — Medication 5: at 12:56

## 2023-01-01 RX ADMIN — Medication 125 MILLIGRAM(S): at 17:45

## 2023-01-01 RX ADMIN — MIDODRINE HYDROCHLORIDE 10 MILLIGRAM(S): 2.5 TABLET ORAL at 21:41

## 2023-01-01 RX ADMIN — Medication 5 MILLIGRAM(S): at 21:44

## 2023-01-01 RX ADMIN — PIPERACILLIN AND TAZOBACTAM 25 GRAM(S): 4; .5 INJECTION, POWDER, LYOPHILIZED, FOR SOLUTION INTRAVENOUS at 05:30

## 2023-01-01 RX ADMIN — FENTANYL CITRATE 50 MICROGRAM(S): 50 INJECTION INTRAVENOUS at 11:30

## 2023-01-01 RX ADMIN — Medication 125 MILLIGRAM(S): at 17:17

## 2023-01-01 RX ADMIN — CHLORHEXIDINE GLUCONATE 15 MILLILITER(S): 213 SOLUTION TOPICAL at 17:31

## 2023-01-01 RX ADMIN — SENNA PLUS 2 TABLET(S): 8.6 TABLET ORAL at 21:14

## 2023-01-01 RX ADMIN — Medication 10 MILLILITER(S): at 09:01

## 2023-01-01 RX ADMIN — Medication 63.75 MILLIMOLE(S): at 04:00

## 2023-01-01 RX ADMIN — CLOPIDOGREL BISULFATE 75 MILLIGRAM(S): 75 TABLET, FILM COATED ORAL at 11:44

## 2023-01-01 RX ADMIN — CLOPIDOGREL BISULFATE 75 MILLIGRAM(S): 75 TABLET, FILM COATED ORAL at 11:13

## 2023-01-01 RX ADMIN — Medication 4: at 18:14

## 2023-01-01 RX ADMIN — Medication 1 DROP(S): at 23:01

## 2023-01-01 RX ADMIN — CHLORHEXIDINE GLUCONATE 1 APPLICATION(S): 213 SOLUTION TOPICAL at 05:18

## 2023-01-01 RX ADMIN — Medication 5 MILLIGRAM(S): at 05:45

## 2023-01-01 RX ADMIN — Medication 4 MILLIGRAM(S): at 21:25

## 2023-01-01 RX ADMIN — Medication 125 MILLIGRAM(S): at 18:08

## 2023-01-01 RX ADMIN — Medication 10 MILLILITER(S): at 17:12

## 2023-01-01 RX ADMIN — CEFEPIME 100 MILLIGRAM(S): 1 INJECTION, POWDER, FOR SOLUTION INTRAMUSCULAR; INTRAVENOUS at 05:24

## 2023-01-01 RX ADMIN — MIDODRINE HYDROCHLORIDE 10 MILLIGRAM(S): 2.5 TABLET ORAL at 21:44

## 2023-01-01 RX ADMIN — Medication 1 DROP(S): at 05:47

## 2023-01-01 RX ADMIN — Medication 4 MILLIGRAM(S): at 21:36

## 2023-01-01 RX ADMIN — Medication 1 DROP(S): at 17:59

## 2023-01-01 RX ADMIN — Medication 4 MILLIGRAM(S): at 21:33

## 2023-01-01 RX ADMIN — CHLORHEXIDINE GLUCONATE 15 MILLILITER(S): 213 SOLUTION TOPICAL at 17:08

## 2023-01-01 RX ADMIN — ATORVASTATIN CALCIUM 80 MILLIGRAM(S): 80 TABLET, FILM COATED ORAL at 21:38

## 2023-01-01 RX ADMIN — ATORVASTATIN CALCIUM 80 MILLIGRAM(S): 80 TABLET, FILM COATED ORAL at 21:29

## 2023-01-01 RX ADMIN — Medication 1 DROP(S): at 00:26

## 2023-01-01 RX ADMIN — FENTANYL CITRATE 25 MICROGRAM(S): 50 INJECTION INTRAVENOUS at 02:14

## 2023-01-01 RX ADMIN — PANTOPRAZOLE SODIUM 40 MILLIGRAM(S): 20 TABLET, DELAYED RELEASE ORAL at 11:52

## 2023-01-01 RX ADMIN — MIDODRINE HYDROCHLORIDE 5 MILLIGRAM(S): 2.5 TABLET ORAL at 19:21

## 2023-01-01 RX ADMIN — LIDOCAINE 15 MILLILITER(S): 4 CREAM TOPICAL at 11:01

## 2023-01-01 RX ADMIN — MIDODRINE HYDROCHLORIDE 10 MILLIGRAM(S): 2.5 TABLET ORAL at 11:17

## 2023-01-01 RX ADMIN — Medication 3: at 17:26

## 2023-01-01 RX ADMIN — CHLORHEXIDINE GLUCONATE 15 MILLILITER(S): 213 SOLUTION TOPICAL at 18:23

## 2023-01-01 RX ADMIN — Medication 125 MILLILITER(S): at 20:07

## 2023-01-01 RX ADMIN — MIDODRINE HYDROCHLORIDE 5 MILLIGRAM(S): 2.5 TABLET ORAL at 11:44

## 2023-01-01 RX ADMIN — Medication 50 MILLIGRAM(S): at 05:37

## 2023-01-01 RX ADMIN — Medication 5 MILLIGRAM(S): at 22:03

## 2023-01-01 RX ADMIN — Medication 50 MILLIEQUIVALENT(S): at 00:05

## 2023-01-01 RX ADMIN — CHLORHEXIDINE GLUCONATE 1 APPLICATION(S): 213 SOLUTION TOPICAL at 05:30

## 2023-01-01 RX ADMIN — CHLORHEXIDINE GLUCONATE 15 MILLILITER(S): 213 SOLUTION TOPICAL at 05:10

## 2023-01-01 RX ADMIN — MIDODRINE HYDROCHLORIDE 10 MILLIGRAM(S): 2.5 TABLET ORAL at 22:02

## 2023-01-01 RX ADMIN — Medication 25 GRAM(S): at 13:20

## 2023-01-01 RX ADMIN — PANTOPRAZOLE SODIUM 40 MILLIGRAM(S): 20 TABLET, DELAYED RELEASE ORAL at 17:30

## 2023-01-01 RX ADMIN — ATORVASTATIN CALCIUM 80 MILLIGRAM(S): 80 TABLET, FILM COATED ORAL at 21:53

## 2023-01-01 RX ADMIN — BUMETANIDE 1 MILLIGRAM(S): 0.25 INJECTION INTRAMUSCULAR; INTRAVENOUS at 05:48

## 2023-01-01 RX ADMIN — Medication 1 DROP(S): at 05:33

## 2023-01-01 RX ADMIN — Medication 2.98 MICROGRAM(S)/KG/MIN: at 08:43

## 2023-01-01 RX ADMIN — MIDODRINE HYDROCHLORIDE 5 MILLIGRAM(S): 2.5 TABLET ORAL at 04:19

## 2023-01-01 RX ADMIN — Medication 1 DROP(S): at 00:23

## 2023-01-01 RX ADMIN — PANTOPRAZOLE SODIUM 40 MILLIGRAM(S): 20 TABLET, DELAYED RELEASE ORAL at 11:48

## 2023-01-01 RX ADMIN — FENTANYL CITRATE 14 MICROGRAM(S)/KG/HR: 50 INJECTION INTRAVENOUS at 16:00

## 2023-01-01 RX ADMIN — CHLORHEXIDINE GLUCONATE 15 MILLILITER(S): 213 SOLUTION TOPICAL at 18:37

## 2023-01-01 RX ADMIN — HEPARIN SODIUM 5000 UNIT(S): 5000 INJECTION INTRAVENOUS; SUBCUTANEOUS at 21:31

## 2023-01-01 RX ADMIN — ALBUTEROL 1 PUFF(S): 90 AEROSOL, METERED ORAL at 14:26

## 2023-01-01 RX ADMIN — ALBUTEROL 2.5 MILLIGRAM(S): 90 AEROSOL, METERED ORAL at 23:52

## 2023-01-01 RX ADMIN — Medication 5 MILLIGRAM(S): at 13:32

## 2023-01-01 RX ADMIN — CHLORHEXIDINE GLUCONATE 15 MILLILITER(S): 213 SOLUTION TOPICAL at 16:08

## 2023-01-01 RX ADMIN — Medication 2: at 06:02

## 2023-01-01 RX ADMIN — Medication 3: at 23:07

## 2023-01-01 RX ADMIN — ALBUTEROL 1 PUFF(S): 90 AEROSOL, METERED ORAL at 15:27

## 2023-01-01 RX ADMIN — Medication 5 MILLIGRAM(S): at 13:57

## 2023-01-01 RX ADMIN — MIDODRINE HYDROCHLORIDE 5 MILLIGRAM(S): 2.5 TABLET ORAL at 21:58

## 2023-01-01 RX ADMIN — Medication 4 MILLILITER(S): at 08:09

## 2023-01-01 RX ADMIN — HEPARIN SODIUM 5000 UNIT(S): 5000 INJECTION INTRAVENOUS; SUBCUTANEOUS at 13:16

## 2023-01-01 RX ADMIN — Medication 81 MILLIGRAM(S): at 11:22

## 2023-01-01 RX ADMIN — CHLORHEXIDINE GLUCONATE 15 MILLILITER(S): 213 SOLUTION TOPICAL at 06:39

## 2023-01-01 RX ADMIN — HEPARIN SODIUM 5000 UNIT(S): 5000 INJECTION INTRAVENOUS; SUBCUTANEOUS at 21:24

## 2023-01-01 RX ADMIN — HEPARIN SODIUM 5000 UNIT(S): 5000 INJECTION INTRAVENOUS; SUBCUTANEOUS at 14:00

## 2023-01-01 RX ADMIN — MIDODRINE HYDROCHLORIDE 5 MILLIGRAM(S): 2.5 TABLET ORAL at 06:35

## 2023-01-01 RX ADMIN — Medication 10 MILLILITER(S): at 03:52

## 2023-01-01 RX ADMIN — Medication 20 MILLIGRAM(S): at 11:21

## 2023-01-01 RX ADMIN — Medication 10 MILLILITER(S): at 05:29

## 2023-01-01 RX ADMIN — HEPARIN SODIUM 5000 UNIT(S): 5000 INJECTION INTRAVENOUS; SUBCUTANEOUS at 13:04

## 2023-01-01 RX ADMIN — FENTANYL CITRATE 25 MICROGRAM(S): 50 INJECTION INTRAVENOUS at 05:40

## 2023-01-01 RX ADMIN — Medication 1 DROP(S): at 17:31

## 2023-01-01 RX ADMIN — CHLORHEXIDINE GLUCONATE 15 MILLILITER(S): 213 SOLUTION TOPICAL at 05:32

## 2023-01-01 RX ADMIN — Medication 1 DROP(S): at 00:30

## 2023-01-01 RX ADMIN — PIPERACILLIN AND TAZOBACTAM 200 GRAM(S): 4; .5 INJECTION, POWDER, LYOPHILIZED, FOR SOLUTION INTRAVENOUS at 08:43

## 2023-01-01 RX ADMIN — POTASSIUM PHOSPHATE, MONOBASIC POTASSIUM PHOSPHATE, DIBASIC 62.5 MILLIMOLE(S): 236; 224 INJECTION, SOLUTION INTRAVENOUS at 23:57

## 2023-01-01 RX ADMIN — Medication 125 MILLIGRAM(S): at 06:23

## 2023-01-01 RX ADMIN — ATORVASTATIN CALCIUM 80 MILLIGRAM(S): 80 TABLET, FILM COATED ORAL at 21:35

## 2023-01-01 RX ADMIN — HEPARIN SODIUM 5000 UNIT(S): 5000 INJECTION INTRAVENOUS; SUBCUTANEOUS at 15:01

## 2023-01-01 RX ADMIN — HEPARIN SODIUM 5000 UNIT(S): 5000 INJECTION INTRAVENOUS; SUBCUTANEOUS at 21:35

## 2023-01-01 RX ADMIN — Medication 5 MILLIGRAM(S): at 13:09

## 2023-01-01 RX ADMIN — Medication 1 DROP(S): at 05:30

## 2023-01-01 RX ADMIN — HEPARIN SODIUM 5000 UNIT(S): 5000 INJECTION INTRAVENOUS; SUBCUTANEOUS at 13:20

## 2023-01-01 RX ADMIN — Medication 50 MILLIGRAM(S): at 17:53

## 2023-01-01 RX ADMIN — MIDODRINE HYDROCHLORIDE 10 MILLIGRAM(S): 2.5 TABLET ORAL at 06:09

## 2023-01-01 RX ADMIN — Medication 125 MILLIGRAM(S): at 23:59

## 2023-01-01 RX ADMIN — Medication 50 MILLILITER(S): at 08:23

## 2023-01-01 RX ADMIN — Medication 10 MILLILITER(S): at 05:27

## 2023-01-01 RX ADMIN — Medication 1 DROP(S): at 17:39

## 2023-01-01 RX ADMIN — Medication 81 MILLIGRAM(S): at 11:18

## 2023-01-01 RX ADMIN — ATORVASTATIN CALCIUM 80 MILLIGRAM(S): 80 TABLET, FILM COATED ORAL at 22:19

## 2023-01-01 RX ADMIN — MIDODRINE HYDROCHLORIDE 10 MILLIGRAM(S): 2.5 TABLET ORAL at 13:05

## 2023-01-01 RX ADMIN — FENTANYL CITRATE 12.7 MICROGRAM(S)/KG/HR: 50 INJECTION INTRAVENOUS at 13:37

## 2023-01-01 RX ADMIN — MIDODRINE HYDROCHLORIDE 5 MILLIGRAM(S): 2.5 TABLET ORAL at 05:13

## 2023-01-01 RX ADMIN — ATORVASTATIN CALCIUM 80 MILLIGRAM(S): 80 TABLET, FILM COATED ORAL at 22:02

## 2023-01-01 RX ADMIN — Medication 10 MILLILITER(S): at 05:57

## 2023-01-01 RX ADMIN — MIDAZOLAM HYDROCHLORIDE 1 MILLIGRAM(S): 1 INJECTION, SOLUTION INTRAMUSCULAR; INTRAVENOUS at 03:00

## 2023-01-01 RX ADMIN — Medication 125 MILLIGRAM(S): at 23:37

## 2023-01-01 RX ADMIN — CHLORHEXIDINE GLUCONATE 15 MILLILITER(S): 213 SOLUTION TOPICAL at 18:44

## 2023-01-01 RX ADMIN — MIDODRINE HYDROCHLORIDE 10 MILLIGRAM(S): 2.5 TABLET ORAL at 05:12

## 2023-01-01 RX ADMIN — Medication 10 MILLILITER(S): at 11:25

## 2023-01-01 RX ADMIN — Medication 125 MILLIGRAM(S): at 17:11

## 2023-01-01 RX ADMIN — HEPARIN SODIUM 5000 UNIT(S): 5000 INJECTION INTRAVENOUS; SUBCUTANEOUS at 05:15

## 2023-01-01 RX ADMIN — Medication 125 MILLIGRAM(S): at 13:05

## 2023-01-01 RX ADMIN — ATORVASTATIN CALCIUM 80 MILLIGRAM(S): 80 TABLET, FILM COATED ORAL at 22:15

## 2023-01-01 RX ADMIN — Medication 2.98 MICROGRAM(S)/KG/MIN: at 16:00

## 2023-01-01 RX ADMIN — Medication 10 MILLILITER(S): at 11:11

## 2023-01-01 RX ADMIN — Medication 1 DROP(S): at 17:10

## 2023-01-01 RX ADMIN — Medication 1 DROP(S): at 06:00

## 2023-01-01 RX ADMIN — MIDODRINE HYDROCHLORIDE 10 MILLIGRAM(S): 2.5 TABLET ORAL at 06:54

## 2023-01-01 RX ADMIN — Medication 10 MILLILITER(S): at 17:11

## 2023-01-01 RX ADMIN — Medication 1 DROP(S): at 18:09

## 2023-01-01 RX ADMIN — FENTANYL CITRATE 25 MICROGRAM(S): 50 INJECTION INTRAVENOUS at 03:19

## 2023-01-01 RX ADMIN — Medication 3: at 12:10

## 2023-01-01 RX ADMIN — Medication 1 DROP(S): at 12:57

## 2023-01-01 RX ADMIN — Medication 125 MILLILITER(S): at 09:39

## 2023-01-01 RX ADMIN — MIDODRINE HYDROCHLORIDE 10 MILLIGRAM(S): 2.5 TABLET ORAL at 21:24

## 2023-01-01 RX ADMIN — MIDODRINE HYDROCHLORIDE 10 MILLIGRAM(S): 2.5 TABLET ORAL at 21:36

## 2023-01-01 RX ADMIN — CHLORHEXIDINE GLUCONATE 1 APPLICATION(S): 213 SOLUTION TOPICAL at 05:27

## 2023-01-01 RX ADMIN — PIPERACILLIN AND TAZOBACTAM 25 GRAM(S): 4; .5 INJECTION, POWDER, LYOPHILIZED, FOR SOLUTION INTRAVENOUS at 14:43

## 2023-01-01 RX ADMIN — SODIUM CHLORIDE 500 MILLILITER(S): 9 INJECTION, SOLUTION INTRAVENOUS at 17:03

## 2023-01-01 RX ADMIN — MIDODRINE HYDROCHLORIDE 10 MILLIGRAM(S): 2.5 TABLET ORAL at 13:01

## 2023-01-01 RX ADMIN — Medication 81 MILLIGRAM(S): at 12:28

## 2023-01-01 RX ADMIN — HEPARIN SODIUM 5000 UNIT(S): 5000 INJECTION INTRAVENOUS; SUBCUTANEOUS at 21:17

## 2023-01-01 RX ADMIN — FAMOTIDINE 20 MILLIGRAM(S): 10 INJECTION INTRAVENOUS at 12:11

## 2023-01-01 RX ADMIN — Medication 1 DROP(S): at 18:22

## 2023-01-01 RX ADMIN — ALBUTEROL 1 PUFF(S): 90 AEROSOL, METERED ORAL at 07:48

## 2023-01-01 RX ADMIN — Medication 1 DROP(S): at 00:24

## 2023-01-01 RX ADMIN — Medication 125 MILLIGRAM(S): at 05:12

## 2023-01-01 RX ADMIN — ATORVASTATIN CALCIUM 80 MILLIGRAM(S): 80 TABLET, FILM COATED ORAL at 21:37

## 2023-01-01 RX ADMIN — DEXMEDETOMIDINE HYDROCHLORIDE IN 0.9% SODIUM CHLORIDE 0.79 MICROGRAM(S)/KG/HR: 4 INJECTION INTRAVENOUS at 18:27

## 2023-01-01 RX ADMIN — FENTANYL CITRATE 25 MICROGRAM(S): 50 INJECTION INTRAVENOUS at 19:00

## 2023-01-01 RX ADMIN — Medication 81 MILLIGRAM(S): at 13:34

## 2023-01-01 RX ADMIN — HEPARIN SODIUM 5000 UNIT(S): 5000 INJECTION INTRAVENOUS; SUBCUTANEOUS at 05:30

## 2023-01-01 RX ADMIN — Medication 10 MILLIGRAM(S): at 05:29

## 2023-01-01 RX ADMIN — OLANZAPINE 5 MILLIGRAM(S): 15 TABLET, FILM COATED ORAL at 18:08

## 2023-01-01 RX ADMIN — ALBUTEROL 2.5 MILLIGRAM(S): 90 AEROSOL, METERED ORAL at 16:33

## 2023-01-01 RX ADMIN — Medication 125 MILLIGRAM(S): at 23:42

## 2023-01-01 RX ADMIN — Medication 63.75 MILLIMOLE(S): at 00:58

## 2023-01-01 RX ADMIN — Medication 4 MILLIGRAM(S): at 21:21

## 2023-01-01 RX ADMIN — Medication 10 MILLILITER(S): at 03:21

## 2023-01-01 RX ADMIN — HEPARIN SODIUM 5000 UNIT(S): 5000 INJECTION INTRAVENOUS; SUBCUTANEOUS at 05:51

## 2023-01-01 RX ADMIN — Medication 50 MILLIEQUIVALENT(S): at 21:49

## 2023-01-01 RX ADMIN — HEPARIN SODIUM 5000 UNIT(S): 5000 INJECTION INTRAVENOUS; SUBCUTANEOUS at 06:00

## 2023-01-01 RX ADMIN — CHLORHEXIDINE GLUCONATE 15 MILLILITER(S): 213 SOLUTION TOPICAL at 05:31

## 2023-01-01 RX ADMIN — HEPARIN SODIUM 5000 UNIT(S): 5000 INJECTION INTRAVENOUS; SUBCUTANEOUS at 15:28

## 2023-01-01 RX ADMIN — Medication 20 MILLIGRAM(S): at 13:57

## 2023-01-01 RX ADMIN — Medication 2: at 12:00

## 2023-01-01 RX ADMIN — MIDODRINE HYDROCHLORIDE 10 MILLIGRAM(S): 2.5 TABLET ORAL at 22:50

## 2023-01-01 RX ADMIN — Medication 100 GRAM(S): at 00:39

## 2023-01-01 RX ADMIN — CHLORHEXIDINE GLUCONATE 15 MILLILITER(S): 213 SOLUTION TOPICAL at 17:17

## 2023-01-01 RX ADMIN — Medication 1 DROP(S): at 17:26

## 2023-01-01 RX ADMIN — Medication 4 MILLILITER(S): at 23:26

## 2023-01-01 RX ADMIN — Medication 10 MILLILITER(S): at 07:26

## 2023-01-01 RX ADMIN — Medication 125 MILLIGRAM(S): at 23:04

## 2023-01-01 RX ADMIN — ALBUTEROL 1 PUFF(S): 90 AEROSOL, METERED ORAL at 01:44

## 2023-01-01 RX ADMIN — FAMOTIDINE 20 MILLIGRAM(S): 10 INJECTION INTRAVENOUS at 11:41

## 2023-01-01 RX ADMIN — Medication 250 MILLIGRAM(S): at 09:04

## 2023-01-01 RX ADMIN — MIDODRINE HYDROCHLORIDE 10 MILLIGRAM(S): 2.5 TABLET ORAL at 05:20

## 2023-01-01 RX ADMIN — Medication 1 DROP(S): at 23:48

## 2023-01-01 RX ADMIN — HEPARIN SODIUM 5000 UNIT(S): 5000 INJECTION INTRAVENOUS; SUBCUTANEOUS at 14:46

## 2023-01-01 RX ADMIN — DEXMEDETOMIDINE HYDROCHLORIDE IN 0.9% SODIUM CHLORIDE 1.59 MICROGRAM(S)/KG/HR: 4 INJECTION INTRAVENOUS at 06:27

## 2023-01-01 RX ADMIN — SODIUM CHLORIDE 100 MILLILITER(S): 9 INJECTION, SOLUTION INTRAVENOUS at 13:04

## 2023-01-01 RX ADMIN — Medication 1 DROP(S): at 11:18

## 2023-01-01 RX ADMIN — Medication 1 DROP(S): at 12:14

## 2023-01-01 RX ADMIN — ALBUTEROL 1 PUFF(S): 90 AEROSOL, METERED ORAL at 08:32

## 2023-01-01 RX ADMIN — Medication 40 MILLIGRAM(S): at 01:12

## 2023-01-01 RX ADMIN — Medication 1 DROP(S): at 23:51

## 2023-01-01 RX ADMIN — PANTOPRAZOLE SODIUM 40 MILLIGRAM(S): 20 TABLET, DELAYED RELEASE ORAL at 12:32

## 2023-01-01 RX ADMIN — CHLORHEXIDINE GLUCONATE 15 MILLILITER(S): 213 SOLUTION TOPICAL at 05:57

## 2023-01-01 RX ADMIN — MIDODRINE HYDROCHLORIDE 10 MILLIGRAM(S): 2.5 TABLET ORAL at 13:03

## 2023-01-01 RX ADMIN — Medication 250 MILLIGRAM(S): at 17:04

## 2023-01-01 RX ADMIN — ALBUTEROL 2.5 MILLIGRAM(S): 90 AEROSOL, METERED ORAL at 00:01

## 2023-01-01 RX ADMIN — VASOPRESSIN 3 UNIT(S)/MIN: 20 INJECTION INTRAVENOUS at 22:20

## 2023-01-01 RX ADMIN — Medication 125 MILLIGRAM(S): at 05:13

## 2023-01-01 RX ADMIN — MIDODRINE HYDROCHLORIDE 10 MILLIGRAM(S): 2.5 TABLET ORAL at 06:00

## 2023-01-01 RX ADMIN — Medication 10 MILLILITER(S): at 21:21

## 2023-01-01 RX ADMIN — MIDODRINE HYDROCHLORIDE 10 MILLIGRAM(S): 2.5 TABLET ORAL at 06:13

## 2023-01-01 RX ADMIN — TAMSULOSIN HYDROCHLORIDE 0.4 MILLIGRAM(S): 0.4 CAPSULE ORAL at 05:55

## 2023-01-01 RX ADMIN — Medication 1 DROP(S): at 05:39

## 2023-01-01 RX ADMIN — OLANZAPINE 5 MILLIGRAM(S): 15 TABLET, FILM COATED ORAL at 10:00

## 2023-01-01 RX ADMIN — Medication 1 DROP(S): at 11:34

## 2023-01-01 RX ADMIN — Medication 5 MILLILITER(S): at 17:10

## 2023-01-01 RX ADMIN — VASOPRESSIN 3 UNIT(S)/MIN: 20 INJECTION INTRAVENOUS at 21:32

## 2023-01-01 RX ADMIN — HEPARIN SODIUM 5000 UNIT(S): 5000 INJECTION INTRAVENOUS; SUBCUTANEOUS at 14:21

## 2023-01-01 RX ADMIN — SODIUM CHLORIDE 500 MILLILITER(S): 9 INJECTION, SOLUTION INTRAVENOUS at 15:10

## 2023-01-01 RX ADMIN — TAMSULOSIN HYDROCHLORIDE 0.4 MILLIGRAM(S): 0.4 CAPSULE ORAL at 17:44

## 2023-01-01 RX ADMIN — Medication 4: at 18:56

## 2023-01-01 RX ADMIN — FAMOTIDINE 20 MILLIGRAM(S): 10 INJECTION INTRAVENOUS at 11:21

## 2023-01-01 RX ADMIN — Medication 1 DROP(S): at 06:05

## 2023-01-01 RX ADMIN — Medication 1 DROP(S): at 05:51

## 2023-01-01 RX ADMIN — HEPARIN SODIUM 5000 UNIT(S): 5000 INJECTION INTRAVENOUS; SUBCUTANEOUS at 05:43

## 2023-01-01 RX ADMIN — Medication 1 DROP(S): at 11:32

## 2023-01-01 RX ADMIN — Medication 4: at 01:08

## 2023-01-01 RX ADMIN — CHLORHEXIDINE GLUCONATE 1 APPLICATION(S): 213 SOLUTION TOPICAL at 05:51

## 2023-01-01 RX ADMIN — MIDAZOLAM HYDROCHLORIDE 1 MILLIGRAM(S): 1 INJECTION, SOLUTION INTRAMUSCULAR; INTRAVENOUS at 15:58

## 2023-01-01 RX ADMIN — HEPARIN SODIUM 5000 UNIT(S): 5000 INJECTION INTRAVENOUS; SUBCUTANEOUS at 05:21

## 2023-01-01 RX ADMIN — Medication 250 MILLIGRAM(S): at 22:03

## 2023-01-01 RX ADMIN — Medication 1 DROP(S): at 13:09

## 2023-01-01 RX ADMIN — CHLORHEXIDINE GLUCONATE 15 MILLILITER(S): 213 SOLUTION TOPICAL at 17:28

## 2023-01-01 RX ADMIN — Medication 1 DROP(S): at 17:24

## 2023-01-01 RX ADMIN — Medication 50 MILLIGRAM(S): at 00:00

## 2023-01-01 RX ADMIN — Medication 1 DROP(S): at 17:38

## 2023-01-01 RX ADMIN — Medication 125 MILLILITER(S): at 21:24

## 2023-01-01 RX ADMIN — CHLORHEXIDINE GLUCONATE 1 APPLICATION(S): 213 SOLUTION TOPICAL at 12:58

## 2023-01-01 RX ADMIN — Medication 1 DROP(S): at 05:29

## 2023-01-01 RX ADMIN — Medication 250 MILLIGRAM(S): at 13:35

## 2023-01-01 RX ADMIN — Medication 100 GRAM(S): at 03:14

## 2023-01-01 RX ADMIN — Medication 2: at 11:37

## 2023-01-01 RX ADMIN — Medication 1 DROP(S): at 12:25

## 2023-01-01 RX ADMIN — CLOPIDOGREL BISULFATE 75 MILLIGRAM(S): 75 TABLET, FILM COATED ORAL at 11:19

## 2023-01-01 RX ADMIN — Medication 4 MILLILITER(S): at 00:01

## 2023-01-01 RX ADMIN — SENNA PLUS 2 TABLET(S): 8.6 TABLET ORAL at 21:24

## 2023-01-01 RX ADMIN — Medication 650 MILLIGRAM(S): at 12:38

## 2023-01-01 RX ADMIN — Medication 10 MILLILITER(S): at 21:28

## 2023-01-01 RX ADMIN — MIDODRINE HYDROCHLORIDE 10 MILLIGRAM(S): 2.5 TABLET ORAL at 13:52

## 2023-01-01 RX ADMIN — Medication 81 MILLIGRAM(S): at 11:32

## 2023-01-01 RX ADMIN — Medication 650 MILLIGRAM(S): at 13:10

## 2023-01-01 RX ADMIN — Medication 1 DROP(S): at 05:28

## 2023-01-01 RX ADMIN — MIDODRINE HYDROCHLORIDE 10 MILLIGRAM(S): 2.5 TABLET ORAL at 23:26

## 2023-01-01 RX ADMIN — PIPERACILLIN AND TAZOBACTAM 25 GRAM(S): 4; .5 INJECTION, POWDER, LYOPHILIZED, FOR SOLUTION INTRAVENOUS at 21:10

## 2023-01-01 RX ADMIN — ALBUTEROL 1 PUFF(S): 90 AEROSOL, METERED ORAL at 16:34

## 2023-01-01 RX ADMIN — Medication 5 MILLIGRAM(S): at 05:24

## 2023-01-01 RX ADMIN — HEPARIN SODIUM 5000 UNIT(S): 5000 INJECTION INTRAVENOUS; SUBCUTANEOUS at 21:30

## 2023-01-01 RX ADMIN — SENNA PLUS 2 TABLET(S): 8.6 TABLET ORAL at 21:48

## 2023-01-01 RX ADMIN — Medication 10 MILLILITER(S): at 22:03

## 2023-01-01 RX ADMIN — Medication 650 MILLIGRAM(S): at 21:24

## 2023-01-01 RX ADMIN — PIPERACILLIN AND TAZOBACTAM 25 GRAM(S): 4; .5 INJECTION, POWDER, LYOPHILIZED, FOR SOLUTION INTRAVENOUS at 18:45

## 2023-01-01 RX ADMIN — Medication 125 MILLIGRAM(S): at 11:52

## 2023-01-01 RX ADMIN — Medication 4 MILLIGRAM(S): at 21:26

## 2023-01-01 RX ADMIN — Medication 1 DROP(S): at 23:50

## 2023-01-01 RX ADMIN — Medication 1 DROP(S): at 23:43

## 2023-01-01 RX ADMIN — MIDODRINE HYDROCHLORIDE 10 MILLIGRAM(S): 2.5 TABLET ORAL at 13:55

## 2023-01-01 RX ADMIN — Medication 10 MILLILITER(S): at 17:41

## 2023-01-01 RX ADMIN — Medication 10 MILLILITER(S): at 21:19

## 2023-01-01 RX ADMIN — ALBUTEROL 1 PUFF(S): 90 AEROSOL, METERED ORAL at 23:50

## 2023-01-01 RX ADMIN — Medication 1 DROP(S): at 18:00

## 2023-01-01 RX ADMIN — HEPARIN SODIUM 5000 UNIT(S): 5000 INJECTION INTRAVENOUS; SUBCUTANEOUS at 13:01

## 2023-01-01 RX ADMIN — Medication 650 MILLIGRAM(S): at 07:22

## 2023-01-01 RX ADMIN — ATORVASTATIN CALCIUM 80 MILLIGRAM(S): 80 TABLET, FILM COATED ORAL at 21:16

## 2023-01-01 RX ADMIN — BUMETANIDE 2 MILLIGRAM(S): 0.25 INJECTION INTRAMUSCULAR; INTRAVENOUS at 09:44

## 2023-01-01 RX ADMIN — CHLORHEXIDINE GLUCONATE 1 APPLICATION(S): 213 SOLUTION TOPICAL at 05:40

## 2023-01-01 RX ADMIN — Medication 4 MILLIGRAM(S): at 22:00

## 2023-01-01 RX ADMIN — Medication 125 MILLIGRAM(S): at 11:43

## 2023-01-01 RX ADMIN — Medication 1 DROP(S): at 11:10

## 2023-01-01 RX ADMIN — DEXMEDETOMIDINE HYDROCHLORIDE IN 0.9% SODIUM CHLORIDE 1.59 MICROGRAM(S)/KG/HR: 4 INJECTION INTRAVENOUS at 11:39

## 2023-01-01 RX ADMIN — Medication 81 MILLIGRAM(S): at 12:35

## 2023-01-01 RX ADMIN — Medication 1 DROP(S): at 23:23

## 2023-01-01 RX ADMIN — HEPARIN SODIUM 5000 UNIT(S): 5000 INJECTION INTRAVENOUS; SUBCUTANEOUS at 13:52

## 2023-01-01 RX ADMIN — CHLORHEXIDINE GLUCONATE 15 MILLILITER(S): 213 SOLUTION TOPICAL at 05:05

## 2023-01-01 RX ADMIN — Medication 1 DROP(S): at 17:14

## 2023-01-01 RX ADMIN — SODIUM ZIRCONIUM CYCLOSILICATE 5 GRAM(S): 10 POWDER, FOR SUSPENSION ORAL at 13:20

## 2023-01-01 RX ADMIN — MIDODRINE HYDROCHLORIDE 10 MILLIGRAM(S): 2.5 TABLET ORAL at 18:13

## 2023-01-01 RX ADMIN — CHLORHEXIDINE GLUCONATE 15 MILLILITER(S): 213 SOLUTION TOPICAL at 06:15

## 2023-01-01 RX ADMIN — Medication 1 DROP(S): at 11:30

## 2023-01-01 RX ADMIN — PANTOPRAZOLE SODIUM 40 MILLIGRAM(S): 20 TABLET, DELAYED RELEASE ORAL at 06:08

## 2023-01-01 RX ADMIN — CLOPIDOGREL BISULFATE 75 MILLIGRAM(S): 75 TABLET, FILM COATED ORAL at 14:00

## 2023-01-01 RX ADMIN — MIDAZOLAM HYDROCHLORIDE 1 MILLIGRAM(S): 1 INJECTION, SOLUTION INTRAMUSCULAR; INTRAVENOUS at 16:05

## 2023-01-01 RX ADMIN — Medication 125 MILLIGRAM(S): at 06:31

## 2023-01-01 RX ADMIN — ALBUTEROL 1 PUFF(S): 90 AEROSOL, METERED ORAL at 15:06

## 2023-01-01 RX ADMIN — Medication 1 DROP(S): at 18:15

## 2023-01-01 RX ADMIN — ALBUTEROL 1 PUFF(S): 90 AEROSOL, METERED ORAL at 07:54

## 2023-01-01 RX ADMIN — FENTANYL CITRATE 25 MICROGRAM(S): 50 INJECTION INTRAVENOUS at 04:40

## 2023-01-01 RX ADMIN — Medication 4 MILLILITER(S): at 16:01

## 2023-01-01 RX ADMIN — FENTANYL CITRATE 25 MICROGRAM(S): 50 INJECTION INTRAVENOUS at 03:21

## 2023-01-01 RX ADMIN — Medication 2: at 23:19

## 2023-01-01 RX ADMIN — Medication 10 MILLILITER(S): at 05:43

## 2023-01-01 RX ADMIN — Medication 81 MILLIGRAM(S): at 14:00

## 2023-01-01 RX ADMIN — Medication 1 DROP(S): at 17:03

## 2023-01-01 RX ADMIN — FENTANYL CITRATE 50 MICROGRAM(S): 50 INJECTION INTRAVENOUS at 11:08

## 2023-01-01 RX ADMIN — Medication 1 DROP(S): at 23:10

## 2023-01-01 RX ADMIN — Medication 1.19 MICROGRAM(S)/KG/MIN: at 03:19

## 2023-01-01 RX ADMIN — PIPERACILLIN AND TAZOBACTAM 25 GRAM(S): 4; .5 INJECTION, POWDER, LYOPHILIZED, FOR SOLUTION INTRAVENOUS at 14:46

## 2023-01-01 RX ADMIN — ALBUTEROL 1 PUFF(S): 90 AEROSOL, METERED ORAL at 08:30

## 2023-01-01 RX ADMIN — MIDODRINE HYDROCHLORIDE 10 MILLIGRAM(S): 2.5 TABLET ORAL at 13:57

## 2023-01-01 RX ADMIN — Medication 125 MILLIGRAM(S): at 07:30

## 2023-01-01 RX ADMIN — Medication 125 MILLIGRAM(S): at 17:10

## 2023-01-01 RX ADMIN — Medication 1.19 MICROGRAM(S)/KG/MIN: at 00:38

## 2023-01-01 RX ADMIN — VASOPRESSIN 4.5 UNIT(S)/MIN: 20 INJECTION INTRAVENOUS at 07:45

## 2023-01-01 RX ADMIN — CHLORHEXIDINE GLUCONATE 1 APPLICATION(S): 213 SOLUTION TOPICAL at 06:35

## 2023-01-01 RX ADMIN — MIDODRINE HYDROCHLORIDE 10 MILLIGRAM(S): 2.5 TABLET ORAL at 21:37

## 2023-01-01 RX ADMIN — Medication 100 MILLIGRAM(S): at 13:00

## 2023-01-01 RX ADMIN — MIDODRINE HYDROCHLORIDE 10 MILLIGRAM(S): 2.5 TABLET ORAL at 13:39

## 2023-01-01 RX ADMIN — Medication 10 MILLILITER(S): at 05:50

## 2023-01-01 RX ADMIN — CHLORHEXIDINE GLUCONATE 15 MILLILITER(S): 213 SOLUTION TOPICAL at 06:23

## 2023-01-01 RX ADMIN — Medication 4 MILLIGRAM(S): at 21:52

## 2023-01-01 RX ADMIN — Medication 125 MILLIGRAM(S): at 05:48

## 2023-01-01 RX ADMIN — MIDODRINE HYDROCHLORIDE 10 MILLIGRAM(S): 2.5 TABLET ORAL at 15:02

## 2023-01-01 RX ADMIN — Medication 250 MILLIGRAM(S): at 21:34

## 2023-01-01 RX ADMIN — Medication 1 DROP(S): at 05:20

## 2023-01-01 RX ADMIN — Medication 125 MILLIGRAM(S): at 05:22

## 2023-01-01 RX ADMIN — SODIUM CHLORIDE 100 MILLILITER(S): 9 INJECTION, SOLUTION INTRAVENOUS at 18:45

## 2023-01-01 RX ADMIN — VASOPRESSIN 1.5 UNIT(S)/MIN: 20 INJECTION INTRAVENOUS at 01:41

## 2023-01-01 RX ADMIN — Medication 1 DROP(S): at 23:44

## 2023-01-01 RX ADMIN — Medication 3: at 05:59

## 2023-01-01 RX ADMIN — ATORVASTATIN CALCIUM 80 MILLIGRAM(S): 80 TABLET, FILM COATED ORAL at 21:39

## 2023-01-01 RX ADMIN — Medication 81 MILLIGRAM(S): at 12:38

## 2023-01-01 RX ADMIN — FAMOTIDINE 20 MILLIGRAM(S): 10 INJECTION INTRAVENOUS at 11:55

## 2023-01-01 RX ADMIN — Medication 50 MILLIEQUIVALENT(S): at 22:56

## 2023-01-01 RX ADMIN — Medication 250 MILLIGRAM(S): at 10:38

## 2023-01-01 RX ADMIN — Medication 81 MILLIGRAM(S): at 11:17

## 2023-01-01 RX ADMIN — Medication 1 DROP(S): at 14:02

## 2023-01-01 RX ADMIN — CHLORHEXIDINE GLUCONATE 1 APPLICATION(S): 213 SOLUTION TOPICAL at 05:50

## 2023-01-01 RX ADMIN — CHLORHEXIDINE GLUCONATE 1 APPLICATION(S): 213 SOLUTION TOPICAL at 05:17

## 2023-01-01 RX ADMIN — MIDODRINE HYDROCHLORIDE 10 MILLIGRAM(S): 2.5 TABLET ORAL at 13:24

## 2023-01-01 RX ADMIN — ALBUTEROL 1 PUFF(S): 90 AEROSOL, METERED ORAL at 15:17

## 2023-01-01 RX ADMIN — Medication 125 MILLIGRAM(S): at 23:57

## 2023-01-01 RX ADMIN — Medication 50 MILLILITER(S): at 17:11

## 2023-01-01 RX ADMIN — FAMOTIDINE 20 MILLIGRAM(S): 10 INJECTION INTRAVENOUS at 11:24

## 2023-01-01 RX ADMIN — Medication 10 MILLILITER(S): at 14:58

## 2023-01-01 RX ADMIN — Medication 81 MILLIGRAM(S): at 11:00

## 2023-01-01 RX ADMIN — Medication 0.6 MICROGRAM(S)/KG/MIN: at 22:42

## 2023-01-01 RX ADMIN — CHLORHEXIDINE GLUCONATE 15 MILLILITER(S): 213 SOLUTION TOPICAL at 05:26

## 2023-01-01 RX ADMIN — Medication 1 DROP(S): at 17:12

## 2023-01-01 RX ADMIN — Medication 5: at 12:31

## 2023-01-01 RX ADMIN — MORPHINE SULFATE 4 MILLIGRAM(S): 50 CAPSULE, EXTENDED RELEASE ORAL at 01:23

## 2023-01-01 RX ADMIN — PIPERACILLIN AND TAZOBACTAM 25 GRAM(S): 4; .5 INJECTION, POWDER, LYOPHILIZED, FOR SOLUTION INTRAVENOUS at 05:13

## 2023-01-01 RX ADMIN — HEPARIN SODIUM 5000 UNIT(S): 5000 INJECTION INTRAVENOUS; SUBCUTANEOUS at 22:05

## 2023-01-01 RX ADMIN — Medication 125 MILLIGRAM(S): at 11:28

## 2023-01-01 RX ADMIN — OLANZAPINE 5 MILLIGRAM(S): 15 TABLET, FILM COATED ORAL at 05:43

## 2023-01-01 RX ADMIN — Medication 2: at 06:33

## 2023-01-01 RX ADMIN — CEFEPIME 100 MILLIGRAM(S): 1 INJECTION, POWDER, FOR SOLUTION INTRAMUSCULAR; INTRAVENOUS at 05:18

## 2023-01-01 RX ADMIN — CHLORHEXIDINE GLUCONATE 15 MILLILITER(S): 213 SOLUTION TOPICAL at 17:33

## 2023-01-01 RX ADMIN — MIDODRINE HYDROCHLORIDE 5 MILLIGRAM(S): 2.5 TABLET ORAL at 23:32

## 2023-01-01 RX ADMIN — Medication 100 MILLIGRAM(S): at 05:12

## 2023-01-01 RX ADMIN — MIDODRINE HYDROCHLORIDE 10 MILLIGRAM(S): 2.5 TABLET ORAL at 13:34

## 2023-01-01 RX ADMIN — POLYETHYLENE GLYCOL 3350 17 GRAM(S): 17 POWDER, FOR SOLUTION ORAL at 11:00

## 2023-01-01 RX ADMIN — MIDODRINE HYDROCHLORIDE 10 MILLIGRAM(S): 2.5 TABLET ORAL at 14:02

## 2023-01-01 RX ADMIN — Medication 4 MILLILITER(S): at 08:32

## 2023-01-01 RX ADMIN — CEFEPIME 100 MILLIGRAM(S): 1 INJECTION, POWDER, FOR SOLUTION INTRAMUSCULAR; INTRAVENOUS at 18:37

## 2023-01-01 RX ADMIN — Medication 10 MILLILITER(S): at 21:58

## 2023-01-01 RX ADMIN — Medication 0: at 06:08

## 2023-01-01 RX ADMIN — ALBUTEROL 1 PUFF(S): 90 AEROSOL, METERED ORAL at 07:43

## 2023-01-01 RX ADMIN — Medication 5 MILLIGRAM(S): at 13:03

## 2023-01-01 RX ADMIN — Medication 10 MILLILITER(S): at 05:16

## 2023-01-01 RX ADMIN — Medication 10 MILLILITER(S): at 17:26

## 2023-01-01 RX ADMIN — DEXMEDETOMIDINE HYDROCHLORIDE IN 0.9% SODIUM CHLORIDE 15.9 MICROGRAM(S)/KG/HR: 4 INJECTION INTRAVENOUS at 09:43

## 2023-01-01 RX ADMIN — HEPARIN SODIUM 5000 UNIT(S): 5000 INJECTION INTRAVENOUS; SUBCUTANEOUS at 22:47

## 2023-01-01 RX ADMIN — Medication 5 MILLIGRAM(S): at 21:34

## 2023-01-01 RX ADMIN — CHLORHEXIDINE GLUCONATE 15 MILLILITER(S): 213 SOLUTION TOPICAL at 17:03

## 2023-01-01 RX ADMIN — Medication 10 MILLILITER(S): at 21:51

## 2023-01-01 RX ADMIN — MIDODRINE HYDROCHLORIDE 10 MILLIGRAM(S): 2.5 TABLET ORAL at 14:24

## 2023-01-01 RX ADMIN — Medication 1 DROP(S): at 17:32

## 2023-01-01 RX ADMIN — POTASSIUM PHOSPHATE, MONOBASIC POTASSIUM PHOSPHATE, DIBASIC 83.33 MILLIMOLE(S): 236; 224 INJECTION, SOLUTION INTRAVENOUS at 00:39

## 2023-01-01 RX ADMIN — SODIUM CHLORIDE 100 MILLILITER(S): 9 INJECTION, SOLUTION INTRAVENOUS at 00:05

## 2023-01-01 RX ADMIN — SODIUM CHLORIDE 1000 MILLILITER(S): 9 INJECTION, SOLUTION INTRAVENOUS at 07:03

## 2023-01-01 RX ADMIN — SODIUM POLYSTYRENE SULFONATE 30 GRAM(S): 4.1 POWDER, FOR SUSPENSION ORAL at 06:58

## 2023-01-01 RX ADMIN — FENTANYL CITRATE 12.7 MICROGRAM(S)/KG/HR: 50 INJECTION INTRAVENOUS at 03:42

## 2023-01-01 RX ADMIN — Medication 250 MILLIGRAM(S): at 06:13

## 2023-01-01 RX ADMIN — CLOPIDOGREL BISULFATE 75 MILLIGRAM(S): 75 TABLET, FILM COATED ORAL at 12:35

## 2023-01-01 RX ADMIN — DEXMEDETOMIDINE HYDROCHLORIDE IN 0.9% SODIUM CHLORIDE 1.59 MICROGRAM(S)/KG/HR: 4 INJECTION INTRAVENOUS at 06:00

## 2023-01-01 RX ADMIN — OLANZAPINE 5 MILLIGRAM(S): 15 TABLET, FILM COATED ORAL at 12:40

## 2023-01-01 RX ADMIN — Medication 50 MILLILITER(S): at 13:18

## 2023-01-01 RX ADMIN — Medication 2: at 18:31

## 2023-01-01 RX ADMIN — Medication 81 MILLIGRAM(S): at 12:11

## 2023-01-01 RX ADMIN — HEPARIN SODIUM 5000 UNIT(S): 5000 INJECTION INTRAVENOUS; SUBCUTANEOUS at 06:55

## 2023-01-01 RX ADMIN — Medication 10 MILLILITER(S): at 13:57

## 2023-01-01 RX ADMIN — CLOPIDOGREL BISULFATE 75 MILLIGRAM(S): 75 TABLET, FILM COATED ORAL at 11:00

## 2023-01-01 RX ADMIN — HEPARIN SODIUM 5000 UNIT(S): 5000 INJECTION INTRAVENOUS; SUBCUTANEOUS at 21:58

## 2023-01-01 RX ADMIN — Medication 1 DROP(S): at 05:16

## 2023-01-01 RX ADMIN — PHENYLEPHRINE HYDROCHLORIDE 1.19 MICROGRAM(S)/KG/MIN: 10 INJECTION INTRAVENOUS at 09:31

## 2023-01-01 RX ADMIN — HEPARIN SODIUM 5000 UNIT(S): 5000 INJECTION INTRAVENOUS; SUBCUTANEOUS at 06:11

## 2023-01-01 RX ADMIN — Medication 1 DROP(S): at 18:37

## 2023-01-01 RX ADMIN — ALBUTEROL 1 PUFF(S): 90 AEROSOL, METERED ORAL at 16:16

## 2023-01-01 RX ADMIN — Medication 40 MILLIGRAM(S): at 00:54

## 2023-01-01 RX ADMIN — HEPARIN SODIUM 5000 UNIT(S): 5000 INJECTION INTRAVENOUS; SUBCUTANEOUS at 06:34

## 2023-01-01 RX ADMIN — DEXMEDETOMIDINE HYDROCHLORIDE IN 0.9% SODIUM CHLORIDE 1.59 MICROGRAM(S)/KG/HR: 4 INJECTION INTRAVENOUS at 14:22

## 2023-01-01 RX ADMIN — CHLORHEXIDINE GLUCONATE 1 APPLICATION(S): 213 SOLUTION TOPICAL at 05:26

## 2023-01-01 RX ADMIN — DEXMEDETOMIDINE HYDROCHLORIDE IN 0.9% SODIUM CHLORIDE 15.9 MICROGRAM(S)/KG/HR: 4 INJECTION INTRAVENOUS at 22:25

## 2023-01-01 RX ADMIN — MIDODRINE HYDROCHLORIDE 10 MILLIGRAM(S): 2.5 TABLET ORAL at 22:14

## 2023-01-01 RX ADMIN — CHLORHEXIDINE GLUCONATE 1 APPLICATION(S): 213 SOLUTION TOPICAL at 05:49

## 2023-01-01 RX ADMIN — Medication 125 MILLIGRAM(S): at 12:11

## 2023-01-01 RX ADMIN — SODIUM CHLORIDE 3000 MILLILITER(S): 9 INJECTION, SOLUTION INTRAVENOUS at 10:46

## 2023-01-01 RX ADMIN — ALBUTEROL 1 PUFF(S): 90 AEROSOL, METERED ORAL at 13:48

## 2023-01-01 RX ADMIN — Medication 5 MILLIGRAM(S): at 21:28

## 2023-01-01 RX ADMIN — DEXMEDETOMIDINE HYDROCHLORIDE IN 0.9% SODIUM CHLORIDE 1.59 MICROGRAM(S)/KG/HR: 4 INJECTION INTRAVENOUS at 07:00

## 2023-01-01 RX ADMIN — Medication 125 MILLIGRAM(S): at 12:16

## 2023-01-01 RX ADMIN — Medication 81 MILLIGRAM(S): at 11:37

## 2023-01-01 RX ADMIN — Medication 0: at 05:40

## 2023-01-01 RX ADMIN — ALBUTEROL 1 PUFF(S): 90 AEROSOL, METERED ORAL at 08:27

## 2023-01-01 RX ADMIN — Medication 1 DROP(S): at 12:45

## 2023-01-01 RX ADMIN — ATORVASTATIN CALCIUM 80 MILLIGRAM(S): 80 TABLET, FILM COATED ORAL at 21:13

## 2023-01-01 RX ADMIN — Medication 250 MILLIGRAM(S): at 06:41

## 2023-01-01 RX ADMIN — Medication 5 MILLIGRAM(S): at 13:52

## 2023-01-01 RX ADMIN — MIDODRINE HYDROCHLORIDE 10 MILLIGRAM(S): 2.5 TABLET ORAL at 23:52

## 2023-01-01 RX ADMIN — DEXMEDETOMIDINE HYDROCHLORIDE IN 0.9% SODIUM CHLORIDE 1.59 MICROGRAM(S)/KG/HR: 4 INJECTION INTRAVENOUS at 12:41

## 2023-01-01 RX ADMIN — PIPERACILLIN AND TAZOBACTAM 25 GRAM(S): 4; .5 INJECTION, POWDER, LYOPHILIZED, FOR SOLUTION INTRAVENOUS at 09:00

## 2023-01-01 RX ADMIN — HEPARIN SODIUM 5000 UNIT(S): 5000 INJECTION INTRAVENOUS; SUBCUTANEOUS at 13:03

## 2023-01-01 RX ADMIN — HEPARIN SODIUM 2500 UNIT(S): 5000 INJECTION INTRAVENOUS; SUBCUTANEOUS at 22:42

## 2023-01-01 RX ADMIN — Medication 5: at 00:07

## 2023-01-01 RX ADMIN — ATORVASTATIN CALCIUM 80 MILLIGRAM(S): 80 TABLET, FILM COATED ORAL at 01:41

## 2023-01-01 RX ADMIN — MIDODRINE HYDROCHLORIDE 10 MILLIGRAM(S): 2.5 TABLET ORAL at 05:46

## 2023-01-01 RX ADMIN — CHLORHEXIDINE GLUCONATE 1 APPLICATION(S): 213 SOLUTION TOPICAL at 06:31

## 2023-01-01 RX ADMIN — MIDODRINE HYDROCHLORIDE 5 MILLIGRAM(S): 2.5 TABLET ORAL at 21:39

## 2023-01-01 RX ADMIN — SODIUM ZIRCONIUM CYCLOSILICATE 5 GRAM(S): 10 POWDER, FOR SUSPENSION ORAL at 05:41

## 2023-01-01 RX ADMIN — CLOPIDOGREL BISULFATE 75 MILLIGRAM(S): 75 TABLET, FILM COATED ORAL at 12:39

## 2023-01-01 RX ADMIN — FAMOTIDINE 20 MILLIGRAM(S): 10 INJECTION INTRAVENOUS at 11:43

## 2023-01-01 RX ADMIN — Medication 1 DROP(S): at 17:33

## 2023-01-01 RX ADMIN — MIDODRINE HYDROCHLORIDE 10 MILLIGRAM(S): 2.5 TABLET ORAL at 05:16

## 2023-01-01 RX ADMIN — POLYETHYLENE GLYCOL 3350 17 GRAM(S): 17 POWDER, FOR SOLUTION ORAL at 11:17

## 2023-01-01 RX ADMIN — MIDAZOLAM HYDROCHLORIDE 2 MILLIGRAM(S): 1 INJECTION, SOLUTION INTRAMUSCULAR; INTRAVENOUS at 05:07

## 2023-01-01 RX ADMIN — POTASSIUM PHOSPHATE, MONOBASIC POTASSIUM PHOSPHATE, DIBASIC 62.5 MILLIMOLE(S): 236; 224 INJECTION, SOLUTION INTRAVENOUS at 22:39

## 2023-01-01 RX ADMIN — CHLORHEXIDINE GLUCONATE 15 MILLILITER(S): 213 SOLUTION TOPICAL at 17:12

## 2023-01-01 RX ADMIN — Medication 125 MILLIGRAM(S): at 17:48

## 2023-01-01 RX ADMIN — Medication 125 MILLILITER(S): at 23:05

## 2023-01-01 RX ADMIN — HEPARIN SODIUM 5000 UNIT(S): 5000 INJECTION INTRAVENOUS; SUBCUTANEOUS at 13:06

## 2023-01-01 RX ADMIN — BUMETANIDE 1 MILLIGRAM(S): 0.25 INJECTION INTRAMUSCULAR; INTRAVENOUS at 16:02

## 2023-01-01 RX ADMIN — Medication 1 DROP(S): at 05:21

## 2023-01-01 RX ADMIN — SODIUM CHLORIDE 30 MILLILITER(S): 9 INJECTION, SOLUTION INTRAVENOUS at 05:43

## 2023-01-01 RX ADMIN — CEFEPIME 100 MILLIGRAM(S): 1 INJECTION, POWDER, FOR SOLUTION INTRAMUSCULAR; INTRAVENOUS at 17:41

## 2023-01-01 RX ADMIN — MIDODRINE HYDROCHLORIDE 10 MILLIGRAM(S): 2.5 TABLET ORAL at 21:29

## 2023-01-01 RX ADMIN — Medication 125 MILLIGRAM(S): at 12:00

## 2023-01-01 RX ADMIN — Medication 50 MILLIEQUIVALENT(S): at 22:54

## 2023-01-01 RX ADMIN — CHLORHEXIDINE GLUCONATE 15 MILLILITER(S): 213 SOLUTION TOPICAL at 05:30

## 2023-01-01 RX ADMIN — Medication 5 MILLIGRAM(S): at 21:40

## 2023-01-01 RX ADMIN — HEPARIN SODIUM 5000 UNIT(S): 5000 INJECTION INTRAVENOUS; SUBCUTANEOUS at 13:32

## 2023-01-01 RX ADMIN — Medication 250 MILLIGRAM(S): at 06:00

## 2023-01-01 RX ADMIN — Medication 10 MILLILITER(S): at 21:35

## 2023-01-01 RX ADMIN — Medication 1 DROP(S): at 06:56

## 2023-01-01 RX ADMIN — Medication 125 MILLIGRAM(S): at 01:13

## 2023-01-01 RX ADMIN — PANTOPRAZOLE SODIUM 40 MILLIGRAM(S): 20 TABLET, DELAYED RELEASE ORAL at 11:17

## 2023-01-01 RX ADMIN — SODIUM CHLORIDE 3000 MILLILITER(S): 9 INJECTION, SOLUTION INTRAVENOUS at 10:15

## 2023-01-01 RX ADMIN — MIDODRINE HYDROCHLORIDE 10 MILLIGRAM(S): 2.5 TABLET ORAL at 06:34

## 2023-01-01 RX ADMIN — SENNA PLUS 2 TABLET(S): 8.6 TABLET ORAL at 21:10

## 2023-01-01 RX ADMIN — PIPERACILLIN AND TAZOBACTAM 25 GRAM(S): 4; .5 INJECTION, POWDER, LYOPHILIZED, FOR SOLUTION INTRAVENOUS at 21:36

## 2023-01-01 RX ADMIN — Medication 50 MILLIEQUIVALENT(S): at 03:00

## 2023-01-01 RX ADMIN — CHLORHEXIDINE GLUCONATE 15 MILLILITER(S): 213 SOLUTION TOPICAL at 18:09

## 2023-01-01 RX ADMIN — DEXMEDETOMIDINE HYDROCHLORIDE IN 0.9% SODIUM CHLORIDE 15.9 MICROGRAM(S)/KG/HR: 4 INJECTION INTRAVENOUS at 06:26

## 2023-01-01 RX ADMIN — SENNA PLUS 2 TABLET(S): 8.6 TABLET ORAL at 22:15

## 2023-01-01 RX ADMIN — Medication 10 MILLIGRAM(S): at 05:40

## 2023-01-01 RX ADMIN — Medication 50 MILLIEQUIVALENT(S): at 00:32

## 2023-01-01 RX ADMIN — MORPHINE SULFATE 2 MILLIGRAM(S): 50 CAPSULE, EXTENDED RELEASE ORAL at 05:37

## 2023-01-01 RX ADMIN — ALBUTEROL 1 PUFF(S): 90 AEROSOL, METERED ORAL at 18:44

## 2023-01-01 RX ADMIN — Medication 1 DROP(S): at 06:11

## 2023-01-01 RX ADMIN — Medication 3: at 05:35

## 2023-01-01 RX ADMIN — Medication 125 MILLIGRAM(S): at 05:46

## 2023-01-01 RX ADMIN — Medication 50 MILLIEQUIVALENT(S): at 01:00

## 2023-01-01 RX ADMIN — ALBUTEROL 1 PUFF(S): 90 AEROSOL, METERED ORAL at 08:02

## 2023-01-01 RX ADMIN — OLANZAPINE 5 MILLIGRAM(S): 15 TABLET, FILM COATED ORAL at 17:11

## 2023-01-01 RX ADMIN — Medication 250 MILLIGRAM(S): at 17:38

## 2023-01-01 RX ADMIN — Medication 10 MILLILITER(S): at 13:24

## 2023-01-01 RX ADMIN — Medication 2.98 MICROGRAM(S)/KG/MIN: at 19:00

## 2023-01-01 RX ADMIN — Medication 10 MILLILITER(S): at 13:40

## 2023-01-01 RX ADMIN — Medication 4 MILLILITER(S): at 23:41

## 2023-01-01 RX ADMIN — Medication 1 DROP(S): at 17:40

## 2023-01-01 RX ADMIN — Medication 1 DROP(S): at 11:51

## 2023-01-01 RX ADMIN — Medication 3: at 05:50

## 2023-01-01 RX ADMIN — Medication 0.5 MILLIGRAM(S): at 05:00

## 2023-01-01 RX ADMIN — CLOPIDOGREL BISULFATE 75 MILLIGRAM(S): 75 TABLET, FILM COATED ORAL at 11:23

## 2023-01-01 RX ADMIN — HEPARIN SODIUM 5000 UNIT(S): 5000 INJECTION INTRAVENOUS; SUBCUTANEOUS at 05:46

## 2023-01-01 RX ADMIN — HEPARIN SODIUM 5000 UNIT(S): 5000 INJECTION INTRAVENOUS; SUBCUTANEOUS at 21:44

## 2023-01-01 RX ADMIN — Medication 1 DROP(S): at 18:08

## 2023-01-01 RX ADMIN — DEXMEDETOMIDINE HYDROCHLORIDE IN 0.9% SODIUM CHLORIDE 1.59 MICROGRAM(S)/KG/HR: 4 INJECTION INTRAVENOUS at 06:59

## 2023-01-01 RX ADMIN — MIDODRINE HYDROCHLORIDE 10 MILLIGRAM(S): 2.5 TABLET ORAL at 21:33

## 2023-01-01 RX ADMIN — Medication 250 MILLIGRAM(S): at 11:34

## 2023-01-01 RX ADMIN — Medication 1 DROP(S): at 17:04

## 2023-01-01 RX ADMIN — Medication 10 MILLILITER(S): at 15:12

## 2023-01-01 RX ADMIN — MIDAZOLAM HYDROCHLORIDE 1 MILLIGRAM(S): 1 INJECTION, SOLUTION INTRAMUSCULAR; INTRAVENOUS at 10:25

## 2023-01-01 RX ADMIN — DEXMEDETOMIDINE HYDROCHLORIDE IN 0.9% SODIUM CHLORIDE 1.59 MICROGRAM(S)/KG/HR: 4 INJECTION INTRAVENOUS at 10:24

## 2023-01-01 RX ADMIN — Medication 650 MILLIGRAM(S): at 11:37

## 2023-01-01 RX ADMIN — Medication 250 MILLIGRAM(S): at 17:00

## 2023-01-01 RX ADMIN — Medication 0.5 MILLIGRAM(S): at 05:27

## 2023-01-01 RX ADMIN — Medication 85 MILLIMOLE(S): at 23:46

## 2023-01-01 RX ADMIN — DEXMEDETOMIDINE HYDROCHLORIDE IN 0.9% SODIUM CHLORIDE 1.59 MICROGRAM(S)/KG/HR: 4 INJECTION INTRAVENOUS at 21:41

## 2023-01-01 RX ADMIN — Medication 125 MILLIGRAM(S): at 23:27

## 2023-01-01 RX ADMIN — ALBUTEROL 1 PUFF(S): 90 AEROSOL, METERED ORAL at 16:04

## 2023-01-01 RX ADMIN — CLOPIDOGREL BISULFATE 75 MILLIGRAM(S): 75 TABLET, FILM COATED ORAL at 11:40

## 2023-01-01 RX ADMIN — Medication 1 DROP(S): at 00:39

## 2023-01-01 RX ADMIN — MIDODRINE HYDROCHLORIDE 10 MILLIGRAM(S): 2.5 TABLET ORAL at 21:59

## 2023-01-01 RX ADMIN — Medication 1 DROP(S): at 05:13

## 2023-01-01 RX ADMIN — Medication 1 DROP(S): at 23:52

## 2023-01-01 RX ADMIN — Medication 10 MILLILITER(S): at 01:36

## 2023-01-01 RX ADMIN — PHENYLEPHRINE HYDROCHLORIDE 1.19 MICROGRAM(S)/KG/MIN: 10 INJECTION INTRAVENOUS at 06:07

## 2023-01-01 RX ADMIN — ATORVASTATIN CALCIUM 80 MILLIGRAM(S): 80 TABLET, FILM COATED ORAL at 22:08

## 2023-01-01 RX ADMIN — ALBUTEROL 1 PUFF(S): 90 AEROSOL, METERED ORAL at 08:15

## 2023-01-01 RX ADMIN — Medication 1 DROP(S): at 05:50

## 2023-01-01 RX ADMIN — SENNA PLUS 2 TABLET(S): 8.6 TABLET ORAL at 22:57

## 2023-01-01 RX ADMIN — HEPARIN SODIUM 5000 UNIT(S): 5000 INJECTION INTRAVENOUS; SUBCUTANEOUS at 13:42

## 2023-01-01 RX ADMIN — Medication 4 MILLIGRAM(S): at 21:31

## 2023-01-01 RX ADMIN — CHLORHEXIDINE GLUCONATE 15 MILLILITER(S): 213 SOLUTION TOPICAL at 17:48

## 2023-01-01 RX ADMIN — Medication 125 MILLIGRAM(S): at 17:08

## 2023-01-01 RX ADMIN — TAMSULOSIN HYDROCHLORIDE 0.4 MILLIGRAM(S): 0.4 CAPSULE ORAL at 18:30

## 2023-01-01 RX ADMIN — HEPARIN SODIUM 5000 UNIT(S): 5000 INJECTION INTRAVENOUS; SUBCUTANEOUS at 14:13

## 2023-01-01 RX ADMIN — ALBUTEROL 1 PUFF(S): 90 AEROSOL, METERED ORAL at 15:25

## 2023-01-01 RX ADMIN — HEPARIN SODIUM 5000 UNIT(S): 5000 INJECTION INTRAVENOUS; SUBCUTANEOUS at 13:48

## 2023-01-01 RX ADMIN — Medication 3: at 05:28

## 2023-01-01 RX ADMIN — Medication 125 MILLIGRAM(S): at 23:43

## 2023-01-01 RX ADMIN — Medication 50 MILLIEQUIVALENT(S): at 17:48

## 2023-01-01 RX ADMIN — Medication 1 MILLIGRAM(S): at 15:49

## 2023-01-01 RX ADMIN — CHLORHEXIDINE GLUCONATE 15 MILLILITER(S): 213 SOLUTION TOPICAL at 07:29

## 2023-01-01 RX ADMIN — CHLORHEXIDINE GLUCONATE 15 MILLILITER(S): 213 SOLUTION TOPICAL at 05:49

## 2023-01-01 RX ADMIN — Medication 250 MILLIGRAM(S): at 05:20

## 2023-01-01 RX ADMIN — Medication 2.98 MICROGRAM(S)/KG/MIN: at 23:47

## 2023-01-01 RX ADMIN — Medication 10 MILLILITER(S): at 13:35

## 2023-01-01 RX ADMIN — CEFEPIME 100 MILLIGRAM(S): 1 INJECTION, POWDER, FOR SOLUTION INTRAMUSCULAR; INTRAVENOUS at 05:44

## 2023-01-01 RX ADMIN — CHLORHEXIDINE GLUCONATE 15 MILLILITER(S): 213 SOLUTION TOPICAL at 17:39

## 2023-01-01 RX ADMIN — Medication 5 MILLIGRAM(S): at 05:55

## 2023-01-01 RX ADMIN — CLOPIDOGREL BISULFATE 75 MILLIGRAM(S): 75 TABLET, FILM COATED ORAL at 11:47

## 2023-01-01 RX ADMIN — Medication 1 DROP(S): at 17:25

## 2023-01-01 RX ADMIN — MIDODRINE HYDROCHLORIDE 10 MILLIGRAM(S): 2.5 TABLET ORAL at 05:09

## 2023-01-01 RX ADMIN — HEPARIN SODIUM 5000 UNIT(S): 5000 INJECTION INTRAVENOUS; SUBCUTANEOUS at 21:40

## 2023-01-01 RX ADMIN — Medication 2: at 11:50

## 2023-01-01 RX ADMIN — CLOPIDOGREL BISULFATE 75 MILLIGRAM(S): 75 TABLET, FILM COATED ORAL at 11:11

## 2023-01-01 RX ADMIN — DEXMEDETOMIDINE HYDROCHLORIDE IN 0.9% SODIUM CHLORIDE 1.59 MICROGRAM(S)/KG/HR: 4 INJECTION INTRAVENOUS at 14:43

## 2023-01-01 RX ADMIN — HEPARIN SODIUM 5000 UNIT(S): 5000 INJECTION INTRAVENOUS; SUBCUTANEOUS at 13:15

## 2023-01-01 RX ADMIN — MIDODRINE HYDROCHLORIDE 10 MILLIGRAM(S): 2.5 TABLET ORAL at 17:17

## 2023-01-01 RX ADMIN — Medication 1 DROP(S): at 06:40

## 2023-01-01 RX ADMIN — ATORVASTATIN CALCIUM 80 MILLIGRAM(S): 80 TABLET, FILM COATED ORAL at 22:04

## 2023-01-01 RX ADMIN — Medication 1 DROP(S): at 18:03

## 2023-01-01 RX ADMIN — MIDODRINE HYDROCHLORIDE 10 MILLIGRAM(S): 2.5 TABLET ORAL at 13:20

## 2023-01-01 RX ADMIN — CASPOFUNGIN ACETATE 260 MILLIGRAM(S): 7 INJECTION, POWDER, LYOPHILIZED, FOR SOLUTION INTRAVENOUS at 15:45

## 2023-01-01 RX ADMIN — MIDODRINE HYDROCHLORIDE 10 MILLIGRAM(S): 2.5 TABLET ORAL at 21:45

## 2023-01-01 RX ADMIN — SODIUM ZIRCONIUM CYCLOSILICATE 5 GRAM(S): 10 POWDER, FOR SUSPENSION ORAL at 21:25

## 2023-01-01 RX ADMIN — FENTANYL CITRATE 25 MICROGRAM(S): 50 INJECTION INTRAVENOUS at 00:02

## 2023-01-01 RX ADMIN — SODIUM CHLORIDE 100 MILLILITER(S): 9 INJECTION, SOLUTION INTRAVENOUS at 08:43

## 2023-01-01 RX ADMIN — Medication 5 MILLIGRAM(S): at 05:21

## 2023-01-01 RX ADMIN — FENTANYL CITRATE 25 MICROGRAM(S): 50 INJECTION INTRAVENOUS at 15:44

## 2023-01-01 RX ADMIN — Medication 5 MILLIGRAM(S): at 06:14

## 2023-01-01 RX ADMIN — Medication 1 APPLICATION(S): at 05:59

## 2023-01-01 RX ADMIN — OLANZAPINE 5 MILLIGRAM(S): 15 TABLET, FILM COATED ORAL at 17:46

## 2023-01-01 RX ADMIN — Medication 1 DROP(S): at 11:25

## 2023-01-01 RX ADMIN — HEPARIN SODIUM 5000 UNIT(S): 5000 INJECTION INTRAVENOUS; SUBCUTANEOUS at 21:34

## 2023-01-01 RX ADMIN — Medication 1 DROP(S): at 18:30

## 2023-01-01 RX ADMIN — Medication 4 MILLIGRAM(S): at 22:04

## 2023-01-01 RX ADMIN — Medication 250 MILLIGRAM(S): at 05:30

## 2023-01-01 RX ADMIN — CHLORHEXIDINE GLUCONATE 1 APPLICATION(S): 213 SOLUTION TOPICAL at 06:10

## 2023-01-01 RX ADMIN — ATORVASTATIN CALCIUM 80 MILLIGRAM(S): 80 TABLET, FILM COATED ORAL at 21:58

## 2023-01-01 RX ADMIN — MIDODRINE HYDROCHLORIDE 10 MILLIGRAM(S): 2.5 TABLET ORAL at 05:39

## 2023-01-01 RX ADMIN — Medication 1 DROP(S): at 17:54

## 2023-01-01 RX ADMIN — Medication 4000 MILLILITER(S): at 00:07

## 2023-01-01 RX ADMIN — Medication 3: at 06:16

## 2023-01-01 RX ADMIN — SODIUM CHLORIDE 3000 MILLILITER(S): 9 INJECTION, SOLUTION INTRAVENOUS at 09:30

## 2023-01-01 RX ADMIN — Medication 4 MILLIGRAM(S): at 21:28

## 2023-01-01 RX ADMIN — CHLORHEXIDINE GLUCONATE 15 MILLILITER(S): 213 SOLUTION TOPICAL at 17:13

## 2023-01-01 RX ADMIN — Medication 1 DROP(S): at 06:30

## 2023-01-01 RX ADMIN — Medication 125 MILLIGRAM(S): at 05:17

## 2023-01-01 RX ADMIN — Medication 2: at 06:00

## 2023-01-01 RX ADMIN — HEPARIN SODIUM 5000 UNIT(S): 5000 INJECTION INTRAVENOUS; SUBCUTANEOUS at 21:37

## 2023-01-01 RX ADMIN — Medication 650 MILLIGRAM(S): at 17:32

## 2023-01-01 RX ADMIN — PANTOPRAZOLE SODIUM 40 MILLIGRAM(S): 20 TABLET, DELAYED RELEASE ORAL at 05:39

## 2023-01-01 RX ADMIN — ALBUTEROL 1 PUFF(S): 90 AEROSOL, METERED ORAL at 15:01

## 2023-01-01 RX ADMIN — Medication 0.6 MICROGRAM(S)/KG/MIN: at 22:46

## 2023-01-01 RX ADMIN — MIDODRINE HYDROCHLORIDE 10 MILLIGRAM(S): 2.5 TABLET ORAL at 11:28

## 2023-01-01 RX ADMIN — CLOPIDOGREL BISULFATE 75 MILLIGRAM(S): 75 TABLET, FILM COATED ORAL at 11:22

## 2023-01-01 RX ADMIN — FENTANYL CITRATE 25 MICROGRAM(S): 50 INJECTION INTRAVENOUS at 23:37

## 2023-01-01 RX ADMIN — Medication 125 MILLIGRAM(S): at 11:19

## 2023-01-01 RX ADMIN — CEFEPIME 100 MILLIGRAM(S): 1 INJECTION, POWDER, FOR SOLUTION INTRAMUSCULAR; INTRAVENOUS at 05:31

## 2023-01-01 RX ADMIN — Medication 10 MILLILITER(S): at 10:33

## 2023-01-01 RX ADMIN — Medication 80 MILLIGRAM(S): at 11:42

## 2023-01-01 RX ADMIN — FAMOTIDINE 20 MILLIGRAM(S): 10 INJECTION INTRAVENOUS at 11:59

## 2023-01-01 RX ADMIN — Medication 81 MILLIGRAM(S): at 11:11

## 2023-01-01 RX ADMIN — Medication 1 DROP(S): at 00:57

## 2023-01-01 RX ADMIN — Medication 50 MILLIEQUIVALENT(S): at 05:51

## 2023-01-01 RX ADMIN — ROBINUL 0.2 MILLIGRAM(S): 0.2 INJECTION INTRAMUSCULAR; INTRAVENOUS at 05:27

## 2023-01-01 RX ADMIN — FENTANYL CITRATE 12.5 MICROGRAM(S): 50 INJECTION INTRAVENOUS at 04:32

## 2023-01-01 RX ADMIN — Medication 10 MILLILITER(S): at 13:04

## 2023-01-01 RX ADMIN — MIDODRINE HYDROCHLORIDE 10 MILLIGRAM(S): 2.5 TABLET ORAL at 05:25

## 2023-01-01 RX ADMIN — Medication 81 MILLIGRAM(S): at 11:07

## 2023-01-01 RX ADMIN — CLOPIDOGREL BISULFATE 75 MILLIGRAM(S): 75 TABLET, FILM COATED ORAL at 11:07

## 2023-01-01 RX ADMIN — ALBUTEROL 1 PUFF(S): 90 AEROSOL, METERED ORAL at 15:07

## 2023-01-01 RX ADMIN — Medication 650 MILLIGRAM(S): at 06:30

## 2023-01-01 RX ADMIN — Medication 650 MILLIGRAM(S): at 00:30

## 2023-01-01 RX ADMIN — ATORVASTATIN CALCIUM 80 MILLIGRAM(S): 80 TABLET, FILM COATED ORAL at 21:28

## 2023-01-01 RX ADMIN — ALBUTEROL 1 PUFF(S): 90 AEROSOL, METERED ORAL at 15:05

## 2023-01-01 RX ADMIN — Medication 200 GRAM(S): at 08:32

## 2023-01-01 RX ADMIN — CLOPIDOGREL BISULFATE 75 MILLIGRAM(S): 75 TABLET, FILM COATED ORAL at 11:25

## 2023-01-01 RX ADMIN — PIPERACILLIN AND TAZOBACTAM 25 GRAM(S): 4; .5 INJECTION, POWDER, LYOPHILIZED, FOR SOLUTION INTRAVENOUS at 14:28

## 2023-01-01 RX ADMIN — ALBUTEROL 1 PUFF(S): 90 AEROSOL, METERED ORAL at 23:51

## 2023-01-01 RX ADMIN — Medication 1 DROP(S): at 17:49

## 2023-01-01 RX ADMIN — SENNA PLUS 2 TABLET(S): 8.6 TABLET ORAL at 22:00

## 2023-01-01 RX ADMIN — PANTOPRAZOLE SODIUM 40 MILLIGRAM(S): 20 TABLET, DELAYED RELEASE ORAL at 11:08

## 2023-01-01 RX ADMIN — OLANZAPINE 5 MILLIGRAM(S): 15 TABLET, FILM COATED ORAL at 18:13

## 2023-01-01 RX ADMIN — Medication 10 MILLILITER(S): at 03:13

## 2023-01-01 RX ADMIN — HEPARIN SODIUM 5000 UNIT(S): 5000 INJECTION INTRAVENOUS; SUBCUTANEOUS at 05:39

## 2023-01-01 RX ADMIN — Medication 1 DROP(S): at 23:58

## 2023-01-01 RX ADMIN — Medication 125 MILLILITER(S): at 02:33

## 2023-01-01 RX ADMIN — MIDODRINE HYDROCHLORIDE 10 MILLIGRAM(S): 2.5 TABLET ORAL at 15:03

## 2023-01-01 RX ADMIN — CASPOFUNGIN ACETATE 260 MILLIGRAM(S): 7 INJECTION, POWDER, LYOPHILIZED, FOR SOLUTION INTRAVENOUS at 17:48

## 2023-01-01 RX ADMIN — PANTOPRAZOLE SODIUM 40 MILLIGRAM(S): 20 TABLET, DELAYED RELEASE ORAL at 11:38

## 2023-01-01 RX ADMIN — Medication 1 DROP(S): at 17:42

## 2023-01-01 RX ADMIN — Medication 1 DROP(S): at 00:49

## 2023-01-01 RX ADMIN — Medication 250 MILLIGRAM(S): at 23:51

## 2023-01-01 RX ADMIN — CHLORHEXIDINE GLUCONATE 1 APPLICATION(S): 213 SOLUTION TOPICAL at 05:00

## 2023-01-01 RX ADMIN — PIPERACILLIN AND TAZOBACTAM 25 GRAM(S): 4; .5 INJECTION, POWDER, LYOPHILIZED, FOR SOLUTION INTRAVENOUS at 16:10

## 2023-01-01 RX ADMIN — CHLORHEXIDINE GLUCONATE 15 MILLILITER(S): 213 SOLUTION TOPICAL at 17:11

## 2023-01-01 RX ADMIN — Medication 10 MILLILITER(S): at 17:28

## 2023-01-01 RX ADMIN — CHLORHEXIDINE GLUCONATE 15 MILLILITER(S): 213 SOLUTION TOPICAL at 18:10

## 2023-01-01 RX ADMIN — Medication 125 MILLILITER(S): at 16:04

## 2023-01-01 RX ADMIN — AMLODIPINE BESYLATE 5 MILLIGRAM(S): 2.5 TABLET ORAL at 10:46

## 2023-01-01 RX ADMIN — DEXMEDETOMIDINE HYDROCHLORIDE IN 0.9% SODIUM CHLORIDE 1.59 MICROGRAM(S)/KG/HR: 4 INJECTION INTRAVENOUS at 21:34

## 2023-01-01 RX ADMIN — Medication 1 DROP(S): at 17:44

## 2023-01-01 RX ADMIN — CHLORHEXIDINE GLUCONATE 1 APPLICATION(S): 213 SOLUTION TOPICAL at 05:14

## 2023-01-01 RX ADMIN — Medication 10 MILLILITER(S): at 14:01

## 2023-01-01 RX ADMIN — DEXMEDETOMIDINE HYDROCHLORIDE IN 0.9% SODIUM CHLORIDE 1.59 MICROGRAM(S)/KG/HR: 4 INJECTION INTRAVENOUS at 22:39

## 2023-01-01 RX ADMIN — PANTOPRAZOLE SODIUM 40 MILLIGRAM(S): 20 TABLET, DELAYED RELEASE ORAL at 11:23

## 2023-01-01 RX ADMIN — Medication 5: at 00:12

## 2023-01-01 RX ADMIN — Medication 124 MILLIGRAM(S): at 10:33

## 2023-01-01 RX ADMIN — Medication 2: at 23:47

## 2023-01-01 RX ADMIN — Medication 1 DROP(S): at 22:08

## 2023-01-01 RX ADMIN — Medication 250 MILLIGRAM(S): at 22:17

## 2023-01-01 RX ADMIN — PANTOPRAZOLE SODIUM 40 MILLIGRAM(S): 20 TABLET, DELAYED RELEASE ORAL at 11:18

## 2023-01-01 RX ADMIN — CLOPIDOGREL BISULFATE 75 MILLIGRAM(S): 75 TABLET, FILM COATED ORAL at 13:05

## 2023-01-01 RX ADMIN — Medication 81 MILLIGRAM(S): at 11:43

## 2023-01-01 RX ADMIN — CISATRACURIUM BESYLATE 10 MILLIGRAM(S): 2 INJECTION INTRAVENOUS at 22:43

## 2023-01-01 RX ADMIN — Medication 1 DROP(S): at 23:39

## 2023-01-01 RX ADMIN — MIDODRINE HYDROCHLORIDE 10 MILLIGRAM(S): 2.5 TABLET ORAL at 21:34

## 2023-01-01 RX ADMIN — HEPARIN SODIUM 5000 UNIT(S): 5000 INJECTION INTRAVENOUS; SUBCUTANEOUS at 22:21

## 2023-01-01 RX ADMIN — PANTOPRAZOLE SODIUM 40 MILLIGRAM(S): 20 TABLET, DELAYED RELEASE ORAL at 05:38

## 2023-01-01 RX ADMIN — Medication 650 MILLIGRAM(S): at 01:30

## 2023-01-01 RX ADMIN — POTASSIUM PHOSPHATE, MONOBASIC POTASSIUM PHOSPHATE, DIBASIC 83.33 MILLIMOLE(S): 236; 224 INJECTION, SOLUTION INTRAVENOUS at 22:32

## 2023-01-01 RX ADMIN — Medication 4 MILLIGRAM(S): at 21:48

## 2023-01-01 RX ADMIN — PANTOPRAZOLE SODIUM 40 MILLIGRAM(S): 20 TABLET, DELAYED RELEASE ORAL at 11:19

## 2023-01-01 RX ADMIN — Medication 1 DROP(S): at 11:12

## 2023-01-01 RX ADMIN — ALBUTEROL 2.5 MILLIGRAM(S): 90 AEROSOL, METERED ORAL at 08:08

## 2023-01-01 RX ADMIN — CHLORHEXIDINE GLUCONATE 1 APPLICATION(S): 213 SOLUTION TOPICAL at 05:20

## 2023-01-01 RX ADMIN — Medication 5 MILLIGRAM(S): at 05:56

## 2023-01-01 RX ADMIN — Medication 1 DROP(S): at 17:01

## 2023-01-01 RX ADMIN — PHENYLEPHRINE HYDROCHLORIDE 1.19 MICROGRAM(S)/KG/MIN: 10 INJECTION INTRAVENOUS at 18:45

## 2023-01-01 RX ADMIN — FENTANYL CITRATE 14 MICROGRAM(S)/KG/HR: 50 INJECTION INTRAVENOUS at 22:19

## 2023-01-01 RX ADMIN — ATORVASTATIN CALCIUM 80 MILLIGRAM(S): 80 TABLET, FILM COATED ORAL at 22:18

## 2023-01-01 RX ADMIN — VASOPRESSIN 4.5 UNIT(S)/MIN: 20 INJECTION INTRAVENOUS at 20:39

## 2023-01-01 RX ADMIN — ALBUTEROL 1 PUFF(S): 90 AEROSOL, METERED ORAL at 14:16

## 2023-01-01 RX ADMIN — DEXMEDETOMIDINE HYDROCHLORIDE IN 0.9% SODIUM CHLORIDE 15.9 MICROGRAM(S)/KG/HR: 4 INJECTION INTRAVENOUS at 22:18

## 2023-01-01 RX ADMIN — CHLORHEXIDINE GLUCONATE 15 MILLILITER(S): 213 SOLUTION TOPICAL at 18:30

## 2023-01-01 RX ADMIN — FENTANYL CITRATE 12.7 MICROGRAM(S)/KG/HR: 50 INJECTION INTRAVENOUS at 23:28

## 2023-01-01 RX ADMIN — Medication 1 DROP(S): at 05:38

## 2023-01-01 RX ADMIN — Medication 5 MILLIGRAM(S): at 05:40

## 2023-01-01 RX ADMIN — Medication 1 DROP(S): at 11:38

## 2023-01-01 RX ADMIN — Medication 10 MILLIGRAM(S): at 05:36

## 2023-01-01 RX ADMIN — Medication 10 MILLILITER(S): at 13:20

## 2023-01-01 RX ADMIN — HEPARIN SODIUM 5000 UNIT(S): 5000 INJECTION INTRAVENOUS; SUBCUTANEOUS at 06:40

## 2023-01-01 RX ADMIN — CLOPIDOGREL BISULFATE 75 MILLIGRAM(S): 75 TABLET, FILM COATED ORAL at 11:55

## 2023-01-01 RX ADMIN — Medication 1 DROP(S): at 11:22

## 2023-01-01 RX ADMIN — Medication 5 MILLIGRAM(S): at 21:32

## 2023-01-01 RX ADMIN — Medication 3: at 05:43

## 2023-01-01 RX ADMIN — Medication 3: at 19:21

## 2023-01-01 RX ADMIN — SODIUM CHLORIDE 1000 MILLILITER(S): 9 INJECTION, SOLUTION INTRAVENOUS at 14:30

## 2023-01-01 RX ADMIN — HEPARIN SODIUM 5000 UNIT(S): 5000 INJECTION INTRAVENOUS; SUBCUTANEOUS at 13:05

## 2023-01-01 RX ADMIN — CHLORHEXIDINE GLUCONATE 1 APPLICATION(S): 213 SOLUTION TOPICAL at 06:02

## 2023-01-01 RX ADMIN — PANTOPRAZOLE SODIUM 40 MILLIGRAM(S): 20 TABLET, DELAYED RELEASE ORAL at 18:10

## 2023-01-01 RX ADMIN — Medication 50 MILLILITER(S): at 22:52

## 2023-01-01 RX ADMIN — SODIUM CHLORIDE 100 MILLILITER(S): 9 INJECTION, SOLUTION INTRAVENOUS at 05:09

## 2023-01-01 RX ADMIN — Medication 10 MILLILITER(S): at 10:45

## 2023-01-01 RX ADMIN — DEXMEDETOMIDINE HYDROCHLORIDE IN 0.9% SODIUM CHLORIDE 15.9 MICROGRAM(S)/KG/HR: 4 INJECTION INTRAVENOUS at 00:05

## 2023-01-01 RX ADMIN — Medication 2: at 18:37

## 2023-01-01 RX ADMIN — CHLORHEXIDINE GLUCONATE 15 MILLILITER(S): 213 SOLUTION TOPICAL at 18:08

## 2023-01-01 RX ADMIN — Medication 1 DROP(S): at 23:27

## 2023-01-01 RX ADMIN — FAMOTIDINE 20 MILLIGRAM(S): 10 INJECTION INTRAVENOUS at 13:04

## 2023-01-01 RX ADMIN — HEPARIN SODIUM 5000 UNIT(S): 5000 INJECTION INTRAVENOUS; SUBCUTANEOUS at 06:24

## 2023-01-01 RX ADMIN — Medication 10 MILLILITER(S): at 17:07

## 2023-01-01 RX ADMIN — Medication 10 MILLILITER(S): at 17:08

## 2023-01-01 RX ADMIN — Medication 2: at 06:41

## 2023-01-01 RX ADMIN — CHLORHEXIDINE GLUCONATE 15 MILLILITER(S): 213 SOLUTION TOPICAL at 18:21

## 2023-01-01 RX ADMIN — ATORVASTATIN CALCIUM 80 MILLIGRAM(S): 80 TABLET, FILM COATED ORAL at 22:49

## 2023-01-01 RX ADMIN — Medication 1.19 MICROGRAM(S)/KG/MIN: at 07:41

## 2023-01-01 RX ADMIN — ALBUTEROL 1 PUFF(S): 90 AEROSOL, METERED ORAL at 19:06

## 2023-01-01 RX ADMIN — Medication 1 DROP(S): at 18:44

## 2023-01-01 RX ADMIN — Medication 100 MILLIGRAM(S): at 22:00

## 2023-01-01 RX ADMIN — CLOPIDOGREL BISULFATE 75 MILLIGRAM(S): 75 TABLET, FILM COATED ORAL at 11:32

## 2023-01-01 RX ADMIN — Medication 1 DROP(S): at 12:20

## 2023-01-01 RX ADMIN — Medication 50 MILLIGRAM(S): at 23:01

## 2023-01-01 RX ADMIN — VASOPRESSIN 4.5 UNIT(S)/MIN: 20 INJECTION INTRAVENOUS at 06:33

## 2023-01-01 RX ADMIN — HEPARIN SODIUM 5000 UNIT(S): 5000 INJECTION INTRAVENOUS; SUBCUTANEOUS at 22:14

## 2023-01-01 RX ADMIN — Medication 50 MILLIGRAM(S): at 21:27

## 2023-01-01 RX ADMIN — CLOPIDOGREL BISULFATE 75 MILLIGRAM(S): 75 TABLET, FILM COATED ORAL at 11:43

## 2023-01-01 RX ADMIN — Medication 1 DROP(S): at 23:00

## 2023-01-01 RX ADMIN — SODIUM CHLORIDE 4 MILLILITER(S): 9 INJECTION INTRAMUSCULAR; INTRAVENOUS; SUBCUTANEOUS at 20:03

## 2023-01-01 RX ADMIN — Medication 650 MILLIGRAM(S): at 06:00

## 2023-01-01 RX ADMIN — Medication 1 DROP(S): at 23:05

## 2023-01-01 RX ADMIN — CASPOFUNGIN ACETATE 260 MILLIGRAM(S): 7 INJECTION, POWDER, LYOPHILIZED, FOR SOLUTION INTRAVENOUS at 17:25

## 2023-01-01 RX ADMIN — Medication 125 MILLIGRAM(S): at 11:01

## 2023-01-01 RX ADMIN — Medication 10 MILLILITER(S): at 11:43

## 2023-01-01 RX ADMIN — SODIUM ZIRCONIUM CYCLOSILICATE 5 GRAM(S): 10 POWDER, FOR SUSPENSION ORAL at 21:43

## 2023-01-01 RX ADMIN — Medication 0.6 MICROGRAM(S)/KG/MIN: at 11:29

## 2023-01-01 RX ADMIN — FENTANYL CITRATE 12.7 MICROGRAM(S)/KG/HR: 50 INJECTION INTRAVENOUS at 21:34

## 2023-01-01 RX ADMIN — CHLORHEXIDINE GLUCONATE 15 MILLILITER(S): 213 SOLUTION TOPICAL at 17:45

## 2023-01-01 RX ADMIN — CHLORHEXIDINE GLUCONATE 15 MILLILITER(S): 213 SOLUTION TOPICAL at 17:41

## 2023-01-01 RX ADMIN — Medication 1 DROP(S): at 05:49

## 2023-01-01 RX ADMIN — CHLORHEXIDINE GLUCONATE 1 APPLICATION(S): 213 SOLUTION TOPICAL at 06:23

## 2023-01-01 RX ADMIN — Medication 40 MILLIGRAM(S): at 18:30

## 2023-01-01 RX ADMIN — CHLORHEXIDINE GLUCONATE 15 MILLILITER(S): 213 SOLUTION TOPICAL at 05:18

## 2023-01-01 RX ADMIN — Medication 50 MILLIEQUIVALENT(S): at 05:01

## 2023-01-01 RX ADMIN — HEPARIN SODIUM 5000 UNIT(S): 5000 INJECTION INTRAVENOUS; SUBCUTANEOUS at 23:01

## 2023-01-01 RX ADMIN — SENNA PLUS 2 TABLET(S): 8.6 TABLET ORAL at 22:19

## 2023-01-01 RX ADMIN — HEPARIN SODIUM 5000 UNIT(S): 5000 INJECTION INTRAVENOUS; SUBCUTANEOUS at 15:03

## 2023-01-01 RX ADMIN — Medication 10 MILLILITER(S): at 05:31

## 2023-01-01 RX ADMIN — Medication 5 MILLIGRAM(S): at 15:04

## 2023-01-01 RX ADMIN — Medication 1 DROP(S): at 19:16

## 2023-01-01 RX ADMIN — Medication 10 MILLILITER(S): at 10:40

## 2023-01-01 RX ADMIN — FENTANYL CITRATE 25 MICROGRAM(S): 50 INJECTION INTRAVENOUS at 13:35

## 2023-01-01 RX ADMIN — CHLORHEXIDINE GLUCONATE 1 APPLICATION(S): 213 SOLUTION TOPICAL at 05:32

## 2023-01-01 RX ADMIN — MIDODRINE HYDROCHLORIDE 10 MILLIGRAM(S): 2.5 TABLET ORAL at 23:43

## 2023-01-01 RX ADMIN — CHLORHEXIDINE GLUCONATE 15 MILLILITER(S): 213 SOLUTION TOPICAL at 17:30

## 2023-01-01 RX ADMIN — MIDODRINE HYDROCHLORIDE 5 MILLIGRAM(S): 2.5 TABLET ORAL at 21:51

## 2023-01-01 RX ADMIN — PANTOPRAZOLE SODIUM 40 MILLIGRAM(S): 20 TABLET, DELAYED RELEASE ORAL at 17:21

## 2023-01-01 RX ADMIN — HEPARIN SODIUM 5000 UNIT(S): 5000 INJECTION INTRAVENOUS; SUBCUTANEOUS at 05:38

## 2023-01-01 RX ADMIN — CLOPIDOGREL BISULFATE 75 MILLIGRAM(S): 75 TABLET, FILM COATED ORAL at 12:11

## 2023-01-01 RX ADMIN — Medication 1 APPLICATION(S): at 17:43

## 2023-01-01 RX ADMIN — Medication 4 MILLILITER(S): at 16:42

## 2023-01-01 RX ADMIN — ALBUTEROL 1 PUFF(S): 90 AEROSOL, METERED ORAL at 23:40

## 2023-01-01 RX ADMIN — Medication 10 MILLILITER(S): at 22:55

## 2023-01-01 RX ADMIN — Medication 1 DROP(S): at 17:35

## 2023-01-01 RX ADMIN — HEPARIN SODIUM 5000 UNIT(S): 5000 INJECTION INTRAVENOUS; SUBCUTANEOUS at 05:09

## 2023-01-01 RX ADMIN — Medication 125 MILLIGRAM(S): at 12:33

## 2023-01-01 RX ADMIN — Medication 1 DROP(S): at 05:41

## 2023-01-01 RX ADMIN — MIDODRINE HYDROCHLORIDE 10 MILLIGRAM(S): 2.5 TABLET ORAL at 13:40

## 2023-01-01 RX ADMIN — Medication 10 MILLILITER(S): at 14:09

## 2023-01-01 RX ADMIN — VASOPRESSIN 3 UNIT(S)/MIN: 20 INJECTION INTRAVENOUS at 15:34

## 2023-01-01 RX ADMIN — PANTOPRAZOLE SODIUM 40 MILLIGRAM(S): 20 TABLET, DELAYED RELEASE ORAL at 05:21

## 2023-01-01 RX ADMIN — ALBUTEROL 1 PUFF(S): 90 AEROSOL, METERED ORAL at 00:45

## 2023-01-01 RX ADMIN — SODIUM CHLORIDE 500 MILLILITER(S): 9 INJECTION, SOLUTION INTRAVENOUS at 15:44

## 2023-01-01 RX ADMIN — Medication 10 MILLIGRAM(S): at 09:44

## 2023-01-01 RX ADMIN — CLOPIDOGREL BISULFATE 75 MILLIGRAM(S): 75 TABLET, FILM COATED ORAL at 13:35

## 2023-01-01 RX ADMIN — Medication 1 DROP(S): at 23:37

## 2023-01-01 RX ADMIN — Medication 1 DROP(S): at 23:06

## 2023-01-01 RX ADMIN — Medication 10 MILLILITER(S): at 21:44

## 2023-01-01 RX ADMIN — HEPARIN SODIUM 5000 UNIT(S): 5000 INJECTION INTRAVENOUS; SUBCUTANEOUS at 22:56

## 2023-01-01 RX ADMIN — Medication 650 MILLIGRAM(S): at 01:12

## 2023-01-01 RX ADMIN — Medication 1 DROP(S): at 06:09

## 2023-01-01 RX ADMIN — FAMOTIDINE 20 MILLIGRAM(S): 10 INJECTION INTRAVENOUS at 11:08

## 2023-01-01 RX ADMIN — Medication 3: at 17:32

## 2023-01-01 RX ADMIN — Medication 4 MILLIGRAM(S): at 21:40

## 2023-01-01 RX ADMIN — ATORVASTATIN CALCIUM 80 MILLIGRAM(S): 80 TABLET, FILM COATED ORAL at 21:44

## 2023-01-01 RX ADMIN — CHLORHEXIDINE GLUCONATE 1 APPLICATION(S): 213 SOLUTION TOPICAL at 06:09

## 2023-01-01 RX ADMIN — Medication 10 MILLILITER(S): at 03:40

## 2023-01-01 RX ADMIN — Medication 1 DROP(S): at 06:39

## 2023-01-01 RX ADMIN — SENNA PLUS 2 TABLET(S): 8.6 TABLET ORAL at 21:28

## 2023-01-01 RX ADMIN — ALBUTEROL 1 PUFF(S): 90 AEROSOL, METERED ORAL at 20:09

## 2023-01-01 RX ADMIN — CEFEPIME 100 MILLIGRAM(S): 1 INJECTION, POWDER, FOR SOLUTION INTRAMUSCULAR; INTRAVENOUS at 17:10

## 2023-01-01 RX ADMIN — SENNA PLUS 2 TABLET(S): 8.6 TABLET ORAL at 21:34

## 2023-01-01 RX ADMIN — Medication 1 DROP(S): at 06:23

## 2023-01-01 RX ADMIN — Medication 2: at 12:39

## 2023-01-01 RX ADMIN — Medication 4 MILLIGRAM(S): at 21:37

## 2023-01-01 RX ADMIN — VASOPRESSIN 3 UNIT(S)/MIN: 20 INJECTION INTRAVENOUS at 03:42

## 2023-01-01 RX ADMIN — CEFEPIME 100 MILLIGRAM(S): 1 INJECTION, POWDER, FOR SOLUTION INTRAMUSCULAR; INTRAVENOUS at 05:15

## 2023-01-01 RX ADMIN — CHLORHEXIDINE GLUCONATE 15 MILLILITER(S): 213 SOLUTION TOPICAL at 06:11

## 2023-01-01 RX ADMIN — SODIUM CHLORIDE 1000 MILLILITER(S): 9 INJECTION, SOLUTION INTRAVENOUS at 12:10

## 2023-01-01 RX ADMIN — Medication 650 MILLIGRAM(S): at 13:47

## 2023-01-01 RX ADMIN — Medication 5 MILLIGRAM(S): at 22:49

## 2023-01-01 RX ADMIN — Medication 650 MILLIGRAM(S): at 11:48

## 2023-01-01 RX ADMIN — Medication 250 MILLIGRAM(S): at 18:45

## 2023-01-01 RX ADMIN — OLANZAPINE 5 MILLIGRAM(S): 15 TABLET, FILM COATED ORAL at 17:42

## 2023-01-01 RX ADMIN — OLANZAPINE 5 MILLIGRAM(S): 15 TABLET, FILM COATED ORAL at 03:44

## 2023-01-01 RX ADMIN — ATORVASTATIN CALCIUM 80 MILLIGRAM(S): 80 TABLET, FILM COATED ORAL at 21:43

## 2023-01-01 RX ADMIN — Medication 2: at 16:15

## 2023-01-01 RX ADMIN — MIDODRINE HYDROCHLORIDE 10 MILLIGRAM(S): 2.5 TABLET ORAL at 23:37

## 2023-01-01 RX ADMIN — Medication 10 MILLILITER(S): at 17:33

## 2023-01-01 RX ADMIN — Medication 20 MILLIGRAM(S): at 05:26

## 2023-01-01 RX ADMIN — FLUCONAZOLE 100 MILLIGRAM(S): 150 TABLET ORAL at 22:00

## 2023-01-01 RX ADMIN — MIDODRINE HYDROCHLORIDE 10 MILLIGRAM(S): 2.5 TABLET ORAL at 12:11

## 2023-01-01 RX ADMIN — Medication 1 DROP(S): at 00:00

## 2023-01-01 RX ADMIN — Medication 81 MILLIGRAM(S): at 11:55

## 2023-01-01 RX ADMIN — MIDODRINE HYDROCHLORIDE 10 MILLIGRAM(S): 2.5 TABLET ORAL at 07:33

## 2023-01-01 RX ADMIN — Medication 2: at 06:03

## 2023-01-01 RX ADMIN — CHLORHEXIDINE GLUCONATE 15 MILLILITER(S): 213 SOLUTION TOPICAL at 05:14

## 2023-01-01 RX ADMIN — Medication 10 MILLILITER(S): at 22:28

## 2023-01-01 RX ADMIN — Medication 10 MILLILITER(S): at 21:45

## 2023-01-01 RX ADMIN — FAMOTIDINE 20 MILLIGRAM(S): 10 INJECTION INTRAVENOUS at 12:33

## 2023-01-01 RX ADMIN — SODIUM CHLORIDE 500 MILLILITER(S): 9 INJECTION, SOLUTION INTRAVENOUS at 10:18

## 2023-01-01 RX ADMIN — MIDODRINE HYDROCHLORIDE 10 MILLIGRAM(S): 2.5 TABLET ORAL at 05:30

## 2023-01-01 RX ADMIN — SODIUM CHLORIDE 4 MILLILITER(S): 9 INJECTION INTRAMUSCULAR; INTRAVENOUS; SUBCUTANEOUS at 20:05

## 2023-01-01 RX ADMIN — CHLORHEXIDINE GLUCONATE 15 MILLILITER(S): 213 SOLUTION TOPICAL at 06:00

## 2023-01-01 RX ADMIN — MORPHINE SULFATE 4 MILLIGRAM(S): 50 CAPSULE, EXTENDED RELEASE ORAL at 14:37

## 2023-01-01 RX ADMIN — Medication 10 MILLILITER(S): at 10:18

## 2023-01-01 RX ADMIN — SODIUM CHLORIDE 4 MILLILITER(S): 9 INJECTION INTRAMUSCULAR; INTRAVENOUS; SUBCUTANEOUS at 21:55

## 2023-01-01 RX ADMIN — Medication 5 MILLIGRAM(S): at 21:50

## 2023-01-01 RX ADMIN — MORPHINE SULFATE 2 MILLIGRAM(S): 50 CAPSULE, EXTENDED RELEASE ORAL at 05:36

## 2023-01-01 RX ADMIN — Medication 25 MILLIGRAM(S): at 11:22

## 2023-01-01 RX ADMIN — Medication 1 DROP(S): at 18:55

## 2023-01-01 RX ADMIN — MIDODRINE HYDROCHLORIDE 10 MILLIGRAM(S): 2.5 TABLET ORAL at 11:59

## 2023-01-01 RX ADMIN — CHLORHEXIDINE GLUCONATE 15 MILLILITER(S): 213 SOLUTION TOPICAL at 17:01

## 2023-01-01 RX ADMIN — ALBUTEROL 2.5 MILLIGRAM(S): 90 AEROSOL, METERED ORAL at 23:23

## 2023-01-01 RX ADMIN — PIPERACILLIN AND TAZOBACTAM 25 GRAM(S): 4; .5 INJECTION, POWDER, LYOPHILIZED, FOR SOLUTION INTRAVENOUS at 05:11

## 2023-01-01 RX ADMIN — VASOPRESSIN 6 UNIT(S)/MIN: 20 INJECTION INTRAVENOUS at 23:05

## 2023-01-01 RX ADMIN — Medication 1 DROP(S): at 01:00

## 2023-01-01 RX ADMIN — Medication 200 GRAM(S): at 06:48

## 2023-01-01 RX ADMIN — PHENYLEPHRINE HYDROCHLORIDE 1.19 MICROGRAM(S)/KG/MIN: 10 INJECTION INTRAVENOUS at 11:39

## 2023-01-01 RX ADMIN — HEPARIN SODIUM 5000 UNIT(S): 5000 INJECTION INTRAVENOUS; SUBCUTANEOUS at 21:49

## 2023-01-01 RX ADMIN — HEPARIN SODIUM 5000 UNIT(S): 5000 INJECTION INTRAVENOUS; SUBCUTANEOUS at 21:36

## 2023-01-01 RX ADMIN — PANTOPRAZOLE SODIUM 40 MILLIGRAM(S): 20 TABLET, DELAYED RELEASE ORAL at 17:39

## 2023-01-01 RX ADMIN — Medication 2: at 11:51

## 2023-01-01 RX ADMIN — Medication 125 MILLIGRAM(S): at 05:38

## 2023-01-01 RX ADMIN — Medication 4: at 11:41

## 2023-01-01 RX ADMIN — HEPARIN SODIUM 5000 UNIT(S): 5000 INJECTION INTRAVENOUS; SUBCUTANEOUS at 22:02

## 2023-01-01 RX ADMIN — Medication 50 MILLILITER(S): at 11:42

## 2023-01-01 RX ADMIN — Medication 250 MILLIGRAM(S): at 10:56

## 2023-01-01 RX ADMIN — SODIUM CHLORIDE 30 MILLILITER(S): 9 INJECTION, SOLUTION INTRAVENOUS at 10:17

## 2023-01-01 RX ADMIN — HEPARIN SODIUM 5000 UNIT(S): 5000 INJECTION INTRAVENOUS; SUBCUTANEOUS at 13:24

## 2023-01-01 RX ADMIN — CHLORHEXIDINE GLUCONATE 15 MILLILITER(S): 213 SOLUTION TOPICAL at 05:40

## 2023-01-01 RX ADMIN — Medication 1 DROP(S): at 05:31

## 2023-01-01 RX ADMIN — Medication 4 MILLIGRAM(S): at 22:14

## 2023-01-01 RX ADMIN — Medication 1 DROP(S): at 01:36

## 2023-01-01 RX ADMIN — Medication 1 DROP(S): at 23:38

## 2023-01-01 RX ADMIN — Medication 10 MILLILITER(S): at 13:05

## 2023-01-01 RX ADMIN — CHLORHEXIDINE GLUCONATE 15 MILLILITER(S): 213 SOLUTION TOPICAL at 06:01

## 2023-01-01 RX ADMIN — Medication 125 MILLIGRAM(S): at 06:58

## 2023-01-01 RX ADMIN — Medication 1 DROP(S): at 17:53

## 2023-01-01 RX ADMIN — CHLORHEXIDINE GLUCONATE 15 MILLILITER(S): 213 SOLUTION TOPICAL at 17:58

## 2023-01-01 RX ADMIN — Medication 125 MILLIGRAM(S): at 05:04

## 2023-01-01 RX ADMIN — Medication 4 MILLIGRAM(S): at 22:01

## 2023-01-01 RX ADMIN — HEPARIN SODIUM 5000 UNIT(S): 5000 INJECTION INTRAVENOUS; SUBCUTANEOUS at 13:40

## 2023-01-01 RX ADMIN — Medication 50 GRAM(S): at 22:14

## 2023-01-01 RX ADMIN — INSULIN HUMAN 10 UNIT(S): 100 INJECTION, SOLUTION SUBCUTANEOUS at 22:52

## 2023-01-01 RX ADMIN — CHLORHEXIDINE GLUCONATE 15 MILLILITER(S): 213 SOLUTION TOPICAL at 17:10

## 2023-01-01 RX ADMIN — CHLORHEXIDINE GLUCONATE 15 MILLILITER(S): 213 SOLUTION TOPICAL at 05:43

## 2023-01-01 RX ADMIN — Medication 0: at 23:23

## 2023-01-01 RX ADMIN — Medication 5: at 23:17

## 2023-01-01 RX ADMIN — Medication 10 MILLILITER(S): at 10:41

## 2023-01-01 RX ADMIN — Medication 1 DROP(S): at 05:55

## 2023-01-01 RX ADMIN — Medication 50 MILLIEQUIVALENT(S): at 20:30

## 2023-01-01 RX ADMIN — Medication 250 MILLIGRAM(S): at 01:42

## 2023-01-01 RX ADMIN — Medication 4 MILLIGRAM(S): at 21:34

## 2023-01-01 RX ADMIN — MIDODRINE HYDROCHLORIDE 10 MILLIGRAM(S): 2.5 TABLET ORAL at 05:29

## 2023-01-01 RX ADMIN — CHLORHEXIDINE GLUCONATE 15 MILLILITER(S): 213 SOLUTION TOPICAL at 05:37

## 2023-01-01 RX ADMIN — Medication 10 MILLILITER(S): at 01:33

## 2023-01-01 RX ADMIN — MIDAZOLAM HYDROCHLORIDE 2 MILLIGRAM(S): 1 INJECTION, SOLUTION INTRAMUSCULAR; INTRAVENOUS at 08:25

## 2023-01-01 RX ADMIN — MIDODRINE HYDROCHLORIDE 10 MILLIGRAM(S): 2.5 TABLET ORAL at 05:21

## 2023-01-01 RX ADMIN — Medication 250 MILLIGRAM(S): at 21:45

## 2023-01-01 RX ADMIN — SODIUM CHLORIDE 4 MILLILITER(S): 9 INJECTION INTRAMUSCULAR; INTRAVENOUS; SUBCUTANEOUS at 08:07

## 2023-01-01 RX ADMIN — ATORVASTATIN CALCIUM 80 MILLIGRAM(S): 80 TABLET, FILM COATED ORAL at 21:52

## 2023-01-01 RX ADMIN — MIDODRINE HYDROCHLORIDE 5 MILLIGRAM(S): 2.5 TABLET ORAL at 05:18

## 2023-01-01 RX ADMIN — Medication 10 MILLILITER(S): at 13:01

## 2023-01-01 RX ADMIN — Medication 10 MILLILITER(S): at 18:09

## 2023-01-01 RX ADMIN — Medication 5: at 11:19

## 2023-01-01 RX ADMIN — Medication 2: at 12:18

## 2023-01-01 RX ADMIN — Medication 4 MILLIGRAM(S): at 21:19

## 2023-01-01 RX ADMIN — FENTANYL CITRATE 25 MICROGRAM(S): 50 INJECTION INTRAVENOUS at 10:45

## 2023-01-01 RX ADMIN — Medication 1 DROP(S): at 13:28

## 2023-01-01 RX ADMIN — ALBUTEROL 2.5 MILLIGRAM(S): 90 AEROSOL, METERED ORAL at 16:13

## 2023-01-01 RX ADMIN — Medication 1 DROP(S): at 05:22

## 2023-01-01 RX ADMIN — Medication 400 MILLIGRAM(S): at 09:14

## 2023-01-01 RX ADMIN — Medication 1 DROP(S): at 11:20

## 2023-01-01 RX ADMIN — CLOPIDOGREL BISULFATE 75 MILLIGRAM(S): 75 TABLET, FILM COATED ORAL at 12:28

## 2023-01-01 RX ADMIN — BUMETANIDE 2 MILLIGRAM(S): 0.25 INJECTION INTRAMUSCULAR; INTRAVENOUS at 11:18

## 2023-01-01 RX ADMIN — Medication 10 MILLILITER(S): at 09:58

## 2023-01-01 RX ADMIN — PIPERACILLIN AND TAZOBACTAM 25 GRAM(S): 4; .5 INJECTION, POWDER, LYOPHILIZED, FOR SOLUTION INTRAVENOUS at 17:00

## 2023-01-01 RX ADMIN — Medication 1 DROP(S): at 06:49

## 2023-01-01 RX ADMIN — VASOPRESSIN 4.5 UNIT(S)/MIN: 20 INJECTION INTRAVENOUS at 11:49

## 2023-01-01 RX ADMIN — SODIUM ZIRCONIUM CYCLOSILICATE 5 GRAM(S): 10 POWDER, FOR SUSPENSION ORAL at 13:06

## 2023-01-01 RX ADMIN — PHENYLEPHRINE HYDROCHLORIDE 2.38 MICROGRAM(S)/KG/MIN: 10 INJECTION INTRAVENOUS at 17:01

## 2023-01-01 RX ADMIN — Medication 10 MILLILITER(S): at 10:22

## 2023-01-01 RX ADMIN — ALBUTEROL 1 PUFF(S): 90 AEROSOL, METERED ORAL at 08:44

## 2023-01-01 RX ADMIN — Medication 81 MILLIGRAM(S): at 11:45

## 2023-01-01 RX ADMIN — MIDODRINE HYDROCHLORIDE 5 MILLIGRAM(S): 2.5 TABLET ORAL at 20:00

## 2023-01-01 RX ADMIN — Medication 10 MILLILITER(S): at 01:57

## 2023-01-01 RX ADMIN — OLANZAPINE 10 MILLIGRAM(S): 15 TABLET, FILM COATED ORAL at 06:53

## 2023-01-01 RX ADMIN — PANTOPRAZOLE SODIUM 40 MILLIGRAM(S): 20 TABLET, DELAYED RELEASE ORAL at 11:41

## 2023-01-01 RX ADMIN — PANTOPRAZOLE SODIUM 40 MILLIGRAM(S): 20 TABLET, DELAYED RELEASE ORAL at 06:12

## 2023-01-01 RX ADMIN — ENOXAPARIN SODIUM 40 MILLIGRAM(S): 100 INJECTION SUBCUTANEOUS at 14:38

## 2023-01-01 RX ADMIN — FENTANYL CITRATE 12.7 MICROGRAM(S)/KG/HR: 50 INJECTION INTRAVENOUS at 21:42

## 2023-01-01 RX ADMIN — PHENYLEPHRINE HYDROCHLORIDE 2.38 MICROGRAM(S)/KG/MIN: 10 INJECTION INTRAVENOUS at 01:21

## 2023-01-01 RX ADMIN — ALBUTEROL 1 PUFF(S): 90 AEROSOL, METERED ORAL at 19:58

## 2023-01-01 RX ADMIN — PIPERACILLIN AND TAZOBACTAM 25 GRAM(S): 4; .5 INJECTION, POWDER, LYOPHILIZED, FOR SOLUTION INTRAVENOUS at 13:38

## 2023-01-01 RX ADMIN — Medication 1 DROP(S): at 06:07

## 2023-01-01 RX ADMIN — Medication 10 MILLILITER(S): at 06:31

## 2023-01-01 RX ADMIN — OLANZAPINE 5 MILLIGRAM(S): 15 TABLET, FILM COATED ORAL at 06:25

## 2023-01-01 RX ADMIN — MIDODRINE HYDROCHLORIDE 10 MILLIGRAM(S): 2.5 TABLET ORAL at 23:57

## 2023-01-01 RX ADMIN — Medication 4 MILLILITER(S): at 16:16

## 2023-01-01 RX ADMIN — Medication 50 MILLIGRAM(S): at 21:38

## 2023-01-01 RX ADMIN — Medication 1 DROP(S): at 05:12

## 2023-01-01 RX ADMIN — Medication 125 MILLIGRAM(S): at 11:21

## 2023-01-01 RX ADMIN — MIDODRINE HYDROCHLORIDE 5 MILLIGRAM(S): 2.5 TABLET ORAL at 05:14

## 2023-01-01 RX ADMIN — DEXMEDETOMIDINE HYDROCHLORIDE IN 0.9% SODIUM CHLORIDE 1.59 MICROGRAM(S)/KG/HR: 4 INJECTION INTRAVENOUS at 03:41

## 2023-01-01 RX ADMIN — Medication 1 DROP(S): at 17:41

## 2023-01-01 RX ADMIN — MIDAZOLAM HYDROCHLORIDE 1.27 MG/KG/HR: 1 INJECTION, SOLUTION INTRAMUSCULAR; INTRAVENOUS at 19:25

## 2023-01-01 RX ADMIN — DEXMEDETOMIDINE HYDROCHLORIDE IN 0.9% SODIUM CHLORIDE 15.9 MICROGRAM(S)/KG/HR: 4 INJECTION INTRAVENOUS at 03:45

## 2023-01-01 RX ADMIN — Medication 1 DROP(S): at 17:13

## 2023-01-01 RX ADMIN — SENNA PLUS 2 TABLET(S): 8.6 TABLET ORAL at 22:48

## 2023-01-01 RX ADMIN — Medication 5 MILLIGRAM(S): at 22:20

## 2023-01-01 RX ADMIN — VASOPRESSIN 4.5 UNIT(S)/MIN: 20 INJECTION INTRAVENOUS at 11:39

## 2023-01-01 RX ADMIN — Medication 1 DROP(S): at 06:01

## 2023-01-01 RX ADMIN — ATORVASTATIN CALCIUM 80 MILLIGRAM(S): 80 TABLET, FILM COATED ORAL at 21:25

## 2023-01-01 RX ADMIN — Medication 10 MILLILITER(S): at 13:08

## 2023-01-01 RX ADMIN — MIDODRINE HYDROCHLORIDE 5 MILLIGRAM(S): 2.5 TABLET ORAL at 14:09

## 2023-01-01 RX ADMIN — MIDODRINE HYDROCHLORIDE 10 MILLIGRAM(S): 2.5 TABLET ORAL at 12:16

## 2023-01-01 RX ADMIN — Medication 125 MILLIGRAM(S): at 05:18

## 2023-01-01 RX ADMIN — Medication 50 MILLILITER(S): at 11:43

## 2023-01-01 RX ADMIN — HEPARIN SODIUM 5000 UNIT(S): 5000 INJECTION INTRAVENOUS; SUBCUTANEOUS at 21:20

## 2023-01-01 RX ADMIN — Medication 81 MILLIGRAM(S): at 11:41

## 2023-01-01 RX ADMIN — SENNA PLUS 2 TABLET(S): 8.6 TABLET ORAL at 21:59

## 2023-01-01 RX ADMIN — CHLORHEXIDINE GLUCONATE 1 APPLICATION(S): 213 SOLUTION TOPICAL at 05:07

## 2023-01-01 RX ADMIN — MIDODRINE HYDROCHLORIDE 10 MILLIGRAM(S): 2.5 TABLET ORAL at 18:08

## 2023-01-01 RX ADMIN — CHLORHEXIDINE GLUCONATE 1 APPLICATION(S): 213 SOLUTION TOPICAL at 06:15

## 2023-01-01 RX ADMIN — Medication 10 MILLILITER(S): at 17:21

## 2023-01-01 RX ADMIN — Medication 1 DROP(S): at 11:42

## 2023-01-01 RX ADMIN — FENTANYL CITRATE 25 MICROGRAM(S): 50 INJECTION INTRAVENOUS at 11:21

## 2023-01-01 RX ADMIN — VASOPRESSIN 3 UNIT(S)/MIN: 20 INJECTION INTRAVENOUS at 21:43

## 2023-01-01 RX ADMIN — FAMOTIDINE 20 MILLIGRAM(S): 10 INJECTION INTRAVENOUS at 11:17

## 2023-01-01 RX ADMIN — Medication 1 DROP(S): at 00:17

## 2023-01-01 RX ADMIN — Medication 3: at 12:16

## 2023-01-01 RX ADMIN — Medication 40 MILLIEQUIVALENT(S): at 03:14

## 2023-01-01 RX ADMIN — Medication 125 MILLIGRAM(S): at 17:59

## 2023-01-01 RX ADMIN — ATORVASTATIN CALCIUM 80 MILLIGRAM(S): 80 TABLET, FILM COATED ORAL at 22:28

## 2023-01-01 RX ADMIN — Medication 10 MILLILITER(S): at 13:52

## 2023-01-01 RX ADMIN — CEFEPIME 100 MILLIGRAM(S): 1 INJECTION, POWDER, FOR SOLUTION INTRAMUSCULAR; INTRAVENOUS at 17:01

## 2023-01-01 RX ADMIN — HEPARIN SODIUM 5000 UNIT(S): 5000 INJECTION INTRAVENOUS; SUBCUTANEOUS at 21:10

## 2023-01-01 RX ADMIN — Medication 50 MILLIEQUIVALENT(S): at 01:08

## 2023-01-01 RX ADMIN — Medication 4 MILLIGRAM(S): at 22:02

## 2023-01-01 RX ADMIN — FENTANYL CITRATE 25 MICROGRAM(S): 50 INJECTION INTRAVENOUS at 09:14

## 2023-01-01 RX ADMIN — ALBUTEROL 1 PUFF(S): 90 AEROSOL, METERED ORAL at 09:17

## 2023-01-01 RX ADMIN — HEPARIN SODIUM 5000 UNIT(S): 5000 INJECTION INTRAVENOUS; SUBCUTANEOUS at 05:31

## 2023-01-01 RX ADMIN — OLANZAPINE 5 MILLIGRAM(S): 15 TABLET, FILM COATED ORAL at 05:48

## 2023-01-01 RX ADMIN — HEPARIN SODIUM 5000 UNIT(S): 5000 INJECTION INTRAVENOUS; SUBCUTANEOUS at 15:02

## 2023-01-01 RX ADMIN — TAMSULOSIN HYDROCHLORIDE 0.4 MILLIGRAM(S): 0.4 CAPSULE ORAL at 17:29

## 2023-01-01 RX ADMIN — Medication 1 DROP(S): at 11:55

## 2023-01-01 RX ADMIN — ATORVASTATIN CALCIUM 80 MILLIGRAM(S): 80 TABLET, FILM COATED ORAL at 21:31

## 2023-01-01 RX ADMIN — HEPARIN SODIUM 5000 UNIT(S): 5000 INJECTION INTRAVENOUS; SUBCUTANEOUS at 06:02

## 2023-01-01 RX ADMIN — CHLORHEXIDINE GLUCONATE 1 APPLICATION(S): 213 SOLUTION TOPICAL at 06:03

## 2023-01-01 RX ADMIN — FAMOTIDINE 20 MILLIGRAM(S): 10 INJECTION INTRAVENOUS at 12:38

## 2023-01-01 RX ADMIN — Medication 1 DROP(S): at 12:32

## 2023-01-01 RX ADMIN — BUMETANIDE 2 MILLIGRAM(S): 0.25 INJECTION INTRAMUSCULAR; INTRAVENOUS at 19:18

## 2023-01-01 RX ADMIN — ALBUTEROL 1 PUFF(S): 90 AEROSOL, METERED ORAL at 07:25

## 2023-01-01 RX ADMIN — FENTANYL CITRATE 25 MICROGRAM(S): 50 INJECTION INTRAVENOUS at 03:44

## 2023-01-01 RX ADMIN — SODIUM ZIRCONIUM CYCLOSILICATE 5 GRAM(S): 10 POWDER, FOR SUSPENSION ORAL at 06:29

## 2023-01-01 RX ADMIN — Medication 50 MILLIEQUIVALENT(S): at 06:06

## 2023-01-01 RX ADMIN — Medication 250 MILLIGRAM(S): at 17:49

## 2023-01-01 RX ADMIN — FENTANYL CITRATE 50 MICROGRAM(S): 50 INJECTION INTRAVENOUS at 09:31

## 2023-01-01 RX ADMIN — Medication 2: at 17:52

## 2023-01-01 RX ADMIN — ALBUTEROL 1 PUFF(S): 90 AEROSOL, METERED ORAL at 08:17

## 2023-01-01 RX ADMIN — Medication 10 MILLILITER(S): at 15:27

## 2023-01-01 RX ADMIN — SODIUM CHLORIDE 1000 MILLILITER(S): 9 INJECTION, SOLUTION INTRAVENOUS at 00:04

## 2023-01-01 RX ADMIN — CHLORHEXIDINE GLUCONATE 15 MILLILITER(S): 213 SOLUTION TOPICAL at 17:32

## 2023-01-01 RX ADMIN — Medication 650 MILLIGRAM(S): at 12:00

## 2023-01-01 RX ADMIN — ALBUTEROL 1 PUFF(S): 90 AEROSOL, METERED ORAL at 16:27

## 2023-01-01 RX ADMIN — Medication 1 DROP(S): at 11:50

## 2023-01-01 RX ADMIN — CEFEPIME 100 MILLIGRAM(S): 1 INJECTION, POWDER, FOR SOLUTION INTRAMUSCULAR; INTRAVENOUS at 17:48

## 2023-01-01 RX ADMIN — Medication 1 DROP(S): at 12:48

## 2023-01-01 RX ADMIN — Medication 81 MILLIGRAM(S): at 11:52

## 2023-01-01 RX ADMIN — HEPARIN SODIUM 5000 UNIT(S): 5000 INJECTION INTRAVENOUS; SUBCUTANEOUS at 21:27

## 2023-01-01 RX ADMIN — ATORVASTATIN CALCIUM 80 MILLIGRAM(S): 80 TABLET, FILM COATED ORAL at 21:21

## 2023-01-01 RX ADMIN — Medication 1 DROP(S): at 00:04

## 2023-01-01 RX ADMIN — MIDODRINE HYDROCHLORIDE 5 MILLIGRAM(S): 2.5 TABLET ORAL at 13:05

## 2023-01-01 RX ADMIN — DEXMEDETOMIDINE HYDROCHLORIDE IN 0.9% SODIUM CHLORIDE 1.59 MICROGRAM(S)/KG/HR: 4 INJECTION INTRAVENOUS at 21:33

## 2023-01-01 RX ADMIN — Medication 81 MILLIGRAM(S): at 11:16

## 2023-01-01 RX ADMIN — BUMETANIDE 2 MILLIGRAM(S): 0.25 INJECTION INTRAMUSCULAR; INTRAVENOUS at 17:02

## 2023-01-01 RX ADMIN — MIDODRINE HYDROCHLORIDE 10 MILLIGRAM(S): 2.5 TABLET ORAL at 13:09

## 2023-01-01 RX ADMIN — ATORVASTATIN CALCIUM 80 MILLIGRAM(S): 80 TABLET, FILM COATED ORAL at 22:00

## 2023-01-01 RX ADMIN — Medication 10 MILLILITER(S): at 01:41

## 2023-01-01 RX ADMIN — SODIUM ZIRCONIUM CYCLOSILICATE 5 GRAM(S): 10 POWDER, FOR SUSPENSION ORAL at 06:53

## 2023-01-01 RX ADMIN — Medication 10 MILLILITER(S): at 04:29

## 2023-01-01 RX ADMIN — BUMETANIDE 1 MILLIGRAM(S): 0.25 INJECTION INTRAMUSCULAR; INTRAVENOUS at 05:57

## 2023-01-01 RX ADMIN — Medication 1 APPLICATION(S): at 05:55

## 2023-01-01 RX ADMIN — HEPARIN SODIUM 5000 UNIT(S): 5000 INJECTION INTRAVENOUS; SUBCUTANEOUS at 06:08

## 2023-01-01 RX ADMIN — Medication 2: at 23:37

## 2023-01-01 RX ADMIN — FENTANYL CITRATE 25 MICROGRAM(S): 50 INJECTION INTRAVENOUS at 13:18

## 2023-01-01 RX ADMIN — OLANZAPINE 5 MILLIGRAM(S): 15 TABLET, FILM COATED ORAL at 05:21

## 2023-01-01 RX ADMIN — HEPARIN SODIUM 5000 UNIT(S): 5000 INJECTION INTRAVENOUS; SUBCUTANEOUS at 13:39

## 2023-01-01 RX ADMIN — TAMSULOSIN HYDROCHLORIDE 0.4 MILLIGRAM(S): 0.4 CAPSULE ORAL at 05:59

## 2023-01-01 RX ADMIN — FENTANYL CITRATE 12.7 MICROGRAM(S)/KG/HR: 50 INJECTION INTRAVENOUS at 16:40

## 2023-01-01 RX ADMIN — Medication 4 MILLIGRAM(S): at 21:10

## 2023-01-01 RX ADMIN — Medication 10 MILLILITER(S): at 01:51

## 2023-01-01 RX ADMIN — Medication 1 DROP(S): at 05:40

## 2023-01-01 RX ADMIN — Medication 1 DROP(S): at 00:40

## 2023-01-01 RX ADMIN — Medication 20 MILLIGRAM(S): at 06:01

## 2023-01-01 RX ADMIN — Medication 3: at 23:28

## 2023-01-01 RX ADMIN — Medication 125 MILLIGRAM(S): at 01:42

## 2023-01-01 RX ADMIN — Medication 20 MILLIGRAM(S): at 13:35

## 2023-01-01 RX ADMIN — SODIUM CHLORIDE 4 MILLILITER(S): 9 INJECTION INTRAMUSCULAR; INTRAVENOUS; SUBCUTANEOUS at 07:25

## 2023-01-01 RX ADMIN — MIDODRINE HYDROCHLORIDE 10 MILLIGRAM(S): 2.5 TABLET ORAL at 14:46

## 2023-01-01 RX ADMIN — DEXMEDETOMIDINE HYDROCHLORIDE IN 0.9% SODIUM CHLORIDE 15.9 MICROGRAM(S)/KG/HR: 4 INJECTION INTRAVENOUS at 23:45

## 2023-01-01 RX ADMIN — CHLORHEXIDINE GLUCONATE 15 MILLILITER(S): 213 SOLUTION TOPICAL at 05:21

## 2023-01-01 RX ADMIN — FENTANYL CITRATE 25 MICROGRAM(S): 50 INJECTION INTRAVENOUS at 06:57

## 2023-01-01 RX ADMIN — HEPARIN SODIUM 5000 UNIT(S): 5000 INJECTION INTRAVENOUS; SUBCUTANEOUS at 13:21

## 2023-01-01 RX ADMIN — HEPARIN SODIUM 5000 UNIT(S): 5000 INJECTION INTRAVENOUS; SUBCUTANEOUS at 21:41

## 2023-01-01 RX ADMIN — Medication 81 MILLIGRAM(S): at 17:44

## 2023-01-01 RX ADMIN — FENTANYL CITRATE 12.7 MICROGRAM(S)/KG/HR: 50 INJECTION INTRAVENOUS at 11:39

## 2023-01-01 RX ADMIN — MIDODRINE HYDROCHLORIDE 10 MILLIGRAM(S): 2.5 TABLET ORAL at 01:13

## 2023-01-01 RX ADMIN — DEXMEDETOMIDINE HYDROCHLORIDE IN 0.9% SODIUM CHLORIDE 1.59 MICROGRAM(S)/KG/HR: 4 INJECTION INTRAVENOUS at 23:06

## 2023-01-01 RX ADMIN — CLOPIDOGREL BISULFATE 75 MILLIGRAM(S): 75 TABLET, FILM COATED ORAL at 11:41

## 2023-01-01 RX ADMIN — Medication 2: at 17:19

## 2023-01-01 RX ADMIN — Medication 4 MILLIGRAM(S): at 03:38

## 2023-01-01 RX ADMIN — HEPARIN SODIUM 5000 UNIT(S): 5000 INJECTION INTRAVENOUS; SUBCUTANEOUS at 15:11

## 2023-01-01 RX ADMIN — Medication 10 MILLIGRAM(S): at 05:20

## 2023-01-01 RX ADMIN — Medication 125 MILLIGRAM(S): at 11:55

## 2023-01-01 RX ADMIN — SODIUM ZIRCONIUM CYCLOSILICATE 5 GRAM(S): 10 POWDER, FOR SUSPENSION ORAL at 12:10

## 2023-01-01 RX ADMIN — MORPHINE SULFATE 4 MILLIGRAM(S): 50 CAPSULE, EXTENDED RELEASE ORAL at 16:18

## 2023-01-01 RX ADMIN — Medication 250 MILLIGRAM(S): at 06:25

## 2023-01-01 RX ADMIN — ALBUTEROL 1 PUFF(S): 90 AEROSOL, METERED ORAL at 01:38

## 2023-01-01 RX ADMIN — Medication 1 DROP(S): at 23:24

## 2023-01-01 RX ADMIN — VASOPRESSIN 4.5 UNIT(S)/MIN: 20 INJECTION INTRAVENOUS at 21:33

## 2023-01-01 RX ADMIN — VASOPRESSIN 3 UNIT(S)/MIN: 20 INJECTION INTRAVENOUS at 12:17

## 2023-01-01 RX ADMIN — Medication 10 MILLIGRAM(S): at 06:09

## 2023-01-01 RX ADMIN — VASOPRESSIN 4.5 UNIT(S)/MIN: 20 INJECTION INTRAVENOUS at 21:35

## 2023-01-01 RX ADMIN — Medication 10 MILLILITER(S): at 09:38

## 2023-01-01 RX ADMIN — Medication 125 MILLILITER(S): at 15:56

## 2023-01-01 RX ADMIN — Medication 20 MILLIGRAM(S): at 17:11

## 2023-01-01 RX ADMIN — PIPERACILLIN AND TAZOBACTAM 25 GRAM(S): 4; .5 INJECTION, POWDER, LYOPHILIZED, FOR SOLUTION INTRAVENOUS at 05:46

## 2023-01-01 RX ADMIN — Medication 1 DROP(S): at 19:20

## 2023-01-01 RX ADMIN — Medication 1 DROP(S): at 23:09

## 2023-01-01 RX ADMIN — TAMSULOSIN HYDROCHLORIDE 0.4 MILLIGRAM(S): 0.4 CAPSULE ORAL at 05:14

## 2023-01-01 RX ADMIN — Medication 650 MILLIGRAM(S): at 18:37

## 2023-01-01 RX ADMIN — Medication 10 MILLILITER(S): at 06:07

## 2023-01-01 RX ADMIN — FENTANYL CITRATE 25 MICROGRAM(S): 50 INJECTION INTRAVENOUS at 02:40

## 2023-01-01 RX ADMIN — PANTOPRAZOLE SODIUM 40 MILLIGRAM(S): 20 TABLET, DELAYED RELEASE ORAL at 18:21

## 2023-01-01 RX ADMIN — Medication 10 MILLILITER(S): at 17:48

## 2023-01-01 RX ADMIN — CLOPIDOGREL BISULFATE 75 MILLIGRAM(S): 75 TABLET, FILM COATED ORAL at 12:52

## 2023-01-01 RX ADMIN — Medication 1 DROP(S): at 18:38

## 2023-01-01 RX ADMIN — DEXMEDETOMIDINE HYDROCHLORIDE IN 0.9% SODIUM CHLORIDE 3.18 MICROGRAM(S)/KG/HR: 4 INJECTION INTRAVENOUS at 17:01

## 2023-01-01 RX ADMIN — Medication 10 MILLILITER(S): at 05:37

## 2023-01-01 RX ADMIN — Medication 1 DROP(S): at 06:10

## 2023-01-01 RX ADMIN — Medication 10 MILLIGRAM(S): at 11:44

## 2023-01-01 RX ADMIN — SODIUM CHLORIDE 1000 MILLILITER(S): 9 INJECTION, SOLUTION INTRAVENOUS at 11:00

## 2023-01-01 RX ADMIN — CHLORHEXIDINE GLUCONATE 15 MILLILITER(S): 213 SOLUTION TOPICAL at 18:13

## 2023-01-01 RX ADMIN — Medication 1 DROP(S): at 11:53

## 2023-01-01 RX ADMIN — HEPARIN SODIUM 5000 UNIT(S): 5000 INJECTION INTRAVENOUS; SUBCUTANEOUS at 22:30

## 2023-01-01 RX ADMIN — Medication 50 MILLIGRAM(S): at 11:08

## 2023-01-01 RX ADMIN — Medication 63.75 MILLIMOLE(S): at 23:57

## 2023-01-01 RX ADMIN — Medication 10 MILLILITER(S): at 10:14

## 2023-01-01 RX ADMIN — Medication 20 MILLIGRAM(S): at 23:06

## 2023-01-01 RX ADMIN — MIDODRINE HYDROCHLORIDE 5 MILLIGRAM(S): 2.5 TABLET ORAL at 12:09

## 2023-01-01 RX ADMIN — FENTANYL CITRATE 12.7 MICROGRAM(S)/KG/HR: 50 INJECTION INTRAVENOUS at 21:43

## 2023-01-01 RX ADMIN — HEPARIN SODIUM 5000 UNIT(S): 5000 INJECTION INTRAVENOUS; SUBCUTANEOUS at 22:28

## 2023-01-01 RX ADMIN — Medication 10 MILLILITER(S): at 06:09

## 2023-01-01 RX ADMIN — VASOPRESSIN 1.5 UNIT(S)/MIN: 20 INJECTION INTRAVENOUS at 10:58

## 2023-01-01 RX ADMIN — PANTOPRAZOLE SODIUM 40 MILLIGRAM(S): 20 TABLET, DELAYED RELEASE ORAL at 17:12

## 2023-01-01 RX ADMIN — FENTANYL CITRATE 12.5 MICROGRAM(S): 50 INJECTION INTRAVENOUS at 05:43

## 2023-01-01 RX ADMIN — Medication 650 MILLIGRAM(S): at 06:29

## 2023-01-01 RX ADMIN — CHLORHEXIDINE GLUCONATE 15 MILLILITER(S): 213 SOLUTION TOPICAL at 05:16

## 2023-01-01 RX ADMIN — Medication 1 DROP(S): at 18:46

## 2023-01-01 RX ADMIN — CEFEPIME 100 MILLIGRAM(S): 1 INJECTION, POWDER, FOR SOLUTION INTRAMUSCULAR; INTRAVENOUS at 01:41

## 2023-01-01 RX ADMIN — Medication 1 DROP(S): at 05:48

## 2023-01-01 RX ADMIN — Medication 100 GRAM(S): at 22:54

## 2023-01-01 RX ADMIN — Medication 125 MILLIGRAM(S): at 18:13

## 2023-01-01 RX ADMIN — Medication 10 MILLILITER(S): at 17:39

## 2023-01-01 RX ADMIN — HEPARIN SODIUM 5000 UNIT(S): 5000 INJECTION INTRAVENOUS; SUBCUTANEOUS at 14:02

## 2023-01-01 RX ADMIN — MIDODRINE HYDROCHLORIDE 5 MILLIGRAM(S): 2.5 TABLET ORAL at 05:48

## 2023-01-01 RX ADMIN — ATORVASTATIN CALCIUM 80 MILLIGRAM(S): 80 TABLET, FILM COATED ORAL at 21:33

## 2023-01-01 RX ADMIN — PANTOPRAZOLE SODIUM 40 MILLIGRAM(S): 20 TABLET, DELAYED RELEASE ORAL at 17:13

## 2023-01-01 RX ADMIN — CHLORHEXIDINE GLUCONATE 15 MILLILITER(S): 213 SOLUTION TOPICAL at 05:46

## 2023-01-01 RX ADMIN — Medication 81 MILLIGRAM(S): at 12:52

## 2023-01-01 RX ADMIN — Medication 81 MILLIGRAM(S): at 12:00

## 2023-01-01 RX ADMIN — Medication 10 MILLILITER(S): at 14:13

## 2023-01-01 RX ADMIN — PIPERACILLIN AND TAZOBACTAM 25 GRAM(S): 4; .5 INJECTION, POWDER, LYOPHILIZED, FOR SOLUTION INTRAVENOUS at 22:03

## 2023-01-01 RX ADMIN — MIDODRINE HYDROCHLORIDE 10 MILLIGRAM(S): 2.5 TABLET ORAL at 22:00

## 2023-01-01 RX ADMIN — Medication 125 MILLIGRAM(S): at 11:16

## 2023-01-01 RX ADMIN — Medication 650 MILLIGRAM(S): at 00:09

## 2023-01-01 RX ADMIN — CHLORHEXIDINE GLUCONATE 15 MILLILITER(S): 213 SOLUTION TOPICAL at 06:08

## 2023-01-01 RX ADMIN — PIPERACILLIN AND TAZOBACTAM 25 GRAM(S): 4; .5 INJECTION, POWDER, LYOPHILIZED, FOR SOLUTION INTRAVENOUS at 10:58

## 2023-01-01 RX ADMIN — DEXMEDETOMIDINE HYDROCHLORIDE IN 0.9% SODIUM CHLORIDE 1.59 MICROGRAM(S)/KG/HR: 4 INJECTION INTRAVENOUS at 12:21

## 2023-01-01 RX ADMIN — Medication 5 MILLIGRAM(S): at 21:51

## 2023-01-01 RX ADMIN — Medication 50 MILLIEQUIVALENT(S): at 10:18

## 2023-01-01 RX ADMIN — HEPARIN SODIUM 5000 UNIT(S): 5000 INJECTION INTRAVENOUS; SUBCUTANEOUS at 05:26

## 2023-01-01 RX ADMIN — Medication 50 MILLIEQUIVALENT(S): at 07:18

## 2023-01-01 RX ADMIN — FENTANYL CITRATE 25 MICROGRAM(S): 50 INJECTION INTRAVENOUS at 06:33

## 2023-01-01 RX ADMIN — SODIUM CHLORIDE 1000 MILLILITER(S): 9 INJECTION, SOLUTION INTRAVENOUS at 05:09

## 2023-01-01 RX ADMIN — MIDAZOLAM HYDROCHLORIDE 1 MILLIGRAM(S): 1 INJECTION, SOLUTION INTRAMUSCULAR; INTRAVENOUS at 05:55

## 2023-01-01 RX ADMIN — Medication 1 DROP(S): at 06:31

## 2023-01-01 RX ADMIN — CHLORHEXIDINE GLUCONATE 15 MILLILITER(S): 213 SOLUTION TOPICAL at 06:09

## 2023-01-01 RX ADMIN — OLANZAPINE 5 MILLIGRAM(S): 15 TABLET, FILM COATED ORAL at 17:33

## 2023-01-01 RX ADMIN — SODIUM ZIRCONIUM CYCLOSILICATE 5 GRAM(S): 10 POWDER, FOR SUSPENSION ORAL at 21:14

## 2023-01-01 RX ADMIN — Medication 5: at 17:33

## 2023-01-01 RX ADMIN — ATORVASTATIN CALCIUM 80 MILLIGRAM(S): 80 TABLET, FILM COATED ORAL at 22:14

## 2023-01-01 RX ADMIN — Medication 1 DROP(S): at 17:16

## 2023-01-01 RX ADMIN — Medication 5 MILLIGRAM(S): at 13:01

## 2023-01-01 RX ADMIN — Medication 5: at 12:35

## 2023-01-01 RX ADMIN — Medication 125 MILLILITER(S): at 15:42

## 2023-01-01 RX ADMIN — Medication 25 MILLIGRAM(S): at 17:32

## 2023-01-01 RX ADMIN — HEPARIN SODIUM 5000 UNIT(S): 5000 INJECTION INTRAVENOUS; SUBCUTANEOUS at 13:51

## 2023-01-01 RX ADMIN — HEPARIN SODIUM 5000 UNIT(S): 5000 INJECTION INTRAVENOUS; SUBCUTANEOUS at 14:28

## 2023-01-01 RX ADMIN — Medication 650 MILLIGRAM(S): at 20:30

## 2023-01-01 RX ADMIN — SODIUM CHLORIDE 100 MILLILITER(S): 9 INJECTION, SOLUTION INTRAVENOUS at 07:00

## 2023-01-01 RX ADMIN — DEXMEDETOMIDINE HYDROCHLORIDE IN 0.9% SODIUM CHLORIDE 1.59 MICROGRAM(S)/KG/HR: 4 INJECTION INTRAVENOUS at 08:13

## 2023-01-01 RX ADMIN — MIDODRINE HYDROCHLORIDE 10 MILLIGRAM(S): 2.5 TABLET ORAL at 11:16

## 2023-01-01 RX ADMIN — MIDODRINE HYDROCHLORIDE 10 MILLIGRAM(S): 2.5 TABLET ORAL at 05:04

## 2023-01-01 RX ADMIN — Medication 1 DROP(S): at 13:05

## 2023-01-01 RX ADMIN — MIDAZOLAM HYDROCHLORIDE 2 MILLIGRAM(S): 1 INJECTION, SOLUTION INTRAMUSCULAR; INTRAVENOUS at 11:41

## 2023-01-01 RX ADMIN — Medication 50 MILLILITER(S): at 04:24

## 2023-01-01 RX ADMIN — FENTANYL CITRATE 12.7 MICROGRAM(S)/KG/HR: 50 INJECTION INTRAVENOUS at 21:33

## 2023-01-01 RX ADMIN — Medication 1 DROP(S): at 13:01

## 2023-01-01 RX ADMIN — Medication 10 MILLILITER(S): at 11:01

## 2023-01-01 RX ADMIN — FAMOTIDINE 20 MILLIGRAM(S): 10 INJECTION INTRAVENOUS at 11:04

## 2023-01-01 RX ADMIN — CHLORHEXIDINE GLUCONATE 15 MILLILITER(S): 213 SOLUTION TOPICAL at 05:50

## 2023-01-01 RX ADMIN — MIDODRINE HYDROCHLORIDE 10 MILLIGRAM(S): 2.5 TABLET ORAL at 06:24

## 2023-01-01 RX ADMIN — Medication 50 GRAM(S): at 21:33

## 2023-01-01 RX ADMIN — Medication 81 MILLIGRAM(S): at 13:05

## 2023-01-01 RX ADMIN — Medication 4 MILLIGRAM(S): at 22:47

## 2023-01-01 RX ADMIN — Medication 4 MILLIGRAM(S): at 22:49

## 2023-01-01 RX ADMIN — Medication 2: at 11:34

## 2023-01-01 RX ADMIN — FAMOTIDINE 20 MILLIGRAM(S): 10 INJECTION INTRAVENOUS at 11:00

## 2023-01-01 RX ADMIN — HEPARIN SODIUM 5000 UNIT(S): 5000 INJECTION INTRAVENOUS; SUBCUTANEOUS at 22:17

## 2023-01-01 RX ADMIN — FAMOTIDINE 20 MILLIGRAM(S): 10 INJECTION INTRAVENOUS at 11:11

## 2023-01-01 RX ADMIN — FENTANYL CITRATE 25 MICROGRAM(S): 50 INJECTION INTRAVENOUS at 11:00

## 2023-01-01 RX ADMIN — Medication 81 MILLIGRAM(S): at 12:09

## 2023-01-01 RX ADMIN — BUMETANIDE 1 MILLIGRAM(S): 0.25 INJECTION INTRAMUSCULAR; INTRAVENOUS at 13:00

## 2023-01-01 RX ADMIN — ALBUTEROL 1 PUFF(S): 90 AEROSOL, METERED ORAL at 23:24

## 2023-01-01 RX ADMIN — HEPARIN SODIUM 5000 UNIT(S): 5000 INJECTION INTRAVENOUS; SUBCUTANEOUS at 22:00

## 2023-01-01 RX ADMIN — Medication 81 MILLIGRAM(S): at 11:13

## 2023-01-01 RX ADMIN — ALBUTEROL 1 PUFF(S): 90 AEROSOL, METERED ORAL at 02:37

## 2023-01-01 RX ADMIN — Medication 1 DROP(S): at 05:19

## 2023-01-01 RX ADMIN — SODIUM CHLORIDE 500 MILLILITER(S): 9 INJECTION, SOLUTION INTRAVENOUS at 20:00

## 2023-01-01 RX ADMIN — HEPARIN SODIUM 5000 UNIT(S): 5000 INJECTION INTRAVENOUS; SUBCUTANEOUS at 05:22

## 2023-01-01 RX ADMIN — Medication 1 DROP(S): at 06:25

## 2023-01-01 RX ADMIN — Medication 2: at 11:59

## 2023-01-01 RX ADMIN — Medication 10 MILLIGRAM(S): at 11:00

## 2023-01-01 RX ADMIN — Medication 10 MILLILITER(S): at 17:03

## 2023-01-01 RX ADMIN — Medication 100 GRAM(S): at 00:31

## 2023-01-01 RX ADMIN — MIDODRINE HYDROCHLORIDE 10 MILLIGRAM(S): 2.5 TABLET ORAL at 05:36

## 2023-01-01 RX ADMIN — FAMOTIDINE 20 MILLIGRAM(S): 10 INJECTION INTRAVENOUS at 11:45

## 2023-01-01 RX ADMIN — MIDODRINE HYDROCHLORIDE 10 MILLIGRAM(S): 2.5 TABLET ORAL at 23:42

## 2023-01-01 RX ADMIN — CHLORHEXIDINE GLUCONATE 15 MILLILITER(S): 213 SOLUTION TOPICAL at 17:09

## 2023-01-01 RX ADMIN — CHLORHEXIDINE GLUCONATE 1 APPLICATION(S): 213 SOLUTION TOPICAL at 05:21

## 2023-01-01 RX ADMIN — Medication 650 MILLIGRAM(S): at 19:49

## 2023-01-01 RX ADMIN — CHLORHEXIDINE GLUCONATE 1 APPLICATION(S): 213 SOLUTION TOPICAL at 07:00

## 2023-01-01 RX ADMIN — CLOPIDOGREL BISULFATE 75 MILLIGRAM(S): 75 TABLET, FILM COATED ORAL at 12:09

## 2023-01-01 RX ADMIN — Medication 5.95 MICROGRAM(S)/KG/MIN: at 09:31

## 2023-01-01 RX ADMIN — VASOPRESSIN 4.5 UNIT(S)/MIN: 20 INJECTION INTRAVENOUS at 13:55

## 2023-01-01 RX ADMIN — Medication 250 MILLIGRAM(S): at 05:15

## 2023-01-01 RX ADMIN — CHLORHEXIDINE GLUCONATE 15 MILLILITER(S): 213 SOLUTION TOPICAL at 06:31

## 2023-01-01 RX ADMIN — CHLORHEXIDINE GLUCONATE 15 MILLILITER(S): 213 SOLUTION TOPICAL at 06:54

## 2023-01-01 RX ADMIN — Medication 50 MILLIGRAM(S): at 18:45

## 2023-01-01 RX ADMIN — Medication 1 DROP(S): at 11:04

## 2023-01-01 RX ADMIN — ATORVASTATIN CALCIUM 80 MILLIGRAM(S): 80 TABLET, FILM COATED ORAL at 21:40

## 2023-01-01 RX ADMIN — Medication 1 DROP(S): at 11:23

## 2023-01-01 RX ADMIN — PIPERACILLIN AND TAZOBACTAM 25 GRAM(S): 4; .5 INJECTION, POWDER, LYOPHILIZED, FOR SOLUTION INTRAVENOUS at 21:58

## 2023-01-01 RX ADMIN — CHLORHEXIDINE GLUCONATE 15 MILLILITER(S): 213 SOLUTION TOPICAL at 17:25

## 2023-01-01 RX ADMIN — Medication 10 MILLILITER(S): at 17:45

## 2023-01-01 RX ADMIN — Medication 4 MILLIGRAM(S): at 21:16

## 2023-01-01 RX ADMIN — Medication 4 MILLIGRAM(S): at 22:58

## 2023-01-01 RX ADMIN — Medication 10 MILLILITER(S): at 10:29

## 2023-01-01 RX ADMIN — MIDAZOLAM HYDROCHLORIDE 2 MILLIGRAM(S): 1 INJECTION, SOLUTION INTRAMUSCULAR; INTRAVENOUS at 13:45

## 2023-01-01 RX ADMIN — Medication 10 MILLILITER(S): at 18:11

## 2023-01-01 RX ADMIN — Medication 1 DROP(S): at 05:17

## 2023-01-01 RX ADMIN — Medication 4 MILLILITER(S): at 23:51

## 2023-01-01 RX ADMIN — FENTANYL CITRATE 12.7 MICROGRAM(S)/KG/HR: 50 INJECTION INTRAVENOUS at 06:32

## 2023-01-01 RX ADMIN — PHENYLEPHRINE HYDROCHLORIDE 1.19 MICROGRAM(S)/KG/MIN: 10 INJECTION INTRAVENOUS at 08:43

## 2023-01-01 RX ADMIN — Medication 10 MILLILITER(S): at 22:19

## 2023-01-01 RX ADMIN — Medication 4 MILLILITER(S): at 16:13

## 2023-01-01 RX ADMIN — SODIUM CHLORIDE 3000 MILLILITER(S): 9 INJECTION, SOLUTION INTRAVENOUS at 10:20

## 2023-01-01 RX ADMIN — MIDODRINE HYDROCHLORIDE 10 MILLIGRAM(S): 2.5 TABLET ORAL at 17:13

## 2023-01-01 RX ADMIN — SODIUM CHLORIDE 500 MILLILITER(S): 9 INJECTION, SOLUTION INTRAVENOUS at 13:07

## 2023-01-01 RX ADMIN — Medication 1 DROP(S): at 18:16

## 2023-01-01 RX ADMIN — Medication 63.75 MILLIMOLE(S): at 00:09

## 2023-01-01 RX ADMIN — ATORVASTATIN CALCIUM 80 MILLIGRAM(S): 80 TABLET, FILM COATED ORAL at 22:57

## 2023-01-01 RX ADMIN — CHLORHEXIDINE GLUCONATE 1 APPLICATION(S): 213 SOLUTION TOPICAL at 06:00

## 2023-01-01 RX ADMIN — Medication 10 MILLILITER(S): at 01:54

## 2023-01-01 RX ADMIN — Medication 50 MILLILITER(S): at 07:01

## 2023-01-01 RX ADMIN — Medication 20 MILLIGRAM(S): at 10:40

## 2023-01-01 RX ADMIN — Medication 4 MILLIGRAM(S): at 21:38

## 2023-01-01 RX ADMIN — HEPARIN SODIUM 5000 UNIT(S): 5000 INJECTION INTRAVENOUS; SUBCUTANEOUS at 06:01

## 2023-01-01 RX ADMIN — Medication 25 GRAM(S): at 10:33

## 2023-01-01 RX ADMIN — Medication 4 MILLILITER(S): at 23:52

## 2023-01-01 RX ADMIN — Medication 5: at 05:51

## 2023-01-01 RX ADMIN — Medication 10 MILLILITER(S): at 01:08

## 2023-01-01 RX ADMIN — Medication 125 MILLIGRAM(S): at 06:00

## 2023-01-01 RX ADMIN — ATORVASTATIN CALCIUM 80 MILLIGRAM(S): 80 TABLET, FILM COATED ORAL at 21:48

## 2023-01-01 RX ADMIN — Medication 4 MILLIGRAM(S): at 22:28

## 2023-01-01 RX ADMIN — Medication 50 MILLIGRAM(S): at 17:02

## 2023-01-01 RX ADMIN — Medication 10 MILLILITER(S): at 09:47

## 2023-01-01 RX ADMIN — MIDAZOLAM HYDROCHLORIDE 1 MILLIGRAM(S): 1 INJECTION, SOLUTION INTRAMUSCULAR; INTRAVENOUS at 11:54

## 2023-01-01 RX ADMIN — Medication 50 MILLIEQUIVALENT(S): at 16:00

## 2023-01-01 RX ADMIN — SODIUM CHLORIDE 500 MILLILITER(S): 9 INJECTION, SOLUTION INTRAVENOUS at 17:30

## 2023-01-01 RX ADMIN — HEPARIN SODIUM 5000 UNIT(S): 5000 INJECTION INTRAVENOUS; SUBCUTANEOUS at 13:56

## 2023-01-01 RX ADMIN — Medication 1 DROP(S): at 11:01

## 2023-01-01 RX ADMIN — Medication 1 DROP(S): at 17:48

## 2023-01-01 RX ADMIN — Medication 50 MILLIGRAM(S): at 06:00

## 2023-01-01 RX ADMIN — Medication 10 MILLILITER(S): at 05:19

## 2023-01-01 RX ADMIN — Medication 25 MILLIGRAM(S): at 21:46

## 2023-01-01 RX ADMIN — Medication 10 MILLILITER(S): at 03:15

## 2023-01-01 RX ADMIN — Medication 10 MILLILITER(S): at 05:51

## 2023-01-01 RX ADMIN — Medication 81 MILLIGRAM(S): at 11:24

## 2023-01-01 RX ADMIN — FAMOTIDINE 20 MILLIGRAM(S): 10 INJECTION INTRAVENOUS at 11:28

## 2023-01-01 RX ADMIN — MIDODRINE HYDROCHLORIDE 5 MILLIGRAM(S): 2.5 TABLET ORAL at 14:58

## 2023-01-01 RX ADMIN — SODIUM CHLORIDE 100 MILLILITER(S): 9 INJECTION, SOLUTION INTRAVENOUS at 06:41

## 2023-01-01 RX ADMIN — CHLORHEXIDINE GLUCONATE 15 MILLILITER(S): 213 SOLUTION TOPICAL at 17:34

## 2023-01-01 RX ADMIN — ATORVASTATIN CALCIUM 80 MILLIGRAM(S): 80 TABLET, FILM COATED ORAL at 21:19

## 2023-01-01 RX ADMIN — Medication 10 MILLILITER(S): at 05:22

## 2023-01-01 RX ADMIN — Medication 3: at 11:05

## 2023-01-01 RX ADMIN — Medication 10 MILLILITER(S): at 21:36

## 2023-01-01 RX ADMIN — Medication 5 MILLIGRAM(S): at 21:14

## 2023-01-01 RX ADMIN — DEXMEDETOMIDINE HYDROCHLORIDE IN 0.9% SODIUM CHLORIDE 15.9 MICROGRAM(S)/KG/HR: 4 INJECTION INTRAVENOUS at 05:39

## 2023-01-01 RX ADMIN — Medication 10 MILLILITER(S): at 15:03

## 2023-01-01 RX ADMIN — Medication 5 MILLIGRAM(S): at 07:30

## 2023-01-01 RX ADMIN — Medication 650 MILLIGRAM(S): at 05:46

## 2023-01-01 RX ADMIN — Medication 50 MILLIGRAM(S): at 12:29

## 2023-01-01 RX ADMIN — INSULIN HUMAN 10 UNIT(S): 100 INJECTION, SOLUTION SUBCUTANEOUS at 08:26

## 2023-01-01 RX ADMIN — POLYETHYLENE GLYCOL 3350 17 GRAM(S): 17 POWDER, FOR SOLUTION ORAL at 11:42

## 2023-01-01 RX ADMIN — Medication 3: at 17:02

## 2023-01-01 RX ADMIN — Medication 10 MILLILITER(S): at 05:21

## 2023-01-01 RX ADMIN — MIDODRINE HYDROCHLORIDE 10 MILLIGRAM(S): 2.5 TABLET ORAL at 17:11

## 2023-01-01 RX ADMIN — Medication 5 MILLIGRAM(S): at 14:14

## 2023-01-01 RX ADMIN — Medication 63.75 MILLIMOLE(S): at 01:41

## 2023-01-01 RX ADMIN — Medication 10 MILLILITER(S): at 18:21

## 2023-01-01 RX ADMIN — Medication 250 MILLIGRAM(S): at 14:03

## 2023-01-01 RX ADMIN — Medication 1 DROP(S): at 13:51

## 2023-01-01 RX ADMIN — DEXMEDETOMIDINE HYDROCHLORIDE IN 0.9% SODIUM CHLORIDE 1.59 MICROGRAM(S)/KG/HR: 4 INJECTION INTRAVENOUS at 01:26

## 2023-01-01 RX ADMIN — Medication 125 MILLIGRAM(S): at 17:41

## 2023-01-01 RX ADMIN — ALBUTEROL 1 PUFF(S): 90 AEROSOL, METERED ORAL at 08:09

## 2023-01-01 RX ADMIN — Medication 20 MILLIGRAM(S): at 05:22

## 2023-01-01 RX ADMIN — Medication 125 MILLIGRAM(S): at 23:48

## 2023-01-01 RX ADMIN — Medication 63.75 MILLIMOLE(S): at 01:00

## 2023-01-01 RX ADMIN — PANTOPRAZOLE SODIUM 40 MILLIGRAM(S): 20 TABLET, DELAYED RELEASE ORAL at 12:39

## 2023-01-01 RX ADMIN — PIPERACILLIN AND TAZOBACTAM 25 GRAM(S): 4; .5 INJECTION, POWDER, LYOPHILIZED, FOR SOLUTION INTRAVENOUS at 00:00

## 2023-01-01 RX ADMIN — Medication 10 MILLILITER(S): at 17:30

## 2023-01-01 RX ADMIN — FAMOTIDINE 20 MILLIGRAM(S): 10 INJECTION INTRAVENOUS at 11:32

## 2023-01-01 RX ADMIN — Medication 125 MILLIGRAM(S): at 18:32

## 2023-01-01 RX ADMIN — Medication 250 MILLIGRAM(S): at 13:04

## 2023-01-01 RX ADMIN — SODIUM CHLORIDE 1000 MILLILITER(S): 9 INJECTION, SOLUTION INTRAVENOUS at 13:51

## 2023-01-01 RX ADMIN — PIPERACILLIN AND TAZOBACTAM 25 GRAM(S): 4; .5 INJECTION, POWDER, LYOPHILIZED, FOR SOLUTION INTRAVENOUS at 21:41

## 2023-01-01 RX ADMIN — Medication 10 MILLILITER(S): at 21:14

## 2023-01-01 RX ADMIN — Medication 81 MILLIGRAM(S): at 12:16

## 2023-01-01 RX ADMIN — ALBUTEROL 1 PUFF(S): 90 AEROSOL, METERED ORAL at 20:10

## 2023-01-01 RX ADMIN — Medication 4 MILLILITER(S): at 07:49

## 2023-01-09 PROBLEM — H54.40 BLIND PAINFUL LEFT EYE: Status: ACTIVE | Noted: 2022-01-01

## 2023-01-09 PROBLEM — I10 HYPERTENSION: Status: ACTIVE | Noted: 2019-12-03

## 2023-01-09 PROBLEM — Z95.5 H/O HEART ARTERY STENT: Status: ACTIVE | Noted: 2019-12-03

## 2023-01-09 PROBLEM — R73.03 PREDIABETES: Status: ACTIVE | Noted: 2022-01-01

## 2023-01-09 PROBLEM — E78.00 HIGH CHOLESTEROL: Status: ACTIVE | Noted: 2019-12-03

## 2023-01-09 PROBLEM — I25.10 CAD (CORONARY ARTERY DISEASE): Status: ACTIVE | Noted: 2022-01-01

## 2023-01-09 PROBLEM — I77.819 AORTIC ECTASIA: Status: ACTIVE | Noted: 2021-10-25

## 2023-01-09 NOTE — HISTORY OF PRESENT ILLNESS
[FreeTextEntry1] : Mr. Segura is a 76yo M with PMHx of CAD s/p PCI, HLD, prostate CA, BPH and frequent falls who presents to establish care. His PMD is Dr. Joaquín Ko. He was recently hospitalized for a fall and lingering left wrist pain. Found to have fractured wrist and old left orbital fracture. Labs revealed troponin elevation 2.53 with uptrend to 3. EKG suggestive of anteroseptal MI, likely recent with RWMA on TTE. Patient was sent to [facility] for LHC which was delayed due to delirium in setting of probable UTI. LHC done which showed mild, nonobstructive disease. Patient is accompanied by his son. He has been feeling well since discharge. He had continued taking lisinopril which made him feel dizzy/lightheaded so he stopped taking it. Denies CP, SOB, palpitations. No further falls since he was discharged. \par -He has been feeling well. Still with intermittent falls, mechanical in nature. Denies CP, SOB, palpitations.

## 2023-01-09 NOTE — ASSESSMENT
[FreeTextEntry1] : CAD: Prior PCI to LAD. LHC revealed patent stent and mild nonobstructive disease in LAD and RCA. \par -Continue with ASA 81mg PO daily and plavix 75mg PO daily.\par -Hold ACEi/ARB and beta blocker due to dizziness and significant history of falls. \par -Will follow with routine TTE and stress. \par -TTE prior to next visit. \par \par HTN: BP at goal per ACC/AHA 2018 guidelines\par -Continue to monitor off meds. \par -Discussed therapeutic lifestyle changes to promote continued BP control. \par \par HLD: , TG 88, HDL 50, LDL 74 (05/22)\par -At goal, continue with atorvastatin 80mg PO daily.\par  -Discussed therapeutic lifestyle changes to promote improved lipid metabolism. \par \par Prediabetes: HA1c 5.8% (05/22)\par -Continue to monitor.\par -Diet and exercise discussed for better glycemic control. \par \par Falls: Mostly mechanical in nature. \par -Will continue with assistive devices to ambulate. \par \par Follow up in 6 months\par

## 2023-01-09 NOTE — PHYSICAL EXAM
[Well Developed] : well developed [Well Nourished] : well nourished [No Acute Distress] : no acute distress [Normal Conjunctiva] : normal conjunctiva [Normal Venous Pressure] : normal venous pressure [5th Left ICS - MCL] : palpated at the 5th LICS in the midclavicular line [Normal] : normal [No Precordial Heave] : no precordial heave was noted [Normal Rate] : normal [Rhythm Regular] : regular [Normal S1] : normal S1 [Normal S2] : normal S2 [No Murmur] : no murmurs heard [No Pitting Edema] : no pitting edema present [2+] : left 2+ [Clear Lung Fields] : clear lung fields [Good Air Entry] : good air entry [No Respiratory Distress] : no respiratory distress  [Soft] : abdomen soft [Non Tender] : non-tender [Normal Bowel Sounds] : normal bowel sounds [Normal Gait] : normal gait [No Edema] : no edema [No Varicosities] : no varicosities [No Rash] : no rash [Moves all extremities] : moves all extremities [No Focal Deficits] : no focal deficits [Alert and Oriented] : alert and oriented [de-identified] : Left orbit with enucleation. Left upper orbit with some suture material.

## 2023-01-24 PROBLEM — N21.0 BLADDER CALCULUS: Status: ACTIVE | Noted: 2022-04-26

## 2023-01-24 PROBLEM — R39.9 LOWER URINARY TRACT SYMPTOMS (LUTS): Status: ACTIVE | Noted: 2023-01-01

## 2023-01-24 PROBLEM — C61 PROSTATE CARCINOMA: Status: ACTIVE | Noted: 2021-10-05

## 2023-01-26 NOTE — PATIENT PROFILE ADULT - DO YOU FEEL UNSAFE AT HOME, WORK, OR SCHOOL?
Impression: Exudative age-related macular degeneration, right eye, with active choroidal neovascularization: H35.3211.
s/p Avastin x 3, last 12/15/22 Plan: --exam/OCT demonstrate improving SRF OD
--findings d/w pt, rec cont anti-VEGF to maintain vision  
--r/b/a of Avastin for NVAMD discussed --pt understands Avastin is considered off-label for NVAMD
--pt elects to cont Avastin under OCT guidance --Avastin 1.25 mg/.05 ml injected  w/o complication --pt instructed to call immediately with s/s VA loss/pain RTC 6 weeks OCT OU re-eval no

## 2023-01-27 NOTE — PATIENT PROFILE ADULT - FOOD INSECURITY
[facility] called Master MAURICIO several times to follow up on patient case. Have not been able to reach Waleska Alexandra or Luciana Ruiz. Left messages on recorder.   no

## 2023-02-08 NOTE — PATIENT PROFILE ADULT - OVER THE PAST TWO WEEKS, HAVE YOU FELT LITTLE INTEREST OR PLEASURE IN DOING THINGS?
Antony Mcgovern is a 7 m.o. female who presents for routine immunizations. She denies any symptoms , reactions or allergies that would exclude them from being immunized today. Risks and adverse reactions were discussed and the VIS was given to them. All questions were addressed. She was observed for 5 min post injection. There were no reactions observed. Julia Badillo      Per mom patient with cough and congestion in the last few days, felt warm and took tylenol early this AM but no fever in office. Advised to try saline+suction 3-4x a dayand provided a neilmed nasal aspirator, monitor for fever or new/worsening symptoms, can consider an infant zarbees (the kind without honey). [see HPI] : see HPI [Negative] : Heme/Lymph no

## 2023-03-20 NOTE — ED PROVIDER NOTE - PHYSICAL EXAMINATION
Physical Exam    Vital Signs: I have reviewed the initial vital signs.  Constitutional: appears stated age, no acute distress  Eyes: Left eye s/p ruptured globe. Right eye conjunctiva pink, Sclera clear, EOMI.  Cardiovascular: S1 and S2, tachycardia, regular rhythm, well-perfused extremities, radial pulses equal and 2+, pedal pulses 2+ and equal  Respiratory: unlabored respiratory effort, clear to auscultation bilaterally  Gastrointestinal: soft, non-tender abdomen  Musculoskeletal: supple neck, no lower extremity edema, no midline tenderness, RLE externally rotated, abducted, shortened. Ecchymosis noted to right medial thigh. Decreased ROM of RLE and LLE in flexion at the hip, able to move toes on both lower extremities, normal strength and ROM in bilateral upper extremities. Sensation intact in bilateral upper and lower extremities.  Neurologic: awake, alert, oriented x3

## 2023-03-20 NOTE — H&P ADULT - NSHPPHYSICALEXAM_GEN_ALL_CORE
T(C): 36.1 (03-20-23 @ 06:19), Max: 36.1 (03-20-23 @ 06:19)  HR: 105 (03-20-23 @ 10:32) (105 - 113)  BP: 145/88 (03-20-23 @ 10:32) (121/85 - 145/88)  RR: 18 (03-20-23 @ 10:32) (18 - 18)  SpO2: 95% (03-20-23 @ 10:32) (95% - 99%)    PHYSICAL EXAM:  GENERAL: NAD, well-developed, well nourished, looks stated age  HEAD:  Atraumatic, Normocephalic  EYES: EOMI, PERRLA, conjunctiva and sclera clear  NECK: Supple, No JVD, no bruits, no masses, no thyroid enlargement  ENMT: No tonsillar erythema, exudated or enlargement, moist mucous membranes  CHEST/LUNG: Clear to auscultation bilaterally; No rales, rhonchi or wheezing, no dullness to percussion  HEART: S1,S2 Regular rate and rhythm; No murmurs, rubs, or gallops  ABDOMEN: Soft, nontender, nondistended, no rebound tenderness; No palpable masses, no hernias, no organomegaly; Bowel sounds present and normoactive  EXTREMITIES:  2+ peripheral pulses bilaterally and symmetrically, no clubbing, cyanosis, or edema, RLE externally rotated and shortened  LYMPH: No lymphadenopathy  PSYCH: AAOx3, normal mood and affect  NEUROLOGY: non-focal, muscle strength 5/5 all extremities, DTRs 2+ symmetrically  SKIN: No rashes or lesions

## 2023-03-20 NOTE — ED PROVIDER NOTE - NS ED ROS FT
Constitutional: (-) fever, (-) chills  Eyes: (-) visual changes  ENT: (-) nasal congestion  Cardiovascular: (-) chest pain, (-) syncope  Respiratory: (-) cough, (-) shortness of breath, (-) dyspnea,   Gastrointestinal: (-) vomiting, (-) diarrhea, (-)nausea,  Musculoskeletal: (-) neck pain, (-) back pain, (+) right hip pain  Neurological: (-) headache, (-) loc, (-) dizziness, (-) tingling, (-)numbness,  :  (-)dysuria,  (-) hematuria  Allergic/Immunologic: (-) pruritus

## 2023-03-20 NOTE — ED ADULT NURSE REASSESSMENT NOTE - NS ED NURSE REASSESS COMMENT FT1
pt being transferred [place] for med/surg admission. report was given to charge rn jamshid who will let aung norwood know. awaiting transport to the [place] site.

## 2023-03-20 NOTE — ED PROVIDER NOTE - DIFFERENTIAL DIAGNOSIS
Hip fractious versus pelvic fracture versus intra-abdominal trauma versus intrathoracic trauma versus intracranial bleed Differential Diagnosis

## 2023-03-20 NOTE — ED ADULT NURSE NOTE - NSICDXPASTSURGICALHX_GEN_ALL_CORE_FT
Problem: Infection:  Goal: Will remain free from infection  Description: Will remain free from infection  Outcome: Met This Shift     Problem: Daily Care:  Goal: Daily care needs are met  Description: Daily care needs are met  Outcome: Met This Shift     Problem: Pain:  Goal: Patient's pain/discomfort is manageable  Description: Patient's pain/discomfort is manageable  Outcome: Met This Shift     Problem: Respiratory:  Goal: Will remain free from infection  Description: Will remain free from infection  Outcome: Met This Shift PAST SURGICAL HISTORY:  Bilateral cataracts     History of lithotripsy     Status post cardiac surgery cardiac stents

## 2023-03-20 NOTE — H&P ADULT - ASSESSMENT
76 yo male with  PMHx CAD, Prostate Ca, HLD presents after mechanical fall at home. Found to have R hip fracture.        Plan:  1. R hip fx  - bed rest  - ortho eval  - pain meds  - hold Plavix    2. CAD  - cont Baby ASA    3. HLD  - Lipitor    4. Prostate Ca  - cont Flomax  - fup OP    5. DVT  - Heparin     5. CHG bath      All above D/W Dr Arreaga 76 yo male with  PMHx CAD, Prostate Ca, HLD presents after mechanical fall at home. Found to have R hip fracture.        Plan:  1. R hip fx  - bed rest  - ortho eval  - pain meds  - hold Plavix, ASA - resume after surgery    2. CAD  - see above    3. HLD  - Lipitor    4. Prostate Ca  - cont Flomax  - fup OP    5. DVT  - Heparin     5. CHG bath      All above D/W Dr Arreaga

## 2023-03-20 NOTE — ED PROVIDER NOTE - PROGRESS NOTE DETAILS
Signed out to Dr. Jordan. darrin- After discussion with orthopedics will transfer to [place] for evaluation.

## 2023-03-20 NOTE — H&P ADULT - NSHPLABSRESULTS_GEN_ALL_CORE
12.2   11.39 )-----------( 205      ( 20 Mar 2023 07:23 )             35.4       03-20    139  |  104  |  23<H>  ----------------------------<  145<H>  4.7   |  19  |  1.2    Ca    9.6      20 Mar 2023 07:23    TPro  6.5  /  Alb  4.1  /  TBili  1.5<H>  /  DBili  x   /  AST  20  /  ALT  11  /  AlkPhos  137<H>  03-20                      PT/INR - ( 20 Mar 2023 07:23 )   PT: 12.00 sec;   INR: 1.05 ratio         PTT - ( 20 Mar 2023 07:23 )  PTT:27.2 sec    Lactate Trend      CARDIAC MARKERS ( 20 Mar 2023 07:23 )  x     / <0.01 ng/mL / 455 U/L / x     / x            RADIOLOGY:   < from: CT Abdomen and Pelvis w/ IV Cont (03.20.23 @ 08:43) >      IMPRESSION:    1. Acute, comminuted right femoral intertrochanteric fracture.    2. No evidence of traumatic intrathoracic pathology or solid abdominal   organ injury.    --- End of Report ---            JEANIE MALONE MD; Attending Radiologist  This document has been electronically signed. Mar 20 2023  8:52AM    < end of copied text >

## 2023-03-20 NOTE — H&P ADULT - HISTORY OF PRESENT ILLNESS
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 Pt walks with walker and got up in middle of night to get food and slipped and fell on his R side. Pt has been having R hip pain since with inability to ambulate. Pts family finally convinced him to come to hospital today. Pt is having 10/10 pain currently. Denies dizziness, CP or LOC prior to fall. Denies head trauma. No HA.

## 2023-03-20 NOTE — CONSULT NOTE ADULT - SUBJECTIVE AND OBJECTIVE BOX
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 He reports that he was getting foot in the middle of the night and slipped injuring the right hip. After the fall he was unable to walk but thought the pain would improve, however it continued to worsen so he was BIBEMS today    Endorses antecedent hip (groin pain - likely 2/2 OA). Takes ASA for CAD. At baseline walks with a walker.    PMH/PSH  Fracture of unspecified part of neck of right femur, initial encounter for closed fracture    FH: colon cancer (Mother)    MEWS Score    CAD (coronary artery disease)    Hypercholesteremia    BPH (benign prostatic hyperplasia)    Kidney stones    Hypertension    Diabetes mellitus    Prostate cancer    Fracture of femoral neck, right    Status post cardiac surgery    Bilateral cataracts    History of lithotripsy    RIGHT HIP PAIN    90+    SysAdmin_VstLnk        Medications  acetaminophen     Tablet .. 650 milliGRAM(s) Oral every 6 hours PRN  aluminum hydroxide/magnesium hydroxide/simethicone Suspension 30 milliLiter(s) Oral every 4 hours PRN  aspirin  chewable 81 milliGRAM(s) Oral daily  atorvastatin 80 milliGRAM(s) Oral at bedtime  atropine 1% Solution 1 Drop(s) Left EYE two times a day  erythromycin   Ointment 1 Application(s) Left EYE two times a day  melatonin 3 milliGRAM(s) Oral at bedtime PRN  morphine  - Injectable 4 milliGRAM(s) IV Push every 6 hours PRN  morphine  - Injectable 4 milliGRAM(s) IV Push once  ondansetron Injectable 4 milliGRAM(s) IV Push every 8 hours PRN  prednisoLONE acetate 1% Suspension 1 Drop(s) Left EYE every 6 hours  tamsulosin 0.4 milliGRAM(s) Oral every 12 hours      Allergies  No Known Allergies        T(C): 37.6 (03-20-23 @ 12:43), Max: 37.6 (03-20-23 @ 12:43)  HR: 100 (03-20-23 @ 15:12) (100 - 113)  BP: 109/73 (03-20-23 @ 15:12) (109/73 - 145/88)  RR: 18 (03-20-23 @ 15:12) (17 - 18)  SpO2: 95% (03-20-23 @ 15:12) (95% - 99%)    P/E:  NAD  Non-labored breathing    BL UE  Skin intact  No swelling/erythema/ecchymosis noted  No deformity/laceration/abrasion noted  NTTP throughout  No bony step offs / crepitus  Moving spontaneously  SILT  Hands wwp    RLE  Skin intact  Shortened / ER'd extremited  TTP over R hip  Compartments soft and compressible, no pain w/ passive stretch of digits  SILT sp/dp/t/sural/saph  Firing ta/ehl/fhl/gs  DP pulse 2+, cap refill <2    LLE  Skin intact  No swelling/erythema/ecchymosis noted  No deformity/laceration/abrasion noted  NTTP throughout  No bony step offs / crepitus  Moving spontaneously  SILT  Foot wwp    Labs                        12.2   11.39 )-----------( 205      ( 20 Mar 2023 07:23 )             35.4     03-20    139  |  104  |  23<H>  ----------------------------<  145<H>  4.7   |  19  |  1.2    Ca    9.6      20 Mar 2023 07:23    TPro  6.5  /  Alb  4.1  /  TBili  1.5<H>  /  DBili  x   /  AST  20  /  ALT  11  /  AlkPhos  137<H>  03-20    LIVER FUNCTIONS - ( 20 Mar 2023 07:23 )  Alb: 4.1 g/dL / Pro: 6.5 g/dL / ALK PHOS: 137 U/L / ALT: 11 U/L / AST: 20 U/L / GGT: x           PT/INR - ( 20 Mar 2023 07:23 )   PT: 12.00 sec;   INR: 1.05 ratio         PTT - ( 20 Mar 2023 07:23 )  PTT:27.2 sec    Imaging:  XR / CT Pelvis / RLE - Comminuted right IT fracture    A/P:  78yo Male transfer from [facility] with right IT fracture sustained after ground level fall 3 days ago. 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 He reports that he was getting foot in the middle of the night and slipped injuring the right hip. After the fall he was unable to walk but thought the pain would improve, however it continued to worsen so he was BIBEMS today    Endorses antecedent hip (groin pain - likely 2/2 OA). Takes ASA for CAD. At baseline walks with a walker.    PMH/PSH  Fracture of unspecified part of neck of right femur, initial encounter for closed fracture    FH: colon cancer (Mother)    MEWS Score    CAD (coronary artery disease)    Hypercholesteremia    BPH (benign prostatic hyperplasia)    Kidney stones    Hypertension    Diabetes mellitus    Prostate cancer    Fracture of femoral neck, right    Status post cardiac surgery    Bilateral cataracts    History of lithotripsy    RIGHT HIP PAIN    90+    SysAdmin_VstLnk        Medications  acetaminophen     Tablet .. 650 milliGRAM(s) Oral every 6 hours PRN  aluminum hydroxide/magnesium hydroxide/simethicone Suspension 30 milliLiter(s) Oral every 4 hours PRN  aspirin  chewable 81 milliGRAM(s) Oral daily  atorvastatin 80 milliGRAM(s) Oral at bedtime  atropine 1% Solution 1 Drop(s) Left EYE two times a day  erythromycin   Ointment 1 Application(s) Left EYE two times a day  melatonin 3 milliGRAM(s) Oral at bedtime PRN  morphine  - Injectable 4 milliGRAM(s) IV Push every 6 hours PRN  morphine  - Injectable 4 milliGRAM(s) IV Push once  ondansetron Injectable 4 milliGRAM(s) IV Push every 8 hours PRN  prednisoLONE acetate 1% Suspension 1 Drop(s) Left EYE every 6 hours  tamsulosin 0.4 milliGRAM(s) Oral every 12 hours      Allergies  No Known Allergies        T(C): 37.6 (03-20-23 @ 12:43), Max: 37.6 (03-20-23 @ 12:43)  HR: 100 (03-20-23 @ 15:12) (100 - 113)  BP: 109/73 (03-20-23 @ 15:12) (109/73 - 145/88)  RR: 18 (03-20-23 @ 15:12) (17 - 18)  SpO2: 95% (03-20-23 @ 15:12) (95% - 99%)    P/E:  NAD  Non-labored breathing    BL UE  Skin intact  No swelling/erythema/ecchymosis noted  No deformity/laceration/abrasion noted  NTTP throughout  No bony step offs / crepitus  Moving spontaneously  SILT  Hands wwp    RLE  Skin intact  Shortened / ER'd extremited  TTP over R hip  Compartments soft and compressible, no pain w/ passive stretch of digits  SILT sp/dp/t/sural/saph  Firing ta/ehl/fhl/gs  DP pulse 2+, cap refill <2    LLE  Skin intact  No swelling/erythema/ecchymosis noted  No deformity/laceration/abrasion noted  NTTP throughout  No bony step offs / crepitus  Moving spontaneously  SILT  Foot wwp    Labs                        12.2   11.39 )-----------( 205      ( 20 Mar 2023 07:23 )             35.4     03-20    139  |  104  |  23<H>  ----------------------------<  145<H>  4.7   |  19  |  1.2    Ca    9.6      20 Mar 2023 07:23    TPro  6.5  /  Alb  4.1  /  TBili  1.5<H>  /  DBili  x   /  AST  20  /  ALT  11  /  AlkPhos  137<H>  03-20    LIVER FUNCTIONS - ( 20 Mar 2023 07:23 )  Alb: 4.1 g/dL / Pro: 6.5 g/dL / ALK PHOS: 137 U/L / ALT: 11 U/L / AST: 20 U/L / GGT: x           PT/INR - ( 20 Mar 2023 07:23 )   PT: 12.00 sec;   INR: 1.05 ratio         PTT - ( 20 Mar 2023 07:23 )  PTT:27.2 sec    Imaging:  XR / CT Pelvis / RLE - Comminuted right IT fracture    A/P:  78yo Male transfer from [facility] with right IT fracture sustained after ground level fall 3 days ago. Surgical fixation pending medical optimization    Weight bearing: NWB RLE  Preop labs: bmp, cbc, coags, type and screen x2m ekg, cxr, utox  Diet: NPO / IVF at midnight  DVT ppx: SCD, please give stat dose of LVX tonight and hold in preparation for OR  Please place 2U PRBC on hold for OR tomorrow  Please obtain BL LE Duplex given prolonged immobility  Pain control  OOBTC  Pain control  Home medications  Repeat labs in AM  PT/OT/Rehab consult

## 2023-03-20 NOTE — ED PROVIDER NOTE - OBJECTIVE STATEMENT
77-year-old male with past medical history of prostate cancer, nephrolithiasis, BPH, hypercholesterolemia, CAD (on aspirin 81 mg daily) presents status post fall 2 days ago.  Patient states he slipped and fell in front of his refrigerator.  Denies LOC, head trauma.  States he was unable to get up after the fall due to pain in the right hip.  States he lives with his family, who helped him up and got him into bed.  States for the past 2 days he has been in bed, nonambulatory.  Denies headache, chest pain, shortness of breath, abdominal pain, nausea/vomiting/diarrhea, urinary symptoms, numbness/tingling.

## 2023-03-20 NOTE — ED PROVIDER NOTE - ATTENDING APP SHARED VISIT CONTRIBUTION OF CARE
77-year-old male, history of CAD, HLD, prostate CA, DM, HTN, fell at home 3 days ago, complaining of right hip pain, unable to ambulate.  No other injuries.  Exam shows alert patient in no distress, HEENT NCAT PERRL, lungs clear, RR S1S2, abdomen soft NT +BS, positive tenderness right hip, right leg shortened and externally rotated, good distal pulses.  Will order labs, EKG, chest x-ray, pelvis and right hip x-ray. 77-year-old male, history of CAD, HLD, prostate CA, DM, HTN, fell at home 3 days ago, complaining of right hip pain, unable to ambulate.  No other injuries.  Exam shows alert patient in no distress, HEENT NCAT PERRL, lungs clear, RR S1S2, abdomen soft NT +BS, positive tenderness right hip, right leg shortened and externally rotated, good distal pulses.  Will order labs, EKG, chest x-ray, pelvis and right hip x-ray, Pan CT.

## 2023-03-21 NOTE — PROGRESS NOTE ADULT - ASSESSMENT
76 yo male with  PMHx CAD, Prostate Ca, HLD presents after mechanical fall at home. Found to have R hip fracture.    #R hip fx  - bed rest  - ortho eval; scheduled for ORIF this afternoon  - pain meds  - hold Plavix, ASA - resume after surgery  - acetaminophen     Tablet .. 650 milliGRAM(s) Oral every 6 hours PRN Temp greater or equal to 38C (100.4F), Mild Pain (1 - 3)  - morphine  - Injectable 4 milliGRAM(s) IV Push every 6 hours PRN Moderate Pain (4 - 6)  - currently patient is HDS, afebrile, and satting well on RA  - no gross lab anormalities other than elevated CPK, which is expected after traumatic fall. currently trending and on mIVF  - ELAINE score 0.2% risk of myocardial infarction or cardiac arrest, intraoperatively or up to 30-days post op  - this does NOT severe as a clearance but rather a risk stratification to assist the surgical team in weighing the risk vs benefit of surgical intervention      #CAD  - aspirin  chewable 81 milliGRAM(s) Oral daily  - atorvastatin 80 milliGRAM(s) Oral at bedtime    #HLD  - Lipitor    #Prostate Ca  - cont tamsulosin 0.4 milliGRAM(s) Oral every 12 hours  - fup OP    #DVT ppx:  Lovenox

## 2023-03-21 NOTE — DISCHARGE NOTE NURSING/CASE MANAGEMENT/SOCIAL WORK - NSDCPEFALRISK_GEN_ALL_CORE
For information on Fall & Injury Prevention, visit: [email or web address] OR  [email or web address] OR  https://www.cdc.gov/steadi/patient.html

## 2023-03-21 NOTE — PROGRESS NOTE ADULT - SUBJECTIVE AND OBJECTIVE BOX
SUBJECTIVE:    Patient is a 77y old Male who presents with a chief complaint of mechanical Fall/ Hip Fx (21 Mar 2023 10:45)    Currently admitted to medicine with the primary diagnosis of Fracture of femoral neck, right       Today is hospital day 1d.     PAST MEDICAL & SURGICAL HISTORY  CAD (coronary artery disease)    Hypercholesteremia    BPH (benign prostatic hyperplasia)    Kidney stones    Prostate cancer    Status post cardiac surgery  cardiac stents    Bilateral cataracts    History of lithotripsy      SOCIAL HISTORY:  Negative for smoking/alcohol/drug use.     ALLERGIES:  No Known Allergies    MEDICATIONS:  STANDING MEDICATIONS  aspirin  chewable 81 milliGRAM(s) Oral daily  atorvastatin 80 milliGRAM(s) Oral at bedtime  atropine 1% Solution 1 Drop(s) Left EYE two times a day  enoxaparin Injectable 40 milliGRAM(s) SubCutaneous every 24 hours  erythromycin   Ointment 1 Application(s) Left EYE two times a day  morphine  - Injectable 4 milliGRAM(s) IV Push once  prednisoLONE acetate 1% Suspension 1 Drop(s) Left EYE every 6 hours  tamsulosin 0.4 milliGRAM(s) Oral every 12 hours    PRN MEDICATIONS  acetaminophen     Tablet .. 650 milliGRAM(s) Oral every 6 hours PRN  aluminum hydroxide/magnesium hydroxide/simethicone Suspension 30 milliLiter(s) Oral every 4 hours PRN  melatonin 3 milliGRAM(s) Oral at bedtime PRN  morphine  - Injectable 4 milliGRAM(s) IV Push every 6 hours PRN  ondansetron Injectable 4 milliGRAM(s) IV Push every 8 hours PRN    VITALS:   T(F): 98.7  HR: 103  BP: 115/73  RR: 18  SpO2: 95%    LABS:                        12.2   11.39 )-----------( 205      ( 20 Mar 2023 07:23 )             35.4     03-20    139  |  104  |  23<H>  ----------------------------<  145<H>  4.7   |  19  |  1.2    Ca    9.6      20 Mar 2023 07:23    TPro  6.5  /  Alb  4.1  /  TBili  1.5<H>  /  DBili  x   /  AST  20  /  ALT  11  /  AlkPhos  137<H>  03-20    PT/INR - ( 20 Mar 2023 07:23 )   PT: 12.00 sec;   INR: 1.05 ratio         PTT - ( 20 Mar 2023 07:23 )  PTT:27.2 sec          CARDIAC MARKERS ( 20 Mar 2023 07:23 )  x     / <0.01 ng/mL / 455 U/L / x     / x          RADIOLOGY:  ACC: [id number] EXAM:  DUPLEX SCAN EXT VEINS LOWER BI   ORDERED BY: SONIA HUBER     PROCEDURE DATE:  03/21/2023          INTERPRETATION:  CLINICAL INFORMATION: 77-year-old male with leg pain and   swelling    COMPARISON: None available.    TECHNIQUE: Duplex sonography of the BILATERAL LOWER extremity veins with   color and spectral Doppler, with and without compression.    FINDINGS:    RIGHT:  Normal compressibility of the RIGHT common femoral, femoral and popliteal   veins.  Doppler examination shows normal spontaneous and phasic flow.  No RIGHT calf vein thrombosis is detected.    LEFT:  Normal compressibility of the LEFT common femoral, femoral and popliteal   veins.  Doppler examination shows normal spontaneous and phasic flow.  No LEFTcalf vein thrombosis is detected.    IMPRESSION:  No evidence of deep venous thrombosis in either lower extremity.    PHYSICAL EXAM:  GEN: No acute distress  LUNGS: Clear to auscultation bilaterally   HEART: S1/S2 present. RRR.   ABD: Soft, non-tender, non-distended. Bowel sounds present  EXT: NC/NC/NE/2+PP/CORRAL/Skin Intact.   NEURO: AAOX3

## 2023-03-21 NOTE — DISCHARGE NOTE NURSING/CASE MANAGEMENT/SOCIAL WORK - NSDCVIVACCINE_GEN_ALL_CORE_FT
influenza, injectable, quadrivalent, preservative free; 16-Oct-2021 15:19; Froilan Leung (RN); [employer]; [id number] (Exp. Date: 31-Jan-2022); IntraMuscular; Deltoid Right.; 0.5 milliLiter(s); VIS (VIS Published: 06-Aug-2021, VIS Presented: 16-Oct-2021);   Tdap; 15-Nov-2021 07:31; Bethanie Aguirre (RN); [employer]; [id number] (Exp. Date: 09-Sep-2023); IntraMuscular; Deltoid Left.; 0.5 milliLiter(s); VIS (VIS Published: 09-May-2013, VIS Presented: 15-Nov-2021);

## 2023-03-21 NOTE — PROGRESS NOTE ADULT - ASSESSMENT
76 yo male with  PMHx CAD, Prostate Ca, HLD presents after mechanical fall at home. Found to have R hip fracture.        # R hip fx  - bed rest  - ortho eval; scheduled for ORIF this afternoon  - pain meds  - hold Plavix, ASA - resume after surgery  - acetaminophen     Tablet .. 650 milliGRAM(s) Oral every 6 hours PRN Temp greater or equal to 38C (100.4F), Mild Pain (1 - 3)  - morphine  - Injectable 4 milliGRAM(s) IV Push every 6 hours PRN Moderate Pain (4 - 6)  - currently patient is HDS, afebrile, and satting well on RA  - no gross lab anormalities other than elevated CPK, which is expected after traumatic fall. currently trending and on mIVF  - ELAINE score 0.2% risk of myocardial infarction or cardiac arrest, intraoperatively or up to 30-days post op  - this does NOT severe as a clearance but rather a risk stratification to assist the surgical team in weighing the risk vs benefit of surgical intervention      #  CAD  - aspirin  chewable 81 milliGRAM(s) Oral daily  - atorvastatin 80 milliGRAM(s) Oral at bedtime    #  HLD  - Lipitor    #  Prostate Ca  - cont tamsulosin 0.4 milliGRAM(s) Oral every 12 hours  - fup OP    # . DVT ppx:  Heparin

## 2023-03-21 NOTE — CONSULT NOTE ADULT - SUBJECTIVE AND OBJECTIVE BOX
he is in bed, nvi no s/s dvt. due to OR schedule will feed now and npo after midnight for OR tomorrow at 9;30AM  spoke to them  about nature of IT hip fx and stress of it and risk of MI, PE, DVT, leg length and rotation change, infection, etc. Also risk of loss of fixation due to poor bone quality and injury itself, and possible need for revision surgey, removal of hardware or change to arthroplasty. They had all questions answered and we will proceed as medical clearance/optimization and OR availability allow

## 2023-03-21 NOTE — DISCHARGE NOTE NURSING/CASE MANAGEMENT/SOCIAL WORK - PATIENT PORTAL LINK FT
You can access the FollowMyHealth Patient Portal offered by [facility] by registering at the following website: [email or web address]. By joining [facility]’s FollowMyHealth portal, you will also be able to view your health information using other applications (apps) compatible with our system.

## 2023-03-21 NOTE — PRE PROCEDURE NOTE - PRE PROCEDURE EVALUATION
ORTHOPEDIC SURGERY PRE OP NOTE      Diagnosis:    right intertroch fracture     Planned Procedure: open reduction internal fixation right hip     Consent: TO BE OBTAINED BY ATTENDING                   Risks/benefits/alternatives were discussed with the patient/family and they wish to proceed with surgery.       ANTICIPATED DATE OF PROCEDURE :   SCHEDULED CASE OR ADD-ON CASE:       Consent:     Clearances:   [***] Medicine:    cleared   [***] Other:    T(C): 37.1 (03-21-23 @ 13:26), Max: 37.3 (03-20-23 @ 18:15)  HR: 103 (03-21-23 @ 13:26) (91 - 103)  BP: 115/73 (03-21-23 @ 13:26) (109/73 - 115/73)  RR: 18 (03-21-23 @ 13:26) (18 - 18)  SpO2: 95% (03-20-23 @ 15:12) (95% - 95%)    Labs:                        12.2   11.39 )-----------( 205      ( 20 Mar 2023 07:23 )             35.4     03-20    139  |  104  |  23<H>  ----------------------------<  145<H>  4.7   |  19  |  1.2    Ca    9.6      20 Mar 2023 07:23    TPro  6.5  /  Alb  4.1  /  TBili  1.5<H>  /  DBili  x   /  AST  20  /  ALT  11  /  AlkPhos  137<H>  03-20    PT/INR - ( 20 Mar 2023 07:23 )   PT: 12.00 sec;   INR: 1.05 ratio         PTT - ( 20 Mar 2023 07:23 )  PTT:27.2 sec  Type and Screen X 2:      [ ]Pregnancy test ( if female of childbearing age) : ***  [ ]EKG:   [ ]CXR:       DIET: NPO after midnight  IVF: per primary team      ANTICOAGULATION STATUS ( include name of anticoagulant) :  ok to give now do not need to hold for surgery   [***] CONTINUE (**name of anticoagulant)   [***] DISCONTINUE(** name of anticoagulant)                                           A/P: Patient is a 77y y/o Male Pending ****** tomorrow    [ ] -NPO and IVF @ midnight  [ ]pain control/analgesia prn per primary team   [ ]Incentive Spirometry   [ ]F/U Clearance  [ ]F/U Pending Labs  [ ]Notify Ortho with any questions- spectra [id number]    [ ]DISCUSSED WITH PRIMARY TEAM MEMBER (name of team member): ****  [ ]Date and Time DISCUSSED WITH PRIMARY TEAM MEMBER: ****    dr abreu 
ORTHOPEDIC SURGERY PRE OP NOTE      Diagnosis: Right hip fx    Planned Procedure: right hip IMN    Consent: TO BE OBTAINED BY ATTENDING                   Risks/benefits/alternatives were discussed with the patient/family and they wish to proceed with surgery.       ANTICIPATED DATE OF PROCEDURE : 3/21/2023  SCHEDULED CASE OR ADD-ON CASE: Scheduled      Consent: To be obtained by attending physician in pre-op holding area    Clearances:   [ ] Medicine: Pending    T(C): 37.6 (03-20-23 @ 12:43), Max: 37.6 (03-20-23 @ 12:43)  HR: 100 (03-20-23 @ 15:12) (100 - 113)  BP: 109/73 (03-20-23 @ 15:12) (109/73 - 145/88)  RR: 18 (03-20-23 @ 15:12) (17 - 18)  SpO2: 95% (03-20-23 @ 15:12) (95% - 99%)    Labs:                        12.2   11.39 )-----------( 205      ( 20 Mar 2023 07:23 )             35.4     03-20    139  |  104  |  23<H>  ----------------------------<  145<H>  4.7   |  19  |  1.2    Ca    9.6      20 Mar 2023 07:23    TPro  6.5  /  Alb  4.1  /  TBili  1.5<H>  /  DBili  x   /  AST  20  /  ALT  11  /  AlkPhos  137<H>  03-20    PT/INR - ( 20 Mar 2023 07:23 )   PT: 12.00 sec;   INR: 1.05 ratio         PTT - ( 20 Mar 2023 07:23 )  PTT:27.2 sec  Type and Screen X 2:  Pending 2nd type and screen    [ ]Pregnancy test ( if female of childbearing age) : N/A  [X]EKG: 3/20/2023  [X]CXR: 3/20/2023      DIET: NPO after midnight  IVF: per primary team      ANTICOAGULATION STATUS ( include name of anticoagulant) : Plavix  [X] DISCONTINUE Plavix  Please give stat dose of LVX tonight and then hold anticoagulation in preparation for the OR                A/P: Patient is a 77y y/o Male Pending  right hip IMN on 3/21/2023    [ ] -NPO and IVF @ midnight  [ ]pain control/analgesia prn per primary team   [ ]Incentive Spirometry   [ ]F/U Clearance: Medicine clearance pending  [ ]F/U Pending Labs: 2nd type and screen  [ ]Notify Ortho with any questions- spectra [id number]    [ ]DISCUSSED WITH PRIMARY TEAM MEMBER (name of team member): Unable to reach primary team (Yas)  [ ]Date and Time DISCUSSED WITH PRIMARY TEAM MEMBER:  Unable to reach primary team (Yas) at 6:45PM 3/20/2023

## 2023-03-21 NOTE — CHART NOTE - NSCHARTNOTEFT_GEN_A_CORE
case is cancelled today due to or availability and late anticipated surgical time   case is booked for tomorrow second case   ok to eat now   npo at midnight   iv fluids as per primary team   fu 1600 labs today   fu am labs   please give lovenox today   scds   discussed with attending at bedside   all questions answered   pt in agreement with plan   discussed with dr abreu

## 2023-03-22 NOTE — PROGRESS NOTE ADULT - SUBJECTIVE AND OBJECTIVE BOX
SUBJECTIVE:    Patient is a 77y old Male who presents with a chief complaint of mechanical Fall/ Hip Fx (21 Mar 2023 15:33)    Currently admitted to medicine with the primary diagnosis of Fracture of femoral neck, right       Today is hospital day 2d.    PAST MEDICAL & SURGICAL HISTORY  CAD (coronary artery disease)    Hypercholesteremia    BPH (benign prostatic hyperplasia)    Kidney stones    Prostate cancer    Status post cardiac surgery  cardiac stents    Bilateral cataracts    History of lithotripsy      SOCIAL HISTORY:  Negative for smoking/alcohol/drug use.     ALLERGIES:  No Known Allergies    MEDICATIONS:  STANDING MEDICATIONS    PRN MEDICATIONS    VITALS:   T(F): 98.8  HR: 67  BP: 93/66  RR: 17  SpO2: 100%    LABS:                        10.1   8.70  )-----------( 187      ( 22 Mar 2023 07:31 )             30.6     03-22    141  |  107  |  31<H>  ----------------------------<  99  4.1   |  22  |  1.1    Ca    9.1      22 Mar 2023 07:31  Mg     2.2     03-22    TPro  6.0  /  Alb  3.4<L>  /  TBili  1.7<H>  /  DBili  x   /  AST  18  /  ALT  10  /  AlkPhos  113  03-22    PT/INR - ( 22 Mar 2023 07:31 )   PT: 11.70 sec;   INR: 1.03 ratio         PTT - ( 22 Mar 2023 07:31 )  PTT:28.7 sec      Creatine Kinase, Serum: 188 U/L (03-21-23 @ 17:59)      CARDIAC MARKERS ( 21 Mar 2023 17:59 )  x     / x     / 188 U/L / x     / x          RADIOLOGY:  ACC: [id number] EXAM:  DUPLEX SCAN EXT VEINS LOWER BI   ORDERED BY: SONIA HUBER     PROCEDURE DATE:  03/21/2023          INTERPRETATION:  CLINICAL INFORMATION: 77-year-old male with leg pain and   swelling    COMPARISON: None available.    TECHNIQUE: Duplex sonography of the BILATERAL LOWER extremity veins with   color and spectral Doppler, with and without compression.    FINDINGS:    RIGHT:  Normal compressibility of the RIGHT common femoral, femoral and popliteal   veins.  Doppler examination shows normal spontaneous and phasic flow.  No RIGHT calf vein thrombosis is detected.    LEFT:  Normal compressibility of the LEFT common femoral, femoral and popliteal   veins.  Doppler examination shows normal spontaneous and phasic flow.  No LEFTcalf vein thrombosis is detected.    IMPRESSION:  No evidence of deep venous thrombosis in either lower extremity.    PHYSICAL EXAM:  GEN: No acute distress  LUNGS: Clear to auscultation bilaterally   HEART: S1/S2 present. RRR.   ABD: Soft, non-tender, non-distended. Bowel sounds present  EXT: NC/NC/NE/2+PP/CORRAL/Skin Intact.   NEURO: AAOX3

## 2023-03-22 NOTE — CHART NOTE - NSCHARTNOTEFT_GEN_A_CORE
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 Pt walks with walker and got up in middle of night to get food and slipped and fell on his R side. Pt has been having R hip pain since with inability to ambulate. Pts family finally convinced him to come to hospital today. Pt is having 10/10 pain currently. Denies dizziness, CP or LOC prior to fall. Denies head trauma. No HA.    Vital Signs Last 24 Hrs  T(C): 37.1 (22 Mar 2023 10:53), Max: 37.1 (22 Mar 2023 09:08)  T(F): 98.8 (22 Mar 2023 10:53), Max: 98.8 (22 Mar 2023 09:08)  HR: 67 (22 Mar 2023 10:53) (67 - 107)  BP: 93/66 (22 Mar 2023 10:53) (93/66 - 99/58)  BP(mean): --  RR: 17 (22 Mar 2023 10:53) (17 - 18)  SpO2: 100% (22 Mar 2023 10:53) (100% - 100%)        I&O's Summary    21 Mar 2023 07:01  -  22 Mar 2023 07:00  --------------------------------------------------------  IN: 0 mL / OUT: 325 mL / NET: -325 mL        Physical Exam:   General: NAD, awake and alert  Cardiac: RRR, no murmur, no rub, no gallop  Respiratory: Good air exchange bilaterally, no wheezes, no crackles  GI: Abdomen nondistended, nontender, bowel sounds present  Neuro: AAOx3, no tremors, no fasciculations     LABS:   CARDIAC MARKERS ( 21 Mar 2023 17:59 )  x     / x     / 188 U/L / x     / x                                  10.1   8.70  )-----------( 187      ( 22 Mar 2023 07:31 )             30.6       03-22    141  |  107  |  31<H>  ----------------------------<  99  4.1   |  22  |  1.1    Ca    9.1      22 Mar 2023 07:31  Mg     2.2     03-22    TPro  6.0  /  Alb  3.4<L>  /  TBili  1.7<H>  /  DBili  x   /  AST  18  /  ALT  10  /  AlkPhos  113  03-22      PT/INR - ( 22 Mar 2023 07:31 )   PT: 11.70 sec;   INR: 1.03 ratio         PTT - ( 22 Mar 2023 07:31 )  PTT:28.7 sec          ASSESSMENT & PLAN:   78 yo male with  PMHx CAD, Prostate Ca, HLD presents after mechanical fall at home. Found to have R hip fracture.    #R hip fx  - bed rest  - ortho eval; scheduled for ORIF this afternoon  - pain meds  - hold Plavix, ASA - resume after surgery  - acetaminophen     Tablet .. 650 milliGRAM(s) Oral every 6 hours PRN Temp greater or equal to 38C (100.4F), Mild Pain (1 - 3)  - morphine  - Injectable 4 milliGRAM(s) IV Push every 6 hours PRN Moderate Pain (4 - 6)  - currently patient is HDS, afebrile, and satting well on RA  - no gross lab anormalities other than elevated CPK, which is expected after traumatic fall. currently trending and on mIVF  - ELAINE score 0.2% risk of myocardial infarction or cardiac arrest, intraoperatively or up to 30-days post op  - OR today with ortho      #CAD  - aspirin  chewable 81 milliGRAM(s) Oral daily  - atorvastatin 80 milliGRAM(s) Oral at bedtime    #HLD  - Lipitor    #Prostate Ca  - cont tamsulosin 0.4 milliGRAM(s) Oral every 12 hours  - fup OP    #DVT ppx:  Lovenox

## 2023-03-22 NOTE — CONSULT NOTE ADULT - ASSESSMENT
76 y/o m with hx of prostate ca with diffecult Garrison placement in a critically ill pt.      A) Bladder neck contracture  difficult Garrison  prostate cancer  right femoral fracture    P) attempt to pass a Garrison was met with resistance at the bladder neck.  Using a 14 fr silastic catheter attempt to pass the bladder neck was unsuccessful.  a 12fr coloplast catheter was passed dilating the bladder neck, this was followed by a 16 fr coloplast catheter,  the 14 fr silastic catheter was then passed into the bladder draining 800 + cc of dark yellow to slightly blood tinged urine.  Garrison is still draining at this time  will d/w attending     78 y/o m with hx of prostate ca with diffecult Garrison placement in a critically ill pt.      A) Bladder neck contracture  urethral stricture  difficult Garrison  prostate cancer  right femoral fracture    P) attempt to pass a Garrison was met with resistance at the bladder neck.  Using a 14 fr silastic catheter attempt to pass the bladder neck was unsuccessful.  a 12fr coloplast catheter was passed dilating the bladder neck, this was followed by a 16 fr coloplast catheter,  the 14 fr silastic catheter was then passed into the bladder draining 800 + cc of dark yellow to slightly blood tinged urine.  Garrison is still draining at this time  will d/w attending     78 y/o m with hx of prostate ca with difficult Garrison placement in a critically ill pt.      A) Bladder neck contracture  urethral stricture  difficult Garrison  prostate cancer  right femoral fracture    P) attempt to pass a Garrison was met with resistance at the bladder neck.  Using a 14 fr silastic catheter attempt to pass the bladder neck was unsuccessful.  a 12fr coloplast catheter was passed dilating the bladder neck, this was followed by a 16 fr coloplast catheter,  the 14 fr silastic catheter was then passed into the bladder draining 800 + cc of dark yellow to slightly blood tinged urine.  Garrison is still draining at this time  will d/w attending     76 y/o m with hx of prostate ca with difficult Garrison placement in a critically ill pt.      A) Bladder neck contracture  urethral stricture  difficult Garrison  prostate cancer  right femoral fracture    P) attempt to pass a Garrison was met with resistance at the bladder neck.  Using a 14 fr silastic catheter attempt to pass the bladder neck was unsuccessful.  a 12fr coloplast catheter was passed dilating the bladder neck, sequentially to a 16 fr coloplast catheter,  the 14 fr silastic catheter was then passed into the bladder draining 800 + cc of dark yellow to slightly blood tinged urine.  Garrison is still draining at this time  will d/w attending

## 2023-03-22 NOTE — CONSULT NOTE ADULT - SUBJECTIVE AND OBJECTIVE BOX
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 Pt walks with walker and got up in middle of night to get food and slipped and fell on his R side. Pt has been having R hip pain since with inability to ambulate. Pts family finally convinced him to come to hospital today. Pt is having 10/10 pain currently. Denies dizziness, CP or LOC prior to fall. Denies head trauma. No HA. (20 Mar 2023 12:02)    3/22: Patient S/P ORIF of right hip using interlocking intramedullary stephon.     Procedure:  OR Stats  OR Time:    EBL: 75  IV Fluids: 2700  Blood Products: None  UOP: Not noted. No blair placed in OR  Findings- Displaced intertrochanteric fracture appreciated. 11mm x 200mm 130, lag screw 10.5 x 105mm    SICU consulted for hypotension requiring pressors.    Patient was a rapid response in the PACU post-operatively due to hypotension. Patient examined bedside and was responding to verbal stimuli. Albumin and fluid bolus given. Labs drawn off A-line and Urbano started. NGT placed by SICU. Blair placed by urology due to difficulty passing Blair in setting of patient with prostate CA and BPH.    [] A ten-point review of systems was otherwise negative except as noted above.  [x] Due to altered mental status/intubation, subjective information was not attained from the patient. History was obtained, to the extent possible, from review of the chart and collateral sources of information.    =========================================================================================================================================      PMH  PAST MEDICAL & SURGICAL HISTORY:  CAD (coronary artery disease)  Hypercholesteremia  BPH (benign prostatic hyperplasia)  Kidney stones  Prostate cancer  Status post cardiac surgery cardiac stents x3  Bilateral cataracts  History of lithotripsy    Home Meds:  Home Medications:  atropine 1% ophthalmic solution: 1 drop(s) to each affected eye 2 times a day (20 Mar 2023 15:44)  erythromycin 0.5% ophthalmic ointment: 1 application to each affected eye 2 times a day (20 Mar 2023 15:44)  prednisoLONE acetate 1% ophthalmic suspension: 1 drop(s) to each affected eye every 6 hours (20 Mar 2023 15:44)  tamsulosin 0.4 mg oral capsule: 1 cap(s) orally once a day (at bedtime)  - if you continue to have your blood pressures drop when you stand, then this medication will need to be STOPPED (20 Mar 2023 15:44)     Allergies  Allergies    No Known Allergies    Intolerances       Current Medications:  acetaminophen     Tablet .. 650 milliGRAM(s) Oral every 6 hours PRN Temp greater or equal to 38C (100.4F), Mild Pain (1 - 3)  aluminum hydroxide/magnesium hydroxide/simethicone Suspension 30 milliLiter(s) Oral every 4 hours PRN Dyspepsia  aspirin  chewable 81 milliGRAM(s) Oral daily  atorvastatin 80 milliGRAM(s) Oral at bedtime  atropine 1% Solution 1 Drop(s) Left EYE two times a day  ceFAZolin   IVPB 2000 milliGRAM(s) IV Intermittent every 8 hours  dexMEDEtomidine Infusion 0.2 MICROgram(s)/kG/Hr (3.18 mL/Hr) IV Continuous <Continuous>  melatonin 3 milliGRAM(s) Oral at bedtime PRN Insomnia  morphine  - Injectable 4 milliGRAM(s) IV Push every 6 hours PRN Moderate Pain (4 - 6)  ondansetron Injectable 4 milliGRAM(s) IV Push every 8 hours PRN Nausea and/or Vomiting  phenylephrine    Infusion 0.1 MICROgram(s)/kG/Min (2.38 mL/Hr) IV Continuous <Continuous>  prednisoLONE acetate 1% Suspension 1 Drop(s) Left EYE every 6 hours  tamsulosin 0.4 milliGRAM(s) Oral every 12 hours      VITAL SIGNS, INS/OUTS (Last 24Hours)  ICU Vital Signs Last 24 Hrs  T(C): 36.4 (22 Mar 2023 14:40), Max: 37.1 (22 Mar 2023 09:08)  T(F): 97.6 (22 Mar 2023 14:40), Max: 98.8 (22 Mar 2023 09:08)  HR: 128 (22 Mar 2023 15:30) (65 - 128)  BP: 83/59 (22 Mar 2023 15:30) (78/55 - 237/132)  BP(mean): --  ABP: 83/55 (22 Mar 2023 15:30) (83/55 - 253/120)  ABP(mean): --  RR: 12 (22 Mar 2023 15:30) (12 - 18)  SpO2: 100% (22 Mar 2023 15:30) (97% - 100%)    O2 Parameters below as of 22 Mar 2023 14:40  Patient On (Oxygen Delivery Method): ventilator  O2 Flow (L/min): 60    I&O's Summary    21 Mar 2023 07:01  -  22 Mar 2023 07:00  --------------------------------------------------------  IN: 0 mL / OUT: 325 mL / NET: -325 mL    22 Mar 2023 07:01  -  22 Mar 2023 17:49  --------------------------------------------------------  IN: 0 mL / OUT: 1000 mL / NET: -1000 mL      Physical Exam:   ----------------------------------------------------------------------------------------------------------  GENERAL: Intubated on pressors. Responsive.  HEENT: NGT in place.  RESPIRATORY: Mechanical ventilation. Normal expansion/effort  CARDIOVASCULAR: Tachycardic. S1/S2. No peripheral edema  GASTROINTESTINAL: Abdomen soft, non-tender, non-distended  MUSCULOSKELETAL: A-line in left radial. Extremities warm, pink, well-perfused.  DERM: No skin breakdown   : Blair in place.  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 POD# 0 S/P ORIF of right hip using interlocking intramedullary stephon.         NEURO:  #Acute pain    - Tylenol, Gabapentin    - Precedex gtt for sedation    - For Fentanyl 25 mcg q4 prn    RESP:   #Oxygenation    - Intubated    - Wean and SBT as tolerated  #Activity    -increase as tolerated    CARDS:   #Hypotension     - Urbano gtt, Vaso gtt    GI/NUTR:   #Diet: NPO except medications  #NGT in place: Monitor output  #Nausea: Zofran prn    - Aspiration precautions, HOB 30  #GI Prophylaxis    - Protonix  #Bowel regimen    - Senna/miralax    /RENAL:   #UO: Blair placed by Urology     - Monitor UO     - Flomax  Labs:          BUN/Cr- 31/1.1  -->,  28/1.0  -->          Electrolytes-Na 136 // K 4.7 // Mg 1.8 //  Phos 4.5 (03-22 @ 15:43)    #Maintain euvolemia        03-21-23 @ 07:01  -  03-22-23 @ 07:00  --------------------------------------------------------  IN: 0 mL / OUT: 325 mL / NET: -325 mL    03-22-23 @ 07:01  -  03-22-23 @ 18:06  --------------------------------------------------------  IN: 0 mL / OUT: 1000 mL / NET: -1000 mL       HEME/ONC:   #DVT prophylaxis    - SCDs  #H/O CAD s/p stents x3    - Restart home ASA & Plavix       Labs: Hb/Hct:  10.1/30.6  -->,  7.8/23.2  -->                      Plts:  187  -->,  180  -->                 PTT/INR:  28.7/1.03  --->,  24.4/1.18  --->       ID:  #leukocytosis   WBC- 9.11  --->>,  8.70  --->>,  14.97  --->>  Antibiotics- Ancef x 3 doses post-operatively  Temp trend- 24hrs T(F): 97.6 (03-22 @ 14:40), Max: 98.8 (03-22 @ 09:08)    ENDO:    -FSG q6 if NPO    -Glucose goal 140-180    -if above 180 start ISS    MSK:    LINES/DRAINS:  A-line left radial, PIV x3 (2 20s, 1 18), Blair    ADVANCED DIRECTIVES:  Full Code    HCP/Emergency Contact- Song Segura (Son): [phone]    INDICATION FOR SICU/SDU: Hypotension with pressor requirements. Intubated.         DISPO:   Case discussed with attending Dr. Allan.   RADHA SEGURA     [id number]   [birth year]  77yM      Admit Date/LOS: 03-20 (2d)  Indication for SDU/SICU: Hypotension with pressor requirements. Intubated.        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 Pt walks with walker and got up in middle of night to get food and slipped and fell on his R side. Pt has been having R hip pain since with inability to ambulate. Pts family finally convinced him to come to hospital today. Pt is having 10/10 pain currently. Denies dizziness, CP or LOC prior to fall. Denies head trauma. No HA. (20 Mar 2023 12:02)    3/22: Patient S/P ORIF of right hip using interlocking intramedullary stephon.     Procedure:  OR Stats  OR Time:    EBL: 75  IV Fluids: 2700  Blood Products: None  UOP: Not noted. No blair placed in OR  Findings- Displaced intertrochanteric fracture appreciated. 11mm x 200mm 130, lag screw 10.5 x 105mm    SICU consulted for hypotension requiring pressors.    Patient was a rapid response in the PACU post-operatively due to hypotension. Patient examined bedside and was responding to verbal stimuli. Albumin and fluid bolus given. Labs drawn off A-line and Urbano started. NGT placed by SICU. Blair placed by urology due to difficulty passing Blair in setting of patient with prostate CA and BPH.    [] A ten-point review of systems was otherwise negative except as noted above.  [x] Due to altered mental status/intubation, subjective information was not attained from the patient. History was obtained, to the extent possible, from review of the chart and collateral sources of information.    =========================================================================================================================================      PMH  PAST MEDICAL & SURGICAL HISTORY:  CAD (coronary artery disease)  Hypercholesteremia  BPH (benign prostatic hyperplasia)  Kidney stones  Prostate cancer  Status post cardiac surgery cardiac stents x3  Bilateral cataracts  History of lithotripsy    Home Meds:  Home Medications:  atropine 1% ophthalmic solution: 1 drop(s) to each affected eye 2 times a day (20 Mar 2023 15:44)  erythromycin 0.5% ophthalmic ointment: 1 application to each affected eye 2 times a day (20 Mar 2023 15:44)  prednisoLONE acetate 1% ophthalmic suspension: 1 drop(s) to each affected eye every 6 hours (20 Mar 2023 15:44)  tamsulosin 0.4 mg oral capsule: 1 cap(s) orally once a day (at bedtime)  - if you continue to have your blood pressures drop when you stand, then this medication will need to be STOPPED (20 Mar 2023 15:44)     Allergies  Allergies    No Known Allergies    Intolerances       Current Medications:  acetaminophen     Tablet .. 650 milliGRAM(s) Oral every 6 hours PRN Temp greater or equal to 38C (100.4F), Mild Pain (1 - 3)  aluminum hydroxide/magnesium hydroxide/simethicone Suspension 30 milliLiter(s) Oral every 4 hours PRN Dyspepsia  aspirin  chewable 81 milliGRAM(s) Oral daily  atorvastatin 80 milliGRAM(s) Oral at bedtime  atropine 1% Solution 1 Drop(s) Left EYE two times a day  ceFAZolin   IVPB 2000 milliGRAM(s) IV Intermittent every 8 hours  dexMEDEtomidine Infusion 0.2 MICROgram(s)/kG/Hr (3.18 mL/Hr) IV Continuous <Continuous>  melatonin 3 milliGRAM(s) Oral at bedtime PRN Insomnia  morphine  - Injectable 4 milliGRAM(s) IV Push every 6 hours PRN Moderate Pain (4 - 6)  ondansetron Injectable 4 milliGRAM(s) IV Push every 8 hours PRN Nausea and/or Vomiting  phenylephrine    Infusion 0.1 MICROgram(s)/kG/Min (2.38 mL/Hr) IV Continuous <Continuous>  prednisoLONE acetate 1% Suspension 1 Drop(s) Left EYE every 6 hours  tamsulosin 0.4 milliGRAM(s) Oral every 12 hours      VITAL SIGNS, INS/OUTS (Last 24Hours)  ICU Vital Signs Last 24 Hrs  T(C): 36.4 (22 Mar 2023 14:40), Max: 37.1 (22 Mar 2023 09:08)  T(F): 97.6 (22 Mar 2023 14:40), Max: 98.8 (22 Mar 2023 09:08)  HR: 128 (22 Mar 2023 15:30) (65 - 128)  BP: 83/59 (22 Mar 2023 15:30) (78/55 - 237/132)  BP(mean): --  ABP: 83/55 (22 Mar 2023 15:30) (83/55 - 253/120)  ABP(mean): --  RR: 12 (22 Mar 2023 15:30) (12 - 18)  SpO2: 100% (22 Mar 2023 15:30) (97% - 100%)    O2 Parameters below as of 22 Mar 2023 14:40  Patient On (Oxygen Delivery Method): ventilator  O2 Flow (L/min): 60    I&O's Summary    21 Mar 2023 07:01  -  22 Mar 2023 07:00  --------------------------------------------------------  IN: 0 mL / OUT: 325 mL / NET: -325 mL    22 Mar 2023 07:01  -  22 Mar 2023 17:49  --------------------------------------------------------  IN: 0 mL / OUT: 1000 mL / NET: -1000 mL      Physical Exam:   ----------------------------------------------------------------------------------------------------------  GENERAL: Intubated on pressors. Responsive.  HEENT: NGT in place.  RESPIRATORY: Mechanical ventilation. Normal expansion/effort  CARDIOVASCULAR: Tachycardic. S1/S2. No peripheral edema  GASTROINTESTINAL: Abdomen soft, non-tender, non-distended  MUSCULOSKELETAL: A-line in left radial. Extremities warm, pink, well-perfused.  DERM: No skin breakdown   : Blair in place.  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 POD# 0 S/P ORIF of right hip using interlocking intramedullary stephon.         NEURO:  #Acute pain    - Tylenol, Gabapentin    - Precedex gtt for sedation    - For Fentanyl 25 mcg q4 prn    RESP:   #Oxygenation    - Intubated    - Wean and SBT as tolerated  #Activity    -increase as tolerated    CARDS:   #Hypotension     - Urbano gtt    GI/NUTR:   #Diet: NPO except medications  #NGT in place: Monitor output  #Nausea: Zofran prn    - Aspiration precautions, HOB 30  #GI Prophylaxis    - Protonix  #Bowel regimen    - Senna    /RENAL:   #UO: Blair placed by Urology     - Monitor UO     - Flomax  Labs:          BUN/Cr- 31/1.1  -->,  28/1.0  -->          Electrolytes-Na 136 // K 4.7 // Mg 1.8 //  Phos 4.5 (03-22 @ 15:43)    #Maintain euvolemia        03-21-23 @ 07:01  -  03-22-23 @ 07:00  --------------------------------------------------------  IN: 0 mL / OUT: 325 mL / NET: -325 mL    03-22-23 @ 07:01  -  03-22-23 @ 18:06  --------------------------------------------------------  IN: 0 mL / OUT: 1000 mL / NET: -1000 mL       HEME/ONC:   #DVT prophylaxis    - SCDs  #H/O CAD s/p stents x3    - Restart home ASA & Plavix       Labs: Hb/Hct:  10.1/30.6  -->,  7.8/23.2  -->                      Plts:  187  -->,  180  -->                 PTT/INR:  28.7/1.03  --->,  24.4/1.18  --->       ID:  #leukocytosis   WBC- 9.11  --->>,  8.70  --->>,  14.97  --->>  Antibiotics- Ancef x 3 doses post-operatively  Temp trend- 24hrs T(F): 97.6 (03-22 @ 14:40), Max: 98.8 (03-22 @ 09:08)    ENDO:    -FSG q6 if NPO    -Glucose goal 140-180    -if above 180 start ISS    MSK:     - Bedrest    LINES/DRAINS:  A-line left radial, PIV x3 (2 20s, 1 18), Blair    ADVANCED DIRECTIVES:  Full Code    HCP/Emergency Contact- Song Segura (Son): [phone]    INDICATION FOR SICU/SDU: Hypotension with pressor requirements. Intubated.         DISPO:   Case discussed with attending Dr. Allan.

## 2023-03-22 NOTE — PROGRESS NOTE ADULT - ATTENDING COMMENTS
76 yo male with  PMHx CAD, Prostate Ca, HLD presents after mechanical fall at home. Found to have R hip fracture.    #R hip fx s/p ORIF c/b hypotension   - ortho eval; scheduled for ORIF this afternoon  - hypotensive post op, transfer to SICU   - pain meds per ortho  - hold Plavix, ASA - resume after surgery    #CAD  #HLD  - c/w statin    #Prostate Ca  - cont tamsulosin     #DVT ppx:  resume post op     Total time spent to complete patient's bedside assessment, review medical chart, discuss medical plan of care with covering medical team was more than 35 minutes  with >50% of time spent face to face with patient, discussion with patient/family and/or coordination of care    Caro Elder DO

## 2023-03-22 NOTE — PROGRESS NOTE ADULT - SUBJECTIVE AND OBJECTIVE BOX
S:  S&E postoperatively. Intubated. Unable to give history.    O:  GEN: Intubated    RLE:  Proximal dressing with minimal spotting, distal c/d/i  Normal postop swelling of thigh  Sensorimotor exam unable to be obtained  BCR    A&P:  77M with right intertrochanteric hip fracture now s/p ORIF on 3/22/23.    - Ancef x24h postop  - May restart AC immediately, agent per primary  - WBAT RLE  - PT/OT/IS when capable  - SCD's  - Trend HgB, transfuse PRN to maintain > 7

## 2023-03-22 NOTE — PRE-ANESTHESIA EVALUATION ADULT - NSANTHPMHFT_GEN_ALL_CORE
CAD s/p stent, on ASA/Plavix    78 yo male with  PMHx CAD, Prostate Ca, HLD presents after mechanical fall at home. Found to have R hip fracture.    # R hip fx  - bed rest  - ortho eval; scheduled for ORIF this afternoon  - pain meds  - hold Plavix, ASA - resume after surgery  - acetaminophen     Tablet .. 650 milliGRAM(s) Oral every 6 hours PRN Temp greater or equal to 38C (100.4F), Mild Pain (1 - 3)  - morphine  - Injectable 4 milliGRAM(s) IV Push every 6 hours PRN Moderate Pain (4 - 6)  - currently patient is HDS, afebrile, and satting well on RA  - no gross lab anormalities other than elevated CPK, which is expected after traumatic fall. currently trending and on mIVF  - ELAINE score 0.2% risk of myocardial infarction or cardiac arrest, intraoperatively or up to 30-days post op  - this does NOT severe as a clearance but rather a risk stratification to assist the surgical team in weighing the risk vs benefit of surgical intervention      #  CAD  - aspirin  chewable 81 milliGRAM(s) Oral daily  - atorvastatin 80 milliGRAM(s) Oral at bedtime    #  HLD  - Lipitor    #  Prostate Ca  - cont tamsulosin 0.4 milliGRAM(s) Oral every 12 hours  - fup OP    # . DVT ppx:  Heparin    Cath 5/2022- admitted with NSTEMI- cath showed nonobstructive CAD CAD s/p stent, on ASA/Plavix  No CP/Palp/Orthonpea  CT chest shows evidence of emphysema but no effusions, normal aorta and pulm artery    76 yo male with  PMHx CAD, Prostate Ca, HLD presents after mechanical fall at home. Found to have R hip fracture.    # R hip fx  - bed rest  - ortho eval; scheduled for ORIF this afternoon  - pain meds  - hold Plavix, ASA - resume after surgery  - acetaminophen     Tablet .. 650 milliGRAM(s) Oral every 6 hours PRN Temp greater or equal to 38C (100.4F), Mild Pain (1 - 3)  - morphine  - Injectable 4 milliGRAM(s) IV Push every 6 hours PRN Moderate Pain (4 - 6)  - currently patient is HDS, afebrile, and satting well on RA  - no gross lab anormalities other than elevated CPK, which is expected after traumatic fall. currently trending and on mIVF  - ELAINE score 0.2% risk of myocardial infarction or cardiac arrest, intraoperatively or up to 30-days post op  - this does NOT severe as a clearance but rather a risk stratification to assist the surgical team in weighing the risk vs benefit of surgical intervention      #  CAD  - aspirin  chewable 81 milliGRAM(s) Oral daily  - atorvastatin 80 milliGRAM(s) Oral at bedtime    #  HLD  - Lipitor    #  Prostate Ca  - cont tamsulosin 0.4 milliGRAM(s) Oral every 12 hours  - fup OP    # . DVT ppx:  Heparin    Cath 5/2022- admitted with NSTEMI- cath showed nonobstructive CAD

## 2023-03-22 NOTE — PROGRESS NOTE ADULT - ASSESSMENT
76 yo male with  PMHx CAD, Prostate Ca, HLD presents after mechanical fall at home. Found to have R hip fracture.    #R hip fx  - bed rest  - ortho eval; scheduled for ORIF this afternoon  - pain meds  - hold Plavix, ASA - resume after surgery  - acetaminophen     Tablet .. 650 milliGRAM(s) Oral every 6 hours PRN Temp greater or equal to 38C (100.4F), Mild Pain (1 - 3)  - morphine  - Injectable 4 milliGRAM(s) IV Push every 6 hours PRN Moderate Pain (4 - 6)  - currently patient is HDS, afebrile, and satting well on RA  - no gross lab anormalities other than elevated CPK, which is expected after traumatic fall. currently trending and on mIVF  - ELAINE score 0.2% risk of myocardial infarction or cardiac arrest, intraoperatively or up to 30-days post op  - OR today with ortho      #CAD  - aspirin  chewable 81 milliGRAM(s) Oral daily  - atorvastatin 80 milliGRAM(s) Oral at bedtime    #HLD  - Lipitor    #Prostate Ca  - cont tamsulosin 0.4 milliGRAM(s) Oral every 12 hours  - fup OP    #DVT ppx:  Lovenox

## 2023-03-22 NOTE — CONSULT NOTE ADULT - SUBJECTIVE AND OBJECTIVE BOX
Urology Consult    Pt is a 78 y/o Male admitted to PACU s/p rapid response called by SICU resident for difficult [place]  placement in a pt with hx of prostate cancer. Pt currently intubated unable to obtain hx.    PAST MEDICAL & SURGICAL HISTORY:  CAD (coronary artery disease)      Hypercholesteremia      BPH (benign prostatic hyperplasia)      Kidney stones      Prostate cancer      Status post cardiac surgery  cardiac stents      Bilateral cataracts      History of lithotripsy          MEDICATIONS  (STANDING):  aspirin  chewable 81 milliGRAM(s) Oral daily  atorvastatin 80 milliGRAM(s) Oral at bedtime  atropine 1% Solution 1 Drop(s) Left EYE two times a day  ceFAZolin   IVPB 2000 milliGRAM(s) IV Intermittent every 8 hours  dexMEDEtomidine Infusion 0.2 MICROgram(s)/kG/Hr (3.18 mL/Hr) IV Continuous <Continuous>  phenylephrine    Infusion 0.1 MICROgram(s)/kG/Min (2.38 mL/Hr) IV Continuous <Continuous>  prednisoLONE acetate 1% Suspension 1 Drop(s) Left EYE every 6 hours  tamsulosin 0.4 milliGRAM(s) Oral every 12 hours    MEDICATIONS  (PRN):  acetaminophen     Tablet .. 650 milliGRAM(s) Oral every 6 hours PRN Temp greater or equal to 38C (100.4F), Mild Pain (1 - 3)  aluminum hydroxide/magnesium hydroxide/simethicone Suspension 30 milliLiter(s) Oral every 4 hours PRN Dyspepsia  melatonin 3 milliGRAM(s) Oral at bedtime PRN Insomnia  morphine  - Injectable 4 milliGRAM(s) IV Push every 6 hours PRN Moderate Pain (4 - 6)  ondansetron Injectable 4 milliGRAM(s) IV Push every 8 hours PRN Nausea and/or Vomiting      Allergies    No Known Allergies      SOCIAL HISTORY: No illicit drug use    FAMILY HISTORY:  FH: colon cancer (Mother)        REVIEW OF SYSTEMS     [X ] Due to altered mental status/intubation, subjective information were not able to be obtained from patient. History was obtained, to the extent possible, from review of the chart and collateral sources of information.    Vital Signs Last 24 Hrs  T(C): 36.4 (22 Mar 2023 14:40), Max: 37.1 (22 Mar 2023 09:08)  T(F): 97.6 (22 Mar 2023 14:40), Max: 98.8 (22 Mar 2023 09:08)  HR: 111 (22 Mar 2023 14:55) (65 - 111)  BP: 109/81 (22 Mar 2023 14:55) (93/66 - 176/109)  RR: 12 (22 Mar 2023 14:55) (12 - 18)  SpO2: 100% (22 Mar 2023 14:55) (97% - 100%)    Parameters below as of 22 Mar 2023 14:40  Patient On (Oxygen Delivery Method): ventilator  O2 Flow (L/min): 60      PHYSICAL EXAM:    GEN: NAD, intubated  SKIN: Good color, non diaphoretic.  RESP: Non-labored breathing. No use of accessory muscles.  CARDIO: +S1/S2  ABDO: Soft, NT/ND, no palpable bladder, no suprapubic tendernes.  BACK: No CVAT B/L  : + Circumcised B/L descended testicles x 2. No lesions or palpable masses  No meatal discharge.    EXT: intubated at this time      I&O's Summary    21 Mar 2023 07:01  -  22 Mar 2023 07:00  --------------------------------------------------------  IN: 0 mL / OUT: 325 mL / NET: -325 mL        LABS:                        7.8    14.97 )-----------( 180      ( 22 Mar 2023 15:43 )             23.2     03-22    136  |  107  |  28<H>  ----------------------------<  239<H>  4.7   |  18  |  1.0    Ca    7.9<L>      22 Mar 2023 15:43  Phos  4.5     03-22  Mg     1.8     03-22    TPro  4.5<L>  /  Alb  2.6<L>  /  TBili  1.5<H>  /  DBili  0.7<H>  /  AST  19  /  ALT  14  /  AlkPhos  94  03-22    PT/INR - ( 22 Mar 2023 15:43 )   PT: 13.50 sec;   INR: 1.18 ratio         PTT - ( 22 Mar 2023 15:43 )  PTT:24.4 sec          RADIOLOGY & ADDITIONAL STUDIES:    < from: CT Abdomen and Pelvis w/ IV Cont (03.20.23 @ 08:43) >    ACC: [id number] EXAM:  CT ABDOMEN AND PELVIS IC   ORDERED BY: ELIZABETH CHAPA     ACC: [id number] EXAM:  CT CHEST IC   ORDERED BY: ELIZABETH CHAPA     PROCEDURE DATE:  03/20/2023          INTERPRETATION:  CLINICAL HISTORY/REASON FOR EXAM: Trauma to chest,   abdomen and pelvis. Prostate cancer.    TECHNIQUE: Contiguous axial CT images were obtained from the thoracic   inlet to the pubic symphysis following administration of 100 cc Omnipaque   350 intravenous contrast.  Oral contrast was not administered.   Reformatted images in the coronal and sagittal planes were acquired. 3D   (MIP) images obtained.    COMPARISON: CT chest, abdomen and pelvis 11/15/2021. CT abdomen and   pelvis 2/25/2022.      FINDINGS:    CHEST:    LUNGS, PLEURA, AIRWAYS: No lobar consolidation, mass, effusion, or   pneumothorax. No evidence of central endobronchial obstruction. No   bronchiectasis or honeycombing. No suspicious pulmonary nodule. Severe   centrilobular, paraseptal emphysema. Bibasilar subsegmental atelectasis.   Elevated left hemidiaphragm.    THORACIC NODES: No mediastinal, hilar, supraclavicular, or axillary   lymphadenopathy.    MEDIASTINUM/GREAT VESSELS: No pericardial effusion. Heart size is within   normal limits. The aorta and main pulmonary artery are of normal caliber.    ABDOMEN/PELVIS:    HEPATOBILIARY: Hepatic cysts. Gallbladder, biliary system unremarkable    SPLEEN: Unremarkable.    PANCREAS: Unremarkable.    ADRENAL GLANDS: Unremarkable.    KIDNEYS: Symmetric pattern of renal enhancement. No hydronephrosis   bilaterally. Bilateral renal cysts.    ABDOMINOPELVIC NODES: No lymphadenopathy.    PELVIC ORGANS: BPH with median lobe hypertrophy. Evaluation for prostate   masses limited by modality.    PERITONEUM/MESENTERY/BOWEL: No bowel obstruction. No ascites or   pneumoperitoneum. Colonic diverticulosis.    BONES/SOFT TISSUES: Acute, comminuted right femoral intertrochanteric   fracture. Right adductor, pectineus and iliacus muscle swelling. Chronic   right rib fractures at costovertebral junctions. Right thigh subcutaneous   inflammatory stranding.    OTHER: Metallic coil at region of GDA, unchanged. Vascular calcifications.      IMPRESSION:    1. Acute, comminuted right femoral intertrochanteric fracture.    2. No evidence of traumatic intrathoracic pathology or solid abdominal   organ injury.    --- End of Report ---            JEANIE MALONE MD; Attending Radiologist  This document has been electronically signed. Mar 20 2023  8:52AM    < end of copied text >

## 2023-03-22 NOTE — CHART NOTE - NSCHARTNOTEFT_GEN_A_CORE
PACU ANESTHESIA ADMISSION NOTE      Procedure: Open reduction and internal fixation of right hip using interlocking intramedullary stephon      Post op diagnosis:  Intertrochanteric fracture of right hip        __x__  Intubated  TV:______       Rate: ______      FiO2: ______        Vitals:   T:   36        R: CV                 BP:  145/76                Sat: 100                  P: 81      Mental Status:  Sedated x    Nausea/Vomiting:  _x___ NO  ______Yes,   See Post - Op Orders          Pain Scale (0-10):  _____    Treatment: __x__ None    ____ See Post - Op/PCA Orders    Post - Operative Fluids:   ____ Oral   ___x_ See Post - Op Orders    Plan: Discharge:   x Critical Care    _____  Other:_________________    Comments:    Intra-op hypotension, controlled with pressors and fluid. Patient stabilized and transported to PACU on all monitors, ventilated with 100% O2 via Ambu bag. Dr Hernandez signed out to SICU resident.

## 2023-03-22 NOTE — PRE-OP CHECKLIST - NS PREOP CHK CHLOROHEX WASH
Rach Loco at [facility] cardiology at [phone], phone is [phone]  COVID disclaimer/ screening completed  Pt states they have NOT had COVID  Reviewed check in process with pt- will enter building no earlier than arrival time given, agreed to wear mask for duration of appt,  will wait in car  Went over pre procedure instructions, no vaccinations 2 weeks prior/post proc, NPO 1 hr prior, if sick or on abx needs to call to rs, wear loose, comf clothing- no buttons/zippers, needs   Pt verbalized understanding  Mailed pre op instructions and blood work order to pt  N/A

## 2023-03-23 NOTE — CONSULT NOTE ADULT - SUBJECTIVE AND OBJECTIVE BOX
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 Pt walks with walker and got up in middle of night to get food and slipped and fell on his R side. Pt has been having R hip pain since with inability to ambulate. Pts family finally convinced him to come to hospital today. Pt is having 10/10 pain currently. Denies dizziness, CP or LOC prior to fall. Denies head trauma. No HA. (20 Mar 2023 12:02)    Cardiac HPI  Pt with above mentioned PMHx presented to the hospital s/p fall found to have Hip Fx s/p OR. Post op pt was hypotensive and tachycardic Cardio consulted for tachycardia.     PAST MEDICAL & SURGICAL HISTORY  CAD (coronary artery disease)    Hypercholesteremia    BPH (benign prostatic hyperplasia)    Kidney stones    Prostate cancer    Status post cardiac surgery  cardiac stents    Bilateral cataracts    History of lithotripsy        FAMILY HISTORY:  FAMILY HISTORY:  FH: colon cancer (Mother)        SOCIAL HISTORY:  Social History:  Lives with son in apartment (20 Mar 2023 12:02)      ALLERGIES:  No Known Allergies      MEDICATIONS:  albumin human  5% IVPB 250 milliLiter(s) IV Intermittent every 6 hours  aspirin  chewable 81 milliGRAM(s) Oral daily  atorvastatin 80 milliGRAM(s) Oral at bedtime  atropine 1% Solution 1 Drop(s) Left EYE two times a day  chlorhexidine 0.12% Liquid 15 milliLiter(s) Oral Mucosa every 12 hours  chlorhexidine 2% Cloths 1 Application(s) Topical <User Schedule>  clopidogrel Tablet 75 milliGRAM(s) Oral daily  dexMEDEtomidine Infusion 0.1 MICROgram(s)/kG/Hr (1.59 mL/Hr) IV Continuous <Continuous>  fentaNYL   Infusion. 0.5 MICROgram(s)/kG/Hr (3.18 mL/Hr) IV Continuous <Continuous>  heparin   Injectable 5000 Unit(s) SubCutaneous every 8 hours  hydrocortisone sodium succinate Injectable 100 milliGRAM(s) IV Push once  hydrocortisone sodium succinate Injectable 50 milliGRAM(s) IV Push every 6 hours  ketamine Infusion. 0.25 mG/kG/Hr (1.59 mL/Hr) IV Continuous <Continuous>  lactated ringers. 1000 milliLiter(s) (100 mL/Hr) IV Continuous <Continuous>  norepinephrine Infusion 0.05 MICROgram(s)/kG/Min (2.98 mL/Hr) IV Continuous <Continuous>  pantoprazole  Injectable 40 milliGRAM(s) IV Push daily  phenylephrine    Infusion 0.1 MICROgram(s)/kG/Min (1.19 mL/Hr) IV Continuous <Continuous>  prednisoLONE acetate 1% Suspension 1 Drop(s) Left EYE every 6 hours  senna 2 Tablet(s) Oral at bedtime  tamsulosin 0.4 milliGRAM(s) Oral every 12 hours    PRN:  acetaminophen     Tablet .. 650 milliGRAM(s) Oral every 6 hours PRN  aluminum hydroxide/magnesium hydroxide/simethicone Suspension 30 milliLiter(s) Oral every 4 hours PRN  fentaNYL    Injectable 25 MICROGram(s) IV Push every 4 hours PRN  melatonin 3 milliGRAM(s) Oral at bedtime PRN  midazolam Injectable 2 milliGRAM(s) IV Push every 4 hours PRN  morphine  - Injectable 4 milliGRAM(s) IV Push every 6 hours PRN  ondansetron Injectable 4 milliGRAM(s) IV Push every 8 hours PRN  sodium chloride 0.9% lock flush 10 milliLiter(s) IV Push every 1 hour PRN      HOME MEDICATIONS:  Home Medications:  atropine 1% ophthalmic solution: 1 drop(s) to each affected eye 2 times a day (20 Mar 2023 15:44)  erythromycin 0.5% ophthalmic ointment: 1 application to each affected eye 2 times a day (20 Mar 2023 15:44)  prednisoLONE acetate 1% ophthalmic suspension: 1 drop(s) to each affected eye every 6 hours (20 Mar 2023 15:44)  tamsulosin 0.4 mg oral capsule: 1 cap(s) orally once a day (at bedtime)  - if you continue to have your blood pressures drop when you stand, then this medication will need to be STOPPED (20 Mar 2023 15:44)      VITALS:   T(F): 97.9 ( @ 07:57), Max: 99.6 ( @ 12:43)  HR: 126 ( @ 09:00) (62 - 128)  BP: 97/57 ( @ 09:00) (78/55 - 237/132)  BP(mean): 72 ( @ 09:00) (69 - 98)  RR: 23 ( @ 08:00) (12 - 23)  SpO2: 100% ( @ 09:00) (95% - 100%)    I&O's Summary    22 Mar 2023 07:01  -  23 Mar 2023 07:00  --------------------------------------------------------  IN: 1051.1 mL / OUT: 1781 mL / NET: -729.9 mL        REVIEW OF SYSTEMS:  unable to obtain    PHYSICAL EXAM:  General: Not in distress.    HEENT: EOMI  Cardio: regular, S1, S2   Pulm: B/L BS.     Abdomen: Soft, non-tender, non-distended. Normoactive bowel sounds  Extremities: No edema b/l le  Neuro: sedated     LABS:                        9.6    13.08 )-----------( 227      ( 23 Mar 2023 09:15 )             28.9         138  |  109  |  27<H>  ----------------------------<  134<H>  4.1   |  17  |  1.1    Ca    8.1<L>      23 Mar 2023 11:11  Phos  3.0       Mg     1.8         TPro  4.5<L>  /  Alb  2.6<L>  /  TBili  1.5<H>  /  DBili  0.7<H>  /  AST  19  /  ALT  14  /  AlkPhos  94      PT/INR - ( 22 Mar 2023 15:43 )   PT: 13.50 sec;   INR: 1.18 ratio         PTT - ( 22 Mar 2023 15:43 )  PTT:24.4 sec  Creatine Kinase, Serum: 358 U/L *H* (23 @ 11:11)  Troponin T, Serum: <0.01 ng/mL (23 @ 11:11)  Creatine Kinase, Serum: TNP U/L (23 @ 09:15)  Troponin T, Serum: TNP ng/mL (23 @ 09:15)  Creatine Kinase, Serum: 338 U/L *H* (23 @ 05:25)  Troponin T, Serum: <0.01 ng/mL (23 @ 05:15)  Creatine Kinase, Serum: 264 U/L *H* (23 @ 21:01)  Troponin T, Serum: <0.01 ng/mL (23 @ 21:01)    CARDIAC MARKERS ( 23 Mar 2023 11:11 )  x     / <0.01 ng/mL / 358 U/L / x     / 2.9 ng/mL  CARDIAC MARKERS ( 23 Mar 2023 09:15 )  x     / TNP ng/mL / TNP U/L / x     / TNP ng/mL  CARDIAC MARKERS ( 23 Mar 2023 05:25 )  x     / x     / 338 U/L / x     / x      CARDIAC MARKERS ( 23 Mar 2023 05:15 )  x     / <0.01 ng/mL / x     / x     / 3.4 ng/mL  CARDIAC MARKERS ( 22 Mar 2023 21:01 )  x     / <0.01 ng/mL / 264 U/L / x     / 4.6 ng/mL  CARDIAC MARKERS ( 22 Mar 2023 15:43 )  x     / <0.01 ng/mL / 141 U/L / x     / 3.6 ng/mL  CARDIAC MARKERS ( 21 Mar 2023 17:59 )  x     / x     / 188 U/L / x     / x            Troponin trend:            RADIOLOGY:  < from: TTE Echo Complete w/o Contrast w/ Doppler (23 @ 09:37) >  Summary:   1. Technically difficult study.   2. Left ventricular ejection fraction appears normal. Cannot evaluate   segmental wall motion.   3. Mildly increased LV wall thickness.  4. Sclerotic aortic valve with decreased opening. Cannot rule out aortic   stenosis.   5. Trace mitral valve regurgitation.   6. Patient is in normal sinus rhythm.   7. Compared to the prior TTE dated 2022, the present study is   technically limited. There is no aortic stenosis on the prior study.    < end of copied text >    Hemodynamics: Hemodynamic assessment demonstrates normal LVEDP.     Coronary circulation: The coronary circulation is right dominant. There was no angiographic evidence for occlusive coronary artery disease.     Left main: Angiography showed mild atherosclerosis with no flow limiting lesions.       LAD: The vessel was large sized. Proximal LAD: There was a tubular 40- 50 % stenosis. Mid LAD: Angiography showed mild atherosclerosis. Patent prior stent. Distal LAD: Angiography showed no evidence of disease. 1st diagonal: Angiography showed no evidence of disease.     Proximal circumflex: The vessel was large sized. Angiography showed no evidence of disease. Distal circumflex: The vessel was small sized. Angiography showed no evidence of disease. 1st obtuse marginal: The vessel was large sized. Angiography showed no evidence of disease.     Ramus intermedius: The vessel was large sized. Angiography showed no evidence of disease.     RCA: The vessel was very large sized (dominant). Proximal RCA: There was a diffuse 30 % stenosis. Mid RCA: There was a tubular 30 % stenosis. Distal RCA: The vessel was ectatic. Angiography showed no evidence of disease. Right PDA: Angiography showed no evidence of disease. Right posterolateral segment: Angiography showed no evidence of disease.        ESTIMATED BLOOD LOSS: < 30 mL        CONDITION:     [x] Good     [] Fair     [] Critical        SPECIMEN REMOVED: N/A       POST-OP DIAGNOSIS:    Non-obstructive coronary artery disease.        PLAN OF CARE:   IV NS  150 cc/hr x 4 hrs   Medical therapy and risk factor modification  Dc planning.      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 Pt walks with walker and got up in middle of night to get food and slipped and fell on his R side. Pt has been having R hip pain since with inability to ambulate. Pts family finally convinced him to come to hospital today. Pt is having 10/10 pain currently. Denies dizziness, CP or LOC prior to fall. Denies head trauma. No HA. (20 Mar 2023 12:02)    Cardiac HPI  Pt with above mentioned PMHx presented to the hospital s/p fall found to have Hip Fx s/p OR. Post op pt was hypotensive and tachycardic Cardio consulted for tachycardia.     PAST MEDICAL & SURGICAL HISTORY  CAD (coronary artery disease)    Hypercholesteremia    BPH (benign prostatic hyperplasia)    Kidney stones    Prostate cancer    Status post cardiac surgery  cardiac stents    Bilateral cataracts    History of lithotripsy        FAMILY HISTORY:  FAMILY HISTORY:  FH: colon cancer (Mother)        SOCIAL HISTORY:  Social History:  Lives with son in apartment (20 Mar 2023 12:02)      ALLERGIES:  No Known Allergies      MEDICATIONS:  albumin human  5% IVPB 250 milliLiter(s) IV Intermittent every 6 hours  aspirin  chewable 81 milliGRAM(s) Oral daily  atorvastatin 80 milliGRAM(s) Oral at bedtime  atropine 1% Solution 1 Drop(s) Left EYE two times a day  chlorhexidine 0.12% Liquid 15 milliLiter(s) Oral Mucosa every 12 hours  chlorhexidine 2% Cloths 1 Application(s) Topical <User Schedule>  clopidogrel Tablet 75 milliGRAM(s) Oral daily  dexMEDEtomidine Infusion 0.1 MICROgram(s)/kG/Hr (1.59 mL/Hr) IV Continuous <Continuous>  fentaNYL   Infusion. 0.5 MICROgram(s)/kG/Hr (3.18 mL/Hr) IV Continuous <Continuous>  heparin   Injectable 5000 Unit(s) SubCutaneous every 8 hours  hydrocortisone sodium succinate Injectable 100 milliGRAM(s) IV Push once  hydrocortisone sodium succinate Injectable 50 milliGRAM(s) IV Push every 6 hours  ketamine Infusion. 0.25 mG/kG/Hr (1.59 mL/Hr) IV Continuous <Continuous>  lactated ringers. 1000 milliLiter(s) (100 mL/Hr) IV Continuous <Continuous>  norepinephrine Infusion 0.05 MICROgram(s)/kG/Min (2.98 mL/Hr) IV Continuous <Continuous>  pantoprazole  Injectable 40 milliGRAM(s) IV Push daily  phenylephrine    Infusion 0.1 MICROgram(s)/kG/Min (1.19 mL/Hr) IV Continuous <Continuous>  prednisoLONE acetate 1% Suspension 1 Drop(s) Left EYE every 6 hours  senna 2 Tablet(s) Oral at bedtime  tamsulosin 0.4 milliGRAM(s) Oral every 12 hours    PRN:  acetaminophen     Tablet .. 650 milliGRAM(s) Oral every 6 hours PRN  aluminum hydroxide/magnesium hydroxide/simethicone Suspension 30 milliLiter(s) Oral every 4 hours PRN  fentaNYL    Injectable 25 MICROGram(s) IV Push every 4 hours PRN  melatonin 3 milliGRAM(s) Oral at bedtime PRN  midazolam Injectable 2 milliGRAM(s) IV Push every 4 hours PRN  morphine  - Injectable 4 milliGRAM(s) IV Push every 6 hours PRN  ondansetron Injectable 4 milliGRAM(s) IV Push every 8 hours PRN  sodium chloride 0.9% lock flush 10 milliLiter(s) IV Push every 1 hour PRN      HOME MEDICATIONS:  Home Medications:  atropine 1% ophthalmic solution: 1 drop(s) to each affected eye 2 times a day (20 Mar 2023 15:44)  erythromycin 0.5% ophthalmic ointment: 1 application to each affected eye 2 times a day (20 Mar 2023 15:44)  prednisoLONE acetate 1% ophthalmic suspension: 1 drop(s) to each affected eye every 6 hours (20 Mar 2023 15:44)  tamsulosin 0.4 mg oral capsule: 1 cap(s) orally once a day (at bedtime)  - if you continue to have your blood pressures drop when you stand, then this medication will need to be STOPPED (20 Mar 2023 15:44)      VITALS:   T(F): 97.9 ( @ 07:57), Max: 99.6 ( @ 12:43)  HR: 126 ( @ 09:00) (62 - 128)  BP: 97/57 ( @ 09:00) (78/55 - 237/132)  BP(mean): 72 ( @ 09:00) (69 - 98)  RR: 23 ( @ 08:00) (12 - 23)  SpO2: 100% ( @ 09:00) (95% - 100%)    I&O's Summary    22 Mar 2023 07:01  -  23 Mar 2023 07:00  --------------------------------------------------------  IN: 1051.1 mL / OUT: 1781 mL / NET: -729.9 mL        REVIEW OF SYSTEMS:  unable to obtain    PHYSICAL EXAM:  General: patient intubated sedated   HEENT: EOMI  Cardio: regular, S1, S2   Pulm: B/L BS.     Abdomen: Soft,   Extremities: No edema b/l le  Neuro: sedated     LABS:                        9.6    13.08 )-----------( 227      ( 23 Mar 2023 09:15 )             28.9         138  |  109  |  27<H>  ----------------------------<  134<H>  4.1   |  17  |  1.1    Ca    8.1<L>      23 Mar 2023 11:11  Phos  3.0       Mg     1.8         TPro  4.5<L>  /  Alb  2.6<L>  /  TBili  1.5<H>  /  DBili  0.7<H>  /  AST  19  /  ALT  14  /  AlkPhos  94      PT/INR - ( 22 Mar 2023 15:43 )   PT: 13.50 sec;   INR: 1.18 ratio         PTT - ( 22 Mar 2023 15:43 )  PTT:24.4 sec  Creatine Kinase, Serum: 358 U/L *H* (23 @ 11:11)  Troponin T, Serum: <0.01 ng/mL (23 @ 11:11)  Creatine Kinase, Serum: TNP U/L (23 @ 09:15)  Troponin T, Serum: TNP ng/mL (23 @ 09:15)  Creatine Kinase, Serum: 338 U/L *H* (23 @ 05:25)  Troponin T, Serum: <0.01 ng/mL (23 @ 05:15)  Creatine Kinase, Serum: 264 U/L *H* (23 @ 21:01)  Troponin T, Serum: <0.01 ng/mL (23 @ 21:01)    CARDIAC MARKERS ( 23 Mar 2023 11:11 )  x     / <0.01 ng/mL / 358 U/L / x     / 2.9 ng/mL  CARDIAC MARKERS ( 23 Mar 2023 09:15 )  x     / TNP ng/mL / TNP U/L / x     / TNP ng/mL  CARDIAC MARKERS ( 23 Mar 2023 05:25 )  x     / x     / 338 U/L / x     / x      CARDIAC MARKERS ( 23 Mar 2023 05:15 )  x     / <0.01 ng/mL / x     / x     / 3.4 ng/mL  CARDIAC MARKERS ( 22 Mar 2023 21:01 )  x     / <0.01 ng/mL / 264 U/L / x     / 4.6 ng/mL  CARDIAC MARKERS ( 22 Mar 2023 15:43 )  x     / <0.01 ng/mL / 141 U/L / x     / 3.6 ng/mL  CARDIAC MARKERS ( 21 Mar 2023 17:59 )  x     / x     / 188 U/L / x     / x            Troponin trend:            RADIOLOGY:  < from: TTE Echo Complete w/o Contrast w/ Doppler (23 @ 09:37) >  Summary:   1. Technically difficult study.   2. Left ventricular ejection fraction appears normal. Cannot evaluate   segmental wall motion.   3. Mildly increased LV wall thickness.  4. Sclerotic aortic valve with decreased opening. Cannot rule out aortic   stenosis.   5. Trace mitral valve regurgitation.   6. Patient is in normal sinus rhythm.   7. Compared to the prior TTE dated 2022, the present study is   technically limited. There is no aortic stenosis on the prior study.    < end of copied text >    Hemodynamics: Hemodynamic assessment demonstrates normal LVEDP.     Coronary circulation: The coronary circulation is right dominant. There was no angiographic evidence for occlusive coronary artery disease.     Left main: Angiography showed mild atherosclerosis with no flow limiting lesions.       LAD: The vessel was large sized. Proximal LAD: There was a tubular 40- 50 % stenosis. Mid LAD: Angiography showed mild atherosclerosis. Patent prior stent. Distal LAD: Angiography showed no evidence of disease. 1st diagonal: Angiography showed no evidence of disease.     Proximal circumflex: The vessel was large sized. Angiography showed no evidence of disease. Distal circumflex: The vessel was small sized. Angiography showed no evidence of disease. 1st obtuse marginal: The vessel was large sized. Angiography showed no evidence of disease.     Ramus intermedius: The vessel was large sized. Angiography showed no evidence of disease.     RCA: The vessel was very large sized (dominant). Proximal RCA: There was a diffuse 30 % stenosis. Mid RCA: There was a tubular 30 % stenosis. Distal RCA: The vessel was ectatic. Angiography showed no evidence of disease. Right PDA: Angiography showed no evidence of disease. Right posterolateral segment: Angiography showed no evidence of disease.        ESTIMATED BLOOD LOSS: < 30 mL        CONDITION:     [x] Good     [] Fair     [] Critical        SPECIMEN REMOVED: N/A       POST-OP DIAGNOSIS:    Non-obstructive coronary artery disease.        PLAN OF CARE:   IV NS  150 cc/hr x 4 hrs   Medical therapy and risk factor modification  Dc planning.      -OTHER:  EC Lead ECG:   Ventricular Rate 140 BPM    Atrial Rate 133 BPM    QRS Duration 110 ms    Q-T Interval 340 ms    QTC Calculation(Bazett) 519 ms    R Axis 269 degrees    T Axis 73 degrees    Diagnosis Line Supraventricular tachycardia  Right bundle branch block  Cannot rule out Anteroseptal infarct , age undetermined  Abnormal ECG    Confirmed by EDIN SUGGS, DIALLO ([id number]) on 3/22/2023 10:27:02 PM ( @ 15:23)      TELEMETRY EVENTS: A Tach

## 2023-03-23 NOTE — PROGRESS NOTE ADULT - SUBJECTIVE AND OBJECTIVE BOX
S:  S&E this AM. Intubated. Unable to give history.    O:  GEN: Intubated  Continues to be on pressors (phenylephrine)    RLE:  Proximal dressing with minimal spotting, distal c/d/i  Normal postop swelling of thigh  Sensorimotor exam unable to be obtained  BCR    A&P:  77M with right intertrochanteric hip fracture now s/p ORIF on 3/22/23.    - Ancef x24h postop  - May restart AC immediately, agent per primary  - WBAT RLE  - PT/OT/IS when capable  - SCD's  - Trend HgB, transfuse PRN to maintain > 7

## 2023-03-23 NOTE — PROGRESS NOTE ADULT - SUBJECTIVE AND OBJECTIVE BOX
RADHA RAMON     [id number]   [birth year]  77yM      Admit Date/LOS: 03-20 (2d)  Indication for SDU/SICU: Hypotension with pressor requirements. Intubated.        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 Pt walks with walker and got up in middle of night to get food and slipped and fell on his R side. Pt has been having R hip pain since with inability to ambulate. Pts family finally convinced him to come to hospital today. Pt is having 10/10 pain currently. Denies dizziness, CP or LOC prior to fall. Denies head trauma. No HA. (20 Mar 2023 12:02)    3/22: Patient S/P ORIF of right hip using interlocking intramedullary stephon.     Procedure:  OR Stats  OR Time:    EBL: 75  IV Fluids: 2700  Blood Products: None  UOP: Not noted. No blair placed in OR  Findings- Displaced intertrochanteric fracture appreciated. 11mm x 200mm 130, lag screw 10.5 x 105mm    SICU consulted for hypotension requiring pressors.    Patient was a rapid response in the PACU post-operatively due to hypotension. Patient examined bedside and was responding to verbal stimuli. Albumin and fluid bolus given. Labs drawn off A-line and Urbano started. NGT placed by SICU. Blair placed by urology due to difficulty passing Blair in setting of patient with prostate CA and BPH.    24Hours  3/23  NIGHT  -Right TLC Placed  -Transitioning from urbano to levo  -Trop neg x3            [] A ten-point review of systems was otherwise negative except as noted above.  [x] Due to altered mental status/intubation, subjective information was not attained from the patient. History was obtained, to the extent possible, from review of the chart and collateral sources of information.    =========================================================================================================================================         RADHA RAMON     [id number]   [birth year]  77yM      Admit Date/LOS: 03-20 (2d)  Indication for SDU/SICU: Hypotension with pressor requirements. Intubated.        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 Pt walks with walker and got up in middle of night to get food and slipped and fell on his R side. Pt has been having R hip pain since with inability to ambulate. Pts family finally convinced him to come to hospital today. Pt is having 10/10 pain currently. Denies dizziness, CP or LOC prior to fall. Denies head trauma. No HA. (20 Mar 2023 12:02)    3/22: Patient S/P ORIF of right hip using interlocking intramedullary stephon.     Procedure:  OR Stats  OR Time:    EBL: 75  IV Fluids: 2700  Blood Products: None  UOP: Not noted. No blair placed in OR  Findings- Displaced intertrochanteric fracture appreciated. 11mm x 200mm 130, lag screw 10.5 x 105mm    SICU consulted for hypotension requiring pressors.    Patient was a rapid response in the PACU post-operatively due to hypotension. Patient examined bedside and was responding to verbal stimuli. Albumin and fluid bolus given. Labs drawn off A-line and Urbano started. NGT placed by SICU. Blair placed by urology due to difficulty passing Blair in setting of patient with prostate CA and BPH.    24Hours  3/23  DAY  -Right TLC Placed  -Transitioning from urbano to levo- Patient became tachycardic  - CXR, EKG, Versed, Albumin 250 x 2, NICOM negative, Echo, Switch a-line, cardiology, ABG, Lovenox DVT ppx  -Trop neg x3        [] A ten-point review of systems was otherwise negative except as noted above.  [x] Due to altered mental status/intubation, subjective information was not attained from the patient. History was obtained, to the extent possible, from review of the chart and collateral sources of information.    =========================================================================================================================================      Daily     Daily     Diet, NPO:   Except Medications (03-22-23 @ 18:10)      CURRENT MEDS:  Neurologic Medications  acetaminophen     Tablet .. 650 milliGRAM(s) Oral every 6 hours PRN Temp greater or equal to 38C (100.4F), Mild Pain (1 - 3)  dexMEDEtomidine Infusion 0.1 MICROgram(s)/kG/Hr IV Continuous <Continuous>  fentaNYL    Injectable 25 MICROGram(s) IV Push every 4 hours PRN Severe Pain (7 - 10)  fentaNYL   Infusion. 0.5 MICROgram(s)/kG/Hr IV Continuous <Continuous>  melatonin 3 milliGRAM(s) Oral at bedtime PRN Insomnia  midazolam Injectable 2 milliGRAM(s) IV Push every 4 hours PRN agitation  morphine  - Injectable 4 milliGRAM(s) IV Push every 6 hours PRN Moderate Pain (4 - 6)  ondansetron Injectable 4 milliGRAM(s) IV Push every 8 hours PRN Nausea and/or Vomiting    Respiratory Medications    Cardiovascular Medications  norepinephrine Infusion 0.05 MICROgram(s)/kG/Min IV Continuous <Continuous>  phenylephrine    Infusion 0.1 MICROgram(s)/kG/Min IV Continuous <Continuous>    Gastrointestinal Medications  albumin human  5% IVPB 250 milliLiter(s) IV Intermittent every 6 hours  aluminum hydroxide/magnesium hydroxide/simethicone Suspension 30 milliLiter(s) Oral every 4 hours PRN Dyspepsia  lactated ringers. 1000 milliLiter(s) IV Continuous <Continuous>  pantoprazole  Injectable 40 milliGRAM(s) IV Push daily  senna 2 Tablet(s) Oral at bedtime  sodium chloride 0.9% lock flush 10 milliLiter(s) IV Push every 1 hour PRN Pre/post blood products, medications, blood draw, and to maintain line patency    Genitourinary Medications  tamsulosin 0.4 milliGRAM(s) Oral every 12 hours    Hematologic/Oncologic Medications  aspirin  chewable 81 milliGRAM(s) Oral daily  clopidogrel Tablet 75 milliGRAM(s) Oral daily  heparin   Injectable 5000 Unit(s) SubCutaneous every 8 hours    Antimicrobial/Immunologic Medications    Endocrine/Metabolic Medications  atorvastatin 80 milliGRAM(s) Oral at bedtime  hydrocortisone sodium succinate Injectable 100 milliGRAM(s) IV Push once  hydrocortisone sodium succinate Injectable 50 milliGRAM(s) IV Push every 6 hours    Topical/Other Medications  atropine 1% Solution 1 Drop(s) Left EYE two times a day  chlorhexidine 0.12% Liquid 15 milliLiter(s) Oral Mucosa every 12 hours  chlorhexidine 2% Cloths 1 Application(s) Topical <User Schedule>  prednisoLONE acetate 1% Suspension 1 Drop(s) Left EYE every 6 hours      ICU Vital Signs Last 24 Hrs  T(C): 36.6 (23 Mar 2023 07:57), Max: 37.3 (22 Mar 2023 19:00)  T(F): 97.9 (23 Mar 2023 07:57), Max: 99.1 (22 Mar 2023 19:00)  HR: 126 (23 Mar 2023 09:00) (62 - 128)  BP: 97/57 (23 Mar 2023 09:00) (78/55 - 237/132)  BP(mean): 72 (23 Mar 2023 09:00) (69 - 98)  ABP: 89/45 (23 Mar 2023 09:00) (83/55 - 253/120)  ABP(mean): 58 (23 Mar 2023 09:00) (58 - 101)  RR: 23 (23 Mar 2023 08:00) (12 - 23)  SpO2: 100% (23 Mar 2023 09:00) (97% - 100%)    O2 Parameters below as of 23 Mar 2023 08:00  Patient On (Oxygen Delivery Method): ventilator    O2 Concentration (%): 30        Mode: AC/ CMV (Assist Control/ Continuous Mandatory Ventilation)  RR (machine): 12  TV (machine): 450  FiO2: 30  PEEP: 6  ITime: 0.9  MAP: 12  PIP: 20    ABG - ( 23 Mar 2023 03:45 )  pH, Arterial: 7.42  pH, Blood: x     /  pCO2: 29    /  pO2: 173   / HCO3: 19    / Base Excess: -4.8  /  SaO2: 99.9        I&O's Summary    22 Mar 2023 07:01  -  23 Mar 2023 07:00  --------------------------------------------------------  IN: 1051.1 mL / OUT: 1781 mL / NET: -729.9 mL      I&O's Detail    22 Mar 2023 07:01  -  23 Mar 2023 07:00  --------------------------------------------------------  IN:    Dexmedetomidine: 77.7 mL    Dexmedetomidine: 133 mL    IV PiggyBack: 50 mL    Phenylephrine: 790.4 mL  Total IN: 1051.1 mL    OUT:    Indwelling Catheter - Urethral (mL): 1781 mL  Total OUT: 1781 mL    Total NET: -729.9 mL        PHYSICAL EXAM:    General/Neuro: Sedated  Lungs:  Mechanical ventilation,  clear to auscultation, Normal expansion/effort.   Cardiovascular : S1, S2. Tachycardic.  Peripheral edema   GI: Abdomen soft, Non-tender, Non-distended. Nasogastric tube in place.  Wound: Dressing c/d/i   Extremities: Extremities mildly cool, well-perfused.  Derm: Good skin turgor, no skin breakdown.    : Blair catheter in place.      CXR:   < from: Xray Chest 1 View- PORTABLE-Urgent (Xray Chest 1 View- PORTABLE-Urgent .) (03.23.23 @ 11:11) >  Impression:    Stable bibasilar opacities.    Stable support devices as described.  --- End of Report --  < end of copied text >     < from: Xray Chest 1 View- PORTABLE-Routine (Xray Chest 1 View- PORTABLE-Routine in AM.) (03.23.23 @ 07:13) >  Impression:    Post right IJ central venous catheter, with tip overlying SVC; no   pneumothorax.  Stable additional support devices.  Stable bibasilar opacities.    --- End of Report ---  < end of copied text >      LABS:  CAPILLARY BLOOD GLUCOSE  POCT Blood Glucose.: 118 mg/dL (23 Mar 2023 13:08)  POCT Blood Glucose.: 109 mg/dL (23 Mar 2023 07:55)  POCT Blood Glucose.: 109 mg/dL (22 Mar 2023 20:46)  POCT Blood Glucose.: 187 mg/dL (22 Mar 2023 15:29)                          9.6    13.08 )-----------( 227      ( 23 Mar 2023 09:15 )             28.9       03-23    138  |  109  |  27<H>  ----------------------------<  134<H>  4.1   |  17  |  1.1    Ca    8.1<L>      23 Mar 2023 11:11  Phos  3.0     03-23  Mg     1.8     03-23    TPro  4.5<L>  /  Alb  2.6<L>  /  TBili  1.5<H>  /  DBili  0.7<H>  /  AST  19  /  ALT  14  /  AlkPhos  94  03-22      PT/INR - ( 22 Mar 2023 15:43 )   PT: 13.50 sec;   INR: 1.18 ratio         PTT - ( 22 Mar 2023 15:43 )  PTT:24.4 sec  CARDIAC MARKERS ( 23 Mar 2023 11:11 )  x     / <0.01 ng/mL / 358 U/L / x     / 2.9 ng/mL  CARDIAC MARKERS ( 23 Mar 2023 09:15 )  x     / TNP ng/mL / TNP U/L / x     / TNP ng/mL  CARDIAC MARKERS ( 23 Mar 2023 05:25 )  x     / x     / 338 U/L / x     / x      CARDIAC MARKERS ( 23 Mar 2023 05:15 )  x     / <0.01 ng/mL / x     / x     / 3.4 ng/mL  CARDIAC MARKERS ( 22 Mar 2023 21:01 )  x     / <0.01 ng/mL / 264 U/L / x     / 4.6 ng/mL  CARDIAC MARKERS ( 22 Mar 2023 15:43 )  x     / <0.01 ng/mL / 141 U/L / x     / 3.6 ng/mL  CARDIAC MARKERS ( 21 Mar 2023 17:59 )  x     / x     / 188 U/L / x     / x

## 2023-03-23 NOTE — PROGRESS NOTE ADULT - ASSESSMENT
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 POD# 0 S/P ORIF of right hip using interlocking intramedullary stephon.       NEURO:  #Acute pain    - Tylenol, Gabapentin    - Precedex gtt for sedation    - For Fentanyl 25 mcg q4 prn    RESP:   #Oxygenation    - Intubated 3/23  RR (machine): 12, TV (machine): 450, FiO2: 30, PEEP: 8  03-23 @ 03:45--7.42 / 29 / 173 / 19 / 99.9  O2 99.9  Lac 1.30    03-22 @ 16:10--7.36 / 33 / 412 / 19 / 100.0  O2 100.0  Lac 1.90      - Wean and SBT as tolerated  #Activity    -increase as tolerated    CARDS:   #Hypotension     - Urbano gtt transition to levophed    GI/NUTR:   #Diet: NPO except medications  #NGT in place  #Nausea: Zofran prn    - Aspiration precautions, HOB 30  #GI Prophylaxis    - Protonix  #Bowel regimen    - Senna    /RENAL:   #UO: Blair placed by Urology     - Monitor UO     - Flomax  #urine output in critically ill    -indwelling blair (placed           BUN/Cr- 28/1.0  -->,  27/1.0  -->          Electrolytes-Na 138 // K 4.1 // Mg 1.9 //  Phos 3.8 (03-22 @ 21:01)  #Maintain euvolemia        03-21-23 @ 07:01  -  03-22-23 @ 07:00  --------------------------------------------------------  IN: 0 mL / OUT: 325 mL / NET: -325 mL    03-22-23 @ 07:01  -  03-22-23 @ 18:06  --------------------------------------------------------  IN: 0 mL / OUT: 1000 mL / NET: -1000 mL       HEME/ONC:   #DVT prophylaxis    - SCDs  #H/O CAD s/p stents x3    - Restarted home ASA & Plavix   #DVT prophylaxis    -, SCDs    Labs: Hb/Hct:  7.8/23.2  -->,  10.2/29.8  -->                      Plts:  180  -->,  171  -->                 PTT/INR:  28.7/1.03  --->,  24.4/1.18  --->     T&S Expires 3/25    ID:  #leukocytosis   WBC- 9.11  --->>,  8.70  --->>,  14.97  --->>  Antibiotics- Ancef x 3 doses post-operatively  Temp trend- 24hrs T(F): 97.6 (03-22 @ 14:40), Max: 98.8 (03-22 @ 09:08)    ENDO:    -FSG q6 if NPO    -Glucose goal 140-180    -if above 180 start ISS    MSK:     - Bedrest    LINES/DRAINS:  A-line left radial, PIV x3 (2 20s, 1 18), Blair, Right IJ TLC (3/23)    ADVANCED DIRECTIVES:  Full Code    HCP/Emergency Contact- Song Segura (Son): [phone]    INDICATION FOR SICU Hypotension with pressor requirements. Intubated.         DISPO:   Case discussed with attending Dr. Allan.

## 2023-03-23 NOTE — PROGRESS NOTE ADULT - ATTENDING COMMENTS
Patient remains intubated.  FiO2 30%, PEEP 5.  On Levo, tachycardic.  Agitated intermittently.  Hypovolemic, requires iv boluses.    ASSESSMENT:  78 y/o male, S/P Fall.  Right Intertrochanteric Femur Fracture. S/P ORIF.  Agitation.  On mechanical ventilation.  Shock.  Hypovolemia.   Acute postoperative pain.    PLAN:  - sedation for RASS -1 - use Fentanyl, Versed drip  - continue Vent support; follow ABG  - keep MAP>65; wean Levo off, use Urbano; give Alb 5% 250 ml x4 q6h  - use Nicom   - Cardiology f/u; follow 2D Echo  - NPO, NGT to suction  - follow serum electrolytes and UOP  - DVT prophylaxis  SICU care Patient remains intubated.  FiO2 30%, PEEP 5.  On Levo, tachycardic.  Agitated intermittently.  Hypovolemic, requires iv boluses.    ASSESSMENT:  76 y/o male, S/P Fall.  Right Intertrochanteric Femur Fracture. S/P ORIF.  Agitation.  On mechanical ventilation.  Shock.  Hypovolemia.   Acute postoperative pain.    PLAN:  - sedation for RASS -1 - use Fentanyl, Versed drip  - continue Vent support; follow ABG  - keep MAP>65; wean Levo off, use Urbano; give Alb 5% 250 ml x4 q6h  - use Nicom   - Cardiology f/u; follow 2D Echo  - put on steroids iv - give sterss dose follow solucortef 50 mg iv q6h  - NPO, NGT to suction  - follow serum electrolytes and UOP  - DVT prophylaxis  SICU care

## 2023-03-23 NOTE — CONSULT NOTE ADULT - ASSESSMENT
IMPRESSION  Episodes of Sinus Tachycardia, A Tach and Junctional tachycardia  Undifferentiated shock on Urbano  Intubated on Vent  Hx of CAD s/p PCI, sp cath 5/22 which showed patent stents and non obstructive CAD  HLD  Prostate CA    RECOMMENDATIONS  management per SICU team  c/w aspirin, palvix and lipitor  start lopressor 12.5 BID once off pressors and if BP allows  consider w/up for pulmonary embolism/fat embolism if remains tachycardic   recall prn IMPRESSION  Episodes of Sinus Tachycardia, A Tach and Junctional tachycardia; likely reactive to underlying medical condition,  echo reviewed echo globally normal   Undifferentiated shock on Urbano, no evidence of ACS at this point  Intubated on Vent  Hx of CAD s/p PCI, sp cath 5/22 which showed patent stents and non obstructive CAD  HLD  Prostate CA    RECOMMENDATIONS  management per SICU team  c/w aspirin, palvix and lipitor  start lopressor 12.5 BID once off pressors and if BP allows  consider w/up for pulmonary embolism/fat embolism if remains tachycardic   recall prn

## 2023-03-23 NOTE — ANESTHESIA FOLLOW-UP NOTE - NSEVALATIONFT_GEN_ALL_CORE
Patient developed hypotension and dysrhythmias perioperatively.  Intraoperatively, managed with vasopressors (phenyl, norepi and vaso).  Intraop ABG demonstrated Hb>9, adequate oxygenation and ventilation; remained intubated postoperatively.  Continued to demonstrate hypotension, rapid response called to PACU, bedside TTE by cards demonstrated good function grossly, repeat CBC demonstrated anemia (Hb<8), given blood transfusion, accepted to SICU with consult to cardiology.

## 2023-03-24 NOTE — DIETITIAN INITIAL EVALUATION ADULT - ORAL INTAKE PTA/DIET HISTORY
Limited to chart review at this time as pt intubated and unable to provide hx.    Weight trends in EMR (kg): 68 (5/3/22), 77.1 (11/15/21)

## 2023-03-24 NOTE — PROGRESS NOTE ADULT - SUBJECTIVE AND OBJECTIVE BOX
S:  S&E this AM. Intubated/sedated    O:  GEN: Intubated  Continues to be on pressors    RLE:  Proximal dressing with minimal spotting, distal c/d/i  Normal postop swelling of thigh  Sensorimotor exam unable to be obtained  BCR    A&P:  77M with right intertrochanteric hip fracture now s/p ORIF on 3/22/23.    - Ancef x24h postop  - DVT ppx per primary  - WBAT RLE  - PT/OT/IS when capable  - SCD's  - Trend HgB, transfuse PRN to maintain > 7  - Rest of care per primary

## 2023-03-24 NOTE — DIETITIAN INITIAL EVALUATION ADULT - PERTINENT MEDS FT
MEDICATIONS  (STANDING):  aspirin  chewable 81 milliGRAM(s) Oral daily  atorvastatin 80 milliGRAM(s) Oral at bedtime  atropine 1% Solution 1 Drop(s) Left EYE two times a day  chlorhexidine 0.12% Liquid 15 milliLiter(s) Oral Mucosa every 12 hours  chlorhexidine 2% Cloths 1 Application(s) Topical <User Schedule>  clopidogrel Tablet 75 milliGRAM(s) Oral daily  doxazosin 4 milliGRAM(s) Oral at bedtime  fentaNYL   Infusion. 0.5 MICROgram(s)/kG/Hr (3.18 mL/Hr) IV Continuous <Continuous>  heparin   Injectable 5000 Unit(s) SubCutaneous every 8 hours  hydrocortisone sodium succinate Injectable 50 milliGRAM(s) IV Push every 6 hours  lactated ringers. 1000 milliLiter(s) (100 mL/Hr) IV Continuous <Continuous>  midazolam Infusion 0.02 mG/kG/Hr (1.27 mL/Hr) IV Continuous <Continuous>  norepinephrine Infusion 0.05 MICROgram(s)/kG/Min (2.98 mL/Hr) IV Continuous <Continuous>  pantoprazole  Injectable 40 milliGRAM(s) IV Push daily  phenylephrine    Infusion 0.1 MICROgram(s)/kG/Min (1.19 mL/Hr) IV Continuous <Continuous>  piperacillin/tazobactam IVPB.- 3.375 Gram(s) IV Intermittent once  prednisoLONE acetate 1% Suspension 1 Drop(s) Left EYE every 6 hours  senna 2 Tablet(s) Oral at bedtime  vancomycin  IVPB 1000 milliGRAM(s) IV Intermittent every 12 hours  vasopressin Infusion 0.03 Unit(s)/Min (4.5 mL/Hr) IV Continuous <Continuous>  vasopressin Infusion 0.03 Unit(s)/Min (4.5 mL/Hr) IV Continuous <Continuous>    MEDICATIONS  (PRN):  acetaminophen     Tablet .. 650 milliGRAM(s) Oral every 6 hours PRN Temp greater or equal to 38C (100.4F), Mild Pain (1 - 3)  aluminum hydroxide/magnesium hydroxide/simethicone Suspension 30 milliLiter(s) Oral every 4 hours PRN Dyspepsia  fentaNYL    Injectable 25 MICROGram(s) IV Push every 4 hours PRN Severe Pain (7 - 10)  melatonin 3 milliGRAM(s) Oral at bedtime PRN Insomnia  ondansetron Injectable 4 milliGRAM(s) IV Push every 8 hours PRN Nausea and/or Vomiting  sodium chloride 0.9% lock flush 10 milliLiter(s) IV Push every 1 hour PRN Pre/post blood products, medications, blood draw, and to maintain line patency

## 2023-03-24 NOTE — PROGRESS NOTE ADULT - SUBJECTIVE AND OBJECTIVE BOX
RADHA RAMON     [id number]   [birth year]  77yM    Admit Date/LOS: 03-20 (2d)  Indication for SDU/SICU: Hypotension with pressor requirements. Intubated.        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 Pt walks with walker and got up in middle of night to get food and slipped and fell on his R side. Pt has been having R hip pain since with inability to ambulate. Pts family finally convinced him to come to hospital today. Pt is having 10/10 pain currently. Denies dizziness, CP or LOC prior to fall. Denies head trauma. No HA. (20 Mar 2023 12:02)    3/22: Patient S/P ORIF of right hip using interlocking intramedullary stephon.     Procedure:  OR Stats  OR Time:    EBL: 75  IV Fluids: 2700  Blood Products: None  UOP: Not noted. No blair placed in OR  Findings- Displaced intertrochanteric fracture appreciated. 11mm x 200mm 130, lag screw 10.5 x 105mm    SICU consulted for hypotension requiring pressors.    Patient was a rapid response in the PACU post-operatively due to hypotension. Patient examined bedside and was responding to verbal stimuli. Albumin and fluid bolus given. Labs drawn off A-line and Urbano started. NGT placed by SICU. Blair placed by urology due to difficulty passing Blair in setting of patient with prostate CA and BPH.    24Hours  3/23  -versed gtt  -15mmol NaPhosph  -remains on levo and phenylephrine  -Afebrile 101.6, UA, BC and ET Sputum ordered        DAY  - hypotensive and tachy cardic on levoand urbano  - 1L LR bolus, albumin  - cards contacted-> NTD-supportive care  - CE negative  - Echo: normal LVEF possible AS  - NICOM negative   - albumin 250 q6 x 2 doses  - solucortef 100 x1, then 50 for 24 hours   - Place on PRVC  - 2gm Mg   - consider CT PE protocol if becomes more stable  - ketamine started  2L crystalloid , 500cc albumin            [] A ten-point review of systems was otherwise negative except as noted above.  [x] Due to altered mental status/intubation, subjective information was not attained from the patient. History was obtained, to the extent possible, from review of the chart and collateral sources of information.    =========================================================================================================================================     RADHA RAMON     [id number]   [birth year]  77yM    Admit Date/LOS:  (2d)  Indication for SDU/SICU: Hypotension with pressor requirements. Intubated.        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 Pt walks with walker and got up in middle of night to get food and slipped and fell on his R side. Pt has been having R hip pain since with inability to ambulate. Pts family finally convinced him to come to hospital today. Pt is having 10/10 pain currently. Denies dizziness, CP or LOC prior to fall. Denies head trauma. No HA. (20 Mar 2023 12:02)    3/22: Patient S/P ORIF of right hip using interlocking intramedullary stephon.     Procedure:  OR Stats  OR Time:    EBL: 75  IV Fluids: 2700  Blood Products: None  UOP: Not noted. No blair placed in OR  Findings- Displaced intertrochanteric fracture appreciated. 11mm x 200mm 130, lag screw 10.5 x 105mm    SICU consulted for hypotension requiring pressors.    Patient was a rapid response in the PACU post-operatively due to hypotension. Patient examined bedside and was responding to verbal stimuli. Albumin and fluid bolus given. Labs drawn off A-line and Urbano started. NGT placed by SICU. Blair placed by urology due to difficulty passing Blair in setting of patient with prostate CA and BPH.    24Hours  3/23  -versed gtt  -15mmol NaPhosph  -remains on levo and phenylephrine  -Afebrile 101.6, UA, BC and ET Sputum ordered        DAY  - hypotensive and tachy cardic on levoand urbano  - 1L LR bolus, albumin  - cards contacted-> NTD-supportive care  - CE negative  - Echo: normal LVEF possible AS  - NICOM negative   - albumin 250 q6 x 2 doses  - solucortef 100 x1, then 50 for 24 hours   - Place on PRVC  - 2gm Mg   - consider CT PE protocol if becomes more stable  - ketamine started  2L crystalloid , 500cc albumin      [] A ten-point review of systems was otherwise negative except as noted above.  [x] Due to altered mental status/intubation, subjective information was not attained from the patient. History was obtained, to the extent possible, from review of the chart and collateral sources of information.    =========================================================================================================================================    Daily     Daily     Diet, NPO:   Except Medications (23 @ 18:10)      CURRENT MEDS:  Neurologic Medications  acetaminophen     Tablet .. 650 milliGRAM(s) Oral every 6 hours PRN Temp greater or equal to 38C (100.4F), Mild Pain (1 - 3)  fentaNYL    Injectable 25 MICROGram(s) IV Push every 4 hours PRN Severe Pain (7 - 10)  fentaNYL   Infusion. 0.5 MICROgram(s)/kG/Hr IV Continuous <Continuous>  melatonin 3 milliGRAM(s) Oral at bedtime PRN Insomnia  midazolam Infusion 0.02 mG/kG/Hr IV Continuous <Continuous>  ondansetron Injectable 4 milliGRAM(s) IV Push every 8 hours PRN Nausea and/or Vomiting    Respiratory Medications    Cardiovascular Medications  doxazosin 4 milliGRAM(s) Oral at bedtime  norepinephrine Infusion 0.05 MICROgram(s)/kG/Min IV Continuous <Continuous>  phenylephrine    Infusion 0.1 MICROgram(s)/kG/Min IV Continuous <Continuous>    Gastrointestinal Medications  aluminum hydroxide/magnesium hydroxide/simethicone Suspension 30 milliLiter(s) Oral every 4 hours PRN Dyspepsia  lactated ringers Bolus 250 milliLiter(s) IV Bolus once  lactated ringers. 1000 milliLiter(s) IV Continuous <Continuous>  pantoprazole  Injectable 40 milliGRAM(s) IV Push daily  senna 2 Tablet(s) Oral at bedtime  sodium chloride 0.9% lock flush 10 milliLiter(s) IV Push every 1 hour PRN Pre/post blood products, medications, blood draw, and to maintain line patency    Genitourinary Medications    Hematologic/Oncologic Medications  aspirin  chewable 81 milliGRAM(s) Oral daily  clopidogrel Tablet 75 milliGRAM(s) Oral daily  heparin   Injectable 5000 Unit(s) SubCutaneous every 8 hours    Antimicrobial/Immunologic Medications  piperacillin/tazobactam IVPB.- 3.375 Gram(s) IV Intermittent once  piperacillin/tazobactam IVPB.- 3.375 Gram(s) IV Intermittent once  vancomycin  IVPB 1000 milliGRAM(s) IV Intermittent once    Endocrine/Metabolic Medications  atorvastatin 80 milliGRAM(s) Oral at bedtime  hydrocortisone sodium succinate Injectable 50 milliGRAM(s) IV Push every 6 hours  vasopressin Infusion 0.03 Unit(s)/Min IV Continuous <Continuous>    Topical/Other Medications  atropine 1% Solution 1 Drop(s) Left EYE two times a day  chlorhexidine 0.12% Liquid 15 milliLiter(s) Oral Mucosa every 12 hours  chlorhexidine 2% Cloths 1 Application(s) Topical <User Schedule>  prednisoLONE acetate 1% Suspension 1 Drop(s) Left EYE every 6 hours      ICU Vital Signs Last 24 Hrs  T(C): 37.2 (24 Mar 2023 08:15), Max: 38.7 (24 Mar 2023 03:00)  T(F): 99 (24 Mar 2023 08:15), Max: 101.6 (24 Mar 2023 03:00)  HR: 113 (24 Mar 2023 10:30) (63 - 114)  BP: 120/66 (24 Mar 2023 10:30) (85/48 - 192/92)  BP(mean): 87 (24 Mar 2023 10:30) (62 - 132)  ABP: 143/47 (24 Mar 2023 10:30) (80/46 - 208/86)  ABP(mean): 71 (24 Mar 2023 10:30) (55 - 124)  RR: 14 (24 Mar 2023 10:30) (11 - 17)  SpO2: 100% (24 Mar 2023 10:30) (99% - 100%)    O2 Parameters below as of 24 Mar 2023 10:00  Patient On (Oxygen Delivery Method): ventilator    O2 Concentration (%): 30      Mode: AC/ CMV (Assist Control/ Continuous Mandatory Ventilation)  RR (machine): 12  TV (machine): 450  FiO2: 30  PEEP: 6  PS: 30  ITime: 1  MAP: 8  PIP: 17    ABG - ( 24 Mar 2023 03:20 )  pH, Arterial: 7.29  pH, Blood: x     /  pCO2: 39    /  pO2: 150   / HCO3: 19    / Base Excess: -7.3  /  SaO2: 99.8        I&O's Summary    23 Mar 2023 07:  -  24 Mar 2023 07:00  --------------------------------------------------------  IN: 4728.9 mL / OUT: 1090 mL / NET: 3638.9 mL    24 Mar 2023 07:  -  24 Mar 2023 10:42  --------------------------------------------------------  IN: 783.2 mL / OUT: 95 mL / NET: 688.2 mL      I&O's Detail    23 Mar 2023 07:  -  24 Mar 2023 07:00  --------------------------------------------------------  IN:    Dexmedetomidine: 187.3 mL    FentaNYL: 196.9 mL    IV PiggyBack: 250 mL    Ketamine: 3.2 mL    Lactated Ringers: 2400 mL    Midazolam: 57.1 mL    Norepinephrine: 337.6 mL    Norepinephrine: 47.6 mL    Phenylephrine: 1249.3 mL  Total IN: 4728.9 mL    OUT:    Indwelling Catheter - Urethral (mL): 1090 mL    Phenylephrine: 0 mL  Total OUT: 1090 mL    Total NET: 3638.9 mL      24 Mar 2023 07:  -  24 Mar 2023 10:42  --------------------------------------------------------  IN:    IV PiggyBack: 100 mL    Lactated Ringers: 400 mL    Norepinephrine: 76.8 mL    Phenylephrine: 206.4 mL  Total IN: 783.2 mL    OUT:    FentaNYL: 0 mL    Indwelling Catheter - Urethral (mL): 95 mL    Midazolam: 0 mL    Nasogastric/Oral tube (mL): 0 mL  Total OUT: 95 mL    Total NET: 688.2 mL      PHYSICAL EXAM:  General/Neuro: SAT underway- Patient unresponsive to pain. Intubated  Lungs: Clear to auscultation bilaterally. On ventilation.   Cardiovascular : S1, S2.  Regular rate and rhythm. Normal Sinus Rhythm  GI: Abdomen soft.  Nasogastric tube in place.  Wound: ORIF dressing   Extremities: Hands cool b/l. Pink, well-perfused.  Derm: Good skin turgor, no skin breakdown.    : Blair catheter in place.    LABS:  CAPILLARY BLOOD GLUCOSE  POCT Blood Glucose.: 153 mg/dL (24 Mar 2023 06:42)  POCT Blood Glucose.: 146 mg/dL (24 Mar 2023 00:26)  POCT Blood Glucose.: 125 mg/dL (23 Mar 2023 18:00)  POCT Blood Glucose.: 118 mg/dL (23 Mar 2023 13:08)                          9.4    14.43 )-----------( 225      ( 23 Mar 2023 20:24 )             27.8       03-23    139  |  109  |  28<H>  ----------------------------<  132<H>  4.5   |  15<L>  |  1.1    Ca    8.2<L>      23 Mar 2023 20:24  Phos  2.7     03-  Mg     2.3     -23    TPro  4.5<L>  /  Alb  2.6<L>  /  TBili  1.5<H>  /  DBili  0.7<H>  /  AST  19  /  ALT  14  /  AlkPhos  94  03-22      PT/INR - ( 22 Mar 2023 15:43 )   PT: 13.50 sec;   INR: 1.18 ratio         PTT - ( 22 Mar 2023 15:43 )  PTT:24.4 sec  CARDIAC MARKERS ( 23 Mar 2023 11:11 )  x     / <0.01 ng/mL / 358 U/L / x     / 2.9 ng/mL  CARDIAC MARKERS ( 23 Mar 2023 09:15 )  x     / TNP ng/mL / TNP U/L / x     / TNP ng/mL  CARDIAC MARKERS ( 23 Mar 2023 05:25 )  x     / x     / 338 U/L / x     / x      CARDIAC MARKERS ( 23 Mar 2023 05:15 )  x     / <0.01 ng/mL / x     / x     / 3.4 ng/mL  CARDIAC MARKERS ( 22 Mar 2023 21:01 )  x     / <0.01 ng/mL / 264 U/L / x     / 4.6 ng/mL  CARDIAC MARKERS ( 22 Mar 2023 15:43 )  x     / <0.01 ng/mL / 141 U/L / x     / 3.6 ng/mL      Urinalysis Basic - ( 24 Mar 2023 05:00 )    Color: Yellow / Appearance: Clear / S.022 / pH: x  Gluc: x / Ketone: Trace  / Bili: Negative / Urobili: <2 mg/dL   Blood: x / Protein: 30 mg/dL / Nitrite: Negative   Leuk Esterase: Negative / RBC: 10 /HPF / WBC 1 /HPF   Sq Epi: x / Non Sq Epi: 1 /HPF / Bacteria: Negative    CXR:   < from: Xray Chest 1 View- PORTABLE-Urgent (Xray Chest 1 View- PORTABLE-Urgent .) (23 @ 11:11) >  Impression:    Stable bibasilar opacities.    Stable support devices as described.    --- End of Report ---  < end of copied text >    < from: Xray Chest 1 View- PORTABLE-Routine (Xray Chest 1 View- PORTABLE-Routine in AM.) (23 @ 07:13) >  Impression:    Post right IJ central venous catheter, with tip overlying SVC; no   pneumothorax.  Stable additional support devices.  Stable bibasilar opacities.    --- End of Report ---  < end of copied text >

## 2023-03-24 NOTE — DIETITIAN INITIAL EVALUATION ADULT - OTHER INFO
Pt presented after fall at home 3 days prior to presentation.  S/P ORIF of right hip using interlocking intramedullary stephon. Pt remains intubate, for vent weaning and SBT as tolerated.

## 2023-03-24 NOTE — DIETITIAN INITIAL EVALUATION ADULT - NUTRITIONGOAL OUTCOME1
to establish a route of nutrition and pt will meet >50% estimated needs in 3-5 days. RD to follow as per high risk protocol.  Monitor diet order, kcal intake, body composition, NFPE, labs  (lytes, BG, renal indices)

## 2023-03-24 NOTE — DIETITIAN INITIAL EVALUATION ADULT - PERTINENT LABORATORY DATA
9.4    14.43 )-----------( 225      ( 23 Mar 2023 20:24 )             27.8     03-23    139  |  109  |  28<H>  ----------------------------<  132<H>  4.5   |  15<L>  |  1.1    Ca    8.2<L>      23 Mar 2023 20:24  Phos  2.7     03-23  Mg     2.3     03-23    TPro  4.5<L>  /  Alb  2.6<L>  /  TBili  1.5<H>  /  DBili  0.7<H>  /  AST  19  /  ALT  14  /  AlkPhos  94  03-22    CAPILLARY BLOOD GLUCOSE  POCT Blood Glucose.: 175 mg/dL (24 Mar 2023 11:27)  POCT Blood Glucose.: 153 mg/dL (24 Mar 2023 06:42)  POCT Blood Glucose.: 146 mg/dL (24 Mar 2023 00:26)  POCT Blood Glucose.: 125 mg/dL (23 Mar 2023 18:00)    A1C with Estimated Average Glucose Result: 5.8 % (05-06-22 @ 05:20)

## 2023-03-24 NOTE — CONSULT NOTE ADULT - NS ATTEND AMEND GEN_ALL_CORE FT
EEG seems no epileptiform  will follow
pt seen and examined 3/22/23. blair now in place draining yellow. given challenging blair, it should be kept in place at least 3-5 days and urology should be recalled prior to removal

## 2023-03-24 NOTE — DIETITIAN INITIAL EVALUATION ADULT - ENTERAL
KUB as warranted to evaluate abdominal distension. Consider Reglan as feedings start when medically feasible to feed. Peptamen AF formula to start at 20ml/hr and advance by 20ml Q4H to goal 50ml/hr. -- TF at goal will provide 1440kcal, 91g protein, 972ml free water. Glycemic control.

## 2023-03-24 NOTE — DIETITIAN INITIAL EVALUATION ADULT - NSFNSGIASSESSMENTFT_GEN_A_CORE
abdomen WDL, distended; no bowel movement documented since admission -- no GI issues noted in chart except abdominal distension

## 2023-03-24 NOTE — DIETITIAN INITIAL EVALUATION ADULT - NSFNSGIIOFT_GEN_A_CORE
I&O's Detail    23 Mar 2023 07:01  -  24 Mar 2023 07:00  --------------------------------------------------------  IN:    Dexmedetomidine: 187.3 mL    FentaNYL: 196.9 mL    IV PiggyBack: 250 mL    Ketamine: 3.2 mL    Lactated Ringers: 2400 mL    Midazolam: 57.1 mL    Norepinephrine: 337.6 mL    Norepinephrine: 47.6 mL    Phenylephrine: 1249.3 mL  Total IN: 4728.9 mL    OUT:    Indwelling Catheter - Urethral (mL): 1090 mL    Phenylephrine: 0 mL  Total OUT: 1090 mL    Total NET: 3638.9 mL

## 2023-03-24 NOTE — CONSULT NOTE ADULT - SUBJECTIVE AND OBJECTIVE BOX
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 Pt walks with walker and got up in middle of night to get food and slipped and fell on his R side. Pt has been having R hip pain since with inability to ambulate. Pts family finally convinced him to come to hospital today. Pt is having 10/10 pain currently. Denies dizziness, CP or LOC prior to fall. Denies head trauma. No HA. (20 Mar 2023 12:02)     MEDICATIONS  Home Medications:  atropine 1% ophthalmic solution: 1 drop(s) to each affected eye 2 times a day (20 Mar 2023 15:44)  erythromycin 0.5% ophthalmic ointment: 1 application to each affected eye 2 times a day (20 Mar 2023 15:44)  prednisoLONE acetate 1% ophthalmic suspension: 1 drop(s) to each affected eye every 6 hours (20 Mar 2023 15:44)  tamsulosin 0.4 mg oral capsule: 1 cap(s) orally once a day (at bedtime)  - if you continue to have your blood pressures drop when you stand, then this medication will need to be STOPPED (20 Mar 2023 15:44)    MEDICATIONS  (STANDING):  aspirin  chewable 81 milliGRAM(s) Oral daily  atorvastatin 80 milliGRAM(s) Oral at bedtime  atropine 1% Solution 1 Drop(s) Left EYE two times a day  chlorhexidine 0.12% Liquid 15 milliLiter(s) Oral Mucosa every 12 hours  chlorhexidine 2% Cloths 1 Application(s) Topical <User Schedule>  clopidogrel Tablet 75 milliGRAM(s) Oral daily  doxazosin 4 milliGRAM(s) Oral at bedtime  fentaNYL   Infusion. 0.5 MICROgram(s)/kG/Hr (3.18 mL/Hr) IV Continuous <Continuous>  heparin   Injectable 5000 Unit(s) SubCutaneous every 8 hours  hydrocortisone sodium succinate Injectable 50 milliGRAM(s) IV Push every 6 hours  lactated ringers. 1000 milliLiter(s) (100 mL/Hr) IV Continuous <Continuous>  midazolam Infusion 0.02 mG/kG/Hr (1.27 mL/Hr) IV Continuous <Continuous>  norepinephrine Infusion 0.05 MICROgram(s)/kG/Min (2.98 mL/Hr) IV Continuous <Continuous>  pantoprazole  Injectable 40 milliGRAM(s) IV Push daily  phenylephrine    Infusion 0.1 MICROgram(s)/kG/Min (1.19 mL/Hr) IV Continuous <Continuous>  prednisoLONE acetate 1% Suspension 1 Drop(s) Left EYE every 6 hours  senna 2 Tablet(s) Oral at bedtime  vancomycin  IVPB 1000 milliGRAM(s) IV Intermittent every 12 hours  vasopressin Infusion 0.03 Unit(s)/Min (4.5 mL/Hr) IV Continuous <Continuous>  vasopressin Infusion 0.03 Unit(s)/Min (4.5 mL/Hr) IV Continuous <Continuous>    MEDICATIONS  (PRN):  acetaminophen     Tablet .. 650 milliGRAM(s) Oral every 6 hours PRN Temp greater or equal to 38C (100.4F), Mild Pain (1 - 3)  aluminum hydroxide/magnesium hydroxide/simethicone Suspension 30 milliLiter(s) Oral every 4 hours PRN Dyspepsia  fentaNYL    Injectable 25 MICROGram(s) IV Push every 4 hours PRN Severe Pain (7 - 10)  melatonin 3 milliGRAM(s) Oral at bedtime PRN Insomnia  ondansetron Injectable 4 milliGRAM(s) IV Push every 8 hours PRN Nausea and/or Vomiting  sodium chloride 0.9% lock flush 10 milliLiter(s) IV Push every 1 hour PRN Pre/post blood products, medications, blood draw, and to maintain line patency      FAMILY HISTORY:  FH: colon cancer (Mother)      SOCIAL HISTORY: negative for tobacco, alcohol, or ilicit drug use.    Allergies    No Known Allergies    Intolerances        GEN: intubated and mechanically ventilated     NEURO:   MENTAL STATUS: unable to access   LANG/SPEECH:  unable to access   CRANIAL NERVES:  II: unable to access   III, IV, VI:  unable to access   V: unable to access   VII:  unable to access   VIII:  unable to access   MOTOR:  unable to access   REFLEXES: 1/4 throughout, bilateral flexor plantars  SENSORY:  did not withdraw the extremities to pain   COORD:  unable to access   Gait: deferred     LABS:                        9.4    14.43 )-----------( 225      ( 23 Mar 2023 20:24 )             27.8         139  |  109  |  28<H>  ----------------------------<  132<H>  4.5   |  15<L>  |  1.1    Ca    8.2<L>      23 Mar 2023 20:24  Phos  2.7     03-23  Mg     2.3     03-23      Hemoglobin A1C:   Vitamin B12     CAPILLARY BLOOD GLUCOSE      POCT Blood Glucose.: 202 mg/dL (24 Mar 2023 18:54)      Urinalysis Basic - ( 24 Mar 2023 05:00 )    Color: Yellow / Appearance: Clear / S.022 / pH: x  Gluc: x / Ketone: Trace  / Bili: Negative / Urobili: <2 mg/dL   Blood: x / Protein: 30 mg/dL / Nitrite: Negative   Leuk Esterase: Negative / RBC: 10 /HPF / WBC 1 /HPF   Sq Epi: x / Non Sq Epi: 1 /HPF / Bacteria: Negative        ABG - ( 24 Mar 2023 03:20 )  pH, Arterial: 7.29  pH, Blood: x     /  pCO2: 39    /  pO2: 150   / HCO3: 19    / Base Excess: -7.3  /  SaO2: 99.8                Microbiology:      RADIOLOGY, EKG AND ADDITIONAL TESTS: Reviewed.

## 2023-03-24 NOTE — CONSULT NOTE ADULT - ASSESSMENT
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 S/P ORIF of right hip using interlocking intramedullary stephon on 3/22.  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 S/P ORIF of right hip using interlocking intramedullary stephon on 3/22.  Rapid response in the PACU post-operatively due to hypotension and the patient was immediately intubated for mech ventilation. Neurology is consulted for assessment of as patient is not responsive to stimulus.     Recommendation:  - vEEG   - please call us for any question or concern

## 2023-03-24 NOTE — PROGRESS NOTE ADULT - ASSESSMENT
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   S/P ORIF of right hip using interlocking intramedullary stephon.       NEURO:  #Acute pain    - Tylenol, Gabapentin, fentanyl 25mcg q4 prn  #acute sedation    -versed infusion    RESP:   #Oxygenation    - Intubated 3/23  RR (machine): 12, TV (machine): 450, FiO2: 30, PEEP: 8  03-23 @ 03:45--7.42 / 29 / 173 / 19 / 99.9  O2 99.9  Lac 1.30    03-22 @ 16:10--7.36 / 33 / 412 / 19 / 100.0  O2 100.0  Lac 1.90      - Wean and SBT as tolerated  #Activity    -increase as tolerated    CARDS:   #acute hypotension    -wean off levophed secondary to tachycardia    -titrate up phenylephrine  TTE Echo Complete w/o Contrast w/ Doppler (03.23.23 @ 09:37) >  1. Technically difficult study.   2. Left ventricular ejection fraction appears normal. Cannot evaluate   segmental wall motion.   3. Mildly increased LV wall thickness.  4. Sclerotic aortic valve with decreased opening. Cannot rule out aortic   stenosis.   5. Trace mitral valve regurgitation.   6. Patient is in normal sinus rhythm.   7. Compared to the prior TTE dated 5/06/2022, the present study is   technically limited. There is no aortic stenosis on the prior study.    < end of copied text >        GI/NUTR:   #Diet: NPO except medications  #NGT in place  #Nausea: Zofran prn    - Aspiration precautions, HOB 30  #GI Prophylaxis    - Protonix  #Bowel regimen    - Senna    /RENAL:   #UO: Garrison placed by Urology     - Monitor UO     - Flomax          BUN/Cr- 27/1.1  -->,  28/1.1  -->          Electrolytes-Na 139 // K 4.5 // Mg 2.3 //  Phos 2.7 (03-23 @ 20:24)  #Maintain euvolemia               HEME/ONC:   #DVT prophylaxis    - SCDs  #H/O CAD s/p stents x3    - Restarted home ASA & Plavix   #DVT prophylaxis    -, SCDs    Labs: Hb/Hct:  9.6/28.9  -->,  9.4/27.8  -->                      Plts:  227  -->,  225  -->                 PTT/INR:        T&S Expires:   T&S Expires 3/25    ID:  #leukocytosis, febrile  WBC- 13.69  --->>,  13.08  --->>,  14.43  --->>  Temp trend- 24hrs T(F): 101.6 (03-24 @ 03:00), Max: 101.6 (03-24 @ 03:00)  Cultures:    BCx 3/24    BCx 3/24    UA 3/24    ET Sputum 3/24          ENDO:    -FSG q6 if NPO    -Glucose goal 140-180    -if above 180 start ISS    MSK:     - Bedrest    LINES/DRAINS:    A-line left radial (3/22),    PIV x3 (2 20s, 1 18),   Garrison (3/22)  Right IJ TLC (3/23)    ADVANCED DIRECTIVES:  Full Code    HCP/Emergency Contact- Song Segura (Son): [phone]    INDICATION FOR SICU Hypotension with pressor requirements. Intubated.         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   S/P ORIF of right hip using interlocking intramedullary stephon.       NEURO:  #Acute pain    - Tylenol, Gabapentin, fentanyl 25mcg q4 prn  #acute sedation    -versed infusion    RESP:   #Oxygenation    - Intubated 3/23  RR (machine): 12, TV (machine): 450, FiO2: 30, PEEP: 8  03-23 @ 03:45--7.42 / 29 / 173 / 19 / 99.9  O2 99.9  Lac 1.30    03-22 @ 16:10--7.36 / 33 / 412 / 19 / 100.0  O2 100.0  Lac 1.90      - Wean and SBT as tolerated  #Activity    -increase as tolerated    CARDS:   #acute hypotension    -wean off levophed secondary to tachycardia    -titrate up phenylephrine  TTE Echo Complete w/o Contrast w/ Doppler (03.23.23 @ 09:37) >  1. Technically difficult study.   2. Left ventricular ejection fraction appears normal. Cannot evaluate   segmental wall motion.   3. Mildly increased LV wall thickness.  4. Sclerotic aortic valve with decreased opening. Cannot rule out aortic   stenosis.   5. Trace mitral valve regurgitation.   6. Patient is in normal sinus rhythm.   7. Compared to the prior TTE dated 5/06/2022, the present study is   technically limited. There is no aortic stenosis on the prior study.    < end of copied text >        GI/NUTR:   #Diet: NPO except medications  #NGT in place  #Nausea: Zofran prn    - Aspiration precautions, HOB 30  #GI Prophylaxis    - Protonix  #Bowel regimen    - Senna    /RENAL:   #UO: Garrison placed by Urology     - Monitor UO     - Flomax          BUN/Cr- 27/1.1  -->,  28/1.1  -->          Electrolytes-Na 139 // K 4.5 // Mg 2.3 //  Phos 2.7 (03-23 @ 20:24)  #Maintain euvolemia               HEME/ONC:   #DVT prophylaxis    - SCDs  #H/O CAD s/p stents x3    - Restarted home ASA & Plavix   #DVT prophylaxis    -, SCDs    Labs: Hb/Hct:  9.6/28.9  -->,  9.4/27.8  -->                      Plts:  227  -->,  225  -->                 PTT/INR:        T&S Expires:   T&S Expires 3/25    ID:  #leukocytosis, febrile  WBC- 13.69  --->>,  13.08  --->>,  14.43  --->>  Temp trend- 24hrs T(F): 101.6 (03-24 @ 03:00), Max: 101.6 (03-24 @ 03:00)  Cultures:    BCx 3/24    BCx 3/24    UA 3/24    ET Sputum 3/24      ENDO:    -FSG q6 if NPO    -Glucose goal 140-180    -if above 180 start ISS    MSK:     - Bedrest     - WBAT RLE per ortho, PT/OT when clinically appropriate    LINES/DRAINS:    A-line left radial (3/22),    PIV x3 (2 20s, 1 18),   Garrison (3/22)  Right IJ TLC (3/23)    ADVANCED DIRECTIVES:  Full Code    HCP/Emergency Contact- Song Nublossom (Son): [phone]    INDICATION FOR SICU Hypotension with pressor requirements. Intubated.         DISPO:   SICU

## 2023-03-25 NOTE — PROGRESS NOTE ADULT - ASSESSMENT
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   S/P ORIF of right hip using interlocking intramedullary stephon.       NEURO:  #Acute pain    - Tylenol, Gabapentin, fentanyl 25mcg q4 prn  #acute sedation    -precedex    RESP:   #acute respiratory failure secondary to post operative arrythmias requiring mechanical ventilation (Intubated 3/23)  RR (machine): 12, TV (machine): 450, FiO2: 40, PEEP: 6  03-25 @ 03:20--7.42 / 33 / 153 / 21 / 100.0  O2 100.0  Lac 0.90    #Activity    -increase as tolerated    CARDS:   #acute hypotension    -wean off levophed secondary to tachycardia    -titrate up phenylephrine  TTE Echo Complete w/o Contrast w/ Doppler (03.23.23 @ 09:37) >   Left ventricular ejection fraction appears normal. Cannot evaluate segmental wall motion.   Mildly increased LV wall thickness.   Sclerotic aortic valve with decreased opening. Cannot rule out aortic tenosis.   Trace mitral valve regurgitation.   Patient is in normal sinus rhythm.    There is no aortic stenosis on the prior study.    GI/NUTR:   #Diet: NPO x/rx    -TF Peptamen goal 50cc/hr  #NGT in place  #Nausea: Zofran prn    - Aspiration precautions, HOB 30  #GI Prophylaxis    - Protonix  #Bowel regimen    - Senna    /RENAL:   #UO: Garrison placed by Urology     - Monitor UO     - Cardura (Flomax at home)       HEME/ONC:   #DVT prophylaxis    - SCDs  #H/O CAD s/p stents x3    - Restarted home ASA & Plavix   #DVT prophylaxis    -, SCDs    Labs: Hb/Hct:  9.4/27.8  -->,  8.4/26.0  -->                      Plts:  225  -->,  142  -->                 PTT/INR:      T&S Expires 3/25 ordered    ID:  #leukocytosis, febrile  WBC- 13.08  --->>,  14.43  --->>,  6.13  --->>  Temp trend- 24hrs T(F): 96.5 (03-25 @ 16:00), Max: 97.6 (03-25 @ 08:00)  Current antibiotics-piperacillin/tazobactam IVPB.. 3.375 every 8 hours  vancomycin  IVPB 1000 every 12 hours  Cultures:    BCx 3/25--pending    UA 3/24--negative    ET Sputum 3/24--rare gram neg, rare gram pos    ENDO:    -FSG q6 if NPO    -Glucose goal 140-180    -if above 180 start ISS    MSK:     - Bedrest     - WBAT RLE per ortho, PT/OT when clinically appropriate    LINES/DRAINS:    A-line left radial (3/22),    PIV x3 (2 20s, 1 18),   Garrison (3/22)  Right IJ TLC (3/23)    ADVANCED DIRECTIVES:  Full Code    HCP/Emergency Contact- Song Segura (Son): [phone]    INDICATION FOR SICU Hypotension with pressor requirements. Intubated.       DISPO:   SICU

## 2023-03-25 NOTE — EEG REPORT - NS EEG TEXT BOX
VIDEO EEG  REPORT      RADHA RAMON    77y Male  MRN [id number]      Recording Technique: The patient underwent continuous video-EEG monitoring using Telemetry System hardware on the XL [employer] Digital System. EEG and video data were stored on a computer hard drive with important events saved in digital archive files. The material was reviewed by a physician (electroencephalographer/epileptologist) on a daily basis. Thomas and seizure detection algorithms were utilized if needed. An EEG technician attended to the patient for 8 to 10 hours per day. The patient was observed by the epilepsy nursing staff 24 hrs per day. The epilepsy center neurologist was available in person or on call 24 hours per day.    Placement and Labeling of Eelectrodes: The EEG was performed using at least 20 channel referential EEG connections (coronal over temporal over parasaggital montage) with inferior temporal electrodes when indicting and using all standard 10-20 electrode placement with EKG, with additional electrodes placed in the inferior temporal region using the modified 10-10 montage electrode placements for elective admissions, or if deemed necessary. Recording was at a sampling rate of 256 samples per second per channel. Time syncronized digital video recording was done simultaneously with EEG recording. A low light infrared camera was used for low light recording.      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 Pt walks with walker and got up in middle of night to get food and slipped and fell on his R side. Pt has been having R hip pain since with inability to ambulate. Pts family finally convinced him to come to hospital today. Pt is having 10/10 pain currently. Denies dizziness, CP or LOC prior to fall. Denies head trauma. No HA. (20 Mar 2023 12:02)      MEDICATIONS  (STANDING):  aspirin  chewable 81 milliGRAM(s) Oral daily  atorvastatin 80 milliGRAM(s) Oral at bedtime  atropine 1% Solution 1 Drop(s) Left EYE two times a day  chlorhexidine 0.12% Liquid 15 milliLiter(s) Oral Mucosa every 12 hours  chlorhexidine 2% Cloths 1 Application(s) Topical <User Schedule>  clopidogrel Tablet 75 milliGRAM(s) Oral daily  dexMEDEtomidine Infusion 0.1 MICROgram(s)/kG/Hr (1.59 mL/Hr) IV Continuous <Continuous>  doxazosin 4 milliGRAM(s) Oral at bedtime  heparin   Injectable 5000 Unit(s) SubCutaneous every 8 hours  hydrocortisone sodium succinate Injectable 50 milliGRAM(s) IV Push every 6 hours  insulin lispro (ADMELOG) corrective regimen sliding scale   SubCutaneous three times a day before meals  lactated ringers. 1000 milliLiter(s) (100 mL/Hr) IV Continuous <Continuous>  pantoprazole  Injectable 40 milliGRAM(s) IV Push daily  phenylephrine    Infusion 0.1 MICROgram(s)/kG/Min (1.19 mL/Hr) IV Continuous <Continuous>  piperacillin/tazobactam IVPB.. 3.375 Gram(s) IV Intermittent every 8 hours  prednisoLONE acetate 1% Suspension 1 Drop(s) Left EYE every 6 hours  senna 2 Tablet(s) Oral at bedtime  vancomycin  IVPB 1000 milliGRAM(s) IV Intermittent every 12 hours  vasopressin Infusion 0.03 Unit(s)/Min (4.5 mL/Hr) IV Continuous <Continuous>  vasopressin Infusion 0.03 Unit(s)/Min (4.5 mL/Hr) IV Continuous <Continuous>    MEDICATIONS  (PRN):  acetaminophen     Tablet .. 650 milliGRAM(s) Oral every 6 hours PRN Temp greater or equal to 38C (100.4F), Mild Pain (1 - 3)  aluminum hydroxide/magnesium hydroxide/simethicone Suspension 30 milliLiter(s) Oral every 4 hours PRN Dyspepsia  fentaNYL    Injectable 25 MICROGram(s) IV Push every 4 hours PRN Severe Pain (7 - 10)  melatonin 3 milliGRAM(s) Oral at bedtime PRN Insomnia  ondansetron Injectable 4 milliGRAM(s) IV Push every 8 hours PRN Nausea and/or Vomiting  sodium chloride 0.9% lock flush 10 milliLiter(s) IV Push every 1 hour PRN Pre/post blood products, medications, blood draw, and to maintain line patency    BACKGROUND  Continuous background with predominant /theta frequencies.   Rudimentary sleep transients         GENERALIZED SLOWING  Continuous,   Moderate    FOCAL SLOWING  None    BREACH ARTIFACT  None    ACTIVATION PROCEDURES      None       PERIODIC ACTIVITY   None recorded     INTERICTAL EPILEPTIFORM ACTIVITY    None       EVENTS:   Pushbutton events :   No electrographic /clinical events recorded.      ARTIFACTS:   Intermittent myogenic and movement artifact noted    ECG:   Single channel ECG demonstrated sinus rhythm of 60 - 80 BPM       VEEG IMPRESSION/CLINICAL CORRELATION:     This 24 hour VEEG study was abnormal due to     Moderate generalized slowing      This finding is consistent with moderate  diffuse or multifocal  cortical dysfunction, but is nonspecific as to etiology.      No epileptiform patterns or seizures recorded.           Ana SPARROW  Epilepsy Attending, [facility]  Professor, Neurology and Pediatrics, [facility].

## 2023-03-25 NOTE — PROGRESS NOTE ADULT - SUBJECTIVE AND OBJECTIVE BOX
RADHA RAMON     [id number]   [birth year]  77yM      Admit Date/LOS: 03-20 (2d)  Indication for SDU/SICU: Hypotension with pressor requirements. Intubated.        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 Pt walks with walker and got up in middle of night to get food and slipped and fell on his R side. Pt has been having R hip pain since with inability to ambulate. Pts family finally convinced him to come to hospital today. Pt is having 10/10 pain currently. Denies dizziness, CP or LOC prior to fall. Denies head trauma. No HA. (20 Mar 2023 12:02)    3/22: Patient S/P ORIF of right hip using interlocking intramedullary stephon.     Procedure:  OR Stats  OR Time:    EBL: 75  IV Fluids: 2700  Blood Products: None  UOP: Not noted. No blair placed in OR  Findings- Displaced intertrochanteric fracture appreciated. 11mm x 200mm 130, lag screw 10.5 x 105mm    SICU consulted for hypotension requiring pressors.    Patient was a rapid response in the PACU post-operatively due to hypotension. Patient examined bedside and was responding to verbal stimuli. Albumin and fluid bolus given. Labs drawn off A-line and Urbano started. NGT placed by SICU. Blair placed by urology due to difficulty passing Blair in setting of patient with prostate CA and BPH.    24Hours  [] A ten-point review of systems was otherwise negative except as noted above.  [x] Due to altered mental status/intubation, subjective information was not attained from the patient. History was obtained, to the extent possible, from review of the chart and collateral sources of information.    =========================================================================================================================================

## 2023-03-26 NOTE — PROGRESS NOTE ADULT - SUBJECTIVE AND OBJECTIVE BOX
RADHA RAMON     [id number]   [birth year]  77yM      Admit Date/LOS: 03-20 (2d)  Indication for SDU/SICU: Hypotension with pressor requirements. Intubated.        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 Pt walks with walker and got up in middle of night to get food and slipped and fell on his R side. Pt has been having R hip pain since with inability to ambulate. Pts family finally convinced him to come to hospital today. Pt is having 10/10 pain currently. Denies dizziness, CP or LOC prior to fall. Denies head trauma. No HA. (20 Mar 2023 12:02)    3/22: Patient S/P ORIF of right hip using interlocking intramedullary stephon.     Procedure:  OR Stats  OR Time:    EBL: 75  IV Fluids: 2700  Blood Products: None  UOP: Not noted. No blair placed in OR  Findings- Displaced intertrochanteric fracture appreciated. 11mm x 200mm 130, lag screw 10.5 x 105mm    SICU consulted for hypotension requiring pressors.    Patient was a rapid response in the PACU post-operatively due to hypotension. Patient examined bedside and was responding to verbal stimuli. Albumin and fluid bolus given. Labs drawn off A-line and Urbano started. NGT placed by SICU. Blair placed by urology due to difficulty passing Blair in setting of patient with prostate CA and BPH.    24Hours  3/25  NIGHT  30 Kphos  BMP and phos AM  following command x4ext  Hgb downtrending 9.4>8.4>7.6    Day  start tube feeds Peptamen w/ 50cc goal  Sputum C/x- normal karie-final  -Blood culture received       [] A ten-point review of systems was otherwise negative except as noted above.  [x] Due to altered mental status/intubation, subjective information was not attained from the patient. History was obtained, to the extent possible, from review of the chart and collateral sources of information.    =========================================================================================================================================   RADHA RAMON     [id number]   [birth year]  77yM      Admit Date/LOS: 03-20 (2d)  Indication for SDU/SICU: Hypotension with pressor requirements. Intubated.        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 Pt walks with walker and got up in middle of night to get food and slipped and fell on his R side. Pt has been having R hip pain since with inability to ambulate. Pts family finally convinced him to come to hospital today. Pt is having 10/10 pain currently. Denies dizziness, CP or LOC prior to fall. Denies head trauma. No HA. (20 Mar 2023 12:02)    3/22: Patient S/P ORIF of right hip using interlocking intramedullary stephon.     Procedure:  OR Stats  OR Time:    EBL: 75  IV Fluids: 2700  Blood Products: None  UOP: Not noted. No blair placed in OR  Findings- Displaced intertrochanteric fracture appreciated. 11mm x 200mm 130, lag screw 10.5 x 105mm    SICU consulted for hypotension requiring pressors.    Patient was a rapid response in the PACU post-operatively due to hypotension. Patient examined bedside and was responding to verbal stimuli. Albumin and fluid bolus given. Labs drawn off A-line and Urbano started. NGT placed by SICU. Blair placed by urology due to difficulty passing Blair in setting of patient with prostate CA and BPH.    24Hours  3/25  NIGHT  30 Kphos  BMP and phos AM  following command x4ext  Hgb downtrending 9.4>8.4>7.6    Day  start tube feeds Peptamen w/ 50cc goal  Sputum C/x- normal karie-final  -Blood culture received       [] A ten-point review of systems was otherwise negative except as noted above.  [x] Due to altered mental status/intubation, subjective information was not attained from the patient. History was obtained, to the extent possible, from review of the chart and collateral sources of information.    =========================================================================================================================================      Daily     Daily   Diet, NPO with Tube Feed:   Tube Feeding Modality: Orogastric  Peptamen A.F. Formula  Total Volume for 24 Hours (mL): 1200  Continuous  Starting Tube Feed Rate mL per Hour: 20  Increase Tube Feed Rate by (mL): 20  Until Goal Tube Feed Rate (mL per Hour): 50  Tube Feed Duration (in Hours): 24  Tube Feed Start Time: 21:00 (03-25-23 @ 19:55)    CURRENT MEDS:  Neurologic Medications  acetaminophen     Tablet .. 650 milliGRAM(s) Oral every 6 hours PRN Temp greater or equal to 38C (100.4F), Mild Pain (1 - 3)  dexMEDEtomidine Infusion 0.1 MICROgram(s)/kG/Hr IV Continuous <Continuous>  fentaNYL    Injectable 25 MICROGram(s) IV Push every 4 hours PRN Severe Pain (7 - 10)  melatonin 3 milliGRAM(s) Oral at bedtime PRN Insomnia  ondansetron Injectable 4 milliGRAM(s) IV Push every 8 hours PRN Nausea and/or Vomiting    Respiratory Medications    Cardiovascular Medications  doxazosin 4 milliGRAM(s) Oral at bedtime  midodrine 5 milliGRAM(s) Oral every 8 hours  phenylephrine    Infusion 0.1 MICROgram(s)/kG/Min IV Continuous <Continuous>    Gastrointestinal Medications  aluminum hydroxide/magnesium hydroxide/simethicone Suspension 30 milliLiter(s) Oral every 4 hours PRN Dyspepsia  pantoprazole  Injectable 40 milliGRAM(s) IV Push daily  senna 2 Tablet(s) Oral at bedtime  sodium chloride 0.9% lock flush 10 milliLiter(s) IV Push every 1 hour PRN Pre/post blood products, medications, blood draw, and to maintain line patency    Genitourinary Medications    Hematologic/Oncologic Medications  aspirin  chewable 81 milliGRAM(s) Oral daily  clopidogrel Tablet 75 milliGRAM(s) Oral daily  heparin   Injectable 5000 Unit(s) SubCutaneous every 8 hours    Antimicrobial/Immunologic Medications  piperacillin/tazobactam IVPB.. 3.375 Gram(s) IV Intermittent every 8 hours    Endocrine/Metabolic Medications  atorvastatin 80 milliGRAM(s) Oral at bedtime  insulin lispro (ADMELOG) corrective regimen sliding scale   SubCutaneous every 6 hours  vasopressin Infusion 0.03 Unit(s)/Min IV Continuous <Continuous>    Topical/Other Medications  atropine 1% Solution 1 Drop(s) Left EYE two times a day  chlorhexidine 0.12% Liquid 15 milliLiter(s) Oral Mucosa every 12 hours  chlorhexidine 2% Cloths 1 Application(s) Topical <User Schedule>  prednisoLONE acetate 1% Suspension 1 Drop(s) Left EYE every 6 hours    ICU Vital Signs Last 24 Hrs  T(C): 36 (26 Mar 2023 14:00), Max: 36.1 (26 Mar 2023 00:00)  T(F): 96.9 (26 Mar 2023 14:00), Max: 97 (26 Mar 2023 00:00)  HR: 79 (26 Mar 2023 14:15) (56 - 138)  BP: 114/70 (26 Mar 2023 14:15) (90/59 - 153/89)  BP(mean): 88 (26 Mar 2023 14:15) (67 - 115)  ABP: 114/53 (26 Mar 2023 14:15) (100/48 - 172/68)  ABP(mean): 72 (26 Mar 2023 14:15) (53 - 106)  RR: 23 (26 Mar 2023 14:15) (11 - 37)  SpO2: 100% (26 Mar 2023 14:15) (89% - 100%)    O2 Parameters below as of 26 Mar 2023 12:00  Patient On (Oxygen Delivery Method): ventilator    O2 Concentration (%): 40      Mode: AC/ CMV (Assist Control/ Continuous Mandatory Ventilation)  RR (machine): 12  TV (machine): 450  FiO2: 40  PEEP: 6    ABG - ( 26 Mar 2023 09:50 )  pH, Arterial: 7.49  pH, Blood: x     /  pCO2: 29    /  pO2: 106   / HCO3: 22    / Base Excess: -0.7  /  SaO2: 100.0             I&O's Summary    25 Mar 2023 07:01  -  26 Mar 2023 07:00  --------------------------------------------------------  IN: 4776.1 mL / OUT: 1694 mL / NET: 3082.1 mL    26 Mar 2023 07:01  -  26 Mar 2023 17:34  --------------------------------------------------------  IN: 1214.6 mL / OUT: 250 mL / NET: 964.6 mL      I&O's Detail    25 Mar 2023 07:01  -  26 Mar 2023 07:00  --------------------------------------------------------  IN:    Dexmedetomidine: 91.8 mL    IV PiggyBack: 350 mL    IV PiggyBack: 100 mL    IV PiggyBack: 499.8 mL    Lactated Ringers: 3100 mL    Peptamen A.F.: 340 mL    Phenylephrine: 249.5 mL    Vasopressin: 45 mL  Total IN: 4776.1 mL    OUT:    Indwelling Catheter - Urethral (mL): 1494 mL    Nasogastric/Oral tube (mL): 200 mL  Total OUT: 1694 mL    Total NET: 3082.1 mL      26 Mar 2023 07:01  -  26 Mar 2023 17:34  --------------------------------------------------------  IN:    Dexmedetomidine: 121.4 mL    Enteral Tube Flush: 90 mL    IV PiggyBack: 100 mL    Lactated Ringers: 400 mL    Peptamen A.F.: 400 mL    Phenylephrine: 89.7 mL    Vasopressin: 4.5 mL    Vasopressin: 9 mL  Total IN: 1214.6 mL    OUT:    Indwelling Catheter - Urethral (mL): 250 mL  Total OUT: 250 mL    Total NET: 964.6 mL          PHYSICAL EXAM:     intubated  sedated  moving all extremities  mild saturation at incision site  blair in place

## 2023-03-26 NOTE — PROGRESS NOTE ADULT - ASSESSMENT
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   S/P ORIF of right hip using interlocking intramedullary stephon.       NEURO:  #Acute pain    - Tylenol, Gabapentin, fentanyl 25mcg q4 prn  #acute sedation    -precedex    RESP:   #acute respiratory failure secondary to post operative arrythmias requiring mechanical ventilation (Intubated 3/23)  RR (machine): 12, TV (machine): 450, FiO2: 40, PEEP: 6  ABG  #Activity    -increase as tolerated    CARDS:   #acute hypotension    -off pressors   TTE Echo Complete w/o Contrast w/ Doppler (03.23.23 @ 09:37) >   Left ventricular ejection fraction appears normal. Cannot evaluate segmental wall motion.   Mildly increased LV wall thickness.   Sclerotic aortic valve with decreased opening. Cannot rule out aortic tenosis.   Trace mitral valve regurgitation.   Patient is in normal sinus rhythm.    There is no aortic stenosis on the prior study.    GI/NUTR:   #Diet: NPO x/rx    -TF Peptamen goal 50cc/hr  #NGT in place  #Nausea: Zofran prn    - Aspiration precautions, HOB 30  #GI Prophylaxis    - Protonix  #Bowel regimen    - Senna    /RENAL:   #UO: Garrison placed by Urology     - Monitor UO     - Cardura (Flomax at home)    -IVF   #Labs:        BUN/Cr: 24/0.8        Electrolytes-Na 143 // K 3.7 // Mg 2.0 //  Phos 1.3 03-25 @ 20:33- hypokalemia, hypophosphotemia repleted, am BMP/IP pending          HEME/ONC:   #DVT prophylaxis    - SCDs  #H/O CAD s/p stents x3    - Restarted home ASA & Plavix    Labs: Hb/Hct:  9.4/27.8  -->,  8.4/26.0  -->,  7.6/22.8  --> AM CBC pending            Plts:  225  -->,  142  -->,  139  -->              PTT/INR:      T&S Expires 3/28    ID:  #leukocytosis, febrile  WBC- 13.08  --->>,  14.43  --->>,  6.13  --->> 4.4  afebrile   Current antibiotics-piperacillin/tazobactam IVPB.. 3.375 every 8 hours  vancomycin  IVPB 1000 every 12 hours  Cultures:    BCx 3/25--pending    UA 3/24--negative    ET Sputum 3/24--prelim neg     ENDO:    -FSG q6 if NPO    -Glucose goal 140-180    -if above 180 start ISS    MSK:     - Bedrest     - WBAT RLE per ortho, PT/OT when clinically appropriate    LINES/DRAINS:    A-line left radial (3/22),    PIV x3 (2 20s, 1 18),   Garrison (3/22)  Right IJ TLC (3/23)    ADVANCED DIRECTIVES:  Full Code    HCP/Emergency Contact- Song Segura (Son): [phone]    INDICATION FOR SICU Hypotension with pressor requirements. Intubated.       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   S/P ORIF of right hip using interlocking intramedullary stephon.       NEURO:  #Acute pain    - Tylenol, Gabapentin, fentanyl 25mcg q4 prn  #acute sedation    -precedex    RESP:   #acute respiratory failure secondary to post operative arrythmias requiring mechanical ventilation (Intubated 3/23)  RR (machine): 12, TV (machine): 450, FiO2: 40, PEEP: 6    ABG  #Activity    -increase as tolerated    CARDS:   #acute hypotension    -off pressors   TTE Echo Complete w/o Contrast w/ Doppler (03.23.23 @ 09:37) >   Left ventricular ejection fraction appears normal. Cannot evaluate segmental wall motion.   Mildly increased LV wall thickness.   Sclerotic aortic valve with decreased opening. Cannot rule out aortic tenosis.   Trace mitral valve regurgitation.   Patient is in normal sinus rhythm.    There is no aortic stenosis on the prior study.    GI/NUTR:   #Diet: NPO x/rx    -TF Peptamen goal 50cc/hr  #NGT in place  #Nausea: Zofran prn    - Aspiration precautions, HOB 30  #GI Prophylaxis    - Protonix  #Bowel regimen    - Senna    /RENAL:   #UO: Garrison placed by Urology     - Monitor UO     - Cardura (Flomax at home)    -IVF   #Labs:        BUN/Cr: 24/0.8        Electrolytes-Na 143 // K 3.7 // Mg 2.0 //  Phos 1.3 03-25 @ 20:33- hypokalemia, hypophosphotemia repleted, am BMP/IP pending          HEME/ONC:   #DVT prophylaxis    - SCDs  #H/O CAD s/p stents x3    - Restarted home ASA & Plavix    Labs: Hb/Hct:  9.4/27.8  -->,  8.4/26.0  -->,  7.6/22.8  --> AM CBC pending            Plts:  225  -->,  142  -->,  139  -->              PTT/INR:      T&S Expires 3/28    ID:  #leukocytosis, febrile  WBC- 13.08  --->>,  14.43  --->>,  6.13  --->> 4.4  afebrile   Current antibiotics-piperacillin/tazobactam IVPB.. 3.375 every 8 hours  vancomycin  IVPB 1000 every 12 hours  Cultures:    BCx 3/25--pending    UA 3/24--negative    ET Sputum 3/24--prelim neg     ENDO:    -FSG q6 if NPO    -Glucose goal 140-180    -if above 180 start ISS    MSK:     - Bedrest     - WBAT RLE per ortho, PT/OT when clinically appropriate    LINES/DRAINS:    A-line left radial (3/22),    PIV x3 (2 20s, 1 18),   Garrison (3/22)  Right IJ TLC (3/23)    ADVANCED DIRECTIVES:  Full Code    HCP/Emergency Contact- Song Segura (Son): [phone]    INDICATION FOR SICU Hypotension with pressor requirements. Intubated.       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   S/P ORIF of right hip using interlocking intramedullary stephon.       NEURO:  #Acute pain    - Tylenol, Gabapentin, fentanyl 25mcg q4 prn  #acute sedation    -precedex    RESP:   #acute respiratory failure secondary to post operative arrythmias requiring mechanical ventilation (Intubated 3/23)  RR (machine): 12, TV (machine): 450, FiO2: 40, PEEP: 6    ABG  #Activity    -increase as tolerated    CARDS:   #acute hypotension    -vaso and sarai  TTE Echo Complete w/o Contrast w/ Doppler (03.23.23 @ 09:37) >   Left ventricular ejection fraction appears normal. Cannot evaluate segmental wall motion.   Mildly increased LV wall thickness.   Sclerotic aortic valve with decreased opening. Cannot rule out aortic tenosis.   Trace mitral valve regurgitation.   Patient is in normal sinus rhythm.    There is no aortic stenosis on the prior study.    GI/NUTR:   #Diet: NPO x/rx    -TF Peptamen goal 50cc/hr  #NGT in place  #Nausea: Zofran prn    - Aspiration precautions, HOB 30  #GI Prophylaxis    - Protonix  #Bowel regimen    - Senna    /RENAL:   #UO: Garrison placed by Urology     - Monitor UO     - Cardura (Flomax at home)    -IVF   #Labs:        BUN/Cr: 24/0.8        Electrolytes-Na 143 // K 3.7 // Mg 2.0 //  Phos 1.3 03-25 @ 20:33- hypokalemia, hypophosphotemia repleted, am BMP/IP pending          HEME/ONC:   #DVT prophylaxis    - SCDs  #H/O CAD s/p stents x3    - Restarted home ASA & Plavix    Labs: Hb/Hct:  9.4/27.8  -->,  8.4/26.0  -->,  7.6/22.8  --> AM CBC pending            Plts:  225  -->,  142  -->,  139  -->              PTT/INR:      T&S Expires 3/28    ID:  #leukocytosis, febrile  WBC- 13.08  --->>,  14.43  --->>,  6.13  --->> 4.4  afebrile   Current antibiotics-piperacillin/tazobactam IVPB.. 3.375 every 8 hours  vancomycin  IVPB 1000 every 12 hours  Cultures:    BCx 3/25--pending    UA 3/24--negative    ET Sputum 3/24--prelim neg     ENDO:    -FSG q6 if NPO    -Glucose goal 140-180    -if above 180 start ISS    MSK:     - Bedrest     - WBAT RLE per ortho, PT/OT when clinically appropriate    LINES/DRAINS:    A-line left radial (3/22),    PIV x3 (2 20s, 1 18),   Garrison (3/22)  Right IJ TLC (3/23)    ADVANCED DIRECTIVES:  Full Code    HCP/Emergency Contact- Song Segura (Son): [phone]    INDICATION FOR SICU Hypotension with pressor requirements. Intubated.       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   S/P ORIF of right hip using interlocking intramedullary stephon.       NEURO:  #Acute pain    - Tylenol, Gabapentin, fentanyl 25mcg q4 prn  #acute sedation    -precedex    RESP:   #acute respiratory failure secondary to post operative arrythmias requiring mechanical ventilation (Intubated 3/23)  RR (machine): 12, TV (machine): 450, FiO2: 40, PEEP: 6  RR (machine): 12, TV (machine): 450, FiO2: 40, PEEP: 6  03-26 @ 09:50--7.49 / 29 / 106 / 22 / 100.0  O2 100.0  Lac 1.10    03-26 @ 03:56--7.43 / 34 / 119 / 23 / 100.0  O2 100.0  Lac 1.00    #Activity    -increase as tolerated    CARDS:   #acute hypotension    -vaso and sarai  TTE Echo Complete w/o Contrast w/ Doppler (03.23.23 @ 09:37) >   Left ventricular ejection fraction appears normal. Cannot evaluate segmental wall motion.   Mildly increased LV wall thickness.   Sclerotic aortic valve with decreased opening. Cannot rule out aortic tenosis.   Trace mitral valve regurgitation.   Patient is in normal sinus rhythm.    There is no aortic stenosis on the prior study.    GI/NUTR:   #Diet: NPO x/rx    -TF Peptamen goal 50cc/hr  #NGT in place  #Nausea: Zofran prn    - Aspiration precautions, HOB 30  #GI Prophylaxis    - Protonix  #Bowel regimen    - Senna    /RENAL:   #UO: Garrison placed by Urology     - Monitor UO     - Cardura (Flomax at home)    -IVF           BUN/Cr- 24/0.8  -->,  24/0.7  -->          Electrolytes-Na 141 // K 4.4 // Mg -- //  Phos 3.1 (03-26 @ 04:27)     HEME/ONC:   #DVT prophylaxis    - SCDs  #H/O CAD s/p stents x3    - Restarted home ASA & Plavix    Labs: Hb/Hct:  7.6/22.8  -->,  7.8/23.4  -->                      Plts:  139  -->,  135  -->                 PTT/INR:      T&S Expires 3/28    ID:  #leukocytosis, febrile  WBC- 6.13  --->>,  4.47  --->>,  4.44  --->>  Temp trend- 24hrs T(F): 96.9 (03-26 @ 14:00), Max: 97 (03-26 @ 00:00)  Current antibiotics-piperacillin/tazobactam IVPB.. 3.375 every 8 hours  vancomycin  IVPB 1000 every 12 hours  Cultures:    BCx 3/25--pending    UA 3/24--negative    ET Sputum 3/24--prelim neg     ENDO:    -FSG q6 if NPO    -Glucose goal 140-180    -if above 180 start ISS    MSK:     - Bedrest     - WBAT RLE per ortho, PT/OT when clinically appropriate    LINES/DRAINS:    A-line left radial (3/22),    PIV x3 (2 20s, 1 18),   Garrison (3/22)  Right IJ TLC (3/23)    ADVANCED DIRECTIVES:  Full Code    HCP/Emergency Contact- Song Yanesrupal (Son): [phone]    INDICATION FOR SICU Hypotension with pressor requirements. Intubated.       DISPO:   SICU

## 2023-03-26 NOTE — PROGRESS NOTE ADULT - SUBJECTIVE AND OBJECTIVE BOX
SUBJECTIVE: Patient seen and examined. Remain intubated in the SICU    OBJECTIVE:  NAD  Vital Signs Last 24 Hrs  T(C): 36.1 (26 Mar 2023 00:00), Max: 36.4 (25 Mar 2023 08:00)  T(F): 97 (26 Mar 2023 00:00), Max: 97.6 (25 Mar 2023 08:00)  HR: 78 (26 Mar 2023 07:00) (56 - 105)  BP: 102/64 (26 Mar 2023 07:00) (90/59 - 153/89)  BP(mean): 77 (26 Mar 2023 07:00) (69 - 115)  RR: 18 (26 Mar 2023 07:00) (11 - 21)  SpO2: 100% (26 Mar 2023 07:00) (100% - 100%)    Parameters below as of 26 Mar 2023 07:00  Patient On (Oxygen Delivery Method): ventilator    O2 Concentration (%): 40    PE:  AAOx2  Intubated, following commands     RLE  Dressing: clean/dry/intact     Sensation: SILT  Motor exam: 5/5 TA/GS/EHL  warm well perfused; capillary refill <3 seconds                         7.8    4.44  )-----------( 135      ( 26 Mar 2023 04:27 )             23.4     03-26    141  |  112<H>  |  24<H>  ----------------------------<  131<H>  4.4   |  20  |  0.7    Ca    8.2<L>      26 Mar 2023 04:27  Phos  3.1     03-26  Mg     2.0     03-25      A/P :  Pt is a 78yo Male with right intertrochanteric hip fracture now s/p ORIF on 3/22/23, POD4.  Intubated and admitted to SICU for hypotension.  Status improving     - DVT ppx per primary  - WBAT RLE  - PT/OT/IS when capable  - SCD's  - Trend HgB, transfuse PRN to maintain > 7  - Rest of care per primary    Beto Obrien MD  Orthopedic Surgery Resident PGY3

## 2023-03-26 NOTE — EEG REPORT - NS EEG TEXT BOX
· EEG Report	  VIDEO EEG  REPORT      RADHA RAMON    77y Male  MRN [id number]      Recording Technique: The patient underwent continuous video-EEG monitoring using Telemetry System hardware on the [employer] Digital System. EEG and video data were stored on a computer hard drive with important events saved in digital archive files. The material was reviewed by a physician (electroencephalographer/epileptologist) on a daily basis. Thomas and seizure detection algorithms were utilized if needed. An EEG technician attended to the patient for 8 to 10 hours per day. The patient was observed by the epilepsy nursing staff 24 hrs per day. The epilepsy center neurologist was available in person or on call 24 hours per day.    Placement and Labeling of Eelectrodes: The EEG was performed using at least 20 channel referential EEG connections (coronal over temporal over parasaggital montage) with inferior temporal electrodes when indicting and using all standard 10-20 electrode placement with EKG, with additional electrodes placed in the inferior temporal region using the modified 10-10 montage electrode placements for elective admissions, or if deemed necessary. Recording was at a sampling rate of 256 samples per second per channel. Time syncronized digital video recording was done simultaneously with EEG recording. A low light infrared camera was used for low light recording.      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 Pt walks with walker and got up in middle of night to get food and slipped and fell on his R side. Pt has been having R hip pain since with inability to ambulate. Pts family finally convinced him to come to hospital today. Pt is having 10/10 pain currently. Denies dizziness, CP or LOC prior to fall. Denies head trauma. No HA. (20 Mar 2023 12:02)      MEDICATIONS  (STANDING):  aspirin  chewable 81 milliGRAM(s) Oral daily  atorvastatin 80 milliGRAM(s) Oral at bedtime  atropine 1% Solution 1 Drop(s) Left EYE two times a day  chlorhexidine 0.12% Liquid 15 milliLiter(s) Oral Mucosa every 12 hours  chlorhexidine 2% Cloths 1 Application(s) Topical <User Schedule>  clopidogrel Tablet 75 milliGRAM(s) Oral daily  dexMEDEtomidine Infusion 0.1 MICROgram(s)/kG/Hr (1.59 mL/Hr) IV Continuous <Continuous>  doxazosin 4 milliGRAM(s) Oral at bedtime  heparin   Injectable 5000 Unit(s) SubCutaneous every 8 hours  hydrocortisone sodium succinate Injectable 50 milliGRAM(s) IV Push every 6 hours  insulin lispro (ADMELOG) corrective regimen sliding scale   SubCutaneous three times a day before meals  lactated ringers. 1000 milliLiter(s) (100 mL/Hr) IV Continuous <Continuous>  pantoprazole  Injectable 40 milliGRAM(s) IV Push daily  phenylephrine    Infusion 0.1 MICROgram(s)/kG/Min (1.19 mL/Hr) IV Continuous <Continuous>  piperacillin/tazobactam IVPB.. 3.375 Gram(s) IV Intermittent every 8 hours  prednisoLONE acetate 1% Suspension 1 Drop(s) Left EYE every 6 hours  senna 2 Tablet(s) Oral at bedtime  vancomycin  IVPB 1000 milliGRAM(s) IV Intermittent every 12 hours  vasopressin Infusion 0.03 Unit(s)/Min (4.5 mL/Hr) IV Continuous <Continuous>  vasopressin Infusion 0.03 Unit(s)/Min (4.5 mL/Hr) IV Continuous <Continuous>    MEDICATIONS  (PRN):  acetaminophen     Tablet .. 650 milliGRAM(s) Oral every 6 hours PRN Temp greater or equal to 38C (100.4F), Mild Pain (1 - 3)  aluminum hydroxide/magnesium hydroxide/simethicone Suspension 30 milliLiter(s) Oral every 4 hours PRN Dyspepsia  fentaNYL    Injectable 25 MICROGram(s) IV Push every 4 hours PRN Severe Pain (7 - 10)  melatonin 3 milliGRAM(s) Oral at bedtime PRN Insomnia  ondansetron Injectable 4 milliGRAM(s) IV Push every 8 hours PRN Nausea and/or Vomiting  sodium chloride 0.9% lock flush 10 milliLiter(s) IV Push every 1 hour PRN Pre/post blood products, medications, blood draw, and to maintain line patency    BACKGROUND  Continuous background with predominantly theta frequencies.   Intermittently, there is an emerging posterior dominant rhythm of 6-7 Hz theta     N2 sleep transients present         GENERALIZED SLOWING   Frequent,   Moderate    FOCAL SLOWING  None    BREACH ARTIFACT  None    ACTIVATION PROCEDURES      None       PERIODIC ACTIVITY    Intermittent GRDA.     INTERICTAL EPILEPTIFORM ACTIVITY    None       EVENTS:   Pushbutton events :   No electrographic /clinical events recorded.      ARTIFACTS:   Intermittent myogenic and movement artifact noted    ECG:   Single channel ECG demonstrated sinus rhythm of 60 - 80 BPM       VEEG IMPRESSION/CLINICAL CORRELATION:     This 24 hour VEEG study was abnormal due to     Moderate generalized slowing and GRDA.       This finding is consistent with moderate  diffuse or multifocal  cortical dysfunction, but is nonspecific as to etiology.      No epileptiform patterns or seizures recorded.           Ana SPARROW  Epilepsy Attending, [facility]  Professor, Neurology and Pediatrics, [employer] at [facility].

## 2023-03-26 NOTE — DISCHARGE NOTE PROVIDER - NSDCHHATTENDCERT_GEN_ALL_CORE
· Baseline hemoglobin between 12-14 -> presented w hgb significantly lower at 8 7 (was 12 4 two months ago)  · Secondary to coffee-ground emesis at home and multiple melanotic stools in the ED in the setting of Eliquis use   · Due to borderline hypotension given IV fluids and transfused a unit of PRBCs and cryoprecipitate in the ED  · Per goals of care discussion on 3/24, patient would not want any blood transfusions or any other life prolonging treatments  Wants to focus on comfort and die a natural death  My signature below certifies that the above stated patient is homebound and upon completion of the Face-To-Face encounter, has the need for intermittent skilled nursing, physical therapy and/or speech or occupational therapy services in their home for their current diagnosis as outlined in their initial plan of care. These services will continue to be monitored by myself or another physician.

## 2023-03-27 NOTE — PROGRESS NOTE ADULT - SUBJECTIVE AND OBJECTIVE BOX
INTERVAL HPI/OVERNIGHT EVENTS:  Patient seen and examined.     MEDICATIONS  (STANDING):  aspirin  chewable 81 milliGRAM(s) Oral daily  atorvastatin 80 milliGRAM(s) Oral at bedtime  atropine 1% Solution 1 Drop(s) Left EYE two times a day  chlorhexidine 0.12% Liquid 15 milliLiter(s) Oral Mucosa every 12 hours  chlorhexidine 2% Cloths 1 Application(s) Topical <User Schedule>  clopidogrel Tablet 75 milliGRAM(s) Oral daily  dexMEDEtomidine Infusion 0.1 MICROgram(s)/kG/Hr (1.59 mL/Hr) IV Continuous <Continuous>  dextrose 5%. 1000 milliLiter(s) (50 mL/Hr) IV Continuous <Continuous>  dextrose 5%. 1000 milliLiter(s) (100 mL/Hr) IV Continuous <Continuous>  dextrose 50% Injectable 12.5 Gram(s) IV Push once  dextrose 50% Injectable 25 Gram(s) IV Push once  dextrose 50% Injectable 25 Gram(s) IV Push once  doxazosin 4 milliGRAM(s) Oral at bedtime  fentaNYL    Injectable 25 MICROGram(s) IV Push once  glucagon  Injectable 1 milliGRAM(s) IntraMuscular once  heparin   Injectable 5000 Unit(s) SubCutaneous every 8 hours  insulin lispro (ADMELOG) corrective regimen sliding scale   SubCutaneous every 6 hours  midodrine 10 milliGRAM(s) Oral every 8 hours  pantoprazole  Injectable 40 milliGRAM(s) IV Push daily  phenylephrine    Infusion 0.1 MICROgram(s)/kG/Min (1.19 mL/Hr) IV Continuous <Continuous>  prednisoLONE acetate 1% Suspension 1 Drop(s) Left EYE every 6 hours  senna 2 Tablet(s) Oral at bedtime    MEDICATIONS  (PRN):  acetaminophen     Tablet .. 650 milliGRAM(s) Oral every 6 hours PRN Temp greater or equal to 38C (100.4F), Mild Pain (1 - 3)  aluminum hydroxide/magnesium hydroxide/simethicone Suspension 30 milliLiter(s) Oral every 4 hours PRN Dyspepsia  dextrose Oral Gel 15 Gram(s) Oral once PRN Blood Glucose LESS THAN 70 milliGRAM(s)/deciliter  fentaNYL    Injectable 25 MICROGram(s) IV Push every 4 hours PRN Severe Pain (7 - 10)  ondansetron Injectable 4 milliGRAM(s) IV Push every 8 hours PRN Nausea and/or Vomiting  sodium chloride 0.9% lock flush 10 milliLiter(s) IV Push every 1 hour PRN Pre/post blood products, medications, blood draw, and to maintain line patency      Allergies    No Known Allergies    Intolerances        Vital Signs Last 24 Hrs  T(C): 36.7 (27 Mar 2023 08:00), Max: 37.6 (26 Mar 2023 23:35)  T(F): 98.1 (27 Mar 2023 08:00), Max: 99.7 (26 Mar 2023 23:35)  HR: 87 (27 Mar 2023 11:00) (73 - 122)  BP: 95/62 (27 Mar 2023 11:00) (95/62 - 155/80)  BP(mean): 70 (27 Mar 2023 11:00) (70 - 112)  RR: 29 (27 Mar 2023 11:00) (21 - 40)  SpO2: 96% (27 Mar 2023 11:00) (96% - 100%)    Parameters below as of 27 Mar 2023 08:00  Patient On (Oxygen Delivery Method): ventilator    O2 Concentration (%): 40    Physical exam: Patient on Precedex  Neurologic:  Mental status: Comatose, does not open eyes to voice or noxious stimuli, does not follow commands or track, mute, patient is tachypneic, shallow breathing  Cranial nerves:   - II: Blink to threat absent  - III, IV, VI: Oculocephalic reflex right eye absent. Pupil: Rt eye: 2mm , not reactive. Left eye: unable to examine  - V:  Corneal reflex intact  - VII: Face appears symmetric  - IX, X: Cough and gag intact  Motor/Sensation: NO Withdrawal of bilateral arms and legs to noxious stimuli.   Reflexes: Planter response: mute B/L      LABS:                        8.6    7.87  )-----------( 194      ( 26 Mar 2023 20:32 )             25.9     03-26    142  |  112<H>  |  29<H>  ----------------------------<  104<H>  4.1   |  21  |  0.8    Ca    8.2<L>      26 Mar 2023 20:32  Phos  1.6     03-26  Mg     2.0     03-26        RADIOLOGY & ADDITIONAL TESTS:  < from: CT Head No Cont (03.24.23 @ 15:44) >  Motion limited study demonstrating no gross evidence of acute   intracranial hemorrhage or large territory infarct.    Video EEG:  Moderate generalized slowing and GRDA.       This finding is consistent with moderate  diffuse or multifocal  cortical dysfunction, but is nonspecific as to etiology.      No epileptiform patterns or seizures recorded.

## 2023-03-27 NOTE — PROGRESS NOTE ADULT - ATTENDING COMMENTS
Orthopedic Attending    Patient seen and examined with resident and/or PA.  All new laboratory work-up and consults reviewed.  All new radiographs were reviewed.   Agree with findings, assessment and plan.  Medical issues predominate currently.  No change in orthopedic plan.

## 2023-03-27 NOTE — PROGRESS NOTE ADULT - SUBJECTIVE AND OBJECTIVE BOX
Orthopaedic Surgery Progress Note    S&E at bedside this AM. Pain well controlled. Denies fever/chills/CP/SOB. No other complaints      T(C): 36.7 (03-27-23 @ 08:00), Max: 37.6 (03-26-23 @ 23:35)  HR: 109 (03-27-23 @ 10:00) (73 - 130)  BP: 117/67 (03-27-23 @ 10:00) (98/65 - 155/80)  RR: 30 (03-27-23 @ 10:00) (21 - 40)  SpO2: 100% (03-27-23 @ 10:00) (93% - 100%)    P/E:  AOx3, NAD  Nonlabored breathing    RLE  Dressing in place, c/d/i  SILT sp/dp/t/sural/saph  Firing ta/ehl/fhl/gs  Foot WWP, 2+ DP pulse    Labs                        8.6    7.87  )-----------( 194      ( 26 Mar 2023 20:32 )             25.9     03-26    142  |  112<H>  |  29<H>  ----------------------------<  104<H>  4.1   |  21  |  0.8    Ca    8.2<L>      26 Mar 2023 20:32  Phos  1.6     03-26  Mg     2.0     03-26        A/P:   76yo Male s/p right hip fracture ORIF POD#4    Weightbearing: WBAT RLE  DVT ppx: SCD, hsq, aspirin, plavix   Abx: completed  Pain control  Home meds  Trend labs  PT/OT/Rehab

## 2023-03-27 NOTE — PROGRESS NOTE ADULT - SUBJECTIVE AND OBJECTIVE BOX
s/p right hip orif pod 5  pt is in icu intubated and sedated   called by sicu team to evaluate right hip wound drainage     Vital Signs Last 24 Hrs  T(C): 36.7 (27 Mar 2023 08:00), Max: 37.6 (26 Mar 2023 23:35)  T(F): 98.1 (27 Mar 2023 08:00), Max: 99.7 (26 Mar 2023 23:35)  HR: 87 (27 Mar 2023 11:00) (73 - 122)  BP: 95/62 (27 Mar 2023 11:00) (95/62 - 155/80)  BP(mean): 70 (27 Mar 2023 11:00) (70 - 112)  RR: 29 (27 Mar 2023 11:00) (23 - 40)  SpO2: 96% (27 Mar 2023 11:00) (96% - 100%)                          8.6    7.87  )-----------( 194      ( 26 Mar 2023 20:32 )             25.9       right hip:   dressing removed staples in place.    mid incision has serous drainage cleaned with betadine, one are i placed 5 staples placed, tolerated well,  drainage is serous dependent edema from fluid resuscitation,  no erythema  new dressing placed, thigh is swollen compressible

## 2023-03-27 NOTE — PROGRESS NOTE ADULT - ASSESSMENT
s/p right hip orif pod 5    pain control   dvt proph  continue current sicu care   fever work up   fu labs   will monitor drainage,   change dressings prn   will follow   any questions or concerns call [phone]

## 2023-03-27 NOTE — PROGRESS NOTE ADULT - ASSESSMENT
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 S/P ORIF of right hip using interlocking intramedullary stephon on 3/22.  s/p Rapid response in the PACU on same day post-operatively due to hypotension and the [facility]. Neurology is evaluating for being unresponsive off sedation. EEG no electrographic seizure. Patient's exam: some of the brainstem reflex intact, also exam is performed ons edation, will re-evaluate off sedation for prognostic purpose.     Recommendations:  - Continue to monitor off Video EEG.  - Repeat CT head w/o contrast  - Wean off sedation as appropriate  - Guarded prognosis

## 2023-03-27 NOTE — PROGRESS NOTE ADULT - ASSESSMENT
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   S/P ORIF of right hip using interlocking intramedullary stephon.       NEURO:  #Acute pain    - Tylenol, Gabapentin, fentanyl 25mcg q4 prn  #acute sedation    -precedex    RESP:   #acute respiratory failure secondary to post operative arrythmias requiring mechanical ventilation (Intubated 3/23)  RR (machine): 12, TV (machine): 450, FiO2: 40, PEEP: 6  #Activity    -increase as tolerated    CARDS:   #acute hypotension    -sarai, (off vaso, tachy w/ levo)  #Labs/Imaging  -Echo 3/23- EF appears normal, mild LVH, sclerotic AoV, trace MR,     GI/NUTR:   #Diet: NPO x/rx    -TF Peptamen goal 50cc/hr  #NGT in place  #Nausea: Zofran prn    - Aspiration precautions, HOB 30  #GI Prophylaxis    - Protonix  #Bowel regimen    - Senna    /RENAL:   #UO: Garrison placed by Urology     - Cardura (Flomax at home)   BUN/Cr- 24/0.7  -->,  29/0.8  -->          Electrolytes-Na 142 // K 4.1 // Mg 2.0 //  Phos 1.6 (03-26 @ 20:32)    HEME/ONC:   #DVT prophylaxis    - SCDs  #H/O CAD s/p stents x3    - ASA & Plavix    T&S Expires 3/28  Labs: Hb/Hct:  7.6/22.8  -->,  7.8/23.4  -->,  8.6/25.9  -->           Plts:  139  -->,  135  -->,  194  -->    ID:  #leukocytosis, febrile  WBC- 6.13  --->>,  4.47  --->>,  4.44  --->>7.8  afebrile   Current antibiotics-piperacillin/tazobactam IVPB.. 3.375 every 8 hours  d/c vanc   Cultures:    BCx 3/25--pending    UA 3/24--negative    ET Sputum 3/24--prelim neg     MRSA neg   ENDO:    -FSG q6 if NPO    -Glucose goal 140-180    -if above 180 start ISS    MSK:     - Bedrest     - WBAT RLE per ortho, PT/OT when clinically appropriate    LINES/DRAINS:    A-line left radial (3/22),    PIV x3 (2 20s, 1 18),   Garrison (3/22)  Right IJ TLC (3/23)  ETT, OGT   ADVANCED DIRECTIVES:  Full Code    HCP/Emergency Contact- Song Segura (Son): [phone]    INDICATION FOR SICU Hypotension with pressor requirements. Intubated.       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   S/P ORIF of right hip using interlocking intramedullary stephon.       NEURO:  #Acute pain    - Tylenol, Gabapentin, fentanyl 25mcg q4 prn  #acute sedation    -precedex    RESP:   #acute respiratory failure secondary to post operative arrythmias requiring mechanical ventilation (Intubated 3/23)  RR (machine): 12, TV (machine): 450, FiO2: 40, PEEP: 6  ABG: ABG - ( 27 Mar 2023 03:08 )  pH, Arterial: 7.44  pH, Blood: x     /  pCO2: 29    /  pO2: 111   / HCO3: 20    / Base Excess: -3.2  /  SaO2: 100.0   CXR:   #Activity    -increase as tolerated    CARDS:   #acute hypotension    -sarai @2.5, (off vaso, tachy w/ levo)  #Labs/Imaging  -Echo 3/23- EF appears normal, mild LVH, sclerotic AoV, trace MR,     GI/NUTR:   #Diet: NPO x/rx    -TF Peptamen goal 50cc/hr  #NGT in place  #Nausea: Zofran prn    - Aspiration precautions, HOB 30  #GI Prophylaxis    - Protonix  #Bowel regimen    - Senna    /RENAL:   #UO: Garrison placed by Urology     - Cardura (Flomax at home)   BUN/Cr- 24/0.7  -->,  29/0.8  -->          Electrolytes-Na 142 // K 4.1 // Mg 2.0 //  Phos 1.6 (03-26 @ 20:32)    HEME/ONC:   #DVT prophylaxis    - SCDs  #H/O CAD s/p stents x3    - ASA & Plavix    T&S Expires 3/28  Labs: Hb/Hct:  7.6/22.8  -->,  7.8/23.4  -->,  8.6/25.9  -->           Plts:  139  -->,  135  -->,  194  -->    ID:  #leukocytosis, febrile  WBC- 6.13  --->>,  4.47  --->>,  4.44  --->>7.8  afebrile   Current antibiotics-piperacillin/tazobactam IVPB.. 3.375 every 8 hours  d/c vanc   Cultures:    BCx 3/25--pending    UA 3/24--negative    ET Sputum 3/24--prelim neg     MRSA neg   ENDO:    -FSG q6 if NPO    -Glucose goal 140-180    -if above 180 start ISS    MSK:     - Bedrest     - WBAT RLE per ortho, PT/OT when clinically appropriate    LINES/DRAINS:    A-line left radial (3/22),    PIV x3 (2 20s, 1 18),   Garrison (3/22)  Right IJ TLC (3/23)  ETT, OGT   ADVANCED DIRECTIVES:  Full Code    HCP/Emergency Contact- Song Segura (Son): [phone]    INDICATION FOR SICU Hypotension with pressor requirements. Intubated.       DISPO:   SICU

## 2023-03-27 NOTE — PROGRESS NOTE ADULT - SUBJECTIVE AND OBJECTIVE BOX
RADHA RAMON     [id number]   [birth year]  77yM      Admit Date/LOS: 03-20 (2d)  Indication for SDU/SICU: Hypotension with pressor requirements. Intubated.        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 Pt walks with walker and got up in middle of night to get food and slipped and fell on his R side. Pt has been having R hip pain since with inability to ambulate. Pts family finally convinced him to come to hospital today. Pt is having 10/10 pain currently. Denies dizziness, CP or LOC prior to fall. Denies head trauma. No HA. (20 Mar 2023 12:02)    3/22: Patient S/P ORIF of right hip using interlocking intramedullary stephon.     Procedure:  OR Stats  OR Time:    EBL: 75  IV Fluids: 2700  Blood Products: None  UOP: Not noted. No blair placed in OR  Findings- Displaced intertrochanteric fracture appreciated. 11mm x 200mm 130, lag screw 10.5 x 105mm    SICU consulted for hypotension requiring pressors.    Patient was a rapid response in the PACU post-operatively due to hypotension. Patient examined bedside and was responding to verbal stimuli. Albumin and fluid bolus given. Labs drawn off A-line and Urbano started. NGT placed by SICU. Blair placed by urology due to difficulty passing Blair in setting of patient with prostate CA and BPH.    24Hours  3/26  NIGHT  urbano requirement incr, precedex 1.5  incr midodrine 10mg q8  acutely agitated, overbreathing vent, tachy 120, EKG sinus tachy, giving fent     DAY  -back on vaso  -start midodrine 5q8  -temp 95-->hypothermia blanket   -BAL rare gram neg, rare gram pos rods  -IVL   -FS 200s  -reattempt SAT/SBT   -MRSA PCR negative           [] A ten-point review of systems was otherwise negative except as noted above.  [x] Due to altered mental status/intubation, subjective information was not attained from the patient. History was obtained, to the extent possible, from review of the chart and collateral sources of information.    =========================================================================================================================================       RADHA RAMON     [id number]   [birth year]  77yM      Admit Date/LOS: 03-20 (2d)  Indication for SDU/SICU: Hypotension with pressor requirements. Intubated.        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 Pt walks with walker and got up in middle of night to get food and slipped and fell on his R side. Pt has been having R hip pain since with inability to ambulate. Pts family finally convinced him to come to hospital today. Pt is having 10/10 pain currently. Denies dizziness, CP or LOC prior to fall. Denies head trauma. No HA. (20 Mar 2023 12:02)    3/22: Patient S/P ORIF of right hip using interlocking intramedullary stephon.     Procedure:  OR Stats  OR Time:    EBL: 75  IV Fluids: 2700  Blood Products: None  UOP: Not noted. No blair placed in OR  Findings- Displaced intertrochanteric fracture appreciated. 11mm x 200mm 130, lag screw 10.5 x 105mm    SICU consulted for hypotension requiring pressors.    Patient was a rapid response in the PACU post-operatively due to hypotension. Patient examined bedside and was responding to verbal stimuli. Albumin and fluid bolus given. Labs drawn off A-line and Urbano started. NGT placed by SICU. Blair placed by urology due to difficulty passing Blair in setting of patient with prostate CA and BPH.    24Hours  3/26  NIGHT  urbano requirement incr, precedex 1.5  incr midodrine 10mg q8  acutely agitated, overbreathing vent, tachy 120, EKG sinus tachy, giving fent     DAY  -back on vaso  -start midodrine 5q8  -temp 95-->hypothermia blanket   -BAL rare gram neg, rare gram pos rods  -IVL   -FS 200s  -reattempt SAT/SBT   -MRSA PCR negative           [] A ten-point review of systems was otherwise negative except as noted above.  [x] Due to altered mental status/intubation, subjective information was not attained from the patient. History was obtained, to the extent possible, from review of the chart and collateral sources of information.    =========================================================================================================================================  Daily     Daily   Diet, NPO with Tube Feed:   Tube Feeding Modality: Orogastric  Peptamen A.F. Formula  Total Volume for 24 Hours (mL): 1200  Continuous  Starting Tube Feed Rate mL per Hour: 20  Increase Tube Feed Rate by (mL): 20  Until Goal Tube Feed Rate (mL per Hour): 50  Tube Feed Duration (in Hours): 24  Tube Feed Start Time: 21:00 (03-25-23 @ 19:55)    CURRENT MEDS:  Neurologic Medications  acetaminophen     Tablet .. 650 milliGRAM(s) Oral every 6 hours PRN Temp greater or equal to 38C (100.4F), Mild Pain (1 - 3)  dexMEDEtomidine Infusion 0.1 MICROgram(s)/kG/Hr IV Continuous <Continuous>  fentaNYL    Injectable 25 MICROGram(s) IV Push every 4 hours PRN Severe Pain (7 - 10)  ondansetron Injectable 4 milliGRAM(s) IV Push every 8 hours PRN Nausea and/or Vomiting    Respiratory Medications    Cardiovascular Medications  doxazosin 4 milliGRAM(s) Oral at bedtime  midodrine 10 milliGRAM(s) Oral every 8 hours  phenylephrine    Infusion 0.1 MICROgram(s)/kG/Min IV Continuous <Continuous>    Gastrointestinal Medications  aluminum hydroxide/magnesium hydroxide/simethicone Suspension 30 milliLiter(s) Oral every 4 hours PRN Dyspepsia  dextrose 5%. 1000 milliLiter(s) IV Continuous <Continuous>  dextrose 5%. 1000 milliLiter(s) IV Continuous <Continuous>  pantoprazole  Injectable 40 milliGRAM(s) IV Push daily  senna 2 Tablet(s) Oral at bedtime  sodium chloride 0.9% lock flush 10 milliLiter(s) IV Push every 1 hour PRN Pre/post blood products, medications, blood draw, and to maintain line patency    Genitourinary Medications    Hematologic/Oncologic Medications  aspirin  chewable 81 milliGRAM(s) Oral daily  clopidogrel Tablet 75 milliGRAM(s) Oral daily  heparin   Injectable 5000 Unit(s) SubCutaneous every 8 hours    Antimicrobial/Immunologic Medications  piperacillin/tazobactam IVPB.. 3.375 Gram(s) IV Intermittent every 8 hours    Endocrine/Metabolic Medications  atorvastatin 80 milliGRAM(s) Oral at bedtime  dextrose 50% Injectable 12.5 Gram(s) IV Push once  dextrose 50% Injectable 25 Gram(s) IV Push once  dextrose 50% Injectable 25 Gram(s) IV Push once  dextrose Oral Gel 15 Gram(s) Oral once PRN Blood Glucose LESS THAN 70 milliGRAM(s)/deciliter  glucagon  Injectable 1 milliGRAM(s) IntraMuscular once  insulin lispro (ADMELOG) corrective regimen sliding scale   SubCutaneous every 6 hours    Topical/Other Medications  atropine 1% Solution 1 Drop(s) Left EYE two times a day  chlorhexidine 0.12% Liquid 15 milliLiter(s) Oral Mucosa every 12 hours  chlorhexidine 2% Cloths 1 Application(s) Topical <User Schedule>  prednisoLONE acetate 1% Suspension 1 Drop(s) Left EYE every 6 hours    ICU Vital Signs Last 24 Hrs  T(C): 37.4 (27 Mar 2023 04:00), Max: 37.6 (26 Mar 2023 23:35)  T(F): 99.4 (27 Mar 2023 04:00), Max: 99.7 (26 Mar 2023 23:35)  HR: 104 (27 Mar 2023 07:00) (66 - 138)  BP: 115/63 (27 Mar 2023 07:00) (98/65 - 155/80)  BP(mean): 84 (27 Mar 2023 07:00) (76 - 112)  ABP: 135/41 (27 Mar 2023 07:00) (97/49 - 182/66)  ABP(mean): 64 (27 Mar 2023 07:00) (60 - 106)  RR: 34 (27 Mar 2023 07:00) (14 - 40)  SpO2: 100% (27 Mar 2023 07:00) (89% - 100%)    O2 Parameters below as of 27 Mar 2023 07:00  Patient On (Oxygen Delivery Method): ventilator    O2 Concentration (%): 40      Mode: AC/ CMV (Assist Control/ Continuous Mandatory Ventilation)  RR (machine): 12  TV (machine): 450  FiO2: 40  PEEP: 6  ITime: 0.9  MAP: 24  PIP: 43    ABG - ( 27 Mar 2023 03:08 )  pH, Arterial: 7.44  pH, Blood: x     /  pCO2: 29    /  pO2: 111   / HCO3: 20    / Base Excess: -3.2  /  SaO2: 100.0             I&O's Summary    26 Mar 2023 07:01  -  27 Mar 2023 07:00  --------------------------------------------------------  IN: 3547.2 mL / OUT: 1125 mL / NET: 2422.2 mL      I&O's Detail    26 Mar 2023 07:01  -  27 Mar 2023 07:00  --------------------------------------------------------  IN:    Dexmedetomidine: 502.2 mL    Enteral Tube Flush: 90 mL    IV PiggyBack: 300 mL    IV PiggyBack: 500 mL    Lactated Ringers: 400 mL    Peptamen A.F.: 1200 mL    Phenylephrine: 541.5 mL    Vasopressin: 4.5 mL    Vasopressin: 9 mL  Total IN: 3547.2 mL    OUT:    Indwelling Catheter - Urethral (mL): 1125 mL  Total OUT: 1125 mL    Total NET: 2422.2 mL          PHYSICAL EXAM:                RADHA RAMON     [id number]   [birth year]  77yM      Admit Date/LOS: 03-20 (2d)  Indication for SDU/SICU: Hypotension with pressor requirements. Intubated.        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 Pt walks with walker and got up in middle of night to get food and slipped and fell on his R side. Pt has been having R hip pain since with inability to ambulate. Pts family finally convinced him to come to hospital today. Pt is having 10/10 pain currently. Denies dizziness, CP or LOC prior to fall. Denies head trauma. No HA. (20 Mar 2023 12:02)    3/22: Patient S/P ORIF of right hip using interlocking intramedullary stephon.     Procedure:  OR Stats  OR Time:    EBL: 75  IV Fluids: 2700  Blood Products: None  UOP: Not noted. No blair placed in OR  Findings- Displaced intertrochanteric fracture appreciated. 11mm x 200mm 130, lag screw 10.5 x 105mm    SICU consulted for hypotension requiring pressors.    Patient was a rapid response in the PACU post-operatively due to hypotension. Patient examined bedside and was responding to verbal stimuli. Albumin and fluid bolus given. Labs drawn off A-line and Urbano started. NGT placed by SICU. Blair placed by urology due to difficulty passing Blair in setting of patient with prostate CA and BPH.    24Hours  3/26  NIGHT  urbano requirement incr, precedex 1.5  incr midodrine 10mg q8  acutely agitated, overbreathing vent, tachy 120, EKG sinus tachy, giving fent     DAY  -back on vaso  -start midodrine 5q8  -temp 95-->hypothermia blanket   -BAL rare gram neg, rare gram pos rods  -IVL   -FS 200s  -reattempt SAT/SBT   -MRSA PCR negative           [] A ten-point review of systems was otherwise negative except as noted above.  [x] Due to altered mental status/intubation, subjective information was not attained from the patient. History was obtained, to the extent possible, from review of the chart and collateral sources of information.    =========================================================================================================================================  Daily     Daily   Diet, NPO with Tube Feed:   Tube Feeding Modality: Orogastric  Peptamen A.F. Formula  Total Volume for 24 Hours (mL): 1200  Continuous  Starting Tube Feed Rate mL per Hour: 20  Increase Tube Feed Rate by (mL): 20  Until Goal Tube Feed Rate (mL per Hour): 50  Tube Feed Duration (in Hours): 24  Tube Feed Start Time: 21:00 (03-25-23 @ 19:55)    CURRENT MEDS:  Neurologic Medications  acetaminophen     Tablet .. 650 milliGRAM(s) Oral every 6 hours PRN Temp greater or equal to 38C (100.4F), Mild Pain (1 - 3)  dexMEDEtomidine Infusion 0.1 MICROgram(s)/kG/Hr IV Continuous <Continuous>  fentaNYL    Injectable 25 MICROGram(s) IV Push every 4 hours PRN Severe Pain (7 - 10)  ondansetron Injectable 4 milliGRAM(s) IV Push every 8 hours PRN Nausea and/or Vomiting    Respiratory Medications    Cardiovascular Medications  doxazosin 4 milliGRAM(s) Oral at bedtime  midodrine 10 milliGRAM(s) Oral every 8 hours  phenylephrine    Infusion 0.1 MICROgram(s)/kG/Min IV Continuous <Continuous>    Gastrointestinal Medications  aluminum hydroxide/magnesium hydroxide/simethicone Suspension 30 milliLiter(s) Oral every 4 hours PRN Dyspepsia  dextrose 5%. 1000 milliLiter(s) IV Continuous <Continuous>  dextrose 5%. 1000 milliLiter(s) IV Continuous <Continuous>  pantoprazole  Injectable 40 milliGRAM(s) IV Push daily  senna 2 Tablet(s) Oral at bedtime  sodium chloride 0.9% lock flush 10 milliLiter(s) IV Push every 1 hour PRN Pre/post blood products, medications, blood draw, and to maintain line patency    Genitourinary Medications    Hematologic/Oncologic Medications  aspirin  chewable 81 milliGRAM(s) Oral daily  clopidogrel Tablet 75 milliGRAM(s) Oral daily  heparin   Injectable 5000 Unit(s) SubCutaneous every 8 hours    Antimicrobial/Immunologic Medications  piperacillin/tazobactam IVPB.. 3.375 Gram(s) IV Intermittent every 8 hours    Endocrine/Metabolic Medications  atorvastatin 80 milliGRAM(s) Oral at bedtime  dextrose 50% Injectable 12.5 Gram(s) IV Push once  dextrose 50% Injectable 25 Gram(s) IV Push once  dextrose 50% Injectable 25 Gram(s) IV Push once  dextrose Oral Gel 15 Gram(s) Oral once PRN Blood Glucose LESS THAN 70 milliGRAM(s)/deciliter  glucagon  Injectable 1 milliGRAM(s) IntraMuscular once  insulin lispro (ADMELOG) corrective regimen sliding scale   SubCutaneous every 6 hours    Topical/Other Medications  atropine 1% Solution 1 Drop(s) Left EYE two times a day  chlorhexidine 0.12% Liquid 15 milliLiter(s) Oral Mucosa every 12 hours  chlorhexidine 2% Cloths 1 Application(s) Topical <User Schedule>  prednisoLONE acetate 1% Suspension 1 Drop(s) Left EYE every 6 hours    ICU Vital Signs Last 24 Hrs  T(C): 37.4 (27 Mar 2023 04:00), Max: 37.6 (26 Mar 2023 23:35)  T(F): 99.4 (27 Mar 2023 04:00), Max: 99.7 (26 Mar 2023 23:35)  HR: 104 (27 Mar 2023 07:00) (66 - 138)  BP: 115/63 (27 Mar 2023 07:00) (98/65 - 155/80)  BP(mean): 84 (27 Mar 2023 07:00) (76 - 112)  ABP: 135/41 (27 Mar 2023 07:00) (97/49 - 182/66)  ABP(mean): 64 (27 Mar 2023 07:00) (60 - 106)  RR: 34 (27 Mar 2023 07:00) (14 - 40)  SpO2: 100% (27 Mar 2023 07:00) (89% - 100%)    O2 Parameters below as of 27 Mar 2023 07:00  Patient On (Oxygen Delivery Method): ventilator    O2 Concentration (%): 40      Mode: AC/ CMV (Assist Control/ Continuous Mandatory Ventilation)  RR (machine): 12  TV (machine): 450  FiO2: 40  PEEP: 6  ITime: 0.9  MAP: 24  PIP: 43    ABG - ( 27 Mar 2023 03:08 )  pH, Arterial: 7.44  pH, Blood: x     /  pCO2: 29    /  pO2: 111   / HCO3: 20    / Base Excess: -3.2  /  SaO2: 100.0             I&O's Summary    26 Mar 2023 07:01  -  27 Mar 2023 07:00  --------------------------------------------------------  IN: 3547.2 mL / OUT: 1125 mL / NET: 2422.2 mL      I&O's Detail    26 Mar 2023 07:01  -  27 Mar 2023 07:00  --------------------------------------------------------  IN:    Dexmedetomidine: 502.2 mL    Enteral Tube Flush: 90 mL    IV PiggyBack: 300 mL    IV PiggyBack: 500 mL    Lactated Ringers: 400 mL    Peptamen A.F.: 1200 mL    Phenylephrine: 541.5 mL    Vasopressin: 4.5 mL    Vasopressin: 9 mL  Total IN: 3547.2 mL    OUT:    Indwelling Catheter - Urethral (mL): 1125 mL  Total OUT: 1125 mL    Total NET: 2422.2 mL      PHYSICAL EXAM:   Neuro: GCS 11T, sedated, right pupil reactive to light, left eye covered, MAEx4   Resp: Intubated 450/12/40/6, CTAB   Cards: S1, S2. Tachycardic   GI: Abdomen soft, nontender, nondistended   MSK: Right hip incision site c/d/i, thigh compartments soft bilaterally   : Blair in place

## 2023-03-28 NOTE — PROGRESS NOTE ADULT - SUBJECTIVE AND OBJECTIVE BOX
RADHA RAMON     [id number]   [birth year]  77yM      Admit Date/LOS: 03-20 (2d)  Indication for SDU/SICU: Hypotension with pressor requirements. Intubated.        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 Pt walks with walker and got up in middle of night to get food and slipped and fell on his R side. Pt has been having R hip pain since with inability to ambulate. Pts family finally convinced him to come to hospital today. Pt is having 10/10 pain currently. Denies dizziness, CP or LOC prior to fall. Denies head trauma. No HA. (20 Mar 2023 12:02)    3/22: Patient S/P ORIF of right hip using interlocking intramedullary stephon.     Procedure:  OR Stats  OR Time:    EBL: 75  IV Fluids: 2700  Blood Products: None  UOP: Not noted. No blair placed in OR  Findings- Displaced intertrochanteric fracture appreciated. 11mm x 200mm 130, lag screw 10.5 x 105mm    SICU consulted for hypotension requiring pressors.    Patient was a rapid response in the PACU post-operatively due to hypotension. Patient examined bedside and was responding to verbal stimuli. Albumin and fluid bolus given. Labs drawn off A-line and Urbano started. NGT placed by SICU. Blair placed by urology due to difficulty passing Blair in setting of patient with prostate CA and BPH.    24Hours  3/27  NIGHT  urbano 2, precedex 1.5, fentayl 2.5  ph 7.39, pco2 35, po2 170, bicarb 21   fio2 dec 60->50  versed 1mg x 1   tylenol ATC for fevers, cooling blanket   became acutely hypotensive SBP 40s, damaris to 48, pads on, increased urbano, added vaso, levo, bolused 500cc, 2cc phenylephrine push    3/27  DAY  -WBC downtrending, dc Zosyn   --SAT today --> became tachypneic and hypotensive  -attempting SBT today; unable to SBT due to desat to 75  -fent added due to overbreathing vent   -temp 102--> tylenol, abx restarted   DC solumedrol        [] A ten-point review of systems was otherwise negative except as noted above.  [x] Due to altered mental status/intubation, subjective information was not attained from the patient. History was obtained, to the extent possible, from review of the chart and collateral sources of information.    =========================================================================================================================================       RADHA RAMON     [id number]   [birth year]  77yM      Admit Date/LOS: 03-20 (2d)  Indication for SDU/SICU: Hypotension with pressor requirements. Intubated.        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 Pt walks with walker and got up in middle of night to get food and slipped and fell on his R side. Pt has been having R hip pain since with inability to ambulate. Pts family finally convinced him to come to hospital today. Pt is having 10/10 pain currently. Denies dizziness, CP or LOC prior to fall. Denies head trauma. No HA. (20 Mar 2023 12:02)    3/22: Patient S/P ORIF of right hip using interlocking intramedullary stephon.     Procedure:  OR Stats  OR Time:    EBL: 75  IV Fluids: 2700  Blood Products: None  UOP: Not noted. No blair placed in OR  Findings- Displaced intertrochanteric fracture appreciated. 11mm x 200mm 130, lag screw 10.5 x 105mm    SICU consulted for hypotension requiring pressors.    Patient was a rapid response in the PACU post-operatively due to hypotension. Patient examined bedside and was responding to verbal stimuli. Albumin and fluid bolus given. Labs drawn off A-line and Urbano started. NGT placed by SICU. Blair placed by urology due to difficulty passing Blair in setting of patient with prostate CA and BPH.    24Hours  3/27  NIGHT  urbano 2, precedex 1.5, fentayl 2.5  ph 7.39, pco2 35, po2 170, bicarb 21   fio2 dec 60->50  versed 1mg x 1   tylenol ATC for fevers, cooling blanket   became acutely hypotensive SBP 40s, damaris to 48, pads on, increased urbano, added vaso, levo, bolused 500cc, 2cc phenylephrine push    3/27  DAY  -WBC downtrending, dc Zosyn   --SAT today --> became tachypneic and hypotensive  -attempting SBT today; unable to SBT due to desat to 75  -fent added due to overbreathing vent   -temp 102--> tylenol, abx restarted   DC solumedrol        [] A ten-point review of systems was otherwise negative except as noted above.  [x] Due to altered mental status/intubation, subjective information was not attained from the patient. History was obtained, to the extent possible, from review of the chart and collateral sources of information.    =========================================================================================================================================  Daily     Daily   Diet, NPO with Tube Feed:   Tube Feeding Modality: Orogastric  Peptamen A.F. Formula  Total Volume for 24 Hours (mL): 1200  Continuous  Starting Tube Feed Rate mL per Hour: 20  Increase Tube Feed Rate by (mL): 20  Until Goal Tube Feed Rate (mL per Hour): 50  Tube Feed Duration (in Hours): 24  Tube Feed Start Time: 21:00 (03-25-23 @ 19:55)    CURRENT MEDS:  Neurologic Medications  acetaminophen     Tablet .. 650 milliGRAM(s) Oral every 6 hours  dexMEDEtomidine Infusion 0.1 MICROgram(s)/kG/Hr IV Continuous <Continuous>  fentaNYL    Injectable 25 MICROGram(s) IV Push every 4 hours PRN Severe Pain (7 - 10)  ondansetron Injectable 4 milliGRAM(s) IV Push every 8 hours PRN Nausea and/or Vomiting    Respiratory Medications    Cardiovascular Medications  doxazosin 4 milliGRAM(s) Oral at bedtime  midodrine 10 milliGRAM(s) Oral every 8 hours  phenylephrine    Infusion 0.1 MICROgram(s)/kG/Min IV Continuous <Continuous>    Gastrointestinal Medications  aluminum hydroxide/magnesium hydroxide/simethicone Suspension 30 milliLiter(s) Oral every 4 hours PRN Dyspepsia  dextrose 5%. 1000 milliLiter(s) IV Continuous <Continuous>  dextrose 5%. 1000 milliLiter(s) IV Continuous <Continuous>  lactated ringers Bolus 500 milliLiter(s) IV Bolus once  lactated ringers Bolus 500 milliLiter(s) IV Bolus once  pantoprazole  Injectable 40 milliGRAM(s) IV Push daily  senna 2 Tablet(s) Oral at bedtime  sodium chloride 0.9% lock flush 10 milliLiter(s) IV Push every 1 hour PRN Pre/post blood products, medications, blood draw, and to maintain line patency  sodium phosphate 15 milliMole(s)/250 mL IVPB 15 milliMole(s) IV Intermittent once    Genitourinary Medications    Hematologic/Oncologic Medications  aspirin  chewable 81 milliGRAM(s) Oral daily  clopidogrel Tablet 75 milliGRAM(s) Oral daily  heparin   Injectable 5000 Unit(s) SubCutaneous every 8 hours    Antimicrobial/Immunologic Medications    Endocrine/Metabolic Medications  atorvastatin 80 milliGRAM(s) Oral at bedtime  dextrose 50% Injectable 25 Gram(s) IV Push once  dextrose 50% Injectable 25 Gram(s) IV Push once  dextrose 50% Injectable 12.5 Gram(s) IV Push once  dextrose Oral Gel 15 Gram(s) Oral once PRN Blood Glucose LESS THAN 70 milliGRAM(s)/deciliter  glucagon  Injectable 1 milliGRAM(s) IntraMuscular once  insulin lispro (ADMELOG) corrective regimen sliding scale   SubCutaneous every 6 hours  vasopressin Infusion 0.03 Unit(s)/Min IV Continuous <Continuous>    Topical/Other Medications  atropine 1% Solution 1 Drop(s) Left EYE two times a day  chlorhexidine 0.12% Liquid 15 milliLiter(s) Oral Mucosa every 12 hours  chlorhexidine 2% Cloths 1 Application(s) Topical <User Schedule>  prednisoLONE acetate 1% Suspension 1 Drop(s) Left EYE every 6 hours    ICU Vital Signs Last 24 Hrs  T(C): 36.7 (27 Mar 2023 08:00), Max: 36.7 (27 Mar 2023 08:00)  T(F): 98.1 (27 Mar 2023 08:00), Max: 98.1 (27 Mar 2023 08:00)  HR: 85 (27 Mar 2023 21:52) (85 - 110)  BP: 95/62 (27 Mar 2023 11:00) (95/62 - 121/56)  BP(mean): 70 (27 Mar 2023 11:00) (70 - 88)  ABP: 139/49 (27 Mar 2023 11:00) (139/49 - 155/48)  ABP(mean): 65 (27 Mar 2023 11:00) (65 - 71)  RR: 29 (27 Mar 2023 11:00) (29 - 33)  SpO2: 100% (27 Mar 2023 21:52) (96% - 100%)    O2 Parameters below as of 27 Mar 2023 08:00  Patient On (Oxygen Delivery Method): ventilator    O2 Concentration (%): 40      Mode: AC/ CMV (Assist Control/ Continuous Mandatory Ventilation)  RR (machine): 12  TV (machine): 450  FiO2: 40  PEEP: 6  ITime: 1  MAP: 11  PIP: 17    ABG - ( 27 Mar 2023 12:07 )  pH, Arterial: 7.38  pH, Blood: x     /  pCO2: 34    /  pO2: 346   / HCO3: 20    / Base Excess: -4.5  /  SaO2: 99.4              I&O's Summary    27 Mar 2023 07:01  -  28 Mar 2023 07:00  --------------------------------------------------------  IN: 264 mL / OUT: 150 mL / NET: 114 mL      I&O's Detail    27 Mar 2023 07:01  -  28 Mar 2023 07:00  --------------------------------------------------------  IN:    Dexmedetomidine: 24 mL    Peptamen A.F.: 150 mL    Phenylephrine: 90 mL  Total IN: 264 mL    OUT:    Indwelling Catheter - Urethral (mL): 150 mL  Total OUT: 150 mL    Total NET: 114 mL          PHYSICAL EXAM:   Neuro: GCS 11T, sedated, right pupil reactive to light, left eye covered, MAEx4   Resp: Intubated 450/12/40/6, CTAB   Cards: S1, S2. RRR  GI: Abdomen soft, nontender, nondistended   MSK: Right hip incision site c/d/i, thigh compartments soft bilaterally   : Blair in place             RADHA RAMON     [id number]   [birth year]  77yM      Admit Date/LOS: 03-20 (2d)  Indication for SDU/SICU: Hypotension with pressor requirements. Intubated.        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 Pt walks with walker and got up in middle of night to get food and slipped and fell on his R side. Pt has been having R hip pain since with inability to ambulate. Pts family finally convinced him to come to hospital today. Pt is having 10/10 pain currently. Denies dizziness, CP or LOC prior to fall. Denies head trauma. No HA. (20 Mar 2023 12:02)    3/22: Patient S/P ORIF of right hip using interlocking intramedullary stephon.     Procedure:  OR Stats  OR Time:    EBL: 75  IV Fluids: 2700  Blood Products: None  UOP: Not noted. No blair placed in OR  Findings- Displaced intertrochanteric fracture appreciated. 11mm x 200mm 130, lag screw 10.5 x 105mm    SICU consulted for hypotension requiring pressors.    Patient was a rapid response in the PACU post-operatively due to hypotension. Patient examined bedside and was responding to verbal stimuli. Albumin and fluid bolus given. Labs drawn off A-line and Urbano started. NGT placed by SICU. Blair placed by urology due to difficulty passing Blair in setting of patient with prostate CA and BPH.    24Hours  3/27  NIGHT  urbano 2, precedex 1.5, fentayl 2.5  ph 7.39, pco2 35, po2 170, bicarb 21   fio2 dec 60->50  versed 1mg x 1   tylenol ATC for fevers, cooling blanket   became acutely hypotensive SBP 40s, damaris to 48, pads on, increased urbano, added vaso, levo, bolused 500cc, 2cc phenylephrine push    3/27  DAY  -WBC downtrending, dc Zosyn   --SAT today --> became tachypneic and hypotensive  -attempting SBT today; unable to SBT due to desat to 75  -fent added due to overbreathing vent   -temp 102--> tylenol, abx restarted   DC solumedrol        [] A ten-point review of systems was otherwise negative except as noted above.  [x] Due to altered mental status/intubation, subjective information was not attained from the patient. History was obtained, to the extent possible, from review of the chart and collateral sources of information.    =========================================================================================================================================  Daily     Daily   Diet, NPO with Tube Feed:   Tube Feeding Modality: Orogastric  Peptamen A.F. Formula  Total Volume for 24 Hours (mL): 1200  Continuous  Starting Tube Feed Rate mL per Hour: 20  Increase Tube Feed Rate by (mL): 20  Until Goal Tube Feed Rate (mL per Hour): 50  Tube Feed Duration (in Hours): 24  Tube Feed Start Time: 21:00 (03-25-23 @ 19:55)    CURRENT MEDS:  Neurologic Medications  acetaminophen     Tablet .. 650 milliGRAM(s) Oral every 6 hours  dexMEDEtomidine Infusion 0.1 MICROgram(s)/kG/Hr IV Continuous <Continuous>  fentaNYL    Injectable 25 MICROGram(s) IV Push every 4 hours PRN Severe Pain (7 - 10)  ondansetron Injectable 4 milliGRAM(s) IV Push every 8 hours PRN Nausea and/or Vomiting    Respiratory Medications    Cardiovascular Medications  doxazosin 4 milliGRAM(s) Oral at bedtime  midodrine 10 milliGRAM(s) Oral every 8 hours  phenylephrine    Infusion 0.1 MICROgram(s)/kG/Min IV Continuous <Continuous>    Gastrointestinal Medications  aluminum hydroxide/magnesium hydroxide/simethicone Suspension 30 milliLiter(s) Oral every 4 hours PRN Dyspepsia  dextrose 5%. 1000 milliLiter(s) IV Continuous <Continuous>  dextrose 5%. 1000 milliLiter(s) IV Continuous <Continuous>  lactated ringers Bolus 500 milliLiter(s) IV Bolus once  lactated ringers Bolus 500 milliLiter(s) IV Bolus once  pantoprazole  Injectable 40 milliGRAM(s) IV Push daily  senna 2 Tablet(s) Oral at bedtime  sodium chloride 0.9% lock flush 10 milliLiter(s) IV Push every 1 hour PRN Pre/post blood products, medications, blood draw, and to maintain line patency  sodium phosphate 15 milliMole(s)/250 mL IVPB 15 milliMole(s) IV Intermittent once    Genitourinary Medications    Hematologic/Oncologic Medications  aspirin  chewable 81 milliGRAM(s) Oral daily  clopidogrel Tablet 75 milliGRAM(s) Oral daily  heparin   Injectable 5000 Unit(s) SubCutaneous every 8 hours    Antimicrobial/Immunologic Medications    Endocrine/Metabolic Medications  atorvastatin 80 milliGRAM(s) Oral at bedtime  dextrose 50% Injectable 25 Gram(s) IV Push once  dextrose 50% Injectable 25 Gram(s) IV Push once  dextrose 50% Injectable 12.5 Gram(s) IV Push once  dextrose Oral Gel 15 Gram(s) Oral once PRN Blood Glucose LESS THAN 70 milliGRAM(s)/deciliter  glucagon  Injectable 1 milliGRAM(s) IntraMuscular once  insulin lispro (ADMELOG) corrective regimen sliding scale   SubCutaneous every 6 hours  vasopressin Infusion 0.03 Unit(s)/Min IV Continuous <Continuous>    Topical/Other Medications  atropine 1% Solution 1 Drop(s) Left EYE two times a day  chlorhexidine 0.12% Liquid 15 milliLiter(s) Oral Mucosa every 12 hours  chlorhexidine 2% Cloths 1 Application(s) Topical <User Schedule>  prednisoLONE acetate 1% Suspension 1 Drop(s) Left EYE every 6 hours    ICU Vital Signs Last 24 Hrs  T(C): 36.7 (27 Mar 2023 08:00), Max: 36.7 (27 Mar 2023 08:00)  T(F): 98.1 (27 Mar 2023 08:00), Max: 98.1 (27 Mar 2023 08:00)  HR: 85 (27 Mar 2023 21:52) (85 - 110)  BP: 95/62 (27 Mar 2023 11:00) (95/62 - 121/56)  BP(mean): 70 (27 Mar 2023 11:00) (70 - 88)  ABP: 139/49 (27 Mar 2023 11:00) (139/49 - 155/48)  ABP(mean): 65 (27 Mar 2023 11:00) (65 - 71)  RR: 29 (27 Mar 2023 11:00) (29 - 33)  SpO2: 100% (27 Mar 2023 21:52) (96% - 100%)    O2 Parameters below as of 27 Mar 2023 08:00  Patient On (Oxygen Delivery Method): ventilator    O2 Concentration (%): 40      Mode: AC/ CMV (Assist Control/ Continuous Mandatory Ventilation)  RR (machine): 12  TV (machine): 450  FiO2: 40  PEEP: 6  ITime: 1  MAP: 11  PIP: 17    ABG - ( 27 Mar 2023 12:07 )  pH, Arterial: 7.38  pH, Blood: x     /  pCO2: 34    /  pO2: 346   / HCO3: 20    / Base Excess: -4.5  /  SaO2: 99.4              I&O's Summary    27 Mar 2023 07:01  -  28 Mar 2023 07:00  --------------------------------------------------------  IN: 264 mL / OUT: 150 mL / NET: 114 mL      I&O's Detail    27 Mar 2023 07:01  -  28 Mar 2023 07:00  --------------------------------------------------------  IN:    Dexmedetomidine: 24 mL    Peptamen A.F.: 150 mL    Phenylephrine: 90 mL  Total IN: 264 mL    OUT:    Indwelling Catheter - Urethral (mL): 150 mL  Total OUT: 150 mL    Total NET: 114 mL          PHYSICAL EXAM:   Neuro: GCS 11T, not following commands   Resp: Intubated 450/12/40/6, overbreathing the vent   Cards: S1, S2. Tachycardic   GI: Abdomen soft, nontender, nondistended   MSK: Right hip incision site c/d/i, thigh compartments soft bilaterally   : Blair in place

## 2023-03-28 NOTE — PROGRESS NOTE ADULT - SUBJECTIVE AND OBJECTIVE BOX
Orthopaedic Surgery Progress Note    s/p right hip orif pod 6  pt is in icu intubated and sedated   Dressing changed on 3/27    T(C): 36.7 (03-27-23 @ 08:00), Max: 36.7 (03-27-23 @ 08:00)  HR: 85 (03-27-23 @ 21:52) (85 - 110)  BP: 95/62 (03-27-23 @ 11:00) (95/62 - 121/56)  RR: 29 (03-27-23 @ 11:00) (29 - 34)  SpO2: 100% (03-27-23 @ 21:52) (96% - 100%)    P/E:  AOx3, NAD  Nonlabored breathing    RLE  Dressing c/d/i  SILT SP/DP/T/Sural/Saph  Firing TA/EHL/FHL/GS  Foot WWP, 2+ DP pulse    Labs                        8.6    7.87  )-----------( 194      ( 26 Mar 2023 20:32 )             25.9     03-27    140  |  112<H>  |  38<H>  ----------------------------<  150<H>  4.8   |  21  |  0.9    Ca    7.8<L>      27 Mar 2023 22:26  Phos  1.9     03-27  Mg     2.1     03-27                  A/P:   76yo Male s/p Right Hip CMN on 3/20. Patient remains intubated/sedated due to acute respiratory failure secondary to postop arrythmia requiring mechanical ventilation.    pain control   dvt proph  continue current sicu care   fever work up   fu labs   will monitor drainage,   change dressings prn   will follow   any questions or concerns call [phone]

## 2023-03-28 NOTE — CHART NOTE - NSCHARTNOTEFT_GEN_A_CORE
Registered Dietitian Follow-Up     Patient Profile Reviewed                           Yes [x]   No []     Nutrition History Previously Obtained        Yes []  No [x]       Pertinent Subjective Information: Limited to chart review at this time as pt intubated and unable to provide hx.     Pertinent Medical Interventions: Pt continues to be managed for S/P ORIF of right hip using interlocking intramedullary stephon and respiratory failure. Pt remains intubated, possibly for trach.      Diet order:   Diet, NPO with Tube Feed:   Tube Feeding Modality: Orogastric  Peptamen A.F. Formula  Total Volume for 24 Hours (mL): 1200  Continuous  Starting Tube Feed Rate {mL per Hour}: 20  Increase Tube Feed Rate by (mL): 20  Until Goal Tube Feed Rate (mL per Hour): 50  Tube Feed Duration (in Hours): 24  Tube Feed Start Time: 21:00 (03-25-23 @ 19:55) [Active]    Anthropometrics:  Height (cm): 175.3 (03-22-23 @ 10:53)  Weight (kg): 63.5 (03-22-23 @ 10:53)  BMI (kg/m2): 20.7 (03-22-23 @ 10:53)  IBW: 72.7 kg  Weight Change: no new weight to assess    MEDICATIONS  (STANDING):  acetaminophen     Tablet .. 650 milliGRAM(s) Oral every 6 hours  aspirin  chewable 81 milliGRAM(s) Oral daily  atorvastatin 80 milliGRAM(s) Oral at bedtime  atropine 1% Solution 1 Drop(s) Left EYE two times a day  chlorhexidine 0.12% Liquid 15 milliLiter(s) Oral Mucosa every 12 hours  chlorhexidine 2% Cloths 1 Application(s) Topical <User Schedule>  clopidogrel Tablet 75 milliGRAM(s) Oral daily  dexMEDEtomidine Infusion 0.1 MICROgram(s)/kG/Hr (1.59 mL/Hr) IV Continuous <Continuous>  dextrose 5%. 1000 milliLiter(s) (50 mL/Hr) IV Continuous <Continuous>  dextrose 5%. 1000 milliLiter(s) (100 mL/Hr) IV Continuous <Continuous>  dextrose 50% Injectable 25 Gram(s) IV Push once  dextrose 50% Injectable 12.5 Gram(s) IV Push once  dextrose 50% Injectable 25 Gram(s) IV Push once  doxazosin 4 milliGRAM(s) Oral at bedtime  fentaNYL   Infusion. 2 MICROgram(s)/kG/Hr (12.7 mL/Hr) IV Continuous <Continuous>  glucagon  Injectable 1 milliGRAM(s) IntraMuscular once  heparin   Injectable 5000 Unit(s) SubCutaneous every 8 hours  insulin lispro (ADMELOG) corrective regimen sliding scale   SubCutaneous every 6 hours  lactated ringers Bolus 500 milliLiter(s) IV Bolus once  lactated ringers Bolus 500 milliLiter(s) IV Bolus once  midodrine 10 milliGRAM(s) Oral every 8 hours  pantoprazole  Injectable 40 milliGRAM(s) IV Push daily  phenylephrine    Infusion 0.1 MICROgram(s)/kG/Min (1.19 mL/Hr) IV Continuous <Continuous>  piperacillin/tazobactam IVPB.. 3.375 Gram(s) IV Intermittent every 8 hours  prednisoLONE acetate 1% Suspension 1 Drop(s) Left EYE every 6 hours  sodium phosphate 15 milliMole(s)/250 mL IVPB 15 milliMole(s) IV Intermittent once  vancomycin  IVPB 750 milliGRAM(s) IV Intermittent every 12 hours    MEDICATIONS  (PRN):  dextrose Oral Gel 15 Gram(s) Oral once PRN Blood Glucose LESS THAN 70 milliGRAM(s)/deciliter  fentaNYL    Injectable 25 MICROGram(s) IV Push every 4 hours PRN Severe Pain (7 - 10)  ondansetron Injectable 4 milliGRAM(s) IV Push every 8 hours PRN Nausea and/or Vomiting  sodium chloride 0.9% lock flush 10 milliLiter(s) IV Push every 1 hour PRN Pre/post blood products, medications, blood draw, and to maintain line patency    Pertinent Labs:                         8.6    7.87  )-----------( 194      ( 26 Mar 2023 20:32 )             25.9     03-28 @ 11:37: Na --, BUN --, Cr --, BG --, K+ --, Phos --, Mg --, Alk Phos 125<H>, ALT/SGPT 50<H>, AST/SGOT 48<H>, HbA1c --  03-27 @ 22:26: Na 140, BUN 38<H>, Cr 0.9, <H>, K+ 4.8, Phos 1.9<L>, Mg 2.1, Alk Phos --, ALT/SGPT --, AST/SGOT --, HbA1c --    Finger Sticks:  POCT Blood Glucose.: 187 mg/dL (03-28 @ 11:17)  POCT Blood Glucose.: 124 mg/dL (03-28 @ 06:11)  POCT Blood Glucose.: 177 mg/dL (03-28 @ 01:33)  POCT Blood Glucose.: 124 mg/dL (03-27 @ 17:20)    I&O's Detail  27 Mar 2023 07:01  -  28 Mar 2023 07:00  --------------------------------------------------------  IN:    Dexmedetomidine: 24 mL    Peptamen A.F.: 150 mL    Phenylephrine: 90 mL  Total IN: 264 mL    OUT:    Indwelling Catheter - Urethral (mL): 150 mL  Total OUT: 150 mL    Total NET: 114 mL    Physical Findings:  - Appearance: sedated; 4+ generalized edema and 4+ edema to scrotum, nonpitting edema to left hand; right hand  - GI function: abdomen WDL; last bowel movement 27-Mar-2023  - Tubes: NGT  - Oral/Mouth cavity: NPO  - Skin: surgical incision      Nutrition Requirements:  Weight Used: dosing weight 63.5 kg     Estimated Energy Needs    Continue []  Adjust [x]  Adjusted Energy Recommendations: 1972 kcal/day PSE Ve 14 Tm 38.7 (temp may be transient) vs 1638 kcal/day based on PSE Ve 14 Tm 36.7        Estimated Protein Needs    Continue [x]  Adjust []  Protein Recommendations: 76-89 gm/day 1.2-1.4g/kg        Estimated Fluid Needs        Continue [x]  Adjust []  Fluid Recommendations: 1587 mL/day 25ml/kg     Nutrient Intake: Current TF provides 1440 kcal, 91g protein, 972ml free water.      [] Previous Nutrition Diagnosis: Inadequate Energy Intake            [x] Ongoing          [] Resolved     Nutrition Intervention      Goal/Expected Outcome: Pt to meet >90% & <105% estimated needs via EN in 3-5 days. RD to follow as per high risk protocol.     Indicator/Monitoring: Monitor diet order, kcal intake, body composition, NFPE, labs  (lytes, BG, renal indices)    Recommendation:    Assessment: Peptamen AF formula no longer warranted. Pt was being underfed calories (recommended by RD based on ASPEN guidelines first week of ICU admission), so hyperglycemia unlikely indicates excessive kcal intake. BUN rising with no signs of worsening renal function. Will increase kcal prescription and continue with current protein prescription.     Switch to Glucerna 1.2 formula. Start at goal 65ml/hr (titration not needed given tolerance to current goal rate). Free water flushes 50ml Q6H. -- will provide 1872kcal, 92g protein, 1264ml free water.     Patient assessed at high nutrition risk.  RD spectra [id number], also available on Teams. Registered Dietitian Follow-Up     Patient Profile Reviewed                           Yes [x]   No []     Nutrition History Previously Obtained        Yes []  No [x]       Pertinent Subjective Information: Limited to chart review at this time as pt intubated and unable to provide hx.     Pertinent Medical Interventions: Pt continues to be managed for S/P ORIF of right hip using interlocking intramedullary stephon and respiratory failure. Pt remains intubated, possibly for trach.      Diet order:   Diet, NPO with Tube Feed:   Tube Feeding Modality: Orogastric  Peptamen A.F. Formula  Total Volume for 24 Hours (mL): 1200  Continuous  Starting Tube Feed Rate {mL per Hour}: 20  Increase Tube Feed Rate by (mL): 20  Until Goal Tube Feed Rate (mL per Hour): 50  Tube Feed Duration (in Hours): 24  Tube Feed Start Time: 21:00 (03-25-23 @ 19:55) [Active]    Anthropometrics:  Height (cm): 175.3 (03-22-23 @ 10:53)  Weight (kg): 63.5 (03-22-23 @ 10:53)  BMI (kg/m2): 20.7 (03-22-23 @ 10:53)  IBW: 72.7 kg  Weight Change: no new weight to assess    MEDICATIONS  (STANDING):  acetaminophen     Tablet .. 650 milliGRAM(s) Oral every 6 hours  aspirin  chewable 81 milliGRAM(s) Oral daily  atorvastatin 80 milliGRAM(s) Oral at bedtime  atropine 1% Solution 1 Drop(s) Left EYE two times a day  chlorhexidine 0.12% Liquid 15 milliLiter(s) Oral Mucosa every 12 hours  chlorhexidine 2% Cloths 1 Application(s) Topical <User Schedule>  clopidogrel Tablet 75 milliGRAM(s) Oral daily  dexMEDEtomidine Infusion 0.1 MICROgram(s)/kG/Hr (1.59 mL/Hr) IV Continuous <Continuous>  dextrose 5%. 1000 milliLiter(s) (50 mL/Hr) IV Continuous <Continuous>  dextrose 5%. 1000 milliLiter(s) (100 mL/Hr) IV Continuous <Continuous>  dextrose 50% Injectable 25 Gram(s) IV Push once  dextrose 50% Injectable 12.5 Gram(s) IV Push once  dextrose 50% Injectable 25 Gram(s) IV Push once  doxazosin 4 milliGRAM(s) Oral at bedtime  fentaNYL   Infusion. 2 MICROgram(s)/kG/Hr (12.7 mL/Hr) IV Continuous <Continuous>  glucagon  Injectable 1 milliGRAM(s) IntraMuscular once  heparin   Injectable 5000 Unit(s) SubCutaneous every 8 hours  insulin lispro (ADMELOG) corrective regimen sliding scale   SubCutaneous every 6 hours  lactated ringers Bolus 500 milliLiter(s) IV Bolus once  lactated ringers Bolus 500 milliLiter(s) IV Bolus once  midodrine 10 milliGRAM(s) Oral every 8 hours  pantoprazole  Injectable 40 milliGRAM(s) IV Push daily  phenylephrine    Infusion 0.1 MICROgram(s)/kG/Min (1.19 mL/Hr) IV Continuous <Continuous>  piperacillin/tazobactam IVPB.. 3.375 Gram(s) IV Intermittent every 8 hours  prednisoLONE acetate 1% Suspension 1 Drop(s) Left EYE every 6 hours  sodium phosphate 15 milliMole(s)/250 mL IVPB 15 milliMole(s) IV Intermittent once  vancomycin  IVPB 750 milliGRAM(s) IV Intermittent every 12 hours    MEDICATIONS  (PRN):  dextrose Oral Gel 15 Gram(s) Oral once PRN Blood Glucose LESS THAN 70 milliGRAM(s)/deciliter  fentaNYL    Injectable 25 MICROGram(s) IV Push every 4 hours PRN Severe Pain (7 - 10)  ondansetron Injectable 4 milliGRAM(s) IV Push every 8 hours PRN Nausea and/or Vomiting  sodium chloride 0.9% lock flush 10 milliLiter(s) IV Push every 1 hour PRN Pre/post blood products, medications, blood draw, and to maintain line patency    Pertinent Labs:                         8.6    7.87  )-----------( 194      ( 26 Mar 2023 20:32 )             25.9     03-28 @ 11:37: Na --, BUN --, Cr --, BG --, K+ --, Phos --, Mg --, Alk Phos 125<H>, ALT/SGPT 50<H>, AST/SGOT 48<H>, HbA1c --  03-27 @ 22:26: Na 140, BUN 38<H>, Cr 0.9, <H>, K+ 4.8, Phos 1.9<L>, Mg 2.1, Alk Phos --, ALT/SGPT --, AST/SGOT --, HbA1c --    Finger Sticks:  POCT Blood Glucose.: 187 mg/dL (03-28 @ 11:17)  POCT Blood Glucose.: 124 mg/dL (03-28 @ 06:11)  POCT Blood Glucose.: 177 mg/dL (03-28 @ 01:33)  POCT Blood Glucose.: 124 mg/dL (03-27 @ 17:20)    I&O's Detail  27 Mar 2023 07:01  -  28 Mar 2023 07:00  --------------------------------------------------------  IN:    Dexmedetomidine: 24 mL    Peptamen A.F.: 150 mL    Phenylephrine: 90 mL  Total IN: 264 mL    OUT:    Indwelling Catheter - Urethral (mL): 150 mL  Total OUT: 150 mL    Total NET: 114 mL    Physical Findings:  - Appearance: sedated; 4+ generalized edema and 4+ edema to scrotum, nonpitting edema to left hand; right hand  - GI function: abdomen WDL; last bowel movement 27-Mar-2023  - Tubes: NGT  - Oral/Mouth cavity: NPO  - Skin: surgical incision      Nutrition Requirements:  Weight Used: dosing weight 63.5 kg     Estimated Energy Needs    Continue []  Adjust [x]  Adjusted Energy Recommendations: 1972 kcal/day PSE Ve 14 Tm 38.7 (temp may be transient) vs 1638 kcal/day based on PSE Ve 14 Tm 36.7        Estimated Protein Needs    Continue [x]  Adjust []  Protein Recommendations: 76-89 gm/day 1.2-1.4g/kg        Estimated Fluid Needs        Continue [x]  Adjust []  Fluid Recommendations: 1587 mL/day 25ml/kg     Nutrient Intake: Current TF provides 1440 kcal, 91g protein, 972ml free water.      [] Previous Nutrition Diagnosis: Inadequate Energy Intake            [x] Ongoing          [] Resolved     Nutrition Intervention      Goal/Expected Outcome: Pt to meet >90% & <105% estimated needs via EN in 3-5 days. RD to follow as per high risk protocol.     Indicator/Monitoring: Monitor diet order, kcal intake, body composition, NFPE, labs  (lytes, BG, renal indices)    Recommendation:    Assessment: Peptamen AF formula no longer warranted. Pt was being underfed calories (recommended by RD based on ASPEN guidelines first week of ICU admission), so hyperglycemia unlikely indicates excessive kcal intake. BUN rising with no signs of worsening renal function. Will increase kcal prescription and continue with current protein prescription.     Recommend to switch to Glucerna 1.2 formula. Start at goal 65ml/hr (titration not needed given tolerance to current goal rate). Free water flushes 50ml Q6H. -- will provide 1872kcal, 92g protein, 1264ml free water.     Patient assessed at high nutrition risk.  RD spectra [phone], also available on Teams.

## 2023-03-28 NOTE — PROGRESS NOTE ADULT - ASSESSMENT
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   S/P ORIF of right hip using interlocking intramedullary stephon.       NEURO:  #Acute pain    - Tylenol, Gabapentin, fentanyl 25mcg q4 prn, fentayl 2.5   #acute sedation    -precedex    RESP:   #acute respiratory failure secondary to post operative arrythmias requiring mechanical ventilation (Intubated 3/23)  RR (machine): 12, TV (machine): 450, FiO2: 60, PEEP: 6  03-27 @ 12:07--7.38 / 34 / 346 / 20 / 99.4  O2 99.4  Lac 1.20    Decreased FIO2 from 60->50%  #Activity    -increase as tolerated    CARDS:   #acute hypotension    -sarai, vaso, levo  #Labs/Imaging  -Echo 3/23- EF appears normal, mild LVH, sclerotic AoV, trace MR,     GI/NUTR:   #Diet: NPO x/rx    -TF Peptamen goal 50cc/hr  #NGT in place  #Nausea: Zofran prn    - Aspiration precautions, HOB 30  #GI Prophylaxis    - Protonix  #Bowel regimen    - Senna    /RENAL:   #UO: Garrison placed by Urology     - Cardura (Flomax at home)   Labs:          BUN/Cr- 29/0.8  -->,  38/0.9  -->          Electrolytes-Na 140 // K 4.8 // Mg 2.1 //  Phos 1.9 (03-27 @ 22:26)     HEME/ONC:   #DVT prophylaxis    - SCDs  #H/O CAD s/p stents x3    - ASA & Plavix    T&S Expires 3/28    Labs: Hb/Hct:  7.8/23.4  -->,  8.6/25.9  -->                      Plts:  135  -->,  194  -->       ID:  #leukocytosis  WBC- 4.47  --->>,  4.44  --->>,  7.87  --->>  Temp trend- 24hrs T(F): 98.1 (03-27 @ 08:00), Max: 98.1 (03-27 @ 08:00)  Febrile hence restarted vanc/zosyn on 3/27  Cultures:    BCx 3/25--pending    UA 3/24--negative    ET Sputum 3/24--prelim neg     MRSA neg       ENDO:    -FSG q6 if NPO    -Glucose goal 140-180    -if above 180 start ISS    MSK:     - Bedrest     - WBAT RLE per ortho, PT/OT when clinically appropriate    LINES/DRAINS:    A-line left radial (3/22),    PIV x3 (2 20s, 1 18),   Garrison (3/22)  Right IJ TLC (3/23)  ETT, OGT   ADVANCED DIRECTIVES:  Full Code    HCP/Emergency Contact- Song Segura (Son): [phone]    INDICATION FOR SICU Hypotension with pressor requirements. Intubated.       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   S/P ORIF of right hip using interlocking intramedullary stephon.       NEURO:  #Acute pain    - Tylenol, Gabapentin, fentanyl 25mcg q4 prn, fentayl 2.5   #acute sedation    -off precedex    RESP:   #acute respiratory failure secondary to post operative arrhythmias requiring mechanical ventilation (Intubated 3/23)  RR (machine): 12, TV (machine): 450, FiO2: 40, PEEP: 6, d/w team on possible trach  Decreased FIO2 from 60->40%  AB.38/34/170/20 Lac 1.0   #Activity    -increase as tolerated    CARDS:   #acute hypotension    -sarai    -off levo and vaso   PVCs- consider starting metoprolol 2.5, will discuss w/ team   #Labs/Imaging  -Echo 3/23- EF appears normal, mild LVH, sclerotic AoV, trace MR,     GI/NUTR:   #Diet: NPO x/rx    -TF Peptamen goal 50cc/hr  #NGT in place  #Nausea: Zofran prn    - Aspiration precautions, HOB 30  #GI Prophylaxis    - Protonix  #Bowel regimen    - Senna    /RENAL:   #UO: Garrison placed by Urology     - Cardura (Flomax at home)   Labs:          BUN/Cr- 29/0.8  -->,  38/0.9  -->          Electrolytes-Na 140 // K 4.8 // Mg 2.1 //  Phos 1.9 ( @ 22:26)     HEME/ONC:   #DVT prophylaxis    - SCDs  #H/O CAD s/p stents x3    - ASA & Plavix    T&S Expires 3/28    Labs: Hb/Hct:  7.8/23.4  -->,  8.6/25.9  -->                      Plts:  135  -->,  194  -->       ID:  #leukocytosis  WBC- 4.47  --->>,  4.44  --->>,  7.87  --->>  Temp trend- 24hrs T(F): 98.1 ( @ 08:00), Max: 98.1 ( @ 08:00)  Febrile overnight 102--> IV tylenol and cooling blanket   Febrile hence restarted vanc/zosyn on 3/27  Cultures:    BCx 3/25--pending    UA 3/24--negative    ET Sputum 3/24--prelim neg     MRSA neg     ENDO:    -FSG q6 if NPO    -Glucose goal 140-180    -if above 180 start ISS    MSK:     - Bedrest     - WBAT RLE per ortho, PT/OT when clinically appropriate    LINES/DRAINS:    A-line left radial (3/22),    PIV x3 (2 20s, 1 18),   Garrison (3/22)  Right IJ TLC (3/23)  ETT, OGT   ADVANCED DIRECTIVES:  Full Code    HCP/Emergency Contact- Song Segura (Son): [phone]    INDICATION FOR SICU Hypotension with pressor requirements. Intubated.       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   S/P ORIF of right hip using interlocking intramedullary stephon.       NEURO:  #Acute pain    - Tylenol, Gabapentin, fentanyl 25mcg q4 prn, fentayl 2.5   #acute sedation    -precedex and fentanyl gtt     RESP:   #acute respiratory failure secondary to post operative arrythmias requiring mechanical ventilation (Intubated 3/23)  RR (machine): 12, TV (machine): 450, FiO2: 40, PEEP: 6  #PE w/u NEGATIVE on 3/28  #Activity    -increase as tolerated    CARDS:   #acute hypotension    -neosynephrine   #Labs/Imaging  -Echo 3/23- EF appears normal, mild LVH, sclerotic AoV, trace MR,     GI/NUTR:   #Diet: NPO x/rx    -TF Peptamen goal 50cc/hr  #NGT in place  #Nausea: Zofran prn    - Aspiration precautions, HOB 30  #GI Prophylaxis    - Protonix  #Bowel regimen    - Senna  #Imaging    -CT abdomen 3/28: No definite infectious source identified      -F/U RUQ sono     -LFTs: LIVER FUNCTIONS - ( 28 Mar 2023 11:37 )  Alb: 2.1 g/dL / Pro: 4.1 g/dL / ALK PHOS: 125 U/L / ALT: 50 U/L / AST: 48 U/L / GGT: x           /RENAL:   #UO: Garrison placed by Urology     - Cardura (Flomax at home)   Labs:          BUN/Cr- 29/0.8  -->,  38/0.9  -->          Electrolytes-Na 140 // K 4.8 // Mg 2.1 //  Phos 1.9 (03-27 @ 22:26)    UA: Negative, small blood     HEME/ONC:   #DVT prophylaxis    - SCDs  #H/O CAD s/p stents x3    - ASA & Plavix    T&S Expires 3/28    Labs: Hb/Hct:  7.8/23.4  -->,  8.6/25.9  -->                      Plts:  135  -->,  194  -->       ID:  #leukocytosis  WBC- 4.47  --->>,  4.44  --->>,  7.87  --->>  Temp trend- 24hrs T(F): 98.1 (03-27 @ 08:00), Max: 98.1 (03-27 @ 08:00)  Febrile hence restarted vanc/zosyn on 3/27  Cultures:    F/U fungitel, procalcitonin     BCx 3/28: pending     UA 3/28: Negative     BCx 3/25--pending    UA 3/24--negative    ET Sputum 3/24--prelim neg     MRSA neg     ENDO:    -FSG q6 if NPO    -Glucose goal 140-180    -if above 180 start ISS    MSK:     - Bedrest     - WBAT RLE per ortho, PT/OT when clinically appropriate    LINES/DRAINS:    A-line left radial (3/22),    PIV x3 (2 20s, 1 18),   Garrison (3/22)  Right IJ TLC (3/23)  ETT, OGT   ADVANCED DIRECTIVES:  Full Code    HCP/Emergency Contact- Song Segura (Son): [phone]    INDICATION FOR SICU Hypotension with pressor requirements. Intubated.       DISPO: SICU

## 2023-03-28 NOTE — CHART NOTE - NSCHARTNOTEFT_GEN_A_CORE
Spoke with son Song bedside and brother Grzegorz on the phone  -requiring vasopressors, phenylephrine  -CTA showing no blood clot in lungs  -CTA shows pneumonia, on antibiotics  -Remains on low sedation  -HCT and Stroke workup negative  Recommend tracheostomy if patient unable to be liberated from ventilator. Patien to remain FULL CODE  All questions and concerns addressed and answered.

## 2023-03-29 NOTE — PROGRESS NOTE ADULT - SUBJECTIVE AND OBJECTIVE BOX
SUBJECTIVE: Patient seen and examined. Remains intubated and sedated in the ICU.    OBJECTIVE:  NAD  Vital Signs Last 24 Hrs  T(C): 37.1 (28 Mar 2023 23:00), Max: 38.7 (28 Mar 2023 08:00)  T(F): 98.7 (28 Mar 2023 23:00), Max: 101.7 (28 Mar 2023 08:00)  HR: 85 (29 Mar 2023 03:00) (72 - 130)  BP: 119/67 (29 Mar 2023 03:00) (80/50 - 119/67)  BP(mean): 88 (29 Mar 2023 03:00) (60 - 88)  RR: 20 (29 Mar 2023 03:00) (17 - 37)  SpO2: 100% (29 Mar 2023 03:00) (93% - 100%)    Parameters below as of 28 Mar 2023 20:00  Patient On (Oxygen Delivery Method): ventilator    P/E:  AOx3, NAD  Nonlabored breathing    RLE  Dressing c/d/i  SILT SP/DP/T/Sural/Saph  Firing TA/EHL/FHL/GS  Foot WWP, 2+ DP pulse                        8.1    13.08 )-----------( 214      ( 28 Mar 2023 21:05 )             25.6     03-28    143  |  116<H>  |  35<H>  ----------------------------<  137<H>  4.7   |  20  |  0.8    Ca    7.9<L>      28 Mar 2023 21:05  Phos  2.7     03-28  Mg     2.0     03-28    TPro  4.1<L>  /  Alb  2.1<L>  /  TBili  1.1  /  DBili  0.5<H>  /  AST  48<H>  /  ALT  50<H>  /  AlkPhos  125<H>  03-28    A/P :  Pt is a 76yo Male s/p right hip IMN on 3/20, POD7. Patient remains intubated/sedated due to acute respiratory failure secondary to postop arrythmia requiring mechanical ventilation.    -    Pain control  -    DVT ppx per SICU   -    Weight bearing status: WBAT   -    Physical Therapy when able  -    Dressing changes per ortho  -    Rest of care per SICU    Beto Obrien MD  Orthopedic Surgery Resident PGY3

## 2023-03-29 NOTE — PROGRESS NOTE ADULT - ASSESSMENT
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   S/P ORIF of right hip using interlocking intramedullary stephon.       NEURO:  #Acute pain    - Tylenol, Gabapentin, fentanyl 25mcg q4 prn, fentayl 2.5   #acute sedation    -precedex and fentanyl gtt     RESP:   #acute respiratory failure secondary to post operative arrythmias requiring mechanical ventilation (Intubated 3/23)  RR (machine): 12, TV (machine): 450, FiO2: 40, PEEP: 6  #PE w/u NEGATIVE on 3/28  #Activity    -increase as tolerated    CARDS:   #acute hypotension    -neosynephrine   #Labs/Imaging  -Echo 3/23- EF appears normal, mild LVH, sclerotic AoV, trace MR,     GI/NUTR:   #Diet: NPO x/rx    -TF Peptamen goal 50cc/hr  #NGT in place  #Nausea: Zofran prn    - Aspiration precautions, HOB 30  #GI Prophylaxis    - Protonix  #Bowel regimen    - Senna  #Imaging    -CT abdomen 3/28: No definite infectious source identified      -RUQ sono: biliary sludge, mild GB wall thickening/edema.     -LFTs     /RENAL:   #UO: Garrison placed by Urology     - Cardura (Flomax at home)    Labs:          BUN/Cr- 38/0.9  -->,  35/0.8  -->          Electrolytes-Na 143 // K 4.7 // Mg 2.0 //  Phos 2.7 (03-28 @ 21:05)   UA: Negative, small blood     HEME/ONC:   #DVT prophylaxis    - SCDs  #H/O CAD s/p stents x3    - ASA & Plavix    T&S Expires 3/28    Labs: Hb/Hct:  8.6/25.9  -->,  8.1/25.6  -->                      Plts:  194  -->,  214  -->                 PTT/INR:      ID:  #leukocytosis  WBC- 7.87  --->>,  13.08  --->>  Temp trend- 24hrs T(F): 98.7 (03-28 @ 23:00), Max: 101.7 (03-28 @ 08:00)  Antibiotics-piperacillin/tazobactam IVPB.. 3.375 every 8 hours  vancomycin  IVPB 750 every 12 hours  Febrile hence restarted vanc/zosyn on 3/27  Cultures:    F/U fungitel, procalcitonin     BCx 3/28: pending     UA 3/28: Negative     BCx 3/25--pending    UA 3/24--negative    ET Sputum 3/24--prelim neg     MRSA neg     ENDO:    -FSG q6 if NPO    -Glucose goal 140-180    -if above 180 start ISS    MSK:     - Bedrest     - WBAT RLE per ortho, PT/OT when clinically appropriate    LINES/DRAINS:    A-line left radial (3/22),    PIV x3 (2 20s, 1 18),   Garrison (3/22)  Right IJ TLC (3/23)  ETT, OGT     ADVANCED DIRECTIVES:  Full Code    HCP/Emergency Contact- Song Segura (Son): [phone]    INDICATION FOR SICU Hypotension with pressor requirements. Intubated.       DISPO: SICU

## 2023-03-29 NOTE — CONSULT NOTE ADULT - SUBJECTIVE AND OBJECTIVE BOX
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 Pt walks with walker and got up in middle of night to get food and slipped and fell on his R side. Pt has been having R hip pain since with inability to ambulate. Pts family finally convinced him to come to hospital today. Pt is having 10/10 pain currently. Denies dizziness, CP or LOC prior to fall. Denies head trauma. No HA. (20 Mar 2023 12:02)      Infectious Diseases History:  Old Micro Data/Cultures: No pertinent hx     FAMILY HISTORY:  FH: colon cancer (Mother)      PAST MEDICAL & SURGICAL HISTORY:  CAD (coronary artery disease)      Hypercholesteremia      BPH (benign prostatic hyperplasia)      Kidney stones      Prostate cancer      Status post cardiac surgery  cardiac stents      Bilateral cataracts      History of lithotripsy          SOCIAL HISTORY  Social History:  Lives with son in apartment (20 Mar 2023 12:02)      Recent Travel: No recent travel   Other Exposures: No exposure prior to admission     ROS  General: Denies rigors, nightsweats  HEENT: Denies headache, rhinorrhea, sore throat, eye pain  CV: Denies CP, palpitations  PULM: Denies wheezing, hemoptysis  GI: Denies hematemesis, hematochezia, melena  : Denies discharge, hematuria  MSK: Denies arthralgias, myalgias  SKIN: Denies rash, lesions  NEURO: Denies paresthesias, weakness  PSYCH: Denies depression, anxiety    VITALS:  T(F): 97.6, Max: 99.5 (23 @ 12:00)  HR: 72  BP: 135/73  RR: 22Vital Signs Last 24 Hrs  T(C): 36.4 (29 Mar 2023 08:00), Max: 37.5 (28 Mar 2023 12:00)  T(F): 97.6 (29 Mar 2023 08:00), Max: 99.5 (28 Mar 2023 12:00)  HR: 72 (29 Mar 2023 10:15) (72 - 116)  BP: 135/73 (29 Mar 2023 10:00) (84/50 - 159/76)  BP(mean): 97 (29 Mar 2023 10:00) (60 - 110)  RR: 22 (29 Mar 2023 10:15) (17 - 36)  SpO2: 100% (29 Mar 2023 10:15) (76% - 100%)    Parameters below as of 29 Mar 2023 08:00  Patient On (Oxygen Delivery Method): ventilator    O2 Concentration (%): 40    PHYSICAL EXAM:  Gen: NAD, resting in bed  HEENT: Normocephalic, atraumatic  Neck: supple, no lymphadenopathy  CV: Regular rate & regular rhythm  Lungs: CTAB  Abdomen: Soft, BS present  Ext: Warm, well perfused  Neuro: non focal, awake  Skin: no rash, no lesions  Lines: no phlebitis    TESTS & MEASUREMENTS:                        8.1    13.08 )-----------( 214      ( 28 Mar 2023 21:05 )             25.6         142  |  113<H>  |  33<H>  ----------------------------<  195<H>  4.4   |  22  |  0.7    Ca    7.4<L>      29 Mar 2023 08:50  Phos  2.7       Mg     2.0         TPro  4.1<L>  /  Alb  2.1<L>  /  TBili  1.1  /  DBili  0.5<H>  /  AST  48<H>  /  ALT  50<H>  /  AlkPhos  125<H>        LIVER FUNCTIONS - ( 28 Mar 2023 11:37 )  Alb: 2.1 g/dL / Pro: 4.1 g/dL / ALK PHOS: 125 U/L / ALT: 50 U/L / AST: 48 U/L / GGT: x           Urinalysis Basic - ( 28 Mar 2023 11:36 )    Color: Yellow / Appearance: Clear / S.019 / pH: x  Gluc: x / Ketone: Negative  / Bili: Negative / Urobili: <2 mg/dL   Blood: x / Protein: 30 mg/dL / Nitrite: Negative   Leuk Esterase: Negative / RBC: 4 /HPF / WBC 1 /HPF   Sq Epi: x / Non Sq Epi: 1 /HPF / Bacteria: Negative        Culture - Blood (collected 23 @ 01:17)  Source: .Blood None  Preliminary Report (23 @ 09:01):    No growth to date.    Culture - Sputum (collected 23 @ 08:30)  Source: ET Tube ET Tube  Gram Stain (23 @ 06:50):    Few polymorphonuclear leukocytes per low power field    Rare Squamous epithelial cells per low power field    Rare Gram Negative Rods per oil power field    Rare Gram Positive Rods per oil power field  Final Report (23 @ 17:48):    Normal Respiratory Fidelia present            INFECTIOUS DISEASES TESTING  MRSA PCR Result.: Negative (23 @ 11:00)      RADIOLOGY & ADDITIONAL TESTS:  I have personally reviewed the last available Chest xray  CXR  Xray Chest 1 View- PORTABLE-Urgent:   ACC: [id number] EXAM:  XR CHEST PORTABLE URGENT 1V   ORDERED BY: PRINCE HERNANDEZ     PROCEDURE DATE:  2023          INTERPRETATION:  Clinical History / Reason for exam: NG tube adjustment    Comparison : Chest radiograph earlier the same day.    Technique/Positioning: AP chest.    Findings:    Support devices: Once again overlying wires obscure the lower lung field.   Cannot assess the nasogastric tube on this examination. Endotracheal tube   in satisfactory position. Right IJ catheter in satisfactory position.    Cardiac/mediastinum/hilum: No change    Lung parenchyma/Pleura: Bilateral pleural effusions and bilateral lung   opacities, unchanged.    Skeleton/soft tissues: Degenerative changes    Impression:    Once again cannot assess the nasogastric tube tip due to overlying   cardiac pad.    Bilateral pleural effusions and opacities, unchanged.    --- End of Report ---            DAVON CHAIDEZ MD; Attending Radiologist  This document has been electronically signed. Mar 28 2023 12:36PM (23 @ 11:08)      CT  CT Angio Chest PE Protocol w/ IV Cont:   ACC: [id number] EXAM:  CT ANGIO CHEST PULM ART [facility]   ORDERED BY: CECILLE BUCKLEY     PROCEDURE DATE:  2023          INTERPRETATION:  STUDY INDICATION: Tachycardia, r/o pe    Technique: CTA of the thorax was performed after administration of   contrast per the PE protocol. Sagittal and coronal reformats were   performed as well as 3D reconstructions and MIP reconstructed images.  Intravenous contrast: 100 cc of Omnipaque 350    Comparison: Chest CT  3/20/2023.    QUALITY STATEMENT: Patient upper extremities causing streak artifact   inherently degrades image quality and limits this exam.    Findings:    PULMONARY ARTERIES: No central or segmental pulmonary emboli.    Lungs/tubes: Endotracheal tube terminates approximately 5 - 6 cm above   the eliza. Enteric tube terminates in the stomach. Right IJ central   venous catheter terminates in the SVC.    Mediastinum/Lymph Nodes: No lymphadenopathy by size criteria.    Heart/Great Vessels: Visually the heart is without significant   enlargement. No pericardial effusion.  Normal caliber aorta. Multivessel   coronary arterial calcifications.    Abdomen: See separate report for evaluation of the abdomen.    Lungs, Pleura, and Airways: Severe emphysematous changes. Complete   occlusion of the bronchus intermedius extending into the middle lobe   causing complete occlusion of the right lower and partial occlusion of   the right middle lobe airways. Near-complete right lower lobar   consolidation and partial right middle lobe consolidation. Bilateral   right greater than left small pleural effusions. Dependent left lower   lobe consolidative changes.    Bones and soft tissues: Unchanged osseous structures.    IMPRESSION:    No central or segmental pulmonary embolus.    Complete occlusion of the bronchus intermedius extending into the middle   lobe causing complete occlusion of the right lower and partial occlusion   of the right middle lobe airways. No complete right lower lobar   consolidative changes/collapse and partial right middle lobe   consolidation. Bilateral right greater than left small pleural effusions.   Dependent left lower lobe consolidative changes. Findings likely   reflecting combination of atelectasis and pneumonia in the appropriate   clinical setting. Short-term follow-up CT is suggested.    --- End of Report ---          QUYNH TENA MD; Resident Radiologist  This document has been electronically signed.  MIQUEL ROBERSON MD; Attending Radiologist  This document has been electronically signed. Mar 28 2023  4:12PM (23 @ 13:41)  CT Abdomen and Pelvis w/ IV Cont:   ACC: [id number] EXAM:  CT ABDOMEN AND PELVIS IC   ORDERED BY: CECLILE BUCKLEY     PROCEDURE DATE:  2023          INTERPRETATION:  CLINICAL STATEMENT: Infection.    TECHNIQUE: Contiguous axial CT images were obtained from the lower chest   to the pubic symphysis after the intravenous administration 100 cc of   Omnipaque 350 (0 cc discarded).  Oral contrast was not administered.    Reformatted images in the coronal and sagittal planes were acquired.    COMPARISON CT: CT chest abdomen pelvis dated 3/20/2023. CT abdomen pelvis   dated 2022      FINDINGS:    Artifact is present from patient arm positioning at the side of the body.    HEPATOBILIARY: 2 cm cyst in the left lobe liver. No radiopaque   gallstones. Nonspecific mild gallbladder wall edema and distention. No   definite biliary ductal dilatation, although partially obscured by   metallic coil artifact.    SPLEEN: Unremarkable.    PANCREAS: The pancreatic head is obscured by artifact. Otherwise,   unremarkable.    ADRENAL GLANDS: Unremarkable.    KIDNEYS: Symmetric parenchymal enhancement. No hydronephrosis or   perinephric stranding.  7 cm cystic lesion adjacent left kidney, possibly   an exophytic renal cyst versus adjacent lymphatic formation, unchanged   from prior exams.    ABDOMINOPELVIC NODES: No abdominal or pelvic lymphadenopathy.    PELVIC ORGANS: Urinary bladder is decompressed by Garrison catheter.    PERITONEUM/MESENTERY/BOWEL: No evidence of bowel obstruction. Trace   pelvic ascites. No areas of bowel wallthickening identified. Colonic   diverticulosis without evidence of diverticulitis. Metallic coil in the   region of the GDA, obscuring portions of the duodenum and gastric outflow   tract. No free air. A rectal thermometer is noted.    VASCULATURE: No abdominal aortic aneurysm.    BONES/SOFT TISSUES: Status post ORIF of an intertrochanteric right femur   fracture with local metallic hardware artifact. There is intramuscular   hemorrhage and/or edema surrounding the operative site, not significantly   changed from 3/20/2023. There is a 4 x 2 x 4 cm right gluteal   subcutaneous hematoma. There is anasarca. There is a fat-containing left   inguinal hernia. There is asymmetric density in the subcutaneous tissues   spanning the majority of the right lateral abdominal wall.      IMPRESSION:  1.  No definite infectious source identified in the abdomen and pelvis.   Somewhat limited exam with multiple sources of artifact.  2.  Nonspecific mild gallbladder wall edema and distention.  3.  Asymmetric density in the subcutaneous tissues spanning a large   portion of the right lateral abdominal wall, unclear if representing   asymmetric lymphedema/anasarca and/or hemorrhage.  4.  Status post ORIF of the right femur. Small right gluteal subcutaneous   hematoma.    --- End of Report ---            MATEO HESTER MD; Attending Radiologist  This document has been electronically signed. Mar 28 2023  4:54PM (23 @ 13:41)      CARDIOLOGY TESTING  12 Lead ECG:   Ventricular Rate 86 BPM    Atrial Rate 86 BPM    P-R Interval 156 ms    QRS Duration 122 ms    Q-T Interval 396 ms    QTC Calculation(Bazett) 473 ms    P Axis 17 degrees    R Axis 7 degrees    T Axis 44 degrees    Diagnosis Line Normal sinus rhythm  Right bundle branch block  Septal infarct , age undetermined  Possible Lateral infarct , age undetermined  Abnormal ECG    Confirmed by Leander Deshpande ([id number]) on 3/28/2023 7:54:31 PM (23 @ 14:13)  12 Lead ECG:   Ventricular Rate 120 BPM    Atrial Rate 120 BPM    P-R Interval 160 ms    QRS Duration 114 ms    Q-T Interval 326 ms    QTC Calculation(Bazett) 460 ms    P Axis 82 degrees    R Axis 20 degrees    T Axis 60 degrees    Diagnosis Line Sinus tachycardia  Incomplete right bundle branch block  Borderline ECG    Confirmed by PRETTY BOLAÑOS MD ([id number]) on 3/27/2023 7:25:57 AM (23 @ 04:18)      All available historical records have been reviewed    MEDICATIONS  acetaminophen     Tablet .. 650  aspirin  chewable 81  atorvastatin 80  atropine 1% Solution 1  chlorhexidine 0.12% Liquid 15  chlorhexidine 2% Cloths 1  clopidogrel Tablet 75  dexMEDEtomidine Infusion 0.1  dextrose 50% Injectable 25  doxazosin 4  fentaNYL   Infusion. 2  heparin   Injectable 5000  insulin lispro (ADMELOG) corrective regimen sliding scale   lactated ringers Bolus 500  lactated ringers Bolus 250  lactated ringers Bolus 250  lactated ringers Bolus 250  midodrine 10  pantoprazole  Injectable 40  phenylephrine    Infusion 0.1  piperacillin/tazobactam IVPB.. 3.375  prednisoLONE acetate 1% Suspension 1  vancomycin  IVPB 750  vasopressin Infusion 0.03      ANTIBIOTICS:  piperacillin/tazobactam IVPB.. 3.375 Gram(s) IV Intermittent every 8 hours  vancomycin  IVPB 750 milliGRAM(s) IV Intermittent every 12 hours      All available historical data has been reviewed    ASSESSMENT      IMPRESSION  #Spiking fevers since  with unknown etiology, pt is s/p right hip orif, intubated on    #No diarrhea per nurse report, pt initially with leucocytosis trended down with subsequent rise somewhat correlating to the fever consistent with biphasic presentation   #CT chest showings consolidation RML, and pt has fine crackles on exam consistent with VAP  #Surgical site looks clean without erythema or induration  #Pt has significant scrotal swelling with purple discoloration, unclear if this is new  INCOMPLETE/PENDING    RECOMMENDATIONS  - f/u pending cultures  - ***    This is a pended note. 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 Pt walks with walker and got up in middle of night to get food and slipped and fell on his R side. Pt has been having R hip pain since with inability to ambulate. Pts family finally convinced him to come to hospital today. Pt is having 10/10 pain currently. Denies dizziness, CP or LOC prior to fall. Denies head trauma. No HA.      Infectious Diseases History:  Old Micro Data/Cultures: No pertinent hx     FAMILY HISTORY:  FH: colon cancer (Mother)      PAST MEDICAL & SURGICAL HISTORY:  CAD (coronary artery disease)  Hypercholesteremia  BPH (benign prostatic hyperplasia)  Kidney stones  Prostate cancer  Status post cardiac surgery  cardiac stents  Bilateral cataracts  History of lithotripsy          SOCIAL HISTORY  Social History:  Lives with son in apartment     Recent Travel: No recent travel   Other Exposures: No exposure prior to admission     ROS  General: Denies rigors, nightsweats  HEENT: Denies headache, rhinorrhea, sore throat, eye pain  CV: Denies CP, palpitations  PULM: Denies wheezing, hemoptysis  GI: Denies hematemesis, hematochezia, melena  : Denies discharge, hematuria  MSK: Denies arthralgias, myalgias  SKIN: Denies rash, lesions  NEURO: Denies paresthesias, weakness  PSYCH: Denies depression, anxiety    VITALS:  T(F): 97.6, Max: 99.5 (23 @ 12:00)  HR: 72  BP: 135/73  RR: 22Vital Signs Last 24 Hrs  T(C): 36.4 (29 Mar 2023 08:00), Max: 37.5 (28 Mar 2023 12:00)  T(F): 97.6 (29 Mar 2023 08:00), Max: 99.5 (28 Mar 2023 12:00)  HR: 72 (29 Mar 2023 10:15) (72 - 116)  BP: 135/73 (29 Mar 2023 10:00) (84/50 - 159/76)  BP(mean): 97 (29 Mar 2023 10:00) (60 - 110)  RR: 22 (29 Mar 2023 10:15) (17 - 36)  SpO2: 100% (29 Mar 2023 10:15) (76% - 100%)    Parameters below as of 29 Mar 2023 08:00  Patient On (Oxygen Delivery Method): ventilator    O2 Concentration (%): 40    PHYSICAL EXAM:  Gen: NAD, resting in bed  HEENT: Normocephalic, atraumatic  Neck: supple, no lymphadenopathy  CV: Regular rate & regular rhythm  Lungs: CTAB  Abdomen: Soft, BS present  Ext: Warm, well perfused  Neuro: non focal, awake  Skin: no rash, no lesions  Lines: no phlebitis    TESTS & MEASUREMENTS:                        8.1    13.08 )-----------( 214      ( 28 Mar 2023 21:05 )             25.6         142  |  113<H>  |  33<H>  ----------------------------<  195<H>  4.4   |  22  |  0.7    Ca    7.4<L>      29 Mar 2023 08:50  Phos  2.7       Mg     2.0         TPro  4.1<L>  /  Alb  2.1<L>  /  TBili  1.1  /  DBili  0.5<H>  /  AST  48<H>  /  ALT  50<H>  /  AlkPhos  125<H>        LIVER FUNCTIONS - ( 28 Mar 2023 11:37 )  Alb: 2.1 g/dL / Pro: 4.1 g/dL / ALK PHOS: 125 U/L / ALT: 50 U/L / AST: 48 U/L / GGT: x           Urinalysis Basic - ( 28 Mar 2023 11:36 )    Color: Yellow / Appearance: Clear / S.019 / pH: x  Gluc: x / Ketone: Negative  / Bili: Negative / Urobili: <2 mg/dL   Blood: x / Protein: 30 mg/dL / Nitrite: Negative   Leuk Esterase: Negative / RBC: 4 /HPF / WBC 1 /HPF   Sq Epi: x / Non Sq Epi: 1 /HPF / Bacteria: Negative        Culture - Blood (collected 23 @ 01:17)  Source: .Blood None  Preliminary Report (23 @ 09:01):    No growth to date.    Culture - Sputum (collected 23 @ 08:30)  Source: ET Tube ET Tube  Gram Stain (23 @ 06:50):    Few polymorphonuclear leukocytes per low power field    Rare Squamous epithelial cells per low power field    Rare Gram Negative Rods per oil power field    Rare Gram Positive Rods per oil power field  Final Report (23 @ 17:48):    Normal Respiratory Fidelia present            INFECTIOUS DISEASES TESTING  MRSA PCR Result.: Negative (23 @ 11:00)      RADIOLOGY & ADDITIONAL TESTS:  I have personally reviewed the last available Chest xray  CXR  Xray Chest 1 View- PORTABLE-Urgent:   ACC: [id number] EXAM:  XR CHEST PORTABLE URGENT 1V   ORDERED BY: PRINCE HERNANDEZ     PROCEDURE DATE:  2023          INTERPRETATION:  Clinical History / Reason for exam: NG tube adjustment    Comparison : Chest radiograph earlier the same day.    Technique/Positioning: AP chest.    Findings:    Support devices: Once again overlying wires obscure the lower lung field.   Cannot assess the nasogastric tube on this examination. Endotracheal tube   in satisfactory position. Right IJ catheter in satisfactory position.    Cardiac/mediastinum/hilum: No change    Lung parenchyma/Pleura: Bilateral pleural effusions and bilateral lung   opacities, unchanged.    Skeleton/soft tissues: Degenerative changes    Impression:    Once again cannot assess the nasogastric tube tip due to overlying   cardiac pad.    Bilateral pleural effusions and opacities, unchanged.    --- End of Report ---            DAVON CHAIDEZ MD; Attending Radiologist  This document has been electronically signed. Mar 28 2023 12:36PM (23 @ 11:08)      CT  CT Angio Chest PE Protocol w/ IV Cont:   ACC: [id number] EXAM:  CT ANGIO CHEST PULM ART [facility]   ORDERED BY: CECILLE BUCKLEY     PROCEDURE DATE:  2023          INTERPRETATION:  STUDY INDICATION: Tachycardia, r/o pe    Technique: CTA of the thorax was performed after administration of   contrast per the PE protocol. Sagittal and coronal reformats were   performed as well as 3D reconstructions and MIP reconstructed images.  Intravenous contrast: 100 cc of Omnipaque 350    Comparison: Chest CT  3/20/2023.    QUALITY STATEMENT: Patient upper extremities causing streak artifact   inherently degrades image quality and limits this exam.    Findings:    PULMONARY ARTERIES: No central or segmental pulmonary emboli.    Lungs/tubes: Endotracheal tube terminates approximately 5 - 6 cm above   the eliza. Enteric tube terminates in the stomach. Right IJ central   venous catheter terminates in the SVC.    Mediastinum/Lymph Nodes: No lymphadenopathy by size criteria.    Heart/Great Vessels: Visually the heart is without significant   enlargement. No pericardial effusion.  Normal caliber aorta. Multivessel   coronary arterial calcifications.    Abdomen: See separate report for evaluation of the abdomen.    Lungs, Pleura, and Airways: Severe emphysematous changes. Complete   occlusion of the bronchus intermedius extending into the middle lobe   causing complete occlusion of the right lower and partial occlusion of   the right middle lobe airways. Near-complete right lower lobar   consolidation and partial right middle lobe consolidation. Bilateral   right greater than left small pleural effusions. Dependent left lower   lobe consolidative changes.    Bones and soft tissues: Unchanged osseous structures.    IMPRESSION:    No central or segmental pulmonary embolus.    Complete occlusion of the bronchus intermedius extending into the middle   lobe causing complete occlusion of the right lower and partial occlusion   of the right middle lobe airways. No complete right lower lobar   consolidative changes/collapse and partial right middle lobe   consolidation. Bilateral right greater than left small pleural effusions.   Dependent left lower lobe consolidative changes. Findings likely   reflecting combination of atelectasis and pneumonia in the appropriate   clinical setting. Short-term follow-up CT is suggested.    --- End of Report ---          QUYNH TENA MD; Resident Radiologist  This document has been electronically signed.  MIQUEL ROBERSON MD; Attending Radiologist  This document has been electronically signed. Mar 28 2023  4:12PM (23 @ 13:41)  CT Abdomen and Pelvis w/ IV Cont:   ACC: [id number] EXAM:  CT ABDOMEN AND PELVIS IC   ORDERED BY: CECILLE BUCKLEY     PROCEDURE DATE:  2023          INTERPRETATION:  CLINICAL STATEMENT: Infection.    TECHNIQUE: Contiguous axial CT images were obtained from the lower chest   to the pubic symphysis after the intravenous administration 100 cc of   Omnipaque 350 (0 cc discarded).  Oral contrast was not administered.    Reformatted images in the coronal and sagittal planes were acquired.    COMPARISON CT: CT chest abdomen pelvis dated 3/20/2023. CT abdomen pelvis   dated 2022      FINDINGS:    Artifact is present from patient arm positioning at the side of the body.    HEPATOBILIARY: 2 cm cyst in the left lobe liver. No radiopaque   gallstones. Nonspecific mild gallbladder wall edema and distention. No   definite biliary ductal dilatation, although partially obscured by   metallic coil artifact.    SPLEEN: Unremarkable.    PANCREAS: The pancreatic head is obscured by artifact. Otherwise,   unremarkable.    ADRENAL GLANDS: Unremarkable.    KIDNEYS: Symmetric parenchymal enhancement. No hydronephrosis or   perinephric stranding.  7 cm cystic lesion adjacent left kidney, possibly   an exophytic renal cyst versus adjacent lymphatic formation, unchanged   from prior exams.    ABDOMINOPELVIC NODES: No abdominal or pelvic lymphadenopathy.    PELVIC ORGANS: Urinary bladder is decompressed by Garrison catheter.    PERITONEUM/MESENTERY/BOWEL: No evidence of bowel obstruction. Trace   pelvic ascites. No areas of bowel wallthickening identified. Colonic   diverticulosis without evidence of diverticulitis. Metallic coil in the   region of the GDA, obscuring portions of the duodenum and gastric outflow   tract. No free air. A rectal thermometer is noted.    VASCULATURE: No abdominal aortic aneurysm.    BONES/SOFT TISSUES: Status post ORIF of an intertrochanteric right femur   fracture with local metallic hardware artifact. There is intramuscular   hemorrhage and/or edema surrounding the operative site, not significantly   changed from 3/20/2023. There is a 4 x 2 x 4 cm right gluteal   subcutaneous hematoma. There is anasarca. There is a fat-containing left   inguinal hernia. There is asymmetric density in the subcutaneous tissues   spanning the majority of the right lateral abdominal wall.      IMPRESSION:  1.  No definite infectious source identified in the abdomen and pelvis.   Somewhat limited exam with multiple sources of artifact.  2.  Nonspecific mild gallbladder wall edema and distention.  3.  Asymmetric density in the subcutaneous tissues spanning a large   portion of the right lateral abdominal wall, unclear if representing   asymmetric lymphedema/anasarca and/or hemorrhage.  4.  Status post ORIF of the right femur. Small right gluteal subcutaneous   hematoma.    --- End of Report ---            MATEO HESTER MD; Attending Radiologist  This document has been electronically signed. Mar 28 2023  4:54PM (23 @ 13:41)      CARDIOLOGY TESTING  12 Lead ECG:   Ventricular Rate 86 BPM    Atrial Rate 86 BPM    P-R Interval 156 ms    QRS Duration 122 ms    Q-T Interval 396 ms    QTC Calculation(Bazett) 473 ms    P Axis 17 degrees    R Axis 7 degrees    T Axis 44 degrees    Diagnosis Line Normal sinus rhythm  Right bundle branch block  Septal infarct , age undetermined  Possible Lateral infarct , age undetermined  Abnormal ECG    Confirmed by Leander Deshpande ([id number]) on 3/28/2023 7:54:31 PM (23 @ 14:13)  12 Lead ECG:   Ventricular Rate 120 BPM    Atrial Rate 120 BPM    P-R Interval 160 ms    QRS Duration 114 ms    Q-T Interval 326 ms    QTC Calculation(Bazett) 460 ms    P Axis 82 degrees    R Axis 20 degrees    T Axis 60 degrees    Diagnosis Line Sinus tachycardia  Incomplete right bundle branch block  Borderline ECG    Confirmed by PRETTY BOLAÑOS MD ([id number]) on 3/27/2023 7:25:57 AM (23 @ 04:18)      All available historical records have been reviewed    MEDICATIONS  acetaminophen     Tablet .. 650  aspirin  chewable 81  atorvastatin 80  atropine 1% Solution 1  chlorhexidine 0.12% Liquid 15  chlorhexidine 2% Cloths 1  clopidogrel Tablet 75  dexMEDEtomidine Infusion 0.1  dextrose 50% Injectable 25  doxazosin 4  fentaNYL   Infusion. 2  heparin   Injectable 5000  insulin lispro (ADMELOG) corrective regimen sliding scale   lactated ringers Bolus 500  lactated ringers Bolus 250  lactated ringers Bolus 250  lactated ringers Bolus 250  midodrine 10  pantoprazole  Injectable 40  phenylephrine    Infusion 0.1  piperacillin/tazobactam IVPB.. 3.375  prednisoLONE acetate 1% Suspension 1  vancomycin  IVPB 750  vasopressin Infusion 0.03      ANTIBIOTICS:  piperacillin/tazobactam IVPB.. 3.375 Gram(s) IV Intermittent every 8 hours  vancomycin  IVPB 750 milliGRAM(s) IV Intermittent every 12 hours      All available historical data has been reviewed    ASSESSMENT      IMPRESSION  #Spiking fevers since  with unknown etiology, pt is s/p right hip orif, intubated on    #No diarrhea per nurse report, pt initially with leucocytosis trended down with subsequent rise somewhat correlating to the fever  #CT chest showings partial consolidation vs atelectasis in RML, and pt has fine crackles on exam consistent with possible fluid less likely VAP at this point given no increased secretions   #Surgical site with clean dressing, no evidence of erythema or induration   #Pt has significant scrotal swelling with purple discoloration but no induration, unclear if this is new  #RUE swelling but no erythema or evidence of cellulitis   #RUQ US equivocal for cholecystis, AST/ALT/ALKP mildly elevated   #MRSA nares -ve, UA  -ve, Blood clx  -ve today, Blood clx  pending     RECOMMENDATIONS  - D/c Vancomycin   - C/w with Zosyn Q8H  - Recommend sending GGT  - Scrotal US  - RUE DVT  This is a pended note. 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 Pt walks with walker and got up in middle of night to get food and slipped and fell on his R side. Pt has been having R hip pain since with inability to ambulate. Pts family finally convinced him to come to hospital today. Pt is having 10/10 pain currently. Denies dizziness, CP or LOC prior to fall. Denies head trauma. No HA.      Infectious Diseases History:  Old Micro Data/Cultures: No pertinent hx     FAMILY HISTORY:  FH: colon cancer (Mother)      PAST MEDICAL & SURGICAL HISTORY:  CAD (coronary artery disease)  Hypercholesteremia  BPH (benign prostatic hyperplasia)  Kidney stones  Prostate cancer  Status post cardiac surgery  cardiac stents  Bilateral cataracts  History of lithotripsy          SOCIAL HISTORY  Social History:  Lives with son in apartment     Recent Travel: No recent travel   Other Exposures: No exposure prior to admission     ROS  General: Denies rigors, nightsweats  HEENT: Denies headache, rhinorrhea, sore throat, eye pain  CV: Denies CP, palpitations  PULM: Denies wheezing, hemoptysis  GI: Denies hematemesis, hematochezia, melena  : Denies discharge, hematuria  MSK: Denies arthralgias, myalgias  SKIN: Denies rash, lesions  NEURO: Denies paresthesias, weakness  PSYCH: Denies depression, anxiety    VITALS:  T(F): 97.6, Max: 99.5 (23 @ 12:00)  HR: 72  BP: 135/73  RR: 22Vital Signs Last 24 Hrs  T(C): 36.4 (29 Mar 2023 08:00), Max: 37.5 (28 Mar 2023 12:00)  T(F): 97.6 (29 Mar 2023 08:00), Max: 99.5 (28 Mar 2023 12:00)  HR: 72 (29 Mar 2023 10:15) (72 - 116)  BP: 135/73 (29 Mar 2023 10:00) (84/50 - 159/76)  BP(mean): 97 (29 Mar 2023 10:00) (60 - 110)  RR: 22 (29 Mar 2023 10:15) (17 - 36)  SpO2: 100% (29 Mar 2023 10:15) (76% - 100%)    Parameters below as of 29 Mar 2023 08:00  Patient On (Oxygen Delivery Method): ventilator    O2 Concentration (%): 40    PHYSICAL EXAM:  Gen: NAD, resting in bed  HEENT: Normocephalic, atraumatic  Neck: supple, no lymphadenopathy  CV: Regular rate & regular rhythm  Lungs: CTAB  Abdomen: Soft, BS present  Ext: Warm, well perfused  Neuro: non focal, awake  Skin: no rash, no lesions  Lines: no phlebitis    TESTS & MEASUREMENTS:                        8.1    13.08 )-----------( 214      ( 28 Mar 2023 21:05 )             25.6         142  |  113<H>  |  33<H>  ----------------------------<  195<H>  4.4   |  22  |  0.7    Ca    7.4<L>      29 Mar 2023 08:50  Phos  2.7       Mg     2.0         TPro  4.1<L>  /  Alb  2.1<L>  /  TBili  1.1  /  DBili  0.5<H>  /  AST  48<H>  /  ALT  50<H>  /  AlkPhos  125<H>        LIVER FUNCTIONS - ( 28 Mar 2023 11:37 )  Alb: 2.1 g/dL / Pro: 4.1 g/dL / ALK PHOS: 125 U/L / ALT: 50 U/L / AST: 48 U/L / GGT: x           Urinalysis Basic - ( 28 Mar 2023 11:36 )    Color: Yellow / Appearance: Clear / S.019 / pH: x  Gluc: x / Ketone: Negative  / Bili: Negative / Urobili: <2 mg/dL   Blood: x / Protein: 30 mg/dL / Nitrite: Negative   Leuk Esterase: Negative / RBC: 4 /HPF / WBC 1 /HPF   Sq Epi: x / Non Sq Epi: 1 /HPF / Bacteria: Negative        Culture - Blood (collected 23 @ 01:17)  Source: .Blood None  Preliminary Report (23 @ 09:01):    No growth to date.    Culture - Sputum (collected 23 @ 08:30)  Source: ET Tube ET Tube  Gram Stain (23 @ 06:50):    Few polymorphonuclear leukocytes per low power field    Rare Squamous epithelial cells per low power field    Rare Gram Negative Rods per oil power field    Rare Gram Positive Rods per oil power field  Final Report (23 @ 17:48):    Normal Respiratory Fidelia present            INFECTIOUS DISEASES TESTING  MRSA PCR Result.: Negative (23 @ 11:00)      RADIOLOGY & ADDITIONAL TESTS:  I have personally reviewed the last available Chest xray  CXR  Xray Chest 1 View- PORTABLE-Urgent:   ACC: [id number] EXAM:  XR CHEST PORTABLE URGENT 1V   ORDERED BY: PRINCE HERNANDEZ     PROCEDURE DATE:  2023          INTERPRETATION:  Clinical History / Reason for exam: NG tube adjustment    Comparison : Chest radiograph earlier the same day.    Technique/Positioning: AP chest.    Findings:    Support devices: Once again overlying wires obscure the lower lung field.   Cannot assess the nasogastric tube on this examination. Endotracheal tube   in satisfactory position. Right IJ catheter in satisfactory position.    Cardiac/mediastinum/hilum: No change    Lung parenchyma/Pleura: Bilateral pleural effusions and bilateral lung   opacities, unchanged.    Skeleton/soft tissues: Degenerative changes    Impression:    Once again cannot assess the nasogastric tube tip due to overlying   cardiac pad.    Bilateral pleural effusions and opacities, unchanged.    --- End of Report ---            DAVON CHAIDEZ MD; Attending Radiologist  This document has been electronically signed. Mar 28 2023 12:36PM (23 @ 11:08)      CT  CT Angio Chest PE Protocol w/ IV Cont:   ACC: [id number] EXAM:  CT ANGIO CHEST PULM ART [facility]   ORDERED BY: CECILLE BUCKLEY     PROCEDURE DATE:  2023          INTERPRETATION:  STUDY INDICATION: Tachycardia, r/o pe    Technique: CTA of the thorax was performed after administration of   contrast per the PE protocol. Sagittal and coronal reformats were   performed as well as 3D reconstructions and MIP reconstructed images.  Intravenous contrast: 100 cc of Omnipaque 350    Comparison: Chest CT  3/20/2023.    QUALITY STATEMENT: Patient upper extremities causing streak artifact   inherently degrades image quality and limits this exam.    Findings:    PULMONARY ARTERIES: No central or segmental pulmonary emboli.    Lungs/tubes: Endotracheal tube terminates approximately 5 - 6 cm above   the eliza. Enteric tube terminates in the stomach. Right IJ central   venous catheter terminates in the SVC.    Mediastinum/Lymph Nodes: No lymphadenopathy by size criteria.    Heart/Great Vessels: Visually the heart is without significant   enlargement. No pericardial effusion.  Normal caliber aorta. Multivessel   coronary arterial calcifications.    Abdomen: See separate report for evaluation of the abdomen.    Lungs, Pleura, and Airways: Severe emphysematous changes. Complete   occlusion of the bronchus intermedius extending into the middle lobe   causing complete occlusion of the right lower and partial occlusion of   the right middle lobe airways. Near-complete right lower lobar   consolidation and partial right middle lobe consolidation. Bilateral   right greater than left small pleural effusions. Dependent left lower   lobe consolidative changes.    Bones and soft tissues: Unchanged osseous structures.    IMPRESSION:    No central or segmental pulmonary embolus.    Complete occlusion of the bronchus intermedius extending into the middle   lobe causing complete occlusion of the right lower and partial occlusion   of the right middle lobe airways. No complete right lower lobar   consolidative changes/collapse and partial right middle lobe   consolidation. Bilateral right greater than left small pleural effusions.   Dependent left lower lobe consolidative changes. Findings likely   reflecting combination of atelectasis and pneumonia in the appropriate   clinical setting. Short-term follow-up CT is suggested.    --- End of Report ---          QUYNH TENA MD; Resident Radiologist  This document has been electronically signed.  MIQUEL ROBERSON MD; Attending Radiologist  This document has been electronically signed. Mar 28 2023  4:12PM (23 @ 13:41)  CT Abdomen and Pelvis w/ IV Cont:   ACC: [id number] EXAM:  CT ABDOMEN AND PELVIS IC   ORDERED BY: CECILLE BUCKLEY     PROCEDURE DATE:  2023          INTERPRETATION:  CLINICAL STATEMENT: Infection.    TECHNIQUE: Contiguous axial CT images were obtained from the lower chest   to the pubic symphysis after the intravenous administration 100 cc of   Omnipaque 350 (0 cc discarded).  Oral contrast was not administered.    Reformatted images in the coronal and sagittal planes were acquired.    COMPARISON CT: CT chest abdomen pelvis dated 3/20/2023. CT abdomen pelvis   dated 2022      FINDINGS:    Artifact is present from patient arm positioning at the side of the body.    HEPATOBILIARY: 2 cm cyst in the left lobe liver. No radiopaque   gallstones. Nonspecific mild gallbladder wall edema and distention. No   definite biliary ductal dilatation, although partially obscured by   metallic coil artifact.    SPLEEN: Unremarkable.    PANCREAS: The pancreatic head is obscured by artifact. Otherwise,   unremarkable.    ADRENAL GLANDS: Unremarkable.    KIDNEYS: Symmetric parenchymal enhancement. No hydronephrosis or   perinephric stranding.  7 cm cystic lesion adjacent left kidney, possibly   an exophytic renal cyst versus adjacent lymphatic formation, unchanged   from prior exams.    ABDOMINOPELVIC NODES: No abdominal or pelvic lymphadenopathy.    PELVIC ORGANS: Urinary bladder is decompressed by Garrison catheter.    PERITONEUM/MESENTERY/BOWEL: No evidence of bowel obstruction. Trace   pelvic ascites. No areas of bowel wallthickening identified. Colonic   diverticulosis without evidence of diverticulitis. Metallic coil in the   region of the GDA, obscuring portions of the duodenum and gastric outflow   tract. No free air. A rectal thermometer is noted.    VASCULATURE: No abdominal aortic aneurysm.    BONES/SOFT TISSUES: Status post ORIF of an intertrochanteric right femur   fracture with local metallic hardware artifact. There is intramuscular   hemorrhage and/or edema surrounding the operative site, not significantly   changed from 3/20/2023. There is a 4 x 2 x 4 cm right gluteal   subcutaneous hematoma. There is anasarca. There is a fat-containing left   inguinal hernia. There is asymmetric density in the subcutaneous tissues   spanning the majority of the right lateral abdominal wall.      IMPRESSION:  1.  No definite infectious source identified in the abdomen and pelvis.   Somewhat limited exam with multiple sources of artifact.  2.  Nonspecific mild gallbladder wall edema and distention.  3.  Asymmetric density in the subcutaneous tissues spanning a large   portion of the right lateral abdominal wall, unclear if representing   asymmetric lymphedema/anasarca and/or hemorrhage.  4.  Status post ORIF of the right femur. Small right gluteal subcutaneous   hematoma.    --- End of Report ---            MATEO HESTER MD; Attending Radiologist  This document has been electronically signed. Mar 28 2023  4:54PM (23 @ 13:41)      CARDIOLOGY TESTING  12 Lead ECG:   Ventricular Rate 86 BPM    Atrial Rate 86 BPM    P-R Interval 156 ms    QRS Duration 122 ms    Q-T Interval 396 ms    QTC Calculation(Bazett) 473 ms    P Axis 17 degrees    R Axis 7 degrees    T Axis 44 degrees    Diagnosis Line Normal sinus rhythm  Right bundle branch block  Septal infarct , age undetermined  Possible Lateral infarct , age undetermined  Abnormal ECG    Confirmed by Leander Deshpande ([id number]) on 3/28/2023 7:54:31 PM (23 @ 14:13)  12 Lead ECG:   Ventricular Rate 120 BPM    Atrial Rate 120 BPM    P-R Interval 160 ms    QRS Duration 114 ms    Q-T Interval 326 ms    QTC Calculation(Bazett) 460 ms    P Axis 82 degrees    R Axis 20 degrees    T Axis 60 degrees    Diagnosis Line Sinus tachycardia  Incomplete right bundle branch block  Borderline ECG    Confirmed by PRETTY BOLAÑOS MD ([id number]) on 3/27/2023 7:25:57 AM (23 @ 04:18)      All available historical records have been reviewed    MEDICATIONS  acetaminophen     Tablet .. 650  aspirin  chewable 81  atorvastatin 80  atropine 1% Solution 1  chlorhexidine 0.12% Liquid 15  chlorhexidine 2% Cloths 1  clopidogrel Tablet 75  dexMEDEtomidine Infusion 0.1  dextrose 50% Injectable 25  doxazosin 4  fentaNYL   Infusion. 2  heparin   Injectable 5000  insulin lispro (ADMELOG) corrective regimen sliding scale   lactated ringers Bolus 500  lactated ringers Bolus 250  lactated ringers Bolus 250  lactated ringers Bolus 250  midodrine 10  pantoprazole  Injectable 40  phenylephrine    Infusion 0.1  piperacillin/tazobactam IVPB.. 3.375  prednisoLONE acetate 1% Suspension 1  vancomycin  IVPB 750  vasopressin Infusion 0.03      ANTIBIOTICS:  piperacillin/tazobactam IVPB.. 3.375 Gram(s) IV Intermittent every 8 hours  vancomycin  IVPB 750 milliGRAM(s) IV Intermittent every 12 hours      All available historical data has been reviewed    ASSESSMENT      IMPRESSION  #Spiking fevers since  with unknown etiology, pt is s/p right hip orif, intubated on    #No diarrhea per nurse report, pt initially with leucocytosis trended down with subsequent rise somewhat correlating to the fever  #CT chest showings partial consolidation vs atelectasis in RML, and pt has fine crackles on exam consistent with possible fluid less likely VAP at this point given no increased secretions   #Surgical site with clean dressing, no evidence of erythema or induration   #Pt has significant scrotal swelling with purple discoloration but no induration, unclear if this is new  #RUE swelling but no erythema or evidence of cellulitis   #RUQ US equivocal for cholecystis, AST/ALT/ALKP mildly elevated   #MRSA nares -ve, UA  -ve, Blood clx  -ve today, Blood clx  pending, ET sputum  pending, Fungitell pending     RECOMMENDATIONS  - D/c Vancomycin   - C/w with Zosyn Q8H  - Recommend sending GGT  - Scrotal US  - R/o RUE DVT  This is a pended note. 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 Pt walks with walker and got up in middle of night to get food and slipped and fell on his R side. Pt has been having R hip pain since with inability to ambulate. Pts family finally convinced him to come to hospital today. Pt is having 10/10 pain currently. Denies dizziness, CP or LOC prior to fall. Denies head trauma. No HA.      Infectious Diseases History:  Old Micro Data/Cultures: No pertinent hx     FAMILY HISTORY:  FH: colon cancer (Mother)      PAST MEDICAL & SURGICAL HISTORY:  CAD (coronary artery disease)  Hypercholesteremia  BPH (benign prostatic hyperplasia)  Kidney stones  Prostate cancer  Status post cardiac surgery  cardiac stents  Bilateral cataracts  History of lithotripsy          SOCIAL HISTORY  Social History:  Lives with son in apartment     Recent Travel: No recent travel   Other Exposures: No exposure prior to admission     ROS  unable to obtain history secondary to patient's mental status and/or sedation      VITALS:  T(F): 97.6, Max: 99.5 (23 @ 12:00)  HR: 72  BP: 135/73  RR: 22Vital Signs Last 24 Hrs  T(C): 36.4 (29 Mar 2023 08:00), Max: 37.5 (28 Mar 2023 12:00)  T(F): 97.6 (29 Mar 2023 08:00), Max: 99.5 (28 Mar 2023 12:00)  HR: 72 (29 Mar 2023 10:15) (72 - 116)  BP: 135/73 (29 Mar 2023 10:00) (84/50 - 159/76)  BP(mean): 97 (29 Mar 2023 10:00) (60 - 110)  RR: 22 (29 Mar 2023 10:15) (17 - 36)  SpO2: 100% (29 Mar 2023 10:15) (76% - 100%)    Parameters below as of 29 Mar 2023 08:00  Patient On (Oxygen Delivery Method): ventilator    O2 Concentration (%): 40    PHYSICAL EXAM:  Gen:intubated  HEENT: Normocephalic, atraumatic  Neck: supple, no lymphadenopathy  CV: Regular rate & regular rhythm  Lungs: CTAB  Abdomen: Soft, BS present  Ext: Warm, well perfused  Neuro: non focal, awake  Skin: swollen scrotum with brusing, no induration   Lines: no phlebitis    TESTS & MEASUREMENTS:                        8.1    13.08 )-----------( 214      ( 28 Mar 2023 21:05 )             25.6         142  |  113<H>  |  33<H>  ----------------------------<  195<H>  4.4   |  22  |  0.7    Ca    7.4<L>      29 Mar 2023 08:50  Phos  2.7       Mg     2.0         TPro  4.1<L>  /  Alb  2.1<L>  /  TBili  1.1  /  DBili  0.5<H>  /  AST  48<H>  /  ALT  50<H>  /  AlkPhos  125<H>        LIVER FUNCTIONS - ( 28 Mar 2023 11:37 )  Alb: 2.1 g/dL / Pro: 4.1 g/dL / ALK PHOS: 125 U/L / ALT: 50 U/L / AST: 48 U/L / GGT: x           Urinalysis Basic - ( 28 Mar 2023 11:36 )    Color: Yellow / Appearance: Clear / S.019 / pH: x  Gluc: x / Ketone: Negative  / Bili: Negative / Urobili: <2 mg/dL   Blood: x / Protein: 30 mg/dL / Nitrite: Negative   Leuk Esterase: Negative / RBC: 4 /HPF / WBC 1 /HPF   Sq Epi: x / Non Sq Epi: 1 /HPF / Bacteria: Negative        Culture - Blood (collected 23 @ 01:17)  Source: .Blood None  Preliminary Report (23 @ 09:01):    No growth to date.    Culture - Sputum (collected 23 @ 08:30)  Source: ET Tube ET Tube  Gram Stain (23 @ 06:50):    Few polymorphonuclear leukocytes per low power field    Rare Squamous epithelial cells per low power field    Rare Gram Negative Rods per oil power field    Rare Gram Positive Rods per oil power field  Final Report (23 @ 17:48):    Normal Respiratory Fidelia present            INFECTIOUS DISEASES TESTING  MRSA PCR Result.: Negative (23 @ 11:00)      RADIOLOGY & ADDITIONAL TESTS:  I have personally reviewed the last available Chest xray  CXR  Xray Chest 1 View- PORTABLE-Urgent:   ACC: [id number] EXAM:  XR CHEST PORTABLE URGENT 1V   ORDERED BY: JESSIE   MARY     PROCEDURE DATE:  2023          INTERPRETATION:  Clinical History / Reason for exam: NG tube adjustment    Comparison : Chest radiograph earlier the same day.    Technique/Positioning: AP chest.    Findings:    Support devices: Once again overlying wires obscure the lower lung field.   Cannot assess the nasogastric tube on this examination. Endotracheal tube   in satisfactory position. Right IJ catheter in satisfactory position.    Cardiac/mediastinum/hilum: No change    Lung parenchyma/Pleura: Bilateral pleural effusions and bilateral lung   opacities, unchanged.    Skeleton/soft tissues: Degenerative changes    Impression:    Once again cannot assess the nasogastric tube tip due to overlying   cardiac pad.    Bilateral pleural effusions and opacities, unchanged.    --- End of Report ---            DAVON CHAIDEZ MD; Attending Radiologist  This document has been electronically signed. Mar 28 2023 12:36PM (23 @ 11:08)      CT  CT Angio Chest PE Protocol w/ IV Cont:   ACC: [id number] EXAM:  CT ANGIO CHEST PULM ART [facility]   ORDERED BY: CECILLE BUCKLEY     PROCEDURE DATE:  2023          INTERPRETATION:  STUDY INDICATION: Tachycardia, r/o pe    Technique: CTA of the thorax was performed after administration of   contrast per the PE protocol. Sagittal and coronal reformats were   performed as well as 3D reconstructions and MIP reconstructed images.  Intravenous contrast: 100 cc of Omnipaque 350    Comparison: Chest CT  3/20/2023.    QUALITY STATEMENT: Patient upper extremities causing streak artifact   inherently degrades image quality and limits this exam.    Findings:    PULMONARY ARTERIES: No central or segmental pulmonary emboli.    Lungs/tubes: Endotracheal tube terminates approximately 5 - 6 cm above   the eliza. Enteric tube terminates in the stomach. Right IJ central   venous catheter terminates in the SVC.    Mediastinum/Lymph Nodes: No lymphadenopathy by size criteria.    Heart/Great Vessels: Visually the heart is without significant   enlargement. No pericardial effusion.  Normal caliber aorta. Multivessel   coronary arterial calcifications.    Abdomen: See separate report for evaluation of the abdomen.    Lungs, Pleura, and Airways: Severe emphysematous changes. Complete   occlusion of the bronchus intermedius extending into the middle lobe   causing complete occlusion of the right lower and partial occlusion of   the right middle lobe airways. Near-complete right lower lobar   consolidation and partial right middle lobe consolidation. Bilateral   right greater than left small pleural effusions. Dependent left lower   lobe consolidative changes.    Bones and soft tissues: Unchanged osseous structures.    IMPRESSION:    No central or segmental pulmonary embolus.    Complete occlusion of the bronchus intermedius extending into the middle   lobe causing complete occlusion of the right lower and partial occlusion   of the right middle lobe airways. No complete right lower lobar   consolidative changes/collapse and partial right middle lobe   consolidation. Bilateral right greater than left small pleural effusions.   Dependent left lower lobe consolidative changes. Findings likely   reflecting combination of atelectasis and pneumonia in the appropriate   clinical setting. Short-term follow-up CT is suggested.    --- End of Report ---          QUYNH TENA MD; Resident Radiologist  This document has been electronically signed.  MIQUEL ROBERSON MD; Attending Radiologist  This document has been electronically signed. Mar 28 2023  4:12PM (23 @ 13:41)  CT Abdomen and Pelvis w/ IV Cont:   ACC: [id number] EXAM:  CT ABDOMEN AND PELVIS IC   ORDERED BY: CECILLE BUCKLEY     PROCEDURE DATE:  2023          INTERPRETATION:  CLINICAL STATEMENT: Infection.    TECHNIQUE: Contiguous axial CT images were obtained from the lower chest   to the pubic symphysis after the intravenous administration 100 cc of   Omnipaque 350 (0 cc discarded).  Oral contrast was not administered.    Reformatted images in the coronal and sagittal planes were acquired.    COMPARISON CT: CT chest abdomen pelvis dated 3/20/2023. CT abdomen pelvis   dated 2022      FINDINGS:    Artifact is present from patient arm positioning at the side of the body.    HEPATOBILIARY: 2 cm cyst in the left lobe liver. No radiopaque   gallstones. Nonspecific mild gallbladder wall edema and distention. No   definite biliary ductal dilatation, although partially obscured by   metallic coil artifact.    SPLEEN: Unremarkable.    PANCREAS: The pancreatic head is obscured by artifact. Otherwise,   unremarkable.    ADRENAL GLANDS: Unremarkable.    KIDNEYS: Symmetric parenchymal enhancement. No hydronephrosis or   perinephric stranding.  7 cm cystic lesion adjacent left kidney, possibly   an exophytic renal cyst versus adjacent lymphatic formation, unchanged   from prior exams.    ABDOMINOPELVIC NODES: No abdominal or pelvic lymphadenopathy.    PELVIC ORGANS: Urinary bladder is decompressed by Garrison catheter.    PERITONEUM/MESENTERY/BOWEL: No evidence of bowel obstruction. Trace   pelvic ascites. No areas of bowel wallthickening identified. Colonic   diverticulosis without evidence of diverticulitis. Metallic coil in the   region of the GDA, obscuring portions of the duodenum and gastric outflow   tract. No free air. A rectal thermometer is noted.    VASCULATURE: No abdominal aortic aneurysm.    BONES/SOFT TISSUES: Status post ORIF of an intertrochanteric right femur   fracture with local metallic hardware artifact. There is intramuscular   hemorrhage and/or edema surrounding the operative site, not significantly   changed from 3/20/2023. There is a 4 x 2 x 4 cm right gluteal   subcutaneous hematoma. There is anasarca. There is a fat-containing left   inguinal hernia. There is asymmetric density in the subcutaneous tissues   spanning the majority of the right lateral abdominal wall.      IMPRESSION:  1.  No definite infectious source identified in the abdomen and pelvis.   Somewhat limited exam with multiple sources of artifact.  2.  Nonspecific mild gallbladder wall edema and distention.  3.  Asymmetric density in the subcutaneous tissues spanning a large   portion of the right lateral abdominal wall, unclear if representing   asymmetric lymphedema/anasarca and/or hemorrhage.  4.  Status post ORIF of the right femur. Small right gluteal subcutaneous   hematoma.    --- End of Report ---            MATEO HESTER MD; Attending Radiologist  This document has been electronically signed. Mar 28 2023  4:54PM (23 @ 13:41)      CARDIOLOGY TESTING  12 Lead ECG:   Ventricular Rate 86 BPM    Atrial Rate 86 BPM    P-R Interval 156 ms    QRS Duration 122 ms    Q-T Interval 396 ms    QTC Calculation(Bazett) 473 ms    P Axis 17 degrees    R Axis 7 degrees    T Axis 44 degrees    Diagnosis Line Normal sinus rhythm  Right bundle branch block  Septal infarct , age undetermined  Possible Lateral infarct , age undetermined  Abnormal ECG    Confirmed by Leander Deshpande ([id number]) on 3/28/2023 7:54:31 PM (23 @ 14:13)  12 Lead ECG:   Ventricular Rate 120 BPM    Atrial Rate 120 BPM    P-R Interval 160 ms    QRS Duration 114 ms    Q-T Interval 326 ms    QTC Calculation(Bazett) 460 ms    P Axis 82 degrees    R Axis 20 degrees    T Axis 60 degrees    Diagnosis Line Sinus tachycardia  Incomplete right bundle branch block  Borderline ECG    Confirmed by PRETTY BOLAÑOS MD ([id number]) on 3/27/2023 7:25:57 AM (23 @ 04:18)      All available historical records have been reviewed    MEDICATIONS  acetaminophen     Tablet .. 650  aspirin  chewable 81  atorvastatin 80  atropine 1% Solution 1  chlorhexidine 0.12% Liquid 15  chlorhexidine 2% Cloths 1  clopidogrel Tablet 75  dexMEDEtomidine Infusion 0.1  dextrose 50% Injectable 25  doxazosin 4  fentaNYL   Infusion. 2  heparin   Injectable 5000  insulin lispro (ADMELOG) corrective regimen sliding scale   lactated ringers Bolus 500  lactated ringers Bolus 250  lactated ringers Bolus 250  lactated ringers Bolus 250  midodrine 10  pantoprazole  Injectable 40  phenylephrine    Infusion 0.1  piperacillin/tazobactam IVPB.. 3.375  prednisoLONE acetate 1% Suspension 1  vancomycin  IVPB 750  vasopressin Infusion 0.03      ANTIBIOTICS:  piperacillin/tazobactam IVPB.. 3.375 Gram(s) IV Intermittent every 8 hours  vancomycin  IVPB 750 milliGRAM(s) IV Intermittent every 12 hours      All available historical data has been reviewed    ASSESSMENT      IMPRESSION  #Spiking fevers since  with unknown etiology, pt is s/p right hip orif, intubated on    #No diarrhea per nurse report, pt initially with leucocytosis trended down with subsequent rise somewhat correlating to the fever  #CT chest showings partial consolidation vs atelectasis in RML, and pt has fine crackles on exam consistent with possible fluid less likely VAP at this point given no increased secretions   #Surgical site with clean dressing, no evidence of erythema or induration   #Pt has significant scrotal swelling with purple discoloration but no induration, unclear if this is new  #RUE swelling but no erythema or evidence of cellulitis   #RUQ US equivocal for cholecystis, AST/ALT/ALKP mildly elevated   #MRSA nares -ve, UA  -ve, Blood clx  -ve today, Blood clx  pending, ET sputum  pending, Fungitell pending     RECOMMENDATIONS  - D/c Vancomycin   - C/w with Zosyn Q8H  - Recommend sending GGT  - Scrotal US  - R/o RUE DVT    Please call or message on Microsoft Teams if with any questions.  Spectra [id number]

## 2023-03-29 NOTE — PROGRESS NOTE ADULT - SUBJECTIVE AND OBJECTIVE BOX
RADHA RAMON     [id number]   [birth year]  77yM      Admit Date/LOS: 03-20 (2d)  Indication for SDU/SICU: Hypotension with pressor requirements. Intubated.        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 Pt walks with walker and got up in middle of night to get food and slipped and fell on his R side. Pt has been having R hip pain since with inability to ambulate. Pts family finally convinced him to come to hospital today. Pt is having 10/10 pain currently. Denies dizziness, CP or LOC prior to fall. Denies head trauma. No HA. (20 Mar 2023 12:02)    3/22: Patient S/P ORIF of right hip using interlocking intramedullary stephon.     Procedure:  OR Stats  OR Time:    EBL: 75  IV Fluids: 2700  Blood Products: None  UOP: Not noted. No blair placed in OR  Findings- Displaced intertrochanteric fracture appreciated. 11mm x 200mm 130, lag screw 10.5 x 105mm    SICU consulted for hypotension requiring pressors.    Patient was a rapid response in the PACU post-operatively due to hypotension. Patient examined bedside and was responding to verbal stimuli. Albumin and fluid bolus given. Labs drawn off A-line and Urbano started. NGT placed by SICU. Blair placed by urology due to difficulty passing Blair in setting of patient with prostate CA and BPH.    24Hours  3/28  NIGHT  15mmol Naphos  RUQ sono: biliary sludge, mild GB wall thickening/edema.     DAY  -send cardiac enzymes (trop neg), EKG, blood clx, ua (neg,small blood), fungitell, ruq sono, procal  -CT chest to r/o PE and CTAP completed NEGATIVE  -pulling low tidal volumes, end tidal CO2 requirements increasing --> CXR, abg  - GOC d/w petra Zuleta : full code, family leaning towards trach.       [] A ten-point review of systems was otherwise negative except as noted above.  [x] Due to altered mental status/intubation, subjective information was not attained from the patient. History was obtained, to the extent possible, from review of the chart and collateral sources of information.    =========================================================================================================================================       RADHA RAMON     [id number]   [birth year]  77yM      Admit Date/LOS: 03-20 (2d)  Indication for SDU/SICU: Hypotension with pressor requirements. Intubated.        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 Pt walks with walker and got up in middle of night to get food and slipped and fell on his R side. Pt has been having R hip pain since with inability to ambulate. Pts family finally convinced him to come to hospital today. Pt is having 10/10 pain currently. Denies dizziness, CP or LOC prior to fall. Denies head trauma. No HA. (20 Mar 2023 12:02)    3/22: Patient S/P ORIF of right hip using interlocking intramedullary stephon.     Procedure:  OR Stats  OR Time:    EBL: 75  IV Fluids: 2700  Blood Products: None  UOP: Not noted. No blair placed in OR  Findings- Displaced intertrochanteric fracture appreciated. 11mm x 200mm 130, lag screw 10.5 x 105mm    SICU consulted for hypotension requiring pressors.    Patient was a rapid response in the PACU post-operatively due to hypotension. Patient examined bedside and was responding to verbal stimuli. Albumin and fluid bolus given. Labs drawn off A-line and Urbano started. NGT placed by SICU. Blair placed by urology due to difficulty passing Blair in setting of patient with prostate CA and BPH.    24Hours  3/28  NIGHT  15mmol Naphos  RUQ sono: biliary sludge, mild GB wall thickening/edema.     DAY  -send cardiac enzymes (trop neg), EKG, blood clx, ua (neg,small blood), fungitell, ruq sono, procal  -CT chest to r/o PE and CTAP completed NEGATIVE  -pulling low tidal volumes, end tidal CO2 requirements increasing --> CXR, abg  - GOC d/w petra Zuleta : full code, family leaning towards trach.       [] A ten-point review of systems was otherwise negative except as noted above.  [x] Due to altered mental status/intubation, subjective information was not attained from the patient. History was obtained, to the extent possible, from review of the chart and collateral sources of information.    =========================================================================================================================================      Daily     Daily   Diet, NPO with Tube Feed:   Tube Feeding Modality: Orogastric  Peptamen A.F. Formula  Total Volume for 24 Hours (mL): 1200  Continuous  Starting Tube Feed Rate mL per Hour: 20  Increase Tube Feed Rate by (mL): 20  Until Goal Tube Feed Rate (mL per Hour): 50  Tube Feed Duration (in Hours): 24  Tube Feed Start Time: 21:00 (03-25-23 @ 19:55)    CURRENT MEDS:  Neurologic Medications  acetaminophen     Tablet .. 650 milliGRAM(s) Oral every 6 hours  dexMEDEtomidine Infusion 0.1 MICROgram(s)/kG/Hr IV Continuous <Continuous>  fentaNYL    Injectable 25 MICROGram(s) IV Push every 4 hours PRN Severe Pain (7 - 10)  fentaNYL   Infusion. 2 MICROgram(s)/kG/Hr IV Continuous <Continuous>  ondansetron Injectable 4 milliGRAM(s) IV Push every 8 hours PRN Nausea and/or Vomiting    Respiratory Medications    Cardiovascular Medications  doxazosin 4 milliGRAM(s) Oral at bedtime  midodrine 10 milliGRAM(s) Oral every 8 hours  phenylephrine    Infusion 0.1 MICROgram(s)/kG/Min IV Continuous <Continuous>    Gastrointestinal Medications  lactated ringers Bolus 500 milliLiter(s) IV Bolus once  lactated ringers Bolus 250 milliLiter(s) IV Bolus once  lactated ringers Bolus 250 milliLiter(s) IV Bolus once  pantoprazole  Injectable 40 milliGRAM(s) IV Push daily  sodium chloride 0.9% lock flush 10 milliLiter(s) IV Push every 1 hour PRN Pre/post blood products, medications, blood draw, and to maintain line patency    Genitourinary Medications    Hematologic/Oncologic Medications  aspirin  chewable 81 milliGRAM(s) Oral daily  clopidogrel Tablet 75 milliGRAM(s) Oral daily  heparin   Injectable 5000 Unit(s) SubCutaneous every 8 hours    Antimicrobial/Immunologic Medications  fluconAZOLE IVPB 400 milliGRAM(s) IV Intermittent every 24 hours  piperacillin/tazobactam IVPB.. 3.375 Gram(s) IV Intermittent every 8 hours  vancomycin  IVPB 750 milliGRAM(s) IV Intermittent every 12 hours    Endocrine/Metabolic Medications  atorvastatin 80 milliGRAM(s) Oral at bedtime  insulin lispro (ADMELOG) corrective regimen sliding scale   SubCutaneous every 6 hours  vasopressin Infusion 0.03 Unit(s)/Min IV Continuous <Continuous>    Topical/Other Medications  atropine 1% Solution 1 Drop(s) Left EYE two times a day  chlorhexidine 0.12% Liquid 15 milliLiter(s) Oral Mucosa every 12 hours  chlorhexidine 2% Cloths 1 Application(s) Topical <User Schedule>  prednisoLONE acetate 1% Suspension 1 Drop(s) Left EYE every 6 hours    ICU Vital Signs Last 24 Hrs  T(C): 36.7 (29 Mar 2023 16:00), Max: 37.1 (28 Mar 2023 23:00)  T(F): 98 (29 Mar 2023 16:00), Max: 98.7 (28 Mar 2023 23:00)  HR: 66 (29 Mar 2023 19:00) (62 - 116)  BP: 102/59 (29 Mar 2023 17:00) (84/50 - 159/76)  BP(mean): 76 (29 Mar 2023 17:00) (60 - 110)  ABP: 124/60 (29 Mar 2023 19:00) (94/45 - 176/75)  ABP(mean): 84 (29 Mar 2023 19:00) (54 - 112)  RR: 17 (29 Mar 2023 19:00) (16 - 36)  SpO2: 100% (29 Mar 2023 19:00) (76% - 100%)    O2 Parameters below as of 29 Mar 2023 18:30  Patient On (Oxygen Delivery Method): ventilator    O2 Concentration (%): 40      Mode: AC/ CMV (Assist Control/ Continuous Mandatory Ventilation)  RR (machine): 12  TV (machine): 450  FiO2: 40  PEEP: 6  ITime: 0.9  MAP: 10  PIP: 25    ABG - ( 29 Mar 2023 14:55 )  pH, Arterial: 7.39  pH, Blood: x     /  pCO2: 36    /  pO2: 104   / HCO3: 22    / Base Excess: -2.8  /  SaO2: 98.9              I&O's Summary    28 Mar 2023 07:01  -  29 Mar 2023 07:00  --------------------------------------------------------  IN: 4927.5 mL / OUT: 1290 mL / NET: 3637.5 mL    29 Mar 2023 07:01  -  29 Mar 2023 21:01  --------------------------------------------------------  IN: 1730.5 mL / OUT: 550 mL / NET: 1180.5 mL      I&O's Detail    28 Mar 2023 07:01  -  29 Mar 2023 07:00  --------------------------------------------------------  IN:    Dexmedetomidine: 336 mL    Enteral Tube Flush: 20 mL    FentaNYL: 160 mL    Glucerna: 910 mL    IV PiggyBack: 64 mL    IV PiggyBack: 1251 mL    IV PiggyBack: 200 mL    IV PiggyBack: 437.5 mL    Lactated Ringers Bolus: 500 mL    Phenylephrine: 1031 mL    Vasopressin: 18 mL  Total IN: 4927.5 mL    OUT:    Indwelling Catheter - Urethral (mL): 1290 mL    Peptamen A.F.: 0 mL  Total OUT: 1290 mL    Total NET: 3637.5 mL      29 Mar 2023 07:01  -  29 Mar 2023 21:01  --------------------------------------------------------  IN:    Dexmedetomidine: 233.7 mL    Enteral Tube Flush: 180 mL    FentaNYL: 115.2 mL    Glucerna: 600 mL    IV PiggyBack: 250 mL    Lactated Ringers Bolus: 250 mL    Phenylephrine: 47.6 mL    Vasopressin: 27 mL    Vasopressin: 27 mL  Total IN: 1730.5 mL    OUT:    Indwelling Catheter - Urethral (mL): 550 mL    Nasogastric/Oral tube (mL): 0 mL  Total OUT: 550 mL    Total NET: 1180.5 mL          PHYSICAL EXAM:   alert  follows comannds  intubated  equal chest rise  abdomen soft  blair in place  scrotal swelling/echymotic  right thigh incision site c/d/i  no tenderness, no soft to palpation

## 2023-03-30 NOTE — PROGRESS NOTE ADULT - SUBJECTIVE AND OBJECTIVE BOX
Orthopaedic Surgery Progress Note    RADHA RAMON  [id number]    77M s/p right hip IMN for right IT fx. S&E at bedside this AM. remains intubated and sedated in the ICU. Got CTA which showed no PE but bronchus occlusion.    acetaminophen     Tablet .. 650 milliGRAM(s) Oral every 6 hours  aspirin  chewable 81 milliGRAM(s) Oral daily  atorvastatin 80 milliGRAM(s) Oral at bedtime  atropine 1% Solution 1 Drop(s) Left EYE two times a day  chlorhexidine 0.12% Liquid 15 milliLiter(s) Oral Mucosa every 12 hours  chlorhexidine 2% Cloths 1 Application(s) Topical <User Schedule>  clopidogrel Tablet 75 milliGRAM(s) Oral daily  dexMEDEtomidine Infusion 0.1 MICROgram(s)/kG/Hr IV Continuous <Continuous>  doxazosin 4 milliGRAM(s) Oral at bedtime  fentaNYL   Infusion. 2 MICROgram(s)/kG/Hr IV Continuous <Continuous>  fluconAZOLE IVPB 400 milliGRAM(s) IV Intermittent every 24 hours  heparin   Injectable 5000 Unit(s) SubCutaneous every 8 hours  insulin lispro (ADMELOG) corrective regimen sliding scale   SubCutaneous every 6 hours  lactated ringers Bolus 500 milliLiter(s) IV Bolus once  lactated ringers Bolus 250 milliLiter(s) IV Bolus once  lactated ringers Bolus 250 milliLiter(s) IV Bolus once  midodrine 10 milliGRAM(s) Oral every 8 hours  ondansetron Injectable 4 milliGRAM(s) IV Push every 8 hours PRN  pantoprazole  Injectable 40 milliGRAM(s) IV Push daily  phenylephrine    Infusion 0.1 MICROgram(s)/kG/Min IV Continuous <Continuous>  piperacillin/tazobactam IVPB.. 3.375 Gram(s) IV Intermittent every 8 hours  prednisoLONE acetate 1% Suspension 1 Drop(s) Left EYE every 6 hours  sodium chloride 0.9% lock flush 10 milliLiter(s) IV Push every 1 hour PRN  vancomycin  IVPB 750 milliGRAM(s) IV Intermittent every 12 hours  vasopressin Infusion 0.03 Unit(s)/Min IV Continuous <Continuous>      T(C): 37.2 (03-30-23 @ 04:00), Max: 37.2 (03-30-23 @ 04:00)  HR: 75 (03-30-23 @ 06:30) (61 - 112)  BP: 102/59 (03-29-23 @ 17:00) (102/59 - 159/76)  RR: 19 (03-30-23 @ 06:30) (14 - 35)  SpO2: 100% (03-30-23 @ 06:30) (76% - 100%)    P/E:  NAD  Nonlabored breathing    RLE  Dressing c/d/i  SILT SP/DP/T/Sural/Saph  Firing TA/EHL/FHL/GS  Foot WWP, 2+ DP pulse    Labs                        7.5    8.47  )-----------( 211      ( 29 Mar 2023 21:23 )             23.6     03-29    141  |  112<H>  |  31<H>  ----------------------------<  161<H>  4.8   |  22  |  0.6<L>    Ca    7.8<L>      29 Mar 2023 21:23  Phos  2.3     03-29  Mg     2.0     03-29    TPro  4.0<L>  /  Alb  2.0<L>  /  TBili  0.9  /  DBili  0.3  /  AST  74<H>  /  ALT  77<H>  /  AlkPhos  175<H>  03-29    LIVER FUNCTIONS - ( 29 Mar 2023 21:23 )  Alb: 2.0 g/dL / Pro: 4.0 g/dL / ALK PHOS: 175 U/L / ALT: 77 U/L / AST: 74 U/L / GGT: 84 U/L           A/P :  Pt is a 78yo Male s/p right hip IMN on 3/20. Patient remains intubated/sedated due to acute respiratory failure secondary to postop arrythmia requiring mechanical ventilation.    -    Pain control  -    DVT ppx per SICU   -    Weight bearing status: WBAT   -    Physical Therapy when able  -    Dressing changes per ortho  -    Rest of care per SICU

## 2023-03-30 NOTE — PROGRESS NOTE ADULT - ATTENDING COMMENTS
Critical Care: [id number]   This patient has a high probability of sudden, clinically significant deterioration, which requires the highest level of physician preparedness to intervene urgently. I managed/supervised life or organ supporting interventions that required frequent physician assessment. I devoted my full attention in the ICU to the direct care of this patient for the period of time indicated below. Time I spent with the family or surrogate(s) is included only if the patient was incapable of providing the necessary information or participating in medical decision making. Time devoted to teaching and to any procedures I billed separately is not included.     RADHA RAMON 77y Male admitted to [x ] SICU /[ ] SDU with post OP hemodynamic instability requiring pressors, vented due to respiratory failure  Patient is examined and evaluated at the bedside with SICU team. Treatment plan discussed with SICU team, nurses and primary team.   Chest X-ray and all relevant studies reviewed during rounds.  Will continue hemodynamic monitoring as per protocol in SICU.    Neuro:  GCS [10T ]   [ ]  Neurovascular checks as per SICU protocol                 [ ] 3% NaCl                     Paralysis [ ] Yes  [ ]  No      Sedated/Pain control with                 [ ] Dilaudid drip, [ ]  Ketamine drip, [ ] Fentanyl drip, [ ] Propofol, [ ] Precedex, [ ] Versed drip, [ ] Ativan drip,                           [ ] OxyContin standing,  [ ] OxyContin PRN, [ ] Dilaudid PRN pushes, [ ] Fentanyl PRN pushes, [ ] PCA,                [ ] Tylenol IV/PO, [ ] Gabapentin, [ ]  Ketorolac, [ ] Tramadol,  [ ] Lidoderm Patch       Other Medications               [ ] Seroquel, [ ] Zyprexa, [ ] Haldol,  [ ] Clonazepam [ ] Xanax, [ ] Versed/Ativan PRN, [ ] Valium [ ] None               [ ] Robaxin   [ ] Baclofen  [ ] Flexeril               [ ] Keppra  [ ] Lamictal  [ ] Depakote  [ ] Dilantin               [ ] CIWA (Ativan/Valium/ Librium)  CV: continue to monitor  On pressors [ ]  Yes  [ ]  No          [ ]  Levophed, [ ] Urbano-Synephrine, [ ] Vasopressin, [ ]  Epinephrine          [ ] Dobutamine, [ ] Milrinone, [ ]  Midodrine,  [ ] Others    Other Cardiac Meds          [ ] Amiodarone IV/PO, [ ] Digoxin, [ ] Cardizem drip, [ ] Cleviprex drip, [ ] Esmolol drip, [ ] Cardene drip    Respiratory: Acute respiratory insufficiency -> continue monitoring                        None Invasive Support  [ ] Incentive Spirometer                                  [ ] BiPAP   [ ] CPAP/NIV   [ ] HFNC   [ ] NR Face Mask   [ ] NC  [ ] Trach Caller                        Ventilatory support  [ ] Yes [ ] No                            [ ] SBT                                  [ ] PC    [ ] VC   [ ] AC/PRVC   [ ] BiVent/APRV   [ ]CPAP   GI  [ ]  bowel regiment  [ ] BM  [ ] Flatus              [ ] Ostomy   [ ] NG tube        Prophylaxis [ ] PPI  [ ] H2 Blockers  [ ] Others  Nutrition: continue   [ ] Diet   [ ] TPN/PPN   [ ] calories count   [ ]  Tube Feeds     Renal: Continue I&Os monitoring, Garrison catheter  [ ] Yes,  [ ]   No ,  [ ] Consideration for discontinuation [ ] Taxes cath/PrimaFit  [ ] TOV  [ ] HD/CVVH       Lytes/Acid-base: replete hypokalemia, hypomagnesemia, hypocalcemia, hypophosphatemia        IV Fluids   [ ] LR,  [ ] NS  [ ] D5W1/2NS [ ] Bicarbonate Drip  [ ] Albumin [ ] Lasix  ID: leukocytosis -> continue to monitor:         IV Abx [ ] Yes, [ ] No;  ID consulted [ ] Yes, [ ] No        Cultures send  [ ] Respiratory   [ ] Blood   [ ] Urine  [ ] Fluids  [ ] Tissue  [ ]  None  Lines:   [ ] Right   [ ] Left  [ ] Bilateral                     [ ] Subclavian TLC        [ ]  Internal Jugular TLC     [ ]  Femoral TLC                     [ ] Subclavian Cordis    [ ] Internal Jugular Cordis   [ ] Femoral Cordis                     [ ] HD catheter    [ ] PICC     [ ]  Midline   [ ] Peripheral IVs                                                 [ ] Right   [ ] Left   [ ] None                     [ ] Radial A-Line    [ ] Femoral A-Line   [ ] Axillary A-Line               Heme: continue to evaluate for acute blood loss anemia- trend Hg/Hct                     AC Reversal Indicated [ ] Yes  [ ] No                                      [ ] Kcentra,  [ ] 3Factors concentrate, [ ] Adnexxa                     Transfused  indicated  [ ] Yes, [ ] No    [ ] PRBCs   [ ] Platelets   [ ] FFPs   [ ] Cryoprecipitate                    Should be started on or continued with  following  [ ] Yes,  [ ] No                               [ ] Lovenox  [ ] Coumadin  [ ] Heparin drip  [ ] NOVACs  [ ] ASA  [ ] Antiplatelets   Endocrine: Prevent and treat hyperglycemia with insulin as needed,                                 Insuline drip [ ] Yes, [ ] No   PV: follow pulse exam  Skin: decub precautions  DVT Prophylaxis:  [ ] SCDs  [ ] Heparin SQ  [ ] Lovenox  SQ  Stress Gastritis Prophylaxis: PPI/H2 Blockers if indicated  Mobility: patient is evaluated at the bedside with mobility team and the goals for today are discussed with PT [ ]    PATIENT/FAMILY/SURROGATE CONFERENCE:  [ ] Yes with patient at the bedside. [ ] No  PURPOSE: To obtain necessary information, To discuss treatment options under consideration today.    I saw and evaluated the patient personally. I have reviewed and agree with note above. Treatment plan discussed with SICU team, nurses and primary team at the time of the multidisciplinary rounds. The above note is NOT written at the time of rounds and will reflect all changes throughout management of the patient for the day note is written for.    Nancy Medrano MD, FACS  Trauma/ACS/SCC Attending Critical Care: [id number]   This patient has a high probability of sudden, clinically significant deterioration, which requires the highest level of physician preparedness to intervene urgently. I managed/supervised life or organ supporting interventions that required frequent physician assessment. I devoted my full attention in the ICU to the direct care of this patient for the period of time indicated below. Time I spent with the family or surrogate(s) is included only if the patient was incapable of providing the necessary information or participating in medical decision making. Time devoted to teaching and to any procedures I billed separately is not included.     RADHA RAMON 77y Male admitted to [x ] SICU /[ ] SDU with post OP hemodynamic instability requiring pressors, vented due to respiratory failure post-OP, A.Fib with hemodynamic instability requiring pressors, electrolytes abnormalities   Patient is examined and evaluated at the bedside with SICU team. Treatment plan discussed with SICU team, nurses and primary team.   Chest X-ray and all relevant studies reviewed during rounds.  Will continue hemodynamic monitoring as per protocol in SICU.    Neuro:  GCS [10T ]   [x ]  Neurovascular checks as per SICU protocol                 [ ] 3% NaCl                     Paralysis [ ] Yes  [x ]  No      Sedated/Pain control with                 [ ] Dilaudid drip, [ ]  Ketamine drip, [ x] Fentanyl drip, [ ] Propofol, [x ] Precedex, [ ] Versed drip, [ ] Ativan drip,                           [ ] OxyContin standing,  [ ] OxyContin PRN, [ ] Dilaudid PRN pushes, [ ] Fentanyl PRN pushes, [ ] PCA,                [x ] Tylenol IV/PO, [x ] Gabapentin, [ ]  Ketorolac, [ ] Tramadol,  [ ] Lidoderm Patch       Other Medications               [ ] Seroquel, [ ] Zyprexa, [ ] Haldol,  [ ] Clonazepam [ ] Xanax, [ ] Versed/Ativan PRN, [ ] Valium [x ] None               [ ] Robaxin   [ ] Baclofen  [ ] Flexeril               [ ] Keppra  [ ] Lamictal  [ ] Depakote  [ ] Dilantin               [ ] CIWA (Ativan/Valium/ Librium)  CV: continue to monitor  On pressors [x ]  Yes  [ ]  No          [x ]  Levophed, [ ] Urbano-Synephrine, [x ] Vasopressin, [ ]  Epinephrine          [ ] Dobutamine, [ ] Milrinone, [ ]  Midodrine,  [ ] Others    Other Cardiac Meds          [ ] Amiodarone IV/PO, [ ] Digoxin, [ ] Cardizem drip, [ ] Cleviprex drip, [ ] Esmolol drip, [ ] Cardene drip    Respiratory: Acute respiratory insufficiency -> continue monitoring, ventilatory support                        None Invasive Support  [ ] Incentive Spirometer                                  [ ] BiPAP   [ ] CPAP/NIV   [ ] HFNC   [ ] NR Face Mask   [ ] NC  [ ] Trach Caller                        Ventilatory support  [ x] Yes [ ] No                            [ ] SBT                                  [ ] PC    [ ] VC   [ x] AC/PRVC   [ ] BiVent/APRV   [ ]CPAP   GI  [x ]  bowel regiment  [ x] BM + [x ] Flatus +             [ ] Ostomy   [x ] NG tube        Prophylaxis [x ] PPI  [ ] H2 Blockers  [ ] Others  Nutrition: continue   [x ] Diet NPO  [ ] TPN/PPN   [ ] calories count   [x ]  Tube Feeds  at goal   Renal: Continue I&Os monitoring, Garrison catheter  [x ] Yes,  [ ]   No ,  [ ] Consideration for discontinuation [ ] Taxes cath/PrimaFit  [ ] TOV  [ ] HD/CVVH       Lytes/Acid-base: replete hypokalemia, hypomagnesemia, hypocalcemia, hypophosphatemia        IV Fluids   [x ] LR@50ml/hr,  [ ] NS  [ ] D5W1/2NS [ ] Bicarbonate Drip  [ ] Albumin [ ] Lasix  ID: leukocytosis -> continue to monitor:         IV Abx [x ] Yes, [ ] No;  ID consulted [ ] Yes, [x ] No        Cultures send  [ ] Respiratory   [ ] Blood   [ ] Urine  [ ] Fluids  [ ] Tissue  [x ]  None  Lines:   [ ] Right   [ ] Left  [ ] Bilateral                     [ ] Subclavian TLC        [ ]  Internal Jugular TLC     [ ]  Femoral TLC                     [ ] Subclavian Cordis    [ ] Internal Jugular Cordis   [ ] Femoral Cordis                     [ ] HD catheter    [ ] PICC     [ ]  Midline   [ x] Peripheral IVs                                                 [x ] Right   [ ] Left   [ ] None                     [x ] Radial A-Line    [ ] Femoral A-Line   [ ] Axillary A-Line               Heme: continue to evaluate for acute blood loss anemia- trend Hg/Hct                     AC Reversal Indicated [ ] Yes  [ x] No                                      [ ] Kcentra,  [ ] 3Factors concentrate, [ ] Adnexxa                     Transfused  indicated  [ ] Yes, [x ] No    [ ] PRBCs   [ ] Platelets   [ ] FFPs   [ ] Cryoprecipitate                    Should be started on or continued with  following  [ ] Yes,  [ x] No                               [ ] Lovenox  [ ] Coumadin  [ ] Heparin drip  [ ] NOVACs  [ ] ASA  [ ] Antiplatelets   Endocrine: Prevent and treat hyperglycemia with insulin as needed,                                 Insuline drip [ ] Yes, [x ] No   PV: follow pulse exam  Skin: decub precautions  DVT Prophylaxis:  [ x] SCDs  [x ] Heparin SQ  [ ] Lovenox  SQ  Stress Gastritis Prophylaxis: PPI/H2 Blockers if indicated  Mobility: patient is evaluated at the bedside with mobility team and the goals for today are discussed with PT [ x] bed rest    PATIENT/FAMILY/SURROGATE CONFERENCE:  [x ] Yes with patient's family at the bedside. [ ] No  PURPOSE: To obtain necessary information, To discuss treatment options under consideration today.    I saw and evaluated the patient personally. I have reviewed and agree with note above. Treatment plan discussed with SICU team, nurses and primary team at the time of the multidisciplinary rounds. The above note is NOT written at the time of rounds and will reflect all changes throughout management of the patient for the day note is written for.    Nancy Medrano MD, FACS  Trauma/ACS/SCC Attending

## 2023-03-30 NOTE — PROGRESS NOTE ADULT - SUBJECTIVE AND OBJECTIVE BOX
RADHA RAMON     [id number]   [birth year]  77yM      Admit Date/LOS: 03-20 (2d)  Indication for SDU/SICU: Hypotension with pressor requirements. Intubated.        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 Pt walks with walker and got up in middle of night to get food and slipped and fell on his R side. Pt has been having R hip pain since with inability to ambulate. Pts family finally convinced him to come to hospital today. Pt is having 10/10 pain currently. Denies dizziness, CP or LOC prior to fall. Denies head trauma. No HA. (20 Mar 2023 12:02)    3/22: Patient S/P ORIF of right hip using interlocking intramedullary stephon.     Procedure:  OR Stats  OR Time:    EBL: 75  IV Fluids: 2700  Blood Products: None  UOP: Not noted. No blair placed in OR  Findings- Displaced intertrochanteric fracture appreciated. 11mm x 200mm 130, lag screw 10.5 x 105mm    SICU consulted for hypotension requiring pressors.    Patient was a rapid response in the PACU post-operatively due to hypotension. Patient examined bedside and was responding to verbal stimuli. Albumin and fluid bolus given. Labs drawn off A-line and Urbano started. NGT placed by SICU. Blair placed by urology due to difficulty passing Blair in setting of patient with prostate CA and BPH.    24Hours  3/29  Night  Naphosph 15mmol  On 100%? Weaning FiO2 >60    Day  - NICOM neg  - failed SBT- Tachypneic  and tachycardic  -  ECHO:  1. Left ventricular ejection fraction, by visual estimation, is 60 to   65%.   2. Normal global left ventricular systolic function.   3. Aortic valve thickening with decreased leaflet opening.   4. There is mild aortic root calcification.   5. Mild mitral valve regurgitation.   6. Moderate tricuspid regurgitation.   7. Estimated pulmonary artery systolic pressure is 38.7 mmHg assuming a right atrial pressure of 10 mmHg, which is consistent with borderline pulmonary hypertension.  -scrotal swelling --US compelted  -RUE US completed  -fluconazole added for yeast in sputum       [] A ten-point review of systems was otherwise negative except as noted above.  [x] Due to altered mental status/intubation, subjective information was not attained from the patient. History was obtained, to the extent possible, from review of the chart and collateral sources of information RADHA RAMON     [id number]   [birth year]  77yM      Admit Date/LOS: 03-20 (2d)  Indication for SDU/SICU: Hypotension with pressor requirements. Intubated.        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 Pt walks with walker and got up in middle of night to get food and slipped and fell on his R side. Pt has been having R hip pain since with inability to ambulate. Pts family finally convinced him to come to hospital today. Pt is having 10/10 pain currently. Denies dizziness, CP or LOC prior to fall. Denies head trauma. No HA. (20 Mar 2023 12:02)    3/22: Patient S/P ORIF of right hip using interlocking intramedullary stephon.     Procedure:  OR Stats  OR Time:    EBL: 75  IV Fluids: 2700  Blood Products: None  UOP: Not noted. No blair placed in OR  Findings- Displaced intertrochanteric fracture appreciated. 11mm x 200mm 130, lag screw 10.5 x 105mm    SICU consulted for hypotension requiring pressors.    Patient was a rapid response in the PACU post-operatively due to hypotension. Patient examined bedside and was responding to verbal stimuli. Albumin and fluid bolus given. Labs drawn off A-line and Urbano started. NGT placed by SICU. Blair placed by urology due to difficulty passing Blair in setting of patient with prostate CA and BPH.    24Hours  3/29  Night  Naphosph 15mmol  On 100%? Weaning FiO2 >60    Day  - NICOM neg  - failed SBT- Tachypneic  and tachycardic  -  ECHO:  1. Left ventricular ejection fraction, by visual estimation, is 60 to   65%.   2. Normal global left ventricular systolic function.   3. Aortic valve thickening with decreased leaflet opening.   4. There is mild aortic root calcification.   5. Mild mitral valve regurgitation.   6. Moderate tricuspid regurgitation.   7. Estimated pulmonary artery systolic pressure is 38.7 mmHg assuming a right atrial pressure of 10 mmHg, which is consistent with borderline pulmonary hypertension.  -scrotal swelling --US compelted  -RUE US completed  -fluconazole added for yeast in sputum       [] A ten-point review of systems was otherwise negative except as noted above.  [x] Due to altered mental status/intubation, subjective information was not attained from the patient. History was obtained, to the extent possible, from review of the chart and collateral sources of information  Daily     Daily   Diet, NPO with Tube Feed:   Tube Feeding Modality: Orogastric  Peptamen A.F. Formula  Total Volume for 24 Hours (mL): 1200  Continuous  Starting Tube Feed Rate mL per Hour: 20  Increase Tube Feed Rate by (mL): 20  Until Goal Tube Feed Rate (mL per Hour): 50  Tube Feed Duration (in Hours): 24  Tube Feed Start Time: 21:00 (03-25-23 @ 19:55)    CURRENT MEDS:  Neurologic Medications  acetaminophen     Tablet .. 650 milliGRAM(s) Oral every 6 hours  dexMEDEtomidine Infusion 0.1 MICROgram(s)/kG/Hr IV Continuous <Continuous>  fentaNYL   Infusion. 2 MICROgram(s)/kG/Hr IV Continuous <Continuous>  ondansetron Injectable 4 milliGRAM(s) IV Push every 8 hours PRN Nausea and/or Vomiting    Respiratory Medications    Cardiovascular Medications  doxazosin 4 milliGRAM(s) Oral at bedtime  midodrine 10 milliGRAM(s) Oral every 8 hours    Gastrointestinal Medications  lactated ringers Bolus 500 milliLiter(s) IV Bolus once  lactated ringers Bolus 250 milliLiter(s) IV Bolus once  lactated ringers Bolus 250 milliLiter(s) IV Bolus once  pantoprazole  Injectable 40 milliGRAM(s) IV Push daily  sodium chloride 0.9% lock flush 10 milliLiter(s) IV Push every 1 hour PRN Pre/post blood products, medications, blood draw, and to maintain line patency    Genitourinary Medications    Hematologic/Oncologic Medications  aspirin  chewable 81 milliGRAM(s) Oral daily  clopidogrel Tablet 75 milliGRAM(s) Oral daily  heparin   Injectable 5000 Unit(s) SubCutaneous every 8 hours    Antimicrobial/Immunologic Medications  fluconAZOLE IVPB 400 milliGRAM(s) IV Intermittent every 24 hours  piperacillin/tazobactam IVPB.. 3.375 Gram(s) IV Intermittent every 8 hours  vancomycin  IVPB 750 milliGRAM(s) IV Intermittent every 12 hours    Endocrine/Metabolic Medications  atorvastatin 80 milliGRAM(s) Oral at bedtime  insulin lispro (ADMELOG) corrective regimen sliding scale   SubCutaneous every 6 hours  vasopressin Infusion 0.03 Unit(s)/Min IV Continuous <Continuous>    Topical/Other Medications  atropine 1% Solution 1 Drop(s) Left EYE two times a day  chlorhexidine 0.12% Liquid 15 milliLiter(s) Oral Mucosa every 12 hours  chlorhexidine 2% Cloths 1 Application(s) Topical <User Schedule>  prednisoLONE acetate 1% Suspension 1 Drop(s) Left EYE every 6 hours    ICU Vital Signs Last 24 Hrs  T(C): 36.7 (30 Mar 2023 08:00), Max: 37.2 (30 Mar 2023 04:00)  T(F): 98.1 (30 Mar 2023 08:00), Max: 99 (30 Mar 2023 04:00)  HR: 69 (30 Mar 2023 09:00) (61 - 107)  BP: 102/59 (29 Mar 2023 17:00) (102/59 - 141/79)  BP(mean): 76 (29 Mar 2023 17:00) (76 - 105)  ABP: 141/60 (30 Mar 2023 09:00) (106/52 - 147/67)  ABP(mean): 88 (30 Mar 2023 09:00) (71 - 97)  RR: 15 (30 Mar 2023 09:00) (14 - 28)  SpO2: 100% (30 Mar 2023 09:00) (94% - 100%)    O2 Parameters below as of 30 Mar 2023 09:00  Patient On (Oxygen Delivery Method): ventilator    O2 Concentration (%): 40      Mode: AC/ CMV (Assist Control/ Continuous Mandatory Ventilation)  RR (machine): 12  TV (machine): 450  FiO2: 80  PEEP: 6  ITime: 1  MAP: 8  PIP: 19    ABG - ( 30 Mar 2023 04:48 )  pH, Arterial: 7.39  pH, Blood: x     /  pCO2: 36    /  pO2: 196   / HCO3: 22    / Base Excess: -2.6  /  SaO2: 100.0             I&O's Summary    29 Mar 2023 07:01  -  30 Mar 2023 07:00  --------------------------------------------------------  IN: 3228.1 mL / OUT: 1230 mL / NET: 1998.1 mL    30 Mar 2023 07:01  -  30 Mar 2023 10:03  --------------------------------------------------------  IN: 174.5 mL / OUT: 275 mL / NET: -100.5 mL      I&O's Detail    29 Mar 2023 07:01  -  30 Mar 2023 07:00  --------------------------------------------------------  IN:    Dexmedetomidine: 521.7 mL    Enteral Tube Flush: 180 mL    FentaNYL: 220.8 mL    Glucerna: 600 mL    IV PiggyBack: 250 mL    IV PiggyBack: 200 mL    IV PiggyBack: 200 mL    IV PiggyBack: 250 mL    Lactated Ringers Bolus: 250 mL    Peptamen A.F.: 400 mL    Phenylephrine: 47.6 mL    Vasopressin: 27 mL    Vasopressin: 81 mL  Total IN: 3228.1 mL    OUT:    Indwelling Catheter - Urethral (mL): 1230 mL    Nasogastric/Oral tube (mL): 0 mL  Total OUT: 1230 mL    Total NET: 1998.1 mL      30 Mar 2023 07:01  -  30 Mar 2023 10:03  --------------------------------------------------------  IN:    Dexmedetomidine: 48 mL    FentaNYL: 17.5 mL    Peptamen A.F.: 100 mL    Vasopressin: 9 mL  Total IN: 174.5 mL    OUT:    Indwelling Catheter - Urethral (mL): 275 mL  Total OUT: 275 mL    Total NET: -100.5 mL          PHYSICAL EXAM:          RADHA RAMON     [id number]   [birth year]  77yM      Admit Date/LOS: 03-20 (2d)  Indication for SDU/SICU: Hypotension with pressor requirements. Intubated.        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 Pt walks with walker and got up in middle of night to get food and slipped and fell on his R side. Pt has been having R hip pain since with inability to ambulate. Pts family finally convinced him to come to hospital today. Pt is having 10/10 pain currently. Denies dizziness, CP or LOC prior to fall. Denies head trauma. No HA. (20 Mar 2023 12:02)    3/22: Patient S/P ORIF of right hip using interlocking intramedullary stephon.     Procedure:  OR Stats  OR Time:    EBL: 75  IV Fluids: 2700  Blood Products: None  UOP: Not noted. No blair placed in OR  Findings- Displaced intertrochanteric fracture appreciated. 11mm x 200mm 130, lag screw 10.5 x 105mm    SICU consulted for hypotension requiring pressors.    Patient was a rapid response in the PACU post-operatively due to hypotension. Patient examined bedside and was responding to verbal stimuli. Albumin and fluid bolus given. Labs drawn off A-line and Urbano started. NGT placed by SICU. Blair placed by urology due to difficulty passing Blair in setting of patient with prostate CA and BPH.    24Hours  3/29  Night  Naphosph 15mmol  On 100%? Weaning FiO2 >60    Day  - NICOM neg  - failed SBT- Tachypneic  and tachycardic  -  ECHO:  1. Left ventricular ejection fraction, by visual estimation, is 60 to   65%.   2. Normal global left ventricular systolic function.   3. Aortic valve thickening with decreased leaflet opening.   4. There is mild aortic root calcification.   5. Mild mitral valve regurgitation.   6. Moderate tricuspid regurgitation.   7. Estimated pulmonary artery systolic pressure is 38.7 mmHg assuming a right atrial pressure of 10 mmHg, which is consistent with borderline pulmonary hypertension.  -scrotal swelling --US compelted  -RUE US completed  -fluconazole added for yeast in sputum       [] A ten-point review of systems was otherwise negative except as noted above.  [x] Due to altered mental status/intubation, subjective information was not attained from the patient. History was obtained, to the extent possible, from review of the chart and collateral sources of information  Daily     Daily   Diet, NPO with Tube Feed:   Tube Feeding Modality: Orogastric  Peptamen A.F. Formula  Total Volume for 24 Hours (mL): 1200  Continuous  Starting Tube Feed Rate mL per Hour: 20  Increase Tube Feed Rate by (mL): 20  Until Goal Tube Feed Rate (mL per Hour): 50  Tube Feed Duration (in Hours): 24  Tube Feed Start Time: 21:00 (03-25-23 @ 19:55)    CURRENT MEDS:  Neurologic Medications  acetaminophen     Tablet .. 650 milliGRAM(s) Oral every 6 hours  dexMEDEtomidine Infusion 0.1 MICROgram(s)/kG/Hr IV Continuous <Continuous>  fentaNYL   Infusion. 2 MICROgram(s)/kG/Hr IV Continuous <Continuous>  ondansetron Injectable 4 milliGRAM(s) IV Push every 8 hours PRN Nausea and/or Vomiting    Respiratory Medications    Cardiovascular Medications  doxazosin 4 milliGRAM(s) Oral at bedtime  midodrine 10 milliGRAM(s) Oral every 8 hours    Gastrointestinal Medications  lactated ringers Bolus 500 milliLiter(s) IV Bolus once  lactated ringers Bolus 250 milliLiter(s) IV Bolus once  lactated ringers Bolus 250 milliLiter(s) IV Bolus once  pantoprazole  Injectable 40 milliGRAM(s) IV Push daily  sodium chloride 0.9% lock flush 10 milliLiter(s) IV Push every 1 hour PRN Pre/post blood products, medications, blood draw, and to maintain line patency    Genitourinary Medications    Hematologic/Oncologic Medications  aspirin  chewable 81 milliGRAM(s) Oral daily  clopidogrel Tablet 75 milliGRAM(s) Oral daily  heparin   Injectable 5000 Unit(s) SubCutaneous every 8 hours    Antimicrobial/Immunologic Medications  fluconAZOLE IVPB 400 milliGRAM(s) IV Intermittent every 24 hours  piperacillin/tazobactam IVPB.. 3.375 Gram(s) IV Intermittent every 8 hours  vancomycin  IVPB 750 milliGRAM(s) IV Intermittent every 12 hours    Endocrine/Metabolic Medications  atorvastatin 80 milliGRAM(s) Oral at bedtime  insulin lispro (ADMELOG) corrective regimen sliding scale   SubCutaneous every 6 hours  vasopressin Infusion 0.03 Unit(s)/Min IV Continuous <Continuous>    Topical/Other Medications  atropine 1% Solution 1 Drop(s) Left EYE two times a day  chlorhexidine 0.12% Liquid 15 milliLiter(s) Oral Mucosa every 12 hours  chlorhexidine 2% Cloths 1 Application(s) Topical <User Schedule>  prednisoLONE acetate 1% Suspension 1 Drop(s) Left EYE every 6 hours    ICU Vital Signs Last 24 Hrs  T(C): 36.7 (30 Mar 2023 08:00), Max: 37.2 (30 Mar 2023 04:00)  T(F): 98.1 (30 Mar 2023 08:00), Max: 99 (30 Mar 2023 04:00)  HR: 69 (30 Mar 2023 09:00) (61 - 107)  BP: 102/59 (29 Mar 2023 17:00) (102/59 - 141/79)  BP(mean): 76 (29 Mar 2023 17:00) (76 - 105)  ABP: 141/60 (30 Mar 2023 09:00) (106/52 - 147/67)  ABP(mean): 88 (30 Mar 2023 09:00) (71 - 97)  RR: 15 (30 Mar 2023 09:00) (14 - 28)  SpO2: 100% (30 Mar 2023 09:00) (94% - 100%)    O2 Parameters below as of 30 Mar 2023 09:00  Patient On (Oxygen Delivery Method): ventilator    O2 Concentration (%): 40      Mode: AC/ CMV (Assist Control/ Continuous Mandatory Ventilation)  RR (machine): 12  TV (machine): 450  FiO2: 80  PEEP: 6  ITime: 1  MAP: 8  PIP: 19    ABG - ( 30 Mar 2023 04:48 )  pH, Arterial: 7.39  pH, Blood: x     /  pCO2: 36    /  pO2: 196   / HCO3: 22    / Base Excess: -2.6  /  SaO2: 100.0             I&O's Summary    29 Mar 2023 07:01  -  30 Mar 2023 07:00  --------------------------------------------------------  IN: 3228.1 mL / OUT: 1230 mL / NET: 1998.1 mL    30 Mar 2023 07:01  -  30 Mar 2023 10:03  --------------------------------------------------------  IN: 174.5 mL / OUT: 275 mL / NET: -100.5 mL      I&O's Detail    29 Mar 2023 07:01  -  30 Mar 2023 07:00  --------------------------------------------------------  IN:    Dexmedetomidine: 521.7 mL    Enteral Tube Flush: 180 mL    FentaNYL: 220.8 mL    Glucerna: 600 mL    IV PiggyBack: 250 mL    IV PiggyBack: 200 mL    IV PiggyBack: 200 mL    IV PiggyBack: 250 mL    Lactated Ringers Bolus: 250 mL    Peptamen A.F.: 400 mL    Phenylephrine: 47.6 mL    Vasopressin: 27 mL    Vasopressin: 81 mL  Total IN: 3228.1 mL    OUT:    Indwelling Catheter - Urethral (mL): 1230 mL    Nasogastric/Oral tube (mL): 0 mL  Total OUT: 1230 mL    Total NET: 1998.1 mL      30 Mar 2023 07:01  -  30 Mar 2023 10:03  --------------------------------------------------------  IN:    Dexmedetomidine: 48 mL    FentaNYL: 17.5 mL    Peptamen A.F.: 100 mL    Vasopressin: 9 mL  Total IN: 174.5 mL    OUT:    Indwelling Catheter - Urethral (mL): 275 mL  Total OUT: 275 mL    Total NET: -100.5 mL          PHYSICAL EXAM:   Neuro: RASS: -2, GCS 11T, follows commands, moves extremities   Resp: Intubated 450/12/60/6, overbreathing vent and tachypneic   Abdomen: Soft, nontender, nondistended. +BS   : +Scrotal swelling/ ecchymosis   MSK: Right thigh incision c/d/i, soft, no tenderness

## 2023-03-30 NOTE — PROGRESS NOTE ADULT - ASSESSMENT
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   S/P ORIF of right hip using interlocking intramedullary stephon.       NEURO:  #Acute pain    - Tylenol, Gabapentin, fentanyl 25mcg q4 prn, fentayl 2.5   #acute sedation    -precedex and fentanyl gtt     RESP:   #acute respiratory failure secondary to post operative arrythmias requiring mechanical ventilation (Intubated 3/23)  RR (machine): 12, TV (machine): 450, FiO2: 60, PEEP: 6  #PE w/u NEGATIVE on 3/28  #Activity    -increase as tolerated    CARDS:   #acute hypotension    -cont vaso 0.03 for MAP>65  #Labs/Imaging  -Echo 3/23- EF appears normal, mild LVH, sclerotic AoV, trace MR,   -Echo 3/29: EF 70-65%, norm LV function, borderline pulm HTN.     GI/NUTR:   #Diet: NPO x/rx    -TF Peptamen goal 50cc/hr  #NGT in place  #Nausea: Zofran prn    - Aspiration precautions, HOB 30  #GI Prophylaxis    - Protonix  #Bowel regimen    - Senna  #Imaging    -CT abdomen 3/28: No definite infectious source identified      -RUQ sono: biliary sludge, mild GB wall thickening/edema.     -LFTs     /RENAL:   #UO: Garrison placed by Urology     - Cardura (Flomax at home)  #scrotal swelling  - U/S 3/29 w/ diffused scrotal wall edema     Labs:          BUN/Cr- 38/0.9  -->,  35/0.8  -->31/0.6          Electrolytes-Na 141 // K 4.8 // Mg 2.0 //  Phos 2.3   UA: Negative, small blood     HEME/ONC:   #DVT prophylaxis    - SCDs  #H/O CAD s/p stents x3    - ASA & Plavix    T&S Expires 3/28      ID:  #leukocytosis  WBC- 7.87  --->>,  13.08  --->>8.4  Temp trend- 24hrs T(F): 98.7 (03-28 @ 23:00), Max: 101.7 (03-28 @ 08:00)  Antibiotics-piperacillin/tazobactam IVPB.. 3.375 every 8 hours  vancomycin  IVPB 750 every 12 hours, last vanc tr 15.0 on 3/29  Fluconazole 400q24	  Febrile hence restarted vanc/zosyn on 3/27  Cultures:    F/U fungitel, procalcitonin     ET Sputum 3/29: few yeast     BCx 3/28: pending     UA 3/28: Negative     BCx 3/25--pending    UA 3/24--negative    ET Sputum 3/24--neg    MRSA neg     ENDO:    -FSG q6 if NPO    -Glucose goal 140-180    -if above 180 start ISS    MSK:     - Bedrest     - WBAT RLE per ortho, PT/OT when clinically appropriate    LINES/DRAINS:    A-line left radial (3/22),    PIV x3 (2 20s, 1 18),   Garrison (3/22)  Right IJ TLC (3/23)  ETT, OGT     ADVANCED DIRECTIVES:  Full Code    HCP/Emergency Contact- Song Segura (Son): [phone]    INDICATION FOR SICU Hypotension with pressor requirements. Intubated.       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   S/P ORIF of right hip using interlocking intramedullary stephon.       NEURO:  #Acute pain    - Tylenol, Gabapentin, fentanyl 25mcg q4 prn, fentanyl 2.5   #acute sedation    -precedex and fentanyl gtt     RESP:   #acute respiratory failure secondary to post operative arrythmias requiring mechanical ventilation (Intubated 3/23)  RR (machine): 12, TV (machine): 450, FiO2: 60, PEEP: 6  #PE w/u NEGATIVE on 3/28  #Activity    -increase as tolerated    CARDS:   #acute hypotension    -cont vaso 0.03 for MAP>65    -midodrine 10 q8  #Labs/Imaging  -Echo 3/23- EF appears normal, mild LVH, sclerotic AoV, trace MR,   -Echo 3/29: EF 70-65%, norm LV function, borderline pulm HTN.     GI/NUTR:   #Diet: NPO x/rx    -TF Peptamen goal 50cc/hr  #NGT in place  #Nausea: Zofran prn    - Aspiration precautions, HOB 30  #GI Prophylaxis    - Protonix  #Bowel regimen    - Senna  #Imaging    -CT abdomen 3/28: No definite infectious source identified      -RUQ sono: biliary sludge, mild GB wall thickening/edema.     /RENAL:   #UO: Garrison placed by Urology     - Cardura (Flomax at home)  #scrotal swelling  - U/S 3/29 w/ diffused scrotal wall edema     Labs:          BUN/Cr- 38/0.9  -->,  35/0.8  -->31/0.6          Electrolytes-Na 141 // K 4.8 // Mg 2.0 //  Phos 2.3   UA: Negative, small blood     HEME/ONC:   #DVT prophylaxis    - SCDs  #H/O CAD s/p stents x3    - ASA & Plavix    T&S Expires 3/28      ID:  #leukocytosis  WBC- 7.87  --->>,  13.08  --->>8.4  Temp trend- 24hrs T(F): 98.7 (03-28 @ 23:00), Max: 101.7 (03-28 @ 08:00)  Antibiotics-piperacillin/tazobactam IVPB.. 3.375 every 8 hours  vancomycin  IVPB 750 every 12 hours, last vanc tr 15.0 on 3/29  Fluconazole 400q24	  Febrile hence restarted vanc/zosyn on 3/27  Cultures:    F/U fungitel, procalcitonin     ET Sputum 3/29: few yeast     BCx 3/28: pending     UA 3/28: Negative     BCx 3/25--pending    UA 3/24--negative    ET Sputum 3/24--neg    MRSA neg     ENDO:    -FSG q6 if NPO    -Glucose goal 140-180    -if above 180 start ISS    MSK:     - Bedrest     - WBAT RLE per ortho, PT/OT when clinically appropriate    LINES/DRAINS:    A-line left radial (3/22),    PIV x3 (2 20s, 1 18),   Garrison (3/22)  Right IJ TLC (3/23)  ETT, OGT     ADVANCED DIRECTIVES:  Full Code    HCP/Emergency Contact- Song Segura (Son): [phone]    INDICATION FOR SICU Hypotension with pressor requirements. Intubated.       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   S/P ORIF of right hip using interlocking intramedullary stephon.       NEURO:  #Acute pain    - Tylenol, Gabapentin, fentanyl 25mcg q4 prn, fentanyl 2.5   #acute sedation    -precedex and fentanyl gtt     RESP:   #acute respiratory failure secondary to post operative arrhythmias requiring mechanical ventilation (Intubated 3/23- Day 7 on vent)  RR (machine): 12, TV (machine): 450, FiO2: 60, PEEP: 6  ABG: ABG - ( 30 Mar 2023 04:48 )  pH, Arterial: 7.39  pH, Blood: x     /  pCO2: 36    /  pO2: 196   / HCO3: 22    / Base Excess: -2.6  /  SaO2: 100.0   #PE w/u NEGATIVE on 3/28  #Activity    -increase as tolerated    CARDS:   #acute hypotension    -cont vaso 0.03 for MAP>65, neosynephrine     -midodrine 10 q8  #Labs/Imaging  -Echo 3/23- EF appears normal, mild LVH, sclerotic AoV, trace MR,   -Echo 3/29: EF 70-65%, norm LV function, borderline pulm HTN.     GI/NUTR:   #Diet: NPO x/rx    -TF Peptamen goal 50cc/hr  #NGT in place  #Nausea: Zofran prn    - Aspiration precautions, HOB 30  #GI Prophylaxis    - Protonix  #Bowel regimen    - Senna  #Imaging    -CT abdomen 3/28: No definite infectious source identified      -RUQ sono: biliary sludge, mild GB wall thickening/edema.     /RENAL:   #UO: Garrison placed by Urology     - Cardura (Flomax at home)  #scrotal swelling  - U/S 3/29 w/ diffused scrotal wall edema     Labs:          BUN/Cr- 38/0.9  -->,  35/0.8  -->31/0.6          Electrolytes-Na 141 // K 4.8 // Mg 2.0 //  Phos 2.3    HEME/ONC:   #DVT prophylaxis    - SCDs  #H/O CAD s/p stents x3    - ASA & Plavix    T&S Expires 4/1      ID:  #leukocytosis  WBC- 7.87  --->>,  13.08  --->>8.4  Temp trend- 24hrs T(F): 98.7 (03-28 @ 23:00), Max: 101.7 (03-28 @ 08:00)  Antibiotics-piperacillin/tazobactam IVPB.. 3.375 every 8 hours  vancomycin  IVPB 750 every 12 hours, last vanc tr 15.0 on 3/29  Fluconazole 400q24	  Febrile hence restarted vanc/zosyn on 3/27    CDiff PCR 3/30: Positive  Cultures:    ET Sputum 3/29: few yeast     BCx 3/28: NGTD    UA 3/28: Negative     BCx 3/25--pending    UA 3/24--negative    ET Sputum 3/24--neg    MRSA neg     ENDO:    -FSG q6 if NPO    -Glucose goal 140-180    -if above 180 start ISS    MSK:     - Bedrest     - WBAT RLE per ortho, PT/OT when clinically appropriate    LINES/DRAINS:    A-line left radial (3/22),    PIV x3 (2 20s, 1 18),   Garrison (3/22)  Right IJ TLC (3/23)  ETT, OGT     ADVANCED DIRECTIVES:  Full Code    HCP/Emergency Contact- Song Segura (Son): [phone]    INDICATION FOR SICU Hypotension with pressor requirements. Intubated.       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   S/P ORIF of right hip using interlocking intramedullary stephon.       NEURO:  #Acute pain    - Tylenol, Gabapentin, fentanyl 25mcg q4 prn, fentanyl 2.5   #acute sedation    -precedex and fentanyl gtt     RESP:   #acute respiratory failure secondary to post operative arrhythmias requiring mechanical ventilation (Intubated 3/23- Day 7 on vent)  RR (machine): 12, TV (machine): 450, FiO2: 60, PEEP: 6  ABG: ABG - ( 30 Mar 2023 13:03 )  pH, Arterial: 7.34  pH, Blood: x     /  pCO2: 42    /  pO2: 124   / HCO3: 23    / Base Excess: -2.9  /  SaO2: 99.8    ABG: ABG - ( 30 Mar 2023 04:48 )  pH, Arterial: 7.39  pH, Blood: x     /  pCO2: 36    /  pO2: 196   / HCO3: 22    / Base Excess: -2.6  /  SaO2: 100.0   #PE w/u NEGATIVE on 3/28  #Activity    -increase as tolerated    CARDS:   #acute hypotension    -cont vaso 0.03 for MAP>65, neosynephrine     -midodrine 10 q8  #Labs/Imaging  -Echo 3/23- EF appears normal, mild LVH, sclerotic AoV, trace MR,   -Echo 3/29: EF 70-65%, norm LV function, borderline pulm HTN.     GI/NUTR:   #Diet: NPO x/rx    -TF Peptamen goal 50cc/hr  #NGT in place  #Nausea: Zofran prn    - Aspiration precautions, HOB 30  #GI Prophylaxis    - Protonix  #Bowel regimen    - Senna  #Imaging    -CT abdomen 3/28: No definite infectious source identified      -RUQ sono: biliary sludge, mild GB wall thickening/edema.     /RENAL:   #UO: Garrison placed by Urology     - Cardura (Flomax at home)  #scrotal swelling  - U/S 3/29 w/ diffused scrotal wall edema     Labs:          BUN/Cr- 38/0.9  -->,  35/0.8  -->31/0.6          Electrolytes-Na 141 // K 4.8 // Mg 2.0 //  Phos 2.3    HEME/ONC:   #DVT prophylaxis    - SCDs  #H/O CAD s/p stents x3    - ASA & Plavix    T&S Expires 4/1      ID:  #leukocytosis  WBC- 7.87  --->>,  13.08  --->>8.4  Temp trend- 24hrs T(F): 98.7 (03-28 @ 23:00), Max: 101.7 (03-28 @ 08:00)  Antibiotics-piperacillin/tazobactam IVPB.. 3.375 every 8 hours  vancomycin  IVPB 750 every 12 hours, last vanc tr 15.0 on 3/29  Fluconazole 400q24	  Febrile hence restarted vanc/zosyn on 3/27    CDiff PCR 3/30: Positive  Cultures:    ET Sputum 3/29: few yeast     BCx 3/28: NGTD    UA 3/28: Negative     BCx 3/25--pending    UA 3/24--negative    ET Sputum 3/24--neg    MRSA neg     ENDO:    -FSG q6 if NPO    -Glucose goal 140-180    -if above 180 start ISS    MSK:     - Bedrest     - WBAT RLE per ortho, PT/OT when clinically appropriate    LINES/DRAINS:    A-line left radial (3/22),    PIV x3 (2 20s, 1 18),   Garrison (3/22)  Right IJ TLC (3/23)  ETT, OGT     ADVANCED DIRECTIVES:  Full Code    HCP/Emergency Contact- Song Segura (Son): [phone]    INDICATION FOR SICU Hypotension with pressor requirements. Intubated.       DISPO: SICU

## 2023-03-31 NOTE — PROGRESS NOTE ADULT - ASSESSMENT
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 presents after fall at home    Impression:  #Right hip fracture - s/p ORIF/IMN 3/22   #Post-op Fevers  #Fevers 3/28 - C diff colitis   - on zosyn since 3/24   - CTA Chest 3/28 -- occlusion of bronchus intermedius   - CT Abd/pelvis 3/28 - no definit infectious source - nonspecidic mild gallbladder edmea and distension -- intramcular hemorrhage/edema by right fluteaus - possible hematoma       Recommendations  - can stop vancomycin/zosyn as sputum cx NG   - continue PO vancomycin 125 mg QID for C diff colitis   - trend fever curve    Please call or message on [employer] Teams if with any questions.  Spectra [phone]

## 2023-03-31 NOTE — PROGRESS NOTE ADULT - SUBJECTIVE AND OBJECTIVE BOX
RADHA RAMON   [id number]   [birth year]  77yM      Admit Date/LOS: 03-20 (2d)  Indication for SDU/SICU: Hypotension with pressor requirements. Intubated.        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 Pt walks with walker and got up in middle of night to get food and slipped and fell on his R side. Pt has been having R hip pain since with inability to ambulate. Pts family finally convinced him to come to hospital today. Pt is having 10/10 pain currently. Denies dizziness, CP or LOC prior to fall. Denies head trauma. No HA. (20 Mar 2023 12:02)    3/22: Patient S/P ORIF of right hip using interlocking intramedullary stephon.   OR Time:    EBL: 75  IV Fluids: 2700  Blood Products: None  UOP: Not noted. No blair placed in OR  Findings- Displaced intertrochanteric fracture appreciated. 11mm x 200mm 130, lag screw 10.5 x 105mm    SICU consulted for hypotension requiring pressors.    Patient was a rapid response in the PACU post-operatively due to hypotension. Patient examined bedside and was responding to verbal stimuli. Albumin and fluid bolus given. Labs drawn off A-line and Urbano started. NGT placed by SICU. Blair placed by urology due to difficulty passing Blair in setting of patient with prostate CA and BPH.    24Hours        [] A ten-point review of systems was otherwise negative except as noted above.  [x] Due to altered mental status/intubation, subjective information was not attained from the patient. History was obtained, to the extent possible, from review of the chart and collateral sources of information.    =========================================================================================================================================   RADHA RAMON   [id number]   [birth year]  77yM      Admit Date/LOS: 03-20 (2d)  Indication for SDU/SICU: Hypotension with pressor requirements. Intubated.        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 Pt walks with walker and got up in middle of night to get food and slipped and fell on his R side. Pt has been having R hip pain since with inability to ambulate. Pts family finally convinced him to come to hospital today. Pt is having 10/10 pain currently. Denies dizziness, CP or LOC prior to fall. Denies head trauma. No HA. (20 Mar 2023 12:02)    3/22: Patient S/P ORIF of right hip using interlocking intramedullary stephon.   OR Time:    EBL: 75  IV Fluids: 2700  Blood Products: None  UOP: Not noted. No blair placed in OR  Findings- Displaced intertrochanteric fracture appreciated. 11mm x 200mm 130, lag screw 10.5 x 105mm    SICU consulted for hypotension requiring pressors.    Patient was a rapid response in the PACU post-operatively due to hypotension. Patient examined bedside and was responding to verbal stimuli. Albumin and fluid bolus given. Labs drawn off A-line and Urbano started. NGT placed by SICU. Blair placed by urology due to difficulty passing Blair in setting of patient with prostate CA and BPH.    24Hours  3/30  NIGHT  vaso 0.03, precedex 1.5, fentanyl 0.5  KUB   vanc 14.7    3/30  Day  CDIFF +, started PO vanc  f/u ID   d/c vaso  f/u trach/peg   Ep of hypotension, restarted urbano/vaso, 500cc LR bolus  tachypeic/tachycardic, 1mg versed   fio2 decreased to 40  BAL- prelim neg  D/c diflucan         [] A ten-point review of systems was otherwise negative except as noted above.  [x] Due to altered mental status/intubation, subjective information was not attained from the patient. History was obtained, to the extent possible, from review of the chart and collateral sources of information.    =========================================================================================================================================      Daily     Daily     Diet, NPO with Tube Feed:   Tube Feeding Modality: Orogastric  Peptamen A.F. Formula  Total Volume for 24 Hours (mL): 1200  Continuous  Starting Tube Feed Rate mL per Hour: 20  Increase Tube Feed Rate by (mL): 20  Until Goal Tube Feed Rate (mL per Hour): 50  Tube Feed Duration (in Hours): 24  Tube Feed Start Time: 21:00 (03-25-23 @ 19:55)      CURRENT MEDS:  Neurologic Medications  acetaminophen     Tablet .. 650 milliGRAM(s) Oral every 6 hours  dexMEDEtomidine Infusion 0.1 MICROgram(s)/kG/Hr IV Continuous <Continuous>  fentaNYL   Infusion. 2 MICROgram(s)/kG/Hr IV Continuous <Continuous>  ondansetron Injectable 4 milliGRAM(s) IV Push every 8 hours PRN Nausea and/or Vomiting    Respiratory Medications    Cardiovascular Medications  doxazosin 4 milliGRAM(s) Oral at bedtime  midodrine 10 milliGRAM(s) Oral every 8 hours    Gastrointestinal Medications  pantoprazole  Injectable 40 milliGRAM(s) IV Push daily  sodium chloride 0.9% lock flush 10 milliLiter(s) IV Push every 1 hour PRN Pre/post blood products, medications, blood draw, and to maintain line patency    Genitourinary Medications    Hematologic/Oncologic Medications  aspirin  chewable 81 milliGRAM(s) Oral daily  clopidogrel Tablet 75 milliGRAM(s) Oral daily  heparin   Injectable 5000 Unit(s) SubCutaneous every 8 hours    Antimicrobial/Immunologic Medications  vancomycin    Solution 125 milliGRAM(s) Oral every 6 hours    Endocrine/Metabolic Medications  atorvastatin 80 milliGRAM(s) Oral at bedtime  insulin lispro (ADMELOG) corrective regimen sliding scale   SubCutaneous every 6 hours  vasopressin Infusion 0.03 Unit(s)/Min IV Continuous <Continuous>    Topical/Other Medications  atropine 1% Solution 1 Drop(s) Left EYE two times a day  chlorhexidine 0.12% Liquid 15 milliLiter(s) Oral Mucosa every 12 hours  chlorhexidine 2% Cloths 1 Application(s) Topical <User Schedule>  prednisoLONE acetate 1% Suspension 1 Drop(s) Left EYE every 6 hours      ICU Vital Signs Last 24 Hrs  T(C): 36.8 (31 Mar 2023 08:00), Max: 37.3 (30 Mar 2023 15:00)  T(F): 98.3 (31 Mar 2023 08:00), Max: 99.2 (30 Mar 2023 15:00)  HR: 71 (31 Mar 2023 11:00) (57 - 99)  BP: --  BP(mean): --  ABP: 115/46 (31 Mar 2023 11:00) (111/50 - 184/65)  ABP(mean): 65 (31 Mar 2023 11:00) (65 - 104)  RR: 23 (31 Mar 2023 11:00) (16 - 31)  SpO2: 99% (31 Mar 2023 11:00) (92% - 100%)    O2 Parameters below as of 31 Mar 2023 11:00  Patient On (Oxygen Delivery Method): ventilator            Adult Advanced Hemodynamics Last 24 Hrs  CVP(mm Hg): --  CVP(cm H2O): --  CO: --  CI: --  PA: --  PA(mean): --  PCWP: --  SVR: --  SVRI: --  PVR: --  PVRI: --    Mode: AC/ CMV (Assist Control/ Continuous Mandatory Ventilation)  RR (machine): 12  TV (machine): 450  FiO2: 40  PEEP: 6  ITime: 0.9  MAP: 12  PIP: 22    ABG - ( 31 Mar 2023 04:09 )  pH, Arterial: 7.37  pH, Blood: x     /  pCO2: 38    /  pO2: 124   / HCO3: 22    / Base Excess: -3.0  /  SaO2: 99.7                I&O's Summary    30 Mar 2023 07:01  -  31 Mar 2023 07:00  --------------------------------------------------------  IN: 2291.3 mL / OUT: 1725 mL / NET: 566.3 mL    31 Mar 2023 07:01  -  31 Mar 2023 14:10  --------------------------------------------------------  IN: 221.1 mL / OUT: 321 mL / NET: -99.9 mL      I&O's Detail    30 Mar 2023 07:01  -  31 Mar 2023 07:00  --------------------------------------------------------  IN:    Dexmedetomidine: 528 mL    Enteral Tube Flush: 240 mL    FentaNYL: 86.3 mL    IV PiggyBack: 250 mL    IV PiggyBack: 200 mL    Peptamen A.F.: 780 mL    Phenylephrine: 103.5 mL    Vasopressin: 103.5 mL  Total IN: 2291.3 mL    OUT:    Indwelling Catheter - Urethral (mL): 1125 mL    Nasogastric/Oral tube (mL): 600 mL  Total OUT: 1725 mL    Total NET: 566.3 mL      31 Mar 2023 07:01  -  31 Mar 2023 14:10  --------------------------------------------------------  IN:    Dexmedetomidine: 168 mL    FentaNYL: 38.4 mL    Vasopressin: 1.2 mL    Vasopressin: 13.5 mL  Total IN: 221.1 mL    OUT:    Indwelling Catheter - Urethral (mL): 321 mL  Total OUT: 321 mL    Total NET: -99.9 mL          PHYSICAL EXAM:    General/Neuro  RASS:  -1  Deficits:   Intubated and sedated, follows commands, moving all extremities    Lungs: clear to auscultation, Normal expansion/effort.     Cardiovascular : S1, S2.  Regular rate and rhythm.    Cardiac Rhythm: Normal Sinus Rhythm    GI: Abdomen soft, Non-tender, Non-distended.      Extremities: Extremities warm, pink, well-perfused    Derm: Good skin turgor, no skin breakdown.      :       Blair catheter in place., Scrotal edema and ecchymosis      CXR:       LABS:  CAPILLARY BLOOD GLUCOSE      POCT Blood Glucose.: 185 mg/dL (31 Mar 2023 11:34)  POCT Blood Glucose.: 134 mg/dL (31 Mar 2023 05:31)  POCT Blood Glucose.: 164 mg/dL (30 Mar 2023 23:32)  POCT Blood Glucose.: 184 mg/dL (30 Mar 2023 18:05)                          7.4    8.24  )-----------( 246      ( 30 Mar 2023 21:06 )             23.4       03-31    140  |  110  |  32<H>  ----------------------------<  145<H>  4.7   |  22  |  0.8    Ca    8.3<L>      31 Mar 2023 04:20  Phos  2.3     03-30  Mg     2.0     03-30    TPro  4.0<L>  /  Alb  2.0<L>  /  TBili  0.9  /  DBili  0.3  /  AST  74<H>  /  ALT  77<H>  /  AlkPhos  175<H>  03-29                Culture - Sputum (collected 29 Mar 2023 08:48)  Source: ET Tube ET Tube  Gram Stain (29 Mar 2023 19:15):    Few Squamous epithelial cells per low power field    Moderate polymorphonuclear leukocytes per low power field    Few Yeast per oil power field  Preliminary Report (30 Mar 2023 15:57):    Normal Respiratory Fidelia present    Culture - Blood (collected 28 Mar 2023 16:41)  Source: .Blood Blood  Preliminary Report (29 Mar 2023 23:02):    No growth to date.

## 2023-03-31 NOTE — PROGRESS NOTE ADULT - ASSESSMENT
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   S/P ORIF of right hip using interlocking intramedullary stephon.       NEURO:  #Acute pain    - Tylenol, Gabapentin, fentanyl 25mcg q4 prn, fentanyl 2.5   #acute sedation    -precedex and fentanyl gtt     RESP:   #acute respiratory failure secondary to post operative arrhythmias requiring mechanical ventilation (Intubated 3/23- Day 7 on vent),     -f/u primary to book OR for trach/peg    -consent in chart    -d/w team antiplatelet medication prior to OR    RR (machine): 12, TV (machine): 450, FiO2: 40, PEEP: 6  ABG:   #PE w/u NEGATIVE on 3/28  #Activity    -increase as tolerated    CARDS:   #acute hypotension    -cont vaso 0.03 for MAP>65, neosynephrine     -midodrine 10 q8  #Labs/Imaging  -Echo 3/23- EF appears normal, mild LVH, sclerotic AoV, trace MR,   -Echo 3/29: EF 70-65%, norm LV function, borderline pulm HTN.     GI/NUTR:   #Diet: NPO x/rx    -TF Peptamen goal 50cc/hr    -NGT at 63cm  #NGT in place  #Nausea: Zofran prn    - Aspiration precautions, HOB 30  #GI Prophylaxis    - Protonix  #Bowel regimen    - Senna    -d/w team adding bowel regimen  #Imaging    -CT abdomen 3/28: No definite infectious source identified      -RUQ sono: biliary sludge, mild GB wall thickening/edema.     /RENAL:   #UO: Blair placed by Urology     - Cardura (Flomax at home)  #scrotal swelling  - U/S 3/29 w/ diffused scrotal wall edema   #urine output in critically ill    -indwelling blair (placed     Current Rx:     Labs:          BUN/Cr- 31/0.6  -->,  32/0.8  -->          Electrolytes-Na 139 // K 4.7 // Mg 2.0 //  Phos 2.3 (03-30 @ 21:06)    HEME/ONC:   #DVT prophylaxis    - SCDs  #H/O CAD s/p stents x3    - ASA & Plavix    T&S Expires 4/1      ID:  #leukocytosis  WBC- 7.87  --->>,  13.08  --->>8.4  Temp trend- 24hrs T(F): 98.7 (03-28 @ 23:00), Max: 101.7 (03-28 @ 08:00)  Antibiotics-piperacillin/tazobactam IVPB.. 3.375 every 8 hours  vancomycin  IVPB 750 every 12 hours, last vanc tr 15.0 on 3/29    -Van level for 3/31 AM prior to 6am dose  Febrile hence restarted vanc/zosyn on 3/27    CDiff PCR 3/30: Positive  Cultures:    ET Sputum 3/29: Prelim negative     BCx 3/28: NGTD    UA 3/28: Negative     BCx 3/25--pending    UA 3/24--negative    ET Sputum 3/24--neg    MRSA neg     ENDO:    -FSG q6 if NPO    -Glucose goal 140-180    -if above 180 start ISS    MSK:     - WBAT RLE per ortho, PT/OT when clinically appropriate    LINES/DRAINS:    A-line left radial (3/22),    PIV x3 (2 20s, 1 18),   Blair (3/22)  Right IJ TLC (3/23)  ETT, OGT     ADVANCED DIRECTIVES:  Full Code    HCP/Emergency Contact- Song Segura (Son): [phone]    INDICATION FOR SICU Hypotension with pressor requirements. Intubated.       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   S/P ORIF of right hip using interlocking intramedullary stephon.       NEURO:  #Acute pain    - Tylenol, Gabapentin, fentanyl 25mcg q4 prn, fentanyl 2.5   #acute sedation    -precedex and fentanyl gtt     RESP:   #acute respiratory failure secondary to post operative arrhythmias requiring mechanical ventilation (Intubated 3/23- Day 7 on vent),     -f/u primary to book OR for trach/peg    -consent in chart    -d/w team antiplatelet medication prior to OR    RR (machine): 12, TV (machine): 450, FiO2: 40, PEEP: 6  ABG:   #PE w/u NEGATIVE on 3/28  #Activity    -increase as tolerated    CARDS:   #acute hypotension    -cont vaso 0.03 for MAP>65, neosynephrine     -midodrine 10 q8  #Labs/Imaging  -Echo 3/23- EF appears normal, mild LVH, sclerotic AoV, trace MR,   -Echo 3/29: EF 70-65%, norm LV function, borderline pulm HTN.     GI/NUTR:   #Diet: NPO x/rx    -TF Peptamen goal 50cc/hr    -NGT at 63cm  #NGT in place  #Nausea: Zofran prn    - Aspiration precautions, HOB 30  #GI Prophylaxis    - Protonix  #Bowel regimen    -d/w team adding bowel regimen--> started miralax  #Imaging    -CT abdomen 3/28: No definite infectious source identified      -RUQ sono: biliary sludge, mild GB wall thickening/edema.     /RENAL:   #UO: Blair placed by Urology     - Cardura (Flomax at home)  #scrotal swelling  - U/S 3/29 w/ diffused scrotal wall edema   #urine output in critically ill    -indwelling blair (placed     Current Rx:     Labs:          BUN/Cr- 31/0.6  -->,  32/0.8  -->          Electrolytes-Na 139 // K 4.7 // Mg 2.0 //  Phos 2.3 (03-30 @ 21:06)    HEME/ONC:   #DVT prophylaxis    - SCDs  #H/O CAD s/p stents x3    - ASA & Plavix    T&S Expires 4/1      ID:  #leukocytosis  WBC- 7.87  --->>,  13.08  --->>8.4  Temp trend- 24hrs T(F): 98.7 (03-28 @ 23:00), Max: 101.7 (03-28 @ 08:00)  Antibiotics-piperacillin/tazobactam IVPB.. 3.375 every 8 hours  vancomycin  IVPB 750 every 12 hours, last vanc tr 15.0 on 3/29    -Van level for 3/31 AM prior to 6am dose  Febrile hence restarted vanc/zosyn on 3/27    CDiff PCR 3/30: Positive  Cultures:    ET Sputum 3/29: Prelim negative     BCx 3/28: NGTD    UA 3/28: Negative     BCx 3/25--pending    UA 3/24--negative    ET Sputum 3/24--neg    MRSA neg     ENDO:    -FSG q6 if NPO    -Glucose goal 140-180    -if above 180 start ISS    MSK:     - WBAT RLE per ortho, PT/OT when clinically appropriate    LINES/DRAINS:    A-line left radial (3/22),    PIV x3 (2 20s, 1 18),   Blair (3/22)  Right IJ TLC (3/23)  ETT, OGT     ADVANCED DIRECTIVES:  Full Code    HCP/Emergency Contact- Song Segura (Son): [phone]    INDICATION FOR SICU Hypotension with pressor requirements. Intubated.       DISPO: Consent for trach obtained  SICU

## 2023-03-31 NOTE — PROGRESS NOTE ADULT - SUBJECTIVE AND OBJECTIVE BOX
RADHA RAMON  77y, Male  Allergy: No Known Allergies      LOS  11d    CHIEF COMPLAINT: mechanical Fall/ Hip Fx (31 Mar 2023 03:55)      INTERVAL EVENTS/HPI  - No acute events overnight  - T(F): , Max: 99.2 (03-30-23 @ 15:00)  - Denies any worsening symptoms  - Tolerating medication  - WBC Count: 8.24 (03-30-23 @ 21:06)  WBC Count: 8.47 (03-29-23 @ 21:23)     - Creatinine, Serum: 0.8 (03-31-23 @ 04:20)  Creatinine, Serum: 0.8 (03-30-23 @ 21:06)       ROS  General: Denies rigors, nightsweats  HEENT: Denies headache, rhinorrhea, sore throat, eye pain  CV: Denies CP, palpitations  PULM: Denies wheezing, hemoptysis  GI: Denies hematemesis, hematochezia, melena  : Denies discharge, hematuria  MSK: Denies arthralgias, myalgias  SKIN: Denies rash, lesions  NEURO: Denies paresthesias, weakness  PSYCH: Denies depression, anxiety    VITALS:  T(F): 98.3, Max: 99.2 (03-30-23 @ 15:00)  HR: 71  BP: 90/55  RR: 23Vital Signs Last 24 Hrs  T(C): 36.8 (31 Mar 2023 08:00), Max: 37.3 (30 Mar 2023 15:00)  T(F): 98.3 (31 Mar 2023 08:00), Max: 99.2 (30 Mar 2023 15:00)  HR: 71 (31 Mar 2023 11:00) (57 - 126)  BP: 90/55 (30 Mar 2023 11:30) (90/55 - 90/55)  BP(mean): 67 (30 Mar 2023 11:30) (67 - 67)  RR: 23 (31 Mar 2023 11:00) (16 - 39)  SpO2: 99% (31 Mar 2023 11:00) (92% - 100%)    Parameters below as of 31 Mar 2023 11:00  Patient On (Oxygen Delivery Method): ventilator        PHYSICAL EXAM:  Gen: NAD, resting in bed  HEENT: Normocephalic, atraumatic  Neck: supple, no lymphadenopathy  CV: Regular rate & regular rhythm  Lungs: decreased BS at bases, no fremitus  Abdomen: Soft, BS present  Ext: Warm, well perfused  Neuro: non focal, awake  Skin: no rash, no erythema  Lines: no phlebitis    FH: Non-contributory  Social Hx: Non-contributory    TESTS & MEASUREMENTS:                        7.4    8.24  )-----------( 246      ( 30 Mar 2023 21:06 )             23.4     03-31    140  |  110  |  32<H>  ----------------------------<  145<H>  4.7   |  22  |  0.8    Ca    8.3<L>      31 Mar 2023 04:20  Phos  2.3     03-30  Mg     2.0     03-30    TPro  4.0<L>  /  Alb  2.0<L>  /  TBili  0.9  /  DBili  0.3  /  AST  74<H>  /  ALT  77<H>  /  AlkPhos  175<H>  03-29      LIVER FUNCTIONS - ( 29 Mar 2023 21:23 )  Alb: 2.0 g/dL / Pro: 4.0 g/dL / ALK PHOS: 175 U/L / ALT: 77 U/L / AST: 74 U/L / GGT: 84 U/L           Culture - Sputum (collected 03-29-23 @ 08:48)  Source: ET Tube ET Tube  Gram Stain (03-29-23 @ 19:15):    Few Squamous epithelial cells per low power field    Moderate polymorphonuclear leukocytes per low power field    Few Yeast per oil power field  Preliminary Report (03-30-23 @ 15:57):    Normal Respiratory Fidelia present    Culture - Blood (collected 03-28-23 @ 16:41)  Source: .Blood Blood  Preliminary Report (03-29-23 @ 23:02):    No growth to date.    Culture - Blood (collected 03-26-23 @ 01:17)  Source: .Blood None  Final Report (03-31-23 @ 09:00):    No Growth Final    Culture - Sputum (collected 03-24-23 @ 08:30)  Source: ET Tube ET Tube  Gram Stain (03-25-23 @ 06:50):    Few polymorphonuclear leukocytes per low power field    Rare Squamous epithelial cells per low power field    Rare Gram Negative Rods per oil power field    Rare Gram Positive Rods per oil power field  Final Report (03-26-23 @ 17:48):    Normal Respiratory Fidelia present            INFECTIOUS DISEASES TESTING  Procalcitonin, Serum: 0.97 (03-28-23 @ 13:05)  Fungitell: <31 (03-28-23 @ 11:37)  MRSA PCR Result.: Negative (03-26-23 @ 11:00)  COVID-19 PCR: NotDetec (05-08-22 @ 09:02)  Procalcitonin, Serum: 0.16 (05-05-22 @ 11:00)  COVID-19 PCR: NotDetec (05-03-22 @ 11:30)  COVID-19 PCR: NotDetec (04-07-22 @ 13:50)      INFLAMMATORY MARKERS      RADIOLOGY & ADDITIONAL TESTS:  I have personally reviewed the last available Chest xray  CXR      CT  CT Angio Chest PE Protocol w/ IV Cont:   ACC: [id number] EXAM:  CT ANGIO CHEST PULM ART [facility]   ORDERED BY: CECILLE BUCKLEY     PROCEDURE DATE:  03/28/2023          INTERPRETATION:  STUDY INDICATION: Tachycardia, r/o pe    Technique: CTA of the thorax was performed after administration of   contrast per the PE protocol. Sagittal and coronal reformats were   performed as well as 3D reconstructions and MIP reconstructed images.  Intravenous contrast: 100 cc of Omnipaque 350    Comparison: Chest CT  3/20/2023.    QUALITY STATEMENT: Patient upper extremities causing streak artifact   inherently degrades image quality and limits this exam.    Findings:    PULMONARY ARTERIES: No central or segmental pulmonary emboli.    Lungs/tubes: Endotracheal tube terminates approximately 5 - 6 cm above   the eliza. Enteric tube terminates in the stomach. Right IJ central   venous catheter terminates in the SVC.    Mediastinum/Lymph Nodes: No lymphadenopathy by size criteria.    Heart/Great Vessels: Visually the heart is without significant   enlargement. No pericardial effusion.  Normal caliber aorta. Multivessel   coronary arterial calcifications.    Abdomen: See separate report for evaluation of the abdomen.    Lungs, Pleura, and Airways: Severe emphysematous changes. Complete   occlusion of the bronchus intermedius extending into the middle lobe   causing complete occlusion of the right lower and partial occlusion of   the right middle lobe airways. Near-complete right lower lobar   consolidation and partial right middle lobe consolidation. Bilateral   right greater than left small pleural effusions. Dependent left lower   lobe consolidative changes.    Bones and soft tissues: Unchanged osseous structures.    IMPRESSION:    No central or segmental pulmonary embolus.    Complete occlusion of the bronchus intermedius extending into the middle   lobe causing complete occlusion of the right lower and partial occlusion   of the right middle lobe airways. No complete right lower lobar   consolidative changes/collapse and partial right middle lobe   consolidation. Bilateral right greater than left small pleural effusions.   Dependent left lower lobe consolidative changes. Findings likely   reflecting combination of atelectasis and pneumonia in the appropriate   clinical setting. Short-term follow-up CT is suggested.    --- End of Report ---          QUYNH TENA MD; Resident Radiologist  This document has been electronically signed.  MIQUEL ROBERSON MD; Attending Radiologist  This document has been electronically signed. Mar 28 2023  4:12PM (03-28-23 @ 13:41)      CARDIOLOGY TESTING  12 Lead ECG:   Ventricular Rate 86 BPM    Atrial Rate 86 BPM    P-R Interval 156 ms    QRS Duration 122 ms    Q-T Interval 396 ms    QTC Calculation(Bazett) 473 ms    P Axis 17 degrees    R Axis 7 degrees    T Axis 44 degrees    Diagnosis Line Normal sinus rhythm  Right bundle branch block  Septal infarct , age undetermined  Possible Lateral infarct , age undetermined  Abnormal ECG    Confirmed by Leander Deshpande ([id number]) on 3/28/2023 7:54:31 PM (03-28-23 @ 14:13)  12 Lead ECG:   Ventricular Rate 120 BPM    Atrial Rate 120 BPM    P-R Interval 160 ms    QRS Duration 114 ms    Q-T Interval 326 ms    QTC Calculation(Bazett) 460 ms    P Axis 82 degrees    R Axis 20 degrees    T Axis 60 degrees    Diagnosis Line Sinus tachycardia  Incomplete right bundle branch block  Borderline ECG    Confirmed by PRETTY BOLAÑOS MD ([id number]) on 3/27/2023 7:25:57 AM (03-27-23 @ 04:18)      MEDICATIONS  acetaminophen     Tablet .. 650 Oral every 6 hours  aspirin  chewable 81 Oral daily  atorvastatin 80 Oral at bedtime  atropine 1% Solution 1 Left EYE two times a day  chlorhexidine 0.12% Liquid 15 Oral Mucosa every 12 hours  chlorhexidine 2% Cloths 1 Topical <User Schedule>  clopidogrel Tablet 75 Oral daily  dexMEDEtomidine Infusion 0.1 IV Continuous <Continuous>  doxazosin 4 Oral at bedtime  fentaNYL   Infusion. 2 IV Continuous <Continuous>  heparin   Injectable 5000 SubCutaneous every 8 hours  insulin lispro (ADMELOG) corrective regimen sliding scale  SubCutaneous every 6 hours  midodrine 10 Oral every 8 hours  pantoprazole  Injectable 40 IV Push daily  piperacillin/tazobactam IVPB.. 3.375 IV Intermittent every 8 hours  prednisoLONE acetate 1% Suspension 1 Left EYE every 6 hours  vancomycin    Solution 125 Oral every 6 hours  vancomycin  IVPB 750 IV Intermittent every 12 hours  vasopressin Infusion 0.03 IV Continuous <Continuous>      WEIGHT  Weight (kg): 63.5 (03-22-23 @ 10:53)  Creatinine, Serum: 0.8 mg/dL (03-31-23 @ 04:20)  Creatinine, Serum: 0.8 mg/dL (03-30-23 @ 21:06)      ANTIBIOTICS:  piperacillin/tazobactam IVPB.. 3.375 Gram(s) IV Intermittent every 8 hours  vancomycin    Solution 125 milliGRAM(s) Oral every 6 hours  vancomycin  IVPB 750 milliGRAM(s) IV Intermittent every 12 hours      All available historical records have been reviewed

## 2023-03-31 NOTE — PROGRESS NOTE ADULT - ATTENDING COMMENTS
Critical Care: [id number]   This patient has a high probability of sudden, clinically significant deterioration, which requires the highest level of physician preparedness to intervene urgently. I managed/supervised life or organ supporting interventions that required frequent physician assessment. I devoted my full attention in the ICU to the direct care of this patient for the period of time indicated below. Time I spent with the family or surrogate(s) is included only if the patient was incapable of providing the necessary information or participating in medical decision making. Time devoted to teaching and to any procedures I billed separately is not included.     RADHA RAMON 77y Male admitted to [x ] SICU /[ ] SDU with post OP hemodynamic instability requiring pressors, vented due to respiratory failure post-OP, A.Fib with hemodynamic instability requiring pressors, electrolytes abnormalities   Patient is examined and evaluated at the bedside with SICU team. Treatment plan discussed with SICU team, nurses and primary team.   Chest X-ray and all relevant studies reviewed during rounds.  Will continue hemodynamic monitoring as per protocol in SICU.    Neuro:  GCS [10T ]   [x ]  Neurovascular checks as per SICU protocol                 [ ] 3% NaCl                     Paralysis [ ] Yes  [x ]  No      Sedated/Pain control with                 [ ] Dilaudid drip, [ ]  Ketamine drip, [ x] Fentanyl drip, [ ] Propofol, [x ] Precedex, [ ] Versed drip, [ ] Ativan drip,                           [ ] OxyContin standing,  [ ] OxyContin PRN, [ ] Dilaudid PRN pushes, [ ] Fentanyl PRN pushes, [ ] PCA,                [x ] Tylenol IV/PO, [x ] Gabapentin, [ ]  Ketorolac, [ ] Tramadol,  [ ] Lidoderm Patch       Other Medications               [ ] Seroquel, [ ] Zyprexa, [ ] Haldol,  [ ] Clonazepam [ ] Xanax, [ ] Versed/Ativan PRN, [ ] Valium [x ] None               [ ] Robaxin   [ ] Baclofen  [ ] Flexeril               [ ] Keppra  [ ] Lamictal  [ ] Depakote  [ ] Dilantin               [ ] CIWA (Ativan/Valium/ Librium)  CV: continue to monitor  On pressors [x ]  Yes  [ ]  No          [ ]  Levophed, [ ] Urbano-Synephrine, [x ] Vasopressin, [ ]  Epinephrine          [ ] Dobutamine, [ ] Milrinone, [ ]  Midodrine,  [ ] Others    Other Cardiac Meds          [ ] Amiodarone IV/PO, [ ] Digoxin, [ ] Cardizem drip, [ ] Cleviprex drip, [ ] Esmolol drip, [ ] Cardene drip    Respiratory: Acute respiratory insufficiency -> continue monitoring, ventilatory support                        None Invasive Support  [ ] Incentive Spirometer                                  [ ] BiPAP   [ ] CPAP/NIV   [ ] HFNC   [ ] NR Face Mask   [ ] NC  [ ] Trach Caller                        Ventilatory support  [ x] Yes [ ] No                            [x ] SBT                                  [ ] PC    [ ] VC   [ x] AC/PRVC   [ ] BiVent/APRV   [ ]CPAP   GI  [x ]  bowel regiment  [ x] BM + [x ] Flatus + [x] C.Diff +             [ ] Ostomy   [x ] NG tube        Prophylaxis [x ] PPI  [ ] H2 Blockers  [ ] Others  Nutrition: continue   [x ] Diet NPO  [ ] TPN/PPN   [ ] calories count   [x ]  Tube Feeds  at goal   Renal: Continue I&Os monitoring, Garrison catheter  [x ] Yes,  [ ]   No ,  [ ] Consideration for discontinuation [ ] Taxes cath/PrimaFit  [ ] TOV  [ ] HD/CVVH       Lytes/Acid-base: replete hypokalemia, hypomagnesemia, hypocalcemia, hypophosphatemia        IV Fluids   [x ] LR@50ml/hr,  [ ] NS  [ ] D5W1/2NS [ ] Bicarbonate Drip  [ ] Albumin [ ] Lasix  ID: leukocytosis -> continue to monitor:         IV Abx [x ] Yes, [ ] No;  ID consulted [x ] Yes, [ ] No        Cultures send  [ ] Respiratory   [ ] Blood   [ ] Urine  [ ] Fluids  [ ] Tissue [x] Stool [ ]  None  Lines:   [ ] Right   [ ] Left  [ ] Bilateral                     [ ] Subclavian TLC        [ ]  Internal Jugular TLC     [ ]  Femoral TLC                     [ ] Subclavian Cordis    [ ] Internal Jugular Cordis   [ ] Femoral Cordis                     [ ] HD catheter    [ ] PICC     [ ]  Midline   [ x] Peripheral IVs                                                 [x ] Right   [ ] Left   [ ] None                     [x ] Radial A-Line    [ ] Femoral A-Line   [ ] Axillary A-Line               Heme: continue to evaluate for acute blood loss anemia- trend Hg/Hct                     AC Reversal Indicated [ ] Yes  [ x] No                                      [ ] Kcentra,  [ ] 3Factors concentrate, [ ] Adnexxa                     Transfused  indicated  [ ] Yes, [x ] No    [ ] PRBCs   [ ] Platelets   [ ] FFPs   [ ] Cryoprecipitate                    Should be started on or continued with  following  [ ] Yes,  [ x] No                               [ ] Lovenox  [ ] Coumadin  [ ] Heparin drip  [ ] NOVACs  [ ] ASA  [ ] Antiplatelets   Endocrine: Prevent and treat hyperglycemia with insulin as needed,                                 Insuline drip [ ] Yes, [x ] No   PV: follow pulse exam  Skin: decub precautions  DVT Prophylaxis:  [ x] SCDs  [x ] Heparin SQ  [ ] Lovenox  SQ  Stress Gastritis Prophylaxis: PPI/H2 Blockers if indicated  Mobility: patient is evaluated at the bedside with mobility team and the goals for today are discussed with PT [ x] bed rest    PATIENT/FAMILY/SURROGATE CONFERENCE:  [x ] Yes with patient's family at the bedside. [ ] No  PURPOSE: To obtain necessary information, To discuss treatment options under consideration today.  Tracheostomy and PEG discussed with family, consent obtained     I saw and evaluated the patient personally. I have reviewed and agree with note above. Treatment plan discussed with SICU team, nurses and primary team at the time of the multidisciplinary rounds. The above note is NOT written at the time of rounds and will reflect all changes throughout management of the patient for the day note is written for.    Nancy Medrano MD, FACS  Trauma/ACS/SCC Attending

## 2023-04-01 NOTE — PROGRESS NOTE ADULT - ATTENDING COMMENTS
Critical Care: [id number]   This patient has a high probability of sudden, clinically significant deterioration, which requires the highest level of physician preparedness to intervene urgently. I managed/supervised life or organ supporting interventions that required frequent physician assessment. I devoted my full attention in the ICU to the direct care of this patient for the period of time indicated below. Time I spent with the family or surrogate(s) is included only if the patient was incapable of providing the necessary information or participating in medical decision making. Time devoted to teaching and to any procedures I billed separately is not included.     RADHA RAMON 77y Male admitted to [x ] SICU /[ ] SDU with post OP hemodynamic instability requiring pressors, vented due to respiratory failure post-OP, A.Fib with hemodynamic instability requiring pressors, electrolytes abnormalities   Patient is examined and evaluated at the bedside with SICU team. Treatment plan discussed with SICU team, nurses and primary team.   Chest X-ray and all relevant studies reviewed during rounds.  Will continue hemodynamic monitoring as per protocol in SICU.    Neuro:  GCS [10T ]   [x ]  Neurovascular checks as per SICU protocol                 [ ] 3% NaCl                     Paralysis [ ] Yes  [x ]  No      Sedated/Pain control with                 [ ] Dilaudid drip, [ ]  Ketamine drip, [ x] Fentanyl drip, [ ] Propofol, [x ] Precedex, [ ] Versed drip, [ ] Ativan drip,                           [ ] OxyContin standing,  [ ] OxyContin PRN, [ ] Dilaudid PRN pushes, [ ] Fentanyl PRN pushes, [ ] PCA,                [x ] Tylenol IV/PO, [x ] Gabapentin, [ ]  Ketorolac, [ ] Tramadol,  [ ] Lidoderm Patch       Other Medications               [ ] Seroquel, [ ] Zyprexa, [ ] Haldol,  [ ] Clonazepam [ ] Xanax, [ ] Versed/Ativan PRN, [ ] Valium [x ] None               [ ] Robaxin   [ ] Baclofen  [ ] Flexeril               [ ] Keppra  [ ] Lamictal  [ ] Depakote  [ ] Dilantin               [ ] CIWA (Ativan/Valium/ Librium)  CV: continue to monitor  On pressors [x ]  Yes  [ ]  No          [ ]  Levophed, [ ] Urbano-Synephrine, [x ] Vasopressin, [ ]  Epinephrine          [ ] Dobutamine, [ ] Milrinone, [ x]  Midodrine,  [ ] Others    Other Cardiac Meds          [ ] Amiodarone IV/PO, [ ] Digoxin, [ ] Cardizem drip, [ ] Cleviprex drip, [ ] Esmolol drip, [ ] Cardene drip  Respiratory: Acute respiratory insufficiency -> continue monitoring, ventilatory support                        None Invasive Support  [ ] Incentive Spirometer                                  [ ] BiPAP   [ ] CPAP/NIV   [ ] HFNC   [ ] NR Face Mask   [ ] NC  [ ] Trach Caller                        Ventilatory support  [ x] Yes [ ] No                            [x ] SBT                                  [ ] PC    [ ] VC   [ x] AC/PRVC   [ ] BiVent/APRV   [ ]CPAP   GI  [x ]  bowel regiment  [ x] BM + [x ] Flatus + [x] C.Diff +             [ ] Ostomy   [x ] NG tube        Prophylaxis [x ] PPI  [ ] H2 Blockers  [ ] Others  Nutrition: continue   [x ] Diet NPO  [ ] TPN/PPN   [ ] calories count   [x ]  Tube Feeds  at goal   Renal: Continue I&Os monitoring, Garrison catheter  [x ] Yes,  [ ]   No ,  [ ] Consideration for discontinuation [ ] Taxes cath/PrimaFit  [ ] TOV  [ ] HD/CVVH       Lytes/Acid-base: replete hypokalemia, hypomagnesemia, hypocalcemia, hypophosphatemia        IV Fluids   [x ] LR@50ml/hr,  [ ] NS  [ ] D5W1/2NS [ ] Bicarbonate Drip  [ ] Albumin [ ] Lasix  ID: leukocytosis -> continue to monitor:         IV Abx [x ] Yes, [ ] No;  ID consulted [x ] Yes, [ ] No        Cultures send  [ ] Respiratory   [ ] Blood   [ ] Urine  [ ] Fluids  [ ] Tissue [x] Stool [ ]  None  Lines:   [ ] Right   [ ] Left  [ ] Bilateral                     [ ] Subclavian TLC        [ ]  Internal Jugular TLC     [ ]  Femoral TLC                     [ ] Subclavian Cordis    [ ] Internal Jugular Cordis   [ ] Femoral Cordis                     [ ] HD catheter    [ ] PICC     [ ]  Midline   [ x] Peripheral IVs                                                 [x ] Right   [ ] Left   [ ] None                     [x ] Radial A-Line    [ ] Femoral A-Line   [ ] Axillary A-Line               Heme: continue to evaluate for acute blood loss anemia- trend Hg/Hct                     AC Reversal Indicated [ ] Yes  [ x] No                                      [ ] Kcentra,  [ ] 3Factors concentrate, [ ] Adnexxa                     Transfused  indicated  [ ] Yes, [x ] No    [ ] PRBCs   [ ] Platelets   [ ] FFPs   [ ] Cryoprecipitate                    Should be started on or continued with  following  [ ] Yes,  [ x] No                               [ ] Lovenox  [ ] Coumadin  [ ] Heparin drip  [ ] NOVACs  [ ] ASA  [ ] Antiplatelets   Endocrine: Prevent and treat hyperglycemia with insulin as needed,                                 Insuline drip [ ] Yes, [x ] No   PV: follow pulse exam  Skin: decub precautions  DVT Prophylaxis:  [ x] SCDs  [x ] Heparin SQ  [ ] Lovenox  SQ  Stress Gastritis Prophylaxis: PPI/H2 Blockers if indicated  Mobility: patient is evaluated at the bedside with mobility team and the goals for today are discussed with PT [ x] bed rest    PATIENT/FAMILY/SURROGATE CONFERENCE:  [x ] Yes with patient's family at the bedside. [ ] No  PURPOSE: To obtain necessary information, To discuss treatment options under consideration today.  Tracheostomy and PEG discussed with family, consent obtained     I saw and evaluated the patient personally. I have reviewed and agree with note above. Treatment plan discussed with SICU team, nurses and primary team at the time of the multidisciplinary rounds. The above note is NOT written at the time of rounds and will reflect all changes throughout management of the patient for the day note is written for.    Nancy Medrano MD, FACS  Trauma/ACS/SCC Attending

## 2023-04-01 NOTE — PROGRESS NOTE ADULT - ASSESSMENT
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   S/P ORIF of right hip using interlocking intramedullary stephon.       NEURO:  #Acute pain    - Tylenol, Fentanyl gtt  #acute sedation    -Precedex    RESP:   #acute respiratory failure secondary to post operative arrhythmias requiring mechanical ventilation (Intubated 3/23- Day 7 on vent),     -Book OR for trach/peg for monday    -consent in chart    -d/w team antiplatelet medication prior to OR    RR (machine): 12, TV (machine): 450, FiO2: 40, PEEP: 6  ABG:   #PE w/u NEGATIVE on 3/28  #Activity    -Bedrest    CARDS:   #acute hypotension    -cont vaso 0.02 for MAP>65    -Phenylephrine weaned off     -midodrine 10 q8  #Labs/Imaging  -Echo 3/23- EF appears normal, mild LVH, sclerotic AoV, trace MR,   -Echo 3/29: EF 70-65%, norm LV function, borderline pulm HTN.     GI/NUTR:   #Diet: NPO x/rx    -TF Peptamen goal 50cc/hr    -NGT at 63cm  #Nausea: Zofran prn    - Aspiration precautions, HOB 30  #GI Prophylaxis    - Protonix  #Bowel regimen    - None while c.diff positive  #Imaging    -CT abdomen 3/28: No definite infectious source identified      -RUQ sono: biliary sludge, mild GB wall thickening/edema.     /RENAL:   #UO: Blair placed by Urology     - Cardura (Flomax at home)  #scrotal swelling  - U/S 3/29 w/ diffused scrotal wall edema   #urine output in critically ill    -indwelling blair (placed 3/22 by Urology)    Labs:          BUN/Cr- 32/0.8  -->,  30/0.7  -->          Electrolytes-Na 138 // K 4.8 // Mg 2.1 //  Phos 2.3 (03-31 @ 20:51)      HEME/ONC:   #DVT prophylaxis- HSQ, SCDs  #H/O CAD s/p stents x3    - ASA & Plavix    Labs: Hb/Hct:  7.4/23.4  -->,  7.2/23.4  -->                      Plts:  246  -->,  248  -->                 PTT/INR:        Home Rx: clopidogrel 75 mg oral tablet: 1 tab(s) orally once a day      ID:  #leukocytosis  WBC- 8.47  --->>,  8.24  --->>,  10.16  --->>  Temp trend- 24hrs T(F): 97.8 (03-31 @ 23:28), Max: 98.3 (03-31 @ 08:00)  Current antibiotics-vancomycin    Solution 125 every 6 hours  CDiff PCR 3/30: Positive  Cultures:    ET Sputum 3/29: Prelim negative     BCx 3/28: NGTD    UA 3/28: Negative     BCx 3/25--pending    UA 3/24--negative    ET Sputum 3/24--neg    MRSA neg     ENDO:    -FSG q6 if NPO    -Glucose goal 140-180    -if above 180 start ISS    MSK:     - WBAT RLE per ortho, PT/OT when clinically appropriate    LINES/DRAINS:    A-line left radial (3/22),    PIV x3 (2 20s, 1 18),   Blair (3/22)  Right IJ TLC (3/23)  ETT, OGT     ADVANCED DIRECTIVES:  Full Code    HCP/Emergency Contact- Song Segura (Son): [phone]    INDICATION FOR SICU Hypotension with pressor requirements. Intubated.       DISPO: Consent for trach obtained  SICU

## 2023-04-01 NOTE — PROGRESS NOTE ADULT - SUBJECTIVE AND OBJECTIVE BOX
RADHA RAMON   [id number]   [birth year]  77yM      Admit Date/LOS: 03-20 (2d)  Indication for SDU/SICU: Hypotension with pressor requirements. Intubated.        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 Pt walks with walker and got up in middle of night to get food and slipped and fell on his R side. Pt has been having R hip pain since with inability to ambulate. Pts family finally convinced him to come to hospital today. Pt is having 10/10 pain currently. Denies dizziness, CP or LOC prior to fall. Denies head trauma. No HA. (20 Mar 2023 12:02)    3/22: Patient S/P ORIF of right hip using interlocking intramedullary stephon.   OR Time:    EBL: 75  IV Fluids: 2700  Blood Products: None  UOP: Not noted. No blair placed in OR  Findings- Displaced intertrochanteric fracture appreciated. 11mm x 200mm 130, lag screw 10.5 x 105mm    SICU consulted for hypotension requiring pressors.    Patient was a rapid response in the PACU post-operatively due to hypotension. Patient examined bedside and was responding to verbal stimuli. Albumin and fluid bolus given. Labs drawn off A-line and Urbano started. NGT placed by SICU. Blair placed by urology due to difficulty passing Blair in setting of patient with prostate CA and BPH.    24Hours  3/31  NIGHT  fentanyl 1.5 precedex 1.5 vaso 0.02  feeds at 30cc/hr as long as no vomiting advance to goal    3/31  Day  decr vaso .01, went back up to .03  f/u ID -> DC IV abx, continue Vanc PO  -trach/peg Monday  -Hypertensive to 170s-> decrease vaso to 0.02        [] A ten-point review of systems was otherwise negative except as noted above.  [x] Due to altered mental status/intubation, subjective information was not attained from the patient. History was obtained, to the extent possible, from review of the chart and collateral sources of information.    =========================================================================================================================================

## 2023-04-01 NOTE — PROGRESS NOTE ADULT - SUBJECTIVE AND OBJECTIVE BOX
Orthopaedic Surgery Progress Note    77M s/p right hip IMN for right IT fx. S&E at bedside this AM. remains intubated and sedated in the ICU. SICU plans for Trach/PEG monday    T(C): 36.6 (03-31-23 @ 23:28), Max: 36.8 (03-31-23 @ 08:00)  HR: 60 (04-01-23 @ 05:00) (57 - 112)  BP: --  RR: 16 (04-01-23 @ 05:00) (14 - 36)  SpO2: 99% (04-01-23 @ 05:00) (95% - 100%)    P/E:  NAD  Nonlabored breathing    RLE  Dressing c/d/i  SILT SP/DP/T/Sural/Saph  Firing TA/EHL/FHL/GS  Foot WWP, 2+ DP pulse  Labs                        7.2    10.16 )-----------( 248      ( 31 Mar 2023 20:51 )             23.4     03-31    138  |  109  |  30<H>  ----------------------------<  136<H>  4.8   |  23  |  0.7    Ca    8.1<L>      31 Mar 2023 20:51  Phos  2.3     03-31  Mg     2.1     03-31        A/P :  Pt is a 76yo Male s/p right hip IMN on 3/20. Patient remains intubated/sedated due to acute respiratory failure secondary to postop arrythmia requiring mechanical ventilation.    -    Pain control  -    DVT ppx per SICU   -    Weight bearing status: WBAT   -    Physical Therapy when able  -    Dressing changes per ortho  -    Rest of care per SICU

## 2023-04-02 NOTE — PROGRESS NOTE ADULT - ATTENDING COMMENTS
Critical Care: [id number]   This patient has a high probability of sudden, clinically significant deterioration, which requires the highest level of physician preparedness to intervene urgently. I managed/supervised life or organ supporting interventions that required frequent physician assessment. I devoted my full attention in the ICU to the direct care of this patient for the period of time indicated below. Time I spent with the family or surrogate(s) is included only if the patient was incapable of providing the necessary information or participating in medical decision making. Time devoted to teaching and to any procedures I billed separately is not included.     RADHA RAMON 77y Male admitted to [x ] SICU /[ ] SDU with post OP hemodynamic instability requiring pressors, vented due to respiratory failure post-OP, A.Fib with hemodynamic instability requiring pressors, electrolytes abnormalities   Patient is examined and evaluated at the bedside with SICU team. Treatment plan discussed with SICU team, nurses and primary team.   Chest X-ray and all relevant studies reviewed during rounds.  Will continue hemodynamic monitoring as per protocol in SICU.    Neuro:  GCS [10T ]   [x ]  Neurovascular checks as per SICU protocol                 [ ] 3% NaCl                     Paralysis [ ] Yes  [x ]  No      Sedated/Pain control with                 [ ] Dilaudid drip, [ ]  Ketamine drip, [ x] Fentanyl drip, [ ] Propofol, [x ] Precedex @1.2mg, [ ] Versed drip, [ ] Ativan drip,                           [ ] OxyContin standing,  [ ] OxyContin PRN, [ ] Dilaudid PRN pushes, [ ] Fentanyl PRN pushes, [ ] PCA,                [x ] Tylenol IV/PO, [ ] Gabapentin, [ ]  Ketorolac, [ ] Tramadol,  [ ] Lidoderm Patch       Other Medications               [ ] Seroquel, [ ] Zyprexa, [ ] Haldol,  [ ] Clonazepam [ ] Xanax, [ ] Versed/Ativan PRN, [ ] Valium [x ] None               [ ] Robaxin   [ ] Baclofen  [ ] Flexeril               [ ] Keppra  [ ] Lamictal  [ ] Depakote  [ ] Dilantin               [ ] CIWA (Ativan/Valium/ Librium)  CV: continue to monitor  On pressors [x ]  Yes  [ ]  No          [ ]  Levophed, [ ] Urbano-Synephrine, [x ] Vasopressin@0.02, [ ]  Epinephrine          [ ] Dobutamine, [ ] Milrinone, [ x]  Midodrine,  [ ] Others    Other Cardiac Meds          [ ] Amiodarone IV/PO, [ ] Digoxin, [ ] Cardizem drip, [ ] Cleviprex drip, [ ] Esmolol drip, [ ] Cardene drip  Respiratory: Acute respiratory insufficiency -> continue monitoring, ventilatory support, CXR bilateral pleural effusions                        None Invasive Support  [ ] Incentive Spirometer                                  [ ] BiPAP   [ ] CPAP/NIV   [ ] HFNC   [ ] NR Face Mask   [ ] NC  [ ] Trach Caller                        Ventilatory support  [ x] Yes [ ] No                            [x ] SBT                                  [ ] PC    [ ] VC   [ x] AC/PRVC 450/12/6/40%  [ ] BiVent/APRV   [ ]CPAP   GI  [x ]  bowel regiment  [ x] BM + [x ] Flatus + [x] C.Diff +             [ ] Ostomy   [x ] NG tube        Prophylaxis [x ] PPI  [ ] H2 Blockers  [ ] Others  Nutrition: continue   [x ] Diet NPO  [ ] TPN/PPN   [ ] calories count   [x ]  Tube Feeds at goal   Renal: Continue I&Os monitoring, Garrison catheter  [x ] Yes,  [ ]   No ,  [ ] Consideration for discontinuation [ ] Taxes cath/PrimaFit  [ ] TOV  [ ] HD/CVVH       Lytes/Acid-base: replete hypokalemia, hypomagnesemia, hypocalcemia, hypophosphatemia        IV Fluids   [x ] LR KVO,  [ ] NS  [ ] D5W1/2NS [ ] Bicarbonate Drip  [ ] Albumin [ ] Lasix  ID: leukocytosis -> continue to monitor:         IV Abx [x ] Yes, [ ] No;  ID consulted [x ] Yes, [ ] No        Cultures send  [ ] Respiratory   [ ] Blood   [ ] Urine  [ ] Fluids  [ ] Tissue [x] Stool [ ]  None  Lines:   [ ] Right   [ ] Left  [ ] Bilateral                     [ ] Subclavian TLC        [ ]  Internal Jugular TLC     [ ]  Femoral TLC                     [ ] Subclavian Cordis    [ ] Internal Jugular Cordis   [ ] Femoral Cordis                     [ ] HD catheter    [ ] PICC     [ ]  Midline   [ x] Peripheral IVs                                                 [x ] Right   [ ] Left   [ ] None                     [x ] Radial A-Line    [ ] Femoral A-Line   [ ] Axillary A-Line               Heme: continue to evaluate for acute blood loss anemia- trend Hg/Hct                     AC Reversal Indicated [ ] Yes  [ x] No                                      [ ] Kcentra,  [ ] 3Factors concentrate, [ ] Adnexxa                     Transfused  indicated  [ ] Yes, [x ] No    [ ] PRBCs   [ ] Platelets   [ ] FFPs   [ ] Cryoprecipitate                    Should be started on or continued with  following  [ ] Yes,  [ x] No                               [ ] Lovenox  [ ] Coumadin  [ ] Heparin drip  [ ] NOVACs  [ ] ASA  [ ] Antiplatelets   Endocrine: Prevent and treat hyperglycemia with insulin as needed,                                 Insuline drip [ ] Yes, [x ] No   PV: follow pulse exam  Skin: decub precautions  DVT Prophylaxis:  [ x] SCDs  [x ] Heparin SQ  [ ] Lovenox  SQ  Stress Gastritis Prophylaxis: PPI/H2 Blockers if indicated  Mobility: patient is evaluated at the bedside with mobility team and the goals for today are discussed with PT [ x] bed rest    PATIENT/FAMILY/SURROGATE CONFERENCE:  [x ] Yes with patient's family at the bedside. [ ] No  PURPOSE: To obtain necessary information, To discuss treatment options under consideration today.  Tracheostomy and PEG discussed with family, consent obtained     I saw and evaluated the patient personally. I have reviewed and agree with note above. Treatment plan discussed with SICU team, nurses and primary team at the time of the multidisciplinary rounds. The above note is NOT written at the time of rounds and will reflect all changes throughout management of the patient for the day note is written for.    Nancy Medrano MD, FACS  Trauma/ACS/SCC Attending

## 2023-04-02 NOTE — PROGRESS NOTE ADULT - ASSESSMENT
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   S/P ORIF of right hip using interlocking intramedullary stephon.       NEURO:  #Acute pain    - Tylenol, Fentanyl gtt  #acute sedation    -Precedex    RESP:   #acute respiratory failure secondary to post operative arrhythmias requiring mechanical ventilation (Intubated 3/23- Day 7 on vent),    Failed mutiple SBT trial    -Book OR for trach/peg     -consent in chart    -d/c Plavix for OR (3/31)    RR (machine): 12, TV (machine): 450, FiO2: 40, PEEP: 6  ABG:   #PE w/u NEGATIVE on 3/28  #Activity    -Bedrest    CARDS:   #acute hypotension    -cont vaso 0.02 for MAP>65    -Phenylephrine weaned off     -midodrine 10 q6  #Labs/Imaging  -Echo 3/23- EF appears normal, mild LVH, sclerotic AoV, trace MR,   -Echo 3/29: EF 70-65%, norm LV function, borderline pulm HTN.     GI/NUTR:   #Diet: NPO x/rx    -TF Peptamen goal 50cc/hr    -NGT at 63cm  #Nausea: Zofran prn    - Aspiration precautions, HOB 30  #GI Prophylaxis    - Protonix  #Bowel regimen    - None while c.diff positive  #Imaging    -CT abdomen 3/28: No definite infectious source identified      -RUQ sono: biliary sludge, mild GB wall thickening/edema.     /RENAL:   #UO: Blair placed by Urology     - Cardura (Flomax at home)  #scrotal swelling  - U/S 3/29 w/ diffused scrotal wall edema   - con't scrotal elevation  #urine output in critically ill    -indwelling blair (placed 3/22 by Urology)    Labs:          BUN/Cr- 30/0.7  -->,  27/0.6  -->          Electrolytes-Na 142 // K 4.4 // Mg 2.0 //  Phos 2.0 (04-01 @ 20:41)    Home Rx: tamsulosin 0.4 mg oral capsule        HEME/ONC:   #DVT prophylaxis- HSQ, SCDs  #H/O CAD s/p stents x3    - con't ASA; hold Plavix for OR    Labs: Hb/Hct:  7.2/23.4  -->,  7.1/23.0  -->      f/u AM CBC                Plts:  248  -->,  271  -->                 PTT/INR:        Home Rx: clopidogrel 75 mg oral tablet: 1 tab(s) orally once a day        ID:  #leukocytosis  WBC- 8.24  --->>,  10.16  --->>,  10.06  --->>  Temp trend- 24hrs T(F): 97 (04-02 @ 00:00), Max: 97.8 (04-01 @ 12:00)  Current antibiotics-vancomycin    Solution 125 every 6 hours      Cultures:    ET Sputum 3/29: Prelim negative     BCx 3/28: NGTD    UA 3/28: Negative     BCx 3/25--pending    UA 3/24--negative    ET Sputum 3/24--neg    MRSA neg    CDiff PCR 3/30: Positive    ENDO:    -FSG q6 if NPO    -Glucose goal 140-180    -if above 180 start ISS    MSK:     - WBAT RLE per ortho, PT/OT when clinically appropriate    LINES/DRAINS:    A-line left radial (3/22),    PIV x3 (2 20s, 1 18),   Blair (3/22)  Right IJ TLC (3/23)  ETT, OGT     ADVANCED DIRECTIVES:  Full Code    HCP/Emergency Contact- Song Segura (Son): [phone]    INDICATION FOR SICU Hypotension with pressor requirements. Intubated.       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   S/P ORIF of right hip using interlocking intramedullary stephon.       NEURO:  #Acute pain    - Tylenol, Fentanyl gtt  #acute sedation    -Precedex    RESP:   #acute respiratory failure secondary to post operative arrhythmias requiring mechanical ventilation (Intubated 3/23- Day 10 on vent),    Failed multiple SBT trial    -Book OR for trach/peg     -consent in chart    -d/c Plavix for OR (3/31)    RR (machine): 12, TV (machine): 450, FiO2: 40, PEEP: 6  AB.29/49/135/24  #PE w/u NEGATIVE on 3/28  #Activity    -Bedrest    CARDS:   #acute hypotension    -cont vaso 0.02 for MAP>65    -Phenylephrine weaned off     -midodrine 10 q6  #Labs/Imaging  -Echo 3/23- EF appears normal, mild LVH, sclerotic AoV, trace MR,   -Echo 3/29: EF 70-65%, norm LV function, borderline pulm HTN.     GI/NUTR:   #Diet: NPO x/rx    -TF Peptamen goal 50cc/hr    -NGT at 63cm  #Nausea: Zofran prn    - Aspiration precautions, HOB 30  #GI Prophylaxis    - Protonix  #Bowel regimen    - None while c.diff positive  #Imaging    -CT abdomen 3/28: No definite infectious source identified      -RUQ sono: biliary sludge, mild GB wall thickening/edema.     /RENAL:   #UO: Blair placed by Urology     - Cardura (Flomax at home)  #scrotal swelling  - U/S 3/29 w/ diffused scrotal wall edema   - con't scrotal elevation  #urine output in critically ill    -indwelling blair (placed 3/22 by Urology)    Labs:          BUN/Cr- 30/0.7  -->,  27/0.6  -->          Electrolytes-Na 142 // K 4.4 // Mg 2.0 //  Phos 2.0 ( @ 20:41)    Home Rx: tamsulosin 0.4 mg oral capsule        HEME/ONC:   #DVT prophylaxis- HSQ, SCDs  #H/O CAD s/p stents x3    - con't ASA; hold Plavix for OR    Labs: Hb/Hct:  7.2/23.4  -->,  7.1/23.0  --> 6.9 --> 1uprbc              Plts:  248  -->,  271  -->                 PTT/INR:        Home Rx: clopidogrel 75 mg oral tablet: 1 tab(s) orally once a day        ID:  #leukocytosis  WBC- 8.24  --->>,  10.16  --->>,  10.06  --->>  Temp trend- 24hrs T(F): 97 ( @ 00:00), Max: 97.8 ( @ 12:00)  Current antibiotics-vancomycin    Solution 125 every 6 hours      Cultures:    ET Sputum 3/29: Prelim negative     BCx 3/28: NGTD    UA 3/28: Negative     BCx 3/25--negative    UA 3/24--negative    ET Sputum 3/24--neg    MRSA neg    CDiff PCR 3/30: Positive    ENDO:    -FSG q6 if NPO    -Glucose goal 140-180    -if above 180 start ISS    MSK:     - WBAT RLE per ortho, PT/OT when clinically appropriate    LINES/DRAINS:    A-line left radial (3/22),    PIV x3 (2 20s, 1 18),   Blair (3/22)  Right IJ TLC (3/23)  ETT, OGT     ADVANCED DIRECTIVES:  Full Code    HCP/Emergency Contact- Song Segura (Son): [phone]    INDICATION FOR SICU Hypotension with pressor requirements. Intubated.       DISPO: Consent for trach obtained, SICU

## 2023-04-02 NOTE — PROGRESS NOTE ADULT - SUBJECTIVE AND OBJECTIVE BOX
RADHA RAMON   [id number]   [birth year]  77yM      Admit Date/LOS: 03-20 (2d)  Indication for SDU/SICU: Hypotension with pressor requirements. Intubated.        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 Pt walks with walker and got up in middle of night to get food and slipped and fell on his R side. Pt has been having R hip pain since with inability to ambulate. Pts family finally convinced him to come to hospital today. Pt is having 10/10 pain currently. Denies dizziness, CP or LOC prior to fall. Denies head trauma. No HA. (20 Mar 2023 12:02)    3/22: Patient S/P ORIF of right hip using interlocking intramedullary stephon.   OR Time:    EBL: 75  IV Fluids: 2700  Blood Products: None  UOP: Not noted. No blair placed in OR  Findings- Displaced intertrochanteric fracture appreciated. 11mm x 200mm 130, lag screw 10.5 x 105mm    SICU consulted for hypotension requiring pressors.    Patient was a rapid response in the PACU post-operatively due to hypotension. Patient examined bedside and was responding to verbal stimuli. Albumin and fluid bolus given. Labs drawn off A-line and Urbano started. NGT placed by SICU. Blair placed by urology due to difficulty passing Blair in setting of patient with prostate CA and BPH.    24Hours    4/1  NIGHT  hb 7.1 -> cbc AM  precedex 1.4, vaso 0.02, fentanyl 3    4/1  Day  No OR - on asa/plavix  --> keep ASA, d/c plavix --> OR later in week for TRACH/PEG  inc midorine 10 q6hr  albumin 25%, 50cc x 2 doses  wean vasopressin --> hold off on steriods for now      [] A ten-point review of systems was otherwise negative except as noted above.  [x] Due to altered mental status/intubation, subjective information was not attained from the patient. History was obtained, to the extent possible, from review of the chart and collateral sources of information.    =========================================================================================================================================   RADHA RAMON   [id number]   [birth year]  77yM      Admit Date/LOS: 03-20 (2d)  Indication for SDU/SICU: Hypotension with pressor requirements. Intubated.        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 Pt walks with walker and got up in middle of night to get food and slipped and fell on his R side. Pt has been having R hip pain since with inability to ambulate. Pts family finally convinced him to come to hospital today. Pt is having 10/10 pain currently. Denies dizziness, CP or LOC prior to fall. Denies head trauma. No HA. (20 Mar 2023 12:02)    3/22: Patient S/P ORIF of right hip using interlocking intramedullary stephon.   OR Time:    EBL: 75  IV Fluids: 2700  Blood Products: None  UOP: Not noted. No blair placed in OR  Findings- Displaced intertrochanteric fracture appreciated. 11mm x 200mm 130, lag screw 10.5 x 105mm    SICU consulted for hypotension requiring pressors.    Patient was a rapid response in the PACU post-operatively due to hypotension. Patient examined bedside and was responding to verbal stimuli. Albumin and fluid bolus given. Labs drawn off A-line and Urbano started. NGT placed by SICU. Blair placed by urology due to difficulty passing Blair in setting of patient with prostate CA and BPH.    24Hours    4/1  NIGHT  hb 7.1 -> cbc AM  precedex 1.4, vaso 0.02, fentanyl 3    4/1  Day  No OR - on asa/plavix  --> keep ASA, d/c plavix --> OR later in week for TRACH/PEG  inc midorine 10 q6hr  albumin 25%, 50cc x 2 doses  wean vasopressin --> hold off on steriods for now      [] A ten-point review of systems was otherwise negative except as noted above.  [x] Due to altered mental status/intubation, subjective information was not attained from the patient. History was obtained, to the extent possible, from review of the chart and collateral sources of information.    =========================================================================================================================================    Daily     Daily     Diet, NPO with Tube Feed:   Tube Feeding Modality: Orogastric  Peptamen A.F. Formula  Total Volume for 24 Hours (mL): 1200  Continuous  Starting Tube Feed Rate mL per Hour: 20  Increase Tube Feed Rate by (mL): 20  Until Goal Tube Feed Rate (mL per Hour): 50  Tube Feed Duration (in Hours): 24  Tube Feed Start Time: 21:00 (03-25-23 @ 19:55)      CURRENT MEDS:  Neurologic Medications  acetaminophen     Tablet .. 650 milliGRAM(s) Oral every 6 hours  dexMEDEtomidine Infusion 0.1 MICROgram(s)/kG/Hr IV Continuous <Continuous>  fentaNYL   Infusion. 2 MICROgram(s)/kG/Hr IV Continuous <Continuous>  midazolam Injectable 2 milliGRAM(s) IV Push once  ondansetron Injectable 4 milliGRAM(s) IV Push every 8 hours PRN Nausea and/or Vomiting    Respiratory Medications    Cardiovascular Medications  doxazosin 4 milliGRAM(s) Oral at bedtime  midodrine 10 milliGRAM(s) Oral <User Schedule>    Gastrointestinal Medications  pantoprazole  Injectable 40 milliGRAM(s) IV Push daily  sodium chloride 0.9% lock flush 10 milliLiter(s) IV Push every 1 hour PRN Pre/post blood products, medications, blood draw, and to maintain line patency    Genitourinary Medications    Hematologic/Oncologic Medications  aspirin  chewable 81 milliGRAM(s) Oral daily  heparin   Injectable 5000 Unit(s) SubCutaneous every 8 hours    Antimicrobial/Immunologic Medications  vancomycin    Solution 125 milliGRAM(s) Oral every 6 hours    Endocrine/Metabolic Medications  atorvastatin 80 milliGRAM(s) Oral at bedtime  insulin lispro (ADMELOG) corrective regimen sliding scale   SubCutaneous every 6 hours  vasopressin Infusion 0.02 Unit(s)/Min IV Continuous <Continuous>    Topical/Other Medications  atropine 1% Solution 1 Drop(s) Left EYE two times a day  chlorhexidine 0.12% Liquid 15 milliLiter(s) Oral Mucosa every 12 hours  chlorhexidine 2% Cloths 1 Application(s) Topical <User Schedule>  prednisoLONE acetate 1% Suspension 1 Drop(s) Left EYE every 6 hours      ICU Vital Signs Last 24 Hrs  T(C): 35.6 (02 Apr 2023 04:00), Max: 36.6 (01 Apr 2023 12:00)  T(F): 96 (02 Apr 2023 04:00), Max: 97.8 (01 Apr 2023 12:00)  HR: 76 (02 Apr 2023 06:00) (54 - 135)  BP: --  BP(mean): --  ABP: 140/54 (02 Apr 2023 06:00) (98/48 - 170/75)  ABP(mean): 78 (02 Apr 2023 06:00) (66 - 108)  RR: 15 (02 Apr 2023 06:00) (13 - 30)  SpO2: 100% (02 Apr 2023 06:00) (88% - 100%)    O2 Parameters below as of 02 Apr 2023 04:00  Patient On (Oxygen Delivery Method): ventilator            Adult Advanced Hemodynamics Last 24 Hrs  CVP(mm Hg): --  CVP(cm H2O): --  CO: --  CI: --  PA: --  PA(mean): --  PCWP: --  SVR: --  SVRI: --  PVR: --  PVRI: --    Mode: AC/ CMV (Assist Control/ Continuous Mandatory Ventilation)  RR (machine): 12  TV (machine): 450  FiO2: 40  PEEP: 6  ITime: 0.9  MAP: 10  PIP: 21    ABG - ( 02 Apr 2023 04:12 )  pH, Arterial: 7.29  pH, Blood: x     /  pCO2: 49    /  pO2: 135   / HCO3: 24    / Base Excess: -3.4  /  SaO2: 100.0               I&O's Summary    01 Apr 2023 07:01  -  02 Apr 2023 07:00  --------------------------------------------------------  IN: 1824 mL / OUT: 1140 mL / NET: 684 mL      I&O's Detail    01 Apr 2023 07:01  -  02 Apr 2023 07:00  --------------------------------------------------------  IN:    Dexmedetomidine: 392 mL    Enteral Tube Flush: 300 mL    FentaNYL: 360 mL    Peptamen A.F.: 700 mL    Vasopressin: 72 mL  Total IN: 1824 mL    OUT:    Indwelling Catheter - Urethral (mL): 1140 mL  Total OUT: 1140 mL    Total NET: 684 mL          PHYSICAL EXAM:   CORRAL  tachpneic  equal chest rise b/l, rhonchi bilaterally   abdomen soft  extremities soft  urinary cath in place

## 2023-04-03 NOTE — CHART NOTE - NSCHARTNOTEFT_GEN_A_CORE
Registered Dietitian Follow-Up     Patient Profile Reviewed                           Yes [x]   No []     Nutrition History Previously Obtained        Yes []  No [x]       Pertinent Subjective Information: Limited to chart review at this time as pt intubated and unable to provide hx.     Pertinent Medical Interventions: Found to be CDIFF+ on 3/30. Pt remain intubated. Trach/PEG pending further GOC.      Diet order:   Diet, NPO with Tube Feed:   Tube Feeding Modality: Orogastric  Peptamen A.F. Formula  Total Volume for 24 Hours (mL): 480  Continuous  Starting Tube Feed Rate {mL per Hour}: 20  Increase Tube Feed Rate by (mL): 20  Until Goal Tube Feed Rate (mL per Hour): 20  Tube Feed Duration (in Hours): 24  Tube Feed Start Time: 21:00 (23 @ 12:48) [Active]    Anthropometrics:  Height (cm): 175.3 (23 @ 10:53)  Weight (kg): 63.5 (23 @ 10:53)  BMI (kg/m2): 20.7 (23 @ 10:53)  IBW: 72.7 kg  Daily Weight in k.7 ()  Weight Change: noted with weight gain likely reflective of fluid retention     MEDICATIONS  (STANDING):  acetaminophen     Tablet .. 650 milliGRAM(s) Oral every 6 hours  aspirin  chewable 81 milliGRAM(s) Oral daily  atorvastatin 80 milliGRAM(s) Oral at bedtime  atropine 1% Solution 1 Drop(s) Left EYE two times a day  chlorhexidine 0.12% Liquid 15 milliLiter(s) Oral Mucosa every 12 hours  chlorhexidine 2% Cloths 1 Application(s) Topical <User Schedule>  dexMEDEtomidine Infusion 0.1 MICROgram(s)/kG/Hr (1.59 mL/Hr) IV Continuous <Continuous>  doxazosin 4 milliGRAM(s) Oral at bedtime  famotidine    Tablet 20 milliGRAM(s) Oral daily  fentaNYL   Infusion. 2 MICROgram(s)/kG/Hr (12.7 mL/Hr) IV Continuous <Continuous>  heparin   Injectable 5000 Unit(s) SubCutaneous every 8 hours  insulin lispro (ADMELOG) corrective regimen sliding scale   SubCutaneous every 6 hours  midodrine 10 milliGRAM(s) Oral <User Schedule>  prednisoLONE acetate 1% Suspension 1 Drop(s) Left EYE every 6 hours  vancomycin    Solution 125 milliGRAM(s) Oral every 6 hours  vasopressin Infusion 0.02 Unit(s)/Min (3 mL/Hr) IV Continuous <Continuous>    MEDICATIONS  (PRN):  ondansetron Injectable 4 milliGRAM(s) IV Push every 8 hours PRN Nausea and/or Vomiting  sodium chloride 0.9% lock flush 10 milliLiter(s) IV Push every 1 hour PRN Pre/post blood products, medications, blood draw, and to maintain line patency    Pertinent Labs:                         8.6    10.37 )-----------( 288      ( 2023 20:47 )             27.8     04 @ 20:47: Na 141, BUN 26<H>, Cr 0.6<L>, <H>, K+ 4.9, Phos 2.1, Mg 2.1, Alk Phos --, ALT/SGPT --, AST/SGOT --, HbA1c --    Finger Sticks:  POCT Blood Glucose.: 183 mg/dL ( @ 12:10)  POCT Blood Glucose.: 191 mg/dL ( @ 05:17)  POCT Blood Glucose.: 148 mg/dL ( @ 23:37)  POCT Blood Glucose.: 143 mg/dL ( @ 16:55)    I&O's Detail  2023 07:01  -  2023 07:00  --------------------------------------------------------  IN:    Dexmedetomidine: 542.4 mL    FentaNYL: 426 mL    IV PiggyBack: 999.8 mL    Peptamen A.F.: 1100 mL    PRBCs (Packed Red Blood Cells): 351 mL    Vasopressin: 72 mL  Total IN: 3491.2 mL    OUT:    Indwelling Catheter - Urethral (mL): 842 mL  Total OUT: 842 mL    Total NET: 2649.2 mL    Physical Findings:  - Appearance: sedated; 2+ generalized edema and 3+ edema to left arm; right arm; scrotum  - GI function: abdomen WDL; last bowel movement 2023; noted with residual of 300ml @2023 00:00  - Tubes: NGT  - Oral/Mouth cavity: NPO  - Skin: surgical incision      Nutrition Requirements:  Weight Used: dosing weight 63.5 kg     Estimated Energy Needs    Continue []  Adjust [x]  Adjusted Energy Recommendations: 1591 kcal/day PSE Ve 9.8 Tm 37.2        Estimated Protein Needs    Continue [x]  Adjust []  Protein Recommendations: 76-89 gm/day 1.2-1.4g/kg        Estimated Fluid Needs        Continue [x]  Adjust []  Fluid Recommendations: 1587 mL/day 25ml/kg     Nutrient Intake: Current TF at goal provides 576kcal, 38g protein, 389ml free water.      [] Previous Nutrition Diagnosis: Inadequate Energy Intake            [x] Ongoing          [] Resolved     Nutrition Intervention      Goal/Expected Outcome: Pt to meet >90% & <105% estimated needs via EN in 3-5 days     Indicator/Monitoring: Monitor diet order, kcal intake, body composition, NFPE, labs  (lytes, BG, renal indices)     Recommendation:  Cont with Peptamen AF formula, titrate up by 20ml Q4H to goal 60ml/hr. Add Banatrol 1pkt 2x/day (banana flakes, prebiotics and probiotics for C diff). -- goal+modulars will provide 1808kcal, 109g protein, 1166ml free water. Interruptions to TF considered.   * Guidelines on holding TF for residuals: hold feeds only if residual>250ml and concurrently other signs of intolerance such as abdominal distension and vomiting; OR if residual>500ml. After feeds are held, re-check residual and sign/s of intolerance in 3-4 hours; resume feeds if improved.     Patient assessed at high nutrition risk.  RD spectra [id number], also available on Teams. Registered Dietitian Follow-Up     Patient Profile Reviewed                           Yes [x]   No []     Nutrition History Previously Obtained        Yes []  No [x]       Pertinent Subjective Information: Limited to chart review at this time as pt intubated and unable to provide hx.     Pertinent Medical Interventions: Found to be CDIFF+ on 3/30. Pt remain intubated. Trach/PEG pending further GOC.      Diet order:   Diet, NPO with Tube Feed:   Tube Feeding Modality: Orogastric  Peptamen A.F. Formula  Total Volume for 24 Hours (mL): 480  Continuous  Starting Tube Feed Rate {mL per Hour}: 20  Increase Tube Feed Rate by (mL): 20  Until Goal Tube Feed Rate (mL per Hour): 20  Tube Feed Duration (in Hours): 24  Tube Feed Start Time: 21:00 (23 @ 12:48) [Active]    Anthropometrics:  Height (cm): 175.3 (23 @ 10:53)  Weight (kg): 63.5 (23 @ 10:53)  BMI (kg/m2): 20.7 (23 @ 10:53)  IBW: 72.7 kg  Daily Weight in k.7 ()  Weight Change: noted with weight gain likely reflective of fluid retention     MEDICATIONS  (STANDING):  acetaminophen     Tablet .. 650 milliGRAM(s) Oral every 6 hours  aspirin  chewable 81 milliGRAM(s) Oral daily  atorvastatin 80 milliGRAM(s) Oral at bedtime  atropine 1% Solution 1 Drop(s) Left EYE two times a day  chlorhexidine 0.12% Liquid 15 milliLiter(s) Oral Mucosa every 12 hours  chlorhexidine 2% Cloths 1 Application(s) Topical <User Schedule>  dexMEDEtomidine Infusion 0.1 MICROgram(s)/kG/Hr (1.59 mL/Hr) IV Continuous <Continuous>  doxazosin 4 milliGRAM(s) Oral at bedtime  famotidine    Tablet 20 milliGRAM(s) Oral daily  fentaNYL   Infusion. 2 MICROgram(s)/kG/Hr (12.7 mL/Hr) IV Continuous <Continuous>  heparin   Injectable 5000 Unit(s) SubCutaneous every 8 hours  insulin lispro (ADMELOG) corrective regimen sliding scale   SubCutaneous every 6 hours  midodrine 10 milliGRAM(s) Oral <User Schedule>  prednisoLONE acetate 1% Suspension 1 Drop(s) Left EYE every 6 hours  vancomycin    Solution 125 milliGRAM(s) Oral every 6 hours  vasopressin Infusion 0.02 Unit(s)/Min (3 mL/Hr) IV Continuous <Continuous>    MEDICATIONS  (PRN):  ondansetron Injectable 4 milliGRAM(s) IV Push every 8 hours PRN Nausea and/or Vomiting  sodium chloride 0.9% lock flush 10 milliLiter(s) IV Push every 1 hour PRN Pre/post blood products, medications, blood draw, and to maintain line patency    Pertinent Labs:                         8.6    10.37 )-----------( 288      ( 2023 20:47 )             27.8     04 @ 20:47: Na 141, BUN 26<H>, Cr 0.6<L>, <H>, K+ 4.9, Phos 2.1, Mg 2.1, Alk Phos --, ALT/SGPT --, AST/SGOT --, HbA1c --    Finger Sticks:  POCT Blood Glucose.: 183 mg/dL ( @ 12:10)  POCT Blood Glucose.: 191 mg/dL ( @ 05:17)  POCT Blood Glucose.: 148 mg/dL ( @ 23:37)  POCT Blood Glucose.: 143 mg/dL ( @ 16:55)    I&O's Detail  2023 07:01  -  2023 07:00  --------------------------------------------------------  IN:    Dexmedetomidine: 542.4 mL    FentaNYL: 426 mL    IV PiggyBack: 999.8 mL    Peptamen A.F.: 1100 mL    PRBCs (Packed Red Blood Cells): 351 mL    Vasopressin: 72 mL  Total IN: 3491.2 mL    OUT:    Indwelling Catheter - Urethral (mL): 842 mL  Total OUT: 842 mL    Total NET: 2649.2 mL    Physical Findings:  - Appearance: sedated; 2+ generalized edema and 3+ edema to left arm; right arm; scrotum  - GI function: abdomen WDL; last bowel movement 2023; noted with residual of 300ml @2023 00:00  - Tubes: NGT  - Oral/Mouth cavity: NPO  - Skin: surgical incision      Nutrition Requirements:  Weight Used: dosing weight 63.5 kg     Estimated Energy Needs    Continue []  Adjust [x]  Adjusted Energy Recommendations: 1591 kcal/day PSE Ve 9.8 Tm 37.2        Estimated Protein Needs    Continue [x]  Adjust []  Protein Recommendations: 76-89 gm/day 1.2-1.4g/kg        Estimated Fluid Needs        Continue [x]  Adjust []  Fluid Recommendations: 1587 mL/day 25ml/kg     Nutrient Intake: Current TF at goal provides 576kcal, 38g protein, 389ml free water.      [] Previous Nutrition Diagnosis: Inadequate Energy Intake            [x] Ongoing          [] Resolved     Nutrition Intervention      Goal/Expected Outcome: Pt to meet >90% & <105% estimated needs via EN in 3-5 days     Indicator/Monitoring: Monitor diet order, kcal intake, body composition, NFPE, labs  (lytes, BG, renal indices)     Recommendation:  Cont with Peptamen AF formula, titrate up by 20ml Q4H to goal 60ml/hr. Add Banatrol 1pkt 2x/day (banana flakes, prebiotics and probiotics for C diff). -- goal+modulars will provide 1808kcal, 109g protein, 1166ml free water. Interruptions to TF considered.   * Guidelines on holding TF for residuals: hold feeds only if residual>250ml and concurrently other signs of intolerance such as abdominal distension and vomiting; OR if residual>500ml. After feeds are held, re-check residual and sign/s of intolerance in 3-4 hours; resume feeds if improved.     Pt discussed with SICU team. Pt thought to have ongoing ileus; feeds to be kept at trophic rate until condition improves.   Patient assessed at high nutrition risk.  RD spectra [phone], also available on Teams.

## 2023-04-03 NOTE — PROGRESS NOTE ADULT - SUBJECTIVE AND OBJECTIVE BOX
RADHA RAMON  77y, Male  Allergy: No Known Allergies      LOS  14d    CHIEF COMPLAINT: mechanical Fall/ Hip Fx (03 Apr 2023 03:33)      INTERVAL EVENTS/HPI  - No acute events overnight  - T(F): , Max: 99 (04-03-23 @ 03:00)  - WBC Count: 10.37 (04-02-23 @ 20:47)  WBC Count: 10.46 (04-02-23 @ 16:20)     - Creatinine, Serum: 0.6 (04-02-23 @ 20:47)  Creatinine, Serum: 0.6 (04-01-23 @ 20:41)       ROS  unable to obtain history secondary to patient's mental status and/or sedation    VITALS:  T(F): 98.4, Max: 99 (04-03-23 @ 03:00)  HR: 75  BP: 92/54  RR: 18Vital Signs Last 24 Hrs  T(C): 36.9 (03 Apr 2023 12:00), Max: 37.2 (03 Apr 2023 03:00)  T(F): 98.4 (03 Apr 2023 12:00), Max: 99 (03 Apr 2023 03:00)  HR: 75 (03 Apr 2023 13:00) (69 - 110)  BP: 92/54 (03 Apr 2023 00:00) (92/54 - 92/54)  BP(mean): 68 (03 Apr 2023 00:00) (68 - 68)  RR: 18 (03 Apr 2023 13:00) (16 - 32)  SpO2: 97% (03 Apr 2023 13:00) (94% - 99%)    Parameters below as of 03 Apr 2023 13:00  Patient On (Oxygen Delivery Method): ventilator    O2 Concentration (%): 40    PHYSICAL EXAM:  Gen: intubated  HEENT: Normocephalic, atraumatic  Neck: supple, no lymphadenopathy  CV: Regular rate & regular rhythm  Lungs: decreased BS at bases, no fremitus  Abdomen: Soft, BS present  Ext: Warm, well perfused  Neuro: non focal, awake  Skin: no rash, no erythema  Lines: no phlebitis    FH: Non-contributory  Social Hx: Non-contributory    TESTS & MEASUREMENTS:                        8.6    10.37 )-----------( 288      ( 02 Apr 2023 20:47 )             27.8     04-02    141  |  112<H>  |  26<H>  ----------------------------<  158<H>  4.9   |  24  |  0.6<L>    Ca    8.6      02 Apr 2023 20:47  Phos  2.1     04-02  Mg     2.1     04-02              Culture - Sputum (collected 03-29-23 @ 08:48)  Source: ET Tube ET Tube  Gram Stain (03-29-23 @ 19:15):    Few Squamous epithelial cells per low power field    Moderate polymorphonuclear leukocytes per low power field    Few Yeast per oil power field  Final Report (03-31-23 @ 19:35):    Normal Respiratory Fidelia present    Culture - Blood (collected 03-28-23 @ 16:41)  Source: .Blood Blood  Final Report (04-02-23 @ 23:01):    No Growth Final    Culture - Blood (collected 03-26-23 @ 01:17)  Source: .Blood None  Final Report (03-31-23 @ 09:00):    No Growth Final    Culture - Sputum (collected 03-24-23 @ 08:30)  Source: ET Tube ET Tube  Gram Stain (03-25-23 @ 06:50):    Few polymorphonuclear leukocytes per low power field    Rare Squamous epithelial cells per low power field    Rare Gram Negative Rods per oil power field    Rare Gram Positive Rods per oil power field  Final Report (03-26-23 @ 17:48):    Normal Respiratory Fidelia present            INFECTIOUS DISEASES TESTING  Procalcitonin, Serum: 0.97 (03-28-23 @ 13:05)  Fungitell: <31 (03-28-23 @ 11:37)  MRSA PCR Result.: Negative (03-26-23 @ 11:00)  COVID-19 PCR: NotDetec (05-08-22 @ 09:02)  Procalcitonin, Serum: 0.16 (05-05-22 @ 11:00)  COVID-19 PCR: NotDetec (05-03-22 @ 11:30)  COVID-19 PCR: NotDetec (04-07-22 @ 13:50)      INFLAMMATORY MARKERS      RADIOLOGY & ADDITIONAL TESTS:  I have personally reviewed the last available Chest xray  CXR      CT      CARDIOLOGY TESTING  12 Lead ECG:   Ventricular Rate 86 BPM    Atrial Rate 86 BPM    P-R Interval 156 ms    QRS Duration 122 ms    Q-T Interval 396 ms    QTC Calculation(Bazett) 473 ms    P Axis 17 degrees    R Axis 7 degrees    T Axis 44 degrees    Diagnosis Line Normal sinus rhythm  Right bundle branch block  Septal infarct , age undetermined  Possible Lateral infarct , age undetermined  Abnormal ECG    Confirmed by Leander Deshpande ([id number]) on 3/28/2023 7:54:31 PM (03-28-23 @ 14:13)  12 Lead ECG:   Ventricular Rate 120 BPM    Atrial Rate 120 BPM    P-R Interval 160 ms    QRS Duration 114 ms    Q-T Interval 326 ms    QTC Calculation(Bazett) 460 ms    P Axis 82 degrees    R Axis 20 degrees    T Axis 60 degrees    Diagnosis Line Sinus tachycardia  Incomplete right bundle branch block  Borderline ECG    Confirmed by PRETTY BOLAÑOS MD ([id number]) on 3/27/2023 7:25:57 AM (03-27-23 @ 04:18)      MEDICATIONS  acetaminophen     Tablet .. 650 Oral every 6 hours  aspirin  chewable 81 Oral daily  atorvastatin 80 Oral at bedtime  atropine 1% Solution 1 Left EYE two times a day  chlorhexidine 0.12% Liquid 15 Oral Mucosa every 12 hours  chlorhexidine 2% Cloths 1 Topical <User Schedule>  dexMEDEtomidine Infusion 0.1 IV Continuous <Continuous>  doxazosin 4 Oral at bedtime  famotidine    Tablet 20 Oral daily  fentaNYL   Infusion. 2 IV Continuous <Continuous>  heparin   Injectable 5000 SubCutaneous every 8 hours  insulin lispro (ADMELOG) corrective regimen sliding scale  SubCutaneous every 6 hours  midodrine 10 Oral <User Schedule>  prednisoLONE acetate 1% Suspension 1 Left EYE every 6 hours  vancomycin    Solution 125 Oral every 6 hours  vasopressin Infusion 0.02 IV Continuous <Continuous>      WEIGHT  Weight (kg): 63.5 (03-22-23 @ 10:53)  Creatinine, Serum: 0.6 mg/dL (04-02-23 @ 20:47)      ANTIBIOTICS:  vancomycin    Solution 125 milliGRAM(s) Oral every 6 hours      All available historical records have been reviewed

## 2023-04-03 NOTE — PROGRESS NOTE ADULT - ASSESSMENT
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   S/P ORIF of right hip using interlocking intramedullary stephon.       NEURO:  #Acute pain    - Tylenol, Fentanyl gtt  #acute sedation    -Precedex    RESP:   #acute respiratory failure secondary to post operative arrhythmias requiring mechanical ventilation (Intubated 3/23),    Failed mutiple SBT trial    -Book OR for trach/peg     -consent in chart    -d/c Plavix for OR (3/31)  RR (machine): 16, TV (machine): 450, FiO2: 30, PEEP: 6  04-03 @ 04:01--7.37 / 39 / 162 / 22 / 100.0  O2 100.0  Lac 0.90    04-02 @ 11:57--7.30 / 47 / 90 / 23 / 98.2  O2 98.2  Lac 0.70    ETT @ 22     #PE w/u NEGATIVE on 3/28  #Activity    -Bedrest    CARDS:   #acute hypotension    -cont vaso 0.02 for MAP>65    -Phenylephrine weaned off     -midodrine 10 q6  #Labs/Imaging  -Echo 3/23- EF appears normal, mild LVH, sclerotic AoV, trace MR,   -Echo 3/29: EF 70-65%, norm LV function, borderline pulm HTN.     GI/NUTR:   #Diet: NPO x/rx    -TF Peptamen goal 50cc/hr    -NGT at 63cm  #Nausea: Zofran prn    - Aspiration precautions, HOB 30  #GI Prophylaxis    - Protonix  #Bowel regimen    - None while c.diff positive  #Imaging    -CT abdomen 3/28: No definite infectious source identified      -RUQ sono: biliary sludge, mild GB wall thickening/edema.     /RENAL:   #UO: Blair placed by Urology     - Cardura (Flomax at home)  #scrotal swelling  - U/S 3/29 w/ diffused scrotal wall edema   - con't scrotal elevation  #urine output in critically ill    -indwelling blair (placed 3/22 by Urology)  -FeNa 4/2 prerenal, 250 cc bolus     Labs:          BUN/Cr- 27/0.6  -->,  26/0.6  -->          Electrolytes-Na 141 // K 4.9 // Mg 2.1 //  Phos 2.1 (04-02 @ 20:47)      Home Rx: tamsulosin 0.4 mg oral capsule        HEME/ONC:   #DVT prophylaxis- HSQ, SCDs  #H/O CAD s/p stents x3    - con't ASA; hold Plavix for OR    Labs: Hb/Hct:  8.9/28.5  -->,  8.6/27.8  -->                      Plts:  284  -->,  288  -->                 PTT/INR:        Home Rx: clopidogrel 75 mg oral tablet: 1 tab(s) orally once a day        ID:  #leukocytosis  WBC- 8.48  --->>,  10.46  --->>,  10.37  --->>  Temp trend- 24hrs T(F): 99 (04-03 @ 03:00), Max: 99 (04-03 @ 03:00)  Antibiotics-vancomycin    Solution 125 every 6 hours      Cultures:    ET Sputum 3/29: negative     BCx 3/28: NGTD    UA 3/28: Negative     BCx 3/25--pending    UA 3/24--negative    ET Sputum 3/24--neg    MRSA neg    CDiff PCR 3/30: Positive    ENDO:    -FSG q6 if NPO    -Glucose goal 140-180    -if above 180 start ISS    MSK:     - WBAT RLE per ortho, PT/OT when clinically appropriate  - R hip dressing changed by SICU team 4/2    LINES/DRAINS:    A-line left radial (3/22),    PIV x3 (2 20s, 1 18),   Blair (3/22)  Right IJ TLC (3/23)  ETT, OGT     ADVANCED DIRECTIVES:  Full Code    HCP/Emergency Contact- Song Segura (Son): [phone]    INDICATION FOR SICU Hypotension with pressor requirements. Intubated.       DISPO: Consent for trach obtained  SICU

## 2023-04-03 NOTE — PROGRESS NOTE ADULT - ASSESSMENT
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  Spectra [phone]

## 2023-04-03 NOTE — PROGRESS NOTE ADULT - SUBJECTIVE AND OBJECTIVE BOX
RADHA RAMON   [id number]   [birth year]  77yM      Admit Date/LOS: 03-20 (2d)  Indication for SDU/SICU: Hypotension with pressor requirements. Intubated.        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 Pt walks with walker and got up in middle of night to get food and slipped and fell on his R side. Pt has been having R hip pain since with inability to ambulate. Pts family finally convinced him to come to hospital today. Pt is having 10/10 pain currently. Denies dizziness, CP or LOC prior to fall. Denies head trauma. No HA. (20 Mar 2023 12:02)    3/22: Patient S/P ORIF of right hip using interlocking intramedullary stephon.   OR Time:    EBL: 75  IV Fluids: 2700  Blood Products: None  UOP: Not noted. No blair placed in OR  Findings- Displaced intertrochanteric fracture appreciated. 11mm x 200mm 130, lag screw 10.5 x 105mm    SICU consulted for hypotension requiring pressors.    Patient was a rapid response in the PACU post-operatively due to hypotension. Patient examined bedside and was responding to verbal stimuli. Albumin and fluid bolus given. Labs drawn off A-line and Urbano started. NGT placed by SICU. Blair placed by urology due to difficulty passing Blair in setting of patient with prostate CA and BPH.    24Hours  4/2  NIGHT  -TF held by RN due to NGT residual >300; restarted   -following commands, PEERLA  -R hip dressing changed   -ETT @ 22  -ABG 7.37/39/169/22  Day  Family discussion - GOC  pm CBC s/p 1 PRBC  Increase to RR to 16 > ABG 7.30/47/90/  versed 2mg x1 for agitation/tachypnea/hypertension   hypothermic 94-95F > warming blanket  fena prerenal > 250 cc bolus        [] A ten-point review of systems was otherwise negative except as noted above.  [x] Due to altered mental status/intubation, subjective information was not attained from the patient. History was obtained, to the extent possible, from review of the chart and collateral sources of information.    =========================================================================================================================================       RADHA RAMON   [id number]   [birth year]  77yM      Admit Date/LOS:  (2d)  Indication for SDU/SICU: Hypotension with pressor requirements. Intubated.        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 Pt walks with walker and got up in middle of night to get food and slipped and fell on his R side. Pt has been having R hip pain since with inability to ambulate. Pts family finally convinced him to come to hospital today. Pt is having 10/10 pain currently. Denies dizziness, CP or LOC prior to fall. Denies head trauma. No HA. (20 Mar 2023 12:02)    3/22: Patient S/P ORIF of right hip using interlocking intramedullary stephon.   OR Time:    EBL: 75  IV Fluids: 2700  Blood Products: None  UOP: Not noted. No blair placed in OR  Findings- Displaced intertrochanteric fracture appreciated. 11mm x 200mm 130, lag screw 10.5 x 105mm    SICU consulted for hypotension requiring pressors.    Patient was a rapid response in the PACU post-operatively due to hypotension. Patient examined bedside and was responding to verbal stimuli. Albumin and fluid bolus given. Labs drawn off A-line and Urbano started. NGT placed by SICU. Blair placed by urology due to difficulty passing Blair in setting of patient with prostate CA and BPH.    24Hours  4/2  NIGHT  -TF held by RN due to NGT residual >300; restarted   -following commands, PEERLA  -R hip dressing changed   -ETT @ 22  -ABG 7.37/39/169/22  Day  Family discussion - GOC  pm CBC s/p 1 PRBC  Increase to RR to 16 > ABG 7.30/47/90/  versed 2mg x1 for agitation/tachypnea/hypertension   hypothermic 94-95F > warming blanket  fena prerenal > 250 cc bolus        [] A ten-point review of systems was otherwise negative except as noted above.  [x] Due to altered mental status/intubation, subjective information was not attained from the patient. History was obtained, to the extent possible, from review of the chart and collateral sources of information.    =========================================================================================================================================          Daily     Daily Weight in k.7 (2023 00:00)  Diet, NPO with Tube Feed:   Tube Feeding Modality: Orogastric  Peptamen A.F. Formula  Total Volume for 24 Hours (mL): 480  Continuous  Starting Tube Feed Rate mL per Hour: 20  Increase Tube Feed Rate by (mL): 20     Every 4 hours  Until Goal Tube Feed Rate (mL per Hour): 20  Tube Feed Duration (in Hours): 24  Tube Feed Start Time: 16:00  Banatrol TF     Qty per Day:  2 (23 @ 14:57)    CURRENT MEDS:  Neurologic Medications  acetaminophen     Tablet .. 650 milliGRAM(s) Oral every 6 hours  dexMEDEtomidine Infusion 0.1 MICROgram(s)/kG/Hr IV Continuous <Continuous>  fentaNYL   Infusion. 2 MICROgram(s)/kG/Hr IV Continuous <Continuous>  ondansetron Injectable 4 milliGRAM(s) IV Push every 8 hours PRN Nausea and/or Vomiting    Respiratory Medications    Cardiovascular Medications  doxazosin 4 milliGRAM(s) Oral at bedtime  midodrine 10 milliGRAM(s) Oral <User Schedule>    Gastrointestinal Medications  famotidine    Tablet 20 milliGRAM(s) Oral daily  sodium chloride 0.9% lock flush 10 milliLiter(s) IV Push every 1 hour PRN Pre/post blood products, medications, blood draw, and to maintain line patency    Genitourinary Medications    Hematologic/Oncologic Medications  aspirin  chewable 81 milliGRAM(s) Oral daily  heparin   Injectable 5000 Unit(s) SubCutaneous every 8 hours    Antimicrobial/Immunologic Medications  vancomycin    Solution 125 milliGRAM(s) Oral every 6 hours    Endocrine/Metabolic Medications  atorvastatin 80 milliGRAM(s) Oral at bedtime  insulin lispro (ADMELOG) corrective regimen sliding scale   SubCutaneous every 6 hours  vasopressin Infusion 0.02 Unit(s)/Min IV Continuous <Continuous>    Topical/Other Medications  atropine 1% Solution 1 Drop(s) Left EYE two times a day  chlorhexidine 0.12% Liquid 15 milliLiter(s) Oral Mucosa every 12 hours  chlorhexidine 2% Cloths 1 Application(s) Topical <User Schedule>  prednisoLONE acetate 1% Suspension 1 Drop(s) Left EYE every 6 hours    ICU Vital Signs Last 24 Hrs  T(C): 36.9 (2023 16:00), Max: 37.2 (2023 03:00)  T(F): 98.5 (2023 16:00), Max: 99 (2023 03:00)  HR: 68 (2023 17:00) (68 - 109)  BP: 92/54 (2023 00:00) (92/54 - 92/54)  BP(mean): 68 (2023 00:00) (68 - 68)  ABP: 155/66 (2023 17:00) (92/54 - 171/68)  ABP(mean): 96 (2023 17:00) (67 - 122)  RR: 17 (2023 17:00) (17 - 29)  SpO2: 98% (2023 17:00) (94% - 99%)    O2 Parameters below as of 2023 17:00  Patient On (Oxygen Delivery Method): ventilator    O2 Concentration (%): 40      Mode: AC/ CMV (Assist Control/ Continuous Mandatory Ventilation)  RR (machine): 16  TV (machine): 450  FiO2: 40  PEEP: 6  ITime: 0.9  MAP: 16  PIP: 28    ABG - ( 2023 04:01 )  pH, Arterial: 7.37  pH, Blood: x     /  pCO2: 39    /  pO2: 162   / HCO3: 22    / Base Excess: -2.6  /  SaO2: 100.0             I&O's Summary    2023 07:01  -  2023 07:00  --------------------------------------------------------  IN: 3491.2 mL / OUT: 842 mL / NET: 2649.2 mL    2023 07:  -  2023 17:51  --------------------------------------------------------  IN: 750.6 mL / OUT: 605 mL / NET: 145.6 mL      I&O's Detail    2023 07:  -  2023 07:00  --------------------------------------------------------  IN:    Dexmedetomidine: 542.4 mL    FentaNYL: 426 mL    IV PiggyBack: 999.8 mL    Peptamen A.F.: 1100 mL    PRBCs (Packed Red Blood Cells): 351 mL    Vasopressin: 72 mL  Total IN: 3491.2 mL    OUT:    Indwelling Catheter - Urethral (mL): 842 mL  Total OUT: 842 mL    Total NET: 2649.2 mL      2023 07:  -  2023 17:51  --------------------------------------------------------  IN:    Dexmedetomidine: 226.4 mL    FentaNYL: 181.2 mL    Peptamen A.F.: 310 mL    Vasopressin: 33 mL  Total IN: 750.6 mL    OUT:    Indwelling Catheter - Urethral (mL): 605 mL  Total OUT: 605 mL    Total NET: 145.6 mL          PHYSICAL EXAM:     Sedated, but follows commands, CORRAL  no acute distress  equal chest rise b/l on mechanical ventilation  abdomen soft  extremities soft  urinary cath in place

## 2023-04-04 NOTE — PROGRESS NOTE ADULT - ASSESSMENT
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   S/P ORIF of right hip using interlocking intramedullary stephon.       NEURO:  #Acute pain    - Tylenol  #Sedation and pain    - Precedex and fentanyl drips for sedation and pain respectively    RESP:   #acute respiratory failure secondary to post operative arrhythmias requiring mechanical ventilation (Intubated 3/23),     -Continue SBTs as tolerated    #PE w/u NEGATIVE on 3/28  #Activity    -Bedrest    CARDS:   #acute hypotension    -cont vaso 0.02 for MAP>65    -midodrine 10 q6  #Labs/Imaging  -Echo 3/23- EF appears normal, mild LVH, sclerotic AoV, trace MR,   -Echo 3/29: EF 70-65%, norm LV function, borderline pulm HTN.     GI/NUTR:   #Diet: NPO   #Nausea: Zofran prn    - Aspiration precautions, HOB 30  #GI Prophylaxis    - Protonix  #Bowel regimen    - None while c.diff positive  #Imaging    -CT abdomen 3/28: No definite infectious source identified      -RUQ sono: biliary sludge, mild GB wall thickening/edema.     /RENAL:   #UO: Blair placed by Urology     - Cardura (Flomax at home)  #scrotal swelling  - U/S 3/29 w/ diffused scrotal wall edema   - con't scrotal elevation  #urine output in critically ill    -indwelling blair (placed 3/22 by Urology)  -FeNa 4/2 prerenal, 250 cc bolus     Labs:  BUN/Cr- 26/0.6  -->,  25/0.6  -->          Electrolytes-Na 142 // K 4.8 // Mg 2.0 //  Phos 2.5 (04-03 @ 20:24)      Home Rx: tamsulosin 0.4 mg oral capsule      HEME/ONC:   #DVT prophylaxis- HSQ, SCDs  #H/O CAD s/p stents x3    - con't ASA; hold Plavix for OR    Labs: Hb/Hct:  8.9/28.5  -->,  8.6/27.8  -->                      Plts:  284  -->,  288  -->                 PTT/INR:        Home Rx: clopidogrel 75 mg oral tablet: 1 tab(s) orally once a day      ID:  #leukocytosis  WBC- 8.48  --->>,  10.46  --->>,  10.37  --->>  Temp trend- 24hrs T(F): 99 (04-03 @ 03:00), Max: 99 (04-03 @ 03:00)  Antibiotics-vancomycin    Solution 125 every 6 hours      Cultures:    ET Sputum 3/29: negative     BCx 3/28: NGTD    UA 3/28: Negative     BCx 3/25--pending    UA 3/24--negative    ET Sputum 3/24--neg    MRSA neg    CDiff PCR 3/30: Positive    ENDO:    -FSG q6 if NPO    -Glucose goal 140-180    -if above 180 start ISS    MSK:     - WBAT RLE per ortho, PT/OT when clinically appropriate  - R hip dressing changed by SICU team 4/2    LINES/DRAINS:    A-line left radial (3/22),    PIV x3 (2 20s, 1 18),   Blair (3/22)  Right IJ TLC (3/23)  ETT, OGT     ADVANCED DIRECTIVES:  Full Code    HCP/Emergency Contact- Song Segura (Son): [phone]    INDICATION FOR SICU Hypotension with pressor requirements. Intubated.       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   S/P ORIF of right hip using interlocking intramedullary stephon.       NEURO:  #Acute pain    - Tylenol  #Sedation and pain    - Precedex and fentanyl drips for sedation and pain respectively    RESP:   #acute respiratory failure secondary to post operative arrhythmias requiring mechanical ventilation (Intubated 3/23),     -Continue SBTs as tolerated    #PE w/u NEGATIVE on 3/28  #Activity    -Bedrest    CARDS:   #acute hypotension    -cont vaso 0.02 for MAP>65    -midodrine 10 q6  #Labs/Imaging  -Echo 3/23- EF appears normal, mild LVH, sclerotic AoV, trace MR,   -Echo 3/29: EF 70-65%, norm LV function, borderline pulm HTN.     GI/NUTR:   #Diet: NPO   #Nausea: Zofran prn    - Aspiration precautions, HOB 30  #GI Prophylaxis    - Protonix  #Bowel regimen    - None while c.diff positive  #Imaging    -CT abdomen 3/28: No definite infectious source identified      -RUQ sono: biliary sludge, mild GB wall thickening/edema.     /RENAL:   #UO: Blair placed by Urology     - Cardura (Flomax at home)  #scrotal swelling  - U/S 3/29 w/ diffused scrotal wall edema   - con't scrotal elevation  #urine output in critically ill    -indwelling blair (placed 3/22 by Urology)  -FeNa 4/2 prerenal, 250 cc bolus     Labs:  BUN/Cr- 26/0.6  -->,  25/0.6  -->          Electrolytes-Na 142 // K 4.8 // Mg 2.0 //  Phos 2.5 (04-03 @ 20:24)      Home Rx: tamsulosin 0.4 mg oral capsule      HEME/ONC:   #DVT prophylaxis- HSQ, SCDs  #H/O CAD s/p stents x3    - con't ASA; hold Plavix for OR    Labs: Hb/Hct:  8.9/28.5  -->,  8.6/27.8  -->                      Plts:  284  -->,  288  -->                 PTT/INR:        Home Rx: clopidogrel 75 mg oral tablet: 1 tab(s) orally once a day      ID:  #leukocytosis  WBC- 8.48  --->>,  10.46  --->>,  10.37  --->>  Temp trend- 24hrs T(F): 99 (04-03 @ 03:00), Max: 99 (04-03 @ 03:00)  Antibiotics-vancomycin    Solution 125 every 6 hours      Cultures:    ET Sputum 3/29: negative     BCx 3/28: NGTD    UA 3/28: Negative     BCx 3/25--pending    UA 3/24--negative    ET Sputum 3/24--neg    MRSA neg    CDiff PCR 3/30: Positive    ENDO:    -FSG q6 if NPO    -Glucose goal 140-180    -if above 180 start ISS    MSK:     - WBAT RLE per ortho, PT/OT when clinically appropriate  - R hip dressing changed by SICU team 4/2    LINES/DRAINS:    A-line left radial (3/22),    PIV x3 (2 20s, 1 18),   Blair (3/22)  Right IJ TLC (3/23)  ETT, NGT     ADVANCED DIRECTIVES:  Full Code    HCP/Emergency Contact- Song Segura (Son): [phone]    INDICATION FOR SICU Hypotension with pressor requirements. Intubated.       DISPO: Consent for trach obtained  SICU

## 2023-04-04 NOTE — PROGRESS NOTE ADULT - SUBJECTIVE AND OBJECTIVE BOX
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 Pt walks with walker and got up in middle of night to get food and slipped and fell on his R side. Pt has been having R hip pain since with inability to ambulate. Pts family finally convinced him to come to hospital today. Pt is having 10/10 pain currently. Denies dizziness, CP or LOC prior to fall. Denies head trauma. No HA. (20 Mar 2023 12:02)    3/22: Patient S/P ORIF of right hip using interlocking intramedullary stephon.   OR Time:    EBL: 75  IV Fluids: 2700  Blood Products: None  UOP: Not noted. No blair placed in OR  Findings- Displaced intertrochanteric fracture appreciated. 11mm x 200mm 130, lag screw 10.5 x 105mm    SICU consulted for hypotension requiring pressors.    Patient was a rapid response in the PACU post-operatively due to hypotension. Patient examined bedside and was responding to verbal stimuli. Albumin and fluid bolus given. Labs drawn off A-line and Urbano started. NGT placed by SICU. Blair placed by urology due to difficulty passing Blair in setting of patient with prostate CA and BPH.    24Hours  4/2  NIGHT  -TF held by RN due to NGT residual >300; restarted   -following commands, PEERLA  -R hip dressing changed   -ETT @ 22  -ABG 7.37/39/169/22  Day  Family discussion - GOC  pm CBC s/p 1 PRBC  Increase to RR to 16 > ABG 7.30/47/90/  versed 2mg x1 for agitation/tachypnea/hypertension   hypothermic 94-95F > warming blanket  fena prerenal > 250 cc bolus   RADHA RAMON   [id number]   [birth year]  77yM      Admit Date/LOS: 03-20 (2d)  Indication for SDU/SICU: Hypotension with pressor requirements. Intubated.        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 Pt walks with walker and got up in middle of night to get food and slipped and fell on his R side. Pt has been having R hip pain since with inability to ambulate. Pts family finally convinced him to come to hospital today. Pt is having 10/10 pain currently. Denies dizziness, CP or LOC prior to fall. Denies head trauma. No HA. (20 Mar 2023 12:02)    3/22: Patient S/P ORIF of right hip using interlocking intramedullary stephon.   OR Time:    EBL: 75  Daily     Daily             IV Fluids: 2700  Blood Products: None  UOP: Not noted. No blair placed in OR  Findings- Displaced intertrochanteric fracture appreciated. 11mm x 200mm 130, lag screw 10.5 x 105mm    SICU consulted for hypotension requiring pressors.    Patient was a rapid response in the PACU post-operatively due to hypotension. Patient examined bedside and was responding to verbal stimuli. Albumin and fluid bolus given. Labs drawn off A-line and Urbano started. NGT placed by SICU. Blair placed by urology due to difficulty passing Blair in setting of patient with prostate CA and BPH.    24Hours  4/3  Night  - 15 Naphos  - , decrease Vaso to 0.01  - PEEP at 10, unknown when changed, f/u AMABG and decrease as tolerated    Day  Weaning fentanyl and precedex  Wean vasopressin as tolerated  Booked for Trach and Peg 4/5 w/ [facility]     [] A ten-point review of systems was otherwise negative except as noted above.  [x] Due to altered mental status/intubation, subjective information was not attained from the patient. History was obtained, to the extent possible, from review of the chart and collateral sources of information.    =========================================================================================================================================Diet, NPO with Tube Feed:   Tube Feeding Modality: Orogastric  Peptamen A.F. Formula  Total Volume for 24 Hours (mL): 480  Continuous  Starting Tube Feed Rate mL per Hour: 20  Increase Tube Feed Rate by (mL): 20     Every 4 hours  Until Goal Tube Feed Rate (mL per Hour): 20  Tube Feed Duration (in Hours): 24  Tube Feed Start Time: 16:00  Banatrol TF     Qty per Day:  2 (04-03-23 @ 14:57)      CURRENT MEDS:  Neurologic Medications  acetaminophen     Tablet .. 650 milliGRAM(s) Oral every 6 hours  dexMEDEtomidine Infusion 1 MICROgram(s)/kG/Hr IV Continuous <Continuous>  fentaNYL   Infusion 2.2 MICROgram(s)/kG/Hr IV Continuous <Continuous>  ondansetron Injectable 4 milliGRAM(s) IV Push every 8 hours PRN Nausea and/or Vomiting    Respiratory Medications    Cardiovascular Medications  doxazosin 4 milliGRAM(s) Oral at bedtime  midodrine 10 milliGRAM(s) Oral <User Schedule>    Gastrointestinal Medications  famotidine    Tablet 20 milliGRAM(s) Oral daily  sodium chloride 0.9% lock flush 10 milliLiter(s) IV Push every 1 hour PRN Pre/post blood products, medications, blood draw, and to maintain line patency    Genitourinary Medications    Hematologic/Oncologic Medications  aspirin  chewable 81 milliGRAM(s) Oral daily  heparin   Injectable 5000 Unit(s) SubCutaneous every 8 hours    Antimicrobial/Immunologic Medications  vancomycin    Solution 125 milliGRAM(s) Oral every 6 hours    Endocrine/Metabolic Medications  atorvastatin 80 milliGRAM(s) Oral at bedtime  insulin lispro (ADMELOG) corrective regimen sliding scale   SubCutaneous every 6 hours  vasopressin Infusion 0.01 Unit(s)/Min IV Continuous <Continuous>    Topical/Other Medications  atropine 1% Solution 1 Drop(s) Left EYE two times a day  chlorhexidine 0.12% Liquid 15 milliLiter(s) Oral Mucosa every 12 hours  chlorhexidine 2% Cloths 1 Application(s) Topical <User Schedule>  prednisoLONE acetate 1% Suspension 1 Drop(s) Left EYE every 6 hours      ICU Vital Signs Last 24 Hrs  T(C): 37.4 (04 Apr 2023 04:00), Max: 37.4 (04 Apr 2023 04:00)  T(F): 99.3 (04 Apr 2023 04:00), Max: 99.3 (04 Apr 2023 04:00)  HR: 71 (04 Apr 2023 07:00) (66 - 118)  BP: 101/59 (04 Apr 2023 07:00) (93/52 - 153/77)  BP(mean): 77 (04 Apr 2023 07:00) (68 - 108)  ABP: 117/50 (04 Apr 2023 07:00) (113/49 - 189/79)  ABP(mean): 70 (04 Apr 2023 07:00) (62 - 122)  RR: 16 (04 Apr 2023 07:00) (16 - 27)  SpO2: 98% (04 Apr 2023 07:00) (72% - 99%)    O2 Parameters below as of 04 Apr 2023 04:00  Patient On (Oxygen Delivery Method): ventilator    O2 Concentration (%): 40        Mode: AC/ CMV (Assist Control/ Continuous Mandatory Ventilation)  RR (machine): 16  TV (machine): 450  FiO2: 40  PEEP: 10  ITime: 0.9  MAP: 15  PIP: 26    ABG - ( 04 Apr 2023 04:27 )  pH, Arterial: 7.33  pH, Blood: x     /  pCO2: 45    /  pO2: 82    / HCO3: 24    / Base Excess: -2.2  /  SaO2: 98.4        I&O's Summary    03 Apr 2023 07:01  -  04 Apr 2023 07:00  --------------------------------------------------------  IN: 1429.1 mL / OUT: 1305 mL / NET: 124.1 mL      I&O's Detail    03 Apr 2023 07:01  -  04 Apr 2023 07:00  --------------------------------------------------------  IN:    Dexmedetomidine: 242.4 mL    Dexmedetomidine: 192 mL    FentaNYL: 168 mL    FentaNYL: 195.2 mL    Peptamen A.F.: 570 mL    Vasopressin: 10.5 mL    Vasopressin: 51 mL  Total IN: 1429.1 mL    OUT:    Indwelling Catheter - Urethral (mL): 1305 mL    Nasogastric/Oral tube (mL): 0 mL  Total OUT: 1305 mL    Total NET: 124.1 mL      PHYSICAL EXAM:  General/Neuro: Sedated, intubated  Lungs: Mechanical ventilation, B/L chest rise  Cardiovascular : S1, S2.  Regular rate and rhythm.  + Peripheral edema   GI: Abdomen soft   Gastrostomy / Jejunostomy tube in place.  Nasogastric tube in place.  Colostomy / Ileostomy.    Wound: Ortho site c/d/i  Extremities: Extremities warm, pink, well-perfused  Derm: Good skin turgor, no skin breakdown.    : Blair catheter in place. Scrotal edema    CXR:   < from: Xray Chest 1 View- PORTABLE-Routine (Xray Chest 1 View- PORTABLE-Routine in AM.) (04.03.23 @ 08:34) >  FINDINGS/  IMPRESSION:    Unchanged lines and tubes.    Bibasal pleural-parenchymal opacities without significant change. No   pneumothorax.    Stable cardiomediastinal silhouette.    Unchanged osseous structures.    --- End of Report ---  < end of copied text >    LABS:  CAPILLARY BLOOD GLUCOSE      POCT Blood Glucose.: 163 mg/dL (04 Apr 2023 06:13)  POCT Blood Glucose.: 147 mg/dL (03 Apr 2023 23:26)  POCT Blood Glucose.: 177 mg/dL (03 Apr 2023 17:15)  POCT Blood Glucose.: 183 mg/dL (03 Apr 2023 12:10)                          8.8    10.31 )-----------( 288      ( 03 Apr 2023 20:24 )             28.5       04-03    142  |  113<H>  |  25<H>  ----------------------------<  174<H>  4.8   |  24  |  0.6<L>    Ca    8.6      03 Apr 2023 20:24  Phos  2.5     04-03  Mg     2.0     04-03           RADHA RAMON   [id number]   [birth year]  77yM      Admit Date/LOS: 03-20 (2d)  Indication for SDU/SICU: Hypotension with pressor requirements. Intubated.        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 Pt walks with walker and got up in middle of night to get food and slipped and fell on his R side. Pt has been having R hip pain since with inability to ambulate. Pts family finally convinced him to come to hospital today. Pt is having 10/10 pain currently. Denies dizziness, CP or LOC prior to fall. Denies head trauma. No HA. (20 Mar 2023 12:02)    3/22: Patient S/P ORIF of right hip using interlocking intramedullary stephon.   OR Time:    EBL: 75   IV Fluids: 2700  Blood Products: None  UOP: Not noted. No blair placed in OR  Findings- Displaced intertrochanteric fracture appreciated. 11mm x 200mm 130, lag screw 10.5 x 105mm    SICU consulted for hypotension requiring pressors.    Patient was a rapid response in the PACU post-operatively due to hypotension. Patient examined bedside and was responding to verbal stimuli. Albumin and fluid bolus given. Labs drawn off A-line and Urbano started. NGT placed by SICU. Blair placed by urology due to difficulty passing Blair in setting of patient with prostate CA and BPH.    24Hours  4/3  Night  - 15 Naphos  - , decrease Vaso to 0.01  - PEEP at 10, unknown when changed, f/u AMABG and decrease as tolerated    Day  Weaning fentanyl and precedex  Wean vasopressin as tolerated  Booked for Trach and Peg 4/5 w/ [facility]     [] A ten-point review of systems was otherwise negative except as noted above.  [x] Due to altered mental status/intubation, subjective information was not attained from the patient. History was obtained, to the extent possible, from review of the chart and collateral sources of information.    =========================================================================================================================================  Diet, NPO with Tube Feed:   Tube Feeding Modality: Orogastric  Peptamen A.F. Formula  Total Volume for 24 Hours (mL): 480  Continuous  Starting Tube Feed Rate mL per Hour: 20  Increase Tube Feed Rate by (mL): 20     Every 4 hours  Until Goal Tube Feed Rate (mL per Hour): 20  Tube Feed Duration (in Hours): 24  Tube Feed Start Time: 16:00  Banatrol TF     Qty per Day:  2 (04-03-23 @ 14:57)      CURRENT MEDS:  Neurologic Medications  acetaminophen     Tablet .. 650 milliGRAM(s) Oral every 6 hours  dexMEDEtomidine Infusion 1 MICROgram(s)/kG/Hr IV Continuous <Continuous>  fentaNYL   Infusion 2.2 MICROgram(s)/kG/Hr IV Continuous <Continuous>  ondansetron Injectable 4 milliGRAM(s) IV Push every 8 hours PRN Nausea and/or Vomiting    Respiratory Medications    Cardiovascular Medications  doxazosin 4 milliGRAM(s) Oral at bedtime  midodrine 10 milliGRAM(s) Oral <User Schedule>    Gastrointestinal Medications  famotidine    Tablet 20 milliGRAM(s) Oral daily  sodium chloride 0.9% lock flush 10 milliLiter(s) IV Push every 1 hour PRN Pre/post blood products, medications, blood draw, and to maintain line patency    Genitourinary Medications    Hematologic/Oncologic Medications  aspirin  chewable 81 milliGRAM(s) Oral daily  heparin   Injectable 5000 Unit(s) SubCutaneous every 8 hours    Antimicrobial/Immunologic Medications  vancomycin    Solution 125 milliGRAM(s) Oral every 6 hours    Endocrine/Metabolic Medications  atorvastatin 80 milliGRAM(s) Oral at bedtime  insulin lispro (ADMELOG) corrective regimen sliding scale   SubCutaneous every 6 hours  vasopressin Infusion 0.01 Unit(s)/Min IV Continuous <Continuous>    Topical/Other Medications  atropine 1% Solution 1 Drop(s) Left EYE two times a day  chlorhexidine 0.12% Liquid 15 milliLiter(s) Oral Mucosa every 12 hours  chlorhexidine 2% Cloths 1 Application(s) Topical <User Schedule>  prednisoLONE acetate 1% Suspension 1 Drop(s) Left EYE every 6 hours      ICU Vital Signs Last 24 Hrs  T(C): 37.4 (04 Apr 2023 04:00), Max: 37.4 (04 Apr 2023 04:00)  T(F): 99.3 (04 Apr 2023 04:00), Max: 99.3 (04 Apr 2023 04:00)  HR: 71 (04 Apr 2023 07:00) (66 - 118)  BP: 101/59 (04 Apr 2023 07:00) (93/52 - 153/77)  BP(mean): 77 (04 Apr 2023 07:00) (68 - 108)  ABP: 117/50 (04 Apr 2023 07:00) (113/49 - 189/79)  ABP(mean): 70 (04 Apr 2023 07:00) (62 - 122)  RR: 16 (04 Apr 2023 07:00) (16 - 27)  SpO2: 98% (04 Apr 2023 07:00) (72% - 99%)    O2 Parameters below as of 04 Apr 2023 04:00  Patient On (Oxygen Delivery Method): ventilator    O2 Concentration (%): 40        Mode: AC/ CMV (Assist Control/ Continuous Mandatory Ventilation)  RR (machine): 16  TV (machine): 450  FiO2: 40  PEEP: 10  ITime: 0.9  MAP: 15  PIP: 26    ABG - ( 04 Apr 2023 04:27 )  pH, Arterial: 7.33  pH, Blood: x     /  pCO2: 45    /  pO2: 82    / HCO3: 24    / Base Excess: -2.2  /  SaO2: 98.4        I&O's Summary    03 Apr 2023 07:01  -  04 Apr 2023 07:00  --------------------------------------------------------  IN: 1429.1 mL / OUT: 1305 mL / NET: 124.1 mL      I&O's Detail    03 Apr 2023 07:01  -  04 Apr 2023 07:00  --------------------------------------------------------  IN:    Dexmedetomidine: 242.4 mL    Dexmedetomidine: 192 mL    FentaNYL: 168 mL    FentaNYL: 195.2 mL    Peptamen A.F.: 570 mL    Vasopressin: 10.5 mL    Vasopressin: 51 mL  Total IN: 1429.1 mL    OUT:    Indwelling Catheter - Urethral (mL): 1305 mL    Nasogastric/Oral tube (mL): 0 mL  Total OUT: 1305 mL    Total NET: 124.1 mL      PHYSICAL EXAM:  General/Neuro: Sedated, intubated  Lungs: Mechanical ventilation, B/L chest rise  Cardiovascular : S1, S2.  Regular rate and rhythm.  + Peripheral edema   GI: Abdomen soft. NGT in place.  Wound: Ortho site c/d/i  Extremities: Extremities warm, pink, well-perfused  Derm: Good skin turgor, no skin breakdown.    : Blair catheter in place. Scrotal edema    CXR:   < from: Xray Chest 1 View- PORTABLE-Routine (Xray Chest 1 View- PORTABLE-Routine in AM.) (04.03.23 @ 08:34) >  FINDINGS/  IMPRESSION:    Unchanged lines and tubes.    Bibasal pleural-parenchymal opacities without significant change. No   pneumothorax.    Stable cardiomediastinal silhouette.    Unchanged osseous structures.    --- End of Report ---  < end of copied text >    LABS:  CAPILLARY BLOOD GLUCOSE  POCT Blood Glucose.: 163 mg/dL (04 Apr 2023 06:13)  POCT Blood Glucose.: 147 mg/dL (03 Apr 2023 23:26)  POCT Blood Glucose.: 177 mg/dL (03 Apr 2023 17:15)  POCT Blood Glucose.: 183 mg/dL (03 Apr 2023 12:10)                          8.8    10.31 )-----------( 288      ( 03 Apr 2023 20:24 )             28.5       04-03    142  |  113<H>  |  25<H>  ----------------------------<  174<H>  4.8   |  24  |  0.6<L>    Ca    8.6      03 Apr 2023 20:24  Phos  2.5     04-03  Mg     2.0     04-03

## 2023-04-05 NOTE — BRIEF OPERATIVE NOTE - NSICDXBRIEFPOSTOP_GEN_ALL_CORE_FT
POST-OP DIAGNOSIS:  Intertrochanteric fracture of right hip 22-Mar-2023 14:05:36  Mendoza Card  
POST-OP DIAGNOSIS:  Acute respiratory failure with hypoxia 05-Apr-2023 18:07:44  Megha Peña

## 2023-04-05 NOTE — BRIEF OPERATIVE NOTE - OPERATION/FINDINGS
displaced intertrochanteric fracture with surgical fixation using Intermediate intramedullary nail [id number], lag screw 10.5 x105mm
Percutaneous trach and PEG placement. PEG is 3cm at skin and 4cm at bumper.

## 2023-04-05 NOTE — BRIEF OPERATIVE NOTE - COMMENTS
Meds can be restarted after 6 hours and feeds can be restarted after 12 hours. Plavix can be restarted on 4/6/23.

## 2023-04-05 NOTE — PROGRESS NOTE ADULT - ASSESSMENT
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   S/P ORIF of right hip using interlocking intramedullary stephon.       NEURO:  #Acute pain    - Tylenol  #Sedation and pain    - Precedex and fentanyl drips for sedation and pain respectively. Wean as tolerated    RESP:   #acute respiratory failure secondary to post operative arrhythmias requiring mechanical ventilation (Intubated 3/23),     -Continue SBTs as tolerated  Vent settings: 450/14/40/8  AM ABG:    #PE w/u NEGATIVE on 3/28  #Activity    -Bedrest    CARDS:   #acute hypotension    -Vaso off since 4/4 AM    -Midodrine 10 q6  #Labs/Imaging  -Echo 3/23- EF appears normal, mild LVH, sclerotic AoV, trace MR,   -Echo 3/29: EF 70-65%, norm LV function, borderline pulm HTN.     GI/NUTR:   #Diet: NPO   #Nausea: Zofran prn    - Aspiration precautions, HOB 30  #GI Prophylaxis    - Pepcid  #Bowel regimen    - None while c.diff positive  #Imaging    -CT abdomen 3/28: No definite infectious source identified      -RUQ sono: biliary sludge, mild GB wall thickening/edema.     /RENAL:   #UO: Blair placed by Urology     - Cardura (Flomax at home)  #scrotal swelling  - U/S 3/29 w/ diffused scrotal wall edema   - con't scrotal elevation  #urine output in critically ill    -indwelling blair (placed 3/22 by Urology)  -FeNa 4/2 prerenal, 250 cc bolus     Labs:          BUN/Cr- 25/0.6  -->,  28/0.7  -->          Electrolytes-Na 144 // K 5.2 // Mg 2.1 //  Phos 3.4 (04-04 @ 21:13)    Home Rx: tamsulosin 0.4 mg oral capsule      HEME/ONC:   #DVT prophylaxis- HSQ, SCDs: HSQ held piror to OR 4/5  #H/O CAD s/p stents x3    - con't ASA; hold Plavix for OR    Labs: Hb/Hct:  8.6/27.8  -->,  8.8/28.5  -->,  8.2/27.5  -->  	Plts:  288  -->,  288  -->,  273  -->      Home Rx: clopidogrel 75 mg oral tablet: 1 tab(s) orally once a day      ID:  #leukocytosis  WBC- 10.37  --->>,  10.31  --->>,  10.31  --->>  Temp trend- 24hrs T(F): 97.3 (04-05 @ 00:00), Max: 99.3 (04-04 @ 04:00)  Antibiotics-vancomycin    Solution 125 every 6 hours plan for 14d per ID c/s (start 3/30)      Cultures:    ET Sputum 3/29: negative     BCx 3/28: NGTD    UA 3/28: Negative     BCx 3/25--pending    UA 3/24--negative    ET Sputum 3/24--neg    MRSA neg    CDiff PCR 3/30: Positive    ENDO:    -ISS tighted 4/4    -Glucose goal 140-180    MSK:     - WBAT RLE per ortho, PT/OT when clinically appropriate  - R hip dressing changed by SICU team 4/2    LINES/DRAINS:    A-line left radial (3/22),    PIV x3 (2 20s, 1 18),   Blair (3/22)  Right IJ TLC (3/23)  ETT, NGT     ADVANCED DIRECTIVES:  Full Code    HCP/Emergency Contact- Song Segura (Son): [phone]    INDICATION FOR SICU Hypotension with pressor requirements. Intubated.       DISPO: Consent for trach obtained. 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   S/P ORIF of right hip using interlocking intramedullary stephon.       NEURO:  #Acute pain    - Tylenol  #Sedation and pain    - Precedex and fentanyl gtt at 1  RESP:   #acute respiratory failure secondary to post operative arrhythmias requiring mechanical ventilation (Intubated 3/23),     ET 8 LL   RR (machine): 14, TV (machine): 450, FiO2: 50, PEEP: 8  04-05 @ 04:29--7.35 / 40 / 107 / 22 / 99.9  O2 99.9  Lac 0.80    #PE w/u NEGATIVE on 3/28  #Activity    -increase as tolerated  CARDS:   #acute hypotension    -Vaso off since 4/4 AM    -Midodrine 10 q6  #Labs/Imaging  -Echo 3/23- EF appears normal, mild LVH, sclerotic AoV, trace MR,   -Echo 3/29: EF 70-65%, norm LV function, borderline pulm HTN.     GI/NUTR:   #acute clostridium difficile positive 3/30    -PO Vancomycin  #Diet: NPO x/rx    -NG tube at 62cm  #Nausea: Zofran prn    - Aspiration precautions, HOB 30  #GI Prophylaxis    - Pepcid  #Bowel regimen    - None while c.diff positive  #Imaging    -CT abdomen 3/28: No definite infectious source identified      -RUQ sono: biliary sludge, mild GB wall thickening/edema.     /RENAL:   #UO: Blair placed by Urology     - Cardura (Flomax at home)  #acute scrotal swelling  - U/S 3/29 w/ diffused scrotal wall edema   -scrotal elevation  #urine output in critically ill    -indwelling blair (placed 3/22 by Urology)    Labs:          BUN/Cr- 25/0.6  -->,  28/0.7  -->          Electrolytes-Na 144 // K 5.2 // Mg 2.1 //  Phos 3.4 (04-04 @ 21:13)    HEME/ONC:   #DVT prophylaxis- HSQ, SCDs: HSQ held piror to OR 4/5 RESTART AFTER OR d/w primary team for restart time  #H/O CAD s/p stents x3    - con't ASA; hold Plavix for OR 4/5 RESTART AFTER OR d/w primary team for restart time    Labs: Hb/Hct:  8.6/27.8  -->,  8.8/28.5  -->,  8.2/27.5  -->  	Plts:  288  -->,  288  -->,  273  -->      ID:  #acute clostridium difficili 4/30  WBC- 10.37  --->>,  10.31  --->>,  10.31  --->>  Temp trend- 24hrs T(F): 97.3 (04-05 @ 00:00), Max: 99.3 (04-04 @ 04:00)  Antibiotics-vancomycin    Solution 125 every 6 hours plan for 14d per ID c/s (start 3/30)  Cultures:    ET Sputum 3/29: negative     BCx 3/28: NGTD    UA 3/28: Negative     BCx 3/25--pending    UA 3/24--negative    ET Sputum 3/24--neg    MRSA neg    CDiff PCR 3/30: Positive    ENDO:    -ISS tightened 4/4    -Glucose goal 140-180    MSK:   -WBAT RLE per ortho, PT/OT when clinically appropriate   -R hip dressing changed by SICU team 4/2    LINES/DRAINS:    A-line left radial (3/22),    PIV x3 (2x20s, 1x18),   Blair (3/22)  Right IJ TLC (3/23)  ETT, NGT     ADVANCED DIRECTIVES:  Full Code    HCP/Emergency Contact- Song Segura (Son): [phone]    INDICATION FOR SICU:Hypotension with pressor requirements. Intubated.       DISPO: Consent for trach obtained. Preop labs 8pm 4/4  OR pending for 4/5  SICU

## 2023-04-05 NOTE — PROGRESS NOTE ADULT - SUBJECTIVE AND OBJECTIVE BOX
RADHA RAMON   [id number]   41  77yM      Admit Date/LOS:  (2d)  Indication for SDU/SICU: Hypotension with pressor requirements. Intubated.        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 Pt walks with walker and got up in middle of night to get food and slipped and fell on his R side. Pt has been having R hip pain since with inability to ambulate. Pts family finally convinced him to come to hospital today. Pt is having 10/10 pain currently. Denies dizziness, CP or LOC prior to fall. Denies head trauma. No HA. (20 Mar 2023 12:02)    3/22: Patient S/P ORIF of right hip using interlocking intramedullary stephon.   OR Time:    EBL: 75  IV Fluids: 2700  Blood Products: None  UOP: Not noted. No blair placed in OR  Findings- Displaced intertrochanteric fracture appreciated. 11mm x 200mm 130, lag screw 10.5 x 105mm    SICU consulted for hypotension requiring pressors.    Patient was a rapid response in the PACU post-operatively due to hypotension. Patient examined bedside and was responding to verbal stimuli. Albumin and fluid bolus given. Labs drawn off A-line and Urbano started. NGT placed by SICU. Blair placed by urology due to difficulty passing Blair in setting of patient with prostate CA and BPH.    24Hours  4/4  Night  - K 5.2 - repeat BMP  - Afib w/ RVR to 120 and desaturation to 90%- Fentanyl PRN and resolved  - NPO for trach and PEG    Day  Weaning fentanyl and precedex  Off Vaso since AM  Trach and PEG 4/5 w/ Glinik   SIMV  Zyprexa 5 BID added  Duoneb and inhaled saline added  Zyprexa IM for agitation x1   Hold DVT ppx starting tonight   Bs; ISS tightened  Preop labs 8pm, NPO @ MN, LR @ 100      [] A ten-point review of systems was otherwise negative except as noted above.  [x] Due to altered mental status/intubation, subjective information was not attained from the patient. History was obtained, to the extent possible, from review of the chart and collateral sources of information.    =========================================================================================================================================   RADHA RAMON   [id number]   43  77yM      Admit Date/LOS:  (2d)  Indication for SDU/SICU: Hypotension with pressor requirements. Intubated.        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 Pt walks with walker and got up in middle of night to get food and slipped and fell on his R side. Pt has been having R hip pain since with inability to ambulate. Pts family finally convinced him to come to hospital today. Pt is having 10/10 pain currently. Denies dizziness, CP or LOC prior to fall. Denies head trauma. No HA. (20 Mar 2023 12:02)    3/22: Patient S/P ORIF of right hip using interlocking intramedullary stephon.   OR Time:    EBL: 75  IV Fluids: 2700  Blood Products: None  UOP: Not noted. No blair placed in OR  Findings- Displaced intertrochanteric fracture appreciated. 11mm x 200mm 130, lag screw 10.5 x 105mm    SICU consulted for hypotension requiring pressors.    Patient was a rapid response in the PACU post-operatively due to hypotension. Patient examined bedside and was responding to verbal stimuli. Albumin and fluid bolus given. Labs drawn off A-line and Urbano started. NGT placed by SICU. Blair placed by urology due to difficulty passing Blair in setting of patient with prostate CA and BPH.    24Hours  4/4  Night  - K 5.2 - repeat BMP  - Afib w/ RVR to 120 and desaturation to 90%- Fentanyl PRN and resolved  - NPO for trach and PEG    Day  Weaning fentanyl and precedex  Off Vaso since AM  Trach and PEG 4/5 w/ Glinik   SIMV  Zyprexa 5 BID added  Duoneb and inhaled saline added  Zyprexa IM for agitation x1   Hold DVT ppx starting tonight   Bs; ISS tightened  Preop labs 8pm, NPO @ MN, LR @ 100      [] A ten-point review of systems was otherwise negative except as noted above.  [x] Due to altered mental status/intubation, subjective information was not attained from the patient. History was obtained, to the extent possible, from review of the chart and collateral sources of information.    =========================================================================================================================================      Daily Height in cm: 175.26 (2023 18:45)    Daily   Diet, NPO after Midnight:      NPO Start Date: 2023,   NPO Start Time: 23:59 (23 @ 17:53)  Diet, NPO with Tube Feed:   Tube Feeding Modality: Orogastric  Peptamen A.F. Formula  Total Volume for 24 Hours (mL): 480  Continuous  Starting Tube Feed Rate mL per Hour: 20  Increase Tube Feed Rate by (mL): 20     Every 4 hours  Until Goal Tube Feed Rate (mL per Hour): 20  Tube Feed Duration (in Hours): 24  Tube Feed Start Time: 16:00  Banatrol TF     Qty per Day:  2 (23 @ 14:57)    CURRENT MEDS:  Neurologic Medications  acetaminophen     Tablet .. 650 milliGRAM(s) Oral every 6 hours  dexMEDEtomidine Infusion 1 MICROgram(s)/kG/Hr IV Continuous <Continuous>  fentaNYL   Infusion 1 MICROgram(s)/kG/Hr IV Continuous <Continuous>  OLANZapine 5 milliGRAM(s) Oral every 12 hours  ondansetron Injectable 4 milliGRAM(s) IV Push every 8 hours PRN Nausea and/or Vomiting    Respiratory Medications  albuterol    0.083% 2.5 milliGRAM(s) Nebulizer every 6 hours  albuterol/ipratropium for Nebulization 3 milliLiter(s) Nebulizer every 6 hours  sodium chloride 3%  Inhalation 4 milliLiter(s) Inhalation every 12 hours    Cardiovascular Medications  doxazosin 4 milliGRAM(s) Oral at bedtime  midodrine 10 milliGRAM(s) Oral <User Schedule>    Gastrointestinal Medications  famotidine    Tablet 20 milliGRAM(s) Oral daily  lactated ringers. 1000 milliLiter(s) IV Continuous <Continuous>  sodium chloride 0.9% lock flush 10 milliLiter(s) IV Push every 1 hour PRN Pre/post blood products, medications, blood draw, and to maintain line patency    Genitourinary Medications    Hematologic/Oncologic Medications  aspirin  chewable 81 milliGRAM(s) Oral daily    Antimicrobial/Immunologic Medications  vancomycin    Solution 125 milliGRAM(s) Oral every 6 hours    Endocrine/Metabolic Medications  atorvastatin 80 milliGRAM(s) Oral at bedtime  insulin lispro (ADMELOG) corrective regimen sliding scale   SubCutaneous every 6 hours    Topical/Other Medications  atropine 1% Solution 1 Drop(s) Left EYE two times a day  chlorhexidine 0.12% Liquid 15 milliLiter(s) Oral Mucosa every 12 hours  chlorhexidine 2% Cloths 1 Application(s) Topical <User Schedule>  prednisoLONE acetate 1% Suspension 1 Drop(s) Left EYE every 6 hours    ICU Vital Signs Last 24 Hrs  T(C): 36.6 (2023 08:28), Max: 37.3 (2023 16:00)  T(F): 97.8 (2023 08:28), Max: 99.2 (2023 16:00)  HR: 72 (2023 08:00) (62 - 138)  BP: 91/57 (2023 08:00) (88/50 - 140/78)  BP(mean): 69 (2023 08:00) (63 - 99)  ABP: 100/49 (2023 08:00) (100/49 - 172/70)  ABP(mean): 66 (2023 08:00) (57 - 98)  RR: 21 (2023 08:00) (15 - 36)  SpO2: 96% (2023 08:00) (70% - 100%)    O2 Parameters below as of 2023 00:00  Patient On (Oxygen Delivery Method): ventilator    O2 Concentration (%): 40      Mode: AC/ CMV (Assist Control/ Continuous Mandatory Ventilation)  RR (machine): 14  TV (machine): 450  FiO2: 50  PEEP: 8  ITime: 0.9  MAP: 16  PIP: 27    ABG - ( 2023 04:29 )  pH, Arterial: 7.35  pH, Blood: x     /  pCO2: 40    /  pO2: 107   / HCO3: 22    / Base Excess: -3.3  /  SaO2: 99.9              I&O's Summary    2023 07:01  -  2023 07:00  --------------------------------------------------------  IN: 925.8 mL / OUT: 1200 mL / NET: -274.2 mL    2023 07:  -  2023 09:11  --------------------------------------------------------  IN: 125 mL / OUT: 30 mL / NET: 95 mL      I&O's Detail    2023 07:01  -  2023 07:00  --------------------------------------------------------  IN:    Dexmedetomidine: 409 mL    FentaNYL: 180.8 mL    FentaNYL: 30 mL    Peptamen A.F.: 300 mL    Vasopressin: 6 mL  Total IN: 925.8 mL    OUT:    Indwelling Catheter - Urethral (mL): 1200 mL  Total OUT: 1200 mL    Total NET: -274.2 mL      2023 07:01  -  2023 09:11  --------------------------------------------------------  IN:    Dexmedetomidine: 19 mL    FentaNYL: 6 mL    Lactated Ringers: 100 mL  Total IN: 125 mL    OUT:    Indwelling Catheter - Urethral (mL): 30 mL  Total OUT: 30 mL    Total NET: 95 mL          PHYSICAL EXAM:     alert intubated  intermittently follows commands, onprecedex/fentanyl gtt  no acute distress  equal chest rise b/l  abdomen soft  extremities soft  urinary cath in place

## 2023-04-05 NOTE — PROGRESS NOTE ADULT - SUBJECTIVE AND OBJECTIVE BOX
GENERAL SURGERY PROGRESS NOTE    Patient: RADHA RAMON , 77y ([birth year])Male   MRN: [id number]  Location: [address]  Visit: 03-20-23 Inpatient  Date: 04-05-23 @ 17:52    Hospital Day #: 17    Procedure/Dx/Injuries: S/p fall, found to have R hip fracture s/p ORIF using interlocking intramedullary stephon     Events of past 24 hours: In A fib with RVR, received fentanyl for pain control and this resolved. plans for trach/peg today.     PAST MEDICAL & SURGICAL HISTORY:  CAD (coronary artery disease)  Hypercholesteremia  BPH (benign prostatic hyperplasia)  Kidney stones  Prostate cancer  Status post cardiac surgery  cardiac stents  Bilateral cataracts  History of lithotripsy    Vitals:   T(F): 97.6 (04-05-23 @ 16:00), Max: 98.1 (04-04-23 @ 20:00)  HR: 89 (04-05-23 @ 16:00)  BP: 91/57 (04-05-23 @ 16:00)  RR: 20 (04-05-23 @ 16:00)  SpO2: 100% (04-05-23 @ 16:00)  Mode: SIMV (Synchronized Intermittent Mandatory Ventilation), RR (machine): 10, TV (machine): 450, FiO2: 40, PEEP: 8, PS: 8, MAP: 11, PIP: 17    Diet, NPO after Midnight:      NPO Start Date: 04-Apr-2023,   NPO Start Time: 23:59  Diet, NPO with Tube Feed:   Tube Feeding Modality: Orogastric  Peptamen A.F. Formula  Total Volume for 24 Hours (mL): 480  Continuous  Starting Tube Feed Rate mL per Hour: 20  Increase Tube Feed Rate by (mL): 20     Every 4 hours  Until Goal Tube Feed Rate (mL per Hour): 20  Tube Feed Duration (in Hours): 24  Tube Feed Start Time: 16:00  Banatrol TF     Qty per Day:  2    Fluids: lactated ringers.: Solution, 1000 milliLiter(s) infuse at 100 mL/Hr  Special Instructions: Please start at MN when TF are held.    I & O's:  04-04-23 @ 07:01  -  04-05-23 @ 07:00  --------------------------------------------------------  IN:    Dexmedetomidine: 409 mL    FentaNYL: 30 mL    FentaNYL: 180.8 mL    Peptamen A.F.: 300 mL    Vasopressin: 6 mL  Total IN: 925.8 mL    OUT:    Indwelling Catheter - Urethral (mL): 1200 mL  Total OUT: 1200 mL    Total NET: -274.2 mL    PHYSICAL EXAM:  alert intubated  intermittently follows commands, onprecedex/fentanyl gtt  no acute distress  equal chest rise b/l  abdomen soft  extremities soft  urinary cath in place    MEDICATIONS  (STANDING):  acetaminophen     Tablet .. 650 milliGRAM(s) Oral every 6 hours  albuterol    90 MICROgram(s) HFA Inhaler 1 Puff(s) Inhalation every 6 hours  aspirin  chewable 81 milliGRAM(s) Oral daily  atorvastatin 80 milliGRAM(s) Oral at bedtime  atropine 1% Solution 1 Drop(s) Left EYE two times a day  chlorhexidine 0.12% Liquid 15 milliLiter(s) Oral Mucosa every 12 hours  chlorhexidine 2% Cloths 1 Application(s) Topical <User Schedule>  dexMEDEtomidine Infusion 1 MICROgram(s)/kG/Hr (15.9 mL/Hr) IV Continuous <Continuous>  doxazosin 4 milliGRAM(s) Oral at bedtime  famotidine    Tablet 20 milliGRAM(s) Oral daily  fentaNYL   Infusion 1 MICROgram(s)/kG/Hr (6.35 mL/Hr) IV Continuous <Continuous>  furosemide   Injectable 40 milliGRAM(s) IV Push once  insulin lispro (ADMELOG) corrective regimen sliding scale   SubCutaneous every 6 hours  lactated ringers. 1000 milliLiter(s) (100 mL/Hr) IV Continuous <Continuous>  midodrine 10 milliGRAM(s) Oral <User Schedule>  OLANZapine 5 milliGRAM(s) Oral every 12 hours  prednisoLONE acetate 1% Suspension 1 Drop(s) Left EYE every 6 hours  sodium chloride 3%  Inhalation 4 milliLiter(s) Inhalation every 12 hours  vancomycin    Solution 125 milliGRAM(s) Oral every 6 hours    MEDICATIONS  (PRN):  ondansetron Injectable 4 milliGRAM(s) IV Push every 8 hours PRN Nausea and/or Vomiting  sodium chloride 0.9% lock flush 10 milliLiter(s) IV Push every 1 hour PRN Pre/post blood products, medications, blood draw, and to maintain line patency    ANTIBIOTICS:  vancomycin    Solution 125 milliGRAM(s)    LAB/STUDIES:             8.2    10.31 )-----------( 273      ( 04 Apr 2023 21:13 )             27.5       04-05  143  |  114<H>  |  30<H>  ----------------------------<  91  5.2<H>   |  24  |  0.7    Calcium, Total Serum: 8.4 mg/dL (04-05-23 @ 04:55)     Blood Gas Arterial, Lactate: 0.80 mmol/L (04-05-23 @ 04:29)  Blood Gas Arterial, Lactate: 0.90 mmol/L (04-04-23 @ 04:27)  Blood Gas Arterial, Lactate: 0.90 mmol/L (04-03-23 @ 04:01)    ABG - ( 05 Apr 2023 04:29 )  pH: 7.35  /  pCO2: 40    /  pO2: 107   / HCO3: 22    / Base Excess: -3.3  /  SaO2: 99.9      ABG - ( 04 Apr 2023 04:27 )  pH: 7.33  /  pCO2: 45    /  pO2: 82    / HCO3: 24    / Base Excess: -2.2  /  SaO2: 98.4      ABG - ( 03 Apr 2023 04:01 )  pH: 7.37  /  pCO2: 39    /  pO2: 162   / HCO3: 22    / Base Excess: -2.6  /  SaO2: 100.0     IMAGING:  < from: Xray Chest 1 View- PORTABLE-Routine (04.05.23 @ 05:16) >  Impression:    Stable bibasilar opacities/effusions. No pneumothorax.    Stable support devices as described.    < end of copied text >        ACCESS/ DEVICES:  [ ] Peripheral IV  [ ] Central Venous Line	[ ] R	[ ] L	[ ] IJ	[ ] Fem	[ ] SC	Placed:   [ ] Arterial Line		[ ] R	[ ] L	[ ] Fem	[ ] Rad	[ ] Ax	Placed:   [ ] PICC:					[ ] Mediport  [ ] Urinary Catheter,  Date Placed:   [ ] Chest tube: [ ] Right, [ ] Left  [ ] ROSENDO/Hakeem Drains

## 2023-04-05 NOTE — PROGRESS NOTE ADULT - ASSESSMENT
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 Presented with right hip pain. Denies dizziness, CP or LOC prior to fall. Denies head trauma. Imaging revealed right hip fracture.     #Right hip fracture, s/p right hip ORIF using interlocking intramedullary stephon on 3/22/23     #Acute respiratory failure, due to post operative arrhythmias   -Remains intubated on vent since surgery on 3/22. PE workup negative.     #Post operative hypotension - now resolved. Vasopressin stopped on 4/4. On Midodrine 10 q 6 hours   -Echo WNL     #C diff, tested positive on 3/30   -On PO Vancomycin     #A fib with RVR - resolved with pain control       PLAN:  -plans for trach/peg today, monitor post operatively   -SICU following for all other cares  -Monitor vent settings   -monitor BP, vitals   -pain control   -on PO vancomycin for C diff, needs 2 weeks  -monitor UOP, blair in place    Lines/Tubes: PIV

## 2023-04-05 NOTE — CHART NOTE - NSCHARTNOTEFT_GEN_A_CORE
TRISTEN RADHA   [id number] (1945)    Event: s/p trach/PEG    Size 8 Trach  PEG 3cm at skin    Time: 1hr 30mins  IVF: 900cc  EBL: 5cc  UO: 50cc via blair    Patient in the OR has to be reintubated and 150cc suctioned from mouth, possible tube feeds vs secretions.    T(C): 34.1 (04-05), Max: 36.7 (04-04)  HR: 97 (04-05) (61 - 130)  BP: 111/58 (04-05) (82/53 - 120/60)  BP(mean): 80 (04-05) (63 - 85)  ABP: 128/55 (04-05) (92/40 - 149/54)  ABP(mean): 79 (04-05) (54 - 86)  RR: 14 (04-05) (12 - 34)  SpO2: 98% (04-05) (87% - 100%)    A/P  -restart meds in 6 hours  -restart plavix 4/6 AM  -restart tube feeds in 12hrs

## 2023-04-05 NOTE — BRIEF OPERATIVE NOTE - NSICDXBRIEFPROCEDURE_GEN_ALL_CORE_FT
PROCEDURES:  Open reduction and internal fixation of right hip using interlocking intramedullary stephon 22-Mar-2023 14:05:04  Mendoza Card  
PROCEDURES:  Creation of tracheostomy with insertion of percutaneous endoscopic gastrostomy (PEG) tube 05-Apr-2023 18:06:37  Megha Peña

## 2023-04-05 NOTE — BRIEF OPERATIVE NOTE - NSICDXBRIEFPREOP_GEN_ALL_CORE_FT
PRE-OP DIAGNOSIS:  Intertrochanteric fracture of right hip 22-Mar-2023 14:05:20  Mendoza Card  
PRE-OP DIAGNOSIS:  Acute respiratory failure with hypoxia 05-Apr-2023 18:07:34  Megha Peña

## 2023-04-05 NOTE — PRE-ANESTHESIA EVALUATION ADULT - MALLAMPATI CLASS
N/A
Class III - visualization of the soft palate and the base of the uvula
Class II - visualization of the soft palate, fauces, and uvula

## 2023-04-06 NOTE — PROGRESS NOTE ADULT - NSPROGADDITIONALINFOA_GEN_ALL_CORE
4/5 night  Pt wiht episode of tachycardia -> given orecedez with imporvment       4/5  DAY   - OR today for trach and PEG   - Held plavix + lovenox for OR   - NPO for OR   - post-op: start lasix 40mg post-op + continue lovenox tonight   - remains off pressors   - vent: on SIMV; morning AB.35/40/107/22 -- goal for PS   - last 24hrs -- net sac201 cc   - continue PO vanc until 23   - PEG @ 3cm skin -- 6 hr for med, 12 hr for feeds, restart plavix tommorrow   - post trach aspiration of enteric content, may need bronch tomorrow  -Lasix 40 mg in the evening

## 2023-04-06 NOTE — PROGRESS NOTE ADULT - SUBJECTIVE AND OBJECTIVE BOX
RADHA RAMON  [id number]  77y Male    Indication for ICU admission:    Admit Date:03-20-23  ICU Date:  OR Date:    No Known Allergies    PAST MEDICAL & SURGICAL HISTORY:  CAD (coronary artery disease)    Hypercholesteremia    BPH (benign prostatic hyperplasia)    Kidney stones    Prostate cancer    Status post cardiac surgery  cardiac stents    Bilateral cataracts    History of lithotripsy      Home Medications:  atropine 1% ophthalmic solution: 1 drop(s) to each affected eye 2 times a day (20 Mar 2023 15:44)  erythromycin 0.5% ophthalmic ointment: 1 application to each affected eye 2 times a day (20 Mar 2023 15:44)  prednisoLONE acetate 1% ophthalmic suspension: 1 drop(s) to each affected eye every 6 hours (20 Mar 2023 15:44)  tamsulosin 0.4 mg oral capsule: 1 cap(s) orally once a day (at bedtime)  - if you continue to have your blood pressures drop when you stand, then this medication will need to be STOPPED (20 Mar 2023 15:44)        24HRS EVENT:  17d        DVT PTX:     GI PTX:famotidine    Tablet 20 milliGRAM(s) Oral daily      ***Tubes/Lines/Drains  ***  Peripheral IV  trach   peg tube     Urinary Catheter		Indication: Strict I&O    Date Placed:       REVIEW OF SYSTEMS    [ ] A ten-point review of systems was otherwise negative except as noted.  [ ] Due to altered mental status/intubation, subjective information were not able to be obtained from the patient. History was obtained, to the extent possible, from review of the chart and collateral sources of information.                   RADHA RAMON  [id number]  77y Male    Indication for ICU admission:    Admit Date:23  ICU Date:  OR Date:    No Known Allergies    PAST MEDICAL & SURGICAL HISTORY:  CAD (coronary artery disease)    Hypercholesteremia    BPH (benign prostatic hyperplasia)    Kidney stones    Prostate cancer    Status post cardiac surgery  cardiac stents    Bilateral cataracts    History of lithotripsy      Home Medications:  atropine 1% ophthalmic solution: 1 drop(s) to each affected eye 2 times a day (20 Mar 2023 15:44)  erythromycin 0.5% ophthalmic ointment: 1 application to each affected eye 2 times a day (20 Mar 2023 15:44)  prednisoLONE acetate 1% ophthalmic suspension: 1 drop(s) to each affected eye every 6 hours (20 Mar 2023 15:44)  tamsulosin 0.4 mg oral capsule: 1 cap(s) orally once a day (at bedtime)  - if you continue to have your blood pressures drop when you stand, then this medication will need to be STOPPED (20 Mar 2023 15:44)        24HRS EVENT:  4/5   night  Pt wiht episode of tachycardia -> given orecedez with imporvment   K 5.7 no peaked T, tx with calcium, insulin, and dextrose      4/5  DAY   - OR today for trach and PEG   - Held plavix + lovenox for OR   - NPO for OR   - post-op: start lasix 40mg post-op + continue lovenox tonight   - remains off pressors   - vent: on SIMV; morning AB.35/40/107/22 -- goal for PS   - last 24hrs -- net pat220 cc   - continue PO vanc until 23   - PEG @ 3cm skin -- 6 hr for med, 12 hr for feeds, restart plavix tommorrow   - post trach aspiration of enteric content, may need bronch tomorrow  -Lasix 40 mg in the evening            DVT PTX:     GI PTX:famotidine    Tablet 20 milliGRAM(s) Oral daily      ***Tubes/Lines/Drains  ***  Peripheral IV  trach   peg tube     Urinary Catheter		Indication: Strict I&O    Date Placed:       REVIEW OF SYSTEMS    [ ] A ten-point review of systems was otherwise negative except as noted.  [ ] Due to altered mental status/intubation, subjective information were not able to be obtained from the patient. History was obtained, to the extent possible, from review of the chart and collateral sources of information.

## 2023-04-06 NOTE — PROGRESS NOTE ADULT - ASSESSMENT
Assessment & Plan    77y Male  s/p     NEURO:  #Acute pain    -APAP prn, Gabapentin 100mg q8    -if intubated Fentanyl 12.5 mcg q4 prn     RESP:   #Oxygenation    -wean off NC to RA as tolerated  #Activity    -increase as tolerated    CARDS:   #  Imaging:   Labs:   Rx-doxazosin 4 at bedtime  midodrine 10 <User Schedule>      GI/NUTR:   #  Diet, NPO with Tube Feed:   Tube Feeding Modality: Gastrostomy  Peptamen A.F. Formula  Total Volume for 24 Hours (mL): 480  Continuous  Starting Tube Feed Rate mL per Hour: 20  Increase Tube Feed Rate by (mL): 20     Every 4 hours  Until Goal Tube Feed Rate (mL per Hour): 20  Tube Feed Duration (in Hours): 24  Tube Feed Start Time: 06:00  Banatrol TF     Qty per Day:  2 (04-05-23 @ 18:03) [Available for Activation]  Diet, NPO with Tube Feed:   Tube Feeding Modality: Orogastric  Glucerna 1.2 Jorge Luis  Total Volume for 24 Hours (mL): 1560  Continuous  Starting Tube Feed Rate mL per Hour: 65  Until Goal Tube Feed Rate (mL per Hour): 65  Tube Feed Duration (in Hours): 24  Tube Feed Start Time: 15:00 (03-28-23 @ 14:35) [Pending Verification By Attending]          -aspiration precautions, HOB 30  #GI Prophylaxis    -not indicated  #Bowel regimen    -senna/miralax    -last bowel movement      /RENAL:   #urine output in crtically ill  #urine output in critically ill    -indwelling blair (placed     Current Rx:     Labs:          BUN/Cr- 30/0.7  -->,  33/0.8  -->          Electrolytes-Na 145 // K 5.4 // Mg 2.0 //  Phos 3.9 (04-05 @ 21:36)      Home Rx: tamsulosin 0.4 mg oral capsule      #maintain euvolemia        04-04-23 @ 07:01  -  04-05-23 @ 07:00  --------------------------------------------------------  IN: 925.8 mL / OUT: 1200 mL / NET: -274.2 mL    04-05-23 @ 07:01  -  04-06-23 @ 04:11  --------------------------------------------------------  IN: 1650 mL / OUT: 620 mL / NET: 1030 mL           HEME/ONC:   #DVT prophylaxis    -, SCDs    Labs: Hb/Hct:  8.2/27.5  -->,  9.5/32.2  -->                      Plts:  273  -->,  280  -->                 PTT/INR:        Home Rx: clopidogrel 75 mg oral tablet: 1 tab(s) orally once a day    T&S Expires:   Blood Consent-obtain if acute anemia, q6 CBC    ID:  #leukocytosis   WBC- 10.31  --->>,  10.31  --->>,  12.04  --->>  Antibiotics-  Culture-  Temp trend- 24hrs T(F): 96.8 (04-05 @ 20:00), Max: 97.8 (04-05 @ 08:28)  Current antibiotics-vancomycin    Solution 125 every 6 hours          ENDO:    -FSG q6 if NPO or Tube feeds    -Glucose goal 140-180    -if above 180 start ISS    MSK:         HOME Medications:      LINES/DRAINS:  Bladimir FIERRO    ADVANCED DIRECTIVES:  Full Code    HCP/Emergency Contact-    INDICATION FOR SICU/SDU: Fracture of unspecified part of neck of right femur, initial encounter for closed fracture             DISPO:   Case discussed with attending

## 2023-04-07 NOTE — PROGRESS NOTE ADULT - SUBJECTIVE AND OBJECTIVE BOX
RADHA RAMON   [id number]   [birth year]  77yM    Admit Date/LOS: 03-20 (2d)  Indication for SDU/SICU: Hypotension with pressor requirements. Intubated.        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 Pt walks with walker and got up in middle of night to get food and slipped and fell on his R side. Pt has been having R hip pain since with inability to ambulate. Pts family finally convinced him to come to hospital today. Pt is having 10/10 pain currently. Denies dizziness, CP or LOC prior to fall. Denies head trauma. No HA. (20 Mar 2023 12:02)    3/22: Patient S/P ORIF of right hip using interlocking intramedullary stephon. - upgradeed for hypotension in PACU  4/5  Trach and PEG    24Hours  4/6  NIGHT  -75CC/LR ordered for maintenance  -k 5.4 treated, AM BMP  -UOP improved  -40 Lasix x1, d/c'd IVF (A-line kept to accurately monitor BP, consider d/c in AM)  -Precedex D/C'd to assess mental status, pt awake follows some commands  - FiO2 decreased to 30%    DAY  -dc IVF   -dc a-line   -Vascular deferred to -[facility] for starting Plavix  -K 5.2    [] A ten-point review of systems was otherwise negative except as noted above.  [x] Due to altered mental status/intubation, subjective information was not attained from the patient. History was obtained, to the extent possible, from review of the chart and collateral sources of information.  ==========================================================================================================

## 2023-04-07 NOTE — PROGRESS NOTE ADULT - ASSESSMENT
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   S/P ORIF of right hip using interlocking intramedullary stephon.       NEURO:  #Acute pain  acetaminophen     Tablet .. 650 milliGRAM(s) Oral every 6 hours  fentaNYL    Injectable 25 MICROGram(s) IV Push every 3 hours PRN  OLANZapine 5 milliGRAM(s) Oral every 12 hours  ondansetron Injectable 4 milliGRAM(s) IV Push every 8 hours PRN  #sedation    - 4/6PM Precedex D/C'd to assess mental status, awake, following some commands    RESP:   #acute respiratory failure secondary to post operative arrhythmias requiring mechanical ventilation (Intubated 3/23),    s/p trached 4/5/23 with size 8  #PE w/u NEGATIVE on 3/28      RR (machine): 10, TV (machine): 450, FiO2: 30, PEEP: 8, ITime: 0.9    04-06 @ 04:25 -- 7.35 / 41 / 82 / 23 / 99.5        SAT  99.5  Lac 0.90     Current Rx: albuterol    90 MICROgram(s) HFA Inhaler 1 every 6 hours      CARDS:   #acute hypotension    -Midodrine 10 q6- currently normotensive, off pressors    #Labs/Imaging  -Echo 3/23- EF appears normal, mild LVH, sclerotic AoV, trace MR,   -Echo 3/29: EF 70-65%, norm LV function, borderline pulm HTN.   EKG 4/6 most recent: , NSR      GI/NUTR:   #acute clostridium difficile positive 3/30    -PO Vancomycin  #Diet: NPO x/rx w/ TFs @20cc w/ Banatrol and Glucerna    -PEG 3cm at skin, 4cm at bumper  #Nausea: Zofran prn    - Aspiration precautions, HOB 30  #GI Prophylaxis    - Pepcid  #Bowel regimen    - None while c.diff positive  #Imaging    -CT abdomen 3/28: No definite infectious source identified      -RUQ sono: biliary sludge, mild GB wall thickening/edema.       /RENAL:   #UO: Blair placed by Urology    Inpatient Rx: doxazosin 4 milliGRAM(s) Oral at bedtime    -indwelling blair (placed 3/22 by Urology) f/u TOV on 4/6    #acute scrotal swelling  - U/S 3/29 w/ diffused scrotal wall edema (keep blair)   -scrotal elevation    Labs:          BUN/Cr- 40/1.1  -->,  40/1.1  -->          Electrolytes-Na 142 // K 5.4 // Mg 2.1 //  Phos 3.4 (04-06 @ 21:17)      Home Rx: tamsulosin 0.4 mg oral capsule -->cardura now      HEME/ONC:   #DVT prophylaxis- HSQ, SCDs  #H/O CAD s/p stents x3    - con't ASA; hold Plavix for OR 4/5 RESTART AFTER OR d/w primary team for restart time    Labs: Hb/Hct:  9.5/32.2  -->,  9.0/29.3  -->                      Plts:  280  -->,  290  -->                 PTT/INR:        Home Rx: clopidogrel 75 mg oral tablet: 1 tab(s) orally once a day    ID:  #acute clostridium difficili 4/30  WBC- 10.31  --->>,  12.04  --->>,  11.45  --->>  Temp trend- 24hrs T(F): 97.6 (04-06 @ 18:05), Max: 98.6 (04-06 @ 08:00)  Current antibiotics-vancomycin    Solution 125 every 6 hours  plan for 14d per ID c/s (start 3/30)  Cultures:    ET Sputum 3/29: negative     BCx 3/28: NGTD    UA 3/28: Negative     BCx 3/25--pending    UA 3/24--negative    ET Sputum 3/24--neg    MRSA neg    CDiff PCR 3/30: Positive    ENDO:    -ISS tightened 4/4    -Glucose goal 140-180     Glucose, Serum: 149 (04-06 @ 21:17)    MSK:   -WBAT RLE per ortho, PT/OT when clinically appropriate   -R hip dressing changed by SICU team 4/2    LINES/DRAINS:    PIV x3 (2x20s, 1x18),   Blair (3/22)  Right IJ TLC (3/23)  Trach, NGT     ADVANCED DIRECTIVES:  Full Code    HCP/Emergency Contact- Song Segura (Son): [phone]    INDICATION FOR SICU:Hypotension with pressor requirements. Intubated.       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   S/P ORIF of right hip using interlocking intramedullary stephon.       NEURO:  #Acute pain  acetaminophen     Tablet .. 650 milliGRAM(s) Oral every 6 hours  fentaNYL    Injectable 25 MICROGram(s) IV Push every 3 hours PRN  OLANZapine 5 milliGRAM(s) Oral every 12 hours  ondansetron Injectable 4 milliGRAM(s) IV Push every 8 hours PRN  #sedation    - 4/6PM Precedex D/C'd to assess mental status, awake, following some commands    RESP:   #acute respiratory failure secondary to post operative arrhythmias requiring mechanical ventilation (Intubated 3/23),    s/p trached 4/5/23 with size 8  #PE w/u NEGATIVE on 3/28      RR (machine): 10, TV (machine): 450, FiO2: 30, PEEP: 8, ITime: 0.9    04-06 @ 04:25 -- 7.35 / 41 / 82 / 23 / 99.5        SAT  99.5  Lac 0.90     Current Rx: albuterol    90 MICROgram(s) HFA Inhaler 1 every 6 hours      CARDS:   #acute hypotension    -Midodrine 10 q6- currently normotensive, off pressors    #Labs/Imaging  -Echo 3/23- EF appears normal, mild LVH, sclerotic AoV, trace MR,   -Echo 3/29: EF 60-65%, norm LV function, borderline pulm HTN.   EKG 4/6 most recent: , NSR      GI/NUTR:   #acute clostridium difficile positive 3/30    -PO Vancomycin  #Diet: NPO x/rx w/ TFs @20cc w/ Banatrol and Glucerna    -PEG 3cm at skin, 4cm at bumper  #Nausea: Zofran prn    - Aspiration precautions, HOB 30  #GI Prophylaxis    - Pepcid  #Bowel regimen    - None while c.diff positive  #Imaging    -CT abdomen 3/28: No definite infectious source identified      -RUQ sono: biliary sludge, mild GB wall thickening/edema.       /RENAL:   #UO: Blair placed by Urology    Inpatient Rx: doxazosin 4 milliGRAM(s) Oral at bedtime    -indwelling blair (placed 3/22 by Urology) f/u TOV on 4/6    #acute scrotal swelling  - U/S 3/29 w/ diffused scrotal wall edema (keep blair)   -scrotal elevation    Labs:          BUN/Cr- 40/1.1  -->,  40/1.1  -->          Electrolytes-Na 142 // K 5.4 // Mg 2.1 //  Phos 3.4 (04-06 @ 21:17)      Home Rx: tamsulosin 0.4 mg oral capsule -->cardura now      HEME/ONC:   #DVT prophylaxis- HSQ, SCDs  #H/O CAD s/p stents x3    - con't ASA; hold Plavix for OR 4/5 RESTART AFTER OR d/w primary team for restart time    Labs: Hb/Hct:  9.5/32.2  -->,  9.0/29.3  -->                      Plts:  280  -->,  290  -->                 PTT/INR:        Home Rx: clopidogrel 75 mg oral tablet: 1 tab(s) orally once a day    ID:  #acute clostridium difficili 4/30  WBC- 10.31  --->>,  12.04  --->>,  11.45  --->>  Temp trend- 24hrs T(F): 97.6 (04-06 @ 18:05), Max: 98.6 (04-06 @ 08:00)  Current antibiotics-vancomycin    Solution 125 every 6 hours  plan for 14d per ID c/s (start 3/30)  Cultures:    ET Sputum 3/29: negative     BCx 3/28: NGTD    UA 3/28: Negative     BCx 3/25--pending    UA 3/24--negative    ET Sputum 3/24--neg    MRSA neg    CDiff PCR 3/30: Positive    ENDO:    -ISS tightened 4/4    -Glucose goal 140-180     Glucose, Serum: 149 (04-06 @ 21:17)    MSK:   -WBAT RLE per ortho, PT/OT when clinically appropriate   -R hip dressing changed by SICU team 4/2    LINES/DRAINS:    PIV x3 (2x20s, 1x18),   Blair (3/22)  Right IJ TLC (3/23)  Trach, NGT     ADVANCED DIRECTIVES:  Full Code    HCP/Emergency Contact- Song Segura (Son): [phone]    INDICATION FOR SICU:Hypotension with pressor requirements. Intubated.       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   S/P ORIF of right hip using interlocking intramedullary stephon.       NEURO:  #Acute pain  acetaminophen     Tablet .. 650 milliGRAM(s) Oral every 6 hours  fentaNYL    Injectable 25 MICROGram(s) IV Push every 3 hours PRN  OLANZapine 5 milliGRAM(s) Oral every 12 hours  ondansetron Injectable 4 milliGRAM(s) IV Push every 8 hours PRN  #sedation    - 4/6PM Precedex D/C'd to assess mental status, awake, following some commands    RESP:   #acute respiratory failure secondary to post operative arrhythmias requiring mechanical ventilation (Intubated 3/23),    s/p trached 4/5/23 with size 8  #PE w/u NEGATIVE on 3/28      RR (machine): 10, TV (machine): 450, FiO2: 30, PEEP: 8, ITime: 0.9    04-06 @ 04:25 -- 7.35 / 41 / 82 / 23 / 99.5        SAT  99.5  Lac 0.90     Current Rx: albuterol    90 MICROgram(s) HFA Inhaler 1 every 6 hours      CARDS:   #acute hypotension    -Midodrine 10 q6- currently normotensive, off pressors    #Labs/Imaging  -Echo 3/23- EF appears normal, mild LVH, sclerotic AoV, trace MR,   -Echo 3/29: EF 60-65%, norm LV function, borderline pulm HTN.   EKG 4/6 most recent: , NSR      GI/NUTR:   #acute clostridium difficile positive 3/30    -PO Vancomycin  #Diet: NPO x/rx w/ Peptamen AF TFs @20cc w/ Banatrol    -PEG 3cm at skin, 4cm at bumper  #Nausea: Zofran prn    - Aspiration precautions, HOB 30  #GI Prophylaxis    - Pepcid 20QD  #Bowel regimen    - None while c.diff positive  #Imaging    -CT abdomen 3/28: No definite infectious source identified      -RUQ sono: biliary sludge, mild GB wall thickening/edema.       /RENAL:   #UO: Blair placed by Urology    Inpatient Rx: doxazosin 4 milliGRAM(s) Oral at bedtime    -indwelling blair (placed 3/22 by Urology) f/u TOV on 4/6    #acute scrotal swelling  - U/S 3/29 w/ diffused scrotal wall edema (keep blair)   -scrotal elevation    Labs:          BUN/Cr- 40/1.1  -->,  40/1.1  -->          Electrolytes-Na 142 // K 5.4 // Mg 2.1 //  Phos 3.4 (04-06 @ 21:17)      Home Rx: tamsulosin 0.4 mg oral capsule -->cardura now      HEME/ONC:   #DVT prophylaxis- HSQ, SCDs  #H/O CAD s/p stents x3    - con't ASA; hold Plavix for OR 4/5 RESTART AFTER OR d/w primary team for restart time    Labs: Hb/Hct:  9.5/32.2  -->,  9.0/29.3  -->                      Plts:  280  -->,  290  -->                 PTT/INR:        Home Rx: clopidogrel 75 mg oral tablet: 1 tab(s) orally once a day    ID:  #acute clostridium difficili 4/30  WBC- 10.31  --->>,  12.04  --->>,  11.45  --->>  Temp trend- 24hrs T(F): 97.6 (04-06 @ 18:05), Max: 98.6 (04-06 @ 08:00)  Current antibiotics-vancomycin    Solution 125 every 6 hours  plan for 14d per ID c/s (start 3/30)  Cultures:    ET Sputum 3/29: negative     BCx 3/28: NGTD    UA 3/28: Negative     BCx 3/25--pending    UA 3/24--negative    ET Sputum 3/24--neg    MRSA neg    CDiff PCR 3/30: Positive    ENDO:    -ISS tightened 4/4    -Glucose goal 140-180     Glucose, Serum: 149 (04-06 @ 21:17)    MSK:   -WBAT RLE per ortho, PT/OT when clinically appropriate   -R hip dressing changed by SICU team 4/2    LINES/DRAINS:    PIV x3 (2x20s, 1x18),   Blair (3/22)  Right IJ TLC (3/23)  Trach, NGT     ADVANCED DIRECTIVES:  Full Code    HCP/Emergency Contact- Song Segura (Son): [phone]    INDICATION FOR SICU:Hypotension with pressor requirements. Intubated.       DISPO: SICU

## 2023-04-08 NOTE — PROCEDURE NOTE - NSBRONCHFINDINGS_GEN_A_CORE_FT
Small amount of thick followed by large amount of thin secretions encountered, primarily encompassing the right middle segmental bronchi. Airway edema/inflammation encountered affecting the same

## 2023-04-08 NOTE — PROGRESS NOTE ADULT - ASSESSMENT
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   S/P ORIF of right hip using interlocking intramedullary stephon.       NEURO:  #Acute pain    -acetaminophen 650 PO q6    -fentaNYL 25 IV q3  PRN    -OLANZapine 5 q12  #sedation    - 4/6PM Precedex D/C'd to assess mental status, awake, following some commands    - trazodone 25 qhs      RESP:   #acute respiratory failure secondary to post operative arrhythmias requiring mechanical ventilation (Intubated 3/23),    s/p trached 4/5/23 with size 8    -4/7: Dec FiO2 to 21%--> didn't tolerate : tachypneic & Tachycardic increased to 40%  #PE w/u NEGATIVE on 3/28    RR (machine): 10, TV (machine): 450, FiO2: 40, PEEP: 8, ITime: 0.9    04-07 @ 03:49 -- 7.22 / 39 / 157 / 16 / 95.5        SAT  95.5  Lac 1.10     Current Rx: albuterol    90 MICROgram(s) HFA Inhaler 1 every 6 hours  sodium chloride 3%  Inhalation 4 every 12 hours      CARDS:   #acute hypotension    -Midodrine 10 q6- currently normotensive, off pressors    #Labs/Imaging  -Echo 3/23- EF appears normal, mild LVH, sclerotic AoV, trace MR,   -Echo 3/29: EF 60-65%, norm LV function, borderline pulm HTN.   -EKG 4/6 most recent: , NSR      GI/NUTR:   #acute clostridium difficile positive 3/30    -PO Vancomycin  #Diet: NPO x/rx w/ TFs @20cc w/ Peptamen AF    -PEG 3cm at skin, 4cm at bumper  #Nausea: Zofran prn    - Aspiration precautions, HOB 30  #GI Prophylaxis    - Pepcid  #Bowel regimen    - senna d/t chronic opioids  #Imaging    -CT abdomen 3/28: No definite infectious source identified      -RUQ sono: biliary sludge, mild GB wall thickening/edema.       /RENAL:   #UO: Blair placed by Urology    -Inpatient Rx: doxazosin 4 milliGRAM(s) Oral at bedtime    -indwelling blair (placed 3/22 by Urology) f/u TOV on 4/6  #acute scrotal swelling  - U/S 3/29 w/ diffused scrotal wall edema (keep blair)   -scrotal elevation      Labs:          BUN/Cr- 43/1.0  -->,  43/1.0  -->          Electrolytes-Na 143 // K 4.6 // Mg 2.1 //  Phos 2.8 (04-07 @ 21:06)    Home Rx: tamsulosin 0.4 mg oral capsule -->cardura now      HEME/ONC:   #DVT prophylaxis- HSQ, SCDs  #H/O CAD s/p stents x3    - ASA 81    - Plavix 75 restarted 4/7    Labs: Hb/Hct:  9.0/29.3  -->,  9.1/29.6  -->                      Plts:  290  -->,  278  -->                 PTT/INR:        Home Rx: clopidogrel 75 mg oral tablet: 1 tab(s) orally once a day    ID:  #acute clostridium difficile 4/30  WBC- 12.04  --->>,  11.45  --->>,  7.97  --->>  Temp trend- 24hrs T(F): 98.7 (04-07 @ 20:00), Max: 98.7 (04-07 @ 20:00)  Current antibiotics-vancomycin    Solution 125 every 6 hours  plan for 14d per ID c/s (start 3/30)  Cultures:    ET Sputum 3/29: negative     BCx 3/28: NGTD    UA 3/28: Negative     BCx 3/25--pending    UA 3/24--negative    ET Sputum 3/24--neg    MRSA neg    CDiff PCR 3/30: Positive      ENDO:  HA1C= 5.0% 3/25/23    -ISS tightened 4/4    -Glucose goal 140-180     Glucose, Serum: 149 (04-06 @ 21:17)   Glucose, Serum: 125 (04-07 @ 21:06)      MSK:   -WBAT RLE per ortho, PT/OT when clinically appropriate   -R hip dressing changed by SICU team 4/2    LINES/DRAINS:    PIV  Bliar (3/22)  Right IJ TLC (3/23)  Trach, NGT     ADVANCED DIRECTIVES:  Full Code    HCP/Emergency Contact- Song Segura (Son): [phone]    INDICATION FOR SICU: Hypotension with pressor requirements. Intubated.       DISPO: SICU

## 2023-04-08 NOTE — PROCEDURE NOTE - NSBRONCHHISTORY_GEN_A_CORE_FT
ABG - ( 08 Apr 2023 06:03 )  pH, Arterial: 7.45  pH, Blood: x     /  pCO2: 36    /  pO2: 67    / HCO3: 25    / Base Excess: 1.2   /  SaO2: 95.7    Mode: SIMV with PS  RR (machine): 10 /  TV (machine): 450 / FiO2: 40 / PEEP: 8 / PS: 8 / ITime: 0.9 / MAP: 12 / PIP: 19    s/p trach 4/5. spO2 remains low. vent settings adjusted accordingly     < from: Xray Chest 1 View- PORTABLE-Routine (04.07.23 @ 06:52) >  Unchanged bibasilar opacities.  < end of copied text >

## 2023-04-08 NOTE — PROGRESS NOTE ADULT - SUBJECTIVE AND OBJECTIVE BOX
RADHA RAMON   [id number]   [birth year]  77yM    Admit Date/LOS: 03-20 (2d)  Indication for SDU/SICU: Hemodynamic monitoring. 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 Pt walks with walker and got up in middle of night to get food and slipped and fell on his R side. Pt has been having R hip pain since with inability to ambulate. Pts family finally convinced him to come to hospital today. Pt is having 10/10 pain currently. Denies dizziness, CP or LOC prior to fall. Denies head trauma. No HA. (20 Mar 2023 12:02)    3/22: Patient S/P ORIF of right hip using interlocking intramedullary stephon. - upgradeed for hypotension in PACU  4/5  Trach and PEG    24Hours  4/6  NIGHT  -75CC/LR ordered for maintenance  -k 5.4 treated, AM BMP  -UOP improved  -40 Lasix x1, d/c'd IVF (A-line kept to accurately monitor BP, consider d/c in AM)  -Precedex D/C'd to assess mental status, pt awake follows some commands  - FiO2 decreased to 30%    DAY  -dc IVF   -dc a-line   -Vascular deferred to -[place] for starting Plavix  -K 5.2    [] A ten-point review of systems was otherwise negative except as noted above.  [x] Due to altered mental status/intubation, subjective information was not attained from the patient. History was obtained, to the extent possible, from review of the chart and collateral sources of information.  =========================================================================================================================================   RADHA RAMON   [id number]   [birth year]  77yM    Admit Date/LOS: 03-20 (2d)  Indication for SDU/SICU: Hemodynamic monitoring. 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 Pt walks with walker and got up in middle of night to get food and slipped and fell on his R side. Pt has been having R hip pain since with inability to ambulate. Pts family finally convinced him to come to hospital today. Pt is having 10/10 pain currently. Denies dizziness, CP or LOC prior to fall. Denies head trauma. No HA. (20 Mar 2023 12:02)    3/22: Patient S/P ORIF of right hip using interlocking intramedullary stephon. - upgradeed for hypotension in PACU  4/5  Trach and PEG      24Hours  4/7  NIGHT  -15 [place]  -Precedex @ 0.8 > 0.5  -TF held d/t 300 cc residual-> Restarted  -tachy to 140 5am while being cleaned > precedex increased     DAY  -Start trazadone 25mg-> first dose stat, melatonin 5mg qhs  -restarted precedex  -Dec FiO2 to 21%--> didn't tolerate : tachypneic & Tachycardic increased to 40%  -po Lasix 20mg for hyperK x1  -Start plavix  -Added Senna for bowel regimen  -Decreased Zyprexa to 5mg q12  -Episode of tachycardia (138) & tachypnea (40)    [] A ten-point review of systems was otherwise negative except as noted above.  [x] Due to altered mental status/intubation, subjective information was not attained from the patient. History was obtained, to the extent possible, from review of the chart and collateral sources of information.  =========================================================================================================================================  Daily     Daily     Diet, NPO with Tube Feed:   Tube Feeding Modality: Gastrostomy  Peptamen A.F. Formula  Total Volume for 24 Hours (mL): 480  Continuous  Starting Tube Feed Rate mL per Hour: 20  Increase Tube Feed Rate by (mL): 20     Every 4 hours  Until Goal Tube Feed Rate (mL per Hour): 20  Tube Feed Duration (in Hours): 24  Tube Feed Start Time: 06:00 (04-07-23 @ 04:39)      CURRENT MEDS:  Neurologic Medications  acetaminophen     Tablet .. 650 milliGRAM(s) Oral every 6 hours  dexMEDEtomidine Infusion 1.002 MICROgram(s)/kG/Hr IV Continuous <Continuous>  fentaNYL    Injectable 25 MICROGram(s) IV Push every 3 hours PRN Severe Pain (7 - 10)  melatonin 5 milliGRAM(s) Oral at bedtime  OLANZapine 5 milliGRAM(s) Oral every 12 hours  ondansetron Injectable 4 milliGRAM(s) IV Push every 8 hours PRN Nausea and/or Vomiting  traZODone 25 milliGRAM(s) Oral at bedtime    Respiratory Medications  albuterol    90 MICROgram(s) HFA Inhaler 1 Puff(s) Inhalation every 6 hours  sodium chloride 3%  Inhalation 4 milliLiter(s) Inhalation every 12 hours    Cardiovascular Medications  doxazosin 4 milliGRAM(s) Oral at bedtime  midodrine 10 milliGRAM(s) Oral <User Schedule>    Gastrointestinal Medications  famotidine    Tablet 20 milliGRAM(s) Oral daily  senna 2 Tablet(s) Oral at bedtime  sodium chloride 0.9% lock flush 10 milliLiter(s) IV Push every 1 hour PRN Pre/post blood products, medications, blood draw, and to maintain line patency    Genitourinary Medications    Hematologic/Oncologic Medications  aspirin  chewable 81 milliGRAM(s) Oral daily  clopidogrel Tablet 75 milliGRAM(s) Oral daily  heparin   Injectable 5000 Unit(s) SubCutaneous every 8 hours    Antimicrobial/Immunologic Medications  vancomycin    Solution 125 milliGRAM(s) Oral every 6 hours    Endocrine/Metabolic Medications  atorvastatin 80 milliGRAM(s) Oral at bedtime  insulin lispro (ADMELOG) corrective regimen sliding scale   SubCutaneous every 6 hours    Topical/Other Medications  atropine 1% Solution 1 Drop(s) Left EYE two times a day  chlorhexidine 0.12% Liquid 15 milliLiter(s) Oral Mucosa every 12 hours  chlorhexidine 2% Cloths 1 Application(s) Topical <User Schedule>  prednisoLONE acetate 1% Suspension 1 Drop(s) Left EYE every 6 hours  sodium zirconium cyclosilicate 5 Gram(s) Oral every 8 hours      ICU Vital Signs Last 24 Hrs  T(C): 36.8 (08 Apr 2023 04:00), Max: 37.1 (07 Apr 2023 20:00)  T(F): 98.3 (08 Apr 2023 04:00), Max: 98.7 (07 Apr 2023 20:00)  HR: 99 (08 Apr 2023 06:00) (69 - 115)  BP: 126/60 (08 Apr 2023 06:00) (116/60 - 162/77)  BP(mean): 87 (08 Apr 2023 06:00) (82 - 108)  ABP: 145/65 (07 Apr 2023 08:00) (145/65 - 145/65)  ABP(mean): 94 (07 Apr 2023 08:00) (94 - 94)  RR: 22 (08 Apr 2023 06:00) (16 - 37)  SpO2: 98% (08 Apr 2023 06:00) (95% - 100%)    O2 Parameters below as of 08 Apr 2023 04:00  Patient On (Oxygen Delivery Method): ventilator    O2 Concentration (%): 40      Mode: AC/ CMV (Assist Control/ Continuous Mandatory Ventilation)  RR (machine): 10  TV (machine): 450  FiO2: 40  PEEP: 8  PS: 8  ITime: 0.9  MAP: 10  PIP: 18    ABG - ( 08 Apr 2023 06:03 )  pH, Arterial: 7.45  pH, Blood: x     /  pCO2: 36    /  pO2: 67    / HCO3: 25    / Base Excess: 1.2   /  SaO2: 95.7       I&O's Summary    07 Apr 2023 07:01  -  08 Apr 2023 07:00  --------------------------------------------------------  IN: 651.9 mL / OUT: 1685 mL / NET: -1033.1 mL      I&O's Detail    07 Apr 2023 07:01  -  08 Apr 2023 07:00  --------------------------------------------------------  IN:    Dexmedetomidine: 15 mL    Dexmedetomidine: 186.9 mL    IV PiggyBack: 250 mL    Peptamen A.F.: 200 mL  Total IN: 651.9 mL    OUT:    Indwelling Catheter - Urethral (mL): 1665 mL    Nasogastric/Oral tube (mL): 20 mL  Total OUT: 1685 mL    Total NET: -1033.1 mL      PHYSICAL EXAM:   alert on ventilator  intermittently follows commands, on Precedex gtt  no acute distress  equal chest rise b/l  abdomen soft, +PEG  extremities soft  urinary cath in place    LABS:  CAPILLARY BLOOD GLUCOSE  POCT Blood Glucose.: 120 mg/dL (08 Apr 2023 06:10)  POCT Blood Glucose.: 121 mg/dL (07 Apr 2023 23:27)  POCT Blood Glucose.: 118 mg/dL (07 Apr 2023 17:29)  POCT Blood Glucose.: 134 mg/dL (07 Apr 2023 13:26)                        9.1    7.97  )-----------( 278      ( 07 Apr 2023 21:06 )             29.6     04-07  143  |  112<H>  |  43<H>  ----------------------------<  125<H>  4.6   |  25  |  1.0    Ca    8.4      07 Apr 2023 21:06  Phos  2.8     04-07  Mg     2.1     04-07

## 2023-04-08 NOTE — CHART NOTE - NSCHARTNOTEFT_GEN_A_CORE
Registered Dietitian Follow-Up     Patient Profile Reviewed                           Yes [x]   No []     Nutrition History Previously Obtained        Yes []  No [x]       Pertinent Subjective Information: Limited to chart review at this time as pt intubated and unable to provide hx.     Pertinent Medical Interventions: Pt presented after fall at home 3 days PTP. Pt s/p ORIF of right hip using interlocking intramedullary stephon - upgraded for hypotension in PACU. Acute respiratory failure 2/2 post operative arrhythmias requiring mechanical ventilation; pt was intubated 3/23. Pt now s/p trach and PEG .    Acute clostridium difficile positive 3/30.    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     Diet order: Peptamen AF goal rate 20 mL/hr. Regimen at goal provides 576 kcal, 36 g protein, 389 mL free H2O. Pt currently receiving tube feeds at goal rate via PEG.    Anthropometrics:  Height (cm): 175.3 (23 @ 13:07)  Weight (kg): 63.5 (23 @ 13:07)  BMI (kg/m2): 20.7 (23 @ 13:07)  IBW: 72.7 kg  Daily Weight in k.7 ()    No new wt at this time; previously noted with weight gain likely reflective of fluid retention. Will continue to monitor.    MEDICATIONS  (STANDING):  acetaminophen     Tablet .. 650 milliGRAM(s) Oral every 6 hours  acetylcysteine 10%  Inhalation 4 milliLiter(s) Inhalation every 8 hours  albuterol    90 MICROgram(s) HFA Inhaler 1 Puff(s) Inhalation every 8 hours  aspirin  chewable 81 milliGRAM(s) Oral daily  atorvastatin 80 milliGRAM(s) Oral at bedtime  atropine 1% Solution 1 Drop(s) Left EYE two times a day  chlorhexidine 0.12% Liquid 15 milliLiter(s) Oral Mucosa every 12 hours  chlorhexidine 2% Cloths 1 Application(s) Topical <User Schedule>  clopidogrel Tablet 75 milliGRAM(s) Oral daily  dexMEDEtomidine Infusion 0.1 MICROgram(s)/kG/Hr (1.59 mL/Hr) IV Continuous <Continuous>  doxazosin 4 milliGRAM(s) Oral at bedtime  famotidine    Tablet 20 milliGRAM(s) Oral daily  guaifenesin/dextromethorphan Oral Liquid 10 milliLiter(s) Oral every 4 hours  heparin   Injectable 5000 Unit(s) SubCutaneous every 8 hours  insulin lispro (ADMELOG) corrective regimen sliding scale   SubCutaneous every 6 hours  melatonin 5 milliGRAM(s) Oral at bedtime  midodrine 5 milliGRAM(s) Oral every 8 hours  OLANZapine 5 milliGRAM(s) Oral every 12 hours  polyethylene glycol 3350 17 Gram(s) Oral daily  prednisoLONE acetate 1% Suspension 1 Drop(s) Left EYE every 6 hours  senna 2 Tablet(s) Oral at bedtime  sodium zirconium cyclosilicate 5 Gram(s) Oral every 8 hours  traZODone 50 milliGRAM(s) Oral at bedtime  vancomycin    Solution 125 milliGRAM(s) Oral every 6 hours    MEDICATIONS  (PRN):  ondansetron Injectable 4 milliGRAM(s) IV Push every 8 hours PRN Nausea and/or Vomiting  sodium chloride 0.9% lock flush 10 milliLiter(s) IV Push every 1 hour PRN Pre/post blood products, medications, blood draw, and to maintain line patency    Pertinent Labs:  @ 21:06: Na 143, BUN 43<H>, Cr 1.0, <H>, K+ 4.6, Phos 2.8, Mg 2.1    Finger Sticks:  POCT Blood Glucose.: 135 mg/dL ( @ 17:21)  POCT Blood Glucose.: 138 mg/dL ( @ 11:21)  POCT Blood Glucose.: 120 mg/dL ( @ 06:10)  POCT Blood Glucose.: 121 mg/dL ( @ 23:27)    Physical Findings:  - Appearance: somnolent; trached; 4+ generalized edema  - GI function: c.diff noted earlier this admit; now on bowel regimen d/t reported constipation. Last BM . Feeding was held earlier today d/t 300 mL residuals earlier today in the am; tube feeds however since resumed and pt is reportedly tolerating tube feed at this time. No abd distention noted at this time.  - Tubes: PEG tube  - Oral/Mouth cavity: NPO  - Skin: surgical incisions (R ORIF, PEG site)     Nutrition Requirements:  Weight Used: dosing weight 63.5 kg     Estimated Energy Needs    Continue []  Adjust [x]  8239-4481 kcal/day (MSJx1.2-1.3 stress factor)        Estimated Protein Needs    Continue []  Adjust [x]  76-95 gm/day 1.2-1.5 g/kg ABW        Estimated Fluid Needs        Continue []  Adjust [x]  1270 mL/day (20 mL/kg ABW) in setting of severe edema     Nutrient Intake: Current tube feed regimen at goal to provide 36% kcal & 47% protein needs at goal.     [x] Previous Nutrition Diagnosis: Inadequate Energy Intake            [x] Ongoing          [] Resolved     Nutrition Education: Deferred, pt unable to participate in nutrition interview at this time     Goal/Expected Outcome: Pt to demonstrate tolerance to nutrition support regimen, meeting >85% & <105% of estimated nutrient needs over next 3-5 days.    Pt at high nutrition risk; RD to follow-up in 3-5 days.     Indicator/Monitoring: Skin, labs, BM, wt, nutrition focused physical findings, body composition, diet order, GI, tube feed tolerance.    Recommendation:  Increase Peptamen AF goal rate by 10 mL q4-6hrs as tolerated to goal rate 55 mL/hr. Provide 50 mL flushes q6hrs. Regimen at goal to provide 1584 kcal, 99 g protein, 1269 mL free H2O. Will monitor tolerance to tube feed advance before transitioning to a standard polymeric formula.    * Guidelines on holding TF for residuals: hold feeds only if residual>250ml and concurrently other signs of intolerance such as abdominal distension and vomiting; OR if residual>500ml. After feeds are held, re-check residual and sign/s of intolerance in 3-4 hours; resume feeds if improved.

## 2023-04-09 NOTE — PROGRESS NOTE ADULT - ASSESSMENT
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   S/P ORIF of right hip using interlocking intramedullary stephon.       NEURO:  #Acute pain    -acetaminophen 650 PO q6   -OLANZapine 5 q12  #sedation    - trazodone 25 qhs    - melatonin    RESP:   #acute respiratory failure secondary to post operative arrhythmias requiring mechanical ventilation (Intubated 3/23),    s/p trached 4/5/23 with size 8    s/p bronch 4/8 (cultures sent)  -Vent Settings:  SIMV 450/10/40/8  -ABG:   #PE w/u NEGATIVE on 3/28      CARDS:   #acute hypotension    -Midodrine 10 q6 --> 5 q8, currently normotensive, off pressors    #Labs/Imaging  -Echo 3/23- EF appears normal, mild LVH, sclerotic AoV, trace MR,   -Echo 3/29: EF 60-65%, norm LV function, borderline pulm HTN.   -EKG 4/6 most recent: , NSR      GI/NUTR:   #acute clostridium difficile positive 3/30    -PO Vancomycin  #Diet: NPO x/rx w/ TFs @20cc w/ Peptamen AF    -PEG 3cm at skin, 4cm at bumper  #Nausea: Zofran prn    - Aspiration precautions, HOB 30  #GI Prophylaxis    - Pepcid  #Bowel regimen    - senna d/t chronic opioids, miralax   #Imaging    -CT abdomen 3/28: No definite infectious source identified      -RUQ sono: biliary sludge, mild GB wall thickening/edema.       /RENAL:   #UO: Balir placed by Urology 3/22 (d/w urology and asses scrotal swelling prior to removal)    -Inpatient Rx: doxazosin 4 milliGRAM(s) Oral at bedtime    #acute scrotal swelling  - U/S 3/29 w/ diffused scrotal wall edema (keep blair)   -scrotal elevation    Labs:          BUN/Cr- 43/1.0  -->,  43/1.0  -->          Electrolytes-Na 143 // K 4.6 // Mg 2.1 //  Phos 2.8 (04-07 @ 21:06)    Home Rx: tamsulosin 0.4 mg oral capsule -->cardura now      HEME/ONC:   #DVT prophylaxis- HSQ, SCDs  #H/O CAD s/p stents x3    - ASA 81    - Plavix 75 restarted 4/7    Labs: Hb/Hct:  9.0/29.3  -->,  9.1/29.6  -->                      Plts:  290  -->,  278  -->                 PTT/INR:        ID:  #acute clostridium difficile 4/30  WBC- 11.45  --->>,  7.97  --->>  Temp trend- 24hrs T(F): 98.4 (04-09 @ 00:00), Max: 98.5 (04-08 @ 12:00)  Antibiotics-vancomycin    Solution 125 every 6 hours  F/U BAL and procalcitonin   Cultures:    ET Sputum 3/29: negative     BCx 3/28: NGTD    UA 3/28: Negative     BCx 3/25--pending    UA 3/24--negative    ET Sputum 3/24--neg    MRSA neg    CDiff PCR 3/30: Positive    ENDO:  HA1C= 5.0% 3/25/23    -ISS tightened 4/4    -Glucose goal 140-180    MSK:   -WBAT RLE per ortho, PT/OT when clinically appropriate   -R hip dressing changed by SICU team 4/2    LINES/DRAINS:    PIV  Blair (3/22)  Right IJ TLC (3/23-4/8)  Trach, NGT     ADVANCED DIRECTIVES:  Full Code    HCP/Emergency Contact- Song Segura (Son): [phone]    INDICATION FOR SICU: Hypotension with pressor requirements. Intubated.       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   S/P ORIF of right hip using interlocking intramedullary stephon.       NEURO:  #Acute pain    -acetaminophen 650 PO q6   -OLANZapine 5 q12  #sedation    - trazodone 25 qhs    - melatonin    RESP:   #acute respiratory failure secondary to post operative arrhythmias requiring mechanical ventilation (Intubated 3/23),    s/p trached 4/5/23 with size 8    s/p bronch 4/8 (cultures sent)  -Vent Settings:  SIMV 450/10/40/8  -ABG:   #PE w/u NEGATIVE on 3/28      CARDS:   #acute hypotension    -Midodrine 10 q6 --> 5 q8, currently normotensive, off pressors    #Labs/Imaging  -Echo 3/23- EF appears normal, mild LVH, sclerotic AoV, trace MR,   -Echo 3/29: EF 60-65%, norm LV function, borderline pulm HTN.   -EKG 4/6 most recent: , NSR      GI/NUTR:   #acute clostridium difficile positive 3/30    -PO Vancomycin  #Diet: NPO x/rx w/ TFs @20cc w/ Peptamen AF    -PEG 3cm at skin, 4cm at bumper  #Nausea: Zofran prn    - Aspiration precautions, HOB 30  #GI Prophylaxis    - Pepcid  #Bowel regimen    - senna d/t chronic opioids, miralax   #Imaging    -CT abdomen 3/28: No definite infectious source identified      -RUQ sono: biliary sludge, mild GB wall thickening/edema.       /RENAL:   #UO: Blair placed by Urology 3/22 (d/w urology and asses scrotal swelling prior to removal)    -Inpatient Rx: doxazosin 4 milliGRAM(s) Oral at bedtime    #acute scrotal swelling  - U/S 3/29 w/ diffused scrotal wall edema (keep blair)   -scrotal elevation    Labs:          BUN/Cr- 43/1.0  -->,  43/1.0  -->          Electrolytes-Na 143 // K 4.6 // Mg 2.1 //  Phos 2.8 (04-07 @ 21:06)    Home Rx: tamsulosin 0.4 mg oral capsule -->cardura now      HEME/ONC:   #DVT prophylaxis- HSQ, SCDs  #H/O CAD s/p stents x3    - ASA 81    - Plavix 75 restarted 4/7    Labs: Hb/Hct:  9.1/29.6  -->,  9.3/30.2  -->                      Plts:  278  -->,  273  -->                 PTT/INR:        ID:  #acute clostridium difficile 4/30  WBC- 11.45  --->>,  7.97  --->>  Temp trend- 24hrs T(F): 98.4 (04-09 @ 00:00), Max: 98.5 (04-08 @ 12:00)  Antibiotics-vancomycin    Solution 125 every 6 hours  F/U BAL and procalcitonin   Cultures:    ET Sputum 3/29: negative     BCx 3/28: NGTD    UA 3/28: Negative     BCx 3/25--pending    UA 3/24--negative    ET Sputum 3/24--neg    MRSA neg    CDiff PCR 3/30: Positive    ENDO:  HA1C= 5.0% 3/25/23    -ISS tightened 4/4    -Glucose goal 140-180    MSK:   -WBAT RLE per ortho, PT/OT when clinically appropriate   -R hip dressing changed by SICU team 4/2    LINES/DRAINS:    PIV  Blair (3/22)  Right IJ TLC (3/23-4/8)  Trach, NGT     ADVANCED DIRECTIVES:  Full Code    HCP/Emergency Contact- Song Segura (Son): [phone]    INDICATION FOR SICU: Hypotension with pressor requirements. Intubated.       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   S/P ORIF of right hip using interlocking intramedullary stephon.       NEURO:  #Acute pain    -acetaminophen 650 PO q6   -OLANZapine 5 q12--> increase to 10mg  #sedation    - trazodone 25 qhs    - melatonin    RESP:   #acute respiratory failure secondary to post operative arrhythmias requiring mechanical ventilation (Intubated 3/23),    s/p trached 4/5/23 with size 8    s/p bronch 4/8 (cultures sent)  -Vent Settings:  SIMV 450/10/40/8  -Daily ABG not needed  - P/s trial today  #PE w/u NEGATIVE on 3/28      CARDS:   #Tachycardia  - Start Propanolol 10mg q8    #acute hypotension    -Midodrine 5 q8 -- attempt to wean if BP tolerates    #Labs/Imaging  -Echo 3/23- EF appears normal, mild LVH, sclerotic AoV, trace MR,   -Echo 3/29: EF 60-65%, norm LV function, borderline pulm HTN.   -EKG 4/6 most recent: , NSR      GI/NUTR:   #acute clostridium difficile positive 3/30    -PO Vancomycin  #Diet: NPO x/rx w/ TFs @30cc w/ Peptamen AF    -PEG 3cm at skin, 4cm at bumper  #Nausea: Zofran prn    - Aspiration precautions, HOB 30  #GI Prophylaxis    - Pepcid  #Bowel regimen    - senna d/t chronic opioids, miralax     - Add Dulcolax suppository  #Imaging    -CT abdomen 3/28: No definite infectious source identified      -RUQ sono: biliary sludge, mild GB wall thickening/edema.       /RENAL:   #UO: Blair placed by Urology 3/22 (d/w urology and asses scrotal swelling prior to removal)    -Inpatient Rx: doxazosin 4 milliGRAM(s) Oral at bedtime    #acute scrotal swelling  - U/S 3/29 w/ diffused scrotal wall edema (keep blair)   -scrotal elevation  Labs:          BUN/Cr- 43/1.0  -->,  45/1.1  -->          Electrolytes-Na 143 // K 3.8 // Mg 2.3 //  Phos 3.5 (04-09 @ 04:39)    Home Rx: tamsulosin 0.4 mg oral capsule -->cardura now      HEME/ONC:   #DVT prophylaxis- HSQ, SCDs  #H/O CAD s/p stents x3    - ASA 81    - Plavix 75 restarted 4/7    Labs: Hb/Hct:  9.0/29.3  -->,  9.1/29.6  -->,  9.3/30.2  -->  Plts:  290  -->,  278  -->,  273  -->    PTT/INR:   T&S:    ID:  #acute clostridium difficile 4/30  WBC- 11.45  --->>,  7.97  --->>,  9.27  --->>  Temp trend- 24hrs T(F): 98.3 (04-09 @ 08:00), Max: 98.5 (04-08 @ 12:00)  Antibiotics-vancomycin    Solution 125 every 6 hours  Cultures:    ET Sputum 3/29: negative     BCx 3/28: NGTD    UA 3/28: Negative     BCx 3/25--pending    UA 3/24--negative    ET Sputum 3/24--neg    MRSA neg    CDiff PCR 3/30: Positive    Procalcitonin 0.32    4/8 BAL: No organisms    ENDO:  HA1C= 5.0% 3/25/23    -ISS tightened 4/4    -Glucose goal 140-180    MSK:   -WBAT RLE per ortho, PT/OT when clinically appropriate   -R hip dressing changed by SICU team 4/2   - Right hip Xray 2 views per ortho    LINES/DRAINS:    PIV  Blair (3/22)  Right IJ TLC (3/23-4/8)  Trach, NGT     ADVANCED DIRECTIVES:  Full Code    HCP/Emergency Contact- Song Segura (Son): [phone]    INDICATION FOR SICU: Hypotension with pressor requirements. Intubated.       DISPO: SICU

## 2023-04-09 NOTE — PROGRESS NOTE ADULT - SUBJECTIVE AND OBJECTIVE BOX
RADHA RAMON   [id number]   [birth year]  77yM    Admit Date/LOS: 03-20 (2d)  Indication for SDU/SICU: Hemodynamic monitoring. 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 Pt walks with walker and got up in middle of night to get food and slipped and fell on his R side. Pt has been having R hip pain since with inability to ambulate. Pts family finally convinced him to come to hospital today. Pt is having 10/10 pain currently. Denies dizziness, CP or LOC prior to fall. Denies head trauma. No HA. (20 Mar 2023 12:02)    3/22: Patient S/P ORIF of right hip using interlocking intramedullary stephon. - upgradeed for hypotension in PACU  4/5  Trach and PEG    24Hours  NIGHT  4/8  Night  off precedex  f/u 8pm labs    DAY  Trazodone switch from BID to night only  Add guaifensin, mucomyst  Diurese w/ lasix 120  TLC out  F/u procal  decreasing midodrine to 5mg q8  mag 2mg given      [] A ten-point review of systems was otherwise negative except as noted above.  [x] Due to altered mental status/intubation, subjective information was not attained from   the patient. History was obtained, to the extent possible, from review of the chart and collateral sources of information.  ==================================================================   RADHA RAMON   [id number]   [birth year]  77yM    Admit Date/LOS: 03-20 (2d)  Indication for SDU/SICU: Hemodynamic monitoring. 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 Pt walks with walker and got up in middle of night to get food and slipped and fell on his R side. Pt has been having R hip pain since with inability to ambulate. Pts family finally convinced him to come to hospital today. Pt is having 10/10 pain currently. Denies dizziness, CP or LOC prior to fall. Denies head trauma. No HA. (20 Mar 2023 12:02)    3/22: Patient S/P ORIF of right hip using interlocking intramedullary stephon. - upgradeed for hypotension in PACU  4/5  Trach and PEG    24Hours  NIGHT  4/8  Night  off precedex  f/u 8pm labs    DAY  Trazodone switch from BID to night only  Add guaifensin, mucomyst  Diurese w/ lasix 120  TLC out  F/u procal  decreasing midodrine to 5mg q8  mag 2mg given      [] A ten-point review of systems was otherwise negative except as noted above.  [x] Due to altered mental status/intubation, subjective information was not attained from   the patient. History was obtained, to the extent possible, from review of the chart and collateral sources of information.  ==================================================================      Daily     Daily     Diet, NPO with Tube Feed:   Tube Feeding Modality: Gastrostomy  Peptamen A.F. Formula  Total Volume for 24 Hours (mL): 720  Continuous  Starting Tube Feed Rate mL per Hour: 20     Every 4 hours  Until Goal Tube Feed Rate (mL per Hour): 30  Tube Feed Duration (in Hours): 24  Tube Feed Start Time: 13:00 (04-09-23 @ 11:33)      CURRENT MEDS:  Neurologic Medications  acetaminophen     Tablet .. 650 milliGRAM(s) Oral every 6 hours  melatonin 5 milliGRAM(s) Oral at bedtime  OLANZapine 5 milliGRAM(s) Oral every 12 hours  ondansetron Injectable 4 milliGRAM(s) IV Push every 8 hours PRN Nausea and/or Vomiting  traZODone 50 milliGRAM(s) Oral at bedtime    Respiratory Medications  acetylcysteine 10%  Inhalation 4 milliLiter(s) Inhalation every 8 hours  albuterol    90 MICROgram(s) HFA Inhaler 1 Puff(s) Inhalation every 8 hours  guaifenesin/dextromethorphan Oral Liquid 10 milliLiter(s) Oral every 4 hours    Cardiovascular Medications  doxazosin 4 milliGRAM(s) Oral at bedtime  midodrine 5 milliGRAM(s) Oral every 8 hours  propranolol 10 milliGRAM(s) Oral every 8 hours    Gastrointestinal Medications  bisacodyl Suppository 10 milliGRAM(s) Rectal daily  famotidine    Tablet 20 milliGRAM(s) Oral daily  polyethylene glycol 3350 17 Gram(s) Oral daily  senna 2 Tablet(s) Oral at bedtime  sodium chloride 0.9% lock flush 10 milliLiter(s) IV Push every 1 hour PRN Pre/post blood products, medications, blood draw, and to maintain line patency    Genitourinary Medications    Hematologic/Oncologic Medications  aspirin  chewable 81 milliGRAM(s) Oral daily  clopidogrel Tablet 75 milliGRAM(s) Oral daily  heparin   Injectable 5000 Unit(s) SubCutaneous every 8 hours    Antimicrobial/Immunologic Medications  vancomycin    Solution 125 milliGRAM(s) Oral every 6 hours    Endocrine/Metabolic Medications  atorvastatin 80 milliGRAM(s) Oral at bedtime  insulin lispro (ADMELOG) corrective regimen sliding scale   SubCutaneous every 6 hours    Topical/Other Medications  atropine 1% Solution 1 Drop(s) Left EYE two times a day  chlorhexidine 0.12% Liquid 15 milliLiter(s) Oral Mucosa every 12 hours  chlorhexidine 2% Cloths 1 Application(s) Topical <User Schedule>  prednisoLONE acetate 1% Suspension 1 Drop(s) Left EYE every 6 hours      ICU Vital Signs Last 24 Hrs  T(C): 36.8 (09 Apr 2023 08:00), Max: 36.9 (08 Apr 2023 12:00)  T(F): 98.3 (09 Apr 2023 08:00), Max: 98.5 (08 Apr 2023 12:00)  HR: 120 (09 Apr 2023 10:00) (101 - 132)  BP: 140/75 (09 Apr 2023 10:00) (93/59 - 158/80)  BP(mean): 101 (09 Apr 2023 10:00) (69 - 111)  ABP: --  ABP(mean): --  RR: 35 (09 Apr 2023 10:00) (25 - 36)  SpO2: 99% (09 Apr 2023 10:00) (99% - 100%)    O2 Parameters below as of 09 Apr 2023 10:00  Patient On (Oxygen Delivery Method): ventilator    O2 Concentration (%): 40        Adult Advanced Hemodynamics Last 24 Hrs  CVP(mm Hg): --  CVP(cm H2O): --  CO: --  CI: --  PA: --  PA(mean): --  PCWP: --  SVR: --  SVRI: --  PVR: --  PVRI: --    Mode: CPAP with PS  FiO2: 40  PEEP: 8  PS: 5  MAP: 10  PIP: 16    ABG - ( 08 Apr 2023 06:03 )  pH, Arterial: 7.45  pH, Blood: x     /  pCO2: 36    /  pO2: 67    / HCO3: 25    / Base Excess: 1.2   /  SaO2: 95.7                I&O's Summary    08 Apr 2023 07:01  -  09 Apr 2023 07:00  --------------------------------------------------------  IN: 762.2 mL / OUT: 2150 mL / NET: -1387.8 mL    09 Apr 2023 07:01  -  09 Apr 2023 11:34  --------------------------------------------------------  IN: 40 mL / OUT: 75 mL / NET: -35 mL      I&O's Detail    08 Apr 2023 07:01  -  09 Apr 2023 07:00  --------------------------------------------------------  IN:    Dexmedetomidine: 15.2 mL    Enteral Tube Flush: 110 mL    IV PiggyBack: 167 mL    Peptamen A.F.: 470 mL  Total IN: 762.2 mL    OUT:    Dexmedetomidine: 0 mL    Indwelling Catheter - Urethral (mL): 2150 mL  Total OUT: 2150 mL    Total NET: -1387.8 mL      09 Apr 2023 07:01  -  09 Apr 2023 11:34  --------------------------------------------------------  IN:    Peptamen A.F.: 40 mL  Total IN: 40 mL    OUT:    Indwelling Catheter - Urethral (mL): 75 mL  Total OUT: 75 mL    Total NET: -35 mL          PHYSICAL EXAM:    General/Neuro  RASS: 1  Deficits: trached, awake alert, following commands, CORRAL    Lungs:  clear to auscultation, Normal expansion/effort.     Cardiovascular : S1, S2.  Regular rate and rhythm. + Peripheral edema   Cardiac Rhythm: Sinus Tachycardia    GI: Abdomen soft, Non-tender, Non-distended.    Gastrostomy tube in place.       Extremities: Extremities warm, pink, well-perfused.    Derm: Good skin turgor, no skin breakdown.      :  Garrison catheter in place. Scrotal edema      CXR:       LABS:  CAPILLARY BLOOD GLUCOSE      POCT Blood Glucose.: 137 mg/dL (09 Apr 2023 05:40)  POCT Blood Glucose.: 146 mg/dL (08 Apr 2023 23:43)  POCT Blood Glucose.: 135 mg/dL (08 Apr 2023 17:21)                          9.3    9.27  )-----------( 273      ( 09 Apr 2023 04:39 )             30.2       04-09    143  |  111<H>  |  45<H>  ----------------------------<  151<H>  3.8   |  24  |  1.1    Ca    8.2<L>      09 Apr 2023 04:39  Phos  3.5     04-09  Mg     2.3     04-09                  Culture - Bronchial (collected 08 Apr 2023 15:44)  Source: .Bronchial None  Gram Stain (09 Apr 2023 07:05):    Few polymorphonuclear leukocytes seen per low power field    Rare Squamous epithelial cells seen per low power field    No organisms seen

## 2023-04-09 NOTE — PROGRESS NOTE ADULT - CRITICAL CARE ATTENDING COMMENT
Critical Care: [id number]   This patient has a high probability of sudden, clinically significant deterioration, which requires the highest level of physician preparedness to intervene urgently. I managed/supervised life or organ supporting interventions that required frequent physician assessment. I have reviewed and agree with note above. I devoted my full attention to the direct care of this patient for the period of time indicated, including reviewing relevant labs and imaging, discussing treatment plan with the SICU team, nurses, and primary team at the time of multidisciplinary rounds, and reviewing findings with patient and family. This does not include time devoted to teaching any trainees and to performing any procedures.    RADHA RAMON 77y Male admitted to SICU with resp failure s/p ORIF R hip fx, likely mixed delirium, now s/p trach/peg    Issues we are addressing today:    agitation off sedation remains barrier to weaning. minimizing sedation, adjusting pain meds including, restarting home meds as possible, delirium precautions, sleep meds as needed at night  optimize fluid status  concern for increasing vent settings, bronch today  restart tube feeds  monitor Is &Os  abx completed  can d/c cvc if PIVs in place  plavix  DVT Prophylaxis   Stress Gastritis Prophylaxis   Mobility: PT/OT when safe    safe for transfer to vent unit or LTACH    The above note is NOT written at the time of rounds and will reflect all changes throughout management of the patient for the day note is written for.    Ej Whiting MD, D.VALERY
see addendum
Critical Care: [id number]   This patient has a high probability of sudden, clinically significant deterioration, which requires the highest level of physician preparedness to intervene urgently. I managed/supervised life or organ supporting interventions that required frequent physician assessment. I have reviewed and agree with note above. I devoted my full attention to the direct care of this patient for the period of time indicated, including reviewing relevant labs and imaging, discussing treatment plan with the SICU team, nurses, and primary team at the time of multidisciplinary rounds, and reviewing findings with patient and family. This does not include time devoted to teaching any trainees and to performing any procedures.    RADHA RAMON 77y Male admitted to SICU with resp failure s/p ORIF R hip fx, likely mixed delirium, now s/p trach/peg    Issues we are addressing today:    minimizing sedation, adjusting pain meds including, restarting home meds as possible, delirium precautions, sleep meds as needed at night  optimize fluid status, diuresis again today   minimize vent settings, progress to trach collar trials  restart tube feeds  monitor Is &Os  abx completed  can d/c cvc if PIVs in place  restart plavix  DVT Prophylaxis   Stress Gastritis Prophylaxis   Mobility: PT/OT when safe    safe for transfer to vent unit or LTACH    The above note is NOT written at the time of rounds and will reflect all changes throughout management of the patient for the day note is written for.    Ej Whiting MD, D.VALERY
Critical Care: [id number]   This patient has a high probability of sudden, clinically significant deterioration, which requires the highest level of physician preparedness to intervene urgently. I managed/supervised life or organ supporting interventions that required frequent physician assessment. I have reviewed and agree with note above. I devoted my full attention to the direct care of this patient for the period of time indicated, including reviewing relevant labs and imaging, discussing treatment plan with the SICU team, nurses, and primary team at the time of multidisciplinary rounds, and reviewing findings with patient and family. This does not include time devoted to teaching any trainees and to performing any procedures.    RADHA RAMON 77y Male admitted to SICU with respiratory failure s/p hip fx ORIF     Issues we are addressing today:    adjusting sedation meds, delirium precautions. when sedation weaned patient becomes tachycardic, tachypneic, and hypertensive. will continue trying different combinations of sedation meds but discomfort should improve with trach rather than ETT  optimize fluid status,  Respiratory: continue to wean support as possible, plan for trach/peg today  Nutrition: diet as tolerated  Renal: monitor Is & Os  ID: abx completed  Lines: d/c as tolerated     DVT Prophylaxis   Stress Gastritis Prophylaxis   Mobility: PT/OT when safe    The above note is NOT written at the time of rounds and will reflect all changes throughout management of the patient for the day note is written for.    Ej Whiting MD, D.VALERY
Critical Care: [id number]   This patient has a high probability of sudden, clinically significant deterioration, which requires the highest level of physician preparedness to intervene urgently. I managed/supervised life or organ supporting interventions that required frequent physician assessment. I have reviewed and agree with note above. I devoted my full attention to the direct care of this patient for the period of time indicated, including reviewing relevant labs and imaging, discussing treatment plan with the SICU team, nurses, and primary team at the time of multidisciplinary rounds, and reviewing findings with patient and family. This does not include time devoted to teaching any trainees and to performing any procedures.    RADHA RAMON 77y Male admitted to SICU with resp failure s/p ORIF R hip fx, likely mixed delirium, now s/p trach/peg    Issues we are addressing today:    minimizing sedation, adjusting pain meds including, restarting home meds as possible, delirium precautions, sleep meds as needed at night  optimize fluid status, diuresis this afternoon  minimize vent settings  restart tube feeds  monitor Is &Os  abx completed  Lines: d/c as tolerated  restart plavix tomorrow  DVT Prophylaxis   Stress Gastritis Prophylaxis   Mobility: PT/OT when safe    safe for transfer to vent unit or LTACH    The above note is NOT written at the time of rounds and will reflect all changes throughout management of the patient for the day note is written for.    Ej Whiting MD, D.VALERY
Critical Care: [id number]   This patient has a high probability of sudden, clinically significant deterioration, which requires the highest level of physician preparedness to intervene urgently. I managed/supervised life or organ supporting interventions that required frequent physician assessment. I have reviewed and agree with note above. I devoted my full attention to the direct care of this patient for the period of time indicated, including reviewing relevant labs and imaging, discussing treatment plan with the SICU team, nurses, and primary team at the time of multidisciplinary rounds, and reviewing findings with patient and family. This does not include time devoted to teaching any trainees and to performing any procedures.    RADHA RAMON 77y Male admitted to SICU with  resp failure s/p ORIF R hip fx, likely mixed delirium, now s/p trach/peg    Issues we are addressing today:    calm off sedation but isolated tachycardia, adjusting pain meds including, delirium precautions, sleep meds as needed at night  optimize fluid status  minimize vent settings, bronch yesterday unlikely bacteria only thin secretions. PS trials, possible trach trials  restart tube feeds but keep to minimum given repeated emesis episodes  monitor Is &Os  abx completed  plavix  DVT Prophylaxis   Stress Gastritis Prophylaxis   Mobility: PT/OT when safe    safe for transfer to vent unit or LTACH    The above note is NOT written at the time of rounds and will reflect all changes throughout management of the patient for the day note is written for.    Ej Whiting MD, D.VALERY
Critical Care: [id number]   This patient has a high probability of sudden, clinically significant deterioration, which requires the highest level of physician preparedness to intervene urgently. I managed/supervised life or organ supporting interventions that required frequent physician assessment. I have reviewed and agree with note above. I devoted my full attention to the direct care of this patient for the period of time indicated, including reviewing relevant labs and imaging, discussing treatment plan with the SICU team, nurses, and primary team at the time of multidisciplinary rounds, and reviewing findings with patient and family. This does not include time devoted to teaching any trainees and to performing any procedures.    RADHA RAMON 77y Male admitted to SICU with shock and respiratory failure s/p R hip fx ORIF    Issues we are addressing today:    adjusting pain meds including restarting home meds as possible, delirium precautions, sleep meds as needed at night  optimize fluid status, wean pressors as tolerated   continue to wean vent as possible, trach planning for tomorrow  tube feeds hold at midnight  monitor Is &Os  Skin: decub precautions  DVT Prophylaxis   Stress Gastritis Prophylaxis   Mobility: PT/OT when safe    The above note is NOT written at the time of rounds and will reflect all changes throughout management of the patient for the day note is written for.    Ej Whiting MD, D.VALERY

## 2023-04-10 NOTE — CONSULT NOTE ADULT - ASSESSMENT
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 Pts found to have R hip fx sp repair wiht ORIF 3/22. Intubated for procedure and upgraded to SICU for hypotension. Pt ended up with trach and PEG 4/5. Had episodes of tachycardia for which pt was started on Propranolol with PVCs/NSVT followed by junctional bradycardia while O2 levels in 80s.     Impression:  Junctional Bradycardia in setting of hypoxia  NSVT/PVCs  R Hip fx sp ORIF  Hypotension, resolved  CAD  HLD    Plan:  - Bradycardia occurred during hypoxia (O2 88%), cont to monitor on tele for further episodes  - Patient to possibly need PPM when he recovers  - Hold AVN blocking agents  - Will need ischemic workup for frequent NSVT/PVCs  - Monitor electrolytes, maintain WNL  - Will discuss further with attending

## 2023-04-10 NOTE — CHART NOTE - NSCHARTNOTEFT_GEN_A_CORE
After my initial consult, patient had further episodes of junctional bradycardia with 4 and 6 second pauses  Spoke with cardiac fellow for possible TVP placement  Discussed with SICU team    Please keep patient NPO after MN for possible PPM placement tomorrow  Cont tele monitoring  Will follow    Call EP with questions [phone]

## 2023-04-10 NOTE — PROGRESS NOTE ADULT - ATTENDING COMMENTS
Resp- on mechanical ventilation, wean to PS as tolerated. cardiac- not on vasopressors. bradycardia- will get Ep to evaluate. renal0 good urine output, cr stable. Nutrition- on tube feeds. diuresing. follow closely.

## 2023-04-10 NOTE — CONSULT NOTE ADULT - ASSESSMENT
IMPRESSION:  Acute hypoxemic respiratory failure  S/p fall s/p R ORIF  Pneumonia, treated  Septic shock, resolved  Volume overload/ Pulmonary edema  C. diff infection  HO CAD  HO Prostate Ca    PLAN:    CNS: Off sedation.     HEENT: Oral care    PULMONARY:  HOB @ 45 degrees.  Vent changes as follows: Dec FiO2 as tolerated. target SpO2 92-96%.     CARDIOVASCULAR: Avoid volume overload. Diurese as tolerated. Strict I's and O's.     GI: GI prophylaxis. PEG feeding, low sodium diet.     RENAL:  Follow up lytes.  Correct as needed    INFECTIOUS DISEASE: Follow up cultures. Complete PO vanc course, end date 04/13.     HEMATOLOGICAL:  DVT prophylaxis.     ENDOCRINE:  Follow up FS. Target -180.     MUSCULOSKELETAL: PT/OT eval. Increase ambulation as tolerated.     Step Down Unit IMPRESSION:    Acute hypoxemic respiratory failure sp trach/ peg  S/p fall s/p R ORIF  Pneumonia, treated  Septic shock, resolved  Volume overload/ Pulmonary edema  C. diff infection  HO CAD  HO Prostate Ca    PLAN:    CNS: Off sedation.     HEENT: Oral care    PULMONARY:  HOB @ 45 degrees.  Vent changes as follows: Dec FiO2 as tolerated. target SpO2 92-96%.     CARDIOVASCULAR: Avoid volume overload. Diurese as tolerated. Strict I's and O's. metolazone/ lasix    GI: GI prophylaxis. PEG feeding,     RENAL:  Follow up lytes.  Correct as needed    INFECTIOUS DISEASE: Follow up cultures. Complete PO vanc course, end date 04/13.     HEMATOLOGICAL:  DVT prophylaxis.     ENDOCRINE:  Follow up FS. Target -180.     MUSCULOSKELETAL: PT/OT eval. Increase ambulation as tolerated.     Step Down Unit

## 2023-04-10 NOTE — PRE-ANESTHESIA EVALUATION ADULT - NS MD HP INPLANTS MED DEV
Artificial joint/Vascular access device/Feeding tube

## 2023-04-10 NOTE — CONSULT NOTE ADULT - SUBJECTIVE AND OBJECTIVE BOX
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 Pt walks with walker and got up in middle of night to get food and slipped and fell on his R side. Pt has been having R hip pain since with inability to ambulate. Pts found to have R hip fx sp repair wiht ORIF 3/22. Intubated for procedure and upgraded to SICU for hypotension. Pt ended up with trach and PEG 4/5. Had episodes of tachycardia for which pt was started on Propranolol with PVCs/NSVT followed by junctional bradycardia while O2 levels in 80s.     PAST MEDICAL & SURGICAL HISTORY:  CAD (coronary artery disease)      Hypercholesteremia      BPH (benign prostatic hyperplasia)      Kidney stones      Prostate cancer      Status post cardiac surgery  cardiac stents      Bilateral cataracts      History of lithotripsy      PREVIOUS DIAGNOSTIC TESTING:      ECHO  FINDINGS:  < from: TTE Echo Complete w/o Contrast w/ Doppler (03.29.23 @ 09:27) >  Summary:   1. Left ventricular ejection fraction, by visual estimation, is 60 to   65%.   2. Normal global left ventricular systolic function.   3. Aortic valve thickening with decreased leaflet opening.   4. There is mild aortic root calcification.   5. Mild mitral valve regurgitation.   6. Moderate tricuspid regurgitation.   7. Estimated pulmonary artery systolic pressure is 38.7 mmHg assuming a   right atrial pressure of 10 mmHg, which is consistent with borderline   pulmonary hypertension.    < end of copied text >    STRESS  FINDINGS:    CATHETERIZATION  FINDINGS:  FINDINGS:    Hemodynamics: Hemodynamic assessment demonstrates normal LVEDP.     Coronary circulation: The coronary circulation is right dominant. There was no angiographic evidence for occlusive coronary artery disease.     Left main: Angiography showed mild atherosclerosis with no flow limiting lesions.       LAD: The vessel was large sized. Proximal LAD: There was a tubular 40- 50 % stenosis. Mid LAD: Angiography showed mild atherosclerosis. Patent prior stent. Distal LAD: Angiography showed no evidence of disease. 1st diagonal: Angiography showed no evidence of disease.     Proximal circumflex: The vessel was large sized. Angiography showed no evidence of disease. Distal circumflex: The vessel was small sized. Angiography showed no evidence of disease. 1st obtuse marginal: The vessel was large sized. Angiography showed no evidence of disease.     Ramus intermedius: The vessel was large sized. Angiography showed no evidence of disease.     RCA: The vessel was very large sized (dominant). Proximal RCA: There was a diffuse 30 % stenosis. Mid RCA: There was a tubular 30 % stenosis. Distal RCA: The vessel was ectatic. Angiography showed no evidence of disease. Right PDA: Angiography showed no evidence of disease. Right posterolateral segment: Angiography showed no evidence of disease.        ESTIMATED BLOOD LOSS: < 30 mL        CONDITION:     [x] Good     [] Fair     [] Critical        SPECIMEN REMOVED: N/A       POST-OP DIAGNOSIS:    Non-obstructive coronary artery disease.     ELECTROPHYSIOLOGY STUDY  FINDINGS:    CAROTID ULTRASOUND:  FINDINGS    VENOUS DUPLEX SCAN:  FINDINGS:    CHEST CT PULMONARY ANGIO with IV Contrast:  FINDINGS:    MEDICATIONS  (STANDING):  acetaminophen     Tablet .. 650 milliGRAM(s) Oral every 6 hours  acetylcysteine 10%  Inhalation 4 milliLiter(s) Inhalation every 8 hours  albumin human  5% IVPB 250 milliLiter(s) IV Intermittent once  albuterol    90 MICROgram(s) HFA Inhaler 1 Puff(s) Inhalation every 8 hours  aspirin  chewable 81 milliGRAM(s) Oral daily  atorvastatin 80 milliGRAM(s) Oral at bedtime  atropine 1% Solution 1 Drop(s) Left EYE two times a day  bisacodyl Suppository 10 milliGRAM(s) Rectal daily  chlorhexidine 0.12% Liquid 15 milliLiter(s) Oral Mucosa every 12 hours  chlorhexidine 2% Cloths 1 Application(s) Topical <User Schedule>  clopidogrel Tablet 75 milliGRAM(s) Oral daily  doxazosin 4 milliGRAM(s) Oral at bedtime  famotidine    Tablet 20 milliGRAM(s) Oral daily  guaifenesin/dextromethorphan Oral Liquid 10 milliLiter(s) Oral every 4 hours  heparin   Injectable 5000 Unit(s) SubCutaneous every 8 hours  insulin lispro (ADMELOG) corrective regimen sliding scale   SubCutaneous every 6 hours  melatonin 5 milliGRAM(s) Oral at bedtime  OLANZapine 5 milliGRAM(s) Oral every 12 hours  polyethylene glycol 3350 17 Gram(s) Oral daily  potassium chloride  20 mEq/100 mL IVPB 20 milliEquivalent(s) IV Intermittent every 2 hours  prednisoLONE acetate 1% Suspension 1 Drop(s) Left EYE every 6 hours  senna 2 Tablet(s) Oral at bedtime  traZODone 50 milliGRAM(s) Oral at bedtime  vancomycin    Solution 125 milliGRAM(s) Oral every 6 hours    MEDICATIONS  (PRN):  atropine Injectable 1 milliGRAM(s) IntraMuscular once PRN HR <35  ondansetron Injectable 4 milliGRAM(s) IV Push every 8 hours PRN Nausea and/or Vomiting  sodium chloride 0.9% lock flush 10 milliLiter(s) IV Push every 1 hour PRN Pre/post blood products, medications, blood draw, and to maintain line patency      FAMILY HISTORY:  FH: colon cancer (Mother)    SOCIAL HISTORY: No smoking, ETOH or illicit drug use    Past Surgical History: see above    Allergies:  No Known Allergies      REVIEW OF SYSTEMS: Unable to assess 2/2 mental status      Vital Signs Last 24 Hrs  T(C): 36.4 (10 Apr 2023 12:00), Max: 36.8 (09 Apr 2023 16:00)  T(F): 97.5 (10 Apr 2023 12:00), Max: 98.3 (09 Apr 2023 16:00)  HR: 76 (10 Apr 2023 12:00) (70 - 102)  BP: 134/70 (10 Apr 2023 12:00) (100/61 - 160/61)  BP(mean): 96 (10 Apr 2023 12:00) (76 - 106)  RR: 32 (10 Apr 2023 12:00) (17 - 32)  SpO2: 98% (10 Apr 2023 12:00) (98% - 100%)    Parameters below as of 10 Apr 2023 12:00  Patient On (Oxygen Delivery Method): BiPAP/CPAP,8/8        PHYSICAL EXAM:  GENERAL: In no apparent distress, trach & PEG  NECK: Supple, No JVD   HEART: Regular rate and rhythm; No murmurs, rubs, or gallops.  PULMONARY: Clear to auscultation and perfusion.  No rales, wheezing, or rhonchi bilaterally.  EXTREMITIES:  2+ Peripheral Pulses, no LE edema BL  NEUROLOGICAL: Grossly nonfocal      INTERPRETATION OF TELEMETRY: SR 85 bpm    ECG:  < from: 12 Lead ECG (04.10.23 @ 04:41) >  Ventricular Rate 85 BPM    Atrial Rate 85 BPM    P-R Interval 122 ms    QRS Duration 118 ms    Q-T Interval 416 ms    QTC Calculation(Bazett) 495 ms    P Axis 5 degrees    R Axis 9 degrees    T Axis 41 degrees    Diagnosis Line Sinus rhythm with occasional Premature ventricular complexes  Incomplete right bundle branch block  Abnormal ECG    Confirmed by Venus Sampson MD ([id number]) on 4/10/2023 7:28:05 AM    < end of copied text >      I&O's Detail    09 Apr 2023 07:01  -  10 Apr 2023 07:00  --------------------------------------------------------  IN:    Enteral Tube Flush: 450 mL    IV PiggyBack: 150 mL    Peptamen A.F.: 570 mL  Total IN: 1170 mL    OUT:    Drain (mL): 250 mL    Indwelling Catheter - Urethral (mL): 1380 mL  Total OUT: 1630 mL    Total NET: -460 mL      10 Apr 2023 07:01  -  10 Apr 2023 15:29  --------------------------------------------------------  IN:    IV PiggyBack: 130 mL    Peptamen A.F.: 180 mL  Total IN: 310 mL    OUT:    Indwelling Catheter - Urethral (mL): 375 mL  Total OUT: 375 mL    Total NET: -65 mL          LABS:                        9.8    10.11 )-----------( 254      ( 10 Apr 2023 14:39 )             31.5     04-10    146  |  109  |  44<H>  ----------------------------<  143<H>  3.3<L>   |  28  |  1.0    Ca    7.9<L>      10 Apr 2023 14:39  Phos  3.0     04-10  Mg     2.2     04-10      CARDIAC MARKERS ( 10 Apr 2023 14:39 )  x     / x     / 423 U/L / x     / x              BNP  I&O's Detail    09 Apr 2023 07:01  -  10 Apr 2023 07:00  --------------------------------------------------------  IN:    Enteral Tube Flush: 450 mL    IV PiggyBack: 150 mL    Peptamen A.F.: 570 mL  Total IN: 1170 mL    OUT:    Drain (mL): 250 mL    Indwelling Catheter - Urethral (mL): 1380 mL  Total OUT: 1630 mL    Total NET: -460 mL      10 Apr 2023 07:01  -  10 Apr 2023 15:29  --------------------------------------------------------  IN:    IV PiggyBack: 130 mL    Peptamen A.F.: 180 mL  Total IN: 310 mL    OUT:    Indwelling Catheter - Urethral (mL): 375 mL  Total OUT: 375 mL    Total NET: -65 mL        Daily     Daily     RADIOLOGY & ADDITIONAL STUDIES:

## 2023-04-10 NOTE — PROGRESS NOTE ADULT - SUBJECTIVE AND OBJECTIVE BOX
RADHA RAMON   [id number]   [birth year]  77yM    Admit Date/LOS: 03-20 (2d)  Indication for SDU/SICU: Hemodynamic monitoring. 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 Pt walks with walker and got up in middle of night to get food and slipped and fell on his R side. Pt has been having R hip pain since with inability to ambulate. Pts family finally convinced him to come to hospital today. Pt is having 10/10 pain currently. Denies dizziness, CP or LOC prior to fall. Denies head trauma. No HA. (20 Mar 2023 12:02)    3/22: Patient S/P ORIF of right hip using interlocking intramedullary stephon. - upgradeed for hypotension in PACU  4/5  Trach and PEG    24Hours  4/9  Night  - 40 IV lasix after BMP  - Ty=853, FWD=2.3L, added 100q6h FW flush, AM BMP ordered  - f/u x-rays  - K 3.8, 1 K rider  - TF @goal 30   - 2 view R hip x-ray pending read     DAY  - 2 view R hip x-ray pending   - BAL neg - normal respiratory karie  - start PS tolerated 3-4 hrs  - Start propanolol 10 q8  - d/c midodrine   - 20 lasix --> unresponsive   - TF@ 40 in morning --> had tube-feed emesis, G tube put to gravity and will continue TF at 30 @ 1pm  -PT/OT  - social work consult   - 40 IV lasix after BMP  - Ortho requested Rt hip 2 View      [] A ten-point review of systems was otherwise negative except as noted above.  [x] Due to altered mental status/intubation, subjective information was not attained from   the patient. History was obtained, to the extent possible, from review of the chart and collateral sources of information.  =================================================================   RADHA RAMON   [id number]   [birth year]  77yM    Admit Date/LOS: 03-20 (2d)  Indication for SDU/SICU: Hemodynamic monitoring. 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 Pt walks with walker and got up in middle of night to get food and slipped and fell on his R side. Pt has been having R hip pain since with inability to ambulate. Pts family finally convinced him to come to hospital today. Pt is having 10/10 pain currently. Denies dizziness, CP or LOC prior to fall. Denies head trauma. No HA. (20 Mar 2023 12:02)    3/22: Patient S/P ORIF of right hip using interlocking intramedullary stephon. - upgradeed for hypotension in PACU  4/5  Trach and PEG    24Hours  4/9  Night  - 40 IV lasix after BMP  - Ya=999, FWD=2.3L, added 100q6h FW flush, AM BMP ordered  - f/u x-rays  - K 3.8, 1 K rider  - TF @goal 30   - 2 view R hip x-ray pending read     DAY  - 2 view R hip x-ray pending   - BAL neg - normal respiratory fidelia  - start PS tolerated 3-4 hrs  - Start propanolol 10 q8  - d/c midodrine   - 20 lasix --> unresponsive   - TF@ 40 in morning --> had tube-feed emesis, G tube put to gravity and will continue TF at 30 @ 1pm  -PT/OT  - social work consult   - 40 IV lasix after BMP  - Ortho requested Rt hip 2 View      [] A ten-point review of systems was otherwise negative except as noted above.  [x] Due to altered mental status/intubation, subjective information was not attained from   the patient. History was obtained, to the extent possible, from review of the chart and collateral sources of information.  =================================================================  Daily     Daily     Diet, NPO with Tube Feed:   Tube Feeding Modality: Gastrostomy  Peptamen A.F. Formula  Total Volume for 24 Hours (mL): 720  Continuous  Starting Tube Feed Rate mL per Hour: 30     Every 4 hours  Until Goal Tube Feed Rate (mL per Hour): 30  Tube Feed Duration (in Hours): 24  Tube Feed Start Time: 23:45  Free Water Flush  Bolus   Total Volume per Flush (mL): 100   Frequency: Every 4 Hours   Total Daily Volume of Flush (mL): 600 (04-09-23 @ 23:34)      CURRENT MEDS:  Neurologic Medications  acetaminophen     Tablet .. 650 milliGRAM(s) Oral every 6 hours  melatonin 5 milliGRAM(s) Oral at bedtime  OLANZapine 5 milliGRAM(s) Oral every 12 hours  ondansetron Injectable 4 milliGRAM(s) IV Push every 8 hours PRN Nausea and/or Vomiting  traZODone 50 milliGRAM(s) Oral at bedtime    Respiratory Medications  acetylcysteine 10%  Inhalation 4 milliLiter(s) Inhalation every 8 hours  albuterol    90 MICROgram(s) HFA Inhaler 1 Puff(s) Inhalation every 8 hours  guaifenesin/dextromethorphan Oral Liquid 10 milliLiter(s) Oral every 4 hours    Cardiovascular Medications  doxazosin 4 milliGRAM(s) Oral at bedtime  propranolol 10 milliGRAM(s) Oral every 8 hours    Gastrointestinal Medications  bisacodyl Suppository 10 milliGRAM(s) Rectal daily  famotidine    Tablet 20 milliGRAM(s) Oral daily  polyethylene glycol 3350 17 Gram(s) Oral daily  senna 2 Tablet(s) Oral at bedtime  sodium chloride 0.9% lock flush 10 milliLiter(s) IV Push every 1 hour PRN Pre/post blood products, medications, blood draw, and to maintain line patency    Genitourinary Medications    Hematologic/Oncologic Medications  aspirin  chewable 81 milliGRAM(s) Oral daily  clopidogrel Tablet 75 milliGRAM(s) Oral daily  heparin   Injectable 5000 Unit(s) SubCutaneous every 8 hours    Antimicrobial/Immunologic Medications  vancomycin    Solution 125 milliGRAM(s) Oral every 6 hours    Endocrine/Metabolic Medications  atorvastatin 80 milliGRAM(s) Oral at bedtime  insulin lispro (ADMELOG) corrective regimen sliding scale   SubCutaneous every 6 hours    Topical/Other Medications  atropine 1% Solution 1 Drop(s) Left EYE two times a day  chlorhexidine 0.12% Liquid 15 milliLiter(s) Oral Mucosa every 12 hours  chlorhexidine 2% Cloths 1 Application(s) Topical <User Schedule>  prednisoLONE acetate 1% Suspension 1 Drop(s) Left EYE every 6 hours      ICU Vital Signs Last 24 Hrs  T(C): 36.2 (10 Apr 2023 08:00), Max: 36.8 (09 Apr 2023 16:00)  T(F): 97.1 (10 Apr 2023 08:00), Max: 98.3 (09 Apr 2023 16:00)  HR: 74 (10 Apr 2023 08:00) (73 - 120)  BP: 138/75 (10 Apr 2023 08:00) (100/61 - 171/72)  BP(mean): 101 (10 Apr 2023 08:00) (76 - 106)  ABP: --  ABP(mean): --  RR: 23 (10 Apr 2023 08:00) (20 - 35)  SpO2: 100% (10 Apr 2023 08:00) (98% - 100%)    O2 Parameters below as of 10 Apr 2023 08:00  Patient On (Oxygen Delivery Method): ventilator    O2 Concentration (%): 40        Mode: AC/ CMV (Assist Control/ Continuous Mandatory Ventilation)  RR (machine): 10  TV (machine): 450  FiO2: 40  PEEP: 8  PS: 8  ITime: 1  MAP: 11  PIP: 21    ABG - ( 10 Apr 2023 03:59 )  pH, Arterial: 7.48  pH, Blood: x     /  pCO2: 37    /  pO2: 93    / HCO3: 28    / Base Excess: 3.9   /  SaO2: 100.0               I&O's Summary    09 Apr 2023 07:01  -  10 Apr 2023 07:00  --------------------------------------------------------  IN: 1170 mL / OUT: 1630 mL / NET: -460 mL    10 Apr 2023 07:01  -  10 Apr 2023 08:57  --------------------------------------------------------  IN: 160 mL / OUT: 40 mL / NET: 120 mL      I&O's Detail    09 Apr 2023 07:01  -  10 Apr 2023 07:00  --------------------------------------------------------  IN:    Enteral Tube Flush: 450 mL    IV PiggyBack: 150 mL    Peptamen A.F.: 570 mL  Total IN: 1170 mL    OUT:    Drain (mL): 250 mL    Indwelling Catheter - Urethral (mL): 1380 mL  Total OUT: 1630 mL    Total NET: -460 mL      10 Apr 2023 07:01  -  10 Apr 2023 08:57  --------------------------------------------------------  IN:    IV PiggyBack: 100 mL    Peptamen A.F.: 60 mL  Total IN: 160 mL    OUT:    Indwelling Catheter - Urethral (mL): 40 mL  Total OUT: 40 mL    Total NET: 120 mL          PHYSICAL EXAM:    General/Neuro  RASS:             GCS:     = E   / V   / M      Deficits:                             alert & oriented x 3, no focal deficits  Pupils:    Lungs:      clear to auscultation, Normal expansion/effort.     Cardiovascular : S1, S2.  Regular rate and rhythm.  Peripheral edema   Cardiac Rhythm: Normal Sinus Rhythm    GI: Abdomen soft, Non-tender, Non-distended.    Gastrostomy / Jejunostomy tube in place.  Nasogastric tube in place.  Colostomy / Ileostomy.    Wound:    Extremities: Extremities warm, pink, well-perfused. Pulses:Rt     Lt    Derm: Good skin turgor, no skin breakdown.      :       Garrison catheter in place.    LABS:  CAPILLARY BLOOD GLUCOSE  POCT Blood Glucose.: 132 mg/dL (10 Apr 2023 05:31)  POCT Blood Glucose.: 124 mg/dL (09 Apr 2023 23:18)  POCT Blood Glucose.: 149 mg/dL (09 Apr 2023 17:40)  POCT Blood Glucose.: 122 mg/dL (09 Apr 2023 12:46)                          8.8    11.32 )-----------( 259      ( 09 Apr 2023 22:13 )             28.4       04-10    150<H>  |  115<H>  |  47<H>  ----------------------------<  148<H>  4.0   |  27  |  1.1    Ca    8.3<L>      10 Apr 2023 05:55  Phos  2.8     04-10  Mg     2.2     04-10      Culture - Bronchial (collected 08 Apr 2023 15:44)  Source: .Bronchial None  Gram Stain (09 Apr 2023 07:05):    Few polymorphonuclear leukocytes seen per low power field    Rare Squamous epithelial cells seen per low power field    No organisms seen  Preliminary Report (09 Apr 2023 19:21):    IMAGING:    Normal Respiratory Fidelia present RADHA RAMON   [id number]   [birth year]  77yM    Admit Date/LOS: 03-20 (2d)  Indication for SDU/SICU: Hemodynamic monitoring. 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 Pt walks with walker and got up in middle of night to get food and slipped and fell on his R side. Pt has been having R hip pain since with inability to ambulate. Pts family finally convinced him to come to hospital today. Pt is having 10/10 pain currently. Denies dizziness, CP or LOC prior to fall. Denies head trauma. No HA. (20 Mar 2023 12:02)    3/22: Patient S/P ORIF of right hip using interlocking intramedullary stephon. - upgradeed for hypotension in PACU  4/5  Trach and PEG    24Hours  4/9  Night  - 40 IV lasix after BMP  - Nb=046, FWD=2.3L, added 100q6h FW flush, AM BMP ordered  - f/u x-rays  - K 3.8, 1 K rider  - TF @goal 30   - 2 view R hip x-ray pending read     DAY  - 2 view R hip x-ray pending   - BAL neg - normal respiratory fidelia  - start PS tolerated 3-4 hrs  - Start propanolol 10 q8  - d/c midodrine   - 20 lasix --> unresponsive   - TF@ 40 in morning --> had tube-feed emesis, G tube put to gravity and will continue TF at 30 @ 1pm  -PT/OT  - social work consult   - 40 IV lasix after BMP  - Ortho requested Rt hip 2 View      [] A ten-point review of systems was otherwise negative except as noted above.  [x] Due to altered mental status/intubation, subjective information was not attained from   the patient. History was obtained, to the extent possible, from review of the chart and collateral sources of information.  =================================================================  Daily     Daily     Diet, NPO with Tube Feed:   Tube Feeding Modality: Gastrostomy  Peptamen A.F. Formula  Total Volume for 24 Hours (mL): 720  Continuous  Starting Tube Feed Rate mL per Hour: 30     Every 4 hours  Until Goal Tube Feed Rate (mL per Hour): 30  Tube Feed Duration (in Hours): 24  Tube Feed Start Time: 23:45  Free Water Flush  Bolus   Total Volume per Flush (mL): 100   Frequency: Every 4 Hours   Total Daily Volume of Flush (mL): 600 (04-09-23 @ 23:34)      CURRENT MEDS:  Neurologic Medications  acetaminophen     Tablet .. 650 milliGRAM(s) Oral every 6 hours  melatonin 5 milliGRAM(s) Oral at bedtime  OLANZapine 5 milliGRAM(s) Oral every 12 hours  ondansetron Injectable 4 milliGRAM(s) IV Push every 8 hours PRN Nausea and/or Vomiting  traZODone 50 milliGRAM(s) Oral at bedtime    Respiratory Medications  acetylcysteine 10%  Inhalation 4 milliLiter(s) Inhalation every 8 hours  albuterol    90 MICROgram(s) HFA Inhaler 1 Puff(s) Inhalation every 8 hours  guaifenesin/dextromethorphan Oral Liquid 10 milliLiter(s) Oral every 4 hours    Cardiovascular Medications  doxazosin 4 milliGRAM(s) Oral at bedtime  propranolol 10 milliGRAM(s) Oral every 8 hours    Gastrointestinal Medications  bisacodyl Suppository 10 milliGRAM(s) Rectal daily  famotidine    Tablet 20 milliGRAM(s) Oral daily  polyethylene glycol 3350 17 Gram(s) Oral daily  senna 2 Tablet(s) Oral at bedtime  sodium chloride 0.9% lock flush 10 milliLiter(s) IV Push every 1 hour PRN Pre/post blood products, medications, blood draw, and to maintain line patency    Genitourinary Medications    Hematologic/Oncologic Medications  aspirin  chewable 81 milliGRAM(s) Oral daily  clopidogrel Tablet 75 milliGRAM(s) Oral daily  heparin   Injectable 5000 Unit(s) SubCutaneous every 8 hours    Antimicrobial/Immunologic Medications  vancomycin    Solution 125 milliGRAM(s) Oral every 6 hours    Endocrine/Metabolic Medications  atorvastatin 80 milliGRAM(s) Oral at bedtime  insulin lispro (ADMELOG) corrective regimen sliding scale   SubCutaneous every 6 hours    Topical/Other Medications  atropine 1% Solution 1 Drop(s) Left EYE two times a day  chlorhexidine 0.12% Liquid 15 milliLiter(s) Oral Mucosa every 12 hours  chlorhexidine 2% Cloths 1 Application(s) Topical <User Schedule>  prednisoLONE acetate 1% Suspension 1 Drop(s) Left EYE every 6 hours      ICU Vital Signs Last 24 Hrs  T(C): 36.2 (10 Apr 2023 08:00), Max: 36.8 (09 Apr 2023 16:00)  T(F): 97.1 (10 Apr 2023 08:00), Max: 98.3 (09 Apr 2023 16:00)  HR: 74 (10 Apr 2023 08:00) (73 - 120)  BP: 138/75 (10 Apr 2023 08:00) (100/61 - 171/72)  BP(mean): 101 (10 Apr 2023 08:00) (76 - 106)  ABP: --  ABP(mean): --  RR: 23 (10 Apr 2023 08:00) (20 - 35)  SpO2: 100% (10 Apr 2023 08:00) (98% - 100%)    O2 Parameters below as of 10 Apr 2023 08:00  Patient On (Oxygen Delivery Method): ventilator    O2 Concentration (%): 40        Mode: AC/ CMV (Assist Control/ Continuous Mandatory Ventilation)  RR (machine): 10  TV (machine): 450  FiO2: 40  PEEP: 8  PS: 8  ITime: 1  MAP: 11  PIP: 21    ABG - ( 10 Apr 2023 03:59 )  pH, Arterial: 7.48  pH, Blood: x     /  pCO2: 37    /  pO2: 93    / HCO3: 28    / Base Excess: 3.9   /  SaO2: 100.0               I&O's Summary    09 Apr 2023 07:01  -  10 Apr 2023 07:00  --------------------------------------------------------  IN: 1170 mL / OUT: 1630 mL / NET: -460 mL    10 Apr 2023 07:01  -  10 Apr 2023 08:57  --------------------------------------------------------  IN: 160 mL / OUT: 40 mL / NET: 120 mL      I&O's Detail    09 Apr 2023 07:01  -  10 Apr 2023 07:00  --------------------------------------------------------  IN:    Enteral Tube Flush: 450 mL    IV PiggyBack: 150 mL    Peptamen A.F.: 570 mL  Total IN: 1170 mL    OUT:    Drain (mL): 250 mL    Indwelling Catheter - Urethral (mL): 1380 mL  Total OUT: 1630 mL    Total NET: -460 mL      10 Apr 2023 07:01  -  10 Apr 2023 08:57  --------------------------------------------------------  IN:    IV PiggyBack: 100 mL    Peptamen A.F.: 60 mL  Total IN: 160 mL    OUT:    Indwelling Catheter - Urethral (mL): 40 mL  Total OUT: 40 mL    Total NET: 120 mL    PHYSICAL EXAM:  General/Neuro: Follows commands, awake, alert  Lungs: On vent, trach. Clear to auscultation, Normal expansion/effort.   Cardiovascular : S1, S2.  Regular rate and rhythm. + Peripheral edema   GI: Abdomen soft, Non-tender, Non-distended. PEG 3cm at skin appreciated.  Extremities: Extremities warm, pink, well-perfused.  Derm: Good skin turgor, no skin breakdown.    : Garrison catheter in place. Diffuse scrotal edema appreciated    LABS:  CAPILLARY BLOOD GLUCOSE  POCT Blood Glucose.: 132 mg/dL (10 Apr 2023 05:31)  POCT Blood Glucose.: 124 mg/dL (09 Apr 2023 23:18)  POCT Blood Glucose.: 149 mg/dL (09 Apr 2023 17:40)  POCT Blood Glucose.: 122 mg/dL (09 Apr 2023 12:46)                          8.8    11.32 )-----------( 259      ( 09 Apr 2023 22:13 )             28.4       04-10    150<H>  |  115<H>  |  47<H>  ----------------------------<  148<H>  4.0   |  27  |  1.1    Ca    8.3<L>      10 Apr 2023 05:55  Phos  2.8     04-10  Mg     2.2     04-10      Culture - Bronchial (collected 08 Apr 2023 15:44)  Source: .Bronchial None  Gram Stain (09 Apr 2023 07:05):    Few polymorphonuclear leukocytes seen per low power field    Rare Squamous epithelial cells seen per low power field    No organisms seen  Preliminary Report (09 Apr 2023 19:21):      Normal Respiratory Fidelia present    IMAGING:  < from: Xray Chest 1 View- PORTABLE-Routine (Xray Chest 1 View- PORTABLE-Routine in AM.) (04.10.23 @ 06:16) >  Impression:  Stablebilateral opacities/effusions. No pneumothorax    --- End of Report ---  < end of copied text >    < from: Xray Hip 2-3 Views, Right (04.09.23 @ 10:16) >  FINDINGS/  IMPRESSION:    The patient is again status post ORIF of a comminuted intertrochanteric   right femur fracture, without definite change compared to 3/22/2023. No   new fracture is seen. Subcutaneous staples are noted over the right hip.    --- End of Report ---  < end of copied text >    < from: Xray Chest 1 View- PORTABLE-Routine (Xray Chest 1 View- PORTABLE-Routine in AM.) (04.09.23 @ 06:33) >  Impression:    Bilateral airspace opacities, slightly increasing. No pneumothorax.    --- End of Report ---  < end of copied text >

## 2023-04-10 NOTE — PRE-ANESTHESIA EVALUATION ADULT - NSANTHINDVINFOSD_GEN_ALL_CORE
patient
son signs Benefits, risks, and possible complicatios of procedure explained to patient/caregiver who verbalized understanding and gave written consent. [phone]
relative
relative

## 2023-04-10 NOTE — PRE-ANESTHESIA EVALUATION ADULT - NSRADCARDRESULTSFT_GEN_ALL_CORE
1. Normal global left ventricular systolic function.   2. LV Ejection Fraction by Lea's Method with a biplane EF of 74 %.   3. Spectral Doppler shows impaired relaxation pattern of left   ventricular myocardial filling (Grade I diastolic dysfunction).   4. Mild mitral valve regurgitation.   5. Mild thickening of the anterior and posterior mitral valve leaflets.
4/19/23 Sinus tach with incomplete RBBB
EKG:    Ventricular Rate 86 BPM    Atrial Rate 86 BPM    P-R Interval 156 ms    QRS Duration 122 ms    Q-T Interval 396 ms    QTC Calculation(Bazett) 473 ms    P Axis 17 degrees    R Axis 7 degrees    T Axis 44 degrees    Diagnosis Line Normal sinus rhythm  Right bundle branch block  Septal infarct , age undetermined  Possible Lateral infarct , age undetermined  Abnormal ECG    Confirmed by Leander Deshpande ([id number]) on 3/28/2023 7:54:31 PM    CXR:    ACC: [id number] EXAM:  XR CHEST PORTABLE IMMED 1V   ORDERED BY: CECILLE BUCKLEY     PROCEDURE DATE:  03/28/2023          INTERPRETATION:  Clinical History / Reason for exam: Low tidal volume    Comparison : Chest radiograph earlier the day.    Technique/Positioning: Frontal, portable and rotated to the right.    Findings:    Support devices: Endotracheal tube is in good position. There is a right   IJ central line. Nasogastric tube is seen coursing below diaphragm, tip   not seen. Telemetry leads, and support apparatus overlies chest limiting   evaluation.    Cardiac/mediastinum/hilum: Limited in evaluation due to rotation    Lung parenchyma/Pleura: Stable bilateral lung opacities    Skeleton/soft tissues: Stable    Impression:    Stable bilateral lung opacities        --- End of Report ---            JOSELIN NUÑEZ MD; Attending Radiologist  This document has been electronically signed. Mar 29 2023  7:32AM    ECHO:  Date of Exam:        3/29/2023 9:27:14 AM  Referring Physician: Service  Sonographer:         Jossy Swartz  Reading Physician:   Leander Deshpande.    Procedure:     2D Echo/Doppler/Color Doppler Complete.  Indications:   I48.91 - Unspecified Atrial Fibrillation  Diagnosis:     I48.91 - Unspecified atrial fibrillation  Study Details: Technically difficult study. Study quality was adversely   affected                 due to the patient being intubated.        Summary:   1. Left ventricular ejection fraction, by visual estimation, is 60 to   65%.   2. Normal global left ventricular systolic function.   3. Aortic valve thickening with decreased leaflet opening.   4. There is mild aortic root calcification.   5. Mild mitral valve regurgitation.   6. Moderate tricuspid regurgitation.   7. Estimated pulmonary artery systolic pressure is 38.7 mmHg assuming a   right atrial pressure of 10 mmHg, which is consistent with borderline   pulmonary hypertension.

## 2023-04-10 NOTE — CHART NOTE - NSCHARTNOTEFT_GEN_A_CORE
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 Pt walks with walker and got up in middle of night to get food and slipped and fell on his R side. Pt has been having R hip pain since with inability to ambulate. Pts family finally convinced him to come to hospital today. Pt is having 10/10 pain currently. Denies dizziness, CP or LOC prior to fall. Denies head trauma. No HA. (20 Mar 2023 12:02)    3/22: Patient S/P ORIF of right hip using interlocking intramedullary stephon.     Procedure:  OR Stats  OR Time:    EBL: 75  IV Fluids: 2700  Blood Products: None  UOP: Not noted. No Blair placed in OR  Findings- Displaced intertrochanteric fracture appreciated. 11mm x 200mm 130, lag screw 10.5 x 105mm    SICU consulted for hypotension requiring pressors.    Patient was a rapid response in the PACU post-operatively due to hypotension. Patient examined bedside and was responding to verbal stimuli. Albumin and fluid bolus given. Labs drawn off A-line and Urbano started. NGT placed by SICU. Blair placed by urology due to difficulty passing Blair in setting of patient with prostate CA and BPH.    4/5: Patient S/P trach and PEG (bumper 3cm at skin).    Patient required pressors on and off. Additionally, he required Fentanyl and Precedex intermittently. He has now been off pressors and sedation since prior to his Trach and Peg.    Of note, on 4/10, he had an episode of bradycardia to 30 that rebounded without intervention. At the time, his blood pressure cuff read 70s SBP but when recycled it was back to its originally SBP of 140s. Had a second episode requiring atropine. His saturation at this time dropped to the 60 percentile. He was bag ventilated for a time before placed back on the Vent at 100/12. His HR ely to 120s and his saturation steadily climbed back to 100 percent. CXR, EKG, ABG were performed.    Cards potentially to transvenous pace. EP evaluated patient. Pacemaker to be placed 4/11.      PAST MEDICAL & SURGICAL HISTORY:  CAD (coronary artery disease)  Hypercholesteremia  BPH (benign prostatic hyperplasia)  Kidney stones  Prostate cancer  Status post cardiac surgery- cardiac stents  Bilateral cataracts  History of lithotripsy    Allergies  No Known Allergies    Intolerances      MEDICATIONS  (STANDING):  acetaminophen     Tablet .. 650 milliGRAM(s) Oral every 6 hours  acetylcysteine 10%  Inhalation 4 milliLiter(s) Inhalation every 8 hours  albumin human  5% IVPB 250 milliLiter(s) IV Intermittent once  albuterol    90 MICROgram(s) HFA Inhaler 1 Puff(s) Inhalation every 8 hours  aspirin  chewable 81 milliGRAM(s) Oral daily  atorvastatin 80 milliGRAM(s) Oral at bedtime  atropine 1% Solution 1 Drop(s) Left EYE two times a day  bisacodyl Suppository 10 milliGRAM(s) Rectal daily  chlorhexidine 0.12% Liquid 15 milliLiter(s) Oral Mucosa every 12 hours  chlorhexidine 2% Cloths 1 Application(s) Topical <User Schedule>  clopidogrel Tablet 75 milliGRAM(s) Oral daily  doxazosin 4 milliGRAM(s) Oral at bedtime  famotidine    Tablet 20 milliGRAM(s) Oral daily  guaifenesin/dextromethorphan Oral Liquid 10 milliLiter(s) Oral every 4 hours  heparin   Injectable 5000 Unit(s) SubCutaneous every 8 hours  insulin lispro (ADMELOG) corrective regimen sliding scale   SubCutaneous every 6 hours  melatonin 5 milliGRAM(s) Oral at bedtime  metolazone 5 milliGRAM(s) Oral once  OLANZapine 5 milliGRAM(s) Oral every 12 hours  polyethylene glycol 3350 17 Gram(s) Oral daily  prednisoLONE acetate 1% Suspension 1 Drop(s) Left EYE every 6 hours  senna 2 Tablet(s) Oral at bedtime  traZODone 50 milliGRAM(s) Oral at bedtime  vancomycin    Solution 125 milliGRAM(s) Oral every 6 hours    MEDICATIONS  (PRN):  atropine Injectable 1 milliGRAM(s) IntraMuscular once PRN HR <35  ondansetron Injectable 4 milliGRAM(s) IV Push every 8 hours PRN Nausea and/or Vomiting  sodium chloride 0.9% lock flush 10 milliLiter(s) IV Push every 1 hour PRN Pre/post blood products, medications, blood draw, and to maintain line patency      Vital Signs Last 24 Hrs  T(C): 36.4 (10 Apr 2023 12:00), Max: 36.8 (09 Apr 2023 16:00)  T(F): 97.5 (10 Apr 2023 12:00), Max: 98.3 (09 Apr 2023 16:00)  HR: 76 (10 Apr 2023 12:00) (70 - 102)  BP: 134/70 (10 Apr 2023 12:00) (100/61 - 160/61)  BP(mean): 96 (10 Apr 2023 12:00) (76 - 106)  RR: 32 (10 Apr 2023 12:00) (17 - 32)  SpO2: 98% (10 Apr 2023 12:00) (98% - 100%)    Parameters below as of 10 Apr 2023 12:00  Patient On (Oxygen Delivery Method): BiPAP/CPAP,8/8      I&O's Summary    09 Apr 2023 07:01  -  10 Apr 2023 07:00  --------------------------------------------------------  IN: 1170 mL / OUT: 1630 mL / NET: -460 mL    10 Apr 2023 07:01  -  10 Apr 2023 15:06  --------------------------------------------------------  IN: 310 mL / OUT: 375 mL / NET: -65 mL      LABS:                        9.8    10.11 )-----------( 254      ( 10 Apr 2023 14:39 )             31.5       04-10    150<H>  |  115<H>  |  47<H>  ----------------------------<  148<H>  4.0   |  27  |  1.1    Ca    8.3<L>      10 Apr 2023 05:55  Phos  2.8     04-10  Mg     2.2     04-10      Culture - Bronchial (collected 08 Apr 2023 15:44)  Source: .Bronchial None  Gram Stain (09 Apr 2023 07:05):    Few polymorphonuclear leukocytes seen per low power field    Rare Squamous epithelial cells seen per low power field    No organisms seen  Preliminary Report (09 Apr 2023 19:21):    Normal Respiratory Fidelia present      · Assessment  77yM w/ PMH w/ CAD, HLD, BPH, Prostate Ca and 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   S/P ORIF of right hip using interlocking intramedullary stephon.       NEURO:  #Acute pain    -acetaminophen 650 PO q6   -OLANZapine 10mg q12  #sedation    - trazodone 50 mg     - melatonin    RESP:   #acute respiratory failure secondary to post operative arrhythmias requiring mechanical ventilation (Intubated 3/23),    s/p trached 4/5/23 with size 8    s/p bronch 4/8 BAL- no organisms  -Vent Settings:  SIMV 450/10/40/8  -Daily ABG not needed  - P/s 3-4 hrs on 4/9 then tachypneic to 40  - F/U PS & trach collar 4/10  #PE w/u NEGATIVE on 3/28    CARDS:   #Tachycardia  - Start Propanolol 10mg q8  #acute hypotension    -Discontinued Midodrine 5 q8   #Labs/Imaging  -Echo 3/23- EF appears normal, mild LVH, sclerotic AoV, trace MR,   -Echo 3/29: EF 60-65%, norm LV function, borderline pulm HTN.   -EKG 4/6 most recent: , NSR      GI/NUTR:   #acute clostridium difficile positive 3/30    -PO Vancomycin ends 4/13   #Diet: NPO x/rx w/ TFs @30cc w/ Peptamen AF    -PEG 3cm at skin, 4cm at bumper  #Nausea: Zofran prn    - Aspiration precautions, HOB 30  #GI Prophylaxis    - Pepcid  #Bowel regimen    - senna d/t chronic opioids, miralax     - Dulcolax suppository    - +BM on 4/9  #Imaging    -CT abdomen 3/28: No definite infectious source identified      -RUQ sono: biliary sludge, mild GB wall thickening/edema.       /RENAL:   #UO: Blair placed by Urology 3/22 (d/w urology and asses scrotal swelling prior to removal)    -Inpatient Rx: doxazosin 4 milliGRAM(s) Oral at bedtime  #acute scrotal swelling  - U/S 3/29 w/ diffused scrotal wall edema (keep blair)   -scrotal elevation  Labs:  #urine output in critically ill    -indwelling blair (placed)    Labs:          BUN/Cr- 45/1.1  -->,  46/1.1  -->          Electrolytes-Na 150 // K 3.8 // Mg 2.3 //  Phos 3.3 (04-09 @ 22:13)    Home Rx: tamsulosin 0.4 mg oral capsule   #Diuresis   -20 Lasix 4/9- unresponsive, given 40 Lasix overnight, will monitor output/ response        HEME/ONC:   #DVT prophylaxis- HSQ, SCDs  #H/O CAD s/p stents x3    - ASA 81    - Plavix 75 restarted 4/7    Labs: Hb/Hct:  9.3/30.2  -->,  8.8/28.4  --> 9.8                    Plts:  273  -->,  259  -->                 PTT/INR:        ID:  #acute clostridium difficile 4/30  WBC- 7.97  --->>,  9.27  --->>,  11.32  --->> 10.11  Temp trend- 24hrs T(F): 97.7 (04-10 @ 00:00), Max: 98.3 (04-09 @ 08:00)  Antibiotics-vancomycin    Solution 125 every 6 hours; Stop date: 4/13    Cultures:    BAL 4/8- negative     ET Sputum 3/29: negative     BCx 3/28: NGTD    UA 3/28: Negative     BCx 3/25--pending    UA 3/24--negative    ET Sputum 3/24--neg    MRSA neg    CDiff PCR 3/30: Positive    Procalcitonin 0.32    4/8 BAL: No organisms    ENDO:  HA1C= 5.0% 3/25/23    -ISS tightened 4/4    -Been 120s to 140s    -Glucose goal 140-180    MSK:   -WBAT RLE per ortho, PT/OT when clinically appropriate   -R hip dressing changed by SICU team 4/2   -Right hip Xray 2 views per ortho- The patient is again status post ORIF of a comminuted intertrochanteric right femur fracture, without definite change compared to 3/22/2023. No new fracture is seen. Subcutaneous staples are noted over the right hip.    LINES/DRAINS:    PIV  Blair (3/22)  Right IJ TLC (3/23-4/8)  Trach, NGT     ADVANCED DIRECTIVES:  Full Code    HCP/Emergency Contact- Song Segura (Son): [phone]    Dispo: Medical SDU    Follow Up:  -STAT labs reviewed, repletions given  -2000 Labs  -Vent settings, wean as tolerated  -UO- Blair placed by Urology, severe scrotal edema      Signed out to:  Date:   Time:

## 2023-04-10 NOTE — PROGRESS NOTE ADULT - ASSESSMENT
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   S/P ORIF of right hip using interlocking intramedullary stephon.       NEURO:  #Acute pain    -acetaminophen 650 PO q6   -OLANZapine 10mg q12  #sedation    - trazodone 50 mg     - melatonin    RESP:   #acute respiratory failure secondary to post operative arrhythmias requiring mechanical ventilation (Intubated 3/23),    s/p trached 4/5/23 with size 8    s/p bronch 4/8 BAL- no organisms  -Vent Settings:  SIMV 450/10/40/8  -Daily ABG not needed  - P/s 3-4 hrs on 4/9 then tachypneic to 40  #PE w/u NEGATIVE on 3/28    CARDS:   #Tachycardia  - Start Propanolol 10mg q8  #acute hypotension    -Discontinued Midodrine 5 q8   #Labs/Imaging  -Echo 3/23- EF appears normal, mild LVH, sclerotic AoV, trace MR,   -Echo 3/29: EF 60-65%, norm LV function, borderline pulm HTN.   -EKG 4/6 most recent: , NSR      GI/NUTR:   #acute clostridium difficile positive 3/30    -PO Vancomycin ends 4/13   #Diet: NPO x/rx w/ TFs @30cc w/ Peptamen AF    -PEG 3cm at skin, 4cm at bumper  #Nausea: Zofran prn    - Aspiration precautions, HOB 30  #GI Prophylaxis    - Pepcid  #Bowel regimen    - senna d/t chronic opioids, miralax     - Dulcolax suppository    - +BM on 4/9  #Imaging    -CT abdomen 3/28: No definite infectious source identified      -RUQ sono: biliary sludge, mild GB wall thickening/edema.       /RENAL:   #UO: Blair placed by Urology 3/22 (d/w urology and asses scrotal swelling prior to removal)    -Inpatient Rx: doxazosin 4 milliGRAM(s) Oral at bedtime  #acute scrotal swelling  - U/S 3/29 w/ diffused scrotal wall edema (keep blair)   -scrotal elevation  Labs:  #urine output in critically ill    -indwelling blair (placed)    Labs:          BUN/Cr- 45/1.1  -->,  46/1.1  -->          Electrolytes-Na 150 // K 3.8 // Mg 2.3 //  Phos 3.3 (04-09 @ 22:13)    Home Rx: tamsulosin 0.4 mg oral capsule   #Diuresis   -20 Lasix 4/9- unresponsive, given 40 Lasix overnight, will monitor output/ response        HEME/ONC:   #DVT prophylaxis- HSQ, SCDs  #H/O CAD s/p stents x3    - ASA 81    - Plavix 75 restarted 4/7    Labs: Hb/Hct:  9.3/30.2  -->,  8.8/28.4  -->                      Plts:  273  -->,  259  -->                 PTT/INR:        ID:  #acute clostridium difficile 4/30  WBC- 7.97  --->>,  9.27  --->>,  11.32  --->>  Temp trend- 24hrs T(F): 97.7 (04-10 @ 00:00), Max: 98.3 (04-09 @ 08:00)  Antibiotics-vancomycin    Solution 125 every 6 hours    Cultures:    BAL 4/8- negative     ET Sputum 3/29: negative     BCx 3/28: NGTD    UA 3/28: Negative     BCx 3/25--pending    UA 3/24--negative    ET Sputum 3/24--neg    MRSA neg    CDiff PCR 3/30: Positive    Procalcitonin 0.32    4/8 BAL: No organisms    ENDO:  HA1C= 5.0% 3/25/23    -ISS tightened 4/4    -Glucose goal 140-180    MSK:   -WBAT RLE per ortho, PT/OT when clinically appropriate   -R hip dressing changed by SICU team 4/2   -Right hip Xray 2 views per ortho pending read     LINES/DRAINS:    PIV  Blair (3/22)  Right IJ TLC (3/23-4/8)  Trach, NGT     ADVANCED DIRECTIVES:  Full Code    HCP/Emergency Contact- Song Segura (Son): [phone]    INDICATION FOR SICU: Hypotension with pressor requirements. Intubated.       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   S/P ORIF of right hip using interlocking intramedullary stephon.       NEURO:  #Acute pain    -acetaminophen 650 PO q6   -OLANZapine 10mg q12  #sedation    - trazodone 50 mg     - melatonin    RESP:   #acute respiratory failure secondary to post operative arrhythmias requiring mechanical ventilation (Intubated 3/23),    s/p trached 4/5/23 with size 8    s/p bronch 4/8 BAL- no organisms  -Vent Settings:  SIMV 450/10/40/8  -Daily ABG not needed  - P/s 3-4 hrs on 4/9 then tachypneic to 40  - F/U PS & trach collar 4/10  #PE w/u NEGATIVE on 3/28    CARDS:   #Tachycardia  - Start Propanolol 10mg q8  #acute hypotension    -Discontinued Midodrine 5 q8   #Labs/Imaging  -Echo 3/23- EF appears normal, mild LVH, sclerotic AoV, trace MR,   -Echo 3/29: EF 60-65%, norm LV function, borderline pulm HTN.   -EKG 4/6 most recent: , NSR      GI/NUTR:   #acute clostridium difficile positive 3/30    -PO Vancomycin ends 4/13   #Diet: NPO x/rx w/ TFs @30cc w/ Peptamen AF    -PEG 3cm at skin, 4cm at bumper  #Nausea: Zofran prn    - Aspiration precautions, HOB 30  #GI Prophylaxis    - Pepcid  #Bowel regimen    - senna d/t chronic opioids, miralax     - Dulcolax suppository    - +BM on 4/9  #Imaging    -CT abdomen 3/28: No definite infectious source identified      -RUQ sono: biliary sludge, mild GB wall thickening/edema.       /RENAL:   #UO: Blair placed by Urology 3/22 (d/w urology and asses scrotal swelling prior to removal)    -Inpatient Rx: doxazosin 4 milliGRAM(s) Oral at bedtime  #acute scrotal swelling  - U/S 3/29 w/ diffused scrotal wall edema (keep blair)   -scrotal elevation  Labs:  #urine output in critically ill    -indwelling blair (placed)    Labs:          BUN/Cr- 45/1.1  -->,  46/1.1  -->          Electrolytes-Na 150 // K 3.8 // Mg 2.3 //  Phos 3.3 (04-09 @ 22:13)    Home Rx: tamsulosin 0.4 mg oral capsule   #Diuresis   -20 Lasix 4/9- unresponsive, given 40 Lasix overnight, will monitor output/ response        HEME/ONC:   #DVT prophylaxis- HSQ, SCDs  #H/O CAD s/p stents x3    - ASA 81    - Plavix 75 restarted 4/7    Labs: Hb/Hct:  9.3/30.2  -->,  8.8/28.4  -->                      Plts:  273  -->,  259  -->                 PTT/INR:        ID:  #acute clostridium difficile 4/30  WBC- 7.97  --->>,  9.27  --->>,  11.32  --->>  Temp trend- 24hrs T(F): 97.7 (04-10 @ 00:00), Max: 98.3 (04-09 @ 08:00)  Antibiotics-vancomycin    Solution 125 every 6 hours    Cultures:    BAL 4/8- negative     ET Sputum 3/29: negative     BCx 3/28: NGTD    UA 3/28: Negative     BCx 3/25--pending    UA 3/24--negative    ET Sputum 3/24--neg    MRSA neg    CDiff PCR 3/30: Positive    Procalcitonin 0.32    4/8 BAL: No organisms    ENDO:  HA1C= 5.0% 3/25/23    -ISS tightened 4/4    -Glucose goal 140-180    MSK:   -WBAT RLE per ortho, PT/OT when clinically appropriate   -R hip dressing changed by SICU team 4/2   -Right hip Xray 2 views per ortho pending read     LINES/DRAINS:    PIV  Blair (3/22)  Right IJ TLC (3/23-4/8)  Trach, NGT     ADVANCED DIRECTIVES:  Full Code    HCP/Emergency Contact- Song Segura (Son): [phone]    INDICATION FOR SICU: Hypotension with pressor requirements. Intubated.       DISPO: SICU

## 2023-04-10 NOTE — CONSULT NOTE ADULT - SUBJECTIVE AND OBJECTIVE BOX
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 Pt walks with walker and got up in middle of night to get food and slipped and fell on his R side. Pt has been having R hip pain since with inability to ambulate. Pts family finally convinced him to come to hospital today. Pt is having 10/10 pain currently. Denies dizziness, CP or LOC prior to fall. Denies head trauma. No HA. (20 Mar 2023 12:02)    Critical Care HPI  Critical care was consulted for inability to wean off mechanical ventilation. Patient was admitted on 03/20/2023. Patient initially presented s/p fall. Patinet had ORIF of R hip done, intubated and extubated for surgery. however, post-op course was complicated by being found unresponsive in PACU and had to be re-intubated for airway protection. Patient has multiple CT Heads done, negative for stroke, mental status waxes and wanes. Patient has had a prolonged hospital course where patient developed pneumonia and septic shock and was eventually weaned off pressors. Patient is now s/p PEG and trach. Patient has generalized edema, team has been diuresing patient, now has developed hypernatremia. SICU team requesting medical transfer.     PAST MEDICAL & SURGICAL HISTORY:  CAD (coronary artery disease)      Hypercholesteremia      BPH (benign prostatic hyperplasia)      Kidney stones      Prostate cancer      Status post cardiac surgery  cardiac stents      Bilateral cataracts      History of lithotripsy          SOCIAL HX:   Smoking      UTO                   ETOH      UTO                      Other    FAMILY HISTORY:  FH: colon cancer (Mother)    :  No known cardiovascular family history     Review Of Systems:     All ROS are negative except per HPI       Allergies    No Known Allergies    Intolerances          PHYSICAL EXAM    ICU Vital Signs Last 24 Hrs  T(C): 36.4 (10 Apr 2023 12:00), Max: 36.8 (09 Apr 2023 16:00)  T(F): 97.5 (10 Apr 2023 12:00), Max: 98.3 (09 Apr 2023 16:00)  HR: 76 (10 Apr 2023 12:00) (70 - 104)  BP: 134/70 (10 Apr 2023 12:00) (100/61 - 171/72)  BP(mean): 96 (10 Apr 2023 12:00) (76 - 106)  RR: 32 (10 Apr 2023 12:00) (17 - 32)  SpO2: 98% (10 Apr 2023 12:00) (98% - 100%)    O2 Parameters below as of 10 Apr 2023 12:00  Patient On (Oxygen Delivery Method): BiPAP/CPAP,8/8            CONSTITUTIONAL:  Ill-appearing male.     ENT:   Airway patent,   Mouth with normal mucosa.   No thrush    EYES:   pupils equal,   round and reactive to light.    CARDIAC:   Normal rate,   Regular rhythm.    Heart sounds S1, S2.   Generalized edema    RESPIRATORY:   Trach  Decreased bilateral breath sounds  No wheezing   Normal chest expansion  No use of accessory muscles    GASTROINTESTINAL:  PEG  Abdomen soft   Non-tender,   No guarding,   + BS    GENITOURINARY  scrotal edema  blair    MUSCULOSKELETAL:   Range of motion is not limited,  No clubbing, cyanosis    NEUROLOGICAL:   Follows commands  Moves all extremities     SKIN:   Skin normal color for race,   Warm and dry  No evidence of rash.    PSYCHIATRIC:   No apparent risk to self or others.        04-09-23 @ 07:01  -  04-10-23 @ 07:00  --------------------------------------------------------  IN:    Enteral Tube Flush: 450 mL    IV PiggyBack: 150 mL    Peptamen A.F.: 570 mL  Total IN: 1170 mL    OUT:    Drain (mL): 250 mL    Indwelling Catheter - Urethral (mL): 1380 mL  Total OUT: 1630 mL    Total NET: -460 mL      04-10-23 @ 07:01  -  04-10-23 @ 14:45  --------------------------------------------------------  IN:    IV PiggyBack: 130 mL    Peptamen A.F.: 180 mL  Total IN: 310 mL    OUT:    Indwelling Catheter - Urethral (mL): 375 mL  Total OUT: 375 mL    Total NET: -65 mL        LABS:                          8.8    11.32 )-----------( 259      ( 09 Apr 2023 22:13 )             28.4                                               04-10    150<H>  |  115<H>  |  47<H>  ----------------------------<  148<H>  4.0   |  27  |  1.1    Ca    8.3<L>      10 Apr 2023 05:55  Phos  2.8     04-10  Mg     2.2     04-10                                                                       Culture - Bronchial (collected 08 Apr 2023 15:44)  Source: .Bronchial None  Gram Stain (09 Apr 2023 07:05):    Few polymorphonuclear leukocytes seen per low power field    Rare Squamous epithelial cells seen per low power field    No organisms seen  Preliminary Report (09 Apr 2023 19:21):    Normal Respiratory Fidelia present                                                   Mode: AC/ CMV (Assist Control/ Continuous Mandatory Ventilation)  RR (machine): 10  TV (machine): 450  FiO2: 40  PEEP: 8  PS: 8  ITime: 1  MAP: 11  PIP: 21                                      ABG - ( 10 Apr 2023 03:59 )  pH, Arterial: 7.48  pH, Blood: x     /  pCO2: 37    /  pO2: 93    / HCO3: 28    / Base Excess: 3.9   /  SaO2: 100.0         X-Rays reviewed:                                                                                       CXR interpreted by me: bilateral interstitial opacities    MEDICATIONS  (STANDING):  acetaminophen     Tablet .. 650 milliGRAM(s) Oral every 6 hours  acetylcysteine 10%  Inhalation 4 milliLiter(s) Inhalation every 8 hours  albumin human  5% IVPB 250 milliLiter(s) IV Intermittent once  albuterol    90 MICROgram(s) HFA Inhaler 1 Puff(s) Inhalation every 8 hours  aspirin  chewable 81 milliGRAM(s) Oral daily  atorvastatin 80 milliGRAM(s) Oral at bedtime  atropine 1% Solution 1 Drop(s) Left EYE two times a day  bisacodyl Suppository 10 milliGRAM(s) Rectal daily  chlorhexidine 0.12% Liquid 15 milliLiter(s) Oral Mucosa every 12 hours  chlorhexidine 2% Cloths 1 Application(s) Topical <User Schedule>  clopidogrel Tablet 75 milliGRAM(s) Oral daily  doxazosin 4 milliGRAM(s) Oral at bedtime  famotidine    Tablet 20 milliGRAM(s) Oral daily  guaifenesin/dextromethorphan Oral Liquid 10 milliLiter(s) Oral every 4 hours  heparin   Injectable 5000 Unit(s) SubCutaneous every 8 hours  insulin lispro (ADMELOG) corrective regimen sliding scale   SubCutaneous every 6 hours  melatonin 5 milliGRAM(s) Oral at bedtime  metolazone 5 milliGRAM(s) Oral once  OLANZapine 5 milliGRAM(s) Oral every 12 hours  polyethylene glycol 3350 17 Gram(s) Oral daily  prednisoLONE acetate 1% Suspension 1 Drop(s) Left EYE every 6 hours  senna 2 Tablet(s) Oral at bedtime  traZODone 50 milliGRAM(s) Oral at bedtime  vancomycin    Solution 125 milliGRAM(s) Oral every 6 hours    MEDICATIONS  (PRN):  atropine Injectable 1 milliGRAM(s) IntraMuscular once PRN HR <35  ondansetron Injectable 4 milliGRAM(s) IV Push every 8 hours PRN Nausea and/or Vomiting  sodium chloride 0.9% lock flush 10 milliLiter(s) IV Push every 1 hour PRN Pre/post blood products, medications, blood draw, and to maintain line patency         Patient is a 77y old  Male who presents with a chief complaint of mechanical Fall/ Hip Fx (10 Apr 2023 00:09)      HPI:  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 Pt walks with walker and got up in middle of night to get food and slipped and fell on his R side. Pt has been having R hip pain since with inability to ambulate. Pts family finally convinced him to come to hospital today. Pt is having 10/10 pain currently. Denies dizziness, CP or LOC prior to fall. Denies head trauma. No HA. (20 Mar 2023 12:02)    Critical Care HPI  Critical care was consulted for inability to wean off mechanical ventilation. Patient was admitted on 03/20/2023. Patient initially presented s/p fall. Patinet had ORIF of R hip done, intubated and extubated for surgery. however, post-op course was complicated by being found unresponsive in PACU and had to be re-intubated for airway protection. Patient has multiple CT Heads done, negative for stroke, mental status waxes and wanes. Patient has had a prolonged hospital course where patient developed pneumonia and septic shock and was eventually weaned off pressors. Patient is now s/p PEG and trach. Patient has generalized edema, team has been diuresing patient, now has developed hypernatremia. SICU team requesting medical transfer.     PAST MEDICAL & SURGICAL HISTORY:  CAD (coronary artery disease)      Hypercholesteremia      BPH (benign prostatic hyperplasia)      Kidney stones      Prostate cancer      Status post cardiac surgery  cardiac stents      Bilateral cataracts      History of lithotripsy          SOCIAL HX:   Smoking      UTO                   ETOH      UTO                      Other    FAMILY HISTORY:  FH: colon cancer (Mother)    :  No known cardiovascular family history     Review Of Systems:     All ROS are negative except per HPI       Allergies    No Known Allergies    Intolerances          PHYSICAL EXAM    ICU Vital Signs Last 24 Hrs  T(C): 36.4 (10 Apr 2023 12:00), Max: 36.8 (09 Apr 2023 16:00)  T(F): 97.5 (10 Apr 2023 12:00), Max: 98.3 (09 Apr 2023 16:00)  HR: 76 (10 Apr 2023 12:00) (70 - 104)  BP: 134/70 (10 Apr 2023 12:00) (100/61 - 171/72)  BP(mean): 96 (10 Apr 2023 12:00) (76 - 106)  RR: 32 (10 Apr 2023 12:00) (17 - 32)  SpO2: 98% (10 Apr 2023 12:00) (98% - 100%)    O2 Parameters below as of 10 Apr 2023 12:00  Patient On (Oxygen Delivery Method): BiPAP/CPAP,8/8            CONSTITUTIONAL:  Ill-appearing male.     ENT:   trach    CARDIAC:   brittany 2.6    RESPIRATORY:   Trach  Decreased bilateral breath sounds  No wheezing   Normal chest expansion  No use of accessory muscles    GASTROINTESTINAL:  PEG  Abdomen soft   Non-tender,   No guarding,   + BS    GENITOURINARY  scrotal edema  blair    MUSCULOSKELETAL:   Range of motion is not limited,  No clubbing, cyanosis    non focal      04-09-23 @ 07:01  -  04-10-23 @ 07:00  --------------------------------------------------------  IN:    Enteral Tube Flush: 450 mL    IV PiggyBack: 150 mL    Peptamen A.F.: 570 mL  Total IN: 1170 mL    OUT:    Drain (mL): 250 mL    Indwelling Catheter - Urethral (mL): 1380 mL  Total OUT: 1630 mL    Total NET: -460 mL      04-10-23 @ 07:01  -  04-10-23 @ 14:45  --------------------------------------------------------  IN:    IV PiggyBack: 130 mL    Peptamen A.F.: 180 mL  Total IN: 310 mL    OUT:    Indwelling Catheter - Urethral (mL): 375 mL  Total OUT: 375 mL    Total NET: -65 mL        LABS:                          8.8    11.32 )-----------( 259      ( 09 Apr 2023 22:13 )             28.4                                               04-10    150<H>  |  115<H>  |  47<H>  ----------------------------<  148<H>  4.0   |  27  |  1.1    Ca    8.3<L>      10 Apr 2023 05:55  Phos  2.8     04-10  Mg     2.2     04-10                                                                       Culture - Bronchial (collected 08 Apr 2023 15:44)  Source: .Bronchial None  Gram Stain (09 Apr 2023 07:05):    Few polymorphonuclear leukocytes seen per low power field    Rare Squamous epithelial cells seen per low power field    No organisms seen  Preliminary Report (09 Apr 2023 19:21):    Normal Respiratory Fidelia present                                                   Mode: AC/ CMV (Assist Control/ Continuous Mandatory Ventilation)  RR (machine): 10  TV (machine): 450  FiO2: 40  PEEP: 8  PS: 8  ITime: 1  MAP: 11  PIP: 21                                      ABG - ( 10 Apr 2023 03:59 )  pH, Arterial: 7.48  pH, Blood: x     /  pCO2: 37    /  pO2: 93    / HCO3: 28    / Base Excess: 3.9   /  SaO2: 100.0         X-Rays reviewed:                                                                                       CXR interpreted by me: bilateral interstitial opacities    MEDICATIONS  (STANDING):  acetaminophen     Tablet .. 650 milliGRAM(s) Oral every 6 hours  acetylcysteine 10%  Inhalation 4 milliLiter(s) Inhalation every 8 hours  albumin human  5% IVPB 250 milliLiter(s) IV Intermittent once  albuterol    90 MICROgram(s) HFA Inhaler 1 Puff(s) Inhalation every 8 hours  aspirin  chewable 81 milliGRAM(s) Oral daily  atorvastatin 80 milliGRAM(s) Oral at bedtime  atropine 1% Solution 1 Drop(s) Left EYE two times a day  bisacodyl Suppository 10 milliGRAM(s) Rectal daily  chlorhexidine 0.12% Liquid 15 milliLiter(s) Oral Mucosa every 12 hours  chlorhexidine 2% Cloths 1 Application(s) Topical <User Schedule>  clopidogrel Tablet 75 milliGRAM(s) Oral daily  doxazosin 4 milliGRAM(s) Oral at bedtime  famotidine    Tablet 20 milliGRAM(s) Oral daily  guaifenesin/dextromethorphan Oral Liquid 10 milliLiter(s) Oral every 4 hours  heparin   Injectable 5000 Unit(s) SubCutaneous every 8 hours  insulin lispro (ADMELOG) corrective regimen sliding scale   SubCutaneous every 6 hours  melatonin 5 milliGRAM(s) Oral at bedtime  metolazone 5 milliGRAM(s) Oral once  OLANZapine 5 milliGRAM(s) Oral every 12 hours  polyethylene glycol 3350 17 Gram(s) Oral daily  prednisoLONE acetate 1% Suspension 1 Drop(s) Left EYE every 6 hours  senna 2 Tablet(s) Oral at bedtime  traZODone 50 milliGRAM(s) Oral at bedtime  vancomycin    Solution 125 milliGRAM(s) Oral every 6 hours    MEDICATIONS  (PRN):  atropine Injectable 1 milliGRAM(s) IntraMuscular once PRN HR <35  ondansetron Injectable 4 milliGRAM(s) IV Push every 8 hours PRN Nausea and/or Vomiting  sodium chloride 0.9% lock flush 10 milliLiter(s) IV Push every 1 hour PRN Pre/post blood products, medications, blood draw, and to maintain line patency

## 2023-04-11 NOTE — PROGRESS NOTE ADULT - ASSESSMENT
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   S/P ORIF of right hip using interlocking intramedullary stephon.  S/P Trach & PEG    NEURO:  #Acute pain    -Acetaminophen 650 PO q6    -OLANZapine 10mg q12  #sedation    - Trazodone 50 mg     - Melatonin    - Precedex    - Nimbex 10/hr    RESP:   #Acute respiratory failure secondary to post operative arrhythmias requiring mechanical ventilation (Intubated 3/23),     -s/p trached 4/5/23 with size 8    -s/p bronch 4/8 BAL- no organisms    -F/U 4/10 BAL --  -Vent Settings:  SIMV 450/10/40/8 --> 450/10/100/12    -Daily ABG not needed    - PS 4 hrs on 4/9 then tachypneic to 40    - PS 3 hrs 4/10 until episode of bradycardia  #PE w/u NEGATIVE on 3/28    CARDS:   #Tachycardia     - Propanolol 10mg q8 - Discontinued 4/10 d/t bradycardic events  #Bradycardia     - 4/10: Episodes x2 bradycardia to 28. Atropine given second episode.      - EP: Possible PPM 4/11. No TVP with thoughts that 2/2 to hypoxia  #acute hypotension    -Restarted 4/10. Midodrine 5q8     -Intermittent hypotensive episodes 4/10 during bradycardic episodes  #Labs/Imaging  -Echo 3/23- EF appears normal, mild LVH, sclerotic AoV, trace MR,   -Echo 3/29: EF 60-65%, norm LV function, borderline pulm HTN.   -EKG 4/10 most recent: , Sinus Tachycardia, low voltage QRS, incomplete RBBB    GI/NUTR:   #Acute clostridium difficile positive 3/30    -PO Vancomycin ends 4/13   #Diet: NPO for procedure: PPM by EP 4/11    -PEG 3cm at skin, 4cm at bumper  #Nausea: Zofran prn    - Aspiration precautions, HOB 30  #GI Prophylaxis    - Pepcid  #Bowel regimen    - Senna d/t chronic opioids, miralax     - Dulcolax suppository    - +BM on 4/9; +BM 4/10  #Imaging    -CT abdomen 3/28: No definite infectious source identified      -RUQ sono: biliary sludge, mild GB wall thickening/edema.     /RENAL:   #UO: Blair placed by Urology 3/22 (d/w urology and asses scrotal swelling prior to removal)    -Inpatient Rx: doxazosin 4 milliGRAM(s) Oral at bedtime  #acute scrotal swelling  - U/S 3/29 w/ diffused scrotal wall edema (keep blair)   -scrotal elevation  Labs:  #urine output in critically ill    -indwelling blair (placed)    Labs:          BUN/Cr- 45/1.1  -->,  46/1.1  --> 44/1          Electrolytes-Na 146 // K 3.3 // Mg 2.2 //  Phos 3.0 (04-10 @ 14:39)    Home Rx: tamsulosin 0.4 mg oral capsule   #Diuresis   -20 Lasix 4/9- unresponsive, given 40 Lasix overnight, will monitor output/ response    -80 Lasix, 5 Metozalone 4/10    HEME/ONC:   #DVT prophylaxis- HSQ, SCDs  #H/O CAD s/p stents x3    - ASA 81    - Plavix 75 restarted 4/7    Labs: Hb/Hct:  9.3/30.2  -->  8.8/28.4  -->   9.8/31.5              Plts:  273  -->,  259  -->   254              PTT/INR:        ID:  #Acute clostridium difficile 4/30  WBC- 7.97  --->>,  9.27  --->>,  11.32  --->> 10.11  Temp trend- 24hrs T(F): 97.7 (04-10 @ 00:00), Max: 98.3 (04-09 @ 08:00)  Antibiotics-vancomycin    Solution 125 every 6 hours, end 4/13    Cultures:    BAL 4/10- pending    BAL 4/8- negative     ET Sputum 3/29: negative     BCx 3/28: NGTD    UA 3/28: Negative     BCx 3/25--pending    UA 3/24--negative    ET Sputum 3/24--neg    MRSA neg    CDiff PCR 3/30: Positive    Procalcitonin 0.32    4/8 BAL: No organisms    ENDO:  HA1C= 5.0% 3/25/23    -ISS tightened 4/4    -Glucose goal 140-180    MSK:   -WBAT RLE per ortho, PT/OT when clinically appropriate   -R hip dressing changed by SICU team 4/2   -Right hip Xray 2 views per ortho pending read     LINES/DRAINS:    PIV  Blair (3/22)  Right IJ TLC (3/23-4/8)  Trach, NGT     ADVANCED DIRECTIVES:  Full Code    HCP/Emergency Contact- Song Segura (Son): [phone]    INDICATION FOR SICU: Hypotension with pressor requirements. Intubated.       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   S/P ORIF of right hip using interlocking intramedullary stephon.  S/P Trach & PEG    NEURO:  #Acute pain    -Acetaminophen 650 PO q6    -OLANZapine 10mg q12  #sedation    - Trazodone 50 mg     - Melatonin    - Precedex    - Nimbex 11.4/hr    - Propofol    RESP:   #Acute respiratory failure secondary to post operative arrhythmias requiring mechanical ventilation (Intubated 3/23),     -s/p trached 4/5/23 with size 8    -s/p bronch 4/8 BAL- no organisms    -F/U 4/10 BAL --    - AM CXR   -Vent Settings:  SIMV 450/10/40/8 --> 450/10/100/12  --> 450/16/100/16    - ABG (4/10) --> 7.23/68/164/28/100    - PS 4 hrs on 4/9 then tachypneic to 40    - PS 3 hrs 4/10 until episode of bradycardia  #PE w/u NEGATIVE on 3/28    CARDS:   #Tachycardia     - Propanolol 10mg q8 - Discontinued 4/10 d/t bradycardic events  #Bradycardia     - 4/10: Episodes x2 bradycardia to 28. Atropine given second episode.      - EP: Possible PPM 4/11. No TVP with thoughts that 2/2 to hypoxia  #acute hypotension    -Restarted 4/10. Midodrine 5q8     -Intermittent hypotensive episodes 4/10 during bradycardic episodes    -Levo / vaso   #Labs/Imaging  -Echo 3/23- EF appears normal, mild LVH, sclerotic AoV, trace MR,   -Echo 3/29: EF 60-65%, norm LV function, borderline pulm HTN.   -EKG 4/10 most recent: , Sinus Tachycardia, low voltage QRS, incomplete RBBB    GI/NUTR:   #Acute clostridium difficile positive 3/30    -PO Vancomycin ends 4/13   #Diet: NPO for procedure: PPM by EP 4/11    -PEG 3cm at skin, 4cm at bumper  #Nausea: Zofran prn    - Aspiration precautions, HOB 30  #GI Prophylaxis    - Pepcid  #Bowel regimen    - Senna d/t chronic opioids, miralax     - Dulcolax suppository    - +BM on 4/9; +BM 4/10  #Imaging    -CT abdomen 3/28: No definite infectious source identified      -RUQ sono: biliary sludge, mild GB wall thickening/edema.     /RENAL:   #UO: Blair placed by Urology 3/22 (d/w urology and asses scrotal swelling prior to removal)    -Inpatient Rx: doxazosin 4 milliGRAM(s) Oral at bedtime  #acute scrotal swelling  - U/S 3/29 w/ diffused scrotal wall edema (keep blair)   -scrotal elevation  Labs:  #urine output in critically ill    -indwelling blair (placed)    Labs:          BUN/Cr- 45/1.1  -->,  46/1.1  --> 44/1          Electrolytes-Na 146 // K 3.3 // Mg 2.2 //  Phos 3.0 (04-10 @ 14:39)    Home Rx: tamsulosin 0.4 mg oral capsule   #Diuresis   -20 Lasix 4/9- unresponsive, given 40 Lasix overnight, will monitor output/ response    -80 Lasix, 5 Metozalone 4/10    HEME/ONC:   #DVT prophylaxis- HSQ, SCDs  #H/O CAD s/p stents x3    - ASA 81    - Plavix 75 restarted 4/7    Labs: Hb/Hct:  9.3/30.2  -->  8.8/28.4  -->   9.8/31.5 -->  9.6/31.7              Plts:  273  -->,  259  -->   254  --> 247              PTT/INR: 29.5 / 1.15       ID:  #Acute clostridium difficile 4/30  WBC- 7.97  --->>,  9.27  --->>,  11.32  --->> 10.11  --> 9.24  Temp trend- 24hrs T(F): 96.4 (10 Apr 2023 16:14), Max: 98.1 (10 Apr 2023 04:00)  Antibiotics   -vancomycin    Solution 125 every 6 hours, end 4/13   -vancomycin IVPB 750mg every 12 hours (started 4/10)  - cefepime IVPB 1000mg every 12 hours (started 4/10)    Cultures:    BCx 4/10 - pending    BAL 4/10- pending    BAL 4/8- negative     ET Sputum 3/29: negative     BCx 3/28: NGTD    UA 3/28: Negative     BCx 3/25--pending    UA 3/24--negative    ET Sputum 3/24--neg    MRSA neg    CDiff PCR 3/30: Positive    Procalcitonin 0.32    ENDO:  HA1C= 5.0% 3/25/23    -ISS tightened 4/4    -Glucose goal 140-180    MSK:   -WBAT RLE per ortho, PT/OT when clinically appropriate   -R hip dressing changed by SICU team 4/2   -Right hip Xray 2 views per ortho (4/9) --> s/p ORIF of comminuted intertrochanteric Rt femur fx w/out definite change compared to 3/22/23    LINES/DRAINS:    PIV  Blair (3/22)  Right IJ TLC (3/23-4/8)  Trach, NGT   Right radial a-line 4/10  RIJ TLC 4/    ADVANCED DIRECTIVES:  Full Code    HCP/Emergency Contact- Song Segura (Son): [phone]    INDICATION FOR SICU: Hypotension with pressor requirements. Intubated.       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   S/P ORIF of right hip using interlocking intramedullary stephon.  S/P Trach & PEG    NEURO:  #Acute pain    -Acetaminophen 650 PO q6    -OLANZapine 10mg q12  #sedation    - Trazodone 50 mg     - Melatonin    - Precedex    - Nimbex 11.4/hr    - Propofol    RESP:   #Acute respiratory failure secondary to post operative arrhythmias requiring mechanical ventilation (Intubated 3/23),     -s/p trached 4/5/23 with size 8    -s/p bronch 4/8 BAL- no organisms    -F/U 4/10 BAL --    - AM CXR   -Vent Settings:  SIMV 450/10/40/8 --> 450/10/100/12  --> 450/16/100/16    - ABG (4/10) --> 7.23/68/164/28/100    - PS 4 hrs on 4/9 then tachypneic to 40    - PS 3 hrs 4/10 until episode of bradycardia  #PE w/u NEGATIVE on 3/28    CARDS:   #Tachycardia     - Propanolol 10mg q8 - Discontinued 4/10 d/t bradycardic events  #Bradycardia     - 4/10: Episodes x2 bradycardia to 28. Atropine given second episode.      - EP: Possible PPM 4/11. No TVP with thoughts that 2/2 to hypoxia  #acute hypotension    -Restarted 4/10. Midodrine 5q8     -Intermittent hypotensive episodes 4/10 during bradycardic episodes    -Levo / vaso   #Labs/Imaging  -Echo 3/23- EF appears normal, mild LVH, sclerotic AoV, trace MR,   -Echo 3/29: EF 60-65%, norm LV function, borderline pulm HTN.   -EKG 4/10 most recent: , Sinus Tachycardia, low voltage QRS, incomplete RBBB    GI/NUTR:   #Acute clostridium difficile positive 3/30    -PO Vancomycin ends 4/13   #Diet: NPO for procedure: PPM by EP 4/11    -PEG 3cm at skin, 4cm at bumper  #Nausea: Zofran prn    - Aspiration precautions, HOB 30  #GI Prophylaxis    - Pepcid  #Bowel regimen    - Senna d/t chronic opioids, miralax     - Dulcolax suppository    - +BM on 4/9; +BM 4/10  #Imaging    -CT abdomen 3/28: No definite infectious source identified      -RUQ sono: biliary sludge, mild GB wall thickening/edema.     /RENAL:   #UO: Blair placed by Urology 3/22 (d/w urology and asses scrotal swelling prior to removal)    -Inpatient Rx: doxazosin 4 milliGRAM(s) Oral at bedtime  #acute scrotal swelling  - U/S 3/29 w/ diffused scrotal wall edema (keep blair)   -scrotal elevation  Labs:  #urine output in critically ill    -indwelling blair (placed)    Labs:          BUN/Cr- 45/1.1  -->,  46/1.1  --> 44/1          Electrolytes-Na 146 // K 3.3 // Mg 2.2 //  Phos 3.0 (04-10 @ 14:39)    Home Rx: tamsulosin 0.4 mg oral capsule   #Diuresis   -20 Lasix 4/9- unresponsive, given 40 Lasix overnight, will monitor output/ response    -80 Lasix, 5 Metozalone 4/10    HEME/ONC:   #DVT SCDs  #Suspected PE    -heparin gtt started 4/10    -f/u PTT  #H/O CAD s/p stents x3    - ASA 81    - Plavix 75 restarted 4/7    Labs: Hb/Hct:  9.3/30.2  -->  8.8/28.4  -->   9.8/31.5 -->  9.6/31.7              Plts:  273  -->,  259  -->   254  --> 247              PTT/INR: 29.5 / 1.15       ID:  #Acute clostridium difficile 4/30  WBC- 7.97  --->>,  9.27  --->>,  11.32  --->> 10.11  --> 9.24  Temp trend- 24hrs T(F): 96.4 (10 Apr 2023 16:14), Max: 98.1 (10 Apr 2023 04:00)  Antibiotics   -vancomycin    Solution 125 every 6 hours, end 4/13   -vancomycin IVPB 750mg every 12 hours (started 4/10)  - cefepime IVPB 1000mg every 12 hours (started 4/10)    Cultures:    BCx 4/10 - pending    BAL 4/10- pending    BAL 4/8- negative     ET Sputum 3/29: negative     BCx 3/28: NGTD    UA 3/28: Negative     BCx 3/25--pending    UA 3/24--negative    ET Sputum 3/24--neg    MRSA neg    CDiff PCR 3/30: Positive    Procalcitonin 0.32    ENDO:  HA1C= 5.0% 3/25/23    -ISS tightened 4/4    -Glucose goal 140-180    MSK:   -WBAT RLE per ortho, PT/OT when clinically appropriate   -R hip dressing changed by SICU team 4/2   -Right hip Xray 2 views per ortho (4/9) --> s/p ORIF of comminuted intertrochanteric Rt femur fx w/out definite change compared to 3/22/23    LINES/DRAINS:    PIV  Blair (3/22)  Right IJ TLC (3/23-4/8)  Trach, NGT   Right radial a-line 4/10  RIJ TLC 4/    ADVANCED DIRECTIVES:  Full Code    HCP/Emergency Contact- Song Segura (Son): [phone]    INDICATION FOR SICU: Hypotension with pressor requirements. Intubated.       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   S/P ORIF of right hip using interlocking intramedullary stephon.  S/P Trach & PEG    NEURO:  #Acute pain    -Acetaminophen 650 PO q6    -OLANZapine 10mg q12  #sedation    - Trazodone 50 mg     - Melatonin    - Precedex    - Nimbex 11.4/hr    - Propofol    RESP:   #Acute respiratory failure secondary to post operative arrhythmias requiring mechanical ventilation (Intubated 3/23),     -s/p trached 4/5/23 with size 8    -s/p bronch 4/8 BAL- no organisms    -F/U 4/10 BAL --    - AM CXR   -Vent Settings:  SIMV 450/10/40/8 --> 450/10/100/12  --> 450/16/100/16 -->450/24/90/16    - ABG (4/10) --> 7.23/68/164/28/100    - PS 4 hrs on 4/9 then tachypneic to 40    - PS 3 hrs 4/10 until episode of bradycardia  #PE w/u NEGATIVE on 3/28    CARDS:   #Tachycardia     - Propanolol 10mg q8 - Discontinued 4/10 d/t bradycardic events  #Bradycardia     - 4/10: Episodes x2 bradycardia to 28. Atropine given second episode.      - EP: Possible PPM 4/11. No TVP with thoughts that 2/2 to hypoxia  #acute hypotension    -Restarted 4/10. Midodrine 5q8     -Intermittent hypotensive episodes 4/10 during bradycardic episodes    -Levo / vaso   #Labs/Imaging  -Echo 3/23- EF appears normal, mild LVH, sclerotic AoV, trace MR,   -Echo 3/29: EF 60-65%, norm LV function, borderline pulm HTN.   -EKG 4/10 most recent: , Sinus Tachycardia, low voltage QRS, incomplete RBBB  -F/U Echo done 4/11    GI/NUTR:   #Acute clostridium difficile positive 3/30    -PO Vancomycin ends 4/13   #Diet: NPO for procedure: PPM by EP 4/11    -PEG 3cm at skin, 4cm at bumper  #Nausea: Zofran prn    - Aspiration precautions, HOB 30  #GI Prophylaxis    - Pepcid  #Bowel regimen    - Senna d/t chronic opioids, miralax     - Dulcolax suppository    - +BM on 4/9; +BM 4/10  #Imaging    -CT abdomen 3/28: No definite infectious source identified      -RUQ sono: biliary sludge, mild GB wall thickening/edema.     /RENAL:   #UO: Blair placed by Urology 3/22 (d/w urology and asses scrotal swelling prior to removal)    -Inpatient Rx: doxazosin 4 milliGRAM(s) Oral at bedtime  #acute scrotal swelling  - U/S 3/29 w/ diffused scrotal wall edema (keep blair)   -scrotal elevation  Labs:  #urine output in critically ill    -indwelling blair (placed)    Labs:          BUN/Cr- 45/1.1  -->,  46/1.1  --> 44/1          Electrolytes-Na 146 // K 3.3 // Mg 2.2 //  Phos 3.0 (04-10 @ 14:39)    Home Rx: tamsulosin 0.4 mg oral capsule   #Diuresis   -20 Lasix 4/9- unresponsive, given 40 Lasix overnight, will monitor output/ response    -80 Lasix, 5 Metozalone 4/10    HEME/ONC:   #DVT SCDs  #Suspected PE    -heparin gtt started 4/10    -f/u PTT  #H/O CAD s/p stents x3    - ASA 81    - Plavix 75 restarted 4/7    Labs: Hb/Hct:  9.3/30.2  -->  8.8/28.4  -->   9.8/31.5 -->  9.6/31.7              Plts:  273  -->,  259  -->   254  --> 247              PTT/INR: 29.5 / 1.15       ID:  #Acute clostridium difficile 4/30  WBC- 7.97  --->>,  9.27  --->>,  11.32  --->> 10.11  --> 9.24  Temp trend- 24hrs T(F): 96.4 (10 Apr 2023 16:14), Max: 98.1 (10 Apr 2023 04:00)  Antibiotics   -vancomycin    Solution 125 every 6 hours, end 4/13   -vancomycin IVPB 750mg every 12 hours (started 4/10)  - cefepime IVPB 1000mg every 12 hours (started 4/10)    Cultures:    BCx 4/10 - pending    BAL 4/10- pending    BAL 4/8- negative     ET Sputum 3/29: negative     BCx 3/28: NGTD    UA 3/28: Negative     BCx 3/25--pending    UA 3/24--negative    ET Sputum 3/24--neg    MRSA neg    CDiff PCR 3/30: Positive    Procalcitonin 0.32    ENDO:  HA1C= 5.0% 3/25/23    -ISS tightened 4/4    -Glucose goal 140-180    MSK:   -WBAT RLE per ortho, PT/OT when clinically appropriate   -R hip dressing changed by SICU team 4/2   -Right hip Xray 2 views per ortho (4/9) --> s/p ORIF of comminuted intertrochanteric Rt femur fx w/out definite change compared to 3/22/23    LINES/DRAINS:    PIV  Blair (3/22)  Right IJ TLC (3/23-4/8)  Trach, NGT   Right radial a-line 4/10  RIJ TLC 4/    ADVANCED DIRECTIVES:  Full Code    HCP/Emergency Contact- Song Segura (Son): [phone]    INDICATION FOR SICU: Hypotension with pressor requirements. Intubated.       DISPO: SICU

## 2023-04-11 NOTE — PROGRESS NOTE ADULT - ATTENDING COMMENTS
patient got sicker overnight. cardiac- on vasopressors. levophed at 0.7 and vaso. Renal- adequate urine output, cr 1.0. Pulm- in severe ARDS, 100% o2 and 16 peep. on paraytics. nimbex. wean vent settings as tolerated. ID- started on antibiotics. follow cultures. Sedation - propofol. on asa/plavix. started on heparin gtt for suspected PE, too unstable to obtain scan. follow EP recs. family discussion.

## 2023-04-11 NOTE — CONSULT NOTE ADULT - ASSESSMENT
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 presents after fall at home with IT hip fx s/p ORIF and trach and PEG, now in shock    THIS IS AN INCOMPLETE NOTE, PLEASE WAIT FOR ATTENDING FINALIZATION    # Undifferentiated Shock  - On Levophed and Vasopressin  - echo done today with hyperdynamic EF  - Pause, junctional bradycardia, and PVCs yesterday on tele- concomitant with hypoxia and increased opacities and effusions on CXR with increased O2 requirements, also followed by EP  - Troponin 0.08, likely NSTEMI type II in setting of shock, please trend trop  - Less likely cardiac in origin, consider other causes       78 yo male with PMHx CAD s/p stent in LAD, HLD, Prostate Ca and 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   - Less likely cardiac in origin, consider other causes

## 2023-04-11 NOTE — PROGRESS NOTE ADULT - SUBJECTIVE AND OBJECTIVE BOX
RADHA RAMON   [id number]   41  77yM    Admit Date/LOS:  (2d)  Indication for SICU: Hemodynamic monitoring. 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 Pt walks with walker and got up in middle of night to get food and slipped and fell on his R side. Pt has been having R hip pain since with inability to ambulate. Pts family finally convinced him to come to hospital today. Pt is having 10/10 pain currently. Denies dizziness, CP or LOC prior to fall. Denies head trauma. No HA. (20 Mar 2023 12:02)    3/22: Patient S/P ORIF of right hip using interlocking intramedullary stephon. - upgradeed for hypotension in PACU  4/5  Trach and PEG    24Hours  4/10  NIGHT  -Right radial a-line placed  -Desaturated to 70s  -PM CXR - white out of left lung. Vent 450/16/100/16 --> too high of Peep setting for bronch. Repoitioned patient to have right lung down, trach irrigated / suctioned, chest physio vest placed. Repeat CXR showing cleared of left lung. O2 100%   -RIJ TLC placed  -f/u PTT   -Precedex d/c; Propofol and Nimbex started   -repeat ABG 7.23/68/164/28/100  -blood cx taken - f/u  -Vanco and Cefepime started for PNA ppx     DAY  -UO 5cc, will watch for next hr, then flush blair   -Lasix 80, zaroxalyn 5  -Bradycardic to 30 w/ hypotension- improved, atropine @bedside, labs and EKG  -Bradycardic again-atropine given; ABG, EKG  -EP: PPM   -NPO, COVID sent, Coags ordered  -EP: No transvenous pacing, attributed to hypoxia  -Pulm crit fellow evaluated & willing to accept to SDU. Per SICU attending, do not transfer to resolution of bradycardic episodes  -Desat to 83%, Precedex restarted  -Nimbex 10 given  -Midodrine restarted 5q8  -K= 3.3 --> 3 K-riders  -F/U BAL  - AB.31/61//31  - ordered heparin gtt per dr. morales (suspicious of PE)   -Albumin 250 x 2    [] A ten-point review of systems was otherwise negative except as noted above.  [x] Due to altered mental status/intubation, subjective information was not attained from   the patient. History was obtained, to the extent possible, from review of the chart and collateral sources of information.  =================================================================   RAHDA RAMON   [id number]   41  77yM    Admit Date/LOS:  (2d)  Indication for SICU: Hemodynamic monitoring. 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 Pt walks with walker and got up in middle of night to get food and slipped and fell on his R side. Pt has been having R hip pain since with inability to ambulate. Pts family finally convinced him to come to hospital today. Pt is having 10/10 pain currently. Denies dizziness, CP or LOC prior to fall. Denies head trauma. No HA. (20 Mar 2023 12:02)    3/22: Patient S/P ORIF of right hip using interlocking intramedullary stephon. - upgraded for hypotension in PACU  4/5  Trach and PEG    24Hours  4/10  NIGHT  -Right radial a-line placed  -Desaturated to 70s  -STAT CXR - white out of left lung. Vent 450/16/100/16 --> Peep setting to high for bronch. Repositioned patient to have right lung down, trach irrigated / suctioned, chest physio vest placed. Repeat CXR showed opening of left lung. O2 100%   -RIJ TLC placed  -PTT 79.9  -Precedex d/c; Propofol and Nimbex started   -repeat ABG 7.23/68/164/28/100  -blood cx taken - f/u  -Vanco and Cefepime started for PNA ppx     DAY  -UO 5cc, will watch for next hr, then flush blair   -Lasix 80, zaroxalyn 5  -Bradycardic to 30 w/ hypotension- improved, atropine @bedside, labs and EKG  -Bradycardic again-atropine given; ABG, EKG  -EP: PPM   -NPO, COVID sent, Coags ordered  -EP: No transvenous pacing, attributed to hypoxia  -Pulm crit fellow evaluated & willing to accept to SDU. Per SICU attending, do not transfer to resolution of bradycardic episodes  -Desat to 83%, Precedex restarted  -Nimbex 10 given  -Midodrine restarted 5q8  -K= 3.3 --> 3 K-riders  -F/U BAL  - AB.31/61//  - ordered heparin gtt per dr. morales (suspicious of PE)   -Albumin 250 x 2    [] A ten-point review of systems was otherwise negative except as noted above.  [x] Due to altered mental status/intubation, subjective information was not attained from   the patient. History was obtained, to the extent possible, from review of the chart and collateral sources of information.  =================================================================   RADHA RAMON   [id number]   41  77yM    Admit Date/LOS:  (2d)  Indication for SICU: Hemodynamic monitoring. 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 Pt walks with walker and got up in middle of night to get food and slipped and fell on his R side. Pt has been having R hip pain since with inability to ambulate. Pts family finally convinced him to come to hospital today. Pt is having 10/10 pain currently. Denies dizziness, CP or LOC prior to fall. Denies head trauma. No HA. (20 Mar 2023 12:02)    3/22: Patient S/P ORIF of right hip using interlocking intramedullary stephon. - upgraded for hypotension in PACU  4/5  Trach and PEG    24Hours  4/10  NIGHT  -Right radial a-line placed  -Desaturated to 70s  -STAT CXR - white out of left lung. Vent 450/16/100/16 --> Peep setting to high for bronch. Repositioned patient to have right lung down, trach irrigated / suctioned, chest physio vest placed. Repeat CXR showed opening of left lung. O2 100%   -RIJ TLC placed  -PTT 79.9  -Precedex d/c; Propofol and Nimbex started   -repeat ABG 7.23/68/164/28/100  -blood cx taken - f/u  -Vanco and Cefepime started for PNA ppx     DAY  -UO 5cc, will watch for next hr, then flush blair   -Lasix 80, zaroxalyn 5  -Bradycardic to 30 w/ hypotension- improved, atropine @bedside, labs and EKG  -Bradycardic again-atropine given; ABG, EKG  -EP: PPM   -NPO, COVID sent, Coags ordered  -EP: No transvenous pacing, attributed to hypoxia  -Pulm crit fellow evaluated & willing to accept to SDU. Per SICU attending, do not transfer to resolution of bradycardic episodes  -Desat to 83%, Precedex restarted  -Nimbex 10 given  -Midodrine restarted 5q8  -K= 3.3 --> 3 K-riders  -F/U BAL  - AB.31/61/78/31  - ordered heparin gtt per dr. morales (suspicious of PE)   -Albumin 250 x 2    [] A ten-point review of systems was otherwise negative except as noted above.  [x] Due to altered mental status/intubation, subjective information was not attained from   the patient. History was obtained, to the extent possible, from review of the chart and collateral sources of information.  =================================================================  Daily Height in cm: 175.26 (10 Apr 2023 18:11)    Daily     Diet, NPO:   Except Medications (04-10-23 @ 16:05)      CURRENT MEDS:  Neurologic Medications  acetaminophen     Tablet .. 650 milliGRAM(s) Oral every 6 hours  cisatracurium Infusion 3 MICROgram(s)/kG/Min IV Continuous <Continuous>  melatonin 5 milliGRAM(s) Oral at bedtime  propofol Infusion 5 MICROgram(s)/kG/Min IV Continuous <Continuous>    Respiratory Medications  acetylcysteine 10%  Inhalation 4 milliLiter(s) Inhalation every 8 hours  albuterol    90 MICROgram(s) HFA Inhaler 1 Puff(s) Inhalation every 8 hours  guaifenesin/dextromethorphan Oral Liquid 10 milliLiter(s) Oral every 4 hours    Cardiovascular Medications  doxazosin 4 milliGRAM(s) Oral at bedtime  midodrine 5 milliGRAM(s) Oral every 8 hours  norepinephrine Infusion 0.01 MICROgram(s)/kG/Min IV Continuous <Continuous>    Gastrointestinal Medications  albumin human  5% IVPB 250 milliLiter(s) IV Intermittent once  bisacodyl Suppository 10 milliGRAM(s) Rectal daily  famotidine    Tablet 20 milliGRAM(s) Oral daily  lactated ringers Bolus 250 milliLiter(s) IV Bolus once  lactated ringers. 1000 milliLiter(s) IV Continuous <Continuous>  polyethylene glycol 3350 17 Gram(s) Oral daily  senna 2 Tablet(s) Oral at bedtime  sodium chloride 0.9% lock flush 10 milliLiter(s) IV Push every 1 hour PRN Pre/post blood products, medications, blood draw, and to maintain line patency    Genitourinary Medications    Hematologic/Oncologic Medications  aspirin  chewable 81 milliGRAM(s) Oral daily  clopidogrel Tablet 75 milliGRAM(s) Oral daily  heparin  Infusion 1100 Unit(s)/Hr IV Continuous <Continuous>    Antimicrobial/Immunologic Medications  cefepime   IVPB 1000 milliGRAM(s) IV Intermittent every 12 hours  vancomycin    Solution 125 milliGRAM(s) Oral every 6 hours  vancomycin  IVPB 750 milliGRAM(s) IV Intermittent every 12 hours    Endocrine/Metabolic Medications  atorvastatin 80 milliGRAM(s) Oral at bedtime  insulin lispro (ADMELOG) corrective regimen sliding scale   SubCutaneous every 6 hours  vasopressin Infusion 0.04 Unit(s)/Min IV Continuous <Continuous>    Topical/Other Medications  atropine 1% Solution 1 Drop(s) Left EYE two times a day  chlorhexidine 0.12% Liquid 15 milliLiter(s) Oral Mucosa every 12 hours  chlorhexidine 2% Cloths 1 Application(s) Topical <User Schedule>  prednisoLONE acetate 1% Suspension 1 Drop(s) Left EYE every 6 hours      ICU Vital Signs Last 24 Hrs  T(C): 34.8 (2023 08:00), Max: 36.4 (10 Apr 2023 12:00)  T(F): 94.7 (2023 08:00), Max: 97.5 (10 Apr 2023 12:00)  HR: 101 (2023 09:00) (28 - 130)  BP: 119/62 (2023 09:00) (47/30 - 152/112)  BP(mean): 85 (2023 09:00) (35 - 125)  ABP: 113/62 (2023 09:00) (95/55 - 139/57)  ABP(mean): 83 (2023 09:00) (59 - 86)  RR: 24 (2023 09:00) (13 - 45)  SpO2: 98% (2023 09:00) (71% - 100%)    O2 Parameters below as of 2023 09:00  Patient On (Oxygen Delivery Method): ventilator            Mode: AC/ CMV (Assist Control/ Continuous Mandatory Ventilation)  RR (machine): 20  TV (machine): 450  FiO2: 90  PEEP: 16  ITime: 1  MAP: 21  PIP: 32    ABG - ( 2023 08:22 )  pH, Arterial: 7.21  pH, Blood: x     /  pCO2: 66    /  pO2: 95    / HCO3: 26    / Base Excess: -2.2  /  SaO2: 99.7                I&O's Summary    10 Apr 2023 07:  -  2023 07:00  --------------------------------------------------------  IN: 1902.1 mL / OUT: 1615 mL / NET: 287.1 mL    2023 07:01  -  2023 10:02  --------------------------------------------------------  IN: 80 mL / OUT: 40 mL / NET: 40 mL      I&O's Detail    10 Apr 2023 07:01  -  2023 07:00  --------------------------------------------------------  IN:    Cisatracurium: 76 mL    Dexmedetomidine: 9.5 mL    Enteral Tube Flush: 100 mL    Heparin: 99 mL    IV PiggyBack: 530 mL    IV PiggyBack: 250 mL    IV PiggyBack: 350 mL    Norepinephrine: 148 mL    Peptamen A.F.: 240 mL    Propofol: 45.6 mL    Vasopressin: 54 mL  Total IN: 1902.1 mL    OUT:    Indwelling Catheter - Urethral (mL): 1615 mL  Total OUT: 1615 mL    Total NET: 287.1 mL      2023 07:01  -  2023 10:02  --------------------------------------------------------  IN:    Cisatracurium: 30.8 mL    Heparin: 22 mL    Propofol: 15.2 mL    Vasopressin: 12 mL  Total IN: 80 mL    OUT:    Indwelling Catheter - Urethral (mL): 40 mL  Total OUT: 40 mL    Total NET: 40 mL          PHYSICAL EXAM:  PHYSICAL EXAM:  General/Neuro: Follows commands, awake, alert  Lungs: On vent, trach. Clear to auscultation, Normal expansion/effort.   Cardiovascular : S1, S2.  Regular rate and rhythm. + Peripheral edema   GI: Abdomen soft, Non-tender, Non-distended. PEG 3cm at skin appreciated.  Extremities: Extremities warm, pink, well-perfused.  Derm: Good skin turgor, no skin breakdown.    : Blair catheter in place. Diffuse scrotal edema appreciated  General/Neuro  GCS: 3T     Deficits:  Sedated  Lungs:  Trach in place, ventilated equal expansion b/l.   Cardiovascular : S1, S2.  Regular rate and rhythm.  + Peripheral edema   GI: PEG appreciated. Soft, non-distended.    Extremities: Extremities warm, pink, well-perfused.   Derm: Good skin turgor, no skin breakdown. Edematous  : Blair catheter in place. Diffuse scrotal edema appreciated      CXR:   < from: Xray Chest 1 View- PORTABLE-Routine (Xray Chest 1 View- PORTABLE-Routine in AM.) (23 @ 06:04) >  Impression:    Stable bilateral opacities/effusions. No pneumothorax    --- End of Report ---  < end of copied text >    < from: Xray Chest 1 View-PORTABLE IMMEDIATE (Xray Chest 1 View-PORTABLE IMMEDIATE .) (04.10.23 @ 22:19) >  Impression:  Right IJ line tip satisfactory position. No pneumothorax    Diminishing left lung opacities with persistent bilateral effusions and   stable right lung opacities.    --- End of Report ---  < end of copied text >    < from: Xray Chest 1 View-PORTABLE IMMEDIATE (Xray Chest 1 View-PORTABLE IMMEDIATE .) (04.10.23 @ 21:23) >  Impression:    Improved aeration left upper lobe with persistent large left effusion,   right lung opacity/effusion.    No pneumothorax    --- End of Report ---  < end of copied text >    < from: Xray Chest 1 View-PORTABLE IMMEDIATE (Xray Chest 1 View-PORTABLE IMMEDIATE .) (04.10.23 @ 20:15) >  Impression:    Interval complete opacification of the left hemithorax, which may   represent mucous plugging/atelectasis.  Unchanged right pleural effusion.    --- End of Report ---  < end of copied text >    < from: Xray Chest 1 View-PORTABLE IMMEDIATE (Xray Chest 1 View-PORTABLE IMMEDIATE .) (04.10.23 @ 16:38) >  Impression:    Unchanged bilateral pleural effusions, opacities.    --- End of Report ---  < end of copied text >    < from: Xray Chest 1 View- PORTABLE-Routine (Xray Chest 1 View- PORTABLE-Routine in AM.) (04.10.23 @ 06:16) >  Impression:  Stablebilateral opacities/effusions. No pneumothorax    --- End of Report ---  < end of copied text >      LABS:  CAPILLARY BLOOD GLUCOSE  POCT Blood Glucose.: 164 mg/dL (2023 06:27)  POCT Blood Glucose.: 105 mg/dL (2023 01:31)  POCT Blood Glucose.: 143 mg/dL (10 Apr 2023 17:55)  POCT Blood Glucose.: 135 mg/dL (10 Apr 2023 11:55)                          9.6    9.24  )-----------( 247      ( 10 Apr 2023 19:32 )             31.7       04-10    147<H>  |  114<H>  |  45<H>  ----------------------------<  127<H>  4.2   |  27  |  1.0    Ca    8.2<L>      10 Apr 2023 19:32  Phos  3.8     04-10  Mg     2.2     04-10        PT/INR - ( 2023 04:45 )   PT: 15.10 sec;   INR: 1.31 ratio         PTT - ( 2023 04:45 )  PTT:79.9 sec  CARDIAC MARKERS ( 10 Apr 2023 14:39 )  x     / x     / 423 U/L / x     / x          Culture - Bronchial (collected 2023 15:44)  Source: .Bronchial None  Gram Stain (2023 07:05):    Few polymorphonuclear leukocytes seen per low power field    Rare Squamous epithelial cells seen per low power field    No organisms seen  Final Report (10 Apr 2023 16:33):    Normal Respiratory Fidelia present

## 2023-04-11 NOTE — CONSULT NOTE ADULT - SUBJECTIVE AND OBJECTIVE BOX
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 Pt walks with walker and got up in middle of night to get food and slipped and fell on his R side. Pt has been having R hip pain since with inability to ambulate. Pts family finally convinced him to come to hospital today. Pt is having 10/10 pain currently. Denies dizziness, CP or LOC prior to fall. Denies head trauma. No HA. (20 Mar 2023 12:02)      ---  cardio fellow additional notes:    Patient had PEG and trach on . EP were consulted for junctional damaris and a 5 sec pause and was planned for a PPM. His condition deteriorated overnight and was started on levophed and vasopressin for hemodynamic support.     PAST MEDICAL & SURGICAL HISTORY  CAD (coronary artery disease)    Hypercholesteremia    BPH (benign prostatic hyperplasia)    Kidney stones    Prostate cancer    Status post cardiac surgery  cardiac stents    Bilateral cataracts    History of lithotripsy        FAMILY HISTORY:  FAMILY HISTORY:  FH: colon cancer (Mother)        SOCIAL HISTORY:  Social History:  Lives with son in apartment (20 Mar 2023 12:02)      ALLERGIES:  No Known Allergies      MEDICATIONS:  acetaminophen     Tablet .. 650 milliGRAM(s) Oral every 6 hours  acetylcysteine 10%  Inhalation 4 milliLiter(s) Inhalation every 8 hours  albumin human  5% IVPB 250 milliLiter(s) IV Intermittent once  albuterol    90 MICROgram(s) HFA Inhaler 1 Puff(s) Inhalation every 8 hours  aspirin  chewable 81 milliGRAM(s) Oral daily  atorvastatin 80 milliGRAM(s) Oral at bedtime  atropine 1% Solution 1 Drop(s) Left EYE two times a day  bisacodyl Suppository 10 milliGRAM(s) Rectal daily  cefepime   IVPB 1000 milliGRAM(s) IV Intermittent every 12 hours  chlorhexidine 0.12% Liquid 15 milliLiter(s) Oral Mucosa every 12 hours  chlorhexidine 2% Cloths 1 Application(s) Topical <User Schedule>  cisatracurium Infusion 3 MICROgram(s)/kG/Min (11.4 mL/Hr) IV Continuous <Continuous>  clopidogrel Tablet 75 milliGRAM(s) Oral daily  doxazosin 4 milliGRAM(s) Oral at bedtime  famotidine    Tablet 20 milliGRAM(s) Oral daily  guaifenesin/dextromethorphan Oral Liquid 10 milliLiter(s) Oral every 4 hours  heparin  Infusion 1100 Unit(s)/Hr (11 mL/Hr) IV Continuous <Continuous>  insulin lispro (ADMELOG) corrective regimen sliding scale   SubCutaneous every 6 hours  lactated ringers Bolus 250 milliLiter(s) IV Bolus once  lactated ringers. 1000 milliLiter(s) (100 mL/Hr) IV Continuous <Continuous>  melatonin 5 milliGRAM(s) Oral at bedtime  midodrine 5 milliGRAM(s) Oral every 8 hours  norepinephrine Infusion 0.01 MICROgram(s)/kG/Min (0.6 mL/Hr) IV Continuous <Continuous>  polyethylene glycol 3350 17 Gram(s) Oral daily  prednisoLONE acetate 1% Suspension 1 Drop(s) Left EYE every 6 hours  propofol Infusion 5 MICROgram(s)/kG/Min (1.91 mL/Hr) IV Continuous <Continuous>  senna 2 Tablet(s) Oral at bedtime  vancomycin    Solution 125 milliGRAM(s) Oral every 6 hours  vancomycin  IVPB 750 milliGRAM(s) IV Intermittent every 12 hours  vasopressin Infusion 0.04 Unit(s)/Min (6 mL/Hr) IV Continuous <Continuous>    PRN:  sodium chloride 0.9% lock flush 10 milliLiter(s) IV Push every 1 hour PRN      HOME MEDICATIONS:  Home Medications:  atropine 1% ophthalmic solution: 1 drop(s) to each affected eye 2 times a day (20 Mar 2023 15:44)  erythromycin 0.5% ophthalmic ointment: 1 application to each affected eye 2 times a day (20 Mar 2023 15:44)  prednisoLONE acetate 1% ophthalmic suspension: 1 drop(s) to each affected eye every 6 hours (20 Mar 2023 15:44)  tamsulosin 0.4 mg oral capsule: 1 cap(s) orally once a day (at bedtime)  - if you continue to have your blood pressures drop when you stand, then this medication will need to be STOPPED (20 Mar 2023 15:44)      VITALS:   T(F): 96.8 ( @ 13:00), Max: 98.5 ( @ 12:00)  HR: 109 ( @ 13:00) (28 - 132)  BP: 126/68 ( @ 12:00) (47/30 - 171/72)  BP(mean): 91 ( @ 12:00) (35 - 125)  RR: 24 ( @ 13:00) (13 - 45)  SpO2: 98% ( @ 13:00) (71% - 100%)    I&O's Summary    10 Apr 2023 07:01  -  2023 07:00  --------------------------------------------------------  IN: 1902.1 mL / OUT: 1615 mL / NET: 287.1 mL    2023 07:01  -  2023 13:55  --------------------------------------------------------  IN: 1285.9 mL / OUT: 53 mL / NET: 1232.9 mL        REVIEW OF SYSTEMS:  Patient intubated, not responsive    PHYSICAL EXAM:  General: Not in distress.  Non-toxic appearing.   HEENT: EOMI  Cardio: regular, S1, S2, no murmur  Pulm: crackles bilaterally  Abdomen: Soft, non-tender, non-distended. Normoactive bowel sounds  Extremities: No edema b/l le  Neuro: A&O x0 not responsive to stimuli    LABS:                        9.6    9.24  )-----------( 247      ( 10 Apr 2023 19:32 )             31.7     04-10    147<H>  |  114<H>  |  45<H>  ----------------------------<  127<H>  4.2   |  27  |  1.0    Ca    8.2<L>      10 Apr 2023 19:32  Phos  3.8     04-10  Mg     2.2     04-10      PT/INR - ( 2023 04:45 )   PT: 15.10 sec;   INR: 1.31 ratio         PTT - ( 2023 04:45 )  PTT:79.9 sec  Creatine Kinase, Serum: 126 U/L (23 @ 11:19)  Troponin T, Serum: 0.08 ng/mL *HH* (23 @ 11:19)  Creatine Kinase, Serum: 423 U/L *H* (04-10-23 @ 14:39)    CARDIAC MARKERS ( 2023 11:19 )  x     / 0.08 ng/mL / 126 U/L / x     / 3.2 ng/mL  CARDIAC MARKERS ( 10 Apr 2023 14:39 )  x     / x     / 423 U/L / x     / x            Troponin trend:        COVID-19 PCR: NotDetec (2023 21:16)      RADIOLOGY:  -CXR: bilateral effusions  -TTE: EF 70-75% mid cavity obliteration, gradient 65  -CCTA:  -STRESS TEST:  -CATHETERIZATION: : LAD tubular stenosis 40-50%, RCA 30% prox RCA and 30% mid RCA  -OTHER:  EC Lead ECG:   Ventricular Rate 115 BPM    Atrial Rate 115 BPM    P-R Interval 132 ms    QRS Duration 116 ms    Q-T Interval 346 ms    QTC Calculation(Bazett) 478 ms    P Axis 1 degrees    R Axis -5 degrees    T Axis 48 degrees    Diagnosis Line Sinus tachycardia  Low voltage QRS  Right bundle branch block  Abnormal ECG    Confirmed by Uzair Bass ([id number]) on 4/10/2023 8:00:32 PM (04-10 @ 16:07)      TELEMETRY EVENTS:

## 2023-04-12 NOTE — PROGRESS NOTE ADULT - SUBJECTIVE AND OBJECTIVE BOX
Over Night Events: events noted, remain critically ill, vent dependant, paralyzed, on propofol, nimbex, levophed, and hep, low grade fever    PHYSICAL EXAM    ICU Vital Signs Last 24 Hrs  T(C): 37.3 (11 Apr 2023 21:00), Max: 37.3 (11 Apr 2023 21:00)  T(F): 99.1 (11 Apr 2023 21:00), Max: 99.1 (11 Apr 2023 21:00)  HR: 101 (12 Apr 2023 05:24) (75 - 118)  BP: 126/68 (11 Apr 2023 12:00) (98/70 - 159/81)  BP(mean): 91 (11 Apr 2023 12:00) (78 - 109)  ABP: 142/55 (12 Apr 2023 04:00) (95/55 - 166/76)  ABP(mean): 83 (12 Apr 2023 04:00) (69 - 112)  RR: 24 (12 Apr 2023 04:00) (17 - 24)  SpO2: 100% (12 Apr 2023 05:24) (95% - 100%)    O2 Parameters below as of 12 Apr 2023 04:00  Patient On (Oxygen Delivery Method): ventilator    O2 Concentration (%): 90        General: ill looking  HEENT: trach  Lungs: Bilateral rhonchi  Cardiovascular: Regular   Abdomen: Soft, Positive BS  anasarca  04-10-23 @ 07:01  -  04-11-23 @ 07:00  --------------------------------------------------------  IN:    Cisatracurium: 76 mL    Dexmedetomidine: 9.5 mL    Enteral Tube Flush: 100 mL    Heparin: 99 mL    IV PiggyBack: 530 mL    IV PiggyBack: 250 mL    IV PiggyBack: 350 mL    Norepinephrine: 148 mL    Peptamen A.F.: 240 mL    Propofol: 45.6 mL    Vasopressin: 54 mL  Total IN: 1902.1 mL    OUT:    Indwelling Catheter - Urethral (mL): 1615 mL  Total OUT: 1615 mL    Total NET: 287.1 mL      04-11-23 @ 07:01  -  04-12-23 @ 05:51  --------------------------------------------------------  IN:    Cisatracurium: 549.6 mL    Enteral Tube Flush: 360 mL    Heparin: 133 mL    Heparin: 77 mL    IV PiggyBack: 250 mL    Lactated Ringers: 2000 mL    Lactated Ringers Bolus: 1250 mL    Norepinephrine: 789.6 mL    Propofol: 289.1 mL    Vasopressin: 126 mL  Total IN: 5824.3 mL    OUT:    Indwelling Catheter - Urethral (mL): 148 mL  Total OUT: 148 mL    Total NET: 5676.3 mL          LABS:                          8.4    16.56 )-----------( 283      ( 11 Apr 2023 20:39 )             28.5                                               04-11    144  |  109  |  52<H>  ----------------------------<  182<H>  4.8   |  24  |  1.4    Ca    8.1<L>      11 Apr 2023 20:18  Phos  4.6     04-11  Mg     2.1     04-11        PT/INR - ( 11 Apr 2023 04:45 )   PT: 15.10 sec;   INR: 1.31 ratio         PTT - ( 12 Apr 2023 04:40 )  PTT:72.5 sec                                           CARDIAC MARKERS ( 12 Apr 2023 04:40 )  x     / 0.09 ng/mL / 79 U/L / x     / 1.5 ng/mL  CARDIAC MARKERS ( 12 Apr 2023 01:33 )  x     / 0.09 ng/mL / 83 U/L / x     / 1.7 ng/mL  CARDIAC MARKERS ( 11 Apr 2023 11:19 )  x     / 0.08 ng/mL / 126 U/L / x     / 3.2 ng/mL  CARDIAC MARKERS ( 10 Apr 2023 14:39 )  x     / x     / 423 U/L / x     / x                                                                                                                                      Mode: AC/ CMV (Assist Control/ Continuous Mandatory Ventilation)  RR (machine): 24  TV (machine): 450  FiO2: 70  PEEP: 16  ITime: 1  MAP: 12  PIP: 29                                      ABG - ( 12 Apr 2023 04:30 )  pH, Arterial: 7.32  pH, Blood: x     /  pCO2: 46    /  pO2: 221   / HCO3: 24    / Base Excess: -2.5  /  SaO2: 100.0               MEDICATIONS  (STANDING):  acetaminophen     Tablet .. 650 milliGRAM(s) Oral every 6 hours  acetylcysteine 10%  Inhalation 4 milliLiter(s) Inhalation every 8 hours  albumin human  5% IVPB 250 milliLiter(s) IV Intermittent once  albuterol    90 MICROgram(s) HFA Inhaler 1 Puff(s) Inhalation every 8 hours  aspirin  chewable 81 milliGRAM(s) Oral daily  atorvastatin 80 milliGRAM(s) Oral at bedtime  atropine 1% Solution 1 Drop(s) Left EYE two times a day  caspofungin IVPB      cefepime   IVPB 1000 milliGRAM(s) IV Intermittent every 12 hours  chlorhexidine 0.12% Liquid 15 milliLiter(s) Oral Mucosa every 12 hours  chlorhexidine 2% Cloths 1 Application(s) Topical <User Schedule>  cisatracurium Infusion 3 MICROgram(s)/kG/Min (11.4 mL/Hr) IV Continuous <Continuous>  clopidogrel Tablet 75 milliGRAM(s) Oral daily  doxazosin 4 milliGRAM(s) Oral at bedtime  famotidine    Tablet 20 milliGRAM(s) Oral daily  guaifenesin/dextromethorphan Oral Liquid 10 milliLiter(s) Oral every 4 hours  heparin  Infusion 1000 Unit(s)/Hr (9 mL/Hr) IV Continuous <Continuous>  insulin lispro (ADMELOG) corrective regimen sliding scale   SubCutaneous every 6 hours  lactated ringers. 1000 milliLiter(s) (100 mL/Hr) IV Continuous <Continuous>  melatonin 5 milliGRAM(s) Oral at bedtime  midodrine 5 milliGRAM(s) Oral every 8 hours  norepinephrine Infusion 0.01 MICROgram(s)/kG/Min (0.6 mL/Hr) IV Continuous <Continuous>  prednisoLONE acetate 1% Suspension 1 Drop(s) Left EYE every 6 hours  propofol Infusion 5 MICROgram(s)/kG/Min (1.91 mL/Hr) IV Continuous <Continuous>  vancomycin    Solution 125 milliGRAM(s) Oral every 6 hours  vancomycin  IVPB 750 milliGRAM(s) IV Intermittent every 12 hours  vasopressin Infusion 0.04 Unit(s)/Min (6 mL/Hr) IV Continuous <Continuous>    MEDICATIONS  (PRN):  fentaNYL    Injectable 25 MICROGram(s) IV Push every 3 hours PRN Severe Pain (7 - 10)  sodium chloride 0.9% lock flush 10 milliLiter(s) IV Push every 1 hour PRN Pre/post blood products, medications, blood draw, and to maintain line patency    cxr reviewed

## 2023-04-12 NOTE — CONSULT NOTE ADULT - PROBLEM SELECTOR RECOMMENDATION 9
Full consult to follow shortly. Please call [phone] with any acute concerns. - c/w caspofungin, cefepime, high risk, monitor counts  - c/w IV vanco and PO, monitor levels

## 2023-04-12 NOTE — PROGRESS NOTE ADULT - ASSESSMENT
IMPRESSION:    Acute hypoxemic respiratory failure sp trach/ peg/ ABG noted  S/p fall s/p R ORIF  Pneumonia, treated  Septic shock, resolved  Volume overload/ Pulmonary edema  C. diff infection  HO CAD  HO Prostate Ca    PLAN:    CNS: add fentanyl, taper propofol, dc nimbex, ativan as needed, if agitation off nimbex add seroquel 50 q 12 ( monitor qt)    HEENT: Oral care    PULMONARY:  HOB @ 45 degrees.  Vent changes as follows: Dec FiO2 50%, PEEP 12, Increase RR 28, monitor P/P/ DP    CARDIOVASCULAR: Avoid volume overload. Diurese as tolerated. Strict I's and O's. bumex drip 1/ mg/h    GI: GI prophylaxis. PEG feeding,     RENAL:  Follow up lytes.  Correct as needed    INFECTIOUS DISEASE: abx per ID    HEMATOLOGICAL:  DVT prophylaxis. HEP    ENDOCRINE:  Follow up FS. Target -180.     MUSCULOSKELETAL: PT/OT eval. Increase ambulation as tolerated.     sicu  very poor prognosis

## 2023-04-12 NOTE — CHART NOTE - NSCHARTNOTEFT_GEN_A_CORE
Registered Dietitian Follow-Up     Patient Profile Reviewed                           Yes []   No []     Nutrition History Previously Obtained        Yes []  No []       Pertinent Medical Interventions: Pt continues to be managed S/P ORIF of right hip using interlocking intramedullary stephon and S/P Trach & PEG; course in the last few days complicated by increased vent requirements, now improving and +Candida aureus 4/6 *(nares/axilla/groin) --per ID start caspofungin until BCx result. Remains on vent, sedated and on pressor. GOC discussions ongoing.      Diet order:   Diet, NPO:   Except Medications (04-11-23 @ 14:55) [Active]    Anthropometrics:  Height (cm): 175.3 (04-10-23 @ 18:11)  Weight (kg): 63.5 (04-10-23 @ 18:11)  BMI (kg/m2): 20.7 (04-10-23 @ 18:11)  IBW: 72.7 kg  Weight Change: no new weight to assess    MEDICATIONS  (STANDING):  acetaminophen     Tablet .. 650 milliGRAM(s) Oral every 6 hours  acetylcysteine 10%  Inhalation 4 milliLiter(s) Inhalation every 8 hours  albuterol    90 MICROgram(s) HFA Inhaler 1 Puff(s) Inhalation every 8 hours  aspirin  chewable 81 milliGRAM(s) Oral daily  atorvastatin 80 milliGRAM(s) Oral at bedtime  atropine 1% Solution 1 Drop(s) Left EYE two times a day  caspofungin IVPB      caspofungin IVPB 50 milliGRAM(s) IV Intermittent every 24 hours  cefepime   IVPB 1000 milliGRAM(s) IV Intermittent every 12 hours  chlorhexidine 0.12% Liquid 15 milliLiter(s) Oral Mucosa every 12 hours  chlorhexidine 2% Cloths 1 Application(s) Topical <User Schedule>  cisatracurium Infusion 3 MICROgram(s)/kG/Min (11.4 mL/Hr) IV Continuous <Continuous>  clopidogrel Tablet 75 milliGRAM(s) Oral daily  doxazosin 4 milliGRAM(s) Oral at bedtime  famotidine    Tablet 20 milliGRAM(s) Oral daily  guaifenesin/dextromethorphan Oral Liquid 10 milliLiter(s) Oral every 4 hours  insulin lispro (ADMELOG) corrective regimen sliding scale   SubCutaneous every 6 hours  lactated ringers. 1000 milliLiter(s) (100 mL/Hr) IV Continuous <Continuous>  midodrine 5 milliGRAM(s) Oral every 8 hours  norepinephrine Infusion 0.01 MICROgram(s)/kG/Min (0.6 mL/Hr) IV Continuous <Continuous>  prednisoLONE acetate 1% Suspension 1 Drop(s) Left EYE every 6 hours  propofol Infusion 5 MICROgram(s)/kG/Min IV Continuous <Continuous>  -- 13.3ml/hr = 351kcal/day  vancomycin    Solution 125 milliGRAM(s) Oral every 6 hours  vancomycin  IVPB 750 milliGRAM(s) IV Intermittent every 12 hours  vasopressin Infusion 0.04 Unit(s)/Min (6 mL/Hr) IV Continuous <Continuous>    MEDICATIONS  (PRN):  fentaNYL    Injectable 25 MICROGram(s) IV Push every 3 hours PRN Severe Pain (7 - 10)  sodium chloride 0.9% lock flush 10 milliLiter(s) IV Push every 1 hour PRN Pre/post blood products, medications, blood draw, and to maintain line patency    Pertinent Labs:                         8.4    16.56 )-----------( 283      ( 11 Apr 2023 20:39 )             28.5       04-11 @ 20:18: Na 144, BUN 52<H>, Cr 1.4, <H>, K+ 4.8, Phos 4.6, Mg 2.1, Alk Phos --, ALT/SGPT --, AST/SGOT --, HbA1c --    Finger Sticks:  POCT Blood Glucose.: 192 mg/dL (04-12 @ 12:01)  POCT Blood Glucose.: 199 mg/dL (04-12 @ 05:44)  POCT Blood Glucose.: 201 mg/dL (04-11 @ 23:53)  POCT Blood Glucose.: 195 mg/dL (04-11 @ 18:15)    11 Apr 2023 07:01  -  12 Apr 2023 07:00  --------------------------------------------------------  IN:    Cisatracurium: 641.1 mL    Enteral Tube Flush: 360 mL    Heparin: 77 mL    Heparin: 160 mL    IV PiggyBack: 500 mL    IV PiggyBack: 50 mL    Lactated Ringers: 2300 mL    Lactated Ringers Bolus: 1250 mL    Norepinephrine: 840.2 mL    Propofol: 342.4 mL    Vasopressin: 144 mL  Total IN: 6664.7 mL    OUT:    Indwelling Catheter - Urethral (mL): 173 mL  Total OUT: 173 mL    Total NET: 6491.7 mL    Physical Findings:  - Appearance: sedated; 3+ generalized edema; 4+ edema to scrotum  - GI function: abdomen WDL; last bowel movement 11-Apr-2023  - Tubes: PEG  - Oral/Mouth cavity: NPO  - Skin: surgical incisions, no pressure injury      Nutrition Requirements:  Weight Used: dosing weight 63.5 kg     Estimated Energy Needs    Continue []  Adjust []   Energy Recommendations: 1596  kcal/day PSE Tm 37.3 Ve 9.4        Estimated Protein Needs    Continue []  Adjust [x]  Protein Recommendations: 83-95 gm/day 1.3-1.5g/kg        Estimated Fluid Needs        Continue []  Adjust []  Fluid Recommendations: 1587 mL/day 25ml/kg     Nutrient Intake: Pt on trickle feeds 4/3-4/9 PM. TF rate increased to 30ml/hr 4/9-4/10 midnight and held after infusing at the same rate for 16 hours. Feeds have been held since 4/10.      [] Previous Nutrition Diagnosis: Inadequate Energy Intake            [x] Ongoing          [] Resolved    **Pt now meets criteria for severe malnutrition in the context of acute illness as evidenced by <50% estimated needs met for > 5 days, moderate to severe orbital and buccal fat depletion, severe edema masking weight loss.     Nutrition Intervention      Goal/Expected Outcome: Pt to meet >90% & <105% estimated needs via EN in 3-5 days     Indicator/Monitoring: Monitor diet order, kcal intake, body composition, NFPE, labs  (lytes, BG, renal indices)    Recommendation:  Switch formulas to Vital High Protein (lower %fat for more balanced nutrient breakdown while pt receiving IV fat emulsion with propofol). Start at 20ml/hr and advance by 20ml Q4H to goal 55ml/hr. -- will provide 1320kcal, 114g protein, 1109ml free water. +351kcal/day from propofol.  Interruptions to TF considered.  Cont with GOC discussion     Patient assessed at high nutrition risk.  RD spectra [id number], also available on Teams. Registered Dietitian Follow-Up     Patient Profile Reviewed                           Yes []   No []     Nutrition History Previously Obtained        Yes []  No []       Pertinent Medical Interventions: Pt continues to be managed S/P ORIF of right hip using interlocking intramedullary stephon and S/P Trach & PEG; course in the last few days complicated by increased vent requirements, now improving and +Candida aureus 4/6 *(nares/axilla/groin) --per ID start caspofungin until BCx result. Remains on vent, sedated and on pressor. GOC discussions ongoing. Per chart, holding feeds and will reassess 4/13 to resume feeds provided pressor requirements have decreased.     Diet order:   Diet, NPO:   Except Medications (04-11-23 @ 14:55) [Active]    Anthropometrics:  Height (cm): 175.3 (04-10-23 @ 18:11)  Weight (kg): 63.5 (04-10-23 @ 18:11)  BMI (kg/m2): 20.7 (04-10-23 @ 18:11)  IBW: 72.7 kg  Weight Change: no new weight to assess    MEDICATIONS  (STANDING):  acetaminophen     Tablet .. 650 milliGRAM(s) Oral every 6 hours  acetylcysteine 10%  Inhalation 4 milliLiter(s) Inhalation every 8 hours  albuterol    90 MICROgram(s) HFA Inhaler 1 Puff(s) Inhalation every 8 hours  aspirin  chewable 81 milliGRAM(s) Oral daily  atorvastatin 80 milliGRAM(s) Oral at bedtime  atropine 1% Solution 1 Drop(s) Left EYE two times a day  caspofungin IVPB      caspofungin IVPB 50 milliGRAM(s) IV Intermittent every 24 hours  cefepime   IVPB 1000 milliGRAM(s) IV Intermittent every 12 hours  chlorhexidine 0.12% Liquid 15 milliLiter(s) Oral Mucosa every 12 hours  chlorhexidine 2% Cloths 1 Application(s) Topical <User Schedule>  cisatracurium Infusion 3 MICROgram(s)/kG/Min (11.4 mL/Hr) IV Continuous <Continuous>  clopidogrel Tablet 75 milliGRAM(s) Oral daily  doxazosin 4 milliGRAM(s) Oral at bedtime  famotidine    Tablet 20 milliGRAM(s) Oral daily  guaifenesin/dextromethorphan Oral Liquid 10 milliLiter(s) Oral every 4 hours  insulin lispro (ADMELOG) corrective regimen sliding scale   SubCutaneous every 6 hours  lactated ringers. 1000 milliLiter(s) (100 mL/Hr) IV Continuous <Continuous>  midodrine 5 milliGRAM(s) Oral every 8 hours  norepinephrine Infusion 0.01 MICROgram(s)/kG/Min (0.6 mL/Hr) IV Continuous <Continuous>  prednisoLONE acetate 1% Suspension 1 Drop(s) Left EYE every 6 hours  propofol Infusion 5 MICROgram(s)/kG/Min IV Continuous <Continuous>  -- 13.3ml/hr = 351kcal/day  vancomycin    Solution 125 milliGRAM(s) Oral every 6 hours  vancomycin  IVPB 750 milliGRAM(s) IV Intermittent every 12 hours  vasopressin Infusion 0.04 Unit(s)/Min (6 mL/Hr) IV Continuous <Continuous>    MEDICATIONS  (PRN):  fentaNYL    Injectable 25 MICROGram(s) IV Push every 3 hours PRN Severe Pain (7 - 10)  sodium chloride 0.9% lock flush 10 milliLiter(s) IV Push every 1 hour PRN Pre/post blood products, medications, blood draw, and to maintain line patency    Pertinent Labs:                         8.4    16.56 )-----------( 283      ( 11 Apr 2023 20:39 )             28.5       04-11 @ 20:18: Na 144, BUN 52<H>, Cr 1.4, <H>, K+ 4.8, Phos 4.6, Mg 2.1, Alk Phos --, ALT/SGPT --, AST/SGOT --, HbA1c --    Finger Sticks:  POCT Blood Glucose.: 192 mg/dL (04-12 @ 12:01)  POCT Blood Glucose.: 199 mg/dL (04-12 @ 05:44)  POCT Blood Glucose.: 201 mg/dL (04-11 @ 23:53)  POCT Blood Glucose.: 195 mg/dL (04-11 @ 18:15)    11 Apr 2023 07:01  -  12 Apr 2023 07:00  --------------------------------------------------------  IN:    Cisatracurium: 641.1 mL    Enteral Tube Flush: 360 mL    Heparin: 77 mL    Heparin: 160 mL    IV PiggyBack: 500 mL    IV PiggyBack: 50 mL    Lactated Ringers: 2300 mL    Lactated Ringers Bolus: 1250 mL    Norepinephrine: 840.2 mL    Propofol: 342.4 mL    Vasopressin: 144 mL  Total IN: 6664.7 mL    OUT:    Indwelling Catheter - Urethral (mL): 173 mL  Total OUT: 173 mL    Total NET: 6491.7 mL    Physical Findings:  - Appearance: sedated; 3+ generalized edema; 4+ edema to scrotum  - GI function: abdomen WDL; last bowel movement 11-Apr-2023  - Tubes: PEG  - Oral/Mouth cavity: NPO  - Skin: surgical incisions, no pressure injury      Nutrition Requirements:  Weight Used: dosing weight 63.5 kg     Estimated Energy Needs    Continue []  Adjust [x]   Energy Recommendations: 1596  kcal/day PSE Tm 37.3 Ve 9.4        Estimated Protein Needs    Continue []  Adjust [x]  Protein Recommendations: 83-95 gm/day 1.3-1.5g/kg        Estimated Fluid Needs        Continue []  Adjust []  Fluid Recommendations: 1587 mL/day 25ml/kg     Nutrient Intake: Pt on trickle feeds 4/3-4/9 PM. TF rate increased to 30ml/hr 4/9-4/10 midnight and held after infusing at the same rate for 16 hours. Feeds have been held since 4/10.      [] Previous Nutrition Diagnosis: Inadequate Energy Intake            [x] Ongoing          [] Resolved    **Pt now meets criteria for severe malnutrition in the context of acute illness as evidenced by <50% estimated needs met for > 5 days, moderate to severe orbital and buccal fat depletion, severe edema masking weight loss.     Nutrition Intervention      Goal/Expected Outcome: Pt to meet >90% & <105% estimated needs via EN in 3-5 days     Indicator/Monitoring: Monitor diet order, kcal intake, body composition, NFPE, labs  (lytes, BG, renal indices)    Recommendation:  Switch formulas to Vital High Protein (lower %fat for more balanced nutrient breakdown while pt receiving IV fat emulsion with propofol). Start at 20ml/hr and advance by 20ml Q4H to goal 55ml/hr. -- will provide 1320kcal, 114g protein, 1109ml free water. +351kcal/day from propofol.  Interruptions to TF considered.  Cont with GOC discussion   If blood culture clear and pt remains with poor tolerance to TF, consider parenteral nutrition if consistent with GOC    Patient assessed at high nutrition risk.  RD spectra [id number], also available on Teams. Registered Dietitian Follow-Up     Patient Profile Reviewed                           Yes [x]   No []     Nutrition History Previously Obtained        Yes []  No [x]       Pertinent Medical Interventions: Pt continues to be managed S/P ORIF of right hip using interlocking intramedullary stephon and S/P Trach & PEG; course in the last few days complicated by increased vent requirements, now improving and +Candida aureus 4/6 *(nares/axilla/groin) --per ID start caspofungin until BCx result. Remains on vent, sedated and on pressor. GOC discussions ongoing. Per chart, holding feeds and will reassess 4/13 to resume feeds provided pressor requirements have decreased.     Diet order:   Diet, NPO:   Except Medications (04-11-23 @ 14:55) [Active]    Anthropometrics:  Height (cm): 175.3 (04-10-23 @ 18:11)  Weight (kg): 63.5 (04-10-23 @ 18:11)  BMI (kg/m2): 20.7 (04-10-23 @ 18:11)  IBW: 72.7 kg  Weight Change: no new weight to assess    MEDICATIONS  (STANDING):  acetaminophen     Tablet .. 650 milliGRAM(s) Oral every 6 hours  acetylcysteine 10%  Inhalation 4 milliLiter(s) Inhalation every 8 hours  albuterol    90 MICROgram(s) HFA Inhaler 1 Puff(s) Inhalation every 8 hours  aspirin  chewable 81 milliGRAM(s) Oral daily  atorvastatin 80 milliGRAM(s) Oral at bedtime  atropine 1% Solution 1 Drop(s) Left EYE two times a day  caspofungin IVPB      caspofungin IVPB 50 milliGRAM(s) IV Intermittent every 24 hours  cefepime   IVPB 1000 milliGRAM(s) IV Intermittent every 12 hours  chlorhexidine 0.12% Liquid 15 milliLiter(s) Oral Mucosa every 12 hours  chlorhexidine 2% Cloths 1 Application(s) Topical <User Schedule>  cisatracurium Infusion 3 MICROgram(s)/kG/Min (11.4 mL/Hr) IV Continuous <Continuous>  clopidogrel Tablet 75 milliGRAM(s) Oral daily  doxazosin 4 milliGRAM(s) Oral at bedtime  famotidine    Tablet 20 milliGRAM(s) Oral daily  guaifenesin/dextromethorphan Oral Liquid 10 milliLiter(s) Oral every 4 hours  insulin lispro (ADMELOG) corrective regimen sliding scale   SubCutaneous every 6 hours  lactated ringers. 1000 milliLiter(s) (100 mL/Hr) IV Continuous <Continuous>  midodrine 5 milliGRAM(s) Oral every 8 hours  norepinephrine Infusion 0.01 MICROgram(s)/kG/Min (0.6 mL/Hr) IV Continuous <Continuous>  prednisoLONE acetate 1% Suspension 1 Drop(s) Left EYE every 6 hours  propofol Infusion 5 MICROgram(s)/kG/Min IV Continuous <Continuous>  -- 13.3ml/hr = 351kcal/day  vancomycin    Solution 125 milliGRAM(s) Oral every 6 hours  vancomycin  IVPB 750 milliGRAM(s) IV Intermittent every 12 hours  vasopressin Infusion 0.04 Unit(s)/Min (6 mL/Hr) IV Continuous <Continuous>    MEDICATIONS  (PRN):  fentaNYL    Injectable 25 MICROGram(s) IV Push every 3 hours PRN Severe Pain (7 - 10)  sodium chloride 0.9% lock flush 10 milliLiter(s) IV Push every 1 hour PRN Pre/post blood products, medications, blood draw, and to maintain line patency    Pertinent Labs:                         8.4    16.56 )-----------( 283      ( 11 Apr 2023 20:39 )             28.5       04-11 @ 20:18: Na 144, BUN 52<H>, Cr 1.4, <H>, K+ 4.8, Phos 4.6, Mg 2.1, Alk Phos --, ALT/SGPT --, AST/SGOT --, HbA1c --    Finger Sticks:  POCT Blood Glucose.: 192 mg/dL (04-12 @ 12:01)  POCT Blood Glucose.: 199 mg/dL (04-12 @ 05:44)  POCT Blood Glucose.: 201 mg/dL (04-11 @ 23:53)  POCT Blood Glucose.: 195 mg/dL (04-11 @ 18:15)    11 Apr 2023 07:01  -  12 Apr 2023 07:00  --------------------------------------------------------  IN:    Cisatracurium: 641.1 mL    Enteral Tube Flush: 360 mL    Heparin: 77 mL    Heparin: 160 mL    IV PiggyBack: 500 mL    IV PiggyBack: 50 mL    Lactated Ringers: 2300 mL    Lactated Ringers Bolus: 1250 mL    Norepinephrine: 840.2 mL    Propofol: 342.4 mL    Vasopressin: 144 mL  Total IN: 6664.7 mL    OUT:    Indwelling Catheter - Urethral (mL): 173 mL  Total OUT: 173 mL    Total NET: 6491.7 mL    Physical Findings:  - Appearance: sedated; 3+ generalized edema; 4+ edema to scrotum  - GI function: abdomen WDL; last bowel movement 11-Apr-2023  - Tubes: PEG  - Oral/Mouth cavity: NPO  - Skin: surgical incisions, no pressure injury      Nutrition Requirements:  Weight Used: dosing weight 63.5 kg     Estimated Energy Needs    Continue []  Adjust [x]   Energy Recommendations: 1596  kcal/day PSE Tm 37.3 Ve 9.4        Estimated Protein Needs    Continue []  Adjust [x]  Protein Recommendations: 83-95 gm/day 1.3-1.5g/kg        Estimated Fluid Needs        Continue []  Adjust []  Fluid Recommendations: 1587 mL/day 25ml/kg     Nutrient Intake: Pt on trickle feeds 4/3-4/9 PM. TF rate increased to 30ml/hr 4/9-4/10 midnight and held after infusing at the same rate for 16 hours. Feeds have been held since 4/10.      [] Previous Nutrition Diagnosis: Inadequate Energy Intake            [x] Ongoing          [] Resolved    **Pt now meets criteria for severe malnutrition in the context of acute illness as evidenced by <50% estimated needs met for > 5 days, moderate to severe orbital and buccal fat depletion, severe edema masking weight loss.     Nutrition Intervention      Goal/Expected Outcome: Pt to meet >90% & <105% estimated needs via EN in 3-5 days     Indicator/Monitoring: Monitor diet order, kcal intake, body composition, NFPE, labs  (lytes, BG, renal indices)    Recommendation:  Switch formulas to Vital High Protein (lower %fat for more balanced nutrient breakdown while pt receiving IV fat emulsion with propofol). Start at 20ml/hr and advance by 20ml Q4H to goal 55ml/hr. -- will provide 1320kcal, 114g protein, 1109ml free water. +351kcal/day from propofol.  Interruptions to TF considered.  Cont with GOC discussion   If blood culture clear and pt remains with poor tolerance to TF, consider parenteral nutrition if consistent with GOC    Patient assessed at high nutrition risk.  RD spectra [id number], also available on Teams. Registered Dietitian Follow-Up     Patient Profile Reviewed                           Yes [x]   No []     Nutrition History Previously Obtained        Yes []  No [x]       Pertinent Medical Interventions: Pt continues to be managed S/P ORIF of right hip using interlocking intramedullary stephon and S/P Trach & PEG; course in the last few days complicated by increased vent requirements, now improving and +Candida aureus 4/6 *(nares/axilla/groin) --per ID start caspofungin until BCx result. Remains on vent, sedated and on pressor. GOC discussions ongoing. Per chart, holding feeds and will reassess 4/13 to resume feeds provided pressor requirements have decreased.     Diet order:   Diet, NPO:   Except Medications (04-11-23 @ 14:55) [Active]    Anthropometrics:  Height (cm): 175.3 (04-10-23 @ 18:11)  Weight (kg): 63.5 (04-10-23 @ 18:11)  BMI (kg/m2): 20.7 (04-10-23 @ 18:11)  IBW: 72.7 kg  Weight Change: no new weight to assess    MEDICATIONS  (STANDING):  acetaminophen     Tablet .. 650 milliGRAM(s) Oral every 6 hours  acetylcysteine 10%  Inhalation 4 milliLiter(s) Inhalation every 8 hours  albuterol    90 MICROgram(s) HFA Inhaler 1 Puff(s) Inhalation every 8 hours  aspirin  chewable 81 milliGRAM(s) Oral daily  atorvastatin 80 milliGRAM(s) Oral at bedtime  atropine 1% Solution 1 Drop(s) Left EYE two times a day  caspofungin IVPB      caspofungin IVPB 50 milliGRAM(s) IV Intermittent every 24 hours  cefepime   IVPB 1000 milliGRAM(s) IV Intermittent every 12 hours  chlorhexidine 0.12% Liquid 15 milliLiter(s) Oral Mucosa every 12 hours  chlorhexidine 2% Cloths 1 Application(s) Topical <User Schedule>  cisatracurium Infusion 3 MICROgram(s)/kG/Min (11.4 mL/Hr) IV Continuous <Continuous>  clopidogrel Tablet 75 milliGRAM(s) Oral daily  doxazosin 4 milliGRAM(s) Oral at bedtime  famotidine    Tablet 20 milliGRAM(s) Oral daily  guaifenesin/dextromethorphan Oral Liquid 10 milliLiter(s) Oral every 4 hours  insulin lispro (ADMELOG) corrective regimen sliding scale   SubCutaneous every 6 hours  lactated ringers. 1000 milliLiter(s) (100 mL/Hr) IV Continuous <Continuous>  midodrine 5 milliGRAM(s) Oral every 8 hours  norepinephrine Infusion 0.01 MICROgram(s)/kG/Min (0.6 mL/Hr) IV Continuous <Continuous>  prednisoLONE acetate 1% Suspension 1 Drop(s) Left EYE every 6 hours  propofol Infusion 5 MICROgram(s)/kG/Min IV Continuous <Continuous>  -- 13.3ml/hr = 351kcal/day  vancomycin    Solution 125 milliGRAM(s) Oral every 6 hours  vancomycin  IVPB 750 milliGRAM(s) IV Intermittent every 12 hours  vasopressin Infusion 0.04 Unit(s)/Min (6 mL/Hr) IV Continuous <Continuous>    MEDICATIONS  (PRN):  fentaNYL    Injectable 25 MICROGram(s) IV Push every 3 hours PRN Severe Pain (7 - 10)  sodium chloride 0.9% lock flush 10 milliLiter(s) IV Push every 1 hour PRN Pre/post blood products, medications, blood draw, and to maintain line patency    Pertinent Labs:                         8.4    16.56 )-----------( 283      ( 11 Apr 2023 20:39 )             28.5       04-11 @ 20:18: Na 144, BUN 52<H>, Cr 1.4, <H>, K+ 4.8, Phos 4.6, Mg 2.1, Alk Phos --, ALT/SGPT --, AST/SGOT --, HbA1c --    Finger Sticks:  POCT Blood Glucose.: 192 mg/dL (04-12 @ 12:01)  POCT Blood Glucose.: 199 mg/dL (04-12 @ 05:44)  POCT Blood Glucose.: 201 mg/dL (04-11 @ 23:53)  POCT Blood Glucose.: 195 mg/dL (04-11 @ 18:15)    11 Apr 2023 07:01  -  12 Apr 2023 07:00  --------------------------------------------------------  IN:    Cisatracurium: 641.1 mL    Enteral Tube Flush: 360 mL    Heparin: 77 mL    Heparin: 160 mL    IV PiggyBack: 500 mL    IV PiggyBack: 50 mL    Lactated Ringers: 2300 mL    Lactated Ringers Bolus: 1250 mL    Norepinephrine: 840.2 mL    Propofol: 342.4 mL    Vasopressin: 144 mL  Total IN: 6664.7 mL    OUT:    Indwelling Catheter - Urethral (mL): 173 mL  Total OUT: 173 mL    Total NET: 6491.7 mL    Physical Findings:  - Appearance: sedated; 3+ generalized edema; 4+ edema to scrotum  - GI function: abdomen WDL; last bowel movement 11-Apr-2023  - Tubes: PEG  - Oral/Mouth cavity: NPO  - Skin: surgical incisions, no pressure injury      Nutrition Requirements:  Weight Used: dosing weight 63.5 kg     Estimated Energy Needs    Continue []  Adjust [x]   Energy Recommendations: 1596  kcal/day PSE Tm 37.3 Ve 9.4        Estimated Protein Needs    Continue []  Adjust [x]  Protein Recommendations: 83-95 gm/day 1.3-1.5g/kg        Estimated Fluid Needs        Continue []  Adjust []  Fluid Recommendations: 1587 mL/day 25ml/kg     Nutrient Intake: Pt on trickle feeds 4/3-4/9 PM. TF rate increased to 30ml/hr 4/9-4/10 midnight and held after infusing at the same rate for 16 hours. Feeds have been held since 4/10.      [] Previous Nutrition Diagnosis: Inadequate Energy Intake            [x] Ongoing          [] Resolved    **Pt now meets criteria for severe malnutrition in the context of acute illness as evidenced by <50% estimated needs met for > 5 days, moderate orbital and buccal fat depletion and moderate to severe edema masking weight loss.     Nutrition Intervention      Goal/Expected Outcome: Pt to meet >90% & <105% estimated needs via EN in 3-5 days     Indicator/Monitoring: Monitor diet order, kcal intake, body composition, NFPE, labs  (lytes, BG, renal indices)    Recommendation:  Switch formulas to Vital High Protein (lower %fat for more balanced nutrient breakdown while pt receiving IV fat emulsion with propofol). Start at 20ml/hr and advance by 20ml Q4H to goal 55ml/hr. -- will provide 1320kcal, 114g protein, 1109ml free water. +351kcal/day from propofol.  Interruptions to TF considered.  Cont with GOC discussion   If blood culture clear and pt remains with poor tolerance to TF, consider parenteral nutrition if consistent with GOC    Patient assessed at high nutrition risk.  RD spectra [id number], also available on Teams.

## 2023-04-12 NOTE — CONSULT NOTE ADULT - PROBLEM SELECTOR RECOMMENDATION 5
- will follow  ______________  Maxime Jean MD  Palliative Medicine  [facility]   [facility]  [phone]

## 2023-04-12 NOTE — CONSULT NOTE ADULT - SUBJECTIVE AND OBJECTIVE BOX
HPI: 77M with PMH of CAD, HLD, prostate CA, L orbital fracture with blindness, here after fall at home, s/p ORIF of R hip. Course c/b acute respiratory failure due to post-op arrhythmias, requiring intubation, now s/p trach and PEG. Is on tylenol and IV fentanyl for acute pain, and also sedated with propofol, paralyzed with nimbex. Also requiring pressors for acute hypotension in the setting of sepsis and ARDS, on broad abx. Patient had C. diff, on PO vanco, and was planned for PPM by EP. Palliative care consulted for GOC, as family reportedly considering comfort-directed care.    PERTINENT PM/SXH:   CAD (coronary artery disease)    Hypercholesteremia    BPH (benign prostatic hyperplasia)    Kidney stones    Hypertension    Diabetes mellitus    Prostate cancer      Status post cardiac surgery    Bilateral cataracts    History of lithotripsy      FAMILY HISTORY:  FH: colon cancer (Mother)      ITEMS NOT CHECKED ARE NOT PRESENT    SOCIAL HISTORY:   Significant other/partner[ ]  Children[X ]  Faith/Spirituality:  Substance hx:  [ ]   Tobacco hx:  [ ]   Alcohol hx: [ ]   Living Situation: [X ]Home  [ ]Long term care  [ ]Rehab [ ]Other  Home Services: [ ] HHA [ ] Visting RN [ ] Hospice  Occupation:  Home Opioid hx:  [ ] Y [ ] N [ ] I-Stop Reference No:    ADVANCE DIRECTIVES:    [ ] Full Code [ ] DNR  MOLST  [ ]  Living Will  [ ]   DECISION MAKER(s):  [ ] Health Care Proxy(s)  [ ] Surrogate(s)  [ ] Guardian           Name(s): Phone Number(s): Song Segura (Son): [phone]      BASELINE (I)ADL(s) (prior to admission):  [place]: [ ]Total  [ ] Moderate [ ]Dependent  Palliative Performance Status Version 2:         %    http://npcrc.org/files/news/palliative_performance_scale_ppsv2.pdf    Allergies    No Known Allergies    Intolerances    MEDICATIONS  (STANDING):  acetaminophen     Tablet .. 650 milliGRAM(s) Oral every 6 hours  acetylcysteine 10%  Inhalation 4 milliLiter(s) Inhalation every 8 hours  albuterol    90 MICROgram(s) HFA Inhaler 1 Puff(s) Inhalation every 8 hours  aspirin  chewable 81 milliGRAM(s) Oral daily  atorvastatin 80 milliGRAM(s) Oral at bedtime  atropine 1% Solution 1 Drop(s) Left EYE two times a day  caspofungin IVPB      cefepime   IVPB 1000 milliGRAM(s) IV Intermittent every 12 hours  chlorhexidine 0.12% Liquid 15 milliLiter(s) Oral Mucosa every 12 hours  chlorhexidine 2% Cloths 1 Application(s) Topical <User Schedule>  cisatracurium Infusion 3 MICROgram(s)/kG/Min (11.4 mL/Hr) IV Continuous <Continuous>  clopidogrel Tablet 75 milliGRAM(s) Oral daily  doxazosin 4 milliGRAM(s) Oral at bedtime  famotidine    Tablet 20 milliGRAM(s) Oral daily  guaifenesin/dextromethorphan Oral Liquid 10 milliLiter(s) Oral every 4 hours  heparin  Infusion 1000 Unit(s)/Hr (9 mL/Hr) IV Continuous <Continuous>  insulin lispro (ADMELOG) corrective regimen sliding scale   SubCutaneous every 6 hours  lactated ringers Bolus 250 milliLiter(s) IV Bolus once  lactated ringers. 1000 milliLiter(s) (100 mL/Hr) IV Continuous <Continuous>  midodrine 5 milliGRAM(s) Oral every 8 hours  norepinephrine Infusion 0.01 MICROgram(s)/kG/Min (0.6 mL/Hr) IV Continuous <Continuous>  prednisoLONE acetate 1% Suspension 1 Drop(s) Left EYE every 6 hours  propofol Infusion 5 MICROgram(s)/kG/Min (1.91 mL/Hr) IV Continuous <Continuous>  vancomycin    Solution 125 milliGRAM(s) Oral every 6 hours  vancomycin  IVPB 750 milliGRAM(s) IV Intermittent every 12 hours  vasopressin Infusion 0.04 Unit(s)/Min (6 mL/Hr) IV Continuous <Continuous>    MEDICATIONS  (PRN):  fentaNYL    Injectable 25 MICROGram(s) IV Push every 3 hours PRN Severe Pain (7 - 10)  sodium chloride 0.9% lock flush 10 milliLiter(s) IV Push every 1 hour PRN Pre/post blood products, medications, blood draw, and to maintain line patency    PRESENT SYMPTOMS: [ ]Unable to obtain due to poor mentation   Source if other than patient:  [ ]Family   [ ]Team     Pain: [ ]yes [ ]no  QOL impact -   Location -                    Aggravating factors -  Quality -  Radiation -  Timing-  Severity (0-10 scale):  Minimal acceptable level (0-10 scale):     CPOT:    https://www.sccm.org/getattachment/lvf17z76-0e0u-1v8w-3h9m-3977z2437f3h/Critical-Care-Pain-Observation-Tool-(CPOT)    PAIN AD Score:   http://geriatrictoolkit.[facility]/cog/painad.pdf (press ctrl +  left click to view)    Dyspnea:                           [ ]None[ ]Mild [ ]Moderate [ ]Severe     Respiratory Distress Observation Scale (RDOS):   A score of 0 to 2 signifies little or no respiratory distress, 3 signifies mild distress, scores 4 to 6 indicate moderate distress, and scores greater than 7 signify severe distress  https://www.[facility].ca/sites/default/files/PDFS/888854-gtefefdjkin-qbqyhabx-junfgjedsgg-hzdpk.pdf    Anxiety:                             [ ]None[ ]Mild [ ]Moderate [ ]Severe   Fatigue:                             [ ]None[ ]Mild [ ]Moderate [ ]Severe   Nausea:                             [ ]None[ ]Mild [ ]Moderate [ ]Severe   Loss of appetite:              [ ]None[ ]Mild [ ]Moderate [ ]Severe   Constipation:                    [ ]None[ ]Mild [ ]Moderate [ ]Severe    Other Symptoms:  [ ]All other review of systems negative     Palliative Performance Status Version 2:         %    http://[facility].org/files/news/palliative_performance_scale_ppsv2.pdf  PHYSICAL EXAM:  Vital Signs Last 24 Hrs  T(C): 35.9 (12 Apr 2023 08:00), Max: 37.3 (11 Apr 2023 21:00)  T(F): 96.6 (12 Apr 2023 08:00), Max: 99.1 (11 Apr 2023 21:00)  HR: 72 (12 Apr 2023 10:00) (72 - 118)  BP: 126/68 (11 Apr 2023 12:00) (126/68 - 144/78)  BP(mean): 91 (11 Apr 2023 12:00) (91 - 104)  RR: 24 (12 Apr 2023 10:00) (22 - 24)  SpO2: 100% (12 Apr 2023 10:00) (98% - 100%)    Parameters below as of 12 Apr 2023 10:00  Patient On (Oxygen Delivery Method): ventilator    O2 Concentration (%): 50 I&O's Summary    11 Apr 2023 07:01  -  12 Apr 2023 07:00  --------------------------------------------------------  IN: 6664.7 mL / OUT: 173 mL / NET: 6491.7 mL    12 Apr 2023 07:01  -  12 Apr 2023 10:17  --------------------------------------------------------  IN: 532.5 mL / OUT: 20 mL / NET: 512.5 mL        GENERAL:  [X ] No acute distress [ ]Lethargic  [ ]Unarousable  [ ]Verbal  [ ]Non-Verbal [ ]Cachexia    BEHAVIORAL/PSYCH:  [ ]Alert and Oriented x  [ ] Anxiety [ ] Delirium [ ] Agitation [X ] Calm   EYES: [ ] No scleral icterus [ ] Scleral icterus [ ] Closed  ENMT:  [ ]Dry mouth  [ ]No external oral lesions [ ] No external ear or nose lesions  CARDIOVASCULAR:  [ ]Regular [ ]Irregular [ ]Tachy [ ]Not Tachy  [ ]Leo [ ] Edema [ ] No edema  PULMONARY:  [ ]Tachypnea  [ ]Audible excessive secretions [X ] No labored breathing [ ] labored breathing  GASTROINTESTINAL: [ ]Soft  [ ]Distended  [ X]Not distended [ ]Non tender [ ]Tender  MUSCULOSKELETAL: [ ]No clubbing [ ] clubbing  [ ] No cyanosis [ ] cyanosis  NEUROLOGIC: [ ]No focal deficits  [ ]Follows commands  [ ]Does not follow commands  [ ]Cognitive impairment  [ ]Dysphagia  [ ]Dysarthria  [ ]Paresis   SKIN: [ ] Jaundiced [X ] Non-jaundiced [ ]Rash [ ]No Rash [ ] Warm [ ] Dry  MISC/LINES: [ ] ET tube [ ] Trach [ ]NGT/OGT [ ]PEG [ ]Garrison    CRITICAL CARE:  [ ] Shock Present  [ ]Septic [ ]Cardiogenic [ ]Neurologic [ ]Hypovolemic  [ ]  Vasopressors [ ]  Inotropes   [ ]Respiratory failure present [ ]Mechanical ventilation [ ]Non-invasive ventilatory support [ ]High flow  [ ]Acute  [ ]Chronic [ ]Hypoxic  [ ]Hypercarbic [ ]Other  [ ]Other organ failure     LABS: reviewed by me                        8.4    16.56 )-----------( 283      ( 11 Apr 2023 20:39 )             28.5   04-11    144  |  109  |  52<H>  ----------------------------<  182<H>  4.8   |  24  |  1.4    Ca    8.1<L>      11 Apr 2023 20:18  Phos  4.6     04-11  Mg     2.1     04-11    PT/INR - ( 11 Apr 2023 04:45 )   PT: 15.10 sec;   INR: 1.31 ratio         PTT - ( 12 Apr 2023 04:40 )  PTT:72.5 sec      RADIOLOGY & ADDITIONAL STUDIES: reviewed by me    CXR 4/11/23    Stable bilateral opacities/effusions. No pneumothorax    PROTEIN CALORIE MALNUTRITION PRESENT: [ ]mild [ ]moderate [ ]severe [ ]underweight [ ]morbid obesity  https://www.andeal.org/vault/2440/web/files/ONC/Table_Clinical%20Characteristics%20to%20Document%20Malnutrition-White%20JV%20et%20al%202012.pdf    Height (cm): 175.3 (04-10-23 @ 18:11), 175.3 (05-03-22 @ 15:36)  Weight (kg): 63.5 (04-10-23 @ 18:11), 66.2 (05-03-22 @ 15:36)  BMI (kg/m2): 20.7 (04-10-23 @ 18:11), 21.5 (05-03-22 @ 15:36)    [ ]PPSV2 < or = to 30% [ ]significant weight loss  [ ]poor nutritional intake  [ ]anasarca      [ ]Artificial Nutrition      REFERRALS:   [ ]Chaplaincy  [ ]Hospice  [ ]Child Life  [ ]Social Work  [ ]Case management [ ]Holistic Therapy     Palliative care education provided to patient and/or family    Goals of Care Document:     ______________  Maxime Jean MD  Palliative Medicine  [facility]   of Geriatric and Palliative Medicine  [phone]   HPI: 77M with PMH of CAD, HLD, prostate CA, L orbital fracture with blindness, here after fall at home, s/p ORIF of R hip. Course c/b acute respiratory failure due to post-op arrhythmias, requiring intubation, now s/p trach and PEG. Is on tylenol and IV fentanyl for acute pain, and also sedated with propofol, paralyzed with nimbex. Also requiring pressors for acute hypotension in the setting of sepsis and ARDS, on broad abx. Patient had C. diff, on PO vanco, and was planned for PPM by EP. Palliative care consulted for GOC, as family reportedly considering comfort-directed care.    PERTINENT PM/SXH:   CAD (coronary artery disease)    Hypercholesteremia    BPH (benign prostatic hyperplasia)    Kidney stones    Hypertension    Diabetes mellitus    Prostate cancer      Status post cardiac surgery    Bilateral cataracts    History of lithotripsy      FAMILY HISTORY:  FH: colon cancer (Mother)      ITEMS NOT CHECKED ARE NOT PRESENT    SOCIAL HISTORY:   Significant other/partner[ ]  Children[X ]  Restorationist/Spirituality:  Substance hx:  [ ]   Tobacco hx:  [ ]   Alcohol hx: [ ]   Living Situation: [X ]Home  [ ]Long term care  [ ]Rehab [ ]Other  Home Services: [ ] HHA [ ] Visting RN [ ] Hospice  Occupation:  Home Opioid hx:  [ ] Y [ ] N [ ] I-Stop Reference No:    ADVANCE DIRECTIVES:    [ ] Full Code [ ] DNR  MOLST  [ ]  Living Will  [ ]   DECISION MAKER(s):  [ ] Health Care Proxy(s)  [ ] Surrogate(s)  [ ] Guardian           Name(s): Phone Number(s): Song Segura (Son): [phone]      BASELINE (I)ADL(s) (prior to admission):  [place]: [ ]Total  [ ] Moderate [ ]Dependent  Palliative Performance Status Version 2:         %    http://npcrc.org/files/news/palliative_performance_scale_ppsv2.pdf    Allergies    No Known Allergies    Intolerances    MEDICATIONS  (STANDING):  acetaminophen     Tablet .. 650 milliGRAM(s) Oral every 6 hours  acetylcysteine 10%  Inhalation 4 milliLiter(s) Inhalation every 8 hours  albuterol    90 MICROgram(s) HFA Inhaler 1 Puff(s) Inhalation every 8 hours  aspirin  chewable 81 milliGRAM(s) Oral daily  atorvastatin 80 milliGRAM(s) Oral at bedtime  atropine 1% Solution 1 Drop(s) Left EYE two times a day  caspofungin IVPB      cefepime   IVPB 1000 milliGRAM(s) IV Intermittent every 12 hours  chlorhexidine 0.12% Liquid 15 milliLiter(s) Oral Mucosa every 12 hours  chlorhexidine 2% Cloths 1 Application(s) Topical <User Schedule>  cisatracurium Infusion 3 MICROgram(s)/kG/Min (11.4 mL/Hr) IV Continuous <Continuous>  clopidogrel Tablet 75 milliGRAM(s) Oral daily  doxazosin 4 milliGRAM(s) Oral at bedtime  famotidine    Tablet 20 milliGRAM(s) Oral daily  guaifenesin/dextromethorphan Oral Liquid 10 milliLiter(s) Oral every 4 hours  heparin  Infusion 1000 Unit(s)/Hr (9 mL/Hr) IV Continuous <Continuous>  insulin lispro (ADMELOG) corrective regimen sliding scale   SubCutaneous every 6 hours  lactated ringers Bolus 250 milliLiter(s) IV Bolus once  lactated ringers. 1000 milliLiter(s) (100 mL/Hr) IV Continuous <Continuous>  midodrine 5 milliGRAM(s) Oral every 8 hours  norepinephrine Infusion 0.01 MICROgram(s)/kG/Min (0.6 mL/Hr) IV Continuous <Continuous>  prednisoLONE acetate 1% Suspension 1 Drop(s) Left EYE every 6 hours  propofol Infusion 5 MICROgram(s)/kG/Min (1.91 mL/Hr) IV Continuous <Continuous>  vancomycin    Solution 125 milliGRAM(s) Oral every 6 hours  vancomycin  IVPB 750 milliGRAM(s) IV Intermittent every 12 hours  vasopressin Infusion 0.04 Unit(s)/Min (6 mL/Hr) IV Continuous <Continuous>    MEDICATIONS  (PRN):  fentaNYL    Injectable 25 MICROGram(s) IV Push every 3 hours PRN Severe Pain (7 - 10)  sodium chloride 0.9% lock flush 10 milliLiter(s) IV Push every 1 hour PRN Pre/post blood products, medications, blood draw, and to maintain line patency    PRESENT SYMPTOMS: [X ]Unable to obtain due to poor mentation   Source if other than patient:  [ ]Family   [ ]Team     Pain: [ ]yes [ ]no  QOL impact -   Location -                    Aggravating factors -  Quality -  Radiation -  Timing-  Severity (0-10 scale):  Minimal acceptable level (0-10 scale):     CPOT:    https://www.sccm.org/getattachment/pga94x83-7v1x-0p1g-7d9m-8297x0811c5r/Critical-Care-Pain-Observation-Tool-(CPOT)    PAIN AD Score:   http://geriatrictoolkit.[facility]/cog/painad.pdf (press ctrl +  left click to view)    Dyspnea:                           [ ]None[ ]Mild [ ]Moderate [ ]Severe     Respiratory Distress Observation Scale (RDOS):   A score of 0 to 2 signifies little or no respiratory distress, 3 signifies mild distress, scores 4 to 6 indicate moderate distress, and scores greater than 7 signify severe distress  https://www.[facility].ca/sites/default/files/PDFS/226906-mjfnpdfwtye-prwtjznu-pwqauvzbxqm-pdmfl.pdf    Anxiety:                             [ ]None[ ]Mild [ ]Moderate [ ]Severe   Fatigue:                             [ ]None[ ]Mild [ ]Moderate [ ]Severe   Nausea:                             [ ]None[ ]Mild [ ]Moderate [ ]Severe   Loss of appetite:              [ ]None[ ]Mild [ ]Moderate [ ]Severe   Constipation:                    [ ]None[ ]Mild [ ]Moderate [ ]Severe    Other Symptoms:  [ ]All other review of systems negative     Palliative Performance Status Version 2:         %    http://[facility].org/files/news/palliative_performance_scale_ppsv2.pdf  PHYSICAL EXAM:  Vital Signs Last 24 Hrs  T(C): 35.9 (12 Apr 2023 08:00), Max: 37.3 (11 Apr 2023 21:00)  T(F): 96.6 (12 Apr 2023 08:00), Max: 99.1 (11 Apr 2023 21:00)  HR: 72 (12 Apr 2023 10:00) (72 - 118)  BP: 126/68 (11 Apr 2023 12:00) (126/68 - 144/78)  BP(mean): 91 (11 Apr 2023 12:00) (91 - 104)  RR: 24 (12 Apr 2023 10:00) (22 - 24)  SpO2: 100% (12 Apr 2023 10:00) (98% - 100%)    Parameters below as of 12 Apr 2023 10:00  Patient On (Oxygen Delivery Method): ventilator    O2 Concentration (%): 50 I&O's Summary    11 Apr 2023 07:01  -  12 Apr 2023 07:00  --------------------------------------------------------  IN: 6664.7 mL / OUT: 173 mL / NET: 6491.7 mL    12 Apr 2023 07:01  -  12 Apr 2023 10:17  --------------------------------------------------------  IN: 532.5 mL / OUT: 20 mL / NET: 512.5 mL        GENERAL:  [X ] No acute distress [ ]Lethargic  [ ]Unarousable  [ ]Verbal  [ ]Non-Verbal [ ]Cachexia    BEHAVIORAL/PSYCH:  [ ]Alert and Oriented x  [ ] Anxiety [ ] Delirium [ ] Agitation [X ] Calm   EYES: [ ] No scleral icterus [ ] Scleral icterus [X ] Closed  ENMT:  [ ]Dry mouth  [ ]No external oral lesions [X ] No external ear or nose lesions  CARDIOVASCULAR:  [ ]Regular [ ]Irregular [ ]Tachy [ ]Not Tachy  [ ]Leo [ ] Edema [ ] No edema  PULMONARY:  [ ]Tachypnea  [ ]Audible excessive secretions [X ] No labored breathing [ ] labored breathing  GASTROINTESTINAL: [ ]Soft  [ ]Distended  [ X]Not distended [ ]Non tender [ ]Tender  MUSCULOSKELETAL: [ ]No clubbing [ ] clubbing  [X ] No cyanosis [ ] cyanosis  NEUROLOGIC: [ ]No focal deficits  [ ]Follows commands  [ ]Does not follow commands  [X ]Cognitive impairment  [ ]Dysphagia  [ ]Dysarthria  [ ]Paresis   SKIN: [ ] Jaundiced [X ] Non-jaundiced [ ]Rash [ ]No Rash [ ] Warm [ ] Dry  MISC/LINES: [ ] ET tube [ ] Trach [ ]NGT/OGT [ ]PEG [ ]Garrison    CRITICAL CARE:  [ ] Shock Present  [ ]Septic [ ]Cardiogenic [ ]Neurologic [ ]Hypovolemic  [ ]  Vasopressors [ ]  Inotropes   [ ]Respiratory failure present [ ]Mechanical ventilation [ ]Non-invasive ventilatory support [ ]High flow  [ ]Acute  [ ]Chronic [ ]Hypoxic  [ ]Hypercarbic [ ]Other  [ ]Other organ failure     LABS: reviewed by me                        8.4    16.56 )-----------( 283      ( 11 Apr 2023 20:39 )             28.5   04-11    144  |  109  |  52<H>  ----------------------------<  182<H>  4.8   |  24  |  1.4    Ca    8.1<L>      11 Apr 2023 20:18  Phos  4.6     04-11  Mg     2.1     04-11    PT/INR - ( 11 Apr 2023 04:45 )   PT: 15.10 sec;   INR: 1.31 ratio         PTT - ( 12 Apr 2023 04:40 )  PTT:72.5 sec      RADIOLOGY & ADDITIONAL STUDIES: reviewed by me    CXR 4/11/23    Stable bilateral opacities/effusions. No pneumothorax    PROTEIN CALORIE MALNUTRITION PRESENT: [ ]mild [ ]moderate [ ]severe [ ]underweight [ ]morbid obesity  https://www.andeal.org/vault/2440/web/files/ONC/Table_Clinical%20Characteristics%20to%20Document%20Malnutrition-White%20JV%20et%20al%202012.pdf    Height (cm): 175.3 (04-10-23 @ 18:11), 175.3 (05-03-22 @ 15:36)  Weight (kg): 63.5 (04-10-23 @ 18:11), 66.2 (05-03-22 @ 15:36)  BMI (kg/m2): 20.7 (04-10-23 @ 18:11), 21.5 (05-03-22 @ 15:36)    [ ]PPSV2 < or = to 30% [ ]significant weight loss  [ ]poor nutritional intake  [ ]anasarca      [ ]Artificial Nutrition      REFERRALS:   [ ]Chaplaincy  [ ]Hospice  [ ]Child Life  [ ]Social Work  [ ]Case management [ ]Holistic Therapy     Palliative care education provided to patient and/or family    Goals of Care Document:     ______________  Maxime Jean MD  Palliative Medicine  [facility]   of Geriatric and Palliative Medicine  [phone]

## 2023-04-12 NOTE — PROGRESS NOTE ADULT - SUBJECTIVE AND OBJECTIVE BOX
RADHA SEGURA   [id number]   41  77yM    Admit Date/LOS:  (2d)  Indication for SICU: Hemodynamic monitoring. 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 Pt walks with walker and got up in middle of night to get food and slipped and fell on his R side. Pt has been having R hip pain since with inability to ambulate. Pts family finally convinced him to come to hospital today. Pt is having 10/10 pain currently. Denies dizziness, CP or LOC prior to fall. Denies head trauma. No HA. (20 Mar 2023 12:02)    3/22: Patient S/P ORIF of right hip using interlocking intramedullary stephon. - upgradeed for hypotension in PACU    Trach and PEG    24Hours     NIGHT  -f/u palliative c/s & family discussion in AM  -Consecetive BM past 3 days, holding bowel reg   -Dec UOP during the day; inc Cr 1.4 from 1.0 --> f/u urine lytes   -PTT: 96.8, dec heprain gtt to 900, f/u AM PTT  -WBC inc 9 --> 16  -Trop inc 0.08 --> 0.09    DAY  -incr RR to 20--> ABG 7.21/66/95/26--> increased rate to 24, f/u gas     - AB.27/55/100/25 LAC 1.5 (1.2)   -f/u troponins (.08), EKG (unchanged)   -f/u cards c/s for increasing pressor requirements  -currently @ 0.7 levo   - d/c trazodone and zyprexa  - f/u pulm consult  - EP --> bradycardic events likely due to hypoxia, will sign off and f/u PRN  - f/u vanc trough   - ECHO: EF 70-75%, aortic valve thickening w/ decreased leaflet opening, mild pulmonary HTN, hyperdynamic LV systolic function  - ++Candida aureus  *(nares/axilla/groin) --per ID start caspofungin until BCx result  - PTT: 125.8 Heparin gtt 11 --> 10  -tachycardic, low UOP--> 500 LR bolus x2  - Family discussion held --> DNR, likely CMO tomorrow      [] A ten-point review of systems was otherwise negative except as noted above.  [x] Due to altered mental status/intubation, subjective information was not attained from   the patient. History was obtained, to the extent possible, from review of the chart and collateral sources of information.  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   S/P ORIF of right hip using interlocking intramedullary stephon.  S/P Trach & PEG    NEURO:  #Acute pain    -Acetaminophen 650 PO q6  #sedation    - Melatonin    - Nimbex 11.4/hr    - Propofol    RESP:   #Acute respiratory failure secondary to post operative arrhythmias requiring mechanical ventilation (Intubated 3/23),     -s/p trached 23 with size 8    -s/p bronch  BAL- no organisms    -F/U 4/10 BAL --    - AM CXR   -Vent Settings: AC Volume  450/24/90/16    - ABG ( AM): 7.21/66/95/26    - PS 4 hrs on  then tachypneic to 40    - PS 3 hrs 4/10 until episode of bradycardia  #PE w/u NEGATIVE on 3/28    CARDS:   #Tachycardia     - Propanolol 10mg q8 - Discontinued 4/10 d/t bradycardic events  #Bradycardia     - 4/10: Episodes x2 bradycardia to 28. Atropine given second episode.      - EP:  --> signing off, re-consult PRN, will not be doing PPM since bradycardia likely due to hypoxia  #acute hypotension    -Restarted 4/10. Midodrine 5 q8     -Intermittent hypotensive episodes 4/10 during bradycardic episodes    -Levo / vaso   #Labs/Imaging  -Echo 3/23- EF appears normal, mild LVH, sclerotic AoV, trace MR,   -Echo 3/29: EF 60-65%, norm LV function, borderline pulm HTN.   -EKG 4/10 most recent: , Sinus Tachycardia, low voltage QRS, incomplete RBBB  -EKG : , Sinus tach; low voltage QRS  -Echo : F/U results    GI/NUTR:   #Acute clostridium difficile positive 3/30    -PO Vancomycin ends    #Diet: NPO for procedure: PPM by EP     -PEG 3cm at skin, 4cm at bumper  #Nausea: Zofran prn    - Aspiration precautions, HOB 30  #GI Prophylaxis    - Pepcid  #Bowel regimen    - Senna d/t chronic opioids, miralax     - Dulcolax suppository    - +BM on ; +BM 4/10  #Imaging    -CT abdomen 3/28: No definite infectious source identified      -RUQ sono: biliary sludge, mild GB wall thickening/edema.     /RENAL:   #UO: Blair placed by Urology 3/22 (d/w urology and asses scrotal swelling prior to removal)    -Inpatient Rx: doxazosin 4 milliGRAM(s) Oral at bedtime  #acute scrotal swelling  - U/S 3/29 w/ diffused scrotal wall edema (keep blair)   -scrotal elevation  Labs:  #urine output in critically ill    -indwelling blair (placed)    Labs:          BUN/Cr- 45/1.1  -->,  46/1.1  --> 44/1          Electrolytes-Na 146 // K 3.3 // Mg 2.2 //  Phos 3.0 (04-10 @ 14:39)    Home Rx: tamsulosin 0.4 mg oral capsule   #Diuresis   -20 Lasix - unresponsive, given 40 Lasix overnight, will monitor output/ response    -80 Lasix, 5 Metozalone 4/10, responded    HEME/ONC:   #DVT prophylaxis- HSQ, SCDs  #H/O CAD s/p stents x3    - ASA 81    - Plavix 75 restarted     Labs: Hb/Hct:  9.3/30.2  -->  8.8/28.4  -->   9.8/31.5 -->  9.6/31.7              Plts:  273  -->,  259  -->   254  --> 247              PTT/INR: 29.5 / 1.15       ID:  #Acute clostridium difficile   WBC- 7.97  --->>,  9.27  --->>,  11.32  --->> 10.11  --> 9.24  Temp trend- 24hrs T(F): 96.4 (10 Apr 2023 16:14), Max: 98.1 (10 Apr 2023 04:00)  Antibiotics   -vancomycin    Solution 125 every 6 hours, end    -vancomycin IVPB 750mg every 12 hours (started 4/10)  - cefepime IVPB 1000mg every 12 hours (started 4/10)    Cultures:    BCx 4/10 - pending    BAL 4/10- pending    BAL - negative     ET Sputum 3/29: negative     BCx 3/28: NGTD    UA 3/28: Negative     BCx 3/25--pending    UA 3/24--negative    ET Sputum 3/24--neg    MRSA neg    CDiff PCR 3/30: Positive    Procalcitonin 0.32    ENDO:  HA1C= 5.0% 3/25/23    -ISS tightened     -Glucose goal 140-180    MSK:   -WBAT RLE per ortho, PT/OT when clinically appropriate   -R hip dressing changed by SICU team    -Right hip Xray 2 views per ortho () --> s/p ORIF of comminuted intertrochanteric Rt femur fx w/out definite change compared to 3/22/23    LINES/DRAINS:    PIV  Blair (3/22)  Right IJ TLC (3/23-)  Trach, NGT   Right radial a-line 4/10  RIJ TLC     ADVANCED DIRECTIVES:  Full Code    HCP/Emergency Contact- Song Segura (Son): [phone]    INDICATION FOR SICU: Hypotension with pressor requirements. Intubated.       DISPO: SICU   RADHA RAMON   [id number]   41  77yM    Admit Date/LOS:  (2d)  Indication for SICU: Hemodynamic monitoring. 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 Pt walks with walker and got up in middle of night to get food and slipped and fell on his R side. Pt has been having R hip pain since with inability to ambulate. Pts family finally convinced him to come to hospital today. Pt is having 10/10 pain currently. Denies dizziness, CP or LOC prior to fall. Denies head trauma. No HA. (20 Mar 2023 12:02)    3/22: Patient S/P ORIF of right hip using interlocking intramedullary stephon. - upgradeed for hypotension in PACU    Trach and PEG    24Hours     NIGHT  -f/u palliative c/s & family discussion in AM  -Consecetive BM past 3 days, holding bowel reg   -Dec UOP during the day; inc Cr 1.4 from 1.0 --> f/u urine lytes   -PTT: 96.8, dec heprain gtt to 900, f/u AM PTT  -WBC inc 9 --> 16  -Trop inc 0.08 --> 0.09  -Pending AM abg    DAY  -incr RR to 20--> ABG 7.21/66/95/26--> increased rate to 24, f/u gas     - AB.27/55/100/25 LAC 1.5 (1.2)   -f/u troponins (.08), EKG (unchanged)   -f/u cards c/s for increasing pressor requirements  -currently @ 0.7 levo   - d/c trazodone and zyprexa  - f/u pulm consult  - EP --> bradycardic events likely due to hypoxia, will sign off and f/u PRN  - f/u vanc trough   - ECHO: EF 70-75%, aortic valve thickening w/ decreased leaflet opening, mild pulmonary HTN, hyperdynamic LV systolic function  - ++Candida aureus 4/6 *(nares/axilla/groin) --per ID start caspofungin until BCx result  - PTT: 125.8 Heparin gtt 11 --> 10  -tachycardic, low UOP--> 500 LR bolus x2  - Family discussion held --> DNR, likely CMO tomorrow      [] A ten-point review of systems was otherwise negative except as noted above.  [x] Due to altered mental status/intubation, subjective information was not attained from   the patient. History was obtained, to the extent possible, from review of the chart and collateral sources of information.  ================================================================= RADHA RAMON   [id number]   43  77yM    Admit Date/LOS:  (2d)  Indication for SICU: Hemodynamic monitoring. 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 Pt walks with walker and got up in middle of night to get food and slipped and fell on his R side. Pt has been having R hip pain since with inability to ambulate. Pts family finally convinced him to come to hospital today. Pt is having 10/10 pain currently. Denies dizziness, CP or LOC prior to fall. Denies head trauma. No HA. (20 Mar 2023 12:02)    3/22: Patient S/P ORIF of right hip using interlocking intramedullary stephon. - upgradeed for hypotension in PACU    Trach and PEG    24Hours     NIGHT  -f/u palliative c/s & family discussion in AM  -Consecetive BM past 3 days, holding bowel reg   -Dec UOP during the day; inc Cr 1.4 from 1.0 --> f/u urine lytes   -PTT: 96.8, dec heprain gtt to 900, f/u AM PTT  -WBC inc 9 --> 16  -Trop inc 0.08 --> 0.09  - ABG ( AM): 7.32/46/221/24 --> decreased Fi02 from 90 to 70      DAY  -incr RR to 20--> ABG 7.21/66/95/26--> increased rate to 24, f/u gas     - AB.27/55/100/25 LAC 1.5 (1.2)   -f/u troponins (.08), EKG (unchanged)   -f/u cards c/s for increasing pressor requirements  -currently @ 0.7 levo   - d/c trazodone and zyprexa  - f/u pulm consult  - EP --> bradycardic events likely due to hypoxia, will sign off and f/u PRN  - f/u vanc trough 4/12  - ECHO: EF 70-75%, aortic valve thickening w/ decreased leaflet opening, mild pulmonary HTN, hyperdynamic LV systolic function  - ++Candida aureus 4/6 *(nares/axilla/groin) --per ID start caspofungin until BCx result  - PTT: 125.8 Heparin gtt 11 --> 10  -tachycardic, low UOP--> 500 LR bolus x2  - Family discussion held --> DNR, likely CMO tomorrow      [] A ten-point review of systems was otherwise negative except as noted above.  [x] Due to altered mental status/intubation, subjective information was not attained from   the patient. History was obtained, to the extent possible, from review of the chart and collateral sources of information.  ================================================================= RADHA RAMON   [id number]   41  77yM    Admit Date/LOS:  (2d)  Indication for SICU: Hemodynamic monitoring. 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 Pt walks with walker and got up in middle of night to get food and slipped and fell on his R side. Pt has been having R hip pain since with inability to ambulate. Pts family finally convinced him to come to hospital today. Pt is having 10/10 pain currently. Denies dizziness, CP or LOC prior to fall. Denies head trauma. No HA. (20 Mar 2023 12:02)    3/22: Patient S/P ORIF of right hip using interlocking intramedullary stephon. - upgradeed for hypotension in PACU    Trach and PEG    24Hours     NIGHT  -f/u palliative c/s & family discussion in AM  -Consecetive BM past 3 days, holding bowel reg   -Dec UOP during the day; inc Cr 1.4 from 1.0 --> f/u urine lytes   -PTT: 96.8, dec heprain gtt to 900, f/u AM PTT  -WBC inc 9 --> 16  -Trop inc 0.08 --> 0.09  - ABG ( AM): 7.32/46/221/24 --> decreased Fi02 from 90 to 70      DAY  -incr RR to 20--> ABG 7.21/66/95/26--> increased rate to 24, f/u gas     - AB.27/55/100/25 LAC 1.5 (1.2)   -f/u troponins (.08), EKG (unchanged)   -f/u cards c/s for increasing pressor requirements  -currently @ 0.7 levo   - d/c trazodone and zyprexa  - f/u pulm consult  - EP --> bradycardic events likely due to hypoxia, will sign off and f/u PRN  - f/u vanc trough 4/12  - ECHO: EF 70-75%, aortic valve thickening w/ decreased leaflet opening, mild pulmonary HTN, hyperdynamic LV systolic function  - ++Candida aureus 4/6 *(nares/axilla/groin) --per ID start caspofungin until BCx result  - PTT: 125.8 Heparin gtt 11 --> 10  -tachycardic, low UOP--> 500 LR bolus x2  - Family discussion held --> DNR, likely CMO tomorrow      [] A ten-point review of systems was otherwise negative except as noted above.  [x] Due to altered mental status/intubation, subjective information was not attained from   the patient. History was obtained, to the extent possible, from review of the chart and collateral sources of information.  =================================================================      Diet, NPO:   Except Medications (23 @ 14:55)      CURRENT MEDS:  Neurologic Medications  acetaminophen     Tablet .. 650 milliGRAM(s) Oral every 6 hours  cisatracurium Infusion 3 MICROgram(s)/kG/Min IV Continuous <Continuous>  fentaNYL    Injectable 25 MICROGram(s) IV Push every 3 hours PRN Severe Pain (7 - 10)  melatonin 5 milliGRAM(s) Oral at bedtime  propofol Infusion 5 MICROgram(s)/kG/Min IV Continuous <Continuous>    Respiratory Medications  acetylcysteine 10%  Inhalation 4 milliLiter(s) Inhalation every 8 hours  albuterol    90 MICROgram(s) HFA Inhaler 1 Puff(s) Inhalation every 8 hours  guaifenesin/dextromethorphan Oral Liquid 10 milliLiter(s) Oral every 4 hours    Cardiovascular Medications  doxazosin 4 milliGRAM(s) Oral at bedtime  midodrine 5 milliGRAM(s) Oral every 8 hours  norepinephrine Infusion 0.01 MICROgram(s)/kG/Min IV Continuous <Continuous>    Gastrointestinal Medications  famotidine    Tablet 20 milliGRAM(s) Oral daily  lactated ringers. 1000 milliLiter(s) IV Continuous <Continuous>  sodium chloride 0.9% lock flush 10 milliLiter(s) IV Push every 1 hour PRN Pre/post blood products, medications, blood draw, and to maintain line patency      Hematologic/Oncologic Medications  aspirin  chewable 81 milliGRAM(s) Oral daily  clopidogrel Tablet 75 milliGRAM(s) Oral daily  heparin  Infusion 1000 Unit(s)/Hr IV Continuous <Continuous>    Antimicrobial/Immunologic Medications  caspofungin IVPB      cefepime   IVPB 1000 milliGRAM(s) IV Intermittent every 12 hours  vancomycin    Solution 125 milliGRAM(s) Oral every 6 hours  vancomycin  IVPB 750 milliGRAM(s) IV Intermittent every 12 hours    Endocrine/Metabolic Medications  atorvastatin 80 milliGRAM(s) Oral at bedtime  insulin lispro (ADMELOG) corrective regimen sliding scale   SubCutaneous every 6 hours  vasopressin Infusion 0.04 Unit(s)/Min IV Continuous <Continuous>    Topical/Other Medications  atropine 1% Solution 1 Drop(s) Left EYE two times a day  chlorhexidine 0.12% Liquid 15 milliLiter(s) Oral Mucosa every 12 hours  chlorhexidine 2% Cloths 1 Application(s) Topical <User Schedule>  prednisoLONE acetate 1% Suspension 1 Drop(s) Left EYE every 6 hours      ICU Vital Signs Last 24 Hrs  T(C): 37.3 (2023 21:00), Max: 37.3 (2023 21:00)  T(F): 99.1 (2023 21:00), Max: 99.1 (2023 21:00)  HR: 75 (2023 07:00) (73 - 118)  BP: 126/68 (2023 12:00) (103/72 - 159/81)  BP(mean): 91 (2023 12:00) (83 - 109)  ABP: 130/53 (2023 07:00) (95/55 - 169/78)  ABP(mean): 77 (2023 07:00) (71 - 112)  RR: 24 (2023 07:00) (20 - 24)  SpO2: 100% (2023 07:00) (97% - 100%)    O2 Parameters below as of 2023 06:00  Patient On (Oxygen Delivery Method): ventilator    O2 Concentration (%): 70        Mode: AC/ CMV (Assist Control/ Continuous Mandatory Ventilation)  RR (machine): 24  TV (machine): 450  FiO2: 70  PEEP: 16  ITime: 1  MAP: 12  PIP: 29    ABG - ( 2023 07:00 )  pH, Arterial: 7.35  pH, Blood: x     /  pCO2: 41    /  pO2: 153   / HCO3: 23    / Base Excess: -2.8  /  SaO2: 99.0        I&O's Summary    2023 07:01  -  2023 07:00  --------------------------------------------------------  IN: 6664.7 mL / OUT: 173 mL / NET: 6491.7 mL      I&O's Detail    2023 07:01  -  2023 07:00  --------------------------------------------------------  IN:    Cisatracurium: 641.1 mL    Enteral Tube Flush: 360 mL    Heparin: 77 mL    Heparin: 160 mL    IV PiggyBack: 500 mL    IV PiggyBack: 50 mL    Lactated Ringers: 2300 mL    Lactated Ringers Bolus: 1250 mL    Norepinephrine: 840.2 mL    Propofol: 342.4 mL    Vasopressin: 144 mL  Total IN: 6664.7 mL    OUT:    Indwelling Catheter - Urethral (mL): 173 mL  Total OUT: 173 mL    Total NET: 6491.7 mL          PHYSICAL EXAM:    General/Neuro  Paralyzed, sedated  RASS:  -3           GCS- unable to assess  Pupils: Right pupil 4mm, non-reactive    Lungs:    Trach site clean, no erythema  Lungs with scattered rhonchi through out lung fields    Cardiovascular : S1, S2.  Regular rate and rhythm.  Peripheral edema +++  Cardiac Rhythm: Normal Sinus Rhythm    GI: Abdomen soft, Non-tender, Non-distended.    Gastrostomy tube 3cm at the anterior abdominal wall   BS present, abdominal wall edema    Extremities: Extremities warm, pink, well-perfused. Pulses: Bilateral DP pulses are palpable    Derm: Good skin turgor, no skin breakdown.      :       Garrison catheter in place. Marked scrotal edema- no skin breakdown      CXR:  left lung white out- bilateral effusions    LABS:  CAPILLARY BLOOD GLUCOSE  POCT Blood Glucose.: 199 mg/dL (2023 05:44)  POCT Blood Glucose.: 201 mg/dL (2023 23:53)  POCT Blood Glucose.: 195 mg/dL (2023 18:15)  POCT Blood Glucose.: 175 mg/dL (2023 11:06)                          8.4    16.56 )-----------( 283      ( 2023 20:39 )             28.5       04-11    144  |  109  |  52<H>  ----------------------------<  182<H>  4.8   |  24  |  1.4    Ca    8.1<L>      2023 20:18  Phos  4.6     04-11  Mg     2.1     04-11        PT/INR - ( 2023 04:45 )   PT: 15.10 sec;   INR: 1.31 ratio         PTT - ( 2023 04:40 )  PTT:72.5 sec  CARDIAC MARKERS ( 2023 04:40 )  x     / 0.09 ng/mL / 79 U/L / x     / 1.5 ng/mL  CARDIAC MARKERS ( 2023 01:33 )  x     / 0.09 ng/mL / 83 U/L / x     / 1.7 ng/mL  CARDIAC MARKERS ( 2023 11:19 )  x     / 0.08 ng/mL / 126 U/L / x     / 3.2 ng/mL  CARDIAC MARKERS ( 10 Apr 2023 14:39 )  x     / x     / 423 U/L / x     / x              Culture - Sputum (collected 2023 20:43)  Source: Trach Asp Tracheal Aspirate  Gram Stain (2023 06:54):    Moderate polymorphonuclear leukocytes per low power field    No Squamous epithelial cells per low power field    Rare Yeast like cells seen per oil power field    Numerous Gram Negative Rods seen per oil power field

## 2023-04-12 NOTE — CONSULT NOTE ADULT - ASSESSMENT
77M with PMH of CAD, HLD, prostate CA, L orbital fracture with blindness, here after fall at home, s/p ORIF of R hip. Course c/b acute respiratory failure due to post-op arrhythmias, requiring intubation, now s/p trach and PEG. Is on tylenol and IV fentanyl for acute pain, and also sedated with propofol, paralyzed with nimbex. Also requiring pressors for acute hypotension in the setting of sepsis and ARDS, on broad abx. Patient had C. diff, on PO vanco, and was planned for PPM by EP. Palliative care consulted for GOC, as family reportedly considering comfort-directed care.

## 2023-04-13 NOTE — CONSULT NOTE ADULT - SUBJECTIVE AND OBJECTIVE BOX
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 S/P ORIF of right hip using interlocking intramedullary stephon. Hospital stay complicated by septic shock and PNA S/P Trach & PEG with recent VAISHNAVI.  patient seen at bedside, on ventilator.  on vasoperssin, BP maintained.    PAST MEDICAL & SURGICAL HISTORY:  CAD (coronary artery disease)      Hypercholesteremia      BPH (benign prostatic hyperplasia)      Kidney stones      Prostate cancer      Status post cardiac surgery  cardiac stents      Bilateral cataracts      History of lithotripsy        Allergies:  No Known Allergies    Home Medications Reviewed  Hospital Medications:   MEDICATIONS  (STANDING):  acetaminophen     Tablet .. 650 milliGRAM(s) Oral every 6 hours  acetylcysteine 10%  Inhalation 4 milliLiter(s) Inhalation every 8 hours  albuterol    90 MICROgram(s) HFA Inhaler 1 Puff(s) Inhalation every 8 hours  aspirin  chewable 81 milliGRAM(s) Oral daily  atorvastatin 80 milliGRAM(s) Oral at bedtime  atropine 1% Solution 1 Drop(s) Left EYE two times a day  caspofungin IVPB      caspofungin IVPB 50 milliGRAM(s) IV Intermittent every 24 hours  cefepime   IVPB 1000 milliGRAM(s) IV Intermittent every 12 hours  chlorhexidine 0.12% Liquid 15 milliLiter(s) Oral Mucosa every 12 hours  chlorhexidine 2% Cloths 1 Application(s) Topical <User Schedule>  clopidogrel Tablet 75 milliGRAM(s) Oral daily  doxazosin 4 milliGRAM(s) Oral at bedtime  famotidine    Tablet 20 milliGRAM(s) Oral daily  guaifenesin/dextromethorphan Oral Liquid 10 milliLiter(s) Oral every 4 hours  heparin   Injectable 5000 Unit(s) SubCutaneous every 8 hours  insulin lispro (ADMELOG) corrective regimen sliding scale   SubCutaneous every 6 hours  lactated ringers. 1000 milliLiter(s) (75 mL/Hr) IV Continuous <Continuous>  midodrine 10 milliGRAM(s) Oral every 8 hours  norepinephrine Infusion 0.01 MICROgram(s)/kG/Min (0.6 mL/Hr) IV Continuous <Continuous>  prednisoLONE acetate 1% Suspension 1 Drop(s) Left EYE every 6 hours  propofol Infusion 5 MICROgram(s)/kG/Min (1.91 mL/Hr) IV Continuous <Continuous>  vancomycin    Solution 125 milliGRAM(s) Oral every 6 hours  vasopressin Infusion 0.03 Unit(s)/Min (4.5 mL/Hr) IV Continuous <Continuous>    SOCIAL HISTORY:  Denies ETOH,Smoking,   FAMILY HISTORY:  FH: colon cancer (Mother)      Yes      REVIEW OF SYSTEMS:  CONSTITUTIONAL: patient trached, unable to obtain comprehensive ROS.    VITALS:  Vital Signs Last 24 Hrs  T(C): 38 (13 Apr 2023 14:00), Max: 38 (13 Apr 2023 08:00)  T(F): 100.4 (13 Apr 2023 14:00), Max: 100.4 (13 Apr 2023 08:00)  HR: 83 (13 Apr 2023 14:45) (66 - 84)  BP: --  BP(mean): --  RR: 30 (13 Apr 2023 14:00) (5 - 36)  SpO2: 95% (13 Apr 2023 14:45) (90% - 100%)    Parameters below as of 13 Apr 2023 14:00  Patient On (Oxygen Delivery Method): ventilator    O2 Concentration (%): 40    04-12 @ 07:01  -  04-13 @ 07:00  --------------------------------------------------------  IN: 4071.2 mL / OUT: 820 mL / NET: 3251.2 mL    04-13 @ 07:01  - 04-13 @ 15:11  --------------------------------------------------------  IN: 882.7 mL / OUT: 395 mL / NET: 487.7 mL        PHYSICAL EXAM:  Constitutional: NAD  HEENT: anicteric sclera, oropharynx clear, MMM  Neck: No JVD  Respiratory: CTAB, b/l rales   Cardiovascular: S1, S2, RRR  Gastrointestinal: BS+, soft, NT/ND  Extremities: No cyanosis or clubbing.  peripheral edema  Neurological: trached  : No CVA tenderness. blair in.   Skin: No rashes    LABS:  04-12    139  |  107  |  56<H>  ----------------------------<  164<H>  4.0   |  23  |  1.8<H>    Ca    7.2<L>      12 Apr 2023 20:22  Phos  3.7     04-12  Mg     2.0     04-12      Creatinine Trend: 1.8 <--, 1.6 <--, 1.4 <--, 1.0 <--, 1.0 <--, 1.1 <--, 1.1 <--, 1.1 <--, 1.0 <--, 1.0 <--, 1.1 <--, 1.1 <--, 1.0 <--, 0.8 <--, 0.7 <--, 0.7 <--, 0.6 <--, 0.6 <--, 0.6 <--, 0.7 <--, 0.8 <--, 0.8 <--, 0.6 <--, 0.7 <--, 0.8 <--, 0.9 <--, 0.8 <--, 0.7 <--, 0.8 <--, 0.9 <--, 1.1 <--, 1.1 <--, 1.0 <--, 1.0 <--, 1.1 <--, 1.1 <--, 1.1 <--, 1.2 <--                        8.5    12.82 )-----------( 180      ( 13 Apr 2023 10:20 )             27.0     Urine Studies:    Creatinine, Random Urine: 53 mg/dL (04-12 @ 12:30)  Sodium, Random Urine: 24.0 mmoL/L (04-12 @ 12:30)  Sodium, Random Urine: 24.0 mmoL/L (04-06 @ 21:17)  Creatinine, Random Urine: 70 mg/dL (04-06 @ 21:17)                     NEPHROLOGY CONSULTATION NOTE    77yM w/ PMH w/ CAD, HLD, BPH, Prostate Ca and 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 S/P ORIF of right hip using interlocking intramedullary stephon. Hospital stay complicated by septic shock and PNA S/P Trach & PEG with recent VAISHNAVI.  patient seen at bedside, on ventilator.  on vasoperssin, BP maintained.    PAST MEDICAL & SURGICAL HISTORY:  CAD (coronary artery disease)      Hypercholesteremia      BPH (benign prostatic hyperplasia)      Kidney stones      Prostate cancer      Status post cardiac surgery  cardiac stents      Bilateral cataracts      History of lithotripsy        Allergies:  No Known Allergies    Home Medications Reviewed  Hospital Medications:   MEDICATIONS  (STANDING):  acetaminophen     Tablet .. 650 milliGRAM(s) Oral every 6 hours  acetylcysteine 10%  Inhalation 4 milliLiter(s) Inhalation every 8 hours  albuterol    90 MICROgram(s) HFA Inhaler 1 Puff(s) Inhalation every 8 hours  aspirin  chewable 81 milliGRAM(s) Oral daily  atorvastatin 80 milliGRAM(s) Oral at bedtime  atropine 1% Solution 1 Drop(s) Left EYE two times a day  caspofungin IVPB      caspofungin IVPB 50 milliGRAM(s) IV Intermittent every 24 hours  cefepime   IVPB 1000 milliGRAM(s) IV Intermittent every 12 hours  chlorhexidine 0.12% Liquid 15 milliLiter(s) Oral Mucosa every 12 hours  chlorhexidine 2% Cloths 1 Application(s) Topical <User Schedule>  clopidogrel Tablet 75 milliGRAM(s) Oral daily  doxazosin 4 milliGRAM(s) Oral at bedtime  famotidine    Tablet 20 milliGRAM(s) Oral daily  guaifenesin/dextromethorphan Oral Liquid 10 milliLiter(s) Oral every 4 hours  heparin   Injectable 5000 Unit(s) SubCutaneous every 8 hours  insulin lispro (ADMELOG) corrective regimen sliding scale   SubCutaneous every 6 hours  lactated ringers. 1000 milliLiter(s) (75 mL/Hr) IV Continuous <Continuous>  midodrine 10 milliGRAM(s) Oral every 8 hours  norepinephrine Infusion 0.01 MICROgram(s)/kG/Min (0.6 mL/Hr) IV Continuous <Continuous>  prednisoLONE acetate 1% Suspension 1 Drop(s) Left EYE every 6 hours  propofol Infusion 5 MICROgram(s)/kG/Min (1.91 mL/Hr) IV Continuous <Continuous>  vancomycin    Solution 125 milliGRAM(s) Oral every 6 hours  vasopressin Infusion 0.03 Unit(s)/Min (4.5 mL/Hr) IV Continuous <Continuous>    SOCIAL HISTORY:  Denies ETOH,Smoking,   FAMILY HISTORY:  FH: colon cancer (Mother)      Yes      REVIEW OF SYSTEMS:  CONSTITUTIONAL: patient trached, unable to obtain comprehensive ROS.    VITALS:  Vital Signs Last 24 Hrs  T(C): 38 (13 Apr 2023 14:00), Max: 38 (13 Apr 2023 08:00)  T(F): 100.4 (13 Apr 2023 14:00), Max: 100.4 (13 Apr 2023 08:00)  HR: 83 (13 Apr 2023 14:45) (66 - 84)  BP: 145/56  BP(mean): --  RR: 30 (13 Apr 2023 14:00) (5 - 36)  SpO2: 95% (13 Apr 2023 14:45) (90% - 100%)    Parameters below as of 13 Apr 2023 14:00  Patient On (Oxygen Delivery Method): ventilator    O2 Concentration (%): 40    04-12 @ 07:01  -  04-13 @ 07:00  --------------------------------------------------------  IN: 4071.2 mL / OUT: 820 mL / NET: 3251.2 mL    04-13 @ 07:01  -  04-13 @ 15:11  --------------------------------------------------------  IN: 882.7 mL / OUT: 395 mL / NET: 487.7 mL        PHYSICAL EXAM:  Constitutional: NAD  Neck: No JVD  Respiratory: CTAB, b/l rales   Cardiovascular: S1, S2, RRR  Gastrointestinal: BS+, soft, NT/ND  Extremities: No cyanosis or clubbing.  peripheral edema  Neurological: trached  : No CVA tenderness. blair in.   Skin: No rashes    LABS:  04-12    139  |  107  |  56<H>  ----------------------------<  164<H>  4.0   |  23  |  1.8<H>    Ca    7.2<L>      12 Apr 2023 20:22  Phos  3.7     04-12  Mg     2.0     04-12      Creatinine Trend: 1.8 <--, 1.6 <--, 1.4 <--, 1.0 <--, 1.0 <--, 1.1 <--, 1.1 <--, 1.1 <--, 1.0 <--, 1.0 <--, 1.1 <--, 1.1 <--, 1.0 <--, 0.8 <--, 0.7 <--, 0.7 <--, 0.6 <--, 0.6 <--, 0.6 <--, 0.7 <--, 0.8 <--, 0.8 <--, 0.6 <--, 0.7 <--, 0.8 <--, 0.9 <--, 0.8 <--, 0.7 <--, 0.8 <--, 0.9 <--, 1.1 <--, 1.1 <--, 1.0 <--, 1.0 <--, 1.1 <--, 1.1 <--, 1.1 <--, 1.2 <--                        8.5    12.82 )-----------( 180      ( 13 Apr 2023 10:20 )             27.0     Urine Studies:    Creatinine, Random Urine: 53 mg/dL (04-12 @ 12:30)  Sodium, Random Urine: 24.0 mmoL/L (04-12 @ 12:30)  Sodium, Random Urine: 24.0 mmoL/L (04-06 @ 21:17)  Creatinine, Random Urine: 70 mg/dL (04-06 @ 21:17)      < from: Xray Chest 1 View-PORTABLE IMMEDIATE (Xray Chest 1 View-PORTABLE IMMEDIATE .) (04.13.23 @ 09:50) >  Impression:    Bilateral opacities and effusions without significant interval difference    < end of copied text >

## 2023-04-13 NOTE — PROGRESS NOTE ADULT - PROBLEM SELECTOR PLAN 4
s/p Right Hip ORIF 3/22/23  - Fentanyl 25mcg IVP q 3 hrs PRN for pain as per primary team  given x 3 in 24 hrs

## 2023-04-13 NOTE — PROGRESS NOTE ADULT - SUBJECTIVE AND OBJECTIVE BOX
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 Pt walks with walker and got up in middle of night to get food and slipped and fell on his R side. Pt has been having R hip pain since with inability to ambulate. Pts family finally convinced him to come to hospital today. Pt is having 10/10 pain currently. Denies dizziness, CP or LOC prior to fall. Denies head trauma. No HA. (20 Mar 2023 12:02)      Interval History  - Pt has advanced illness   ADVANCE DIRECTIVES:    [ ] Full Code [x ] DNR  MOLST  [ ]  Living Will  [ ]   DECISION MAKER(s):  [ ] Health Care Proxy(s)  [ ] Surrogate(s)  [ ] Guardian           Name(s): Phone Number(s):    Answers: Emergency Contact Name Song,  Answers: Emergency Contact Phone # [phone],  Answers: Emergency Contact Relationship son  BASELINE (I)ADL(s) (prior to admission):  [place]: [ ]Total  [ ] Moderate [ ]Dependent  Palliative Performance Status Version 2:         %    http://npcrc.org/files/news/palliative_performance_scale_ppsv2.pdf    Allergies    No Known Allergies    Intolerances    MEDICATIONS  (STANDING):  acetaminophen     Tablet .. 650 milliGRAM(s) Oral every 6 hours  acetylcysteine 10%  Inhalation 4 milliLiter(s) Inhalation every 8 hours  albuterol    90 MICROgram(s) HFA Inhaler 1 Puff(s) Inhalation every 8 hours  aspirin  chewable 81 milliGRAM(s) Oral daily  atorvastatin 80 milliGRAM(s) Oral at bedtime  atropine 1% Solution 1 Drop(s) Left EYE two times a day  caspofungin IVPB      caspofungin IVPB 50 milliGRAM(s) IV Intermittent every 24 hours  cefepime   IVPB 1000 milliGRAM(s) IV Intermittent every 12 hours  chlorhexidine 0.12% Liquid 15 milliLiter(s) Oral Mucosa every 12 hours  chlorhexidine 2% Cloths 1 Application(s) Topical <User Schedule>  clopidogrel Tablet 75 milliGRAM(s) Oral daily  doxazosin 4 milliGRAM(s) Oral at bedtime  famotidine    Tablet 20 milliGRAM(s) Oral daily  guaifenesin/dextromethorphan Oral Liquid 10 milliLiter(s) Oral every 4 hours  heparin   Injectable 5000 Unit(s) SubCutaneous every 8 hours  insulin lispro (ADMELOG) corrective regimen sliding scale   SubCutaneous every 6 hours  lactated ringers Bolus 250 milliLiter(s) IV Bolus once  lactated ringers. 1000 milliLiter(s) (100 mL/Hr) IV Continuous <Continuous>  midodrine 10 milliGRAM(s) Oral every 8 hours  norepinephrine Infusion 0.01 MICROgram(s)/kG/Min (0.6 mL/Hr) IV Continuous <Continuous>  prednisoLONE acetate 1% Suspension 1 Drop(s) Left EYE every 6 hours  propofol Infusion 5 MICROgram(s)/kG/Min (1.91 mL/Hr) IV Continuous <Continuous>  vancomycin    Solution 125 milliGRAM(s) Oral every 6 hours  vasopressin Infusion 0.03 Unit(s)/Min (4.5 mL/Hr) IV Continuous <Continuous>    MEDICATIONS  (PRN):  fentaNYL    Injectable 25 MICROGram(s) IV Push every 3 hours PRN Severe Pain (7 - 10)  sodium chloride 0.9% lock flush 10 milliLiter(s) IV Push every 1 hour PRN Pre/post blood products, medications, blood draw, and to maintain line patency    PRESENT SYMPTOMS: [ ]Unable to obtain due to poor mentation   Source if other than patient:  [ ]Family   [ ]Team     Pain: [ ]yes [ ]no  QOL impact -   Location -                    Aggravating factors -  Quality -  Radiation -  Timing-  Severity (0-10 scale):  Minimal acceptable level (0-10 scale):     CPOT:    https://www.[facility].org/getattachment/[email or web address]/Critical-Care-Pain-Observation-Tool-(CPOT)    PAIN AD Score:   [email or web address].[email or web address]/cog/painad.pdf (press ctrl +  left click to view)    Dyspnea:                           [ ]None[ ]Mild [ ]Moderate [ ]Severe     Respiratory Distress Observation Scale (RDOS):   A score of 0 to 2 signifies little or no respiratory distress, 3 signifies mild distress, scores 4 to 6 indicate moderate distress, and scores greater than 7 signify severe distress  [email or web address]/sites/default/files/[email or web address]-pctcrjsmbkp-qkivudpl-oobdhckmxff-cxaik.pdf    Anxiety:                             [ ]None[ ]Mild [ ]Moderate [ ]Severe   Fatigue:                             [ ]None[ ]Mild [ ]Moderate [ ]Severe   Nausea:                             [ ]None[ ]Mild [ ]Moderate [ ]Severe   Loss of appetite:              [ ]None[ ]Mild [ ]Moderate [ ]Severe   Constipation:                    [ ]None[ ]Mild [ ]Moderate [ ]Severe    Other Symptoms:  [ ]All other review of systems negative     Palliative Performance Status Version 2:         %    http://[facility].org/files/news/palliative_performance_scale_ppsv2.pdf  PHYSICAL EXAM:  Vital Signs Last 24 Hrs  T(C): 35.5 (13 Apr 2023 08:00), Max: 35.8 (12 Apr 2023 20:00)  T(F): 95.9 (13 Apr 2023 08:00), Max: 96.6 (13 Apr 2023 06:00)  HR: 75 (13 Apr 2023 10:00) (64 - 80)  BP: --  BP(mean): --  RR: 26 (13 Apr 2023 10:00) (5 - 36)  SpO2: 95% (13 Apr 2023 10:00) (90% - 100%)    Parameters below as of 13 Apr 2023 10:00  Patient On (Oxygen Delivery Method): ventilator    O2 Concentration (%): 40 I&O's Summary    12 Apr 2023 07:01  -  13 Apr 2023 07:00  --------------------------------------------------------  IN: 4071.2 mL / OUT: 820 mL / NET: 3251.2 mL    13 Apr 2023 07:01  -  13 Apr 2023 11:07  --------------------------------------------------------  IN: 415.9 mL / OUT: 180 mL / NET: 235.9 mL      GENERAL:  [X ] No acute distress [ ]Lethargic  [ ]Unarousable  [ ]Verbal  [ ]Non-Verbal [ ]Cachexia    BEHAVIORAL/PSYCH:  [ ]Alert and Oriented x  [ ] Anxiety [ ] Delirium [ ] Agitation [X ] Calm   EYES: [ ] No scleral icterus [ ] Scleral icterus [X ] Closed  ENMT:  [ ]Dry mouth  [ ]No external oral lesions [X ] No external ear or nose lesions  CARDIOVASCULAR:  [ ]Regular [ ]Irregular [ ]Tachy [ ]Not Tachy  [ ]Leo [ ] Edema [ ] No edema  PULMONARY:  [ ]Tachypnea  [ ]Audible excessive secretions [X ] No labored breathing [ ] labored breathing  GASTROINTESTINAL: [ ]Soft  [ ]Distended  [ X]Not distended [ ]Non tender [ ]Tender  MUSCULOSKELETAL: [ ]No clubbing [ ] clubbing  [X ] No cyanosis [ ] cyanosis  NEUROLOGIC: [ ]No focal deficits  [ ]Follows commands  [ ]Does not follow commands  [X ]Cognitive impairment  [ ]Dysphagia  [ ]Dysarthria  [ ]Paresis   SKIN: [ ] Jaundiced [X ] Non-jaundiced [ ]Rash [ ]No Rash [ ] Warm [ ] Dry  MISC/LINES: [ ] ET tube [ ] Trach [ ]NGT/OGT [ ]PEG [ ]Garrison    CRITICAL CARE:  [ ] Shock Present  [ ]Septic [ ]Cardiogenic [ ]Neurologic [ ]Hypovolemic  [ ]  Vasopressors [ ]  Inotropes   [ ]Respiratory failure present [ ]Mechanical ventilation [ ]Non-invasive ventilatory support [ ]High flow  [ ]Acute  [ ]Chronic [ ]Hypoxic  [ ]Hypercarbic [ ]Other  [ ]Other organ failure     LABS: reviewed by me                        8.5    12.82 )-----------( 180      ( 13 Apr 2023 10:20 )             27.0   04-12    139  |  107  |  56<H>  ----------------------------<  164<H>  4.0   |  23  |  1.8<H>    Ca    7.2<L>      12 Apr 2023 20:22  Phos  3.7     04-12  Mg     2.0     04-12    PTT - ( 12 Apr 2023 12:20 )  PTT:71.1 sec      RADIOLOGY & ADDITIONAL STUDIES: reviewed by me    EKG: reviewed by me    REFERRALS:   [ ]Chaplaincy  [ ]Hospice  [ ]Child Life  [ x]Social Work  [x ]Case management [ ]Holistic Therapy     Patient discussed with primary medical team MD  Palliative care education provided to patient and/or family    Goals of Care Document:    HPI:  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 Pt walks with walker and got up in middle of night to get food and slipped and fell on his R side. Pt has been having R hip pain since with inability to ambulate. Pts family finally convinced him to come to hospital today. Pt is having 10/10 pain currently. Denies dizziness, CP or LOC prior to fall. Denies head trauma. No HA. (20 Mar 2023 12:02)    Palliative Care consulted for goals of care and symptom management.   Interval History  - Pt has advanced illness appears critically ill.   ADVANCE DIRECTIVES:    [ ] Full Code [x ] DNR  MOLST  [ ]  Living Will  [ ]   DECISION MAKER(s):  [ ] Health Care Proxy(s)  [ ] Surrogate(s)  [ ] Guardian           Name(s): Phone Number(s):    Answers: Emergency Contact Name Song,  Answers: Emergency Contact Phone # [phone],  Answers: Emergency Contact Relationship son  BASELINE (I)ADL(s) (prior to admission):  [place]: [ ]Total  [ ] Moderate [ ]Dependent  Palliative Performance Status Version 2:         %    http://npcrc.org/files/news/palliative_performance_scale_ppsv2.pdf    Allergies    No Known Allergies    Intolerances    MEDICATIONS  (STANDING):  acetaminophen     Tablet .. 650 milliGRAM(s) Oral every 6 hours  acetylcysteine 10%  Inhalation 4 milliLiter(s) Inhalation every 8 hours  albuterol    90 MICROgram(s) HFA Inhaler 1 Puff(s) Inhalation every 8 hours  aspirin  chewable 81 milliGRAM(s) Oral daily  atorvastatin 80 milliGRAM(s) Oral at bedtime  atropine 1% Solution 1 Drop(s) Left EYE two times a day  caspofungin IVPB      caspofungin IVPB 50 milliGRAM(s) IV Intermittent every 24 hours  cefepime   IVPB 1000 milliGRAM(s) IV Intermittent every 12 hours  chlorhexidine 0.12% Liquid 15 milliLiter(s) Oral Mucosa every 12 hours  chlorhexidine 2% Cloths 1 Application(s) Topical <User Schedule>  clopidogrel Tablet 75 milliGRAM(s) Oral daily  doxazosin 4 milliGRAM(s) Oral at bedtime  famotidine    Tablet 20 milliGRAM(s) Oral daily  guaifenesin/dextromethorphan Oral Liquid 10 milliLiter(s) Oral every 4 hours  heparin   Injectable 5000 Unit(s) SubCutaneous every 8 hours  insulin lispro (ADMELOG) corrective regimen sliding scale   SubCutaneous every 6 hours  lactated ringers Bolus 250 milliLiter(s) IV Bolus once  lactated ringers. 1000 milliLiter(s) (100 mL/Hr) IV Continuous <Continuous>  midodrine 10 milliGRAM(s) Oral every 8 hours  norepinephrine Infusion 0.01 MICROgram(s)/kG/Min (0.6 mL/Hr) IV Continuous <Continuous>  prednisoLONE acetate 1% Suspension 1 Drop(s) Left EYE every 6 hours  propofol Infusion 5 MICROgram(s)/kG/Min (1.91 mL/Hr) IV Continuous <Continuous>  vancomycin    Solution 125 milliGRAM(s) Oral every 6 hours  vasopressin Infusion 0.03 Unit(s)/Min (4.5 mL/Hr) IV Continuous <Continuous>    MEDICATIONS  (PRN):  fentaNYL    Injectable 25 MICROGram(s) IV Push every 3 hours PRN Severe Pain (7 - 10)  sodium chloride 0.9% lock flush 10 milliLiter(s) IV Push every 1 hour PRN Pre/post blood products, medications, blood draw, and to maintain line patency    PRESENT SYMPTOMS: [ x]Unable to obtain due to poor mentation   Source if other than patient:  [ ]Family   [ ]Team     CPOT:  2/8  https://www.[facility].org/getattachment/[email or web address]/Critical-Care-Pain-Observation-Tool-(CPOT)PHYSICAL EXAM:  Respiratory Distress Observation Scale (RDOS): 3/16  A score of 0 to 2 signifies little or no respiratory distress, 3 signifies mild distress, scores 4 to 6 indicate moderate distress, and scores greater than 7 signify severe distress  [email or web address].ca/sites/default/files/PDFS/618369-fbilykaxfmy-peaoyelu-ghvkypvwppc-qunzx.pdf    Vital Signs Last 24 Hrs  T(C): 35.5 (13 Apr 2023 08:00), Max: 35.8 (12 Apr 2023 20:00)  T(F): 95.9 (13 Apr 2023 08:00), Max: 96.6 (13 Apr 2023 06:00)  HR: 75 (13 Apr 2023 10:00) (64 - 80)  BP: --  BP(mean): --  RR: 26 (13 Apr 2023 10:00) (5 - 36)  SpO2: 95% (13 Apr 2023 10:00) (90% - 100%)    Parameters below as of 13 Apr 2023 10:00  Patient On (Oxygen Delivery Method): ventilator    O2 Concentration (%): 40 I&O's Summary    12 Apr 2023 07:01  -  13 Apr 2023 07:00  --------------------------------------------------------  IN: 4071.2 mL / OUT: 820 mL / NET: 3251.2 mL    13 Apr 2023 07:01  -  13 Apr 2023 11:07  --------------------------------------------------------  IN: 415.9 mL / OUT: 180 mL / NET: 235.9 mL      GENERAL:  [X ] No acute distress [ ]Lethargic  [ ]Unarousable  [ ]Verbal  [ ]Non-Verbal [ ]Cachexia    BEHAVIORAL/PSYCH:  [ ]Alert and Oriented x  [ ] Anxiety [ ] Delirium [ ] Agitation [X ] Calm   EYES: [ ] No scleral icterus [ ] Scleral icterus [X ] Closed  ENMT:  [ ]Dry mouth  [ ]No external oral lesions [X ] No external ear or nose lesions  CARDIOVASCULAR:  [ ]Regular [ ]Irregular [ ]Tachy [ ]Not Tachy  [ ]Leo [ ] Edema [ ] No edema  PULMONARY:  [ ]Tachypnea  [ ]Audible excessive secretions [X ] No labored breathing [ ] labored breathing  GASTROINTESTINAL: [ ]Soft  [ ]Distended  [ X]Not distended [ ]Non tender [ ]Tender  MUSCULOSKELETAL: [ ]No clubbing [ ] clubbing  [X ] No cyanosis [ ] cyanosis  NEUROLOGIC: [ ]No focal deficits  [ ]Follows commands  [ ]Does not follow commands  [X ]Cognitive impairment  [ ]Dysphagia  [ ]Dysarthria  [ ]Paresis   SKIN: [ ] Jaundiced [X ] Non-jaundiced [ ]Rash [ ]No Rash [ ] Warm [ ] Dry  MISC/LINES: [ ] ET tube [ x] Trach [ ]NGT/OGT [ x]PEG [x ]Garrison    CRITICAL CARE:  [ ] Shock Present  [ ]Septic [ ]Cardiogenic [ ]Neurologic [ ]Hypovolemic  [x ]  Vasopressors [ ]  Inotropes   [x ]Respiratory failure present [ x]Mechanical ventilation [ ]Non-invasive ventilatory support [ ]High flow  [ ]Acute  [ ]Chronic [ ]Hypoxic  [ ]Hypercarbic [ ]Other  [ ]Other organ failure     LABS: reviewed by me                        8.5    12.82 )-----------( 180      ( 13 Apr 2023 10:20 )             27.0   04-12    139  |  107  |  56<H>  ----------------------------<  164<H>  4.0   |  23  |  1.8<H>    Ca    7.2<L>      12 Apr 2023 20:22  Phos  3.7     04-12  Mg     2.0     04-12    PTT - ( 12 Apr 2023 12:20 )  PTT:71.1 sec      RADIOLOGY & ADDITIONAL STUDIES: reviewed by me    EKG: reviewed by me    REFERRALS:   [ ]Chaplaincy  [ ]Hospice  [ ]Child Life  [ x]Social Work  [x ]Case management [ ]Holistic Therapy     Patient discussed with primary medical team MD  Palliative care education provided to patient and/or family    Goals of Care Document:

## 2023-04-13 NOTE — PROGRESS NOTE ADULT - ASSESSMENT
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   S/P ORIF of right hip using interlocking intramedullary stephon.  S/P Trach & PEG    NEURO:  #Acute pain    -Acetaminophen 650 PO q6    -Fentanyl 25mcg IVP PRN  #sedation    - Nimbex 11.4/hr--> trial off paralytic if tolerates discontinue    - Propofol 45 mcg--> wean as tolerated      RESP:   #Acute respiratory failure secondary to post operative arrhythmias requiring mechanical ventilation (Intubated 3/23),     -s/p trach 4/5/23 with size 8    -s/p bronch 4/8 BAL- no organisms    -F/U 4/10 BAL -- Rare yeast, gram negative rods    - AM CXR     -F/u pulm c/s recs  -Vent Settings: AC Volume  450/22/40/10      Continue to wean PEEP and FiO2 as tolerated  #PE w/u NEGATIVE on 3/28    CARDS:   #Tachycardia     - Propanolol 10mg q8 - Discontinued 4/10 d/t bradycardic events  #Bradycardia     - 4/10: Episodes x2 bradycardia to 28. Atropine given second episode.      - EP: 4/11 --> signing off, re-consult PRN, will not be doing PPM since bradycardia likely due to hypoxia    -Trops: 0.08 -> 0.09-- seen by cards likely demand ischemia  #acute hypotension    -On Midodrine 5 q8 (restarted 4/12)     -Intermittent hypotensive episodes 4/10 during bradycardic episodes    -on Levo / vaso   #Labs/Imaging  -Echo 3/23- EF appears normal, mild LVH, sclerotic AoV, trace MR,   -Echo 3/29: EF 60-65%, norm LV function, borderline pulm HTN.   -EKG 4/10 most recent: , Sinus Tachycardia, low voltage QRS, incomplete RBBB  -EKG 4/11: , Sinus tach; low voltage QRS  -Echo 4/11: EF 70-75%, Hyperdynamic LV, mild MVR, aortic wall thickening    GI/NUTR:   #Acute clostridium difficile positive 3/30    -PO Vancomycin ends 4/13   #Diet: NPO except medications - will reassess 4/13 to resume feeds provided pressor requirements have decreased    -PEG 3cm at skin, 4cm at bumper  #Nausea: Zofran prn    - Aspiration precautions, HOB 30  #GI Prophylaxis    - Pepcid  #Bowel regimen    - holding due to consistent BM for last 3 days  #Imaging    -CT abdomen 3/28: No definite infectious source identified      -RUQ sono: biliary sludge, mild GB wall thickening/edema.     /RENAL:   #UO: Blair placed by Urology 3/22 (d/w urology and asses scrotal swelling prior to removal)    -Inpatient Rx: doxazosin 4 milliGRAM(s) Oral at bedtime  #acute scrotal swelling  - U/S 3/29 w/ diffused scrotal wall edema (keep blair)   -scrotal elevation  Labs:  #urine output in critically ill    -indwelling blair (placed 3/22)    -VAISHNAVI with oliguria    Labs:          BUN/Cr- 56/1.6  -->,  56/1.8  -->          Electrolytes-Na 139 // K 4.0 // Mg 2.0 //  Phos 3.7 (04-12 @ 20:22)  #Diuresis   -20 Lasix 4/9- unresponsive, given 40 Lasix overnight, will monitor output/ response    -80 Lasix, 5 Metozalone 4/10, responded (4/10)    HEME/ONC:   #DVT prophylaxis- HSQ, SCDs  #H/O CAD s/p stents x3    - ASA 81    - Plavix 75 restarted 4/7    Labs: Hb/Hct:  7.0/23.0  -->,  7.5/24.0  -->                      Plts:  213  -->,  188  -->                 PTT/INR:  72.5/--  --->,  71.1/--  --->     T&S expires 4/15    ID:  #Acute clostridium difficile 4/30  WBC- 16.56  --->>,  11.45  --->>,  11.75  --->>  Temp trend- 24hrs T(F): 96.4 (04-12 @ 20:00), Max: 96.6 (04-12 @ 08:00)    Antibiotics   -vancomycin    Solution 125 every 6 hours, end 4/13   -vancomycin IVPB 1g every 24 hours (started 4/10)  - cefepime IVPB 1000mg every 12 hours (started 4/10)    Cultures:    BCx 4/11 - no growth to date    BAL 4/10- pending (gram stain + yeast and gram neg rods)    BAL 4/8- negative     ET Sputum 3/29: negative     BCx 3/28: NGTD    UA 3/28: Negative     BCx 3/25--pending    UA 3/24--negative    ET Sputum 3/24--neg    MRSA neg    CDiff PCR 3/30: Positive    Procalcitonin 0.32  # +Candida Auris surveillance swab on 4/6/23 (nares, axilla, groin)    ENDO:  HA1C= 5.0    -ISS    -Glucose goal 140-180    MSK:   -WBAT RLE per ortho, PT/OT when clinically appropriate   -R hip dressing changed by SICU team 4/2   -Right hip Xray 2 views per ortho (4/9) --> s/p ORIF of comminuted intertrochanteric Rt femur fx w/out definite change compared to 3/22/23    LINES/DRAINS:    PIV  Blair (3/22)  Trach, NGT   Right radial a-line 4/10  RIJ TLC 4/11    ADVANCED DIRECTIVES:  DNR    HCP/Emergency Contact- Song Segura (Son): [phone]    INDICATION FOR SICU: Hypotension with pressor requirements. Intubated.       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   S/P ORIF of right hip using interlocking intramedullary stephon.  S/P Trach & PEG    NEURO:  #Acute pain    -Acetaminophen 650 PO q6    -Fentanyl 25mcg IVP PRN  #sedation    - Off Nimbex since am 4/13    - Propofol 25 mcg--> wean as tolerated      RESP:   #Acute respiratory failure secondary to post operative arrhythmias requiring mechanical ventilation (Intubated 3/23),     -s/p trach 4/5/23 with size 8    -s/p bronch 4/8 BAL- no organisms    -F/U 4/10 BAL -- Rare yeast, gram negative rods Cx prel Pseudomonas    - CXR     -F/u pulm c/s recs  -Vent Settings: AC Volume  450/22/40/10      ABG - ( 13 Apr 2023 04:38 )  pH, Arterial: 7.36  pH, Blood: x     /  pCO2: 40    /  pO2: 171   / HCO3: 23    / Base Excess: -2.6  /  SaO2: 100.0   Continue to wean PEEP and FiO2 as tolerated  Peak 33/ Plat 15  #PE w/u NEGATIVE on 3/28    CARDS:   #Tachycardia     - Propanolol 10mg q8 - Discontinued 4/10 d/t bradycardic events  #Bradycardia     - 4/10: Episodes x2 bradycardia to 28. Atropine given second episode.      - EP: 4/11 --> signing off, re-consult PRN, will not be doing PPM since bradycardia likely due to hypoxia    -Trops: 0.08 -> 0.09-- seen by cards likely demand ischemia  #acute hypotension    -On Midodrine 5 q8 (restarted 4/12)     -Intermittent hypotensive episodes 4/10 during bradycardic episodes    -on Levo / vaso   #Labs/Imaging  -Echo 3/23- EF appears normal, mild LVH, sclerotic AoV, trace MR,   -Echo 3/29: EF 60-65%, norm LV function, borderline pulm HTN.   -EKG 4/10 most recent: , Sinus Tachycardia, low voltage QRS, incomplete RBBB  -EKG 4/11: , Sinus tach; low voltage QRS  -Echo 4/11: EF 70-75%, Hyperdynamic LV, mild MVR, aortic wall thickening    GI/NUTR:   #Acute clostridium difficile positive 3/30    -PO Vancomycin ends 4/13   #Diet: NPO except medications - will reassess 4/13 to resume feeds provided pressor requirements have decreased    -PEG 3cm at skin, 4cm at bumper  #Nausea: Zofran prn    - Aspiration precautions, HOB 30  #GI Prophylaxis    - Pepcid  #Bowel regimen    - holding due to consistent BM for last 3 days       BM am 4/13- described as "creamy, red streaked"  #Imaging    -CT abdomen 3/28: No definite infectious source identified      -RUQ sono: biliary sludge, mild GB wall thickening/edema.     /RENAL:   #UO: Blair placed by Urology 3/22 (d/w urology and asses scrotal swelling prior to removal)    -Inpatient Rx: doxazosin 4 milliGRAM(s) Oral at bedtime  #acute scrotal swelling  - U/S 3/29 w/ diffused scrotal wall edema (keep blair)   -scrotal elevation  Labs:  #urine output in critically ill    -indwelling blair (placed 3/22)    -VAISHNAVI with oliguria- resolving however creatini continues to increase    Labs:          BUN/Cr- 56/1.6  -->,  56/1.8  -->          Electrolytes-Na 139 // K 4.0 // Mg 2.0 //  Phos 3.7 (04-12 @ 20:22)  #Diuresis   -20 Lasix 4/9- unresponsive, given 40 Lasix overnight, will monitor output/ response    -80 Lasix, 5 Metozalone 4/10, responded (4/10)    HEME/ONC:   #DVT prophylaxis- HSQ, SCDs  #H/O CAD s/p stents x3    - ASA 81    - Plavix 75 restarted 4/7    Labs: Hb/Hct:  7.0/23.0  -->,  7.5/24.0  -->                      Plts:  213  -->,  188  -->                 PTT/INR:  72.5/--  --->,  71.1/--  --->     T&S expires 4/15    ID:  #Acute clostridium difficile 4/30  WBC- 16.56  --->>,  11.45  --->>,  11.75  --->>  Temp trend- 24hrs T(F): 96.4 (04-12 @ 20:00), Max: 96.6 (04-12 @ 08:00)    Antibiotics   -vancomycin    Solution 125 every 6 hours, end 4/13   -vancomycin IVPB 1g every 24 hours (started 4/10)  - cefepime IVPB 1000mg every 12 hours (started 4/10)    Cultures:    BCx 4/11 - no growth to date    BAL 4/10- pending (gram stain + yeast and gram neg rods)    BAL 4/8- negative     ET Sputum 3/29: negative     BCx 3/28: NGTD    UA 3/28: Negative     BCx 3/25--pending    UA 3/24--negative    ET Sputum 3/24--neg    MRSA neg    CDiff PCR 3/30: Positive    Procalcitonin 0.32  # +Candida Auris surveillance swab on 4/6/23 (nares, axilla, groin)    ENDO:  HA1C= 5.0    -ISS    -Glucose goal 140-180    MSK:   -WBAT RLE per ortho, PT/OT when clinically appropriate   -R hip dressing changed by SICU team 4/2   -Right hip Xray 2 views per ortho (4/9) --> s/p ORIF of comminuted intertrochanteric Rt femur fx w/out definite change compared to 3/22/23    LINES/DRAINS:    PIV  Blair (3/22)  Trach, NGT   Right radial a-line 4/10  RIJ TLC 4/11    ADVANCED DIRECTIVES:  DNR    HCP/Emergency Contact- Song Segura (Son): [phone]    INDICATION FOR SICU: Hypotension with pressor requirements. Intubated.       DISPO: SICU

## 2023-04-13 NOTE — PROGRESS NOTE ADULT - SUBJECTIVE AND OBJECTIVE BOX
RADHA RAMON   [id number]   [birth year]  77yM    Admit Date/LOS: 03-20 (2d)  Indication for SICU: Hemodynamic monitoring. 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 Pt walks with walker and got up in middle of night to get food and slipped and fell on his R side. Pt has been having R hip pain since with inability to ambulate. Pts family finally convinced him to come to hospital today. Pt is having 10/10 pain currently. Denies dizziness, CP or LOC prior to fall. Denies head trauma. No HA. (20 Mar 2023 12:02)    3/22: Patient S/P ORIF of right hip using interlocking intramedullary stephon. - upgradeed for hypotension in PACU  4/5  Trach and PEG    24Hour Events  4/12      [] A ten-point review of systems was otherwise negative except as noted above.  [x] Due to altered mental status/intubation, subjective information was not attained from   the patient. History was obtained, to the extent possible, from review of the chart and collateral sources of information.   RADHA RAMON   [id number]   [birth year]  77yM    Admit Date/LOS:  (2d)  Indication for SICU: Hemodynamic monitoring. 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 Pt walks with walker and got up in middle of night to get food and slipped and fell on his R side. Pt has been having R hip pain since with inability to ambulate. Pts family finally convinced him to come to hospital today. Pt is having 10/10 pain currently. Denies dizziness, CP or LOC prior to fall. Denies head trauma. No HA. (20 Mar 2023 12:02)    3/22: Patient S/P ORIF of right hip using interlocking intramedullary stephon. - upgradeed for hypotension in PACU    Trach and PEG    24Hour Events      NIGHT  - PO vanco to finish tomorrow   -  bcx NGTD   - PM rounds - 0.16 levo, 0.04 vaso, 35 propofol  - bilateral peripheral pulses palpable  - PERRLA   - f/u AM CBC  - nephrology consult placed  - Creatinine increased to 1.8, UOP increased to 35-45 cc/hr  - Hgb continuing to downtrend from 7.5 --> 7.1; 1 unit PRBC ordered  - continue to hold heparin gtt  - AM AB.36/40/171/23  Lac 1.4  - Vent settings 450/22/40/10  - AM EKG: sinus rhythm w/ low voltage; QTc 466  -solid bloody BM        DAY  -PRN fentanyl added, off nimbex  -Cr increase to 1.4,(1.0), urine output poor  VAISHNAVI  -WBC 9-->16  -NSTEMI c/w demand ischemia per cardio  -Pending discussion with family goals of care - continuing medical managment, remains DNR  -NICOM neg   -Decreased PEEP to 10, FIO2 40%  -Hgb 8.4->7.0, 1uRBC; heparin HELD  -FeNa- pre renal 0.4%  - Bloody BM with clots  -Vanc adjusted per ID pharmacy recs (1g q24hr)      [] A ten-point review of systems was otherwise negative except as noted above.  [x] Due to altered mental status/intubation, subjective information was not attained from   the patient. History was obtained, to the extent possible, from review of the chart and collateral sources of information.      Daily     Diet, NPO:   Except Medications (23 @ 14:55)      CURRENT MEDS:  Neurologic Medications  acetaminophen     Tablet .. 650 milliGRAM(s) Oral every 6 hours  fentaNYL    Injectable 25 MICROGram(s) IV Push every 3 hours PRN Severe Pain (7 - 10)  propofol Infusion 5 MICROgram(s)/kG/Min IV Continuous <Continuous>    Respiratory Medications  acetylcysteine 10%  Inhalation 4 milliLiter(s) Inhalation every 8 hours  albuterol    90 MICROgram(s) HFA Inhaler 1 Puff(s) Inhalation every 8 hours  guaifenesin/dextromethorphan Oral Liquid 10 milliLiter(s) Oral every 4 hours    Cardiovascular Medications  doxazosin 4 milliGRAM(s) Oral at bedtime  midodrine 5 milliGRAM(s) Oral every 8 hours  norepinephrine Infusion 0.01 MICROgram(s)/kG/Min IV Continuous <Continuous>    Gastrointestinal Medications  famotidine    Tablet 20 milliGRAM(s) Oral daily  lactated ringers. 1000 milliLiter(s) IV Continuous <Continuous>  sodium chloride 0.9% lock flush 10 milliLiter(s) IV Push every 1 hour PRN Pre/post blood products, medications, blood draw, and to maintain line patency      Hematologic/Oncologic Medications  aspirin  chewable 81 milliGRAM(s) Oral daily  clopidogrel Tablet 75 milliGRAM(s) Oral daily    Antimicrobial/Immunologic Medications  caspofungin IVPB      caspofungin IVPB 50 milliGRAM(s) IV Intermittent every 24 hours  cefepime   IVPB 1000 milliGRAM(s) IV Intermittent every 12 hours  vancomycin    Solution 125 milliGRAM(s) Oral every 6 hours    Endocrine/Metabolic Medications  atorvastatin 80 milliGRAM(s) Oral at bedtime  insulin lispro (ADMELOG) corrective regimen sliding scale   SubCutaneous every 6 hours  vasopressin Infusion 0.04 Unit(s)/Min IV Continuous <Continuous>    Topical/Other Medications  atropine 1% Solution 1 Drop(s) Left EYE two times a day  chlorhexidine 0.12% Liquid 15 milliLiter(s) Oral Mucosa every 12 hours  chlorhexidine 2% Cloths 1 Application(s) Topical <User Schedule>  prednisoLONE acetate 1% Suspension 1 Drop(s) Left EYE every 6 hours      ICU Vital Signs Last 24 Hrs  T(C): 35.8 (2023 06:00), Max: 35.9 (2023 08:00)  T(F): 96.6 (2023 06:00), Max: 96.6 (2023 08:00)  HR: 72 (2023 07:00) (64 - 79)  ABP: 129/57 (2023 07:00) (117/49 - 145/57)  ABP(mean): 82 (:) (73 - 87)  RR: 26 (:) (5 - 30)  SpO2: 99% (:) (97% - 100%)    O2 Parameters below as of 2023 07:00  Patient On (Oxygen Delivery Method): ventilator    O2 Concentration (%): 40        Mode: AC/ CMV (Assist Control/ Continuous Mandatory Ventilation)  RR (machine): 22  TV (machine): 450  FiO2: 40  PEEP: 10  ITime: 1  MAP: 22  PIP: 28    ABG - ( 2023 04:38 )  pH, Arterial: 7.36  pH, Blood: x     /  pCO2: 40    /  pO2: 171   / HCO3: 23    / Base Excess: -2.6  /  SaO2: 100.0               I&O's Summary    2023 07:  -  2023 07:00  --------------------------------------------------------  IN: 4071.2 mL / OUT: 820 mL / NET: 3251.2 mL      I&O's Detail    2023 07:01  -  2023 07:00  --------------------------------------------------------  IN:    Cisatracurium: 118.2 mL    Enteral Tube Flush: 240 mL    Heparin: 72 mL    IV PiggyBack: 50 mL    IV PiggyBack: 250 mL    Lactated Ringers: 2300 mL    Norepinephrine: 256.7 mL    PRBCs (Packed Red Blood Cells): 348 mL    Propofol: 298.3 mL    Vasopressin: 138 mL  Total IN: 4071.2 mL    OUT:    Indwelling Catheter - Urethral (mL): 820 mL  Total OUT: 820 mL    Total NET: 3251.2 mL          PHYSICAL EXAM:    General/Neuro  RASS:   0          GCS: sedated, unable to obtain  Sedated with Propofol 25 mcg/ kg/ hr  Pupils: 5mm sluggish    Lungs:  Trached, vented  Course breath sounds through out b/l lung fields L >> R    Cardiovascular : S1, S2.  Regular rate and rhythm.  Peripheral edema +++  Cardiac Rhythm: Normal Sinus Rhythm    GI: Abdomen soft, Non-tender, Non-distended.    Gastrostomy 4cm at hub    Extremities: Extremities warm, pink, well-perfused. Pulses: palpable DP bilaterally    Derm: Good skin turgor, no skin breakdown.      :       Garrison catheter in place. +++ Scrotal edema      CXR: Increased left basilar and hilar infiltrates with air bronchograms    LABS:  CAPILLARY BLOOD GLUCOSE  POCT Blood Glucose.: 150 mg/dL (2023 06:32)  POCT Blood Glucose.: 140 mg/dL (2023 23:51)  POCT Blood Glucose.: 176 mg/dL (2023 17:14)  POCT Blood Glucose.: 192 mg/dL (2023 12:01)                          7.1    11.10 )-----------( 163      ( 2023 02:53 )             22.7       04-12    139  |  107  |  56<H>  ----------------------------<  164<H>  4.0   |  23  |  1.8<H>    Ca    7.2<L>      2023 20:22  Phos  3.7     04-12  Mg     2.0     04-12        PTT - ( 2023 12:20 )  PTT:71.1 sec  CARDIAC MARKERS ( 2023 12:20 )  x     / 0.09 ng/mL / x     / x     / x      CARDIAC MARKERS ( 2023 04:40 )  x     / 0.09 ng/mL / 79 U/L / x     / 1.5 ng/mL  CARDIAC MARKERS ( 2023 01:33 )  x     / 0.09 ng/mL / 83 U/L / x     / 1.7 ng/mL  CARDIAC MARKERS ( 2023 11:19 )  x     / 0.08 ng/mL / 126 U/L / x     / 3.2 ng/mL          Culture - Sputum (collected 2023 20:43)  Source: Trach Asp Tracheal Aspirate  Gram Stain (2023 06:54):    Moderate polymorphonuclear leukocytes per low power field    No Squamous epithelial cells per low power field    Rare Yeast like cells seen per oil power field    Numerous Gram Negative Rods seen per oil power field  Preliminary Report (2023 06:20):    Numerous Pseudomonas aeruginosa- sensitivities pending    Culture - Blood (collected 2023 01:16)  Source: .Blood Blood  Preliminary Report (2023 09:02):    No growth to date.

## 2023-04-13 NOTE — PROGRESS NOTE ADULT - ASSESSMENT
77M with PMH of CAD, HLD, prostate CA, L orbital fracture with blindness, here after fall at home, s/p ORIF of R hip. Course c/b acute respiratory failure due to post-op arrhythmias, requiring intubation, now s/p trach and PEG. Is on tylenol and IV fentanyl for acute pain, and also sedated with propofol, paralyzed with nimbex. Also requiring pressors for acute hypotension in the setting of sepsis and ARDS, on broad abx. Patient had C. diff, on PO vanco, and was planned for PPM by EP. Palliative care consulted for GOC, as family reportedly considering comfort-directed care. 77M with PMH of CAD, HLD, prostate CA, L orbital fracture with blindness, here after fall at home, s/p ORIF of R hip. Course c/b acute respiratory failure due to post-op arrhythmias, requiring intubation, now s/p trach and PEG. Is on tylenol and IV fentanyl for acute pain, and also sedated with propofol, paralyzed with nimbex. Also requiring pressors for acute hypotension in the setting of sepsis and ARDS, on broad abx. Patient had C. diff, on PO vanco, and was planned for PPM by EP. Palliative care spoke to SICU team, pt now having sedation weened to better assess mental status.

## 2023-04-13 NOTE — PROGRESS NOTE ADULT - PROBLEM SELECTOR PLAN 5
Dada Zuleta is next of kin/surrogate Dada Zuleta is next of kin/surrogate. Song wants to continue all care but agreed to DNR. Due to overall very poor prognosis palliative care will follow for further GOC discussions

## 2023-04-13 NOTE — CONSULT NOTE ADULT - ASSESSMENT
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 S/P ORIF of right hip using interlocking intramedullary stephon. Hospital stay complicated by septic shock and PNA S/P Trach & PEG with recent VAISHNAVI.    Assessment and plan  VAISHNAVI/ pulmonary edema/ septic shock resolved/ Hip fracture s/p ORIF  VAISHNAVI likely ATN iso of hypotension, possible component of CRS  pulmonary edema, balance grossly positive since admission  on vasopressin, non oliguric  stop IVH  start bumex 1 mg iv bid  Fluid and Na restriction  US kidneys and bladder  Urine lytes noted  no acute indications for RRT as of now  will follow

## 2023-04-14 NOTE — PROGRESS NOTE ADULT - ASSESSMENT
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   S/P ORIF of right hip using interlocking intramedullary stephon.  S/P Trach & PEG    NEURO:  #Acute pain    -Acetaminophen 650 PO q6    -Fentanyl 25mcg IVP PRN  #sedation    - off nimbex 4/12    - off propofol 4/13    RESP:   #Acute respiratory failure secondary to post operative arrhythmias requiring mechanical ventilation (Intubated 3/23),     -s/p trach 4/5/23 with size 8    -s/p bronch 4/8 BAL- no organisms    -F/U 4/10 BAL - pseudomonas    - AM CXR     -F/u pulm c/s recs  -Vent Settings: AC Volume  450/22/30/8- tolerated PS 10/8 x 3hr  #PE w/u NEGATIVE on 3/28    CARDS:   #Tachycardia     - Propanolol 10mg q8 - Discontinued 4/10 d/t bradycardic events  #Bradycardia     - 4/10: Episodes x2 bradycardia to 28. Atropine given second episode.      - EP: 4/11 --> signing off, re-consult PRN, will not be doing PPM since bradycardia likely due to hypoxia    -Trops: 0.08 -> 0.09-- seen by cards likely demand ischemia  #acute hypotension    -increased Midodrine 10mg q8    -Intermittent hypotensive episodes 4/10 during bradycardic episodes    -off levo and vaso on 4/13  #Labs/Imaging  -Echo 3/23- EF appears normal, mild LVH, sclerotic AoV, trace MR,   -Echo 3/29: EF 60-65%, norm LV function, borderline pulm HTN.   -EKG 4/10 most recent: , Sinus Tachycardia, low voltage QRS, incomplete RBBB  -EKG 4/11: , Sinus tach; low voltage QRS  -Echo 4/11: EF 70-75%, Hyperdynamic LV, mild MVR, aortic wall thickening    GI/NUTR:   #Acute clostridium difficile positive 3/30    -PO Vancomycin course completed 4/13   #Diet: restarted TF Peptamen AF increased to goal of 45 ml/h     Probided 1350 kcal and 85.5 gms protein    -PEG 3cm at skin, 4cm at bumper  #Nausea: Zofran prn    - Aspiration precautions, HOB 30  #GI Prophylaxis    - Pepcid  #Bowel regimen    - holding due to consistent BM for last 3 days    - bloody vs melanotic BMs  #Imaging    -CT abdomen 3/28: No definite infectious source identified      -RUQ sono: biliary sludge, mild GB wall thickening/edema.     /RENAL:   #UO: Blair placed by Urology 3/22 (d/w urology and asses scrotal swelling prior to removal)    -Inpatient Rx: doxazosin 4 milliGRAM(s) Oral at bedtime  #acute scrotal swelling  - U/S 3/29 w/ diffused scrotal wall edema (keep blair)   -scrotal elevation  Labs:  #urine output in critically ill    -indwelling blair (placed 3/22)    -VAISHNAVI, oliguria, improving u/o  ml/hr    IVF LR @50cc     Labs:          BUN/Cr- 56/1.6  -->,  56/1.8  -->55/1.6          Electrolytes-  #Diuresis   -20 Lasix 4/9- unresponsive, given 40 Lasix overnight, will monitor output/ response    -80 Lasix, 5 Metozalone 4/10, responded (4/10)    HEME/ONC:   #DVT prophylaxis- HSQ restarted (d/c heparin gtt), SCDs  #H/O CAD s/p stents x3    - ASA 81    - Plavix 75 restarted 4/7    Labs: Hb/Hct:  7.5/24.0  -->,  7.1/22.7  1prbc -->,  8.5/27.0  -->           Plts:  188  -->,  163  -->,  180  -->              PTT/INR:  72.5/--  --->,  71.1/--  --->     T&S expires 4/15    ID:  #Acute clostridium difficile 4/30  WBC- 16.56  --->>,  11.45  --->>,  11.75  --->> 12.8  afebrile     Antibiotics   -vancomycin    Solution 125 every 6 hours, end 4/13  - cefepime IVPB 1000mg every 12 hours (started 4/10)  -d/c vanc IV     Cultures:    BCx 4/11 - no growth to date    BAL 4/10- Pseudomonas     BAL 4/8- negative     ET Sputum 3/29: negative     BCx 3/28: NGTD    UA 3/28: Negative     BCx 3/25--pending    UA 3/24--negative    ET Sputum 3/24--neg    MRSA neg    CDiff PCR 3/30: Positive    Procalcitonin 0.32  # +Candida Auris surveillance swab on 4/6/23 (nares, axilla, groin)    ENDO:  HA1C= 5.0    -ISS    -Glucose goal 140-180    MSK:   -WBAT RLE per ortho, PT/OT when clinically appropriate   -R hip dressing changed by SICU team 4/2   -Right hip Xray 2 views per ortho (4/9) --> s/p ORIF of comminuted intertrochanteric Rt femur fx w/out definite change compared to 3/22/23    LINES/DRAINS:    PIV  Blair (3/22)  Trach, NGT   Right radial a-line 4/10  RIJ TLC 4/11    ADVANCED DIRECTIVES:  DNR    HCP/Emergency Contact- Song Segura (Son): [phone]    INDICATION FOR SICU: Hypotension with pressor requirements. Intubated.       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   S/P ORIF of right hip using interlocking intramedullary stephon.  S/P Trach & PEG    NEURO:  #Acute pain    -Acetaminophen 650 PO q6    -Fentanyl 25mcg IVP PRN  #sedation    - off nimbex 4/12    - off propofol 4/13    RESP:   #Acute respiratory failure secondary to post operative arrhythmias requiring mechanical ventilation (Intubated 3/23),     -s/p trach 4/5/23 with size 8    -s/p bronch 4/8 BAL- no organisms    -F/U 4/10 BAL - pseudomonas    -AM CXR     -F/u pulm c/s recs    -Albuterol, Robitussin  -Vent Settings: AC Volume  450/22/30/8- tolerated PS 10/8 x 3hr  #PE w/u NEGATIVE on 3/28      CARDS:   #Tachycardia     - Propanolol 10mg q8 - Discontinued 4/10 d/t bradycardic events  #Bradycardia     - 4/10: Episodes x2 bradycardia to 28. Atropine given second episode.      - EP: 4/11 --> signing off, re-consult PRN, will not be doing PPM since bradycardia likely due to hypoxia    -Trops: 0.08 -> 0.09-- seen by cards likely demand ischemia  #acute hypotension    -increased Midodrine 10mg q8    -Intermittent hypotensive episodes 4/10 during bradycardic episodes    -off levo and vaso on 4/13  #Labs/Imaging  -Echo 3/23- EF appears normal, mild LVH, sclerotic AoV, trace MR,   -Echo 3/29: EF 60-65%, norm LV function, borderline pulm HTN.   -EKG 4/10 most recent: , Sinus Tachycardia, low voltage QRS, incomplete RBBB  -EKG 4/11: , Sinus tach; low voltage QRS  -Echo 4/11: EF 70-75%, Hyperdynamic LV, mild MVR, aortic wall thickening    GI/NUTR:   #Acute clostridium difficile positive 3/30    -PO Vancomycin course completed 4/13   #Diet: restarted TF Peptamen AF increased to goal of 45 ml/h-- 35 4/14     Probided 1350 kcal and 85.5 gms protein    -PEG 3cm at skin, 4cm at bumper  #Nausea: Zofran prn    - Aspiration precautions, HOB 30  #GI Prophylaxis    - Pepcid  #Bowel regimen    - holding due to consistent BM for last 3 days    - bloody vs melanotic BMs  #Imaging    -CT abdomen 3/28: No definite infectious source identified      -RUQ sono: biliary sludge, mild GB wall thickening/edema.     /RENAL:   #UO: Blair placed by Urology 3/22 (d/w urology and asses scrotal swelling prior to removal)    -Inpatient Rx: doxazosin 4 milliGRAM(s) Oral at bedtime  #acute scrotal swelling  - U/S 3/29 w/ diffused scrotal wall edema (keep blair)   -scrotal elevation  Labs:  #urine output in critically ill    -indwelling blair (placed 3/22)    -VAISHNAVI, oliguria, improving u/o  ml/hr    IVF LR @50cc     Labs:          BUN/Cr- 56/1.6  -->,  56/1.8  -->55/1.6          Electrolytes-  #Diuresis   -20 Lasix 4/9- unresponsive, given 40 Lasix overnight, will monitor output/ response    -80 Lasix, 5 Metozalone 4/10, responded (4/10)  -Nephrology: Bumex 1g BID, US K&B  follow up intravascular assessment    HEME/ONC:   #DVT prophylaxis- HSQ restarted (d/c heparin gtt), SCDs  #H/O CAD s/p stents x3    - ASA 81    - Plavix 75 restarted 4/7    Labs: Hb/Hct:  7.5/24.0  -->,  7.1/22.7  1prbc -->,  8.5/27.0  -->           Plts:  188  -->,  163  -->,  180  -->              PTT/INR:  72.5/--  --->,  71.1/--  --->     T&S expires 4/15    ID:  #Acute clostridium difficile 4/30  WBC- 16.56  --->>,  11.45  --->>,  11.75  --->> 12.8  afebrile     Antibiotics   -Ended 4/13: vancomycin    Solution 125 every 6 hours  - DC'd 4/14 per ID. cefepime IVPB 1000mg every 12 hours (started 4/10)  - DC'd vanc IV     Cultures:    BCx 4/11 - no growth to date    BAL 4/10- Pseudomonas     BAL 4/8- negative     ET Sputum 3/29: negative     BCx 3/28: NGTD    UA 3/28: Negative     BCx 3/25--pending    UA 3/24--negative    ET Sputum 3/24--neg    MRSA neg    CDiff PCR 3/30: Positive    Procalcitonin 0.32  # +Candida Auris surveillance swab on 4/6/23 (nares, axilla, groin)    ENDO:  HA1C= 5.0    -ISS    -Glucose goal 140-180    MSK:   -WBAT RLE per ortho, PT/OT when clinically appropriate   -R hip dressing changed by SICU team 4/2   -Right hip Xray 2 views per ortho (4/9) --> s/p ORIF of comminuted intertrochanteric Rt femur fx w/out definite change compared to 3/22/23    LINES/DRAINS:    PIV  Blair (3/22)  Trach, NGT   Right radial a-line 4/10  RIJ TLC 4/11    ADVANCED DIRECTIVES:  DNR    HCP/Emergency Contact- Song Segura (Son): [phone]    INDICATION FOR SICU: Hypotension with pressor requirements. Intubated.       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   S/P ORIF of right hip using interlocking intramedullary stephon.  S/P Trach & PEG    NEURO:  #Acute pain    -Acetaminophen 650 PO q6    -Fentanyl 25mcg IVP PRN  #sedation    - off nimbex 4/12    - off propofol 4/13    RESP:   #Acute respiratory failure secondary to post operative arrhythmias requiring mechanical ventilation (Intubated 3/23),     -s/p trach 4/5/23 with size 8    -s/p bronch 4/8 BAL- no organisms    -F/U 4/10 BAL - pseudomonas    -AM CXR     -F/u pulm c/s recs    -Albuterol, Robitussin  -Vent Settings: AC Volume  450/22/30/8- tolerated PS 10/8 x 3hr  #PE w/u NEGATIVE on 3/28      CARDS:   #Tachycardia     - Propanolol 10mg q8 - Discontinued 4/10 d/t bradycardic events  #Bradycardia     - 4/10: Episodes x2 bradycardia to 28. Atropine given second episode.      - EP: 4/11 --> signing off, re-consult PRN, will not be doing PPM since bradycardia likely due to hypoxia    -Trops: 0.08 -> 0.09-- seen by cards likely demand ischemia  #acute hypotension    -increased Midodrine 10mg q8    -Intermittent hypotensive episodes 4/10 during bradycardic episodes    -off levo and vaso on 4/13  #Labs/Imaging  -Echo 3/23- EF appears normal, mild LVH, sclerotic AoV, trace MR,   -Echo 3/29: EF 60-65%, norm LV function, borderline pulm HTN.   -EKG 4/10 most recent: , Sinus Tachycardia, low voltage QRS, incomplete RBBB  -EKG 4/11: , Sinus tach; low voltage QRS  -Echo 4/11: EF 70-75%, Hyperdynamic LV, mild MVR, aortic wall thickening    GI/NUTR:   #Acute clostridium difficile positive 3/30    -PO Vancomycin course completed 4/13   #Diet: restarted TF Peptamen AF increased to goal of 45 ml/h-- 35 4/14     Probided 1350 kcal and 85.5 gms protein    -PEG 3cm at skin, 4cm at bumper  #Nausea: Zofran prn    - Aspiration precautions, HOB 30  #GI Prophylaxis    - Pepcid  #Bowel regimen    - holding due to consistent BM for last 3 days    - bloody vs melanotic BMs  #Imaging    -CT abdomen 3/28: No definite infectious source identified      -RUQ sono: biliary sludge, mild GB wall thickening/edema.     /RENAL:   #UO: Blair placed by Urology 3/22 (d/w urology and asses scrotal swelling prior to removal)    -Inpatient Rx: doxazosin 4 milliGRAM(s) Oral at bedtime  #acute scrotal swelling  - U/S 3/29 w/ diffused scrotal wall edema (keep blair)   -scrotal elevation  Labs:  #urine output in critically ill    -indwelling blair (placed 3/22)    -VAISHNAVI, oliguria, improving u/o  ml/hr    IVF LR @50cc     Labs:          BUN/Cr- 56/1.6  -->,  56/1.8  -->55/1.6          Electrolytes-  #Diuresis   -20 Lasix 4/9- unresponsive, given 40 Lasix overnight, will monitor output/ response    -80 Lasix, 5 Metozalone 4/10, responded (4/10)  -Nephrology: Bumex 1g BID, US K&B  follow up intravascular assessment    HEME/ONC:   #DVT prophylaxis- HSQ restarted (d/c heparin gtt), SCDs  #H/O CAD s/p stents x3    - ASA 81    - Plavix 75 restarted 4/7    Labs: Hb/Hct:  7.5/24.0  -->,  7.1/22.7  1prbc -->,  8.5/27.0  -->           Plts:  188  -->,  163  -->,  180  -->              PTT/INR:  72.5/--  --->,  71.1/--  --->     T&S expires 4/15    ID:  #Acute clostridium difficile 4/30  WBC- 16.56  --->>,  11.45  --->>,  11.75  --->> 12.8  afebrile     Antibiotics   -Ended 4/13: vancomycin    Solution 125 every 6 hours  - Cefepime IVPB 1000mg every 12 hours (started 4/10)  - DC'd vanc IV     Cultures:    BCx 4/11 - no growth to date    BAL 4/10- Pseudomonas     BAL 4/8- negative     ET Sputum 3/29: negative     BCx 3/28: NGTD    UA 3/28: Negative     BCx 3/25--pending    UA 3/24--negative    ET Sputum 3/24--neg    MRSA neg    CDiff PCR 3/30: Positive    Procalcitonin 0.32  # +Candida Auris surveillance swab on 4/6/23 (nares, axilla, groin)    ENDO:  HA1C= 5.0    -ISS    -Glucose goal 140-180    MSK:   -WBAT RLE per ortho, PT/OT when clinically appropriate   -R hip dressing changed by SICU team 4/2   -Right hip Xray 2 views per ortho (4/9) --> s/p ORIF of comminuted intertrochanteric Rt femur fx w/out definite change compared to 3/22/23    LINES/DRAINS:    PIV  Blair (3/22)  Trach, NGT   Right radial a-line 4/10  RIJ TLC 4/11    ADVANCED DIRECTIVES:  DNR    HCP/Emergency Contact- Song Segura (Son): [phone]    INDICATION FOR SICU: Hypotension with pressor requirements. Intubated.       DISPO: SICU

## 2023-04-14 NOTE — PROGRESS NOTE ADULT - SUBJECTIVE AND OBJECTIVE BOX
Nephrology progress note    THIS IS AN INCOMPLETE NOTE . FULL NOTE TO FOLLOW SHORTLY    Patient is seen and examined, events over the last 24 h noted .    Allergies:  No Known Allergies    Hospital Medications:   MEDICATIONS  (STANDING):  acetaminophen     Tablet .. 650 milliGRAM(s) Oral every 6 hours  acetylcysteine 10%  Inhalation 4 milliLiter(s) Inhalation every 8 hours  albuterol    90 MICROgram(s) HFA Inhaler 1 Puff(s) Inhalation every 8 hours  aspirin  chewable 81 milliGRAM(s) Oral daily  atorvastatin 80 milliGRAM(s) Oral at bedtime  atropine 1% Solution 1 Drop(s) Left EYE two times a day  caspofungin IVPB      caspofungin IVPB 50 milliGRAM(s) IV Intermittent every 24 hours  cefepime   IVPB 1000 milliGRAM(s) IV Intermittent every 12 hours  chlorhexidine 0.12% Liquid 15 milliLiter(s) Oral Mucosa every 12 hours  chlorhexidine 2% Cloths 1 Application(s) Topical <User Schedule>  clopidogrel Tablet 75 milliGRAM(s) Oral daily  doxazosin 4 milliGRAM(s) Oral at bedtime  famotidine    Tablet 20 milliGRAM(s) Oral daily  guaifenesin/dextromethorphan Oral Liquid 10 milliLiter(s) Oral every 4 hours  heparin   Injectable 5000 Unit(s) SubCutaneous every 8 hours  insulin lispro (ADMELOG) corrective regimen sliding scale   SubCutaneous every 6 hours  lactated ringers. 1000 milliLiter(s) (30 mL/Hr) IV Continuous <Continuous>  midodrine 10 milliGRAM(s) Oral every 8 hours  norepinephrine Infusion 0.01 MICROgram(s)/kG/Min (0.6 mL/Hr) IV Continuous <Continuous>  prednisoLONE acetate 1% Suspension 1 Drop(s) Left EYE every 6 hours  propofol Infusion 5 MICROgram(s)/kG/Min (1.91 mL/Hr) IV Continuous <Continuous>  vasopressin Infusion 0.03 Unit(s)/Min (4.5 mL/Hr) IV Continuous <Continuous>        VITALS:  T(F): 99 (04-13-23 @ 20:00), Max: 100.4 (04-13-23 @ 10:00)  HR: 94 (04-14-23 @ 08:00)  BP: 150/81 (04-14-23 @ 08:00)  RR: 33 (04-14-23 @ 08:00)  SpO2: 97% (04-14-23 @ 08:00)  Wt(kg): --    04-12 @ 07:01  -  04-13 @ 07:00  --------------------------------------------------------  IN: 4071.2 mL / OUT: 820 mL / NET: 3251.2 mL    04-13 @ 07:01  -  04-14 @ 07:00  --------------------------------------------------------  IN: 2789.7 mL / OUT: 1800 mL / NET: 989.7 mL    04-14 @ 07:01  -  04-14 @ 09:07  --------------------------------------------------------  IN: 75 mL / OUT: 0 mL / NET: 75 mL          PHYSICAL EXAM:  Constitutional: NAD  HEENT: anicteric sclera, oropharynx clear, MMM  Neck: No JVD  Respiratory: CTAB, no wheezes, rales or rhonchi  Cardiovascular: S1, S2, RRR  Gastrointestinal: BS+, soft, NT/ND  Extremities: No cyanosis or clubbing. No peripheral edema  :  No blair.   Skin: No rashes    LABS:  04-14    139  |  107  |  55<H>  ----------------------------<  158<H>  4.4   |  24  |  1.6<H>    Ca    7.1<L>      14 Apr 2023 06:30  Phos  3.3     04-14  Mg     2.1     04-14                            9.0    12.67 )-----------( 165      ( 13 Apr 2023 21:05 )             27.2       Urine Studies:    Creatinine, Random Urine: 53 mg/dL (04-12 @ 12:30)  Sodium, Random Urine: 24.0 mmoL/L (04-12 @ 12:30)      Ferritin 69      [05-10-22 @ 06:30]  TSH 0.71      [05-04-22 @ 05:29]  Lipid: chol --, TG 89, HDL --, LDL --      [04-12-23 @ 20:22]          RADIOLOGY & ADDITIONAL STUDIES:   Nephrology progress note    Patient is seen and examined, events over the last 24 h noted .  lying in bed   edematous    Allergies:  No Known Allergies    Hospital Medications:   MEDICATIONS  (STANDING):  acetaminophen     Tablet .. 650 milliGRAM(s) Oral every 6 hours  acetylcysteine 10%  Inhalation 4 milliLiter(s) Inhalation every 8 hours  albuterol    90 MICROgram(s) HFA Inhaler 1 Puff(s) Inhalation every 8 hours  aspirin  chewable 81 milliGRAM(s) Oral daily  atorvastatin 80 milliGRAM(s) Oral at bedtime  atropine 1% Solution 1 Drop(s) Left EYE two times a day   caspofungin IVPB 50 milliGRAM(s) IV Intermittent every 24 hours  cefepime   IVPB 1000 milliGRAM(s) IV Intermittent every 12 hours  clopidogrel Tablet 75 milliGRAM(s) Oral daily  doxazosin 4 milliGRAM(s) Oral at bedtime  famotidine    Tablet 20 milliGRAM(s) Oral daily  guaifenesin/dextromethorphan Oral Liquid 10 milliLiter(s) Oral every 4 hours  heparin   Injectable 5000 Unit(s) SubCutaneous every 8 hours  insulin lispro (ADMELOG) corrective regimen sliding scale   SubCutaneous every 6 hours  lactated ringers. 1000 milliLiter(s) (30 mL/Hr) IV Continuous <Continuous>  midodrine 10 milliGRAM(s) Oral every 8 hours  norepinephrine Infusion 0.01 MICROgram(s)/kG/Min (0.6 mL/Hr) IV Continuous <Continuous>  prednisoLONE acetate 1% Suspension 1 Drop(s) Left EYE every 6 hours  propofol Infusion 5 MICROgram(s)/kG/Min (1.91 mL/Hr) IV Continuous <Continuous>  vasopressin Infusion 0.03 Unit(s)/Min (4.5 mL/Hr) IV Continuous <Continuous>        VITALS:  T(F): 99 (04-13-23 @ 20:00), Max: 100.4 (04-13-23 @ 10:00)  HR: 94 (04-14-23 @ 08:00)  BP: 150/81 (04-14-23 @ 08:00)  RR: 33 (04-14-23 @ 08:00)  SpO2: 97% (04-14-23 @ 08:00)  Wt(kg): --    04-12 @ 07:01  -  04-13 @ 07:00  --------------------------------------------------------  IN: 4071.2 mL / OUT: 820 mL / NET: 3251.2 mL    04-13 @ 07:01 - 04-14 @ 07:00  --------------------------------------------------------  IN: 2789.7 mL / OUT: 1800 mL / NET: 989.7 mL    04-14 @ 07:01 - 04-14 @ 09:07  --------------------------------------------------------  IN: 75 mL / OUT: 0 mL / NET: 75 mL      13 Apr 2023 07:01  -  14 Apr 2023 07:00  --------------------------------------------------------  IN:    Enteral Tube Flush: 240 mL    IV PiggyBack: 300 mL    IV PiggyBack: 200 mL    Lactated Ringers: 400 mL    Lactated Ringers: 985 mL    Norepinephrine: 17.7 mL    Peptamen A.F.: 515 mL    Propofol: 28.5 mL    Vasopressin: 103.5 mL  Total IN: 2789.7 mL    OUT:    Indwelling Catheter - Urethral (mL): 1800 mL  Total OUT: 1800 mL    Total NET: 989.7 mL      14 Apr 2023 07:01  -  14 Apr 2023 13:25  --------------------------------------------------------  IN:    Lactated Ringers: 60 mL    Lactated Ringers: 30 mL    Peptamen A.F.: 125 mL    Vasopressin: 13.5 mL  Total IN: 228.5 mL    OUT:    Indwelling Catheter - Urethral (mL): 235 mL    Norepinephrine: 0 mL    Propofol: 0 mL  Total OUT: 235 mL    Total NET: -6.5 mL            PHYSICAL EXAM:  Constitutional: NAD  Respiratory: CTAB, no wheezes, rales or rhonchi  Cardiovascular: S1, S2, RRR  Gastrointestinal: BS+, soft, NT/ND  Extremities: No cyanosis or clubbing. diffuse edema   :  No blair.   Skin: No rashes    LABS:  04-14    139  |  107  |  55<H>  ----------------------------<  158<H>  4.4   |  24  |  1.6<H>    Ca    7.1<L>      14 Apr 2023 06:30  Phos  3.3     04-14  Mg     2.1     04-14                            9.0    12.67 )-----------( 165      ( 13 Apr 2023 21:05 )             27.2       Urine Studies:    Creatinine, Random Urine: 53 mg/dL (04-12 @ 12:30)  Sodium, Random Urine: 24.0 mmoL/L (04-12 @ 12:30)      Ferritin 69      [05-10-22 @ 06:30]  TSH 0.71      [05-04-22 @ 05:29]  Lipid: chol --, TG 89, HDL --, LDL --      [04-12-23 @ 20:22]          RADIOLOGY & ADDITIONAL STUDIES:

## 2023-04-14 NOTE — PROGRESS NOTE ADULT - ASSESSMENT
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 S/P ORIF of right hip using interlocking intramedullary stephon. Hospital stay complicated by septic shock and PNA S/P Trach & PEG with recent VAISHNAVI.    Assessment and plan  VAISHNAVI/ pulmonary edema/ septic shock resolved/ Hip fracture s/p ORIF  VAISHNAVI likely ATN iso of hypotension, possible component of CRS  pulmonary edema, balance grossly positive since admission  on vasopressin, non oliguric  DC IVF/ Bumex IV as per yesterday cs   Fluid and Na restriction  US kidneys and bladder   Urine lytes noted  no acute indications for RRT as of now  will follow

## 2023-04-14 NOTE — PROGRESS NOTE ADULT - SUBJECTIVE AND OBJECTIVE BOX
RADHA RAMON   [id number]   [birth year]  77yM    Admit Date/LOS: 03-20 (2d)  Indication for SICU: Hemodynamic monitoring. 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 Pt walks with walker and got up in middle of night to get food and slipped and fell on his R side. Pt has been having R hip pain since with inability to ambulate. Pts family finally convinced him to come to hospital today. Pt is having 10/10 pain currently. Denies dizziness, CP or LOC prior to fall. Denies head trauma. No HA. (20 Mar 2023 12:02)    3/22: Patient S/P ORIF of right hip using interlocking intramedullary stephon. - upgradeed for hypotension in PACU  4/5  Trach and PEG    24Hour Events  4/13  NIGHT  Hgb uptrended to 9.0  Potassium 3.3- replaced with 60 meq IBVPB, 40 po  Remains off sedation, tolerting well  Awake and responsive to questions  ABX sensitivity for Pseudomonas pending  Increased tube fees to 45 ml/hr  Tolerated PS 10/8 x 3h  DAY  -0.14 levo, 25 prop, 0.03 vaso  -FiO2 decreased to 30 and PEEP 8  -Stop monitoring trops per cardio  -Blood streaked stool this AM  -BAL Pseudomonas   -Began hsq  -restarted tube feeds   -off levo at 11 am   -Decreased IVF to LR @50   -hgb 8.5 from 7.1, responded well to 1 u prbc   -SBT  d/c vanc   - Continued scrotal edema with loss of urine around the blair; consider urology consultation 4/14.       [] A ten-point review of systems was otherwise negative except as noted above.  [x] Due to altered mental status/intubation, subjective information was not attained from   the patient. History was obtained, to the extent possible, from review of the chart and collateral sources of information.  =================================================================     RADHA RAMON   [id number]   [birth year]  77yM    Admit Date/LOS: 03-20 (2d)  Indication for SICU: Hemodynamic monitoring. 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 Pt walks with walker and got up in middle of night to get food and slipped and fell on his R side. Pt has been having R hip pain since with inability to ambulate. Pts family finally convinced him to come to hospital today. Pt is having 10/10 pain currently. Denies dizziness, CP or LOC prior to fall. Denies head trauma. No HA. (20 Mar 2023 12:02)    3/22: Patient S/P ORIF of right hip using interlocking intramedullary stephon. - upgraded for hypotension in PACU  4/5  Trach and PEG    24Hour Events  4/13  NIGHT  Hgb uptrended to 9.0  Potassium 3.3- replaced with 60 meq IBVPB, 40 po  Remains off sedation, tolerting well  Awake and responsive to questions  ABX sensitivity for Pseudomonas pending  Increased tube fees to 45 ml/hr  Tolerated PS 10/8 x 3h  DAY  -0.14 levo, 25 prop, 0.03 vaso  -FiO2 decreased to 30 and PEEP 8  -Stop monitoring trops per cardio  -Blood streaked stool this AM  -BAL Pseudomonas   -Began hsq  -restarted tube feeds   -off levo at 11 am   -Decreased IVF to LR @50   -hgb 8.5 from 7.1, responded well to 1 u prbc   -SBT  d/c vanc   - Continued scrotal edema with loss of urine around the blair; consider urology consultation 4/14.       [] A ten-point review of systems was otherwise negative except as noted above.  [x] Due to altered mental status/intubation, subjective information was not attained from   the patient. History was obtained, to the extent possible, from review of the chart and collateral sources of information.  =================================================================    Daily     Daily     Diet, NPO with Tube Feed:   Tube Feeding Modality: Gastrostomy  Peptamen A.F. Formula  Total Volume for 24 Hours (mL): 1080  Continuous  Starting Tube Feed Rate mL per Hour: 45     Every 4 hours  Until Goal Tube Feed Rate (mL per Hour): 45  Tube Feed Duration (in Hours): 24  Tube Feed Start Time: 10:00 (04-14-23 @ 02:27)      CURRENT MEDS:  Neurologic Medications  acetaminophen     Tablet .. 650 milliGRAM(s) Oral every 6 hours  fentaNYL    Injectable 25 MICROGram(s) IV Push every 3 hours PRN Severe Pain (7 - 10)    Respiratory Medications  acetylcysteine 10%  Inhalation 4 milliLiter(s) Inhalation every 8 hours  albuterol    0.083% 2.5 milliGRAM(s) Nebulizer every 8 hours  albuterol    90 MICROgram(s) HFA Inhaler 1 Puff(s) Inhalation every 8 hours  guaifenesin/dextromethorphan Oral Liquid 10 milliLiter(s) Oral every 4 hours    Cardiovascular Medications  doxazosin 4 milliGRAM(s) Oral at bedtime  midodrine 10 milliGRAM(s) Oral every 8 hours    Gastrointestinal Medications  famotidine    Tablet 20 milliGRAM(s) Oral daily  lactated ringers. 1000 milliLiter(s) IV Continuous <Continuous>  metoclopramide 5 milliGRAM(s) Oral every 8 hours  sodium chloride 0.9% lock flush 10 milliLiter(s) IV Push every 1 hour PRN Pre/post blood products, medications, blood draw, and to maintain line patency    Genitourinary Medications    Hematologic/Oncologic Medications  aspirin  chewable 81 milliGRAM(s) Oral daily  clopidogrel Tablet 75 milliGRAM(s) Oral daily  heparin   Injectable 5000 Unit(s) SubCutaneous every 8 hours    Antimicrobial/Immunologic Medications  cefepime   IVPB 1000 milliGRAM(s) IV Intermittent every 12 hours    Endocrine/Metabolic Medications  atorvastatin 80 milliGRAM(s) Oral at bedtime  insulin lispro (ADMELOG) corrective regimen sliding scale   SubCutaneous every 6 hours  vasopressin Infusion 0.03 Unit(s)/Min IV Continuous <Continuous>    Topical/Other Medications  atropine 1% Solution 1 Drop(s) Left EYE two times a day  chlorhexidine 0.12% Liquid 15 milliLiter(s) Oral Mucosa every 12 hours  chlorhexidine 2% Cloths 1 Application(s) Topical <User Schedule>  prednisoLONE acetate 1% Suspension 1 Drop(s) Left EYE every 6 hours      ICU Vital Signs Last 24 Hrs  T(C): 36.6 (14 Apr 2023 08:00), Max: 38 (13 Apr 2023 11:00)  T(F): 97.9 (14 Apr 2023 08:00), Max: 100.4 (13 Apr 2023 11:00)  HR: 101 (14 Apr 2023 09:00) (76 - 101)  BP: 123/58 (14 Apr 2023 09:00) (123/58 - 150/81)  BP(mean): 79 (14 Apr 2023 09:00) (79 - 108)  ABP: 118/54 (14 Apr 2023 09:00) (103/47 - 180/72)  ABP(mean): 76 (14 Apr 2023 09:00) (66 - 110)  RR: 28 (14 Apr 2023 09:00) (22 - 39)  SpO2: 96% (14 Apr 2023 09:00) (92% - 100%)    O2 Parameters below as of 14 Apr 2023 09:00  Patient On (Oxygen Delivery Method): ventilator    O2 Concentration (%): 45        Mode: AC/ CMV (Assist Control/ Continuous Mandatory Ventilation)  RR (machine): 22  TV (machine): 450  FiO2: 45  PEEP: 8  PS: 10  ITime: 1  MAP: 19  PIP: 28    ABG - ( 14 Apr 2023 04:23 )  pH, Arterial: 7.38  pH, Blood: x     /  pCO2: 40    /  pO2: 66    / HCO3: 24    / Base Excess: -1.3  /  SaO2: 96.1                I&O's Summary    13 Apr 2023 07:01  -  14 Apr 2023 07:00  --------------------------------------------------------  IN: 2789.7 mL / OUT: 1800 mL / NET: 989.7 mL    14 Apr 2023 07:01  -  14 Apr 2023 10:17  --------------------------------------------------------  IN: 154.5 mL / OUT: 0 mL / NET: 154.5 mL      I&O's Detail    13 Apr 2023 07:01  -  14 Apr 2023 07:00  --------------------------------------------------------  IN:    Enteral Tube Flush: 240 mL    IV PiggyBack: 300 mL    IV PiggyBack: 200 mL    Lactated Ringers: 400 mL    Lactated Ringers: 985 mL    Norepinephrine: 17.7 mL    Peptamen A.F.: 515 mL    Propofol: 28.5 mL    Vasopressin: 103.5 mL  Total IN: 2789.7 mL    OUT:    Indwelling Catheter - Urethral (mL): 1800 mL  Total OUT: 1800 mL    Total NET: 989.7 mL      14 Apr 2023 07:01  -  14 Apr 2023 10:17  --------------------------------------------------------  IN:    Lactated Ringers: 30 mL    Lactated Ringers: 30 mL    Peptamen A.F.: 90 mL    Vasopressin: 4.5 mL  Total IN: 154.5 mL    OUT:    Norepinephrine: 0 mL    Propofol: 0 mL  Total OUT: 0 mL    Total NET: 154.5 mL    PHYSICAL EXAM:  General/Neuro: Awake, Following commands.  Lungs: Trach. Ventilated. Equal b/l expansion. Clear to auscultation.  Cardiovascular : S1, S2.  Regular rate and rhythm.  + Peripheral edema.  GI: Abdomen soft, non-tender, non-distended. PEG 3cm @ skin.   Extremities: Extremities warm, pink, well-perfused.  Derm: Weeping from skin appreciated. Diffuse edema.  : Blair catheter in place. Diffuse scrotal edema.      CXR:   Follow up official 4/14 CXR read. Significant improvement appreciated.    < from: Xray Chest 1 View-PORTABLE IMMEDIATE (Xray Chest 1 View-PORTABLE IMMEDIATE .) (04.13.23 @ 09:50) >  Impression:    Bilateral opacities and effusions without significant interval difference    --- End of Report ---  < end of copied text >    LABS:  CAPILLARY BLOOD GLUCOSE  POCT Blood Glucose.: 156 mg/dL (14 Apr 2023 05:40)  POCT Blood Glucose.: 158 mg/dL (13 Apr 2023 23:07)  POCT Blood Glucose.: 118 mg/dL (13 Apr 2023 17:35)  POCT Blood Glucose.: 159 mg/dL (13 Apr 2023 12:00)                          9.0    12.67 )-----------( 165      ( 13 Apr 2023 21:05 )             27.2       04-14    139  |  107  |  55<H>  ----------------------------<  158<H>  4.4   |  24  |  1.6<H>    Ca    7.1<L>      14 Apr 2023 06:30  Phos  3.3     04-14  Mg     2.1     04-14        PTT - ( 12 Apr 2023 12:20 )  PTT:71.1 sec  CARDIAC MARKERS ( 12 Apr 2023 12:20 )  x     / 0.09 ng/mL / x     / x     / x        Culture - Sputum (collected 11 Apr 2023 20:43)  Source: Trach Asp Tracheal Aspirate  Gram Stain (12 Apr 2023 06:54):    Moderate polymorphonuclear leukocytes per low power field    No Squamous epithelial cells per low power field    Rare Yeast like cells seen per oil power field    Numerous Gram Negative Rods seen per oil power field  Preliminary Report (13 Apr 2023 06:20):    Numerous Pseudomonas aeruginosa

## 2023-04-14 NOTE — CHART NOTE - NSCHARTNOTEFT_GEN_A_CORE
Orthopaedic Event Note     Staples over right hip removed. Steristrips placed over distal incision. Gauze and tegaderm placed.

## 2023-04-15 NOTE — PROCEDURE NOTE - NSBRONCHPROCDETAILS_GEN_A_CORE_FT
A time out was performed confirming correct patient, site, procedure, allergies after all equipment was gathered and phone consent obtained from the patient's son.  2 mg versed and 50 mcg of fentanyl were given for sedation.  Patient was pre-oxygenated 100% on the ventilator.  The bronchoscopy was inserted through the tracheostomy and into the right and left main stem bronchus.  There was no purulence, bilious, or any other fluid identified in bilateral lungs.   The patient maintained 100% oxygen saturation throughout the procedure.  The bronchoscope was withdrawn.
RLL and RML inspected first, irrigated and suctioned w/ trap in place for specimen collection. REmainder of segmental and subsegmental bronchi evaluated as well, caliber permitting. Findings below

## 2023-04-15 NOTE — PROGRESS NOTE ADULT - ASSESSMENT
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 S/P ORIF of right hip using interlocking intramedullary stephon. Hospital stay complicated by septic shock and PNA S/P Trach & PEG with recent VAISHNAVI.    Assessment and plan  VAISHNAVI/ pulmonary edema/ septic shock resolved/ Hip fracture s/p ORIF  VAISHNAVI likely ATN iso of hypotension, possible component of CRS  pulmonary edema, balance grossly positive since admission  on vasopressin, non oliguric  on Bumex non oliguric continue   Fluid and Na restriction  US kidneys and bladder noted no hydro / cyst   Urine lytes noted  no indication for RRT as of now  will follow

## 2023-04-15 NOTE — PROGRESS NOTE ADULT - SUBJECTIVE AND OBJECTIVE BOX
Admit Date/LOS: 03-20 (2d)  Indication for SICU: Hemodynamic monitoring. 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 Pt walks with walker and got up in middle of night to get food and slipped and fell on his R side. Pt has been having R hip pain since with inability to ambulate. Pts family finally convinced him to come to hospital today. Pt is having 10/10 pain currently. Denies dizziness, CP or LOC prior to fall. Denies head trauma. No HA. (20 Mar 2023 12:02)    3/22: Patient S/P ORIF of right hip using interlocking intramedullary stephon. - upgraded for hypotension in PACU  4/5  Trach and PEG    24Hour Events  4/14  DAY  -Vaso re-started for MAP in 60s  -Episode of gagging, will trial Reglan 5 q 8 and dec NGT to 35cc/hr  -Nephro rec bumex 1mg IV BID; will f/u volume status then start if signs of overload  -Wean vaso as tolerated   -KUB ordered to assess for retention  -POCUS to assess intravascular volume   -Bumex 1mg IV BID started, as per nephrology recommendations  -DC caspo per ID  -Vent: 450/22/45/  -afternoon ABG   -trach aspirate final-pseudomonas; sensitive to cefepime    NIGHT  Repleated K, 1 K rider     [] A ten-point review of systems was otherwise negative except as noted above.  [x] Due to altered mental status/intubation, subjective information was not attained from   the patient. History was obtained, to the extent possible, from review of the chart and collateral sources of information.  =================================================================    Diet, NPO with Tube Feed:   Tube Feeding Modality: Gastrostomy  Peptamen A.F. Formula  Total Volume for 24 Hours (mL): 1080  Continuous  Starting Tube Feed Rate mL per Hour: 45     Every 4 hours  Until Goal Tube Feed Rate (mL per Hour): 45  Tube Feed Duration (in Hours): 24  Tube Feed Start Time: 10:00 (04-14-23 @ 02:27)      CURRENT MEDS:  Neurologic Medications  acetaminophen     Tablet .. 650 milliGRAM(s) Oral every 6 hours  fentaNYL    Injectable 25 MICROGram(s) IV Push every 3 hours PRN Severe Pain (7 - 10)    Respiratory Medications  acetylcysteine 10%  Inhalation 4 milliLiter(s) Inhalation every 8 hours  albuterol    0.083% 2.5 milliGRAM(s) Nebulizer every 8 hours  albuterol    90 MICROgram(s) HFA Inhaler 1 Puff(s) Inhalation every 8 hours  guaifenesin/dextromethorphan Oral Liquid 10 milliLiter(s) Oral every 4 hours    Cardiovascular Medications  doxazosin 4 milliGRAM(s) Oral at bedtime  midodrine 10 milliGRAM(s) Oral every 8 hours    Gastrointestinal Medications  famotidine    Tablet 20 milliGRAM(s) Oral daily  lactated ringers. 1000 milliLiter(s) IV Continuous <Continuous>  metoclopramide 5 milliGRAM(s) Oral every 8 hours  sodium chloride 0.9% lock flush 10 milliLiter(s) IV Push every 1 hour PRN Pre/post blood products, medications, blood draw, and to maintain line patency    Genitourinary Medications    Hematologic/Oncologic Medications  aspirin  chewable 81 milliGRAM(s) Oral daily  clopidogrel Tablet 75 milliGRAM(s) Oral daily  heparin   Injectable 5000 Unit(s) SubCutaneous every 8 hours    Antimicrobial/Immunologic Medications  cefepime   IVPB 1000 milliGRAM(s) IV Intermittent every 12 hours    Endocrine/Metabolic Medications  atorvastatin 80 milliGRAM(s) Oral at bedtime  insulin lispro (ADMELOG) corrective regimen sliding scale   SubCutaneous every 6 hours  vasopressin Infusion 0.03 Unit(s)/Min IV Continuous <Continuous>    Topical/Other Medications  atropine 1% Solution 1 Drop(s) Left EYE two times a day  chlorhexidine 0.12% Liquid 15 milliLiter(s) Oral Mucosa every 12 hours  chlorhexidine 2% Cloths 1 Application(s) Topical <User Schedule>  prednisoLONE acetate 1% Suspension 1 Drop(s) Left EYE every 6 hours   Daily     Daily     Diet, NPO with Tube Feed:   Tube Feeding Modality: Gastrostomy  Peptamen A.F. Formula  Total Volume for 24 Hours (mL): 1080  Continuous  Starting Tube Feed Rate mL per Hour: 45     Every 4 hours  Until Goal Tube Feed Rate (mL per Hour): 45  Tube Feed Duration (in Hours): 24  Tube Feed Start Time: 10:00 (04-14-23 @ 02:27)      CURRENT MEDS:  Neurologic Medications  acetaminophen     Tablet .. 650 milliGRAM(s) Oral every 6 hours  fentaNYL    Injectable 25 MICROGram(s) IV Push every 3 hours PRN Severe Pain (7 - 10)  metoclopramide Injectable 5 milliGRAM(s) IV Push every 8 hours    Respiratory Medications  acetylcysteine 10%  Inhalation 4 milliLiter(s) Inhalation every 8 hours  albuterol    0.083% 2.5 milliGRAM(s) Nebulizer every 8 hours  albuterol    90 MICROgram(s) HFA Inhaler 1 Puff(s) Inhalation every 8 hours  guaifenesin/dextromethorphan Oral Liquid 10 milliLiter(s) Oral every 4 hours    Cardiovascular Medications  buMETAnide Injectable 1 milliGRAM(s) IV Push two times a day  doxazosin 4 milliGRAM(s) Oral at bedtime  midodrine 10 milliGRAM(s) Oral every 8 hours  norepinephrine Infusion 0.01 MICROgram(s)/kG/Min IV Continuous <Continuous>    Gastrointestinal Medications  famotidine    Tablet 20 milliGRAM(s) Oral daily  lactated ringers. 1000 milliLiter(s) IV Continuous <Continuous>  sodium chloride 0.9% lock flush 10 milliLiter(s) IV Push every 1 hour PRN Pre/post blood products, medications, blood draw, and to maintain line patency    Genitourinary Medications    Hematologic/Oncologic Medications  aspirin  chewable 81 milliGRAM(s) Oral daily  clopidogrel Tablet 75 milliGRAM(s) Oral daily  heparin   Injectable 5000 Unit(s) SubCutaneous every 8 hours    Antimicrobial/Immunologic Medications  cefepime   IVPB 1000 milliGRAM(s) IV Intermittent every 12 hours    Endocrine/Metabolic Medications  atorvastatin 80 milliGRAM(s) Oral at bedtime  hydrocortisone sodium succinate Injectable 50 milliGRAM(s) IV Push every 6 hours  insulin lispro (ADMELOG) corrective regimen sliding scale   SubCutaneous every 6 hours  vasopressin Infusion 0.03 Unit(s)/Min IV Continuous <Continuous>    Topical/Other Medications  atropine 1% Solution 1 Drop(s) Left EYE two times a day  chlorhexidine 0.12% Liquid 15 milliLiter(s) Oral Mucosa every 12 hours  chlorhexidine 2% Cloths 1 Application(s) Topical <User Schedule>  prednisoLONE acetate 1% Suspension 1 Drop(s) Left EYE every 6 hours      ICU Vital Signs Last 24 Hrs  T(C): 36.3 (15 Apr 2023 04:00), Max: 36.7 (14 Apr 2023 12:00)  T(F): 97.4 (15 Apr 2023 04:00), Max: 98 (14 Apr 2023 12:00)  HR: 89 (15 Apr 2023 10:45) (85 - 119)  BP: 111/69 (15 Apr 2023 10:00) (104/57 - 169/85)  BP(mean): 86 (15 Apr 2023 10:00) (75 - 120)  ABP: 107/39 (15 Apr 2023 10:45) (85/34 - 180/67)  ABP(mean): 53 (15 Apr 2023 10:45) (45 - 109)  RR: 25 (15 Apr 2023 10:45) (23 - 38)  SpO2: 97% (15 Apr 2023 10:45) (91% - 98%)    O2 Parameters below as of 15 Apr 2023 04:00  Patient On (Oxygen Delivery Method): ventilator    O2 Concentration (%): 45        Adult Advanced Hemodynamics Last 24 Hrs  CVP(mm Hg): --  CVP(cm H2O): --  CO: --  CI: --  PA: --  PA(mean): --  PCWP: --  SVR: --  SVRI: --  PVR: --  PVRI: --    Mode: AC/ CMV (Assist Control/ Continuous Mandatory Ventilation)  RR (machine): 22  TV (machine): 450  FiO2: 45  PEEP: 8    ABG - ( 15 Apr 2023 04:30 )  pH, Arterial: 7.44  pH, Blood: x     /  pCO2: 35    /  pO2: 144   / HCO3: 24    / Base Excess: -0.1  /  SaO2: 99.7                I&O's Summary    14 Apr 2023 07:01  -  15 Apr 2023 07:00  --------------------------------------------------------  IN: 1741.5 mL / OUT: 2055 mL / NET: -313.5 mL    15 Apr 2023 07:01  -  15 Apr 2023 11:35  --------------------------------------------------------  IN: 30 mL / OUT: 175 mL / NET: -145 mL      I&O's Detail    14 Apr 2023 07:01  -  15 Apr 2023 07:00  --------------------------------------------------------  IN:    Enteral Tube Flush: 180 mL    Lactated Ringers: 690 mL    Lactated Ringers: 30 mL    Peptamen A.F.: 825 mL    Vasopressin: 13.5 mL    Vasopressin: 3 mL  Total IN: 1741.5 mL    OUT:    Indwelling Catheter - Urethral (mL): 2055 mL    Norepinephrine: 0 mL    Propofol: 0 mL  Total OUT: 2055 mL    Total NET: -313.5 mL      15 Apr 2023 07:01  -  15 Apr 2023 11:35  --------------------------------------------------------  IN:    Lactated Ringers: 30 mL  Total IN: 30 mL    OUT:    Indwelling Catheter - Urethral (mL): 175 mL    Vasopressin: 0 mL  Total OUT: 175 mL    Total NET: -145 mL          PHYSICAL EXAM:    General/Neuro  RASS:             GCS:     = E   / V   / M      Deficits:                             alert & oriented x 3, no focal deficits  Pupils:    Lungs:      clear to auscultation, Normal expansion/effort.     Cardiovascular : S1, S2.  Regular rate and rhythm.  Peripheral edema   Cardiac Rhythm: Normal Sinus Rhythm    GI: Abdomen soft, Non-tender, Non-distended.    Gastrostomy / Jejunostomy tube in place.  Nasogastric tube in place.  Colostomy / Ileostomy.    Wound:    Extremities: Extremities warm, pink, well-perfused. Pulses:Rt     Lt    Derm: Good skin turgor, no skin breakdown.      :       Garrison catheter in place.      CXR:       LABS:  CAPILLARY BLOOD GLUCOSE      POCT Blood Glucose.: 123 mg/dL (15 Apr 2023 11:25)  POCT Blood Glucose.: 106 mg/dL (15 Apr 2023 06:08)  POCT Blood Glucose.: 98 mg/dL (14 Apr 2023 23:35)  POCT Blood Glucose.: 128 mg/dL (14 Apr 2023 18:19)  POCT Blood Glucose.: 145 mg/dL (14 Apr 2023 12:43)                          9.6    12.89 )-----------( 187      ( 14 Apr 2023 21:18 )             28.8       04-14    141  |  107  |  56<H>  ----------------------------<  97  3.5   |  23  |  1.7<H>    Ca    7.8<L>      14 Apr 2023 21:18  Phos  2.9     04-14  Mg     2.0     04-14    TPro  4.4<L>  /  Alb  1.9<L>  /  TBili  0.7  /  DBili  0.4<H>  /  AST  36  /  ALT  23  /  AlkPhos  184<H>  04-14                Admit Date/LOS: 03-20 (2d)  Indication for SICU: Hemodynamic monitoring. 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 Pt walks with walker and got up in middle of night to get food and slipped and fell on his R side. Pt has been having R hip pain since with inability to ambulate. Pts family finally convinced him to come to hospital today. Pt is having 10/10 pain currently. Denies dizziness, CP or LOC prior to fall. Denies head trauma. No HA. (20 Mar 2023 12:02)    3/22: Patient S/P ORIF of right hip using interlocking intramedullary stephon. - upgraded for hypotension in PACU  4/5  Trach and PEG    24Hour Events  4/14  DAY  -Vaso re-started for MAP in 60s  -Episode of gagging, will trial Reglan 5 q 8 and dec NGT to 35cc/hr  -Nephro rec bumex 1mg IV BID; will f/u volume status then start if signs of overload  -Wean vaso as tolerated   -KUB ordered to assess for retention  -POCUS to assess intravascular volume   -Bumex 1mg IV BID started, as per nephrology recommendations  -DC caspo per ID  -Vent: 450/22/45/  -afternoon ABG   -trach aspirate final-pseudomonas; sensitive to cefepime    NIGHT  Repleated K, 1 K rider     [] A ten-point review of systems was otherwise negative except as noted above.  [x] Due to altered mental status/intubation, subjective information was not attained from   the patient. History was obtained, to the extent possible, from review of the chart and collateral sources of information.  =================================================================    Daily         Diet, NPO with Tube Feed:   Tube Feeding Modality: Gastrostomy  Peptamen A.F. Formula  Total Volume for 24 Hours (mL): 1080  Continuous  Starting Tube Feed Rate mL per Hour: 45     Every 4 hours  Until Goal Tube Feed Rate (mL per Hour): 45  Tube Feed Duration (in Hours): 24  Tube Feed Start Time: 10:00 (04-14-23 @ 02:27)      CURRENT MEDS:  Neurologic Medications  acetaminophen     Tablet .. 650 milliGRAM(s) Oral every 6 hours  fentaNYL    Injectable 25 MICROGram(s) IV Push every 3 hours PRN Severe Pain (7 - 10)  metoclopramide Injectable 5 milliGRAM(s) IV Push every 8 hours    Respiratory Medications  acetylcysteine 10%  Inhalation 4 milliLiter(s) Inhalation every 8 hours  albuterol    0.083% 2.5 milliGRAM(s) Nebulizer every 8 hours  albuterol    90 MICROgram(s) HFA Inhaler 1 Puff(s) Inhalation every 8 hours  guaifenesin/dextromethorphan Oral Liquid 10 milliLiter(s) Oral every 4 hours    Cardiovascular Medications  buMETAnide Injectable 1 milliGRAM(s) IV Push two times a day  doxazosin 4 milliGRAM(s) Oral at bedtime  midodrine 10 milliGRAM(s) Oral every 8 hours  norepinephrine Infusion 0.01 MICROgram(s)/kG/Min IV Continuous <Continuous>    Gastrointestinal Medications  famotidine    Tablet 20 milliGRAM(s) Oral daily  lactated ringers. 1000 milliLiter(s) IV Continuous <Continuous>  sodium chloride 0.9% lock flush 10 milliLiter(s) IV Push every 1 hour PRN Pre/post blood products, medications, blood draw, and to maintain line patency    Genitourinary Medications    Hematologic/Oncologic Medications  aspirin  chewable 81 milliGRAM(s) Oral daily  clopidogrel Tablet 75 milliGRAM(s) Oral daily  heparin   Injectable 5000 Unit(s) SubCutaneous every 8 hours    Antimicrobial/Immunologic Medications  cefepime   IVPB 1000 milliGRAM(s) IV Intermittent every 12 hours    Endocrine/Metabolic Medications  atorvastatin 80 milliGRAM(s) Oral at bedtime  hydrocortisone sodium succinate Injectable 50 milliGRAM(s) IV Push every 6 hours  insulin lispro (ADMELOG) corrective regimen sliding scale   SubCutaneous every 6 hours  vasopressin Infusion 0.03 Unit(s)/Min IV Continuous <Continuous>    Topical/Other Medications  atropine 1% Solution 1 Drop(s) Left EYE two times a day  chlorhexidine 0.12% Liquid 15 milliLiter(s) Oral Mucosa every 12 hours  chlorhexidine 2% Cloths 1 Application(s) Topical <User Schedule>  prednisoLONE acetate 1% Suspension 1 Drop(s) Left EYE every 6 hours      ICU Vital Signs Last 24 Hrs  T(C): 36.3 (15 Apr 2023 04:00), Max: 36.7 (14 Apr 2023 12:00)  T(F): 97.4 (15 Apr 2023 04:00), Max: 98 (14 Apr 2023 12:00)  HR: 89 (15 Apr 2023 10:45) (85 - 119)  BP: 111/69 (15 Apr 2023 10:00) (104/57 - 169/85)  BP(mean): 86 (15 Apr 2023 10:00) (75 - 120)  ABP: 107/39 (15 Apr 2023 10:45) (85/34 - 180/67)  ABP(mean): 53 (15 Apr 2023 10:45) (45 - 109)  RR: 25 (15 Apr 2023 10:45) (23 - 38)  SpO2: 97% (15 Apr 2023 10:45) (91% - 98%)    O2 Parameters below as of 15 Apr 2023 04:00  Patient On (Oxygen Delivery Method): ventilator    O2 Concentration (%): 45        Adult Advanced Hemodynamics Last 24 Hrs  CVP(mm Hg): --  CVP(cm H2O): --  CO: --  CI: --  PA: --  PA(mean): --  PCWP: --  SVR: --  SVRI: --  PVR: --  PVRI: --    Mode: AC/ CMV (Assist Control/ Continuous Mandatory Ventilation)  RR (machine): 22  TV (machine): 450  FiO2: 45  PEEP: 8    ABG - ( 15 Apr 2023 04:30 )  pH, Arterial: 7.44  pH, Blood: x     /  pCO2: 35    /  pO2: 144   / HCO3: 24    / Base Excess: -0.1  /  SaO2: 99.7                I&O's Summary    14 Apr 2023 07:01  -  15 Apr 2023 07:00  --------------------------------------------------------  IN: 1741.5 mL / OUT: 2055 mL / NET: -313.5 mL    15 Apr 2023 07:01  -  15 Apr 2023 11:36  --------------------------------------------------------  IN: 30 mL / OUT: 175 mL / NET: -145 mL      I&O's Detail    14 Apr 2023 07:01  -  15 Apr 2023 07:00  --------------------------------------------------------  IN:    Enteral Tube Flush: 180 mL    Lactated Ringers: 690 mL    Lactated Ringers: 30 mL    Peptamen A.F.: 825 mL    Vasopressin: 13.5 mL    Vasopressin: 3 mL  Total IN: 1741.5 mL    OUT:    Indwelling Catheter - Urethral (mL): 2055 mL    Norepinephrine: 0 mL    Propofol: 0 mL  Total OUT: 2055 mL    Total NET: -313.5 mL      15 Apr 2023 07:01  -  15 Apr 2023 11:36  --------------------------------------------------------  IN:    Lactated Ringers: 30 mL  Total IN: 30 mL    OUT:    Indwelling Catheter - Urethral (mL): 175 mL    Vasopressin: 0 mL  Total OUT: 175 mL    Total NET: -145 mL          PHYSICAL EXAM:    General/Neuro: Awake following commands.  Pupils: R pupil reactive to light.  Lungs: Ventilated. Equal chest rise b/l.    Cardiovascular : S1, S2.  Regular rate and rhythm.  Peripheral edema 4+.    Cardiac Rhythm: Normal Sinus Rhythm  GI: Abdomen soft, Non-tender, Non-distended.    -Nasogastric tube in place.   Extremities: Extremities warm, pink, well-perfused.  Derm: Weeping from skin. Diffuse edema.   : Garriosn catheter in place. Scrotal swelling.           LABS:  CAPILLARY BLOOD GLUCOSE    POCT Blood Glucose.: 123 mg/dL (15 Apr 2023 11:25)  POCT Blood Glucose.: 106 mg/dL (15 Apr 2023 06:08)  POCT Blood Glucose.: 98 mg/dL (14 Apr 2023 23:35)  POCT Blood Glucose.: 128 mg/dL (14 Apr 2023 18:19)  POCT Blood Glucose.: 145 mg/dL (14 Apr 2023 12:43)                          9.6    12.89 )-----------( 187      ( 14 Apr 2023 21:18 )             28.8       04-14    141  |  107  |  56<H>  ----------------------------<  97  3.5   |  23  |  1.7<H>    Ca    7.8<L>      14 Apr 2023 21:18  Phos  2.9     04-14  Mg     2.0     04-14    TPro  4.4<L>  /  Alb  1.9<L>  /  TBili  0.7  /  DBili  0.4<H>  /  AST  36  /  ALT  23  /  AlkPhos  184<H>  04-14                   Admit Date/LOS: 03-20 (2d)  Indication for SICU: Hemodynamic monitoring. 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 Pt walks with walker and got up in middle of night to get food and slipped and fell on his R side. Pt has been having R hip pain since with inability to ambulate. Pts family finally convinced him to come to hospital today. Pt is having 10/10 pain currently. Denies dizziness, CP or LOC prior to fall. Denies head trauma. No HA. (20 Mar 2023 12:02)    3/22: Patient S/P ORIF of right hip using interlocking intramedullary stephon. - upgraded for hypotension in PACU  4/5  Trach and PEG    24Hour Events  4/14  DAY  -Vaso re-started for MAP in 60s  -Episode of gagging, will trial Reglan 5 q 8 and dec NGT to 35cc/hr  -Nephro rec bumex 1mg IV BID; will f/u volume status then start if signs of overload  -Wean vaso as tolerated   -KUB ordered to assess for retention  -POCUS to assess intravascular volume   -Bumex 1mg IV BID started, as per nephrology recommendations  -DC caspo per ID  -Vent: 450/22/45/  -afternoon ABG   -trach aspirate final-pseudomonas; sensitive to cefepime    NIGHT  Repleated K, 1 K rider     [] A ten-point review of systems was otherwise negative except as noted above.  [x] Due to altered mental status/intubation, subjective information was not attained from   the patient. History was obtained, to the extent possible, from review of the chart and collateral sources of information.  =================================================================    Daily         Diet, NPO with Tube Feed:   Tube Feeding Modality: Gastrostomy  Peptamen A.F. Formula  Total Volume for 24 Hours (mL): 1080  Continuous  Starting Tube Feed Rate mL per Hour: 45     Every 4 hours  Until Goal Tube Feed Rate (mL per Hour): 45  Tube Feed Duration (in Hours): 24  Tube Feed Start Time: 10:00 (04-14-23 @ 02:27)      CURRENT MEDS:  Neurologic Medications  acetaminophen     Tablet .. 650 milliGRAM(s) Oral every 6 hours  fentaNYL    Injectable 25 MICROGram(s) IV Push every 3 hours PRN Severe Pain (7 - 10)  metoclopramide Injectable 5 milliGRAM(s) IV Push every 8 hours    Respiratory Medications  acetylcysteine 10%  Inhalation 4 milliLiter(s) Inhalation every 8 hours  albuterol    0.083% 2.5 milliGRAM(s) Nebulizer every 8 hours  albuterol    90 MICROgram(s) HFA Inhaler 1 Puff(s) Inhalation every 8 hours  guaifenesin/dextromethorphan Oral Liquid 10 milliLiter(s) Oral every 4 hours    Cardiovascular Medications  buMETAnide Injectable 1 milliGRAM(s) IV Push two times a day  doxazosin 4 milliGRAM(s) Oral at bedtime  midodrine 10 milliGRAM(s) Oral every 8 hours  norepinephrine Infusion 0.01 MICROgram(s)/kG/Min IV Continuous <Continuous>    Gastrointestinal Medications  famotidine    Tablet 20 milliGRAM(s) Oral daily  lactated ringers. 1000 milliLiter(s) IV Continuous <Continuous>  sodium chloride 0.9% lock flush 10 milliLiter(s) IV Push every 1 hour PRN Pre/post blood products, medications, blood draw, and to maintain line patency      Hematologic/Oncologic Medications  aspirin  chewable 81 milliGRAM(s) Oral daily  clopidogrel Tablet 75 milliGRAM(s) Oral daily  heparin   Injectable 5000 Unit(s) SubCutaneous every 8 hours    Antimicrobial/Immunologic Medications  cefepime   IVPB 1000 milliGRAM(s) IV Intermittent every 12 hours    Endocrine/Metabolic Medications  atorvastatin 80 milliGRAM(s) Oral at bedtime  hydrocortisone sodium succinate Injectable 50 milliGRAM(s) IV Push every 6 hours  insulin lispro (ADMELOG) corrective regimen sliding scale   SubCutaneous every 6 hours  vasopressin Infusion 0.03 Unit(s)/Min IV Continuous <Continuous>    Topical/Other Medications  atropine 1% Solution 1 Drop(s) Left EYE two times a day  chlorhexidine 0.12% Liquid 15 milliLiter(s) Oral Mucosa every 12 hours  chlorhexidine 2% Cloths 1 Application(s) Topical <User Schedule>  prednisoLONE acetate 1% Suspension 1 Drop(s) Left EYE every 6 hours      ICU Vital Signs Last 24 Hrs  T(C): 36.3 (15 Apr 2023 04:00), Max: 36.7 (14 Apr 2023 12:00)  T(F): 97.4 (15 Apr 2023 04:00), Max: 98 (14 Apr 2023 12:00)  HR: 89 (15 Apr 2023 10:45) (85 - 119)  BP: 111/69 (15 Apr 2023 10:00) (104/57 - 169/85)  BP(mean): 86 (15 Apr 2023 10:00) (75 - 120)  ABP: 107/39 (15 Apr 2023 10:45) (85/34 - 180/67)  ABP(mean): 53 (15 Apr 2023 10:45) (45 - 109)  RR: 25 (15 Apr 2023 10:45) (23 - 38)  SpO2: 97% (15 Apr 2023 10:45) (91% - 98%)    O2 Parameters below as of 15 Apr 2023 04:00  Patient On (Oxygen Delivery Method): ventilator    O2 Concentration (%): 45          Mode: AC/ CMV (Assist Control/ Continuous Mandatory Ventilation)  RR (machine): 22  TV (machine): 450  FiO2: 45  PEEP: 8    ABG - ( 15 Apr 2023 04:30 )  pH, Arterial: 7.44  pH, Blood: x     /  pCO2: 35    /  pO2: 144   / HCO3: 24    / Base Excess: -0.1  /  SaO2: 99.7                I&O's Summary    14 Apr 2023 07:01  -  15 Apr 2023 07:00  --------------------------------------------------------  IN: 1741.5 mL / OUT: 2055 mL / NET: -313.5 mL    15 Apr 2023 07:01  -  15 Apr 2023 11:36  --------------------------------------------------------  IN: 30 mL / OUT: 175 mL / NET: -145 mL      I&O's Detail    14 Apr 2023 07:01  -  15 Apr 2023 07:00  --------------------------------------------------------  IN:    Enteral Tube Flush: 180 mL    Lactated Ringers: 690 mL    Lactated Ringers: 30 mL    Peptamen A.F.: 825 mL    Vasopressin: 13.5 mL    Vasopressin: 3 mL  Total IN: 1741.5 mL    OUT:    Indwelling Catheter - Urethral (mL): 2055 mL    Norepinephrine: 0 mL    Propofol: 0 mL  Total OUT: 2055 mL    Total NET: -313.5 mL      15 Apr 2023 07:01  -  15 Apr 2023 11:36  --------------------------------------------------------  IN:    Lactated Ringers: 30 mL  Total IN: 30 mL    OUT:    Indwelling Catheter - Urethral (mL): 175 mL    Vasopressin: 0 mL  Total OUT: 175 mL    Total NET: -145 mL          PHYSICAL EXAM:    General/Neuro: Awake following commands.  Pupils: R pupil reactive to light.  Lungs: Ventilated. Equal chest rise b/l.    Cardiovascular : S1, S2.  Regular rate and rhythm.  Peripheral edema 4+.    Cardiac Rhythm: Normal Sinus Rhythm  GI: Abdomen soft, Non-tender, Non-distended.    -Nasogastric tube in place.   Extremities: Extremities warm, pink, well-perfused.  Derm: Weeping from skin. Diffuse edema.   : Garrison catheter in place. Scrotal swelling.           LABS:  CAPILLARY BLOOD GLUCOSE    POCT Blood Glucose.: 123 mg/dL (15 Apr 2023 11:25)  POCT Blood Glucose.: 106 mg/dL (15 Apr 2023 06:08)  POCT Blood Glucose.: 98 mg/dL (14 Apr 2023 23:35)  POCT Blood Glucose.: 128 mg/dL (14 Apr 2023 18:19)  POCT Blood Glucose.: 145 mg/dL (14 Apr 2023 12:43)                          9.6    12.89 )-----------( 187      ( 14 Apr 2023 21:18 )             28.8       04-14    141  |  107  |  56<H>  ----------------------------<  97  3.5   |  23  |  1.7<H>    Ca    7.8<L>      14 Apr 2023 21:18  Phos  2.9     04-14  Mg     2.0     04-14    TPro  4.4<L>  /  Alb  1.9<L>  /  TBili  0.7  /  DBili  0.4<H>  /  AST  36  /  ALT  23  /  AlkPhos  184<H>  04-14

## 2023-04-15 NOTE — PROGRESS NOTE ADULT - ATTENDING COMMENTS
resp- on mechanical ventilation, mild settings. 40% o2 AND 8 PEEP. Nutrition- npo. will obtain KUB. on ASA and plavix. ID- on Cefepime for pseudomonas. Renal- Cr stable at 1.7. good urine output. sedation and pain control as necessary.

## 2023-04-15 NOTE — PROGRESS NOTE ADULT - SUBJECTIVE AND OBJECTIVE BOX
Nephrology progress note    THIS IS AN INCOMPLETE NOTE . FULL NOTE TO FOLLOW SHORTLY    Patient is seen and examined, events over the last 24 h noted .    Allergies:  No Known Allergies    Hospital Medications:   MEDICATIONS  (STANDING):  acetaminophen     Tablet .. 650 milliGRAM(s) Oral every 6 hours  acetylcysteine 10%  Inhalation 4 milliLiter(s) Inhalation every 8 hours  albuterol    0.083% 2.5 milliGRAM(s) Nebulizer every 8 hours  albuterol    90 MICROgram(s) HFA Inhaler 1 Puff(s) Inhalation every 8 hours  aspirin  chewable 81 milliGRAM(s) Oral daily  atorvastatin 80 milliGRAM(s) Oral at bedtime  atropine 1% Solution 1 Drop(s) Left EYE two times a day  buMETAnide Injectable 1 milliGRAM(s) IV Push two times a day  cefepime   IVPB 1000 milliGRAM(s) IV Intermittent every 12 hours  chlorhexidine 0.12% Liquid 15 milliLiter(s) Oral Mucosa every 12 hours  chlorhexidine 2% Cloths 1 Application(s) Topical <User Schedule>  clopidogrel Tablet 75 milliGRAM(s) Oral daily  doxazosin 4 milliGRAM(s) Oral at bedtime  famotidine    Tablet 20 milliGRAM(s) Oral daily  guaifenesin/dextromethorphan Oral Liquid 10 milliLiter(s) Oral every 4 hours  heparin   Injectable 5000 Unit(s) SubCutaneous every 8 hours  insulin lispro (ADMELOG) corrective regimen sliding scale   SubCutaneous every 6 hours  lactated ringers. 1000 milliLiter(s) (30 mL/Hr) IV Continuous <Continuous>  metoclopramide 5 milliGRAM(s) Oral every 8 hours  midodrine 10 milliGRAM(s) Oral every 8 hours  prednisoLONE acetate 1% Suspension 1 Drop(s) Left EYE every 6 hours  vasopressin Infusion 0.01 Unit(s)/Min (1.5 mL/Hr) IV Continuous <Continuous>        VITALS:  T(F): 97.4 (04-15-23 @ 04:00), Max: 98 (04-14-23 @ 12:00)  HR: 90 (04-15-23 @ 07:00)  BP: 104/57 (04-15-23 @ 07:00)  RR: 26 (04-15-23 @ 07:00)  SpO2: 95% (04-15-23 @ 07:00)  Wt(kg): --    04-13 @ 07:01  -  04-14 @ 07:00  --------------------------------------------------------  IN: 2789.7 mL / OUT: 1800 mL / NET: 989.7 mL    04-14 @ 07:01  -  04-15 @ 07:00  --------------------------------------------------------  IN: 1741.5 mL / OUT: 2055 mL / NET: -313.5 mL          PHYSICAL EXAM:  Constitutional: NAD  HEENT: anicteric sclera, oropharynx clear, MMM  Neck: No JVD  Respiratory: CTAB, no wheezes, rales or rhonchi  Cardiovascular: S1, S2, RRR  Gastrointestinal: BS+, soft, NT/ND  Extremities: No cyanosis or clubbing. No peripheral edema  :  No blair.   Skin: No rashes    LABS:  04-14    141  |  107  |  56<H>  ----------------------------<  97  3.5   |  23  |  1.7<H>  Creatinine Trend: 1.7<--, 1.6<--, 1.6<--, 1.8<--, 1.6<--, 1.4<--  Ca    7.8<L>      14 Apr 2023 21:18  Phos  2.9     04-14  Mg     2.0     04-14    TPro  4.4<L>  /  Alb  1.9<L>  /  TBili  0.7  /  DBili  0.4<H>  /  AST  36  /  ALT  23  /  AlkPhos  184<H>  04-14                          9.6    12.89 )-----------( 187      ( 14 Apr 2023 21:18 )             28.8       Urine Studies:    Creatinine, Random Urine: 53 mg/dL (04-12 @ 12:30)  Sodium, Random Urine: 24.0 mmoL/L (04-12 @ 12:30)      Ferritin 69      [05-10-22 @ 06:30]  TSH 0.71      [05-04-22 @ 05:29]  Lipid: chol --, TG 89, HDL --, LDL --      [04-12-23 @ 20:22]          RADIOLOGY & ADDITIONAL STUDIES:   Nephrology progress note    Patient is seen and examined, events over the last 24 h noted .  trached on MV      Allergies:  No Known Allergies    Hospital Medications:   MEDICATIONS  (STANDING):  acetaminophen     Tablet .. 650 milliGRAM(s) Oral every 6 hours  acetylcysteine 10%  Inhalation 4 milliLiter(s) Inhalation every 8 hours  albuterol    0.083% 2.5 milliGRAM(s) Nebulizer every 8 hours  albuterol    90 MICROgram(s) HFA Inhaler 1 Puff(s) Inhalation every 8 hours  aspirin  chewable 81 milliGRAM(s) Oral daily  atorvastatin 80 milliGRAM(s) Oral at bedtime  atropine 1% Solution 1 Drop(s) Left EYE two times a day  buMETAnide Injectable 1 milliGRAM(s) IV Push two times a day  cefepime   IVPB 1000 milliGRAM(s) IV Intermittent every 12 hours  clopidogrel Tablet 75 milliGRAM(s) Oral daily  doxazosin 4 milliGRAM(s) Oral at bedtime  famotidine    Tablet 20 milliGRAM(s) Oral daily  guaifenesin/dextromethorphan Oral Liquid 10 milliLiter(s) Oral every 4 hours  heparin   Injectable 5000 Unit(s) SubCutaneous every 8 hours  insulin lispro (ADMELOG) corrective regimen sliding scale   SubCutaneous every 6 hours  lactated ringers. 1000 milliLiter(s) (30 mL/Hr) IV Continuous <Continuous>  metoclopramide 5 milliGRAM(s) Oral every 8 hours  midodrine 10 milliGRAM(s) Oral every 8 hours  prednisoLONE acetate 1% Suspension 1 Drop(s) Left EYE every 6 hours  vasopressin Infusion 0.01 Unit(s)/Min (1.5 mL/Hr) IV Continuous <Continuous>        VITALS:  T(F): 97.4 (04-15-23 @ 04:00), Max: 98 (04-14-23 @ 12:00)  HR: 90 (04-15-23 @ 07:00)  BP: 104/57 (04-15-23 @ 07:00)  RR: 26 (04-15-23 @ 07:00)  SpO2: 95% (04-15-23 @ 07:00)      04-13 @ 07:01  -  04-14 @ 07:00  --------------------------------------------------------  IN: 2789.7 mL / OUT: 1800 mL / NET: 989.7 mL    04-14 @ 07:01  -  04-15 @ 07:00  --------------------------------------------------------  IN: 1741.5 mL / OUT: 2055 mL / NET: -313.5 mL          PHYSICAL EXAM:  Constitutional: trached on MV   Respiratory: CTAB,   Cardiovascular: S1, S2, RRR  Gastrointestinal: BS+, soft, NT/ND  Extremities: No cyanosis or clubbing. No peripheral edema  :  No blair.   Skin: No rashes    LABS:  04-14    141  |  107  |  56<H>  ----------------------------<  97  3.5   |  23  |  1.7<H>    Creatinine Trend: 1.7<--, 1.6<--, 1.6<--, 1.8<--, 1.6<--, 1.4<--    Ca    7.8<L>      14 Apr 2023 21:18  Phos  2.9     04-14  Mg     2.0     04-14    TPro  4.4<L>  /  Alb  1.9<L>  /  TBili  0.7  /  DBili  0.4<H>  /  AST  36  /  ALT  23  /  AlkPhos  184<H>  04-14                          9.6    12.89 )-----------( 187      ( 14 Apr 2023 21:18 )             28.8       Urine Studies:    Creatinine, Random Urine: 53 mg/dL (04-12 @ 12:30)  Sodium, Random Urine: 24.0 mmoL/L (04-12 @ 12:30)      Ferritin 69      [05-10-22 @ 06:30]  TSH 0.71      [05-04-22 @ 05:29]  Lipid: chol --, TG 89, HDL --, LDL --      [04-12-23 @ 20:22]          RADIOLOGY & ADDITIONAL STUDIES:  < from: US Kidney and Bladder (04.14.23 @ 17:01) >  IMPRESSION:    No hydronephrosis or calculi.    Approximate 8.4 cm cyst, left kidney (present on earlier CT scan)..    Blair catheter within collapsed urinary bladder    < end of copied text >

## 2023-04-15 NOTE — CHART NOTE - NSCHARTNOTEFT_GEN_A_CORE
I spoke with the patient's son Song at [phone] about the patient's condition and the bronchoscopy. Informed consent was obtained prior to the procedure.  The findings of no secretions bilaterally was discussed with the patient's son.  All questions were answered.    Han Ocasio  SICU Fellow

## 2023-04-15 NOTE — PROGRESS NOTE ADULT - ASSESSMENT
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   S/P ORIF of right hip using interlocking intramedullary stephon.  S/P Trach & PEG    NEURO:  #Acute pain    -Acetaminophen 650 PO q6    -Fentanyl 25mcg IVP PRN  #sedation    - off nimbex 4/12    - off propofol 4/13    RESP:   #Acute respiratory failure secondary to post operative arrhythmias requiring mechanical ventilation (Intubated 3/23),     -s/p trach 4/5/23 with size 8    -s/p bronch 4/8 BAL- no organisms    -F/U 4/10 BAL - pseudomonas    -AM CXR     -F/u pulm c/s recs    -Albuterol, Robitussin  -Vent Settings: AC Volume  450/22/30/8- tolerated PS 10/8 x 3hr  #PE w/u NEGATIVE on 3/28      CARDS:   #Tachycardia     - Propanolol 10mg q8 - Discontinued 4/10 d/t bradycardic events  #Bradycardia     - 4/10: Episodes x2 bradycardia to 28. Atropine given second episode.      - EP: 4/11 --> signing off, re-consult PRN, will not be doing PPM since bradycardia likely due to hypoxia    -Trops: 0.08 -> 0.09-- seen by cards likely demand ischemia  #acute hypotension    -increased Midodrine 10mg q8    -Intermittent hypotensive episodes 4/10 during bradycardic episodes    -off levo and vaso on 4/13  #Labs/Imaging  -Echo 3/23- EF appears normal, mild LVH, sclerotic AoV, trace MR,   -Echo 3/29: EF 60-65%, norm LV function, borderline pulm HTN.   -EKG 4/10 most recent: , Sinus Tachycardia, low voltage QRS, incomplete RBBB  -EKG 4/11: , Sinus tach; low voltage QRS  -Echo 4/11: EF 70-75%, Hyperdynamic LV, mild MVR, aortic wall thickening    GI/NUTR:   #Acute clostridium difficile positive 3/30    -PO Vancomycin course completed 4/13   #Diet: restarted TF Peptamen AF increased to goal of 45 ml/h-- 35 4/14     Probided 1350 kcal and 85.5 gms protein    -PEG 3cm at skin, 4cm at bumper  #Nausea: Zofran prn    - Aspiration precautions, HOB 30  #GI Prophylaxis    - Pepcid  #Bowel regimen    - holding due to consistent BM for last 3 days    - bloody vs melanotic BMs  #Imaging    -CT abdomen 3/28: No definite infectious source identified      -RUQ sono: biliary sludge, mild GB wall thickening/edema.     /RENAL:   #UO: Blair placed by Urology 3/22 (d/w urology and asses scrotal swelling prior to removal)    -Inpatient Rx: doxazosin 4 milliGRAM(s) Oral at bedtime  #acute scrotal swelling  - U/S 3/29 w/ diffused scrotal wall edema (keep blair)   -scrotal elevation  Labs:  #urine output in critically ill    -indwelling blair (placed 3/22)    -VAISHNAVI, oliguria, improving u/o  ml/hr    IVF LR @50cc     Labs:          BUN/Cr- 56/1.6  -->,  56/1.8  -->55/1.6          Electrolytes-  #Diuresis   -20 Lasix 4/9- unresponsive, given 40 Lasix overnight, will monitor output/ response    -80 Lasix, 5 Metozalone 4/10, responded (4/10)  -Nephrology: Bumex 1g BID, US K&B  follow up intravascular assessment    HEME/ONC:   #DVT prophylaxis- HSQ restarted (d/c heparin gtt), SCDs  #H/O CAD s/p stents x3    - ASA 81    - Plavix 75 restarted 4/7    Labs: Hb/Hct:  7.5/24.0  -->,  7.1/22.7  1prbc -->,  8.5/27.0  -->           Plts:  188  -->,  163  -->,  180  -->              PTT/INR:  72.5/--  --->,  71.1/--  --->     T&S expires 4/15    ID:  #Acute clostridium difficile 4/30  WBC- 16.56  --->>,  11.45  --->>,  11.75  --->> 12.8  afebrile     Antibiotics   -Ended 4/13: vancomycin    Solution 125 every 6 hours  - Cefepime IVPB 1000mg every 12 hours (started 4/10)  - DC'd vanc IV     Cultures:    BCx 4/11 - no growth to date    BAL 4/10- Pseudomonas     BAL 4/8- negative     ET Sputum 3/29: negative     BCx 3/28: NGTD    UA 3/28: Negative     BCx 3/25--pending    UA 3/24--negative    ET Sputum 3/24--neg    MRSA neg    CDiff PCR 3/30: Positive    Procalcitonin 0.32  # +Candida Auris surveillance swab on 4/6/23 (nares, axilla, groin)    ENDO:  HA1C= 5.0    -ISS    -Glucose goal 140-180    MSK:   -WBAT RLE per ortho, PT/OT when clinically appropriate   -R hip dressing changed by SICU team 4/2   -Right hip Xray 2 views per ortho (4/9) --> s/p ORIF of comminuted intertrochanteric Rt femur fx w/out definite change compared to 3/22/23    LINES/DRAINS:    PIV  Blair (3/22)  Trach, NGT   Right radial a-line 4/10  RIJ TLC 4/11    ADVANCED DIRECTIVES:  DNR    HCP/Emergency Contact- Song Segura (Son): [phone]    INDICATION FOR SICU: Hypotension with pressor requirements. Intubated.       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   S/P ORIF of right hip using interlocking intramedullary stephon.  S/P Trach & PEG    NEURO:  #Acute pain    -Acetaminophen 650 PO q6    -Fentanyl 25mcg IVP PRN  #sedation    - off nimbex 4/12    - off propofol 4/13    RESP:   #Acute respiratory failure secondary to post operative arrhythmias requiring mechanical ventilation (Intubated 3/23),   - Beginning course of Bumetanide   - BAL at 11:00 4/15, little mucus appreciated, no sample was sent    #Pneumonia   - Pseudomonas on culture, pt on Cefepime beginning 4/10.       -s/p trach 4/5/23 with size 8    -s/p bronch 4/8 BAL- no organisms    -F/U 4/10 BAL - pseudomonas    -AM CXR     -F/u pulm c/s recs    -Albuterol, Robitussin  -Vent Settings: AC Volume  450/22/45/8- tolerated PS 10/8 x 3hr  #PE w/u NEGATIVE on 3/28      CARDS:   #Tachycardia     - Propanolol 10mg q8 - Discontinued 4/10 d/t bradycardic events  #Bradycardia     - Restarted vaso at 0.03     - 4/10: Episodes x2 bradycardia to 28. Atropine given second episode.      - EP: 4/11 --> signing off, re-consult PRN, will not be doing PPM since bradycardia likely due to hypoxia    -Trops: 0.08 -> 0.09-- seen by cards likely demand ischemia  #acute hypotension    -increased Midodrine 10mg q8    -Intermittent hypotensive episodes 4/10 during bradycardic episodes    -off levo on 4/13  #Labs/Imaging  -Echo 3/23- EF appears normal, mild LVH, sclerotic AoV, trace MR,   -Echo 3/29: EF 60-65%, norm LV function, borderline pulm HTN.   -EKG 4/10 most recent: , Sinus Tachycardia, low voltage QRS, incomplete RBBB  -EKG 4/11: , Sinus tach; low voltage QRS  -Echo 4/11: EF 70-75%, Hyperdynamic LV, mild MVR, aortic wall thickening    GI/NUTR:   #Acute clostridium difficile positive 3/30    -PO Vancomycin course completed 4/13   #Diet: restarted TF Peptamen AF increased to goal of 45 ml/h-- 35 4/14     - Emesis this AM, paused tube feeds, aspiration unlikely      -Probided 1350 kcal and 85.5 gms protein    -PEG 3cm at skin, 4cm at bumper  #Nausea: Zofran prn    - Aspiration precautions, HOB 30    - KUB ordered 4/15   #GI Prophylaxis    - Pepcid  #Bowel regimen    - holding due to consistent BM for last 3 days    - 1 BM overnight (4/14)  #Imaging    -CT abdomen 3/28: No definite infectious source identified      -RUQ sono: biliary sludge, mild GB wall thickening/edema.     /RENAL:   #UO: Blair placed by Urology 3/22 (d/w urology and asses scrotal swelling prior to removal)    -Inpatient Rx: doxazosin 4 milliGRAM(s) Oral at bedtime   - US kidney bladder demonstrated: No hydronephrosis or calculi, approximate 8.4 cm cyst, left kidney (present on earlier CT scan), blair catheter within collapsed urinary bladder.  #acute scrotal swelling  - U/S 3/29 w/ diffused scrotal wall edema (keep blair)   -scrotal elevation  Labs:  #urine output in critically ill    -indwelling blair (placed 3/22)    -VAISHNAVI, oliguria, improving u/o  ml/hr    IVF LR @50cc     Labs:          BUN/Cr- 56/1.6  -->,  56/1.8  -->55/1.6          Electrolytes-  #Diuresis   -20 Lasix 4/9- unresponsive, given 40 Lasix overnight, will monitor output/ response    -80 Lasix, 5 Metozalone 4/10, responded (4/10)  -Nephrology: Bumex 1g BID, US K&B  follow up intravascular assessment             - Will hold PM dose of 1g Bumex if hypotensive    HEME/ONC:   #DVT prophylaxis- HSQ restarted (d/c heparin gtt), SCDs  #H/O CAD s/p stents x3    - ASA 81    - Plavix 75 restarted 4/7    Labs: Hb/Hct:  7.5/24.0  -->,  7.1/22.7  1prbc -->,  8.5/27.0  -->           Plts:  188  -->,  163  -->,  180  -->              PTT/INR:  72.5/--  --->,  71.1/--  --->     T&S expires 4/18    ID:  #Acute clostridium difficile 4/30  WBC- 16.56  --->>,  11.45  --->>,  11.75  --->> 12.8  afebrile     Antibiotics   -Ended 4/13: vancomycin    Solution 125 every 6 hours  - Cefepime IVPB 1000mg every 12 hours (started 4/10)  - DC'd vanc IV     Cultures:    BCx 4/11 - no growth to date    BAL 4/10- Pseudomonas     BAL 4/8- negative     ET Sputum 3/29: negative     BCx 3/28: NGTD    UA 3/28: Negative     BCx 3/25--pending    UA 3/24--negative    ET Sputum 3/24--neg    MRSA neg    CDiff PCR 3/30: Positive    Procalcitonin 0.32  # +Candida Auris surveillance swab on 4/6/23 (nares, axilla, groin)    ENDO:  HA1C= 5.0    -ISS    -Glucose goal 140-180    MSK:   -WBAT RLE per ortho, PT/OT when clinically appropriate   -R hip dressing changed by SICU team 4/2   -Right hip Xray 2 views per ortho (4/9) --> s/p ORIF of comminuted intertrochanteric Rt femur fx w/out definite change compared to 3/22/23    LINES/DRAINS:    PIV  Blair (3/22)  Trach, NGT   Right radial a-line 4/10  RIJ TLC 4/11    ADVANCED DIRECTIVES:  DNR    HCP/Emergency Contact- Song Segura (Son): [phone]    INDICATION FOR SICU: Hypotension with pressor requirements. Intubated.       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   S/P ORIF of right hip using interlocking intramedullary stephon.  S/P Trach & PEG    NEURO:  #Acute pain    -Acetaminophen 650 PO q6    -Fentanyl 25mcg IVP PRN  #sedation    - off nimbex 4/12    - off propofol 4/13    RESP:   #Acute respiratory failure secondary to post operative arrhythmias requiring mechanical ventilation (Intubated 3/23),   - Beginning course of Bumetanide   - BAL at 11:00 4/15, little secretions appreciated, no sample was sent    #Pneumonia   - Pseudomonas on culture, pt on Cefepime beginning 4/10.       -s/p trach 4/5/23 with size 8    -s/p bronch 4/8 BAL- no organisms    -F/U 4/10 BAL - pseudomonas    -AM CXR     -F/u pulm c/s recs    -Albuterol, Robitussin  -Vent Settings: AC Volume  450/22/45/8- tolerated PS 10/8 x 3hr  #PE w/u NEGATIVE on 3/28      CARDS:   #Tachycardia     - Propanolol 10mg q8 - Discontinued 4/10 d/t bradycardic events  #Bradycardia     - Restarted vaso at 0.03     - 4/10: Episodes x2 bradycardia to 28. Atropine given second episode.      - EP: 4/11 --> signing off, re-consult PRN, will not be doing PPM since bradycardia likely due to hypoxia    -Trops: 0.08 -> 0.09-- seen by cards likely demand ischemia  #acute hypotension    -increased Midodrine 10mg q8    -Intermittent hypotensive episodes 4/10 during bradycardic episodes    -off levo on 4/13  #Labs/Imaging  -Echo 3/23- EF appears normal, mild LVH, sclerotic AoV, trace MR,   -Echo 3/29: EF 60-65%, norm LV function, borderline pulm HTN.   -EKG 4/10 most recent: , Sinus Tachycardia, low voltage QRS, incomplete RBBB  -EKG 4/11: , Sinus tach; low voltage QRS  -Echo 4/11: EF 70-75%, Hyperdynamic LV, mild MVR, aortic wall thickening    GI/NUTR:   #Acute clostridium difficile positive 3/30    -PO Vancomycin course completed 4/13   #Diet: restarted TF Peptamen AF increased to goal of 45 ml/h-- 35 4/14     - Emesis this AM, paused tube feeds, aspiration unlikely      -Probided 1350 kcal and 85.5 gms protein    -PEG 3cm at skin, 4cm at bumper  #Nausea: Zofran prn    - Aspiration precautions, HOB 30    - KUB ordered 4/15   #GI Prophylaxis    - Pepcid  #Bowel regimen    - holding due to consistent BM for last 3 days    - 1 BM overnight (4/14)  #Imaging    -CT abdomen 3/28: No definite infectious source identified      -RUQ sono: biliary sludge, mild GB wall thickening/edema.     /RENAL:   #UO: Blair placed by Urology 3/22 (d/w urology and asses scrotal swelling prior to removal)    -Inpatient Rx: doxazosin 4 milliGRAM(s) Oral at bedtime   - US kidney bladder demonstrated: No hydronephrosis or calculi, approximate 8.4 cm cyst, left kidney (present on earlier CT scan), blair catheter within collapsed urinary bladder.  #acute scrotal swelling  - U/S 3/29 w/ diffused scrotal wall edema (keep blair)   -scrotal elevation  Labs:  #urine output in critically ill    -indwelling blair (placed 3/22)    -VAISHNAVI, oliguria, improving u/o  ml/hr    IVF LR @50cc     Labs:          BUN/Cr- 56/1.6  -->,  56/1.8  -->55/1.6          Electrolytes-  #Diuresis   -20 Lasix 4/9- unresponsive, given 40 Lasix overnight, will monitor output/ response    -80 Lasix, 5 Metozalone 4/10, responded (4/10)  -Nephrology: Bumex 1g BID, US K&B  follow up intravascular assessment             - Will hold PM dose of 1g Bumex if hypotensive    HEME/ONC:   #DVT prophylaxis- HSQ restarted (d/c heparin gtt), SCDs  #H/O CAD s/p stents x3    - ASA 81    - Plavix 75 restarted 4/7    Labs: Hb/Hct:  7.5/24.0  -->,  7.1/22.7  1prbc -->,  8.5/27.0  -->           Plts:  188  -->,  163  -->,  180  -->              PTT/INR:  72.5/--  --->,  71.1/--  --->     T&S expires 4/18    ID:  #Acute clostridium difficile 4/30  WBC- 16.56  --->>,  11.45  --->>,  11.75  --->> 12.8  afebrile     Antibiotics   -Ended 4/13: vancomycin    Solution 125 every 6 hours  - Cefepime IVPB 1000mg every 12 hours (started 4/10)  - DC'd vanc IV     Cultures:    BCx 4/11 - no growth to date    BAL 4/10- Pseudomonas     BAL 4/8- negative     ET Sputum 3/29: negative     BCx 3/28: NGTD    UA 3/28: Negative     BCx 3/25--pending    UA 3/24--negative    ET Sputum 3/24--neg    MRSA neg    CDiff PCR 3/30: Positive    Procalcitonin 0.32  # +Candida Auris surveillance swab on 4/6/23 (nares, axilla, groin)    ENDO:  HA1C= 5.0    -ISS    -Glucose goal 140-180    MSK:   -WBAT RLE per ortho, PT/OT when clinically appropriate   -R hip dressing changed by SICU team 4/2   -Right hip Xray 2 views per ortho (4/9) --> s/p ORIF of comminuted intertrochanteric Rt femur fx w/out definite change compared to 3/22/23    LINES/DRAINS:    PIV  Blair (3/22)  Trach, NGT   Right radial a-line 4/10  RIJ TLC 4/11    ADVANCED DIRECTIVES:  DNR    HCP/Emergency Contact- Song Segura (Son): [phone]    INDICATION FOR SICU: Hypotension with pressor requirements. Intubated.       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   S/P ORIF of right hip using interlocking intramedullary stephon.  S/P Trach & PEG    NEURO:  #Acute pain    -Acetaminophen 650 PO q6    -Fentanyl 25mcg IVP PRN  #sedation    - off nimbex 4/12    - off propofol 4/13    RESP:   #Acute respiratory failure secondary to post operative arrhythmias requiring mechanical ventilation (Intubated 3/23),   - On Bumetanide 1mg q12- will hold due to intermittent use of pressors  - BAL at 11:00 4/15, little secretions appreciated, no sample was sent    #Pneumonia   - Pseudomonas on culture, sensitive to Cefepime -started on 4/10.       -s/p trach 4/5/23 with size 8    -s/p bronch 4/8 BAL- no organisms    -F/U 4/10 BAL - pseudomonas    -AM CXR     -F/u pulm c/s recs    -Albuterol, Robitussin  -Vent Settings: AC Volume  450/22/45/8- tolerated PS 10/8 x 3hr 4/14  #PE w/u NEGATIVE on 3/28      CARDS:   #Tachycardia     - Propanolol - Discontinued 4/10 d/t bradycardic events  #Bradycardia       - 4/10: Episodes x2 bradycardia to 28. Atropine given second episode.      - EP: 4/11 --> signing off, re-consult PRN, will not be doing PPM since bradycardia likely due to hypoxia    -Trops: 0.08 -> 0.09-- seen by cards likely demand ischemia  #acute hypotension    -increased Midodrine 10mg q8    -Intermittent hypotensive episodes 4/10 during bradycardic episodes    -off levo on 4/13    -- Restarted vaso at 0.03- sensitve, becomes hypertensive, currently off  #Labs/Imaging  -Echo 3/23- EF appears normal, mild LVH, sclerotic AoV, trace MR,   -Echo 3/29: EF 60-65%, norm LV function, borderline pulm HTN.   -EKG 4/10 most recent: , Sinus Tachycardia, low voltage QRS, incomplete RBBB  -EKG 4/11: , Sinus tach; low voltage QRS  -Echo 4/11: EF 70-75%, Hyperdynamic LV, mild MVR, aortic wall thickening    GI/NUTR:   #Diet: restarted TF Peptamen AF increased to goal of 45 ml/h-- 35 4/14       -Provides 1350 kcal and 85.5 gms protein   - Emesis this AM, paused tube feeds, aspiration unlikely     -PEG 3cm at skin, 4cm at bumper  #Nausea: Zofran prn    - Aspiration precautions, HOB 30    -? dysmotility- changes reglan to ATC and IV    - KUB ordered 4/15   #GI Prophylaxis    - Pepcid  #Bowel regimen    - holding due to multiple BMs    - 1 BM overnight (4/14)  #Imaging    -CT abdomen 3/28: No definite infectious source identified      -RUQ sono: biliary sludge, mild GB wall thickening/edema.     /RENAL:   #UO: Blair placed by Urology 3/22 (d/w urology and asses scrotal swelling prior to removal)    -Inpatient Rx: doxazosin 4 milliGRAM(s) Oral at bedtime   - US kidney bladder demonstrated: No hydronephrosis or calculi, approximate 8.4 cm cyst, left kidney (present on earlier CT scan), blair catheter within collapsed urinary bladder.  #acute scrotal swelling  - U/S 3/29 w/ diffused scrotal wall edema (keep blair)   -scrotal elevation  Labs:  #urine output in critically ill    -indwelling blair (placed 3/22)    -VAISHNAVI, oliguria, improving u/o  ml/hr    IVF LR @50cc     Labs:          BUN/Cr- 56/1.6  -->,  56/1.8  -->55/1.6          Electrolytes-Sodium/Potassium/Magnesium/Phosphate  04-14 @ 21:18  Sodium  141  Potassium 3.5  Magnesium 2.0  Phosphorus  2.9   #Diuresis   -20 Lasix 4/9- unresponsive, given 40 Lasix overnight, will monitor output/ response    -80 Lasix, 5 Metozalone 4/10, responded (4/10)  -Nephrology: Bumex 1g BID, US K&B  follow up intravascular assessment             - Will hold PM dose of 1g Bumex if hypotensive    HEME/ONC:   #DVT prophylaxis- HSQ, SCDs  #H/O CAD s/p stents x3    - ASA 81    - Plavix 75 restarted 4/7    Labs: Hb/Hct:  7.5/24.0  -->,  7.1/22.7  1prbc -->,  8.5/27.0  -->           Plts:  188  -->,  163  -->,  180  -->              PTT/INR:  72.5/--  --->,  71.1/--  --->     T&S expires 4/18    ID:  #Acute clostridium difficile 3/30    COmpleted course of po Vanco on 4/13  WBC- 16.56  --->>,  11.45  --->>,  11.75  --->> 12.8  afebrile     Antibiotics   -Ended 4/13: vancomycin    Solution 125 every 6 hours  - Cefepime IVPB 1000mg every 12 hours (started 4/10)  - DC'd vanc IV     Cultures:    BCx 4/11 - no growth to date    BAL 4/10- Pseudomonas     BAL 4/8- negative     ET Sputum 3/29: negative     BCx 3/28: NGTD    UA 3/28: Negative     BCx 3/25--pending    UA 3/24--negative    ET Sputum 3/24--neg    MRSA neg    CDiff PCR 3/30: Positive    Procalcitonin 0.32  # +Candida Auris surveillance swab on 4/6/23 (nares, axilla, groin)    ENDO:  HA1C= 5.0    -ISS    -Glucose goal 140-180    MSK:   -WBAT RLE per ortho, PT/OT when clinically appropriate   -Right hip Xray 2 views per ortho (4/9) --> s/p ORIF of comminuted intertrochanteric Rt femur fx w/out definite change compared to 3/22/23    LINES/DRAINS:    PIV  Blair (3/22)  Trach, NGT   Right radial a-line 4/10  RIJ TLC 4/11    ADVANCED DIRECTIVES:  DNR    HCP/Emergency Contact- Song Segura (Son): [phone]    INDICATION FOR SICU: Hypotension with pressor requirements. Intubated.       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   S/P ORIF of right hip using interlocking intramedullary stephon.  S/P Trach & PEG    NEURO:  #Acute pain    -Acetaminophen 650 PO q6    -Fentanyl 25mcg IVP PRN  #sedation    - off nimbex 4/12    - off propofol 4/13    RESP:   #Acute respiratory failure secondary to post operative arrhythmias requiring mechanical ventilation (Intubated 3/23),   - On Bumetanide 1mg q12- will hold due to intermittent use of pressors  - BAL at 11:00 4/15, little secretions appreciated, no sample was sent    #Pneumonia   - Pseudomonas on culture, sensitive to Cefepime -started on 4/10.       -s/p trach 4/5/23 with size 8    -s/p bronch 4/8 BAL- no organisms    -F/U 4/10 BAL - pseudomonas    -AM CXR     -F/u pulm c/s recs    -Albuterol, Robitussin  -Vent Settings: AC Volume  450/22/45/8- tolerated PS 10/8 x 3hr 4/14  #PE w/u NEGATIVE on 3/28      CARDS:   #Tachycardia     - Propanolol - Discontinued 4/10 d/t bradycardic events  #Bradycardia       - 4/10: Episodes x2 bradycardia to 28. Atropine given second episode.      - EP: 4/11 --> signing off, re-consult PRN, will not be doing PPM since bradycardia likely due to hypoxia    -Trops: 0.08 -> 0.09-- seen by cards likely demand ischemia  #acute hypotension    -increased Midodrine 10mg q8    -Intermittent hypotensive episodes 4/10 during bradycardic episodes    -off levo on 4/13    -- Restarted vaso at 0.03- sensitve, becomes hypertensive, currently off  #Labs/Imaging  -Echo 3/23- EF appears normal, mild LVH, sclerotic AoV, trace MR,   -Echo 3/29: EF 60-65%, norm LV function, borderline pulm HTN.   -EKG 4/10 most recent: , Sinus Tachycardia, low voltage QRS, incomplete RBBB  -EKG 4/11: , Sinus tach; low voltage QRS  -Echo 4/11: EF 70-75%, Hyperdynamic LV, mild MVR, aortic wall thickening    GI/NUTR:   #Diet: restarted TF Peptamen AF increased to goal of 45 ml/h-- 35 4/14       -Provides 1350 kcal and 85.5 gms protein   - Emesis this AM, paused tube feeds, aspiration unlikely     -PEG 3cm at skin, 4cm at bumper  #Nausea: Zofran prn    - Aspiration precautions, HOB 30    -? dysmotility- changes reglan to ATC and IV    - KUB ordered 4/15   #GI Prophylaxis    - Pepcid  #Bowel regimen    - holding due to multiple BMs    - 1 BM overnight (4/14)  #Imaging    -CT abdomen 3/28: No definite infectious source identified      -RUQ sono: biliary sludge, mild GB wall thickening/edema.     /RENAL:   #UO: Blair placed by Urology 3/22 (d/w urology and asses scrotal swelling prior to removal)    -Inpatient Rx: doxazosin 4 milliGRAM(s) Oral at bedtime   - US kidney bladder demonstrated: No hydronephrosis or calculi, approximate 8.4 cm cyst, left kidney (present on earlier CT scan), blair catheter within collapsed urinary bladder.  #acute scrotal swelling  - U/S 3/29 w/ diffused scrotal wall edema (keep blair)   -scrotal elevation  Labs:  #urine output in critically ill    -indwelling blair (placed 3/22)    -VAISHNAVI, oliguria, improving u/o  ml/hr    IVF LR @50cc     Labs:          BUN/Cr- 56/1.6  -->,  56/1.8  -->55/1.6          Electrolytes-Sodium/Potassium/Magnesium/Phosphate  04-14 @ 21:18  Sodium  141  Potassium 3.5  Magnesium 2.0  Phosphorus  2.9   #Diuresis   -20 Lasix 4/9- unresponsive, given 40 Lasix overnight, will monitor output/ response    -80 Lasix, 5 Metozalone 4/10, responded (4/10)  -Nephrology: Bumex 1g BID, US K&B  follow up intravascular assessment             - Will hold PM dose of 1g Bumex if hypotensive    HEME/ONC:   #DVT prophylaxis- HSQ, SCDs  #H/O CAD s/p stents x3    - ASA 81    - Plavix 75 restarted 4/7    Labs: Hb/Hct:  7.5/24.0  -->,  7.1/22.7  1prbc -->,  8.5/27.0  -->           Plts:  188  -->,  163  -->,  180  -->              PTT/INR:  72.5/--  --->,  71.1/--  --->     T&S expires 4/18    ID:  #Acute clostridium difficile 3/30    Completed course of po Vanco on 4/13  WBC- 16.56  --->>,  11.45  --->>,  11.75  --->> 12.8  afebrile     Antibiotics   -Ended 4/13: vancomycin    Solution 125 every 6 hours  - Cefepime IVPB 1000mg every 12 hours (started 4/10)  - DC'd vanc IV     Cultures:    BCx 4/11 - no growth to date    BAL 4/10- Pseudomonas     BAL 4/8- negative     ET Sputum 3/29: negative     BCx 3/28: NGTD    UA 3/28: Negative     BCx 3/25--pending    UA 3/24--negative    ET Sputum 3/24--neg    MRSA neg    CDiff PCR 3/30: Positive    Procalcitonin 0.32  # +Candida [facility] surveillance swab on 4/6/23 (nares, axilla, groin)    ENDO:  HA1C= 5.0    -ISS    -Glucose goal 140-180    MSK:   -WBAT RLE per ortho, PT/OT when clinically appropriate   -Right hip Xray 2 views per ortho (4/9) --> s/p ORIF of comminuted intertrochanteric Rt femur fx w/out definite change compared to 3/22/23    LINES/DRAINS:    PIV  [place] (3/22)  Trach, NGT   Right radial a-line 4/10  RIJ TLC 4/11    ADVANCED DIRECTIVES:  DNR    HCP/Emergency Contact- Song Segura (Son): [phone]    INDICATION FOR SICU: Hypotension with pressor requirements. Intubated.       DISPO: SICU

## 2023-04-16 NOTE — PHARMACOTHERAPY INTERVENTION NOTE - COMMENTS
As per policy, ordered a vancomycin trough for 4/14 with AM labs in order to assist with vancomycin pharmacokinetic monitoring.     Waldemar Hugo, PharmD, [facility]  Clinical Pharmacy Specialist, Infectious Diseases  Tele-Antimicrobial Stewardship Program (Tele-ASP)  Tele-ASP Phone: [phone]  
Recommended to adjust vancomycin dose from 750 mg IV q12h to 1000 mg IV q24h since the vancomycin level today, 4/12 was 14.7 mg/L. As per Prec[employer] calculations, the steady state vancomycin AUC/BENOIT is 748.94 mg/L*h, which is greater than the therapeutic range of 400 - 600 mg/L*h. Decreasing the dose is predicted to yield an AUC of 499.47 mg/L*h.    Waldemar Hugo, PharmD, BCIDP  Clinical Pharmacy Specialist, Infectious Diseases  Tele-Antimicrobial Stewardship Program (Tele-ASP)  Tele-ASP Phone: [phone]  
Clarified duration of treatment with cefepime 1g IV q12h for the treatment of Pseudomonas aeruginosa that grew from 4/11 Tracheal Aspirate culture, patient is planned to complete therapy tomorrow 4/17.    Yannick LaytonD  Clinical Pharmacy Specialist, Infectious Diseases  Tele-Antimicrobial Stewardship Program (Tele-ASP)  Tele-ASP Phone: [phone]

## 2023-04-16 NOTE — PROGRESS NOTE ADULT - SUBJECTIVE AND OBJECTIVE BOX
have been seeing pt. throughout hos[ital stay and have been in communication with SICU attending and son.  No orthopedic complication present and no ortho treatment needed at this time. appreciate excellent care of SIVU staff and consults

## 2023-04-16 NOTE — PROGRESS NOTE ADULT - SUBJECTIVE AND OBJECTIVE BOX
RADHA RAMON  [id number]  77y Male    Indication for ICU admission:    Admit Date:03-20-23  ICU Date:  OR Date:    No Known Allergies    PAST MEDICAL & SURGICAL HISTORY:  CAD (coronary artery disease)    Hypercholesteremia    BPH (benign prostatic hyperplasia)    Kidney stones    Prostate cancer    Status post cardiac surgery  cardiac stents    Bilateral cataracts    History of lithotripsy      Home Medications:  atropine 1% ophthalmic solution: 1 drop(s) to each affected eye 2 times a day (20 Mar 2023 15:44)  erythromycin 0.5% ophthalmic ointment: 1 application to each affected eye 2 times a day (20 Mar 2023 15:44)  prednisoLONE acetate 1% ophthalmic suspension: 1 drop(s) to each affected eye every 6 hours (20 Mar 2023 15:44)  tamsulosin 0.4 mg oral capsule: 1 cap(s) orally once a day (at bedtime)  - if you continue to have your blood pressures drop when you stand, then this medication will need to be STOPPED (20 Mar 2023 15:44)        24HRS EVENT:  PT placed on levo .2 due to MAPS <60 pt became tachycardiac to 110s given albumin       DVT PTX:     GI PTX:famotidine    Tablet 20 milliGRAM(s) Oral daily      ***Tubes/Lines/Drains  ***  PIV  Garrison (3/22)  Trach, NGT   Right radial a-line 4/10  RIJ TLC 4/11                     RADHA RAMON  [id number]  77y Male    Indication for ICU admission:    Admit Date:03-20-23  ICU Date:  OR Date:    No Known Allergies    PAST MEDICAL & SURGICAL HISTORY:  CAD (coronary artery disease)    Hypercholesteremia    BPH (benign prostatic hyperplasia)    Kidney stones    Prostate cancer    Status post cardiac surgery  cardiac stents    Bilateral cataracts    History of lithotripsy      Home Medications:  atropine 1% ophthalmic solution: 1 drop(s) to each affected eye 2 times a day (20 Mar 2023 15:44)  erythromycin 0.5% ophthalmic ointment: 1 application to each affected eye 2 times a day (20 Mar 2023 15:44)  prednisoLONE acetate 1% ophthalmic suspension: 1 drop(s) to each affected eye every 6 hours (20 Mar 2023 15:44)  tamsulosin 0.4 mg oral capsule: 1 cap(s) orally once a day (at bedtime)  - if you continue to have your blood pressures drop when you stand, then this medication will need to be STOPPED (20 Mar 2023 15:44)        24HRS EVENT:  tube feeds resumed 150cc of drainage from g tube   ramined on vent   placed back on levo, off by AM   ep of HR 45, self resolved   u/o decreased 10cc, tachy 100s to 110s, albumin 5%250cc   urine output imporved to 30s cc after albumin        DVT PTX:     GI PTX:famotidine    Tablet 20 milliGRAM(s) Oral daily      ***Tubes/Lines/Drains  ***  PIV  Garrison (3/22)  Trach, NGT   Right radial a-line 4/10  RIJ TLC 4/11                   RADHA RAMON  [id number]  77y Male    Indication for ICU admission:    Admit Date:03-20-23  ICU Date:  OR Date:    No Known Allergies    PAST MEDICAL & SURGICAL HISTORY:  CAD (coronary artery disease)    Hypercholesteremia    BPH (benign prostatic hyperplasia)    Kidney stones    Prostate cancer    Status post cardiac surgery  cardiac stents    Bilateral cataracts    History of lithotripsy      Home Medications:  atropine 1% ophthalmic solution: 1 drop(s) to each affected eye 2 times a day (20 Mar 2023 15:44)  erythromycin 0.5% ophthalmic ointment: 1 application to each affected eye 2 times a day (20 Mar 2023 15:44)  prednisoLONE acetate 1% ophthalmic suspension: 1 drop(s) to each affected eye every 6 hours (20 Mar 2023 15:44)  tamsulosin 0.4 mg oral capsule: 1 cap(s) orally once a day (at bedtime)  - if you continue to have your blood pressures drop when you stand, then this medication will need to be STOPPED (20 Mar 2023 15:44)        24HRS EVENT:  tube feeds resumed 150cc of drainage from g tube   ramined on vent   placed back on levo, off by AM   ep of HR 45, self resolved   u/o decreased 10cc, tachy 100s to 110s, albumin 5%250cc   urine output imporved to 30s cc after albumin        DVT PTX:     GI PTX:famotidine    Tablet 20 milliGRAM(s) Oral daily      ***Tubes/Lines/Drains  ***  PIV  [place] (3/22)  Trach, NGT   Right radial a-line 4/10  RIJ TLC 4/11    Daily     Daily   Diet, NPO with Tube Feed:   Tube Feeding Modality: Gastrostomy  Peptamen A.F. Formula  Total Volume for 24 Hours (mL): 1080  Continuous  Starting Tube Feed Rate mL per Hour: 45     Every 4 hours  Until Goal Tube Feed Rate (mL per Hour): 45  Tube Feed Duration (in Hours): 24  Tube Feed Start Time: 10:00 (04-14-23 @ 02:27)    CURRENT MEDS:  Neurologic Medications  acetaminophen     Tablet .. 650 milliGRAM(s) Oral every 6 hours  fentaNYL    Injectable 25 MICROGram(s) IV Push every 3 hours PRN Severe Pain (7 - 10)  metoclopramide Injectable 5 milliGRAM(s) IV Push every 8 hours    Respiratory Medications  acetylcysteine 10%  Inhalation 4 milliLiter(s) Inhalation every 8 hours  albuterol    0.083% 2.5 milliGRAM(s) Nebulizer every 8 hours  albuterol    90 MICROgram(s) HFA Inhaler 1 Puff(s) Inhalation every 8 hours  guaifenesin/dextromethorphan Oral Liquid 10 milliLiter(s) Oral every 4 hours    Cardiovascular Medications  doxazosin 4 milliGRAM(s) Oral at bedtime  midodrine 10 milliGRAM(s) Oral every 8 hours  norepinephrine Infusion 0.01 MICROgram(s)/kG/Min IV Continuous <Continuous>    Gastrointestinal Medications  famotidine    Tablet 20 milliGRAM(s) Oral daily  lactated ringers. 1000 milliLiter(s) IV Continuous <Continuous>  sodium chloride 0.9% lock flush 10 milliLiter(s) IV Push every 1 hour PRN Pre/post blood products, medications, blood draw, and to maintain line patency    Genitourinary Medications    Hematologic/Oncologic Medications  aspirin  chewable 81 milliGRAM(s) Oral daily  clopidogrel Tablet 75 milliGRAM(s) Oral daily  heparin   Injectable 5000 Unit(s) SubCutaneous every 8 hours    Antimicrobial/Immunologic Medications  cefepime   IVPB 1000 milliGRAM(s) IV Intermittent every 12 hours    Endocrine/Metabolic Medications  atorvastatin 80 milliGRAM(s) Oral at bedtime  insulin lispro (ADMELOG) corrective regimen sliding scale   SubCutaneous every 6 hours  methylPREDNISolone sodium succinate Injectable 20 milliGRAM(s) IV Push every 12 hours    Topical/Other Medications  atropine 1% Solution 1 Drop(s) Left EYE two times a day  chlorhexidine 0.12% Liquid 15 milliLiter(s) Oral Mucosa every 12 hours  chlorhexidine 2% Cloths 1 Application(s) Topical <User Schedule>  prednisoLONE acetate 1% Suspension 1 Drop(s) Left EYE every 6 hours    ICU Vital Signs Last 24 Hrs  T(C): 36.2 (16 Apr 2023 00:00), Max: 36.2 (15 Apr 2023 11:30)  T(F): 97.2 (16 Apr 2023 00:00), Max: 97.2 (15 Apr 2023 20:00)  HR: 101 (16 Apr 2023 07:00) (78 - 108)  BP: 135/81 (16 Apr 2023 07:00) (74/50 - 158/87)  BP(mean): 102 (16 Apr 2023 07:00) (57 - 116)  ABP: 158/65 (16 Apr 2023 07:00) (83/37 - 208/79)  ABP(mean): 96 (16 Apr 2023 07:00) (45 - 120)  RR: 30 (16 Apr 2023 07:00) (21 - 43)  SpO2: 96% (16 Apr 2023 07:00) (91% - 99%)    O2 Parameters below as of 16 Apr 2023 07:00  Patient On (Oxygen Delivery Method): ventilator    O2 Concentration (%): 45      Mode: AC/ CMV (Assist Control/ Continuous Mandatory Ventilation)  RR (machine): 22  TV (machine): 450  FiO2: 45  PEEP: 8  ITime: 1  MAP: 10  PIP: 20    ABG - ( 16 Apr 2023 04:30 )  pH, Arterial: 7.44  pH, Blood: x     /  pCO2: 35    /  pO2: 89    / HCO3: 24    / Base Excess: -0.1  /  SaO2: 98.3              I&O's Summary    15 Apr 2023 07:01  -  16 Apr 2023 07:00  --------------------------------------------------------  IN: 1734.9 mL / OUT: 1465 mL / NET: 269.9 mL      I&O's Detail    15 Apr 2023 07:01  -  16 Apr 2023 07:00  --------------------------------------------------------  IN:    IV PiggyBack: 100 mL    IV PiggyBack: 300 mL    Lactated Ringers: 720 mL    Lactated Ringers Bolus: 500 mL    Norepinephrine: 8 mL    Norepinephrine: 4 mL    Norepinephrine: 21.9 mL    Peptamen A.F.: 80 mL    Vasopressin: 1 mL  Total IN: 1734.9 mL    OUT:    Indwelling Catheter - Urethral (mL): 1465 mL    Vasopressin: 0 mL  Total OUT: 1465 mL    Total NET: 269.9 mL          PHYSICAL EXAM:     GCS 11T  follows commands, CORRAL  no acute distress  equal chest rise b/l with b/l clear breath sounds  abdomen soft and distended with hyperactive bowel sounds. Peg tube in place with no port site infection noted.  extremities soft with SCDs in place. Right hip dressing clean, dry, and intact.  urinary cath in place with scrotal swelling noted.                     RADHA RAMON  [id number]  77y Male    Indication for ICU admission:    Admit Date:03-20-23  ICU Date:  OR Date:    No Known Allergies    PAST MEDICAL & SURGICAL HISTORY:  CAD (coronary artery disease)  Hypercholesteremia  BPH (benign prostatic hyperplasia)  Kidney stones  Prostate cancer  Status post cardiac surgery  cardiac stents  Bilateral cataracts  History of lithotripsy    Home Medications:  atropine 1% ophthalmic solution: 1 drop(s) to each affected eye 2 times a day (20 Mar 2023 15:44)  erythromycin 0.5% ophthalmic ointment: 1 application to each affected eye 2 times a day (20 Mar 2023 15:44)  prednisoLONE acetate 1% ophthalmic suspension: 1 drop(s) to each affected eye every 6 hours (20 Mar 2023 15:44)  tamsulosin 0.4 mg oral capsule: 1 cap(s) orally once a day (at bedtime)  - if you continue to have your blood pressures drop when you stand, then this medication will need to be STOPPED (20 Mar 2023 15:44)        24HRS EVENT:  4/15  NIGHT  tube feeds resumed 150cc of drainage from g tube   rremained on vent   placed back on levo, off by AM   ep of HR 45, self resolved   u/o decreased 10cc, tachy 100s to 110s, albumin 5%250cc   urine output improved to 30s cc after albumin      DAY  -episode of emesis in AM ; TF held, KUB ordered, reglan 5 q 8 IV  -KUB negative   -G tube to gravity til 9pm, then resume full feeds   -multifocal PVCs after vomiting, back on AC   -MAP 46-49; adding vaso  -if remains hypotensive hold evening dose bumex  -bronchoscopy today- no secretions bilaterally  -renal US neg for hydronephrosis    ***Tubes/Lines/Drains  ***  PIV  [place] (3/22)  Trach, NGT   Right radial a-line 4/10  RIJ TLC 4/11      Daily   Diet, NPO with Tube Feed:   Tube Feeding Modality: Gastrostomy  Peptamen A.F. Formula  Total Volume for 24 Hours (mL): 1080  Continuous  Starting Tube Feed Rate mL per Hour: 45     Every 4 hours  Until Goal Tube Feed Rate (mL per Hour): 45  Tube Feed Duration (in Hours): 24  Tube Feed Start Time: 10:00 (04-14-23 @ 02:27)    CURRENT MEDS:  Neurologic Medications  acetaminophen     Tablet .. 650 milliGRAM(s) Oral every 6 hours  fentaNYL    Injectable 25 MICROGram(s) IV Push every 3 hours PRN Severe Pain (7 - 10)  metoclopramide Injectable 5 milliGRAM(s) IV Push every 8 hours    Respiratory Medications  acetylcysteine 10%  Inhalation 4 milliLiter(s) Inhalation every 8 hours  albuterol    0.083% 2.5 milliGRAM(s) Nebulizer every 8 hours  albuterol    90 MICROgram(s) HFA Inhaler 1 Puff(s) Inhalation every 8 hours  guaifenesin/dextromethorphan Oral Liquid 10 milliLiter(s) Oral every 4 hours    Cardiovascular Medications  doxazosin 4 milliGRAM(s) Oral at bedtime  midodrine 10 milliGRAM(s) Oral every 8 hours  norepinephrine Infusion 0.01 MICROgram(s)/kG/Min IV Continuous <Continuous>    Gastrointestinal Medications  famotidine    Tablet 20 milliGRAM(s) Oral daily  lactated ringers. 1000 milliLiter(s) IV Continuous <Continuous>  sodium chloride 0.9% lock flush 10 milliLiter(s) IV Push every 1 hour PRN Pre/post blood products, medications, blood draw, and to maintain line patency    Genitourinary Medications    Hematologic/Oncologic Medications  aspirin  chewable 81 milliGRAM(s) Oral daily  clopidogrel Tablet 75 milliGRAM(s) Oral daily  heparin   Injectable 5000 Unit(s) SubCutaneous every 8 hours    Antimicrobial/Immunologic Medications  cefepime   IVPB 1000 milliGRAM(s) IV Intermittent every 12 hours    Endocrine/Metabolic Medications  atorvastatin 80 milliGRAM(s) Oral at bedtime  insulin lispro (ADMELOG) corrective regimen sliding scale   SubCutaneous every 6 hours  methylPREDNISolone sodium succinate Injectable 20 milliGRAM(s) IV Push every 12 hours    Topical/Other Medications  atropine 1% Solution 1 Drop(s) Left EYE two times a day  chlorhexidine 0.12% Liquid 15 milliLiter(s) Oral Mucosa every 12 hours  chlorhexidine 2% Cloths 1 Application(s) Topical <User Schedule>  prednisoLONE acetate 1% Suspension 1 Drop(s) Left EYE every 6 hours    ICU Vital Signs Last 24 Hrs  T(C): 36.2 (16 Apr 2023 00:00), Max: 36.2 (15 Apr 2023 11:30)  T(F): 97.2 (16 Apr 2023 00:00), Max: 97.2 (15 Apr 2023 20:00)  HR: 101 (16 Apr 2023 07:00) (78 - 108)  BP: 135/81 (16 Apr 2023 07:00) (74/50 - 158/87)  BP(mean): 102 (16 Apr 2023 07:00) (57 - 116)  ABP: 158/65 (16 Apr 2023 07:00) (83/37 - 208/79)  ABP(mean): 96 (16 Apr 2023 07:00) (45 - 120)  RR: 30 (16 Apr 2023 07:00) (21 - 43)  SpO2: 96% (16 Apr 2023 07:00) (91% - 99%)    O2 Parameters below as of 16 Apr 2023 07:00  Patient On (Oxygen Delivery Method): ventilator    O2 Concentration (%): 45      Mode: AC/ CMV (Assist Control/ Continuous Mandatory Ventilation)  RR (machine): 22  TV (machine): 450  FiO2: 45  PEEP: 8  ITime: 1  MAP: 10  PIP: 20    ABG - ( 16 Apr 2023 04:30 )  pH, Arterial: 7.44  pH, Blood: x     /  pCO2: 35    /  pO2: 89    / HCO3: 24    / Base Excess: -0.1  /  SaO2: 98.3      I&O's Summary    15 Apr 2023 07:01  -  16 Apr 2023 07:00  --------------------------------------------------------  IN: 1734.9 mL / OUT: 1465 mL / NET: 269.9 mL      I&O's Detail    15 Apr 2023 07:01  -  16 Apr 2023 07:00  --------------------------------------------------------  IN:    IV PiggyBack: 100 mL    IV PiggyBack: 300 mL    Lactated Ringers: 720 mL    Lactated Ringers Bolus: 500 mL    Norepinephrine: 8 mL    Norepinephrine: 4 mL    Norepinephrine: 21.9 mL    Peptamen A.F.: 80 mL    Vasopressin: 1 mL  Total IN: 1734.9 mL    OUT:    Indwelling Catheter - Urethral (mL): 1465 mL    Vasopressin: 0 mL  Total OUT: 1465 mL    Total NET: 269.9 mL      PHYSICAL EXAM:     GCS 11T  follows commands, CORRAL  no acute distress  equal chest rise b/l with b/l clear breath sounds  abdomen soft and distended with hyperactive bowel sounds. Peg tube in place with no port site infection noted.  extremities soft with SCDs in place. Right hip dressing clean, dry, and intact.  urinary cath in place with scrotal swelling noted.                     RADHA RAMON  [id number]  77y Male    Indication for ICU admission:    Admit Date:03-20-23  ICU Date:  OR Date:    No Known Allergies    PAST MEDICAL & SURGICAL HISTORY:  CAD (coronary artery disease)  Hypercholesteremia  BPH (benign prostatic hyperplasia)  Kidney stones  Prostate cancer  Status post cardiac surgery  cardiac stents  Bilateral cataracts  History of lithotripsy    Home Medications:  atropine 1% ophthalmic solution: 1 drop(s) to each affected eye 2 times a day (20 Mar 2023 15:44)  erythromycin 0.5% ophthalmic ointment: 1 application to each affected eye 2 times a day (20 Mar 2023 15:44)  prednisoLONE acetate 1% ophthalmic suspension: 1 drop(s) to each affected eye every 6 hours (20 Mar 2023 15:44)  tamsulosin 0.4 mg oral capsule: 1 cap(s) orally once a day (at bedtime)  - if you continue to have your blood pressures drop when you stand, then this medication will need to be STOPPED (20 Mar 2023 15:44)         24HRS EVENT:  4/15  NIGHT  tube feeds resumed 150cc of drainage from g tube   rremained on vent   placed back on levo, off by AM   ep of HR 45, self resolved   u/o decreased 10cc, tachy 100s to 110s, albumin 5%250cc   urine output improved to 30s cc after albumin      DAY  -episode of emesis in AM ; TF held, KUB ordered, reglan 5 q 8 IV  -KUB negative   -G tube to gravity til 9pm, then resume full feeds   -multifocal PVCs after vomiting, back on AC   -MAP 46-49; adding vaso  -if remains hypotensive hold evening dose bumex  -bronchoscopy today- no secretions bilaterally  -renal US neg for hydronephrosis    ***Tubes/Lines/Drains  ***  PIV  [place]arrison (3/22)  Trach, NGT   Right radial a-line 4/10  RIJ TLC 4/11      Daily   Diet, NPO with Tube Feed:   Tube Feeding Modality: Gastrostomy  Peptamen A.F. Formula  Total Volume for 24 Hours (mL): 1080  Continuous  Starting Tube Feed Rate mL per Hour: 45     Every 4 hours  Until Goal Tube Feed Rate (mL per Hour): 45  Tube Feed Duration (in Hours): 24  Tube Feed Start Time: 10:00 (04-14-23 @ 02:27)    CURRENT MEDS:  Neurologic Medications  acetaminophen     Tablet .. 650 milliGRAM(s) Oral every 6 hours  fentaNYL    Injectable 25 MICROGram(s) IV Push every 3 hours PRN Severe Pain (7 - 10)  metoclopramide Injectable 5 milliGRAM(s) IV Push every 8 hours    Respiratory Medications  acetylcysteine 10%  Inhalation 4 milliLiter(s) Inhalation every 8 hours  albuterol    0.083% 2.5 milliGRAM(s) Nebulizer every 8 hours  albuterol    90 MICROgram(s) HFA Inhaler 1 Puff(s) Inhalation every 8 hours  guaifenesin/dextromethorphan Oral Liquid 10 milliLiter(s) Oral every 4 hours    Cardiovascular Medications  doxazosin 4 milliGRAM(s) Oral at bedtime  midodrine 10 milliGRAM(s) Oral every 8 hours  norepinephrine Infusion 0.01 MICROgram(s)/kG/Min IV Continuous <Continuous>    Gastrointestinal Medications  famotidine    Tablet 20 milliGRAM(s) Oral daily  lactated ringers. 1000 milliLiter(s) IV Continuous <Continuous>  sodium chloride 0.9% lock flush 10 milliLiter(s) IV Push every 1 hour PRN Pre/post blood products, medications, blood draw, and to maintain line patency    Genitourinary Medications    Hematologic/Oncologic Medications  aspirin  chewable 81 milliGRAM(s) Oral daily  clopidogrel Tablet 75 milliGRAM(s) Oral daily  heparin   Injectable 5000 Unit(s) SubCutaneous every 8 hours    Antimicrobial/Immunologic Medications  cefepime   IVPB 1000 milliGRAM(s) IV Intermittent every 12 hours    Endocrine/Metabolic Medications  atorvastatin 80 milliGRAM(s) Oral at bedtime  insulin lispro (ADMELOG) corrective regimen sliding scale   SubCutaneous every 6 hours  methylPREDNISolone sodium succinate Injectable 20 milliGRAM(s) IV Push every 12 hours    Topical/Other Medications  atropine 1% Solution 1 Drop(s) Left EYE two times a day  chlorhexidine 0.12% Liquid 15 milliLiter(s) Oral Mucosa every 12 hours  chlorhexidine 2% Cloths 1 Application(s) Topical <User Schedule>  prednisoLONE acetate 1% Suspension 1 Drop(s) Left EYE every 6 hours    ICU Vital Signs Last 24 Hrs  T(C): 36.2 (16 Apr 2023 00:00), Max: 36.2 (15 Apr 2023 11:30)  T(F): 97.2 (16 Apr 2023 00:00), Max: 97.2 (15 Apr 2023 20:00)  HR: 101 (16 Apr 2023 07:00) (78 - 108)  BP: 135/81 (16 Apr 2023 07:00) (74/50 - 158/87)  BP(mean): 102 (16 Apr 2023 07:00) (57 - 116)  ABP: 158/65 (16 Apr 2023 07:00) (83/37 - 208/79)  ABP(mean): 96 (16 Apr 2023 07:00) (45 - 120)  RR: 30 (16 Apr 2023 07:00) (21 - 43)  SpO2: 96% (16 Apr 2023 07:00) (91% - 99%)    O2 Parameters below as of 16 Apr 2023 07:00  Patient On (Oxygen Delivery Method): ventilator    O2 Concentration (%): 45      Mode: AC/ CMV (Assist Control/ Continuous Mandatory Ventilation)  RR (machine): 22  TV (machine): 450  FiO2: 45  PEEP: 8  ITime: 1  MAP: 10  PIP: 20    ABG - ( 16 Apr 2023 04:30 )  pH, Arterial: 7.44  pH, Blood: x     /  pCO2: 35    /  pO2: 89    / HCO3: 24    / Base Excess: -0.1  /  SaO2: 98.3      I&O's Summary    15 Apr 2023 07:01  -  16 Apr 2023 07:00  --------------------------------------------------------  IN: 1734.9 mL / OUT: 1465 mL / NET: 269.9 mL      I&O's Detail    15 Apr 2023 07:01  -  16 Apr 2023 07:00  --------------------------------------------------------  IN:    IV PiggyBack: 100 mL    IV PiggyBack: 300 mL    Lactated Ringers: 720 mL    Lactated Ringers Bolus: 500 mL    Norepinephrine: 8 mL    Norepinephrine: 4 mL    Norepinephrine: 21.9 mL    Peptamen A.F.: 80 mL    Vasopressin: 1 mL  Total IN: 1734.9 mL    OUT:    Indwelling Catheter - Urethral (mL): 1465 mL    Vasopressin: 0 mL  Total OUT: 1465 mL    Total NET: 269.9 mL      PHYSICAL EXAM:     GCS 11T  follows commands, CORRAL  no acute distress  equal chest rise b/l with b/l clear breath sounds  abdomen soft and distended with hyperactive bowel sounds. Peg tube in place with no port site infection noted.  extremities soft with SCDs in place. Right hip dressing clean, dry, and intact.  urinary cath in place with scrotal swelling noted.

## 2023-04-16 NOTE — PROGRESS NOTE ADULT - ATTENDING COMMENTS
Respiratory- ventilator weaned to 45% O2 and 8 peep. continue to wean settings. Renal- good urine output 1,400. Cr stable. Cardiac- off vasopressors. Tube feeds for nutrition. on cefepime.

## 2023-04-16 NOTE — CONSULT NOTE ADULT - SUBJECTIVE AND OBJECTIVE BOX
pt seen and alert even though with trach. seems to understand well and told him he looks much better and he should keep the osmar and expect to improve. He acknowledged. moves b hands and feet when asked. Appreciate improvement and SICU and consult care.

## 2023-04-16 NOTE — PROGRESS NOTE ADULT - ASSESSMENT
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   S/P ORIF of right hip using interlocking intramedullary stephon.  S/P Trach & PEG    NEURO:  #Acute pain    -Acetaminophen 650 PO q6    -Fentanyl 25mcg IVP PRN  #sedation    - off nimbex 4/12    - off propofol 4/13    RESP:   #Acute respiratory failure secondary to post operative arrhythmias requiring mechanical ventilation (Intubated 3/23),   - On Bumetanide 1mg q12- will hold due to intermittent use of pressors  - BAL at 11:00 4/15, little secretions appreciated, no sample was sent    #Pneumonia   - Pseudomonas on culture, sensitive to Cefepime -started on 4/10.       -s/p trach 4/5/23 with size 8    -s/p bronch 4/8 BAL- no organisms    -F/U 4/10 BAL - pseudomonas    -AM CXR     -F/u pulm c/s recs    -Albuterol, Robitussin  -Vent Settings: AC Volume  450/22/45/8- tolerated PS 10/8 x 3hr 4/14  #PE w/u NEGATIVE on 3/28      CARDS:   #Tachycardia     - Propanolol - Discontinued 4/10 d/t bradycardic events  #Bradycardia       - 4/10: Episodes x2 bradycardia to 28. Atropine given second episode.      - EP: 4/11 --> signing off, re-consult PRN, will not be doing PPM since bradycardia likely due to hypoxia    -Trops: 0.08 -> 0.09-- seen by cards likely demand ischemia  #acute hypotension    -increased Midodrine 10mg q8    -Intermittent hypotensive episodes 4/10 during bradycardic episodes    -off levo on 4/13    -- Restarted vaso at 0.03- sensitve, becomes hypertensive, currently off  --restarted on levo 4/16 as MAPs below 60s   #Labs/Imaging  -Echo 3/23- EF appears normal, mild LVH, sclerotic AoV, trace MR,   -Echo 3/29: EF 60-65%, norm LV function, borderline pulm HTN.   -EKG 4/10 most recent: , Sinus Tachycardia, low voltage QRS, incomplete RBBB  -EKG 4/11: , Sinus tach; low voltage QRS  -Echo 4/11: EF 70-75%, Hyperdynamic LV, mild MVR, aortic wall thickening    GI/NUTR:   #Diet: restarted TF Peptamen AF increased to goal of 45 ml/h-- 35 4/14       -Provides 1350 kcal and 85.5 gms protein   - Emesis this AM, paused tube feeds, aspiration unlikely     -PEG 3cm at skin, 4cm at bumper  #Nausea: Zofran prn    - Aspiration precautions, HOB 30    -? dysmotility- changes reglan to ATC and IV    - KUB ordered 4/15   #GI Prophylaxis    - Pepcid  #Bowel regimen    - holding due to multiple BMs    - 1 BM overnight (4/14)  #Imaging    -CT abdomen 3/28: No definite infectious source identified      -RUQ sono: biliary sludge, mild GB wall thickening/edema.     /RENAL:   #UO: Blair placed by Urology 3/22 (d/w urology and asses scrotal swelling prior to removal)    -Inpatient Rx: doxazosin 4 milliGRAM(s) Oral at bedtime   - US kidney bladder demonstrated: No hydronephrosis or calculi, approximate 8.4 cm cyst, left kidney (present on earlier CT scan), balir catheter within collapsed urinary bladder.  #acute scrotal swelling  - U/S 3/29 w/ diffused scrotal wall edema (keep blair)   -scrotal elevation  Labs:  #urine output in critically ill    -indwelling blair (placed 3/22)    -VAISHNAVI, oliguria, improving u/o  ml/hr    IVF LR @50cc     Labs:          BUN/Cr- 56/1.6  -->,  56/1.8  -->55/1.6          Electrolytes-Sodium/Potassium/Magnesium/Phosphate  04-14 @ 21:18  Sodium  141  Potassium 3.5  Magnesium 2.0  Phosphorus  2.9   #Diuresis   -20 Lasix 4/9- unresponsive, given 40 Lasix overnight, will monitor output/ response    -80 Lasix, 5 Metozalone 4/10, responded (4/10)  -Nephrology: Bumex 1g BID, US K&B  follow up intravascular assessment             - Will hold PM dose of 1g Bumex if hypotensive    HEME/ONC:   #DVT prophylaxis- HSQ, SCDs  #H/O CAD s/p stents x3    - ASA 81    - Plavix 75 restarted 4/7    Labs: Hb/Hct:  7.5/24.0  -->,  7.1/22.7  1prbc -->,  8.5/27.0  -->           Plts:  188  -->,  163  -->,  180  -->              PTT/INR:  72.5/--  --->,  71.1/--  --->     T&S expires 4/18    ID:  #Acute clostridium difficile 3/30    COmpleted course of po Vanco on 4/13  WBC- 16.56  --->>,  11.45  --->>,  11.75  --->> 12.8  afebrile     Antibiotics   -Ended 4/13: vancomycin    Solution 125 every 6 hours  - Cefepime IVPB 1000mg every 12 hours (started 4/10)  - DC'd vanc IV     Cultures:    BCx 4/11 - no growth to date    BAL 4/10- Pseudomonas     BAL 4/8- negative     ET Sputum 3/29: negative     BCx 3/28: NGTD    UA 3/28: Negative     BCx 3/25--pending    UA 3/24--negative    ET Sputum 3/24--neg    MRSA neg    CDiff PCR 3/30: Positive    Procalcitonin 0.32  # +Candida Auris surveillance swab on 4/6/23 (nares, axilla, groin)    ENDO:  HA1C= 5.0    -ISS    -Glucose goal 140-180    MSK:   -WBAT RLE per ortho, PT/OT when clinically appropriate   -Right hip Xray 2 views per ortho (4/9) --> s/p ORIF of comminuted intertrochanteric Rt femur fx w/out definite change compared to 3/22/23    LINES/DRAINS:    PIV  Blair (3/22)  Trach, NGT   Right radial a-line 4/10  RIJ TLC 4/11    ADVANCED DIRECTIVES:  DNR    HCP/Emergency Contact- Song Segura (Son): [phone]    INDICATION FOR SICU: Hypotension with pressor requirements. Intubated.       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   S/P ORIF of right hip using interlocking intramedullary stephon.  S/P Trach & PEG    NEURO:  #Acute pain    -Acetaminophen 650 PO q6    -Fentanyl 25mcg IVP PRN  #sedation    - off nimbex 4/12    - off propofol 4/13    RESP:   #Acute respiratory failure secondary to post operative arrhythmias requiring mechanical ventilation (Intubated 3/23),   - On Bumetanide 1mg q12- will hold due to intermittent use of pressors  - BAL at 11:00 4/15, little secretions appreciated, no sample was sent    #Pneumonia   - Pseudomonas on culture, sensitive to Cefepime -started on 4/10.       -s/p trach 4/5/23 with size 8    -s/p bronch 4/8 BAL- no organisms    -F/U 4/10 BAL - pseudomonas    -AM CXR     -F/u pulm c/s recs    -Albuterol, Robitussin  -Vent Settings: AC Volume  450/22/45/8- tolerated PS 10/8 x 3hr 4/14  #PE w/u NEGATIVE on 3/28      CARDS:   #Tachycardia     - Propanolol - Discontinued 4/10 d/t bradycardic events  #Bradycardia       - 4/10: Episodes x2 bradycardia to 28. Atropine given second episode.      - EP: 4/11 --> signing off, re-consult PRN, will not be doing PPM since bradycardia likely due to hypoxia    -Trops: 0.08 -> 0.09-- seen by cards likely demand ischemia  #acute hypotension    -increased Midodrine 10mg q8    -Intermittent hypotensive episodes 4/10 during bradycardic episodes    -off levo on 4/13    -- Restarted vaso at 0.03- sensitve, becomes hypertensive, currently off  --restarted on levo 4/16 as MAPs below 60s   #Labs/Imaging  -Echo 3/23- EF appears normal, mild LVH, sclerotic AoV, trace MR,   -Echo 3/29: EF 60-65%, norm LV function, borderline pulm HTN.   -EKG 4/10 most recent: , Sinus Tachycardia, low voltage QRS, incomplete RBBB  -EKG 4/11: , Sinus tach; low voltage QRS  -Echo 4/11: EF 70-75%, Hyperdynamic LV, mild MVR, aortic wall thickening    GI/NUTR:   #Diet: restarted TF Peptamen AF increased to goal of 45 ml/h-- 35 4/14       -Provides 1350 kcal and 85.5 gms protein   - Emesis this AM, paused tube feeds, aspiration unlikely     -PEG 3cm at skin, 4cm at bumper  #Nausea: Zofran prn    - Aspiration precautions, HOB 30    -? dysmotility- changes reglan to ATC and IV    - KUB ordered 4/15   #GI Prophylaxis    - Pepcid  #Bowel regimen    - holding due to multiple BMs    - 1 BM overnight (4/14)  #Imaging    -CT abdomen 3/28: No definite infectious source identified      -RUQ sono: biliary sludge, mild GB wall thickening/edema.     /RENAL:   #UO: Blair placed by Urology 3/22 (d/w urology and asses scrotal swelling prior to removal)    -Inpatient Rx: doxazosin 4 milliGRAM(s) Oral at bedtime   - US kidney bladder demonstrated: No hydronephrosis or calculi, approximate 8.4 cm cyst, left kidney (present on earlier CT scan), blair catheter within collapsed urinary bladder.  #acute scrotal swelling  - U/S 3/29 w/ diffused scrotal wall edema (keep blair)   -scrotal elevation  Labs:  #urine output in critically ill    -indwelling blair (placed 3/22)    -VAISHNAVI, oliguria, improving u/o  ml/hr    IVF LR @50cc   - urine out put decreased to 10cc per hour around 2 am given albumin with improvement now 30cc/hr     Labs:          BUN/Cr- 56/1.6  -->,  56/1.8  -->55/1.6          Electrolytes-Sodium/Potassium/Magnesium/Phosphate  04-14 @ 21:18  Sodium  141  Potassium 3.5  Magnesium 2.0  Phosphorus  2.9   #Diuresis   -20 Lasix 4/9- unresponsive, given 40 Lasix overnight, will monitor output/ response    -80 Lasix, 5 Metozalone 4/10, responded (4/10)  -Nephrology: Bumex 1g BID, US K&B  follow up intravascular assessment             - Will hold PM dose of 1g Bumex if hypotensive    HEME/ONC:   #DVT prophylaxis- HSQ, SCDs  #H/O CAD s/p stents x3    - ASA 81    - Plavix 75 restarted 4/7    Labs: Hb/Hct:  7.5/24.0  -->,  7.1/22.7  1prbc -->,  8.5/27.0  -->           Plts:  188  -->,  163  -->,  180  -->              PTT/INR:  72.5/--  --->,  71.1/--  --->     T&S expires 4/18    ID:  #Acute clostridium difficile 3/30    COmpleted course of po Vanco on 4/13  WBC- 16.56  --->>,  11.45  --->>,  11.75  --->> 12.8  afebrile     Antibiotics   -Ended 4/13: vancomycin    Solution 125 every 6 hours  - Cefepime IVPB 1000mg every 12 hours (started 4/10)  - DC'd vanc IV     Cultures:    BCx 4/11 - no growth to date    BAL 4/10- Pseudomonas     BAL 4/8- negative     ET Sputum 3/29: negative     BCx 3/28: NGTD    UA 3/28: Negative     BCx 3/25--pending    UA 3/24--negative    ET Sputum 3/24--neg    MRSA neg    CDiff PCR 3/30: Positive    Procalcitonin 0.32  # +Candida Auris surveillance swab on 4/6/23 (nares, axilla, groin)    ENDO:  HA1C= 5.0    -ISS    -Glucose goal 140-180    MSK:   -WBAT RLE per ortho, PT/OT when clinically appropriate   -Right hip Xray 2 views per ortho (4/9) --> s/p ORIF of comminuted intertrochanteric Rt femur fx w/out definite change compared to 3/22/23    LINES/DRAINS:    PIV  Blair (3/22)  Trach, NGT   Right radial a-line 4/10  RIJ TLC 4/11    ADVANCED DIRECTIVES:  DNR    HCP/Emergency Contact- Song Segura (Son): [phone]    INDICATION FOR SICU: Hypotension with pressor requirements. Intubated.       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   S/P ORIF of right hip using interlocking intramedullary stephon.  S/P Trach & PEG    NEURO:  #Acute pain    -Fentanyl 25mcg IVP PRN  #sedation    - off nimbex 4/12    - off propofol 4/13    RESP:   #Acute respiratory failure secondary to post operative arrhythmias requiring mechanical ventilation (Intubated 3/23),   - BAL at 11:00 4/15, little secretions appreciated, no sample was sent  #Pneumonia   - Pseudomonas on culture, sensitive to Cefepime -started on 4/10.       -s/p trach 4/5/23 with size 8    -s/p bronch 4/8 BAL- no organisms    -F/U 4/10 BAL - pseudomonas    -AM CXR     -F/u pulm c/s recs    -Albuterol, Robitussin  -Vent Settings: AC Volume  450/22/45/8- tolerated PS 10/8 x 3hr 4/14  #PE w/u NEGATIVE on 3/28    CARDS:   #Tachycardia     - Propanolol - Discontinued 4/10 d/t bradycardic events  #Bradycardia       - 4/10: Episodes x2 bradycardia to 28. Atropine given second episode.      - EP: 4/11 --> signing off, re-consult PRN, will not be doing PPM since bradycardia likely due to hypoxia    -Trops: 0.08 -> 0.09-- seen by cards likely demand ischemia  #acute hypotension    -increased Midodrine 10mg q8    -Intermittent hypotensive episodes 4/10 during bradycardic episodes    -off levo on 4/13    -- Restarted vaso at 0.03- sensitve, becomes hypertensive, currently off  --restarted on levo 4/16 as MAPs below 60s   #Labs/Imaging  -Echo 3/23- EF appears normal, mild LVH, sclerotic AoV, trace MR,   -Echo 3/29: EF 60-65%, norm LV function, borderline pulm HTN.   -EKG 4/10 most recent: , Sinus Tachycardia, low voltage QRS, incomplete RBBB  -EKG 4/11: , Sinus tach; low voltage QRS  -Echo 4/11: EF 70-75%, Hyperdynamic LV, mild MVR, aortic wall thickening    GI/NUTR:   #Diet: restarted TF Peptamen AF increased to goal of 45 ml/h-- 35 4/14       -Provides 1350 kcal and 85.5 gms protein   - Emesis this AM, paused tube feeds, aspiration unlikely     -PEG 3cm at skin, 4cm at bumper  #Nausea: Zofran prn    - Aspiration precautions, HOB 30    -? dysmotility- changes reglan to ATC and IV    - KUB ordered 4/15   #GI Prophylaxis    - Pepcid  #Bowel regimen    - holding due to multiple BMs    - 1 BM overnight (4/14)  #Imaging    -CT abdomen 3/28: No definite infectious source identified      -RUQ sono: biliary sludge, mild GB wall thickening/edema.     /RENAL:   #UO: Blair placed by Urology 3/22 (d/w urology and asses scrotal swelling prior to removal)    -Inpatient Rx: doxazosin 4 milliGRAM(s) Oral at bedtime   - US kidney bladder demonstrated: No hydronephrosis or calculi, approximate 8.4 cm cyst, left kidney (present on earlier CT scan), blair catheter within collapsed urinary bladder.  #acute scrotal swelling  - U/S 3/29 w/ diffused scrotal wall edema (keep blair)   -scrotal elevation    #urine output in critically ill    -indwelling blair (placed 3/22)    -VAISHNAVI, oliguria, improving u/o  ml/hr    IVF LR @50cc   - urine out put decreased to 10cc per hour around 2 am given albumin with improvement now 30cc/hr   - On Bumetanide 1mg q12- will hold due to intermittent use of pressors    Labs:          BUN/Cr- 56/1.6  -->,  56/1.8  -->55/1.6          Electrolytes-Sodium/Potassium/Magnesium/Phosphate  04-14 @ 21:18  Sodium  141  Potassium 3.5  Magnesium 2.0  Phosphorus  2.9   #Diuresis   -20 Lasix 4/9- unresponsive, given 40 Lasix overnight, will monitor output/ response    -80 Lasix, 5 Metozalone 4/10, responded (4/10)  -Nephrology: Bumex 1g BID, US K&B  follow up intravascular assessment             - Will hold PM dose of 1g Bumex if hypotensive    HEME/ONC:   #DVT prophylaxis- HSQ, SCDs  #H/O CAD s/p stents x3    - ASA 81    - Plavix 75 restarted 4/7    Labs: Hb/Hct:  7.5/24.0  -->,  7.1/22.7  1prbc -->,  8.5/27.0  -->           Plts:  188  -->,  163  -->,  180  -->              PTT/INR:  72.5/--  --->,  71.1/--  --->     T&S expires 4/18    ID:  #Acute clostridium difficile 3/30    COmpleted course of po Vanco on 4/13  WBC- 16.56  --->>,  11.45  --->>,  11.75  --->> 12.8  afebrile     Antibiotics   -Ended 4/13: vancomycin    Solution 125 every 6 hours  - Cefepime IVPB 1000mg every 12 hours (started 4/10)  - DC'd vanc IV     Cultures:    BCx 4/11 - no growth to date    BAL 4/10- Pseudomonas     BAL 4/8- negative     ET Sputum 3/29: negative     BCx 3/28: NGTD    UA 3/28: Negative     BCx 3/25--pending    UA 3/24--negative    ET Sputum 3/24--neg    MRSA neg    CDiff PCR 3/30: Positive    Procalcitonin 0.32  # +Candida Auris surveillance swab on 4/6/23 (nares, axilla, groin)    ENDO:  HA1C= 5.0    -ISS    -Glucose goal 140-180    MSK:   -WBAT RLE per ortho, PT/OT when clinically appropriate   -Right hip Xray 2 views per ortho (4/9) --> s/p ORIF of comminuted intertrochanteric Rt femur fx w/out definite change compared to 3/22/23    LINES/DRAINS:    PIV  Blair (3/22)  Trach, NGT   Right radial a-line 4/10  RIJ TLC 4/11    ADVANCED DIRECTIVES:  DNR    HCP/Emergency Contact- Song Segura (Son): [phone]    INDICATION FOR SICU: Hypotension with pressor requirements. Intubated.       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   S/P ORIF of right hip using interlocking intramedullary stephon.  S/P Trach & PEG    NEURO:  #Acute pain    -Fentanyl 25mcg IVP PRN  #sedation    - off nimbex 4/12    - off propofol 4/13    RESP:   #Acute respiratory failure secondary to post operative arrhythmias requiring mechanical ventilation (Intubated 3/23)    -Mucomyst, Albuterol   - BAL 4/15: little secretions appreciated, no sample was sent  #Pneumonia   - Pseudomonas on culture, sensitive to Cefepime -started on 4/10.       -s/p trach 4/5/23 with size 8    -s/p bronch 4/8 BAL- no organisms    -4/11 BAL - pseudomonas    -4/11 blood cx - neg    -AM CXR     -F/u pulm c/s recs    -Albuterol, Robitussin  -Vent Settings: AC Volume  450/22/45/8  #PE w/u NEGATIVE on 3/28    CARDS:   #Tachycardia     - Propanolol - Discontinued 4/10 d/t bradycardic events  #Bradycardia       - 4/10: Episodes x2 bradycardia to 28. Atropine given second episode.      - EP: 4/11 --> signing off, re-consult PRN, will not be doing PPM since bradycardia likely due to hypoxia    -Trops: 0.08 -> 0.09-- seen by cards likely demand ischemia  #acute hypotension    -currently off levo and vaso    -increased Midodrine 10mg q8  Intermittent hypotensive episodes 4/10 during bradycardic episodes    -off levo on 4/13    -Restarted vaso at 0.03- sensitve, becomes hypertensive, currently off   -Restarted on levo 4/16 as MAPs below 60s  - On Bumex 1mg q12- will hold due to intermittent use of pressors      #Labs/Imaging  -Echo 3/23- EF appears normal, mild LVH, sclerotic AoV, trace MR,   -Echo 3/29: EF 60-65%, norm LV function, borderline pulm HTN.   -EKG 4/10 most recent: , Sinus Tachycardia, low voltage QRS, incomplete RBBB  -EKG 4/11: , Sinus tach; low voltage QRS  -Echo 4/11: EF 70-75%, Hyperdynamic LV, mild MVR, aortic wall thickening  #HLD    -Atorvastatin 80mg HS    GI/NUTR:   #Diet: restarted TF Peptamen AF increased to goal of 45 ml/h-- 35 4/14       -Provides 1350 kcal and 85.5 gms protein     -PEG 3cm at skin, 4cm at bumper  #Nausea: Zofran prn    - Aspiration precautions, HOB 30    -? dysmotility- Reglan ATC IV 5mg q8    - KUB ordered 4/15 - normal bowel gas pattern  #GI Prophylaxis    - Pepcid  #Bowel regimen    - holding due to multiple BMs    - 1 BM overnight (4/14)  #Imaging    -CT abdomen 3/28: No definite infectious source identified      -RUQ sono: biliary sludge, mild GB wall thickening/edema    -KUB: normal bowel gas pattern     /RENAL:   #UO: Blair placed by Urology 3/22 (d/w urology and asses scrotal swelling prior to removal)    -Inpatient Rx: doxazosin 4 milliGRAM(s) Oral at bedtime   - US kidney bladder demonstrated: No hydronephrosis or calculi, approximate 8.4 cm cyst, left kidney (present on earlier CT scan), blair catheter within collapsed urinary bladder.  #acute scrotal swelling  - U/S 3/29 w/ diffused scrotal wall edema (keep blair)   -scrotal elevation    #urine output in critically ill    -indwelling blair (placed 3/22)    -VAISHNAVI, oliguria, improving u/o  ml/hr    IVF LR @30cc   - urine out put decreased to 10cc per hour around 2 am (4/16) given albumin 250cc bolus with improvement now 30cc/hr     Labs:          BUN/Cr- 58/1.7  -->,  63/1.7  -->          Electrolytes-Na 140 // K 3.9 // Mg -- //  Phos -- (04-16 @ 05:13)    Home Rx: tamsulosin 0.4 mg oral capsule    #Diuresis   -20 Lasix 4/9- unresponsive, given 40 Lasix overnight, will monitor output/ response    -80 Lasix, 5 Metozalone 4/10, responded (4/10)  -Nephrology: Bumex 1g BID, US K&B  follow up intravascular assessment             - HOLDING, will continue once off pressors    HEME/ONC:   #DVT prophylaxis- HSQ, SCDs  #H/O CAD s/p stents x3    - ASA 81    - Plavix 75 restarted 4/7    Labs: Hb/Hct:  9.6/28.8  -->,  10.2/31.5  -->                      Plts:  187  -->,  199  -->                 PTT/INR:        Home Rx: clopidogrel 75 mg oral tablet: 1 tab(s) orally once a day  T&S Expires: 4/18    ID:  #Acute clostridium difficile 3/30    Completed course of po Vanco on 4/13  WBC- 12.67  --->>,  12.89  --->>,  12.11  --->>  Temp trend- 24hrs T(F): 97.2 (04-16 @ 00:00), Max: 97.2 (04-15 @ 20:00)  Antibiotics   -currently on Cefepime IVPB 1000mg every 12 hours (started 4/10)   -Ended 4/13: vancomycin Solution 125 every 6 hours   -DC'd vanc IV     Cultures:    BCx 4/11 - no growth to date    BAL 4/10- Pseudomonas     BAL 4/8- negative     ET Sputum 3/29: negative     BCx 3/28: NGTD    UA 3/28: Negative     BCx 3/25--pending    UA 3/24--negative    ET Sputum 3/24--neg    MRSA neg    CDiff PCR 3/30: Positive    Procalcitonin 0.32  # +Candida Auris surveillance swab on 4/6/23 (nares, axilla, groin)    ENDO:  HA1C= 5.0    -ISS    -Glucose goal 140-180    MSK:   -WBAT RLE per ortho, PT/OT when clinically appropriate   -Right hip Xray 2 views per ortho (4/9) --> s/p ORIF of comminuted intertrochanteric Rt femur fx w/out definite change compared to 3/22/23    LINES/DRAINS:    PIV  Blair (3/22)  Trach  PEG   Right radial a-line 4/10  RIJ TLC 4/11    ADVANCED DIRECTIVES:  DNR    HCP/Emergency Contact- Song Segura (Son): [phone]    INDICATION FOR SICU: Hypotension with pressor requirements. Intubated.       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   S/P ORIF of right hip using interlocking intramedullary stephon.  S/P Trach & PEG    NEURO:  #Acute pain    -Fentanyl 25mcg IVP PRN  #sedation    - off nimbex 4/12    - off propofol 4/13    RESP:   #Acute respiratory failure secondary to post operative arrhythmias requiring mechanical ventilation (Intubated 3/23)    -Mucomyst, Albuterol   - BAL 4/15: little secretions appreciated, no sample was sent  #Pneumonia   - Pseudomonas on culture, sensitive to Cefepime -started on 4/10.       -s/p trach 4/5/23 with size 8    -s/p bronch 4/8 BAL- no organisms    -4/11 BAL - pseudomonas    -4/11 blood cx - neg    -AM CXR     -F/u pulm c/s recs    -Albuterol, Robitussin  -Vent Settings: AC Volume  450/22/45/8  #PE w/u NEGATIVE on 3/28    CARDS:   #Tachycardia     - Propanolol - Discontinued 4/10 d/t bradycardic events  #Bradycardia       - 4/10: Episodes x2 bradycardia to 28. Atropine given second episode.      - EP: 4/11 --> signing off, re-consult PRN, will not be doing PPM since bradycardia likely due to hypoxia    -Trops: 0.08 -> 0.09-- seen by cards likely demand ischemia  #acute hypotension    -currently off levo and vaso    -increased Midodrine 10mg q8  Intermittent hypotensive episodes 4/10 during bradycardic episodes    -off levo on 4/13    -Restarted vaso at 0.03- sensitve, becomes hypertensive, currently off   -Restarted on levo 4/16 as MAPs below 60s  - On Bumex 1mg q12- will hold due to intermittent use of pressors      #Labs/Imaging  -Echo 3/23- EF appears normal, mild LVH, sclerotic AoV, trace MR,   -Echo 3/29: EF 60-65%, norm LV function, borderline pulm HTN.   -EKG 4/10 most recent: , Sinus Tachycardia, low voltage QRS, incomplete RBBB  -EKG 4/11: , Sinus tach; low voltage QRS  -Echo 4/11: EF 70-75%, Hyperdynamic LV, mild MVR, aortic wall thickening  #HLD    -Atorvastatin 80mg HS    GI/NUTR:   #Diet: restarted TF Peptamen AF increased to goal of 45 ml/h-- 35 4/14       -Provides 1350 kcal and 85.5 gms protein     -PEG 3cm at skin, 4cm at bumper  #Nausea: Zofran prn    - Aspiration precautions, HOB 30    -? dysmotility- Reglan ATC IV 5mg q8    - KUB ordered 4/15 - normal bowel gas pattern  #GI Prophylaxis    - Pepcid  #Bowel regimen    - holding due to multiple BMs    - 1 BM overnight (4/14)  #Imaging    -CT abdomen 3/28: No definite infectious source identified      -RUQ sono: biliary sludge, mild GB wall thickening/edema    -KUB: normal bowel gas pattern     /RENAL:   #UO: Blair placed by Urology 3/22 (d/w urology and asses scrotal swelling prior to removal)    -Inpatient Rx: doxazosin 4 milliGRAM(s) Oral at bedtime   - US kidney bladder demonstrated: No hydronephrosis or calculi, approximate 8.4 cm cyst, left kidney (present on earlier CT scan), blari catheter within collapsed urinary bladder.  #acute scrotal swelling  - U/S 3/29 w/ diffused scrotal wall edema (keep blair)   -scrotal elevation    #urine output in critically ill    -indwelling blair (placed 3/22)    -VAISHNAVI, oliguria, improving u/o  ml/hr    IVF LR @30cc   - urine out put decreased to 10cc per hour around 2 am (4/16) given albumin 250cc bolus with improvement now 30cc/hr     Labs:          BUN/Cr- 58/1.7  -->,  63/1.7  -->          Electrolytes-Na 140 // K 3.9 // Mg -- //  Phos -- (04-16 @ 05:13)  -on Cardura 4mg HS    Home Rx: tamsulosin 0.4 mg oral capsule    #Diuresis   -20 Lasix 4/9- unresponsive, given 40 Lasix overnight, will monitor output/ response    -80 Lasix, 5 Metozalone 4/10, responded (4/10)  -Nephrology: Bumex 1g BID, US K&B  follow up intravascular assessment             - HOLDING, will continue once off pressors    HEME/ONC:   #DVT prophylaxis- HSQ, SCDs  #H/O CAD s/p stents x3    - ASA 81    - Plavix 75 restarted 4/7    Labs: Hb/Hct:  9.6/28.8  -->,  10.2/31.5  -->                      Plts:  187  -->,  199  -->                 PTT/INR:        Home Rx: clopidogrel 75 mg oral tablet: 1 tab(s) orally once a day  T&S Expires: 4/18    ID:  #Acute clostridium difficile 3/30    Completed course of po Vanco on 4/13  WBC- 12.67  --->>,  12.89  --->>,  12.11  --->>  Temp trend- 24hrs T(F): 97.2 (04-16 @ 00:00), Max: 97.2 (04-15 @ 20:00)  Antibiotics   -currently on Cefepime IVPB 1000mg every 12 hours (started 4/10)   -Ended 4/13: vancomycin Solution 125 every 6 hours   -DC'd vanc IV     Cultures:    BCx 4/11 - no growth to date    BAL 4/10- Pseudomonas     BAL 4/8- negative     ET Sputum 3/29: negative     BCx 3/28: NGTD    UA 3/28: Negative     BCx 3/25--pending    UA 3/24--negative    ET Sputum 3/24--neg    MRSA neg    CDiff PCR 3/30: Positive    Procalcitonin 0.32  # +Candida Auris surveillance swab on 4/6/23 (nares, axilla, groin)    ENDO:  HA1C= 5.0    -ISS    -Glucose goal 140-180    MSK:   -WBAT RLE per ortho, PT/OT when clinically appropriate   -Right hip Xray 2 views per ortho (4/9) --> s/p ORIF of comminuted intertrochanteric Rt femur fx w/out definite change compared to 3/22/23    LINES/DRAINS:    PIV  Blair (3/22)  Trach  PEG   Right radial a-line 4/10  RIJ TLC 4/11    ADVANCED DIRECTIVES:  DNR    HCP/Emergency Contact- Song Segura (Son): [phone]    INDICATION FOR SICU: Hypotension with pressor requirements. Intubated.       DISPO: SICU

## 2023-04-17 NOTE — CHART NOTE - NSCHARTNOTEFT_GEN_A_CORE
Registered Dietitian Follow-Up     Patient Profile Reviewed                           Yes [x]   No []     Nutrition History Previously Obtained        Yes []  No [x]       Pertinent Medical Interventions: Presented s/p fall 3 days PTP. Pt now s/p ORIF of R hip. Acute respiratory failure secondary to post operative arrhythmias requiring mechanical ventilation; pt was intubated 3/23. Pt now s/p trach & PEG as of 4/5.    VAISHNAVI noted. Acute clostridium difficile 3/30 noted. Diuresis noted.    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     Diet order: Peptamen AF goal rate 45 mL/hr via PEG. Regimen at goal to provide 1296 kcal, 81 g protein, 875 mL free H2O.    Anthropometrics:  Height (cm): 175.3 (04-10-23 @ 18:11)  Weight (kg): 63.5 (04-10-23 @ 18:11)  BMI (kg/m2): 20.7 (04-10-23 @ 18:11)  IBW: 72.7 kg  Weight Change: 68.7 kg (4/3). Wt changes may be related to fluid shifts. Pt noted to have received diuresis this admit.    MEDICATIONS  (STANDING):  albuterol    0.083% 2.5 milliGRAM(s) Nebulizer every 8 hours  albuterol    90 MICROgram(s) HFA Inhaler 1 Puff(s) Inhalation every 8 hours  amLODIPine   Tablet 5 milliGRAM(s) Oral daily  aspirin  chewable 81 milliGRAM(s) Oral daily  atorvastatin 80 milliGRAM(s) Oral at bedtime  atropine 1% Solution 1 Drop(s) Left EYE two times a day  buMETAnide Injectable 2 milliGRAM(s) IV Push every 12 hours  chlorhexidine 0.12% Liquid 15 milliLiter(s) Oral Mucosa every 12 hours  chlorhexidine 2% Cloths 1 Application(s) Topical <User Schedule>  clopidogrel Tablet 75 milliGRAM(s) Oral daily  doxazosin 4 milliGRAM(s) Oral at bedtime  famotidine    Tablet 20 milliGRAM(s) Oral daily  guaifenesin/dextromethorphan Oral Liquid 10 milliLiter(s) Oral every 4 hours  heparin   Injectable 5000 Unit(s) SubCutaneous every 8 hours  insulin lispro (ADMELOG) corrective regimen sliding scale   SubCutaneous every 6 hours  metoclopramide Injectable 5 milliGRAM(s) IV Push every 8 hours  potassium phosphate IVPB 15 milliMole(s) IV Intermittent once  prednisoLONE acetate 1% Suspension 1 Drop(s) Left EYE every 6 hours    MEDICATIONS  (PRN):  fentaNYL    Injectable 25 MICROGram(s) IV Push every 3 hours PRN Severe Pain (7 - 10)  sodium chloride 0.9% lock flush 10 milliLiter(s) IV Push every 1 hour PRN Pre/post blood products, medications, blood draw, and to maintain line patency    No longer on propofol at this time.    Pertinent Labs: 04-17 @ 20:25: Na 143, BUN 70<HH>, Cr 1.7<H>, <H>, K+ 4.1, Phos 2.5, Mg 2.1    Finger Sticks:  POCT Blood Glucose.: 171 mg/dL (04-17 @ 16:57)  POCT Blood Glucose.: 145 mg/dL (04-17 @ 06:27)  POCT Blood Glucose.: 183 mg/dL (04-16 @ 23:08)    Physical Findings:  - Appearance: 3+ generalized edema; 4+ edema to scrotum; trached  - GI function: pt was on bowel regimen; now held d/t multiple BMs  - Tubes: PEG  - Oral/Mouth cavity: NPO  - Skin: surgical incisions, no pressure injury      Nutrition Requirements:  Weight Used: dosing weight 63.5 kg     Estimated Energy Needs    Continue [x]  Adjust []  1596  kcal/day PS 2003 equation       Estimated Protein Needs    Continue [x]  Adjust []  83-95 gm/day 1.3-1.5g/kg        Estimated Fluid Needs        Continue []  Adjust [x]  4911-3835 mL/day 20-25ml/kg     Nutrient Intake: Pt began receiving Peptamen AF starting at 10 mL/hr yesterday 4/16. Goal rate achieved in the evening of 4/16; pt continues to receive goal rate 45 mL/hr tonight. Regimen at goal provides 81% kcal & 98% protein needs at goal.     [x] Previous Nutrition Diagnosis: Inadequate Energy Intake            [x] Ongoing          [] Resolved    [x] Previous Nutrition Diagnosis: Malnutrition            [x] Ongoing          [] Resolved    Nutrition Education: Deferred; pt unable to participate in nutrition interview at this time.     Goal/Expected Outcome: Pt to meet >90% & <105% estimated needs via EN in 3-5 days     Indicator/Monitoring: Monitor diet order, kcal intake, body composition, NFPE, labs  (lytes, BG, renal indices)    Recommendation:  (1) Obtain udpated wt. Registered Dietitian Follow-Up     Patient Profile Reviewed                           Yes [x]   No []     Nutrition History Previously Obtained        Yes []  No [x]       Pertinent Medical Interventions: Presented s/p fall 3 days PTP. Pt now s/p ORIF of R hip. Acute respiratory failure secondary to post operative arrhythmias requiring mechanical ventilation; pt was intubated 3/23. Pt now s/p trach & PEG as of 4/5.    VAISHNAVI noted. Acute clostridium difficile 3/30 noted. Diuresis noted.    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     Diet order: Peptamen AF goal rate 45 mL/hr via PEG. Regimen at goal to provide 1296 kcal, 81 g protein, 875 mL free H2O.    Anthropometrics:  Height (cm): 175.3 (04-10-23 @ 18:11)  Weight (kg): 63.5 (04-10-23 @ 18:11)  BMI (kg/m2): 20.7 (04-10-23 @ 18:11)  IBW: 72.7 kg  Weight Change: 68.7 kg (4/3). Wt changes may be related to fluid shifts. Pt noted to have received diuresis this admit.    MEDICATIONS  (STANDING):  albuterol    0.083% 2.5 milliGRAM(s) Nebulizer every 8 hours  albuterol    90 MICROgram(s) HFA Inhaler 1 Puff(s) Inhalation every 8 hours  amLODIPine   Tablet 5 milliGRAM(s) Oral daily  aspirin  chewable 81 milliGRAM(s) Oral daily  atorvastatin 80 milliGRAM(s) Oral at bedtime  atropine 1% Solution 1 Drop(s) Left EYE two times a day  buMETAnide Injectable 2 milliGRAM(s) IV Push every 12 hours  chlorhexidine 0.12% Liquid 15 milliLiter(s) Oral Mucosa every 12 hours  chlorhexidine 2% Cloths 1 Application(s) Topical <User Schedule>  clopidogrel Tablet 75 milliGRAM(s) Oral daily  doxazosin 4 milliGRAM(s) Oral at bedtime  famotidine    Tablet 20 milliGRAM(s) Oral daily  guaifenesin/dextromethorphan Oral Liquid 10 milliLiter(s) Oral every 4 hours  heparin   Injectable 5000 Unit(s) SubCutaneous every 8 hours  insulin lispro (ADMELOG) corrective regimen sliding scale   SubCutaneous every 6 hours  metoclopramide Injectable 5 milliGRAM(s) IV Push every 8 hours  potassium phosphate IVPB 15 milliMole(s) IV Intermittent once  prednisoLONE acetate 1% Suspension 1 Drop(s) Left EYE every 6 hours    MEDICATIONS  (PRN):  fentaNYL    Injectable 25 MICROGram(s) IV Push every 3 hours PRN Severe Pain (7 - 10)  sodium chloride 0.9% lock flush 10 milliLiter(s) IV Push every 1 hour PRN Pre/post blood products, medications, blood draw, and to maintain line patency    No longer on propofol at this time.    Pertinent Labs: 04-17 @ 20:25: Na 143, BUN 70<HH>, Cr 1.7<H>, <H>, K+ 4.1, Phos 2.5, Mg 2.1    Finger Sticks:  POCT Blood Glucose.: 171 mg/dL (04-17 @ 16:57)  POCT Blood Glucose.: 145 mg/dL (04-17 @ 06:27)  POCT Blood Glucose.: 183 mg/dL (04-16 @ 23:08)    Physical Findings:  - Appearance: 3+ generalized edema; 4+ edema to scrotum; trached  - GI function: pt was on bowel regimen; now held d/t multiple BMs. Last BM 4/16. No constipation/diarrhea reported at this time. Pt had episode of vomiting on 4/15. Now on reglan at this time. No nausea/vomiting reported today.  - Tubes: PEG  - Oral/Mouth cavity: NPO  - Skin: surgical incisions (s/p ORIF + s/p PEG), no pressure injury      Nutrition Requirements:  Weight Used: dosing weight 63.5 kg     Estimated Energy Needs    Continue [x]  Adjust []  1596  kcal/day PS 2003 equation       Estimated Protein Needs    Continue [x]  Adjust []  83-95 gm/day 1.3-1.5g/kg        Estimated Fluid Needs        Continue []  Adjust [x]  9323-8327 mL/day 20-25ml/kg     Nutrient Intake: Pt began receiving Peptamen AF starting at 10 mL/hr yesterday 4/16. Goal rate achieved in the evening of 4/16; pt continues to receive goal rate 45 mL/hr tonight. Regimen at goal provides 81% kcal & 98% protein needs at goal.     [x] Previous Nutrition Diagnosis: Inadequate Energy Intake            [x] Ongoing          [] Resolved    [x] Previous Nutrition Diagnosis: Malnutrition            [x] Ongoing          [] Resolved    Nutrition Education: Deferred; pt unable to participate in nutrition interview at this time.     Goal/Expected Outcome: Pt to meet >90% & <105% estimated needs via EN in 3-5 days     Indicator/Monitoring: Monitor diet order, kcal intake, body composition, NFPE, labs  (lytes, BG, renal indices)    Recommendation:  Increase Peptamen AF goal rate to 55 mL/hr. Regimen at goal to provide 1584 kcal, 99 g protein, 1069 mL free H2O. Provide 100 mL flushes q6hrs.

## 2023-04-17 NOTE — PROGRESS NOTE ADULT - ATTENDING COMMENTS
Patient placed on PSV, 8/8.  Hypertensive to  this am.  On tube feeds   No sedation required.    ASSESSMENT:  76 y/o male, S/P Fall.  Right Intertrochanteric Femur Fracture. S/P ORIF.  Acute respiratory failure.  S/P Tracheostomy and PEG  Dysphagia.  Hypertensive urgency.  Hypervolemia.    PLAN:  - pain control prn  - on PSV, use vent as needed; follow ABG  - keep normotensive; will consider Cardene drip for BP control  - give Bumex 2 mg iv today  - on tube feeds  - follow serum electrolytes and UOP  - ID: Pseudomonas in miniBAL - on Cefepime  - ID f/u needed  - DVT prophylaxis

## 2023-04-17 NOTE — PROGRESS NOTE ADULT - ASSESSMENT
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   S/P ORIF of right hip using interlocking intramedullary stephon.  S/P Trach & PEG    NEURO:  #Acute pain    -Fentanyl 25mcg IVP PRN  #sedation    - off nimbex 4/12    - off propofol 4/13    RESP:   #Acute respiratory failure secondary to post operative arrhythmias requiring mechanical ventilation (Intubated 3/23)    -Mucomyst, Albuterol   - BAL 4/15: little secretions appreciated, no sample was sent  #Pneumonia   - Pseudomonas on culture, sensitive to Cefepime -started on 4/10.       -s/p trach 4/5/23 with size 8    -s/p bronch 4/8 BAL- no organisms    -4/11 BAL - pseudomonas    -4/11 blood cx - neg    -AM CXR     -F/u pulm c/s recs    -Albuterol, Robitussin  -Vent Settings: AC Volume  450/22/45/8  #PE w/u NEGATIVE on 3/28    CARDS:   #Tachycardia     - Propanolol - Discontinued 4/10 d/t bradycardic events  #Bradycardia       - 4/10: Episodes x2 bradycardia to 28. Atropine given second episode.      - EP: 4/11 --> signing off, re-consult PRN, will not be doing PPM since bradycardia likely due to hypoxia    -Trops: 0.08 -> 0.09-- seen by cards likely demand ischemia  #acute hypotension    -currently off levo and vaso    -d/c Midodrine 10mg q8, SBP in 160s, MAPs high 90s  Intermittent hypotensive episodes 4/10 during bradycardic episodes    -off levo on 4/13    -Restarted vaso at 0.03- sensitve, becomes hypertensive, currently off   -Restarted on levo 4/16 as MAPs below 60s  - restarted Bumex 1mg q12- will hold due to intermittent use of pressors   #Labs/Imaging  -Echo 3/23- EF appears normal, mild LVH, sclerotic AoV, trace MR,   -Echo 3/29: EF 60-65%, norm LV function, borderline pulm HTN.   -EKG 4/10 most recent: , Sinus Tachycardia, low voltage QRS, incomplete RBBB  -EKG 4/11: , Sinus tach; low voltage QRS  -Echo 4/11: EF 70-75%, Hyperdynamic LV, mild MVR, aortic wall thickening  #HLD    -Atorvastatin 80mg HS    GI/NUTR:   #Diet: restarted TF Peptamen AF increased to goal of 45 ml/h-- 35 4/14       -Provides 1350 kcal and 85.5 gms protein     -PEG 3cm at skin, 4cm at bumper  #Nausea: Zofran prn    - Aspiration precautions, HOB 30    -? dysmotility- Reglan ATC IV 5mg q8    - KUB ordered 4/15 - normal bowel gas pattern  #GI Prophylaxis    - Pepcid  #Bowel regimen    - holding due to multiple BMs    - 1 BM overnight (4/14)  #Imaging    -CT abdomen 3/28: No definite infectious source identified      -RUQ sono: biliary sludge, mild GB wall thickening/edema    -KUB: normal bowel gas pattern     /RENAL:   #UO: Blair placed by Urology 3/22 (d/w urology and asses scrotal swelling prior to removal)    -Inpatient Rx: doxazosin 4 milliGRAM(s) Oral at bedtime   - US kidney bladder demonstrated: No hydronephrosis or calculi, approximate 8.4 cm cyst, left kidney (present on earlier CT scan), blair catheter within collapsed urinary bladder.  #acute scrotal swelling  - U/S 3/29 w/ diffused scrotal wall edema (keep blair)   -scrotal elevation  Electrolytes-Na 140 // K 3.7 // Mg 1.9 //  Phos 3.0 (04-16 @ 21:28)    #urine output in critically ill    -indwelling blair (placed 3/22)    -VAISHNAVI, oliguria, improving u/o  ml/hr    IVF LR @30cc   - urine out put decreased to 10cc per hour around 2 am (4/16) given albumin 250cc bolus with improvement now 30cc/hr     Labs:          BUN/Cr- 58/1.7  -->,  63/1.7  -->          Electrolytes--on Cardura 4mg HS    Home Rx: tamsulosin 0.4 mg oral capsule    #Diuresis   -20 Lasix 4/9- unresponsive, given 40 Lasix overnight, will monitor output/ response    -80 Lasix, 5 Metozalone 4/10, responded (4/10)  -Nephrology: Bumex 1g BID, US K&B  follow up intravascular assessment             - restarted 4/16    HEME/ONC:   #DVT prophylaxis- HSQ, SCDs  #H/O CAD s/p stents x3    - ASA 81    - Plavix 75 restarted 4/7    Labs: Hb/Hct:  9.6/28.8  -->,  10.2/31.5  -->                      Plts:  187  -->,  199  -->                 PTT/INR:        Home Rx: clopidogrel 75 mg oral tablet: 1 tab(s) orally once a day  T&S Expires: 4/18    ID:  #Acute clostridium difficile 3/30    Completed course of po Vanco on 4/13  WBC- 12.67  --->>,  12.89  --->>,  12.11  --->>  Temp trend- 24hrs T(F): 97.2 (04-16 @ 00:00), Max: 97.2 (04-15 @ 20:00)  Antibiotics   -currently on Cefepime IVPB 1000mg every 12 hours (started 4/10)   -Ended 4/13: vancomycin Solution 125 every 6 hours   -DC'd vanc IV     Cultures:    BCx 4/11 - no growth to date    BAL 4/10- Pseudomonas     BAL 4/8- negative     ET Sputum 3/29: negative     BCx 3/28: NGTD    UA 3/28: Negative     BCx 3/25--pending    UA 3/24--negative    ET Sputum 3/24--neg    MRSA neg    CDiff PCR 3/30: Positive    Procalcitonin 0.32  # +Candida Auris surveillance swab on 4/6/23 (nares, axilla, groin)    ENDO:  HA1C= 5.0    -ISS    -Glucose goal 140-180    MSK:   -WBAT RLE per ortho, PT/OT when clinically appropriate   -Right hip Xray 2 views per ortho (4/9) --> s/p ORIF of comminuted intertrochanteric Rt femur fx w/out definite change compared to 3/22/23    LINES/DRAINS:    PIV  Blair (3/22)  Trach  PEG   Right radial a-line 4/10  RIJ TLC 4/11    ADVANCED DIRECTIVES:  DNR    HCP/Emergency Contact- Song Segura (Son): [phone]    INDICATION FOR SICU: Hypotension with pressor requirements. Intubated.       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   S/P ORIF of right hip using interlocking intramedullary stephon.  S/P Trach & PEG 4/5     NEURO:  #Acute pain    -Fentanyl 25mcg IVP PRN  #sedation    - off nimbex 4/12    - off propofol 4/13     - off all sedation 4/17 AM     RESP:   #Acute respiratory failure secondary to post operative arrhythmias requiring mechanical ventilation (Intubated 3/23)    -Mucomyst, Albuterol   - BAL 4/15: little secretions appreciated, no sample was sent  #Pneumonia   - Pseudomonas on culture, sensitive to Cefepime -started on 4/10, end date 4/17     -s/p trach 4/5/23 with size 8    -s/p bronch 4/8 BAL- no organisms    -4/11 BAL - pseudomonas    -4/11 blood cx - neg    -AM CXR     -F/u pulm c/s recs    -Albuterol, Robitussin  -Vent Settings: AC Volume  450/22/45/8 --> now on PS 4/17 AM (goal to SBT soon)   #PE w/u NEGATIVE on 3/28    CARDS:   #Tachycardia     - Propanolol - Discontinued 4/10 d/t bradycardic events  #Bradycardia       - 4/10: Episodes x2 bradycardia to 28. Atropine given second episode.      - EP: 4/11 --> signing off, re-consult PRN, will not be doing PPM since bradycardia likely due to hypoxia     - Trops: 0.08 -> 0.09-- seen by cards likely demand ischemia  #acute hypotension    -currently off levo and vaso    -d/c Midodrine 10mg q8, SBP in 160s, MAPs high 90s  Intermittent hypotensive episodes 4/10 during bradycardic episodes    -off levo 4/13    -Restarted vaso at 0.03- sensitve, becomes hypertensive,  off 4/15   -Restarted on levo 4/16 as MAPs below 60s -- off 4/16 PM   - restarted Bumex 1mg q12- will hold due to intermittent use of pressors   #Labs/Imaging  -Echo 3/23- EF appears normal, mild LVH, sclerotic AoV, trace MR,   -Echo 3/29: EF 60-65%, norm LV function, borderline pulm HTN.   -EKG 4/10 most recent: , Sinus Tachycardia, low voltage QRS, incomplete RBBB  -EKG 4/11: , Sinus tach; low voltage QRS  -Echo 4/11: EF 70-75%, Hyperdynamic LV, mild MVR, aortic wall thickening  #HLD    -Atorvastatin 80mg HS    GI/NUTR:   #Diet: restarted TF Peptamen AF increased to goal of 45 ml/h-- 35 4/14       -Provides 1350 kcal and 85.5 gms protein     -PEG 3cm at skin, 4cm at bumper  #Nausea: Zofran prn    - Aspiration precautions, HOB 30    -? dysmotility- Reglan ATC IV 5mg q8    - KUB ordered 4/15 - normal bowel gas pattern  #GI Prophylaxis    - Pepcid  #Bowel regimen    - holding due to multiple BMs    - 1 BM overnight (4/14)  #Imaging    -CT abdomen 3/28: No definite infectious source identified      -RUQ sono: biliary sludge, mild GB wall thickening/edema    -KUB: normal bowel gas pattern     /RENAL:   #UO: Blair placed by Urology 3/22 (d/w urology and asses scrotal swelling prior to removal)    -Inpatient Rx: doxazosin 4 milliGRAM(s) Oral at bedtime   - US kidney bladder demonstrated: No hydronephrosis or calculi, approximate 8.4 cm cyst, left kidney (present on earlier CT scan), blair catheter within collapsed urinary bladder.  #acute scrotal swelling  - U/S 3/29 w/ diffused scrotal wall edema (keep blair)   -scrotal elevation  Electrolytes-Na 140 // K 3.7 // Mg 1.9 //  Phos 3.0 (04-16 @ 21:28)    #urine output in critically ill    -indwelling blair (placed 3/22)    -VAISHNAVI, oliguria, improving u/o  ml/hr    IVF LR @30cc   - urine out put decreased to 10cc per hour around 2 am (4/16) given albumin 250cc bolus with improvement now 30cc/hr     Labs:          BUN/Cr- 58/1.7  -->,  63/1.7  -->          Electrolytes--on Cardura 4mg HS    Home Rx: tamsulosin 0.4 mg oral capsule    #Diuresis   -20 Lasix 4/9- unresponsive, given 40 Lasix overnight, will monitor output/ response    -80 Lasix, 5 Metozalone 4/10, responded (4/10)  -Nephrology: Bumex 1g BID, US K&B  follow up intravascular assessment             - restarted 4/16    HEME/ONC:   #DVT prophylaxis- HSQ, SCDs  #H/O CAD s/p stents x3    - ASA 81    - Plavix 75 restarted 4/7    Labs: Hb/Hct:  9.6/28.8  -->,  10.2/31.5  -->                      Plts:  187  -->,  199  -->                 PTT/INR:        Home Rx: clopidogrel 75 mg oral tablet: 1 tab(s) orally once a day  T&S Expires: 4/18    ID:  #Acute clostridium difficile 3/30    Completed course of po Vanco on 4/13  WBC- 12.67  --->>,  12.89  --->>,  12.11  --->>  Temp trend- 24hrs T(F): 97.2 (04-16 @ 00:00), Max: 97.2 (04-15 @ 20:00)  Antibiotics   -currently on Cefepime IVPB 1000mg every 12 hours (started 4/10)   -Ended 4/13: vancomycin Solution 125 every 6 hours   -DC'd vanc IV     Cultures:    BCx 4/11 - no growth to date    BAL 4/10- Pseudomonas     BAL 4/8- negative     ET Sputum 3/29: negative     BCx 3/28: NGTD    UA 3/28: Negative     BCx 3/25--pending    UA 3/24--negative    ET Sputum 3/24--neg    MRSA neg    CDiff PCR 3/30: Positive    Procalcitonin 0.32  # +Candida Auris surveillance swab on 4/6/23 (nares, axilla, groin)    ENDO:  HA1C= 5.0    -ISS    -Glucose goal 140-180    MSK:   -WBAT RLE per ortho, PT/OT when clinically appropriate   -Right hip Xray 2 views per ortho (4/9) --> s/p ORIF of comminuted intertrochanteric Rt femur fx w/out definite change compared to 3/22/23    LINES/DRAINS:    PIV  Blair (3/22)  Trach  PEG   Right radial a-line 4/10  RIJ TLC 4/11    ADVANCED DIRECTIVES:  DNR    HCP/Emergency Contact- Song Segura (Son): [phone]    INDICATION FOR SICU: Hypotension with pressor requirements. Intubated.       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   S/P ORIF of right hip using interlocking intramedullary stephon.  S/P Trach & PEG 4/5     NEURO:  #Acute pain    -Fentanyl 25mcg IVP PRN  #sedation    - off nimbex 4/12    - off propofol 4/13     - off all sedation 4/17 AM     RESP:   #Acute respiratory failure secondary to post operative arrhythmias requiring mechanical ventilation (Intubated 3/23)    - Albuterol  - BAL 4/15: little secretions appreciated, no sample was sent  #Pneumonia   - Pseudomonas on culture, sensitive to Cefepime -started on 4/10, end date 4/17     -s/p trach 4/5/23 with size 8    -s/p bronch 4/8 BAL- no organisms    -4/11 BAL - pseudomonas    -4/11 blood cx - neg    -AM CXR     -F/u pulm c/s recs    -Albuterol, Robitussin  -Vent Settings: AC Volume  450/22/45/8 --> now on PS 4/17 AM (goal to SBT today)   #PE w/u NEGATIVE on 3/28    CARDS:   #Tachycardia     - Propanolol - Discontinued 4/10 d/t bradycardic events  #Bradycardia       - 4/10: Episodes x2 bradycardia to 28. Atropine given second episode.      - EP: 4/11 --> signing off, re-consult PRN, will not be doing PPM since bradycardia likely due to hypoxia     - Trops: 0.08 -> 0.09-- seen by cards likely demand ischemia  #acute hypotension    - off levo and vaso 4/17    - d/c Midodrine 10mg q8, SBP in 160s, MAPs high 90s  Intermittent hypotensive episodes 4/10 during bradycardic episodes    -off levo 4/13    - Restarted vaso at 0.03- sensitve, becomes hypertensive,  off 4/15    - Restarted on levo 4/16 as MAPs below 60s -- off 4/16 PM     - restarted Bumex 1mg q12- will hold due to intermittent use of pressors     - increased Bumex 2 mg q12 4/17   #Labs/Imaging  -Echo 3/23- EF appears normal, mild LVH, sclerotic AoV, trace MR,   -Echo 3/29: EF 60-65%, norm LV function, borderline pulm HTN.   -EKG 4/10 most recent: , Sinus Tachycardia, low voltage QRS, incomplete RBBB  -EKG 4/11: , Sinus tach; low voltage QRS  -Echo 4/11: EF 70-75%, Hyperdynamic LV, mild MVR, aortic wall thickening  #HLD    -Atorvastatin 80mg HS  #HTN    - added amlodipine 5mg qd for HTN     GI/NUTR:   #Diet: restarted TF Peptamen AF increased to goal of 45 ml/h-- 35 4/14       -Provides 1350 kcal and 85.5 gms protein     -PEG 3cm at skin, 4cm at bumper  #Nausea: Zofran prn    - Aspiration precautions, HOB 30    -? dysmotility- Reglan ATC IV 5mg q8    - KUB ordered 4/15 - normal bowel gas pattern  #GI Prophylaxis    - Pepcid  #Bowel regimen    - holding due to multiple BMs    - Last BM (4/16)  #Imaging    -CT abdomen 3/28: No definite infectious source identified      -RUQ sono: biliary sludge, mild GB wall thickening/edema    -KUB: normal bowel gas pattern     /RENAL:   #UO: Blair placed by Urology 3/22 (d/w urology and asses scrotal swelling prior to removal)     -Inpatient Rx: doxazosin 4 milliGRAM(s) Oral at bedtime   - US kidney bladder demonstrated: No hydronephrosis or calculi, approximate 8.4 cm cyst, left kidney (present on earlier CT scan), blair catheter within collapsed urinary bladder.  #acute scrotal swelling  - U/S 3/29 w/ diffused scrotal wall edema (keep blair) - blair has minimal output 4/17, will replace today    -scrotal elevation  Electrolytes-Na 140 // K 3.7 // Mg 1.9 //  Phos 3.0 (04-16 @ 21:28)    #urine output in critically ill    -indwelling blair (placed 3/22) - will replace today     -VAISHNAVI, oliguria, improving u/o  ml/hr    - IVF LR @30cc -- D/C'd 4/17   - urine out put decreased to 10cc per hour around 2 am (4/16) given albumin 250cc bolus with improvement now 30cc/hr -- has minimal output 4/17, will replace     Labs:          BUN/Cr- 58/1.7  -->,  63/1.7  -->          Electrolytes--on Cardura 4mg HS    Home Rx: tamsulosin 0.4 mg oral capsule    #Diuresis   -20 Lasix 4/9- unresponsive, given 40 Lasix overnight, will monitor output/ response    -80 Lasix, 5 Metozalone 4/10, responded (4/10)  -Nephrology: Bumex 1g BID, US K&B  follow up intravascular assessment             - restarted 4/16             - increased to Bumex 2mg BID 4/17    HEME/ONC:   #DVT prophylaxis- HSQ, SCDs  #H/O CAD s/p stents x3    - ASA 81    - Plavix 75 restarted 4/7    Labs: Hb/Hct:  9.6/28.8  -->,  10.2/31.5  -->                      Plts:  187  -->,  199  -->                 PTT/INR:        Home Rx: clopidogrel 75 mg oral tablet: 1 tab(s) orally once a day  T&S Expires: 4/18    ID:  #Acute clostridium difficile 3/30    Completed course of po Vanco on 4/13  WBC- 12.67  --->>,  12.89  --->>,  12.11  --->>  Temp trend- 24hrs T(F): 97.2 (04-16 @ 00:00), Max: 97.2 (04-15 @ 20:00)  Antibiotics   -currently on Cefepime IVPB 1000mg every 12 hours (started 4/10)   -Ended 4/13: vancomycin Solution 125 every 6 hours   -DC'd vanc IV     Cultures:    BCx 4/11 - no growth to date    BAL 4/10- Pseudomonas     BAL 4/8- negative     ET Sputum 3/29: negative     BCx 3/28: NGTD    UA 3/28: Negative     BCx 3/25--pending    UA 3/24--negative    ET Sputum 3/24--neg    MRSA neg    CDiff PCR 3/30: Positive    Procalcitonin 0.32  # +Candida Auris surveillance swab on 4/6/23 (nares, axilla, groin)    ENDO:  HA1C= 5.0    -ISS    -Glucose goal 140-180  - D/C'd 20 mg solumedrol - no current indication   MSK:   -WBAT RLE per ortho, PT/OT when clinically appropriate   -Right hip Xray 2 views per ortho (4/9) --> s/p ORIF of comminuted intertrochanteric Rt femur fx w/out definite change compared to 3/22/23    LINES/DRAINS:    PIV  Blair (3/22) -- will replace 4/17   Trach  PEG   Right radial a-line 4/10  RIJ TLC 4/11    ADVANCED DIRECTIVES:  DNR    HCP/Emergency Contact- Song Segura (Son): [phone]    INDICATION FOR SICU: Hypotension with pressor requirements. Intubated.       DISPO: SICU

## 2023-04-17 NOTE — CHART NOTE - NSCHARTNOTEFT_GEN_A_CORE
Palliative care consult completed, pt is DNR with ongoing medical management. Nephew speaks to SICU team daily. At this time plan is to ween from vent medically and pt is improving.   D/w SICU team     Palliative care will sign off, reconsult as needed x [phone]

## 2023-04-17 NOTE — PROGRESS NOTE ADULT - SUBJECTIVE AND OBJECTIVE BOX
RADHA RAMON  [id number]  77y Male    Indication for ICU admission:    Admit Date:03-20-23  ICU Date:  OR Date:    No Known Allergies    PAST MEDICAL & SURGICAL HISTORY:  CAD (coronary artery disease)    Hypercholesteremia    BPH (benign prostatic hyperplasia)    Kidney stones    Prostate cancer    Status post cardiac surgery  cardiac stents    Bilateral cataracts    History of lithotripsy      Home Medications:  atropine 1% ophthalmic solution: 1 drop(s) to each affected eye 2 times a day (20 Mar 2023 15:44)  erythromycin 0.5% ophthalmic ointment: 1 application to each affected eye 2 times a day (20 Mar 2023 15:44)  prednisoLONE acetate 1% ophthalmic suspension: 1 drop(s) to each affected eye every 6 hours (20 Mar 2023 15:44)  tamsulosin 0.4 mg oral capsule: 1 cap(s) orally once a day (at bedtime)  - if you continue to have your blood pressures drop when you stand, then this medication will need to be STOPPED (20 Mar 2023 15:44)        24HRS EVENT:  28d        DVT PTX:     GI PTX:famotidine    Tablet 20 milliGRAM(s) Oral daily      ***Tubes/Lines/Drains  ***  Peripheral IV  Central Venous Line     	Date   Arterial Line		                Date   [] PICC:         	[] Midline		[] Mediport             Urinary Catheter		Indication: Strict I&O    Date Placed:       REVIEW OF SYSTEMS    [ ] A ten-point review of systems was otherwise negative except as noted.  [ ] Due to altered mental status/intubation, subjective information were not able to be obtained from the patient. History was obtained, to the extent possible, from review of the chart and collateral sources of information.                   RADHA RAMON  [id number]  77y Male    Indication for ICU admission:    Admit Date:03-20-23  ICU Date:  OR Date:    No Known Allergies    PAST MEDICAL & SURGICAL HISTORY:  CAD (coronary artery disease)    Hypercholesteremia    BPH (benign prostatic hyperplasia)    Kidney stones    Prostate cancer    Status post cardiac surgery  cardiac stents    Bilateral cataracts    History of lithotripsy      Home Medications:  atropine 1% ophthalmic solution: 1 drop(s) to each affected eye 2 times a day (20 Mar 2023 15:44)  erythromycin 0.5% ophthalmic ointment: 1 application to each affected eye 2 times a day (20 Mar 2023 15:44)  prednisoLONE acetate 1% ophthalmic suspension: 1 drop(s) to each affected eye every 6 hours (20 Mar 2023 15:44)  tamsulosin 0.4 mg oral capsule: 1 cap(s) orally once a day (at bedtime)  - if you continue to have your blood pressures drop when you stand, then this medication will need to be STOPPED (20 Mar 2023 15:44)      24HRS EVENT:    4/16  NIGHT  -Hypertensive to 200s placed on nicardipine drip 10pm turned off reamins off   - potassium 3.7 repleted 2 rider      4/16  DAY  - PS 10/8, tolerated for 30 mins, became apnic 10sec pauses with bradycardia, switched to SIMV  -SIMV 450/16/45/5, desat 89% after 1 hour, back on AC with original settings  - Trend UOP  - Inc TF from 10cc/hr to goal of 45, reglan 5mg TID  - BMx1  - restarted Bumex 1mg q12  - d/c midodrine, BP in the 160s      DVT PTX:     GI PTX:famotidine    Tablet 20 milliGRAM(s) Oral daily      ***Tubes/Lines/Drains  ***  Peripheral IV  Central Venous Line: RIJ    	Date   Arterial Line: R A-line 		                Date   [] PICC:         	[] Midline		[] Mediport             Urinary Catheter	: since 3/22	Indication: Strict I&O    Date Placed:     REVIEW OF SYSTEMS    [ ] A ten-point review of systems was otherwise negative except as noted.  [ ] Due to altered mental status/intubation, subjective information were not able to be obtained from the patient. History was obtained, to the extent possible, from review of the chart and collateral sources of information.      Daily       Diet, NPO with Tube Feed:   Tube Feeding Modality: Gastrostomy  Peptamen A.F. Formula  Total Volume for 24 Hours (mL): 1080  Continuous  Starting Tube Feed Rate mL per Hour: 45     Every 4 hours  Until Goal Tube Feed Rate (mL per Hour): 45  Tube Feed Duration (in Hours): 24  Tube Feed Start Time: 10:00 (04-14-23 @ 02:27)    CURRENT MEDS:  Neurologic Medications  fentaNYL    Injectable 25 MICROGram(s) IV Push every 3 hours PRN Severe Pain (7 - 10)  metoclopramide Injectable 5 milliGRAM(s) IV Push every 8 hours    Respiratory Medications  acetylcysteine 10%  Inhalation 4 milliLiter(s) Inhalation every 8 hours  albuterol    0.083% 2.5 milliGRAM(s) Nebulizer every 8 hours  albuterol    90 MICROgram(s) HFA Inhaler 1 Puff(s) Inhalation every 8 hours  guaifenesin/dextromethorphan Oral Liquid 10 milliLiter(s) Oral every 4 hours    Cardiovascular Medications  buMETAnide Injectable 1 milliGRAM(s) IV Push every 12 hours  doxazosin 4 milliGRAM(s) Oral at bedtime    Gastrointestinal Medications  famotidine    Tablet 20 milliGRAM(s) Oral daily  lactated ringers. 1000 milliLiter(s) IV Continuous <Continuous>  sodium chloride 0.9% lock flush 10 milliLiter(s) IV Push every 1 hour PRN Pre/post blood products, medications, blood draw, and to maintain line patency    Genitourinary Medications    Hematologic/Oncologic Medications  aspirin  chewable 81 milliGRAM(s) Oral daily  clopidogrel Tablet 75 milliGRAM(s) Oral daily  heparin   Injectable 5000 Unit(s) SubCutaneous every 8 hours    Antimicrobial/Immunologic Medications  cefepime   IVPB 1000 milliGRAM(s) IV Intermittent every 12 hours    Endocrine/Metabolic Medications  atorvastatin 80 milliGRAM(s) Oral at bedtime  insulin lispro (ADMELOG) corrective regimen sliding scale   SubCutaneous every 6 hours  methylPREDNISolone sodium succinate Injectable 20 milliGRAM(s) IV Push every 12 hours    Topical/Other Medications  atropine 1% Solution 1 Drop(s) Left EYE two times a day  chlorhexidine 0.12% Liquid 15 milliLiter(s) Oral Mucosa every 12 hours  chlorhexidine 2% Cloths 1 Application(s) Topical <User Schedule>  prednisoLONE acetate 1% Suspension 1 Drop(s) Left EYE every 6 hours      ICU Vital Signs Last 24 Hrs  T(C): 36.1 (16 Apr 2023 20:00), Max: 36.1 (16 Apr 2023 20:00)  T(F): 97 (16 Apr 2023 20:00), Max: 97 (16 Apr 2023 20:00)  HR: 97 (17 Apr 2023 08:00) (85 - 104)  BP: 150/86 (17 Apr 2023 08:00) (108/68 - 159/68)  BP(mean): 113 (17 Apr 2023 08:00) (84 - 115)  ABP: 155/75 (17 Apr 2023 08:00) (128/53 - 167/65)  ABP(mean): 101 (17 Apr 2023 08:00) (76 - 101)  RR: 34 (17 Apr 2023 08:00) (23 - 48)  SpO2: 96% (17 Apr 2023 08:00) (89% - 100%)    O2 Parameters below as of 16 Apr 2023 22:00  Patient On (Oxygen Delivery Method): ventilator        Adult Advanced Hemodynamics Last 24 Hrs  CVP(mm Hg): --  CVP(cm H2O): --  CO: --  CI: --  PA: --  PA(mean): --  PCWP: --  SVR: --  SVRI: --  PVR: --  PVRI: --    Mode: AC/ CMV (Assist Control/ Continuous Mandatory Ventilation)  RR (machine): 22  TV (machine): 450  FiO2: 45  PEEP: 8  ITime: 1  MAP: 11  PIP: 22    ABG - ( 17 Apr 2023 04:14 )  pH, Arterial: 7.48  pH, Blood: x     /  pCO2: 33    /  pO2: 94    / HCO3: 25    / Base Excess: 1.6   /  SaO2: 99.4          I&O's Summary    16 Apr 2023 07:01  -  17 Apr 2023 07:00  --------------------------------------------------------  IN: 1885 mL / OUT: 1425 mL / NET: 460 mL      I&O's Detail    16 Apr 2023 07:01  -  17 Apr 2023 07:00  --------------------------------------------------------  IN:    IV PiggyBack: 100 mL    IV PiggyBack: 200 mL    Lactated Ringers: 720 mL    Peptamen A.F.: 865 mL  Total IN: 1885 mL    OUT:    Indwelling Catheter - Urethral (mL): 1425 mL    Norepinephrine: 0 mL    PEG (Percutaneous Endoscopic Gastrostomy) Tube (mL): 0 mL  Total OUT: 1425 mL    Total NET: 460 mL          PHYSICAL EXAM:    General/Neuro  RASS: 0            GCS:  15     Deficits: alert & oriented x 3, no focal deficits  Pupils: PERRL (R eye, L blind)     Lungs: CTAB, Normal expansion/effort.     Cardiovascular: S1, S2.  Regular rate and rhythm. ++Peripheral edema   Cardiac Rhythm: Normal Sinus Rhythm    GI: Abdomen soft, Non-tender, Non-distended.    Gastrostomy tube in place.        Extremities: Extremities edematous, warm, pink, well-perfused.     Derm: Good skin turgor, no skin breakdown.     : Garrison catheter in place. ++Scrotal edema       CXR: 4/16: Unchanged bilateral pleural effusions, opacities. No pneumothorax.      LABS:  CAPILLARY BLOOD GLUCOSE      POCT Blood Glucose.: 145 mg/dL (17 Apr 2023 06:27)  POCT Blood Glucose.: 183 mg/dL (16 Apr 2023 23:08)  POCT Blood Glucose.: 140 mg/dL (16 Apr 2023 17:07)  POCT Blood Glucose.: 146 mg/dL (16 Apr 2023 11:13)                         9.7    10.89 )-----------( 194      ( 16 Apr 2023 21:28 )             30.5       04-16    140  |  106  |  63<HH>  ----------------------------<  157<H>  3.7   |  23  |  1.7<H>    Ca    8.3<L>      16 Apr 2023 21:28  Phos  3.0     04-16  Mg     1.9     04-16

## 2023-04-18 NOTE — PROGRESS NOTE ADULT - SUBJECTIVE AND OBJECTIVE BOX
Pt. seen and without significant change. Please change dressings if wet. No ortho trearment at this time. WBAT PT when pt medically able

## 2023-04-18 NOTE — PROGRESS NOTE ADULT - SUBJECTIVE AND OBJECTIVE BOX
RADHA RAMON   [id number]   [birth year]  77yM    Admit Date/LOS: 03-20 (2d)  Indication for SICU: Hemodynamic monitoring. 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 Pt walks with walker and got up in middle of night to get food and slipped and fell on his R side. Pt has been having R hip pain since with inability to ambulate. Pts family finally convinced him to come to hospital today. Pt is having 10/10 pain currently. Denies dizziness, CP or LOC prior to fall. Denies head trauma. No HA. (20 Mar 2023 12:02)    3/22: Patient S/P ORIF of right hip using interlocking intramedullary stephon. - upgraded for hypotension in PACU  4/5  Trach and PEG    24HRS EVENT:  4/17  NIGHT  - Midline and new right forearm PIV placed  - Pt w/ self-limited 15 second damaris episode to 30-40s w/ desat to 93%; pt put back on SIMV          [] A ten-point review of systems was otherwise negative except as noted above.  [x] Due to altered mental status/intubation, subjective information was not attained from   the patient. History was obtained, to the extent possible, from review of the chart and collateral sources of information.  =================================================================     RADHA RAMON   [id number]   [birth year]  77yM    Admit Date/LOS: 03-20 (2d)  Indication for SICU: Hemodynamic monitoring. 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 Pt walks with walker and got up in middle of night to get food and slipped and fell on his R side. Pt has been having R hip pain since with inability to ambulate. Pts family finally convinced him to come to hospital today. Pt is having 10/10 pain currently. Denies dizziness, CP or LOC prior to fall. Denies head trauma. No HA. (20 Mar 2023 12:02)    3/22: Patient S/P ORIF of right hip using interlocking intramedullary stephon. - upgraded for hypotension in PACU  4/5  Trach and PEG    24HRS EVENT:  4/17  NIGHT  - Midline and new right forearm PIV placed  - Pt w/ self-limited 15 second damaris episode to 30-40s w/ desat to 93%; pt put back on SIMV          [] A ten-point review of systems was otherwise negative except as noted above.  [x] Due to altered mental status/intubation, subjective information was not attained from   the patient. History was obtained, to the extent possible, from review of the chart and collateral sources of information.  =================================================================  EXAM   PHYSICAL EXAM:    General/Neuro  RASS: 0            GCS:  15     Deficits: alert & oriented x 3, no focal deficits  Pupils: PERRL (R eye, L blind)     Lungs: CTAB, Normal expansion/effort.     Cardiovascular: S1, S2.  Regular rate and rhythm. ++Peripheral edema   Cardiac Rhythm: Normal Sinus Rhythm    GI: Abdomen soft, Non-tender, Non-distended.    Gastrostomy tube in place.        Extremities: Extremities edematous, warm, pink, well-perfused.     Derm: Good skin turgor, no skin breakdown.

## 2023-04-18 NOTE — PROGRESS NOTE ADULT - ASSESSMENT
NEURO:  #Acute pain    -Fentanyl 25mcg IVP PRN  #sedation    - off nimbex 4/12    - off propofol 4/13     - off all sedation 4/17 AM     RESP:   #Acute respiratory failure secondary to post operative arrhythmias requiring mechanical ventilation (Intubated 3/23)    - Albuterol  - BAL 4/15: little secretions appreciated, no sample was sent  #Pneumonia   - Pseudomonas on culture, sensitive to Cefepime -started on 4/10, end date 4/17     -s/p trach 4/5/23 with size 8    -s/p bronch 4/8 BAL- no organisms    -4/11 BAL - pseudomonas    -4/11 blood cx - neg    -AM CXR     -F/u pulm c/s recs    -Albuterol, Robitussin  -Vent Settings: AC Volume  450/22/45/8 --> now on PS 4/17 AM (goal to SBT today)   #PE w/u NEGATIVE on 3/28    CARDS:   #Tachycardia     - Propanolol - Discontinued 4/10 d/t bradycardic events  #Bradycardia       - 4/10: Episodes x2 bradycardia to 28. Atropine given second episode.      - EP: 4/11 --> signing off, re-consult PRN, will not be doing PPM since bradycardia likely due to hypoxia     - Trops: 0.08 -> 0.09-- seen by cards likely demand ischemia  #acute hypotension    - off levo and vaso 4/17    - d/c Midodrine 10mg q8, SBP in 160s, MAPs high 90s  Intermittent hypotensive episodes 4/10 during bradycardic episodes    -off levo 4/13    - Restarted vaso at 0.03- sensitve, becomes hypertensive,  off 4/15    - Restarted on levo 4/16 as MAPs below 60s -- off 4/16 PM     - restarted Bumex 1mg q12- will hold due to intermittent use of pressors     - increased Bumex 2 mg q12 4/17   #Labs/Imaging  -Echo 3/23- EF appears normal, mild LVH, sclerotic AoV, trace MR,   -Echo 3/29: EF 60-65%, norm LV function, borderline pulm HTN.   -EKG 4/10 most recent: , Sinus Tachycardia, low voltage QRS, incomplete RBBB  -EKG 4/11: , Sinus tach; low voltage QRS  -Echo 4/11: EF 70-75%, Hyperdynamic LV, mild MVR, aortic wall thickening  #HLD    -Atorvastatin 80mg HS  #HTN    - added amlodipine 5mg qd for HTN     GI/NUTR:   #Diet: restarted TF Peptamen AF increased to goal of 45 ml/h-- 35 4/14       -Provides 1350 kcal and 85.5 gms protein     -PEG 3cm at skin, 4cm at bumper  #Nausea: Zofran prn    - Aspiration precautions, HOB 30    -? dysmotility- Reglan ATC IV 5mg q8    - KUB ordered 4/15 - normal bowel gas pattern  #GI Prophylaxis    - Pepcid  #Bowel regimen    - holding due to multiple BMs    - Last BM (4/16)  #Imaging    -CT abdomen 3/28: No definite infectious source identified      -RUQ sono: biliary sludge, mild GB wall thickening/edema    -KUB: normal bowel gas pattern     /RENAL:   #UO: Blair placed by Urology 3/22 (d/w urology and asses scrotal swelling prior to removal) replaced 4/17    -Inpatient Rx: doxazosin 4 milliGRAM(s) Oral at bedtime   - US kidney bladder demonstrated: No hydronephrosis or calculi, approximate 8.4 cm cyst, left kidney (present on earlier CT scan), blair catheter within collapsed urinary bladder.  #acute scrotal swelling  - U/S 3/29 w/ diffused scrotal wall edema (keep blair) - blair has minimal output 4/17, will replace today    -scrotal elevation      #urine output in critically ill    -indwelling blair (placed 3/22) - will replace today     -VAISHNAVI, oliguria, improving u/o  ml/hr    - IVF LR @30cc -- D/C'd 4/17   - urine out put decreased to 10cc per hour around 2 am (4/16) given albumin 250cc bolus with improvement now 30cc/hr -- has minimal output 4/17, will replace     Labs:          BUN/Cr- 58/1.7  -->,  63/1.7  -->  70/1.7          Electrolytes--on Cardura 4mg HS  Electrolytes-Na 143 // K 4.1 // Mg 2.1 //  Phos 2.5 (04-17 @ 20:25)    Home Rx: tamsulosin 0.4 mg oral capsule    #Diuresis   -20 Lasix 4/9- unresponsive, given 40 Lasix overnight, will monitor output/ response    -80 Lasix, 5 Metozalone 4/10, responded (4/10)  -Nephrology: Bumex 1g BID, US K&B  follow up intravascular assessment             - restarted 4/16             - increased to Bumex 2mg BID 4/17    HEME/ONC:   #DVT prophylaxis- HSQ, SCDs  #H/O CAD s/p stents x3    - ASA 81    - Plavix 75 restarted 4/7    Labs: Hb/Hct:  9.6/28.8  -->,  10.2/31.5  -->   9.7/29.9              Plts:  187  -->,  199  -->   195              PTT/INR:        Home Rx: clopidogrel 75 mg oral tablet: 1 tab(s) orally once a day  T&S Expires: 4/18    ID:  #Acute clostridium difficile 3/30    Completed course of po Vanco on 4/13  WBC- 12.67  --->>,  12.89  --->>,  12.11  --->> 13.1  Temp trend- 24hrs T(F): 97.2 (04-16 @ 00:00), Max: 97.2 (04-15 @ 20:00)  Antibiotics   -currently on Cefepime IVPB 1000mg every 12 hours (started 4/10)   -Ended 4/13: vancomycin Solution 125 every 6 hours   -DC'd vanc IV     Cultures:    BCx 4/11 - no growth to date    BAL 4/10- Pseudomonas     BAL 4/8- negative     ET Sputum 3/29: negative     BCx 3/28: NGTD    UA 3/28: Negative     BCx 3/25--pending    UA 3/24--negative    ET Sputum 3/24--neg    MRSA neg    CDiff PCR 3/30: Positive    Procalcitonin 0.32  # +Candida Auris surveillance swab on 4/6/23 (nares, axilla, groin)    ENDO:  HA1C= 5.0    -ISS    -Glucose goal 140-180  - D/C'd 20 mg solumedrol - no current indication   MSK:   -WBAT RLE per ortho, PT/OT when clinically appropriate   -Right hip Xray 2 views per ortho (4/9) --> s/p ORIF of comminuted intertrochanteric Rt femur fx w/out definite change compared to 3/22/23    LINES/DRAINS:    PIV  Blair (3/22) -- replaced 4/17   Trach  PEG   Right radial a-line 4/10  RIJ TLC 4/11, removed 4/17    ADVANCED DIRECTIVES:  DNR    HCP/Emergency Contact- Song Segura (Son): [phone]    INDICATION FOR SICU: Hemodynamic instability. Mechanical ventilation       DISPO: SICU

## 2023-04-18 NOTE — PROCEDURE NOTE - NSSECUREBY_VASC_A_CORE
stabilization device/sterile occulsive dressing
stabilization device/sterile occulsive dressing/sterile pressure dressing/tape

## 2023-04-18 NOTE — PROGRESS NOTE ADULT - ATTENDING COMMENTS
Patient was on CPAP yesterday, placed on a Vent at night after short period of O2Sat at 93.  Had brief episode of bradycardia at the same time that resolved.  Completed Cefepime yesterday.  On tube feeds.  On Bumex 2 mg BID, fluid balance -1.6L.    ASSESSMENT:  78 y/o male, S/P Fall.  Right Intertrochanteric Femur Fracture. S/P ORIF.  Acute respiratory failure.  S/P Tracheostomy and PEG  Dysphagia.  Hypertensive urgency.  Hypervolemia.    PLAN:  - pain control prn  - on PSV this am  - keep normotensive  - give Bumex 2 mg iv today again  - on tube feeds  - follow serum electrolytes and UOP  - ID: universal precautions  - DVT prophylaxis  - SS eval for LTAC placement

## 2023-04-19 NOTE — PROGRESS NOTE ADULT - ASSESSMENT
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   S/P ORIF of right hip using interlocking intramedullary stephon.  S/P Trach & PEG 4/5     NEURO:  #Acute pain    -Fentanyl 25mcg IVP PRN  #sedation    - off nimbex 4/12    - off propofol 4/13     - off all sedation 4/17 AM     RESP:   #Acute respiratory failure secondary to post operative arrhythmias requiring mechanical ventilation (Intubated 3/23)    - Albuterol  - BAL 4/15: little secretions appreciated, no sample was sent  #Pneumonia   - Pseudomonas on culture, sensitive to Cefepime -started on 4/10, end date 4/17     -s/p trach 4/5/23 with size 8    -s/p bronch 4/8 BAL- no organisms    -4/11 BAL - pseudomonas    -4/11 blood cx - neg    -AM CXR     -Pulm c/s - avoid volume overload and diuresis prn     -Albuterol, Robitussin  -Vent Settings: AC Volume  450/22/40/8 --> PS as tolerated   #PE w/u NEGATIVE on 3/28    CARDS:   #Tachycardia     - Propanolol - Discontinued 4/10 d/t bradycardic events  #Bradycardia       - 4/10: Episodes x2 bradycardia to 28. Atropine given second episode.      - EP: 4/11 --> signing off, re-consult PRN, will not be doing PPM since bradycardia likely due to hypoxia     - Trops: 0.08 -> 0.09-- seen by cards likely demand ischemia     - 4/18: Episode x1 bradycardia to 30s --> resolved after suctioning trach   #acute hypotension    - off levo and vaso 4/17    - d/c Midodrine 10mg q8, SBP in 160s, MAPs high 90s    - restarted levo 4/18   Intermittent hypotensive episodes 4/10 during bradycardic episodes    -off levo 4/13    - Restarted vaso at 0.03- sensitve, becomes hypertensive,  off 4/15    - Restarted on levo 4/16 as MAPs below 60s -- off 4/16 PM     - restarted Bumex 1mg q12- will hold due to intermittent use of pressors     - increased Bumex 2 mg q12 4/17   #Labs/Imaging  -Echo 3/23- EF appears normal, mild LVH, sclerotic AoV, trace MR,   -Echo 3/29: EF 60-65%, norm LV function, borderline pulm HTN.   -EKG 4/10 most recent: , Sinus Tachycardia, low voltage QRS, incomplete RBBB  -EKG 4/11: , Sinus tach; low voltage QRS  -Echo 4/11: EF 70-75%, Hyperdynamic LV, mild MVR, aortic wall thickening  #HLD    -Atorvastatin 80mg HS  #HTN    - added amlodipine 5mg qd for HTN     GI/NUTR:   #Diet: restarted TF Peptamen AF increased to goal of 45 ml/h       -Provides 1350 kcal and 85.5 gms protein     -PEG 3cm at skin, 4cm at bumper  #Nausea: Zofran prn    - Aspiration precautions, HOB 30    -? dysmotility- Reglan ATC IV 5mg q8    - KUB ordered 4/15 - normal bowel gas pattern  #GI Prophylaxis    - Pepcid  #Bowel regimen    - holding due to multiple BMs    - Last BM (4/16)  #Imaging    -CT abdomen 3/28: No definite infectious source identified      -RUQ sono: biliary sludge, mild GB wall thickening/edema    -KUB: normal bowel gas pattern     /RENAL:   #UO: Blair placed by Urology 3/22 (d/w urology and asses scrotal swelling prior to removal)     -Inpatient Rx: doxazosin 4 milliGRAM(s) Oral at bedtime   - US kidney bladder demonstrated: No hydronephrosis or calculi, approximate 8.4 cm cyst, left kidney (present on earlier CT scan), blair catheter within collapsed urinary bladder.  #acute scrotal swelling  - U/S 3/29 w/ diffused scrotal wall edema (keep blair) - blair has minimal output 4/17, replaced 4/17   -scrotal elevation  Electrolytes-Na 139 // K 4.1 // Mg 1.9 //  Phos 3.2 (04-18 @ 20:48)    #urine output in critically ill    -indwelling blair (placed 3/22) - will replace today     -VAISHNAVI, oliguria, improving u/o  ml/hr    - IVF LR @30cc -- D/C'd 4/17   - urine out put decreased to 10cc per hour around 2 am (4/16) given albumin 250cc bolus with improvement now 30cc/hr -- has minimal output 4/17, replaced     Labs:          BUN/Cr- 70/1.7  -->,  71/1.7  -->          Electrolytes-Na 139 // K 4.1 // Mg 1.9 //  Phos 3.2 (04-18 @ 20:48)    Home Rx: tamsulosin 0.4 mg oral capsule    #Diuresis   -20 Lasix 4/9- unresponsive, given 40 Lasix overnight, will monitor output/ response    -80 Lasix, 5 Metozalone 4/10, responded (4/10)  -Nephrology: Bumex 1g BID, US K&B  follow up intravascular assessment             - restarted 4/16             - increased to Bumex 2mg BID 4/17    HEME/ONC:   #DVT prophylaxis- HSQ, SCDs  #H/O CAD s/p stents x3    - ASA 81    - Plavix 75 restarted 4/7    Labs: Hb/Hct:  9.7/29.9  -->,  9.8/30.8  -->                      Plts:  195  -->,  184  -->                 PTT/INR:           Home Rx: clopidogrel 75 mg oral tablet: 1 tab(s) orally once a day  T&S Expires: 4/18    ID:  #Acute clostridium difficile 3/30    Completed course of po Vanco on 4/13  WBC- 12.67  --->>,  12.89  --->>,  12.11  --->>  Temp trend- 24hrs T(F): 97.2 (04-16 @ 00:00), Max: 97.2 (04-15 @ 20:00)  Antibiotics   -currently on Cefepime IVPB 1000mg every 12 hours (started 4/10)   -Ended 4/13: vancomycin Solution 125 every 6 hours   -DC'd vanc IV     Cultures:    BCx 4/11 - no growth to date    BAL 4/10- Pseudomonas     BAL 4/8- negative     ET Sputum 3/29: negative     BCx 3/28: NGTD    UA 3/28: Negative     BCx 3/25--pending    UA 3/24--negative    ET Sputum 3/24--neg    MRSA neg    CDiff PCR 3/30: Positive    Procalcitonin 0.32  # +Candida Auris surveillance swab on 4/6/23 (nares, axilla, groin)    ENDO:  HA1C= 5.0    -ISS    -Glucose goal 140-180  - D/C'd 20 mg solumedrol - no current indication   MSK:   -WBAT RLE per ortho, PT/OT when clinically appropriate   -Right hip Xray 2 views per ortho (4/9) --> s/p ORIF of comminuted intertrochanteric Rt femur fx w/out definite change compared to 3/22/23    LINES/DRAINS:    PIV  Blair (3/22) -- will replace 4/17   Trach  PEG   Right radial a-line 4/10  RIJ TLC 4/11    ADVANCED DIRECTIVES:  DNR    HCP/Emergency Contact- Song Nusmaninderrupal (Son): [phone]    INDICATION FOR SICU: Hypotension with pressor requirements. Intubated.       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   S/P ORIF of right hip using interlocking intramedullary stephon.  S/P Trach & PEG 4/5     NEURO:  #Acute pain    -Fentanyl 25mcg IVP PRN  #sedation    - off nimbex 4/12    - off propofol 4/13     - off all sedation 4/17 AM     RESP:   #Acute respiratory failure secondary to post operative arrhythmias requiring mechanical ventilation (Intubated 3/23)    - Albuterol  - BAL 4/15: little secretions appreciated, no sample was sent  #Pneumonia   - Pseudomonas on culture, sensitive to Cefepime -started on 4/10, end date 4/17     -s/p trach 4/5/23 with size 8    -s/p bronch 4/8 BAL- no organisms    -4/11 BAL - pseudomonas    -4/11 blood cx - neg    -AM CXR     -Pulm c/s - avoid volume overload and diuresis prn     -Albuterol, Robitussin  -Vent Settings: AC Volume  450/22/40/8 --> PS as tolerated   #PE w/u NEGATIVE on 3/28    CARDS:   #Tachycardia     - Propanolol - Discontinued 4/10 d/t bradycardic events  #Bradycardia       - 4/10: Episodes x2 bradycardia to 28. Atropine given second episode.      - EP: 4/11 --> signing off, re-consult PRN, will not be doing PPM since bradycardia likely due to hypoxia     - Trops: 0.08 -> 0.09-- seen by cards likely demand ischemia     - 4/18: Episode x1 bradycardia to 30s --> resolved after suctioning trach   #acute hypotension    - off levo and vaso 4/17    - d/c Midodrine 10mg q8, SBP in 160s, MAPs high 90s    - restarted levo 4/18   Intermittent hypotensive episodes 4/10 during bradycardic episodes    -off levo 4/13    - Restarted vaso at 0.03- sensitve, becomes hypertensive,  off 4/15    - Restarted on levo 4/16 as MAPs below 60s -- off 4/16 PM     - restarted Bumex 1mg q12- will hold due to intermittent use of pressors     - increased Bumex 2 mg q12 4/17   #Labs/Imaging  -Echo 3/23- EF appears normal, mild LVH, sclerotic AoV, trace MR,   -Echo 3/29: EF 60-65%, norm LV function, borderline pulm HTN.   -EKG 4/10 most recent: , Sinus Tachycardia, low voltage QRS, incomplete RBBB  -EKG 4/11: , Sinus tach; low voltage QRS  -Echo 4/11: EF 70-75%, Hyperdynamic LV, mild MVR, aortic wall thickening  #HLD    -Atorvastatin 80mg HS  #HTN    - dc'd amlodipine 5mg qd for HTN     GI/NUTR:   #Diet: restarted TF Peptamen AF increased to goal of 45 ml/h       -Provides 1350 kcal and 85.5 gms protein     -PEG 3cm at skin, 4cm at bumper  #Nausea: Zofran prn    - Aspiration precautions, HOB 30    -? dysmotility- Reglan ATC IV 5mg q8    - KUB ordered 4/15 - normal bowel gas pattern  #GI Prophylaxis    - Pepcid  #Bowel regimen    - senna    - last BM (4/19) - 1 x small blood clots  #Imaging    -CT abdomen 3/28: No definite infectious source identified      -RUQ sono: biliary sludge, mild GB wall thickening/edema    -KUB: normal bowel gas pattern     /RENAL:   #UO: Blair placed by Urology 3/22 (d/w urology and assess scrotal swelling prior to removal)     - on Doxazosin 4 milliGRAM(s) Oral at bedtime (Home rx: Flomax 0.4mg HS)   - US kidney bladder demonstrated: No hydronephrosis or calculi, approximate 8.4 cm cyst, left kidney (present on earlier CT scan), blair catheter within collapsed urinary bladder.  #acute scrotal swelling  - U/S 3/29 w/ diffused scrotal wall edema (keep blair) - blair has minimal output 4/17, replaced 4/17   -scrotal elevation    #urine output in critically ill    -indwelling blair (placed 3/22) - replaced (4/17)     -VAISHNAVI, oliguria, improving u/o  ml/hr    - IVF LR @30cc -- D/C'd 4/17   - urine out put decreased to 10cc per hour around 2 am (4/16) given albumin 250cc bolus with improvement now 30cc/hr -- has minimal output 4/17, replaced (4/17)    Labs:          BUN/Cr- 70/1.7  -->,  71/1.7  -->          Electrolytes-Na 139 // K 4.1 // Mg 1.9 //  Phos 3.2 (04-18 @ 20:48)    #Diuresis   -20 Lasix 4/9- unresponsive, given 40 Lasix overnight, will monitor output/ response    -80 Lasix, 5 Metozalone 4/10, responded (4/10)  -Nephrology: Bumex 1g BID, US K&B  follow up intravascular assessment             - restarted 4/16             - increased to Bumex 2mg BID 4/17    HEME/ONC:   #DVT prophylaxis- HSQ, SCDs  #H/O CAD s/p stents x3    - ASA 81    - Plavix 75 restarted 4/7    Labs: Hb/Hct:  9.7/29.9  -->,  9.8/30.8  -->                      Plts:  195  -->,  184  -->                 PTT/INR:           Home Rx: clopidogrel 75 mg oral tablet: 1 tab(s) orally once a day, Aspirin 81mg QD  T&S Expires: pending    ID:  #Acute clostridium difficile 3/30    Completed course of po Vanco on 4/13  WBC- 10.89  --->>,  13.10  --->>,  15.67  --->>  Temp trend- 24hrs T(F): 98 (04-19 @ 04:00), Max: 98 (04-18 @ 16:00)  Antibiotics:    -Ended 4/17: Cefepime IVPB 1000mg every 12 hours (started 4/10)   -Ended 4/13: vancomycin Solution 125 every 6 hours   -DC'd vanc IV     Cultures:    BCx 4/11 - no growth to date    BAL 4/10- Pseudomonas     BAL 4/8- negative     ET Sputum 3/29: negative     BCx 3/28: NGTD    UA 3/28: Negative     BCx 3/25--pending    UA 3/24--negative    ET Sputum 3/24--neg    MRSA neg    CDiff PCR 3/30: Positive    Procalcitonin 0.32  # +Candida Auris surveillance swab on 4/6/23 (nares, axilla, groin)    ENDO:  HA1C= 5.0    -ISS    -Glucose goal 140-180  - D/C'd 20 mg solumedrol - no current indication   MSK:   -WBAT RLE per ortho, PT/OT when clinically appropriate   -Right hip Xray 2 views per ortho (4/9) --> s/p ORIF of comminuted intertrochanteric Rt femur fx w/out definite change compared to 3/22/23    LINES/DRAINS:    PIV  Blair (3/22) -- replaced (4/17)   Trach  PEG   Right radial a-line (4/10)  Left cephalic midline (4/18)    ADVANCED DIRECTIVES:  DNR    HCP/Emergency Contact- Song Segura (Son): [phone]    INDICATION FOR SICU: Hypotension with pressor requirements. Intubated.       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   S/P ORIF of right hip using interlocking intramedullary stephon.  S/P Trach & PEG 4/5     NEURO:  #Acute pain    -Fentanyl 25mcg IVP PRN  #sedation    - off nimbex 4/12    - off propofol 4/13     - off all sedation 4/17 AM     RESP:   #Acute respiratory failure secondary to post operative arrhythmias requiring mechanical ventilation (Intubated 3/23)    - Albuterol  - BAL 4/15: little secretions appreciated, no sample was sent  #Pneumonia   - Pseudomonas on culture, sensitive to Cefepime -started on 4/10, end date 4/17     -s/p trach 4/5/23 with size 8    -s/p bronch 4/8 BAL- no organisms    -4/11 BAL - pseudomonas    -4/11 blood cx - neg    -AM CXR     -Pulm c/s - avoid volume overload and diuresis prn     -Albuterol, Robitussin  -Vent Settings: AC Volume  450/22/40/8 --> PS as tolerated   #PE w/u NEGATIVE on 3/28    CARDS:   #Tachycardia     - Propanolol - Discontinued 4/10 d/t bradycardic events  #Bradycardia       - 4/10: Episodes x2 bradycardia to 28. Atropine given second episode.      - EP: 4/11 --> signing off, re-consult PRN, will not be doing PPM since bradycardia likely due to hypoxia     - Trops: 0.08 -> 0.09-- seen by cards likely demand ischemia     - 4/18: Episode x1 bradycardia to 30s --> resolved after suctioning trach   #acute hypotension    - off levo and vaso 4/17    - d/c Midodrine 10mg q8, SBP in 160s, MAPs high 90s    - restarted levo 4/18   Intermittent hypotensive episodes 4/10 during bradycardic episodes    -off levo 4/13    - Restarted vaso at 0.03- sensitve, becomes hypertensive,  off 4/15    - Restarted on levo 4/16 as MAPs below 60s -- off 4/16 PM     - restarted Bumex 1mg q12- will hold due to intermittent use of pressors     - increased Bumex 2 mg q12 4/17   #Labs/Imaging  -Echo 3/23- EF appears normal, mild LVH, sclerotic AoV, trace MR,   -Echo 3/29: EF 60-65%, norm LV function, borderline pulm HTN.   -EKG 4/10 most recent: , Sinus Tachycardia, low voltage QRS, incomplete RBBB  -EKG 4/11: , Sinus tach; low voltage QRS  -Echo 4/11: EF 70-75%, Hyperdynamic LV, mild MVR, aortic wall thickening  #HLD    -Atorvastatin 80mg HS  #HTN    - dc'd amlodipine 5mg qd for HTN     GI/NUTR:   #Diet: restarted TF Peptamen AF increased to goal of 45 ml/h       -Provides 1350 kcal and 85.5 gms protein     -PEG 3cm at skin, 4cm at bumper  #Nausea: Zofran prn    - Aspiration precautions, HOB 30    -? dysmotility- Reglan ATC IV 5mg q8    - KUB ordered 4/15 - normal bowel gas pattern  #GI Prophylaxis    - Pepcid  #Bowel regimen    - senna    - last BM (4/19) - 1 x small blood clots  #Imaging    -CT abdomen 3/28: No definite infectious source identified      -RUQ sono: biliary sludge, mild GB wall thickening/edema    -KUB: normal bowel gas pattern     /RENAL:   #UO: Blair placed by Urology 3/22 (d/w urology and assess scrotal swelling prior to removal)     - on Doxazosin 4 milliGRAM(s) Oral at bedtime (Home rx: Flomax 0.4mg HS)   - US kidney bladder demonstrated: No hydronephrosis or calculi, approximate 8.4 cm cyst, left kidney (present on earlier CT scan), blair catheter within collapsed urinary bladder.  #acute scrotal swelling  - U/S 3/29 w/ diffused scrotal wall edema (keep blair) - blair has minimal output 4/17, replaced 4/17   -scrotal elevation    #urine output in critically ill    -indwelling blair (placed 3/22) - replaced (4/17)     -VAISHNAVI, oliguria, improving u/o  ml/hr    - IVF LR @30cc -- D/C'd 4/17    - urine out put decreased to 10cc per hour around 2 am (4/16) given albumin 250cc bolus with improvement now 30cc/hr -- has minimal output 4/17, replaced (4/17)   - Albumin 25% 50cc x1 (4/19)    Labs:          BUN/Cr- 70/1.7  -->,  71/1.7  -->          Electrolytes-Na 139 // K 4.1 // Mg 1.9 //  Phos 3.2 (04-18 @ 20:48)    #Diuresis   -20 Lasix 4/9- unresponsive, given 40 Lasix overnight, will monitor output/ response    -80 Lasix, 5 Metozalone 4/10, responded (4/10)  -Nephrology: Bumex 1g BID, US K&B  follow up intravascular assessment             - restarted 4/16             - increased to Bumex 2mg BID 4/17    HEME/ONC:   #DVT prophylaxis- HSQ, SCDs  #H/O CAD s/p stents x3    - ASA 81    - Plavix 75 restarted 4/7    Labs: Hb/Hct:  9.7/29.9  -->,  9.8/30.8  -->                      Plts:  195  -->,  184  -->                 PTT/INR:           Home Rx: clopidogrel 75 mg oral tablet: 1 tab(s) orally once a day, Aspirin 81mg QD  T&S Expires: pending    ID:  #Acute clostridium difficile 3/30    Completed course of po Vanco on 4/13  WBC- 10.89  --->>,  13.10  --->>,  15.67  --->>  Temp trend- 24hrs T(F): 98 (04-19 @ 04:00), Max: 98 (04-18 @ 16:00)  Antibiotics:    -Ended 4/17: Cefepime IVPB 1000mg every 12 hours (started 4/10)   -Ended 4/13: vancomycin Solution 125 every 6 hours   -DC'd vanc IV     Cultures:    BCx 4/11 - no growth to date    BAL 4/10- Pseudomonas     BAL 4/8- negative     ET Sputum 3/29: negative     BCx 3/28: NGTD    UA 3/28: Negative     BCx 3/25--pending    UA 3/24--negative    ET Sputum 3/24--neg    MRSA neg    CDiff PCR 3/30: Positive    Procalcitonin 0.32  # +Candida Auris surveillance swab on 4/6/23 (nares, axilla, groin)    ENDO:  HA1C= 5.0    -ISS    -Glucose goal 140-180  - D/C'd 20 mg solumedrol - no current indication   MSK:   -WBAT RLE per ortho, PT/OT when clinically appropriate   -Right hip Xray 2 views per ortho (4/9) --> s/p ORIF of comminuted intertrochanteric Rt femur fx w/out definite change compared to 3/22/23    LINES/DRAINS:    PIV  Blair (3/22) -- replaced (4/17)   Trach  PEG   Right radial a-line (4/10)  Left cephalic midline (4/18)    ADVANCED DIRECTIVES:  DNR    HCP/Emergency Contact- Song Yanesrupal (Son): [phone]    INDICATION FOR SICU: Hypotension with pressor requirements. Intubated.       DISPO: SICU

## 2023-04-19 NOTE — PROGRESS NOTE ADULT - ATTENDING COMMENTS
Patient was hypotensive last night and received Alb 5% 250 ml.  On Levo at 0.1 mcg/kg/min at this time.  Arousable , follows simple commands.  Bumex stopped.  Fluid balance -450 ml over the past 24 h  CXR with atelectasis and moderate right pleural effusion.    ASSESSMENT:  76 y/o male, S/P Fall.  Right Intertrochanteric Femur Fracture. S/P ORIF.  Acute respiratory failure.  S/P Tracheostomy and PEG  Dysphagia.  Hypotension.    PLAN:  - pain control prn  - PSV this am; alternate with AC  - keep MAP>65; give Alb 25% 50 ml  - continue tube feeds; bowel regiment  - follow serum electrolytes and UOP  - ID: universal precautions  - DVT prophylaxis  Patient is very deconditioned. Patient was hypotensive last night and received Alb 5% 250 ml.  On Levo at 0.1 mcg/kg/min at this time.  Arousable , follows simple commands.  Bumex stopped.  Fluid balance -450 ml over the past 24 h  CXR with atelectasis and moderate right pleural effusion.    ASSESSMENT:  76 y/o male, S/P Fall.  Right Intertrochanteric Femur Fracture. S/P ORIF.  Acute respiratory failure.  S/P Tracheostomy and PEG  Dysphagia.  Hypotension.    PLAN:  - pain control prn  - PSV this am; alternate with AC  - keep MAP>65; give Alb 25% 50 ml  - wean Levo off  - continue tube feeds; bowel regiment  - follow serum electrolytes and UOP  - ID: universal precautions  - DVT prophylaxis  Patient is very deconditioned.

## 2023-04-19 NOTE — PROGRESS NOTE ADULT - SUBJECTIVE AND OBJECTIVE BOX
RADHA RAMON   [id number]   [birth year]  77yM    Admit Date/LOS: 03-20 (2d)  Indication for SICU: Hemodynamic monitoring. 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 Pt walks with walker and got up in middle of night to get food and slipped and fell on his R side. Pt has been having R hip pain since with inability to ambulate. Pts family finally convinced him to come to hospital today. Pt is having 10/10 pain currently. Denies dizziness, CP or LOC prior to fall. Denies head trauma. No HA. (20 Mar 2023 12:02)    3/22: Patient S/P ORIF of right hip using interlocking intramedullary stephon. - upgraded for hypotension in PACU  4/5  Trach and PEG    24HRS EVENT:  4/18   NIGHT  -hypotensive to 80/50s when asleep, bolused 5% albumin 250cc   -remained hypotensive, restarted peripheral levophed  -expiratory wheezing, got Proventil tx     Day  CPAP 8/8 until 1pm (tachypneic), placed back on AC  senna started   episode of hypoxia to 94 , damaris 30s resolved after suctioning 5pm       [] A ten-point review of systems was otherwise negative except as noted above.  [x] Due to altered mental status/intubation, subjective information was not attained from   the patient. History was obtained, to the extent possible, from review of the chart and collateral sources of information.  =================================================================       RADHA RAMON   [id number]   [birth year]  77yM    Admit Date/LOS: 03-20 (2d)  Indication for SICU: Hemodynamic monitoring. 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 Pt walks with walker and got up in middle of night to get food and slipped and fell on his R side. Pt has been having R hip pain since with inability to ambulate. Pts family finally convinced him to come to hospital today. Pt is having 10/10 pain currently. Denies dizziness, CP or LOC prior to fall. Denies head trauma. No HA. (20 Mar 2023 12:02)    3/22: Patient S/P ORIF of right hip using interlocking intramedullary stephon. - upgraded for hypotension in PACU  4/5  Trach and PEG    24HRS EVENT:  4/18   NIGHT  -hypotensive to 80/50s when asleep, bolused 5% albumin 250cc   -remained hypotensive, restarted peripheral levophed  -expiratory wheezing, got Proventil tx     Day  CPAP 8/8 until 1pm (tachypneic), placed back on AC  senna started   episode of hypoxia to 94 , damaris 30s resolved after suctioning 5pm       [] A ten-point review of systems was otherwise negative except as noted above.  [x] Due to altered mental status/intubation, subjective information was not attained from   the patient. History was obtained, to the extent possible, from review of the chart and collateral sources of information.  =================================================================  Daily     Daily   Diet, NPO with Tube Feed:   Tube Feeding Modality: Gastrostomy  Peptamen A.F. Formula  Total Volume for 24 Hours (mL): 1080  Continuous  Starting Tube Feed Rate mL per Hour: 45     Every 4 hours  Until Goal Tube Feed Rate (mL per Hour): 45  Tube Feed Duration (in Hours): 24  Tube Feed Start Time: 10:00 (04-14-23 @ 02:27)    CURRENT MEDS:  Neurologic Medications  fentaNYL    Injectable 25 MICROGram(s) IV Push every 3 hours PRN Severe Pain (7 - 10)  metoclopramide Injectable 5 milliGRAM(s) IV Push every 8 hours    Respiratory Medications  albuterol    0.083% 2.5 milliGRAM(s) Nebulizer every 8 hours  albuterol    90 MICROgram(s) HFA Inhaler 1 Puff(s) Inhalation every 8 hours  guaifenesin/dextromethorphan Oral Liquid 10 milliLiter(s) Oral every 4 hours    Cardiovascular Medications  doxazosin 4 milliGRAM(s) Oral at bedtime  midodrine 5 milliGRAM(s) Oral every 8 hours  norepinephrine Infusion 0.01 MICROgram(s)/kG/Min IV Continuous <Continuous>    Gastrointestinal Medications  famotidine    Tablet 20 milliGRAM(s) Oral daily  senna 2 Tablet(s) Oral at bedtime  sodium chloride 0.9% lock flush 10 milliLiter(s) IV Push every 1 hour PRN Pre/post blood products, medications, blood draw, and to maintain line patency    Genitourinary Medications    Hematologic/Oncologic Medications  aspirin  chewable 81 milliGRAM(s) Oral daily  clopidogrel Tablet 75 milliGRAM(s) Oral daily  heparin   Injectable 5000 Unit(s) SubCutaneous every 8 hours    Antimicrobial/Immunologic Medications    Endocrine/Metabolic Medications  atorvastatin 80 milliGRAM(s) Oral at bedtime  insulin lispro (ADMELOG) corrective regimen sliding scale   SubCutaneous every 6 hours    Topical/Other Medications  atropine 1% Solution 1 Drop(s) Left EYE two times a day  chlorhexidine 0.12% Liquid 15 milliLiter(s) Oral Mucosa every 12 hours  chlorhexidine 2% Cloths 1 Application(s) Topical <User Schedule>  prednisoLONE acetate 1% Suspension 1 Drop(s) Left EYE every 6 hours    ICU Vital Signs Last 24 Hrs  T(C): 36.7 (19 Apr 2023 04:00), Max: 36.7 (18 Apr 2023 16:00)  T(F): 98 (19 Apr 2023 04:00), Max: 98 (18 Apr 2023 16:00)  HR: 109 (19 Apr 2023 06:00) (86 - 116)  BP: 115/59 (19 Apr 2023 06:00) (97/53 - 168/84)  BP(mean): 83 (19 Apr 2023 06:00) (68 - 118)  ABP: 1/1 (19 Apr 2023 06:00) (1/1 - 166/86)  ABP(mean): 1 (19 Apr 2023 06:00) (1 - 113)  RR: 25 (19 Apr 2023 06:00) (23 - 27)  SpO2: 97% (19 Apr 2023 06:00) (95% - 99%)    O2 Parameters below as of 18 Apr 2023 19:00  Patient On (Oxygen Delivery Method): ventilator          Mode: AC/ CMV (Assist Control/ Continuous Mandatory Ventilation)  RR (machine): 22  TV (machine): 450  FiO2: 40  PEEP: 8  ITime: 1  MAP: 11  PIP: 19    ABG - ( 19 Apr 2023 05:34 )  pH, Arterial: 7.39  pH, Blood: x     /  pCO2: 43    /  pO2: 207   / HCO3: 26    / Base Excess: 0.8   /  SaO2: 100.0             I&O's Summary    18 Apr 2023 07:01  -  19 Apr 2023 07:00  --------------------------------------------------------  IN: 1486.9 mL / OUT: 1890 mL / NET: -403.1 mL      I&O's Detail    18 Apr 2023 07:01  -  19 Apr 2023 07:00  --------------------------------------------------------  IN:    Norepinephrine: 221.9 mL    Peptamen A.F.: 1265 mL  Total IN: 1486.9 mL    OUT:    Indwelling Catheter - Urethral (mL): 1890 mL  Total OUT: 1890 mL    Total NET: -403.1 mL          PHYSICAL EXAM:     GCS 11T  follows commands, CORRAL  no acute distress  equal chest rise b/l with b/l breath sounds noted  abdomen soft, nondistended and nontender with peg tube in place  extremities soft with SCDs in place  urinary cath in place with scrotal edema noted       RADHA RAMON   [id number]   [birth year]  77yM    Admit Date/LOS: 03-20 (2d)  Indication for SICU: Hemodynamic monitoring. 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 Pt walks with walker and got up in middle of night to get food and slipped and fell on his R side. Pt has been having R hip pain since with inability to ambulate. Pts family finally convinced him to come to hospital today. Pt is having 10/10 pain currently. Denies dizziness, CP or LOC prior to fall. Denies head trauma. No HA. (20 Mar 2023 12:02)    3/22: Patient S/P ORIF of right hip using interlocking intramedullary stephon. - upgraded for hypotension in PACU  4/5  Trach and PEG    24HRS EVENT:  4/18   NIGHT  -hypotensive to 80/50s when asleep, bolused 5% albumin 250cc   -remained hypotensive, restarted peripheral levophed  -expiratory wheezing, got Proventil tx     Day  CPAP 8/8 until 1pm (tachypneic), placed back on AC  senna started   episode of hypoxia to 94 , damaris 30s resolved after suctioning 5pm       [] A ten-point review of systems was otherwise negative except as noted above.  [x] Due to altered mental status/intubation, subjective information was not attained from   the patient. History was obtained, to the extent possible, from review of the chart and collateral sources of information.  =================================================================     Daily   Diet, NPO with Tube Feed:   Tube Feeding Modality: Gastrostomy  Peptamen A.F. Formula  Total Volume for 24 Hours (mL): 1080  Continuous  Starting Tube Feed Rate mL per Hour: 45     Every 4 hours  Until Goal Tube Feed Rate (mL per Hour): 45  Tube Feed Duration (in Hours): 24  Tube Feed Start Time: 10:00 (04-14-23 @ 02:27)    CURRENT MEDS:  Neurologic Medications  fentaNYL    Injectable 25 MICROGram(s) IV Push every 3 hours PRN Severe Pain (7 - 10)  metoclopramide Injectable 5 milliGRAM(s) IV Push every 8 hours    Respiratory Medications  albuterol    0.083% 2.5 milliGRAM(s) Nebulizer every 8 hours  albuterol    90 MICROgram(s) HFA Inhaler 1 Puff(s) Inhalation every 8 hours  guaifenesin/dextromethorphan Oral Liquid 10 milliLiter(s) Oral every 4 hours    Cardiovascular Medications  doxazosin 4 milliGRAM(s) Oral at bedtime  midodrine 5 milliGRAM(s) Oral every 8 hours  norepinephrine Infusion 0.01 MICROgram(s)/kG/Min IV Continuous <Continuous>    Gastrointestinal Medications  famotidine    Tablet 20 milliGRAM(s) Oral daily  senna 2 Tablet(s) Oral at bedtime  sodium chloride 0.9% lock flush 10 milliLiter(s) IV Push every 1 hour PRN Pre/post blood products, medications, blood draw, and to maintain line patency    Genitourinary Medications    Hematologic/Oncologic Medications  aspirin  chewable 81 milliGRAM(s) Oral daily  clopidogrel Tablet 75 milliGRAM(s) Oral daily  heparin   Injectable 5000 Unit(s) SubCutaneous every 8 hours    Antimicrobial/Immunologic Medications    Endocrine/Metabolic Medications  atorvastatin 80 milliGRAM(s) Oral at bedtime  insulin lispro (ADMELOG) corrective regimen sliding scale   SubCutaneous every 6 hours    Topical/Other Medications  atropine 1% Solution 1 Drop(s) Left EYE two times a day  chlorhexidine 0.12% Liquid 15 milliLiter(s) Oral Mucosa every 12 hours  chlorhexidine 2% Cloths 1 Application(s) Topical <User Schedule>  prednisoLONE acetate 1% Suspension 1 Drop(s) Left EYE every 6 hours    ICU Vital Signs Last 24 Hrs  T(C): 36.7 (19 Apr 2023 04:00), Max: 36.7 (18 Apr 2023 16:00)  T(F): 98 (19 Apr 2023 04:00), Max: 98 (18 Apr 2023 16:00)  HR: 109 (19 Apr 2023 06:00) (86 - 116)  BP: 115/59 (19 Apr 2023 06:00) (97/53 - 168/84)  BP(mean): 83 (19 Apr 2023 06:00) (68 - 118)  ABP: 1/1 (19 Apr 2023 06:00) (1/1 - 166/86)  ABP(mean): 1 (19 Apr 2023 06:00) (1 - 113)  RR: 25 (19 Apr 2023 06:00) (23 - 27)  SpO2: 97% (19 Apr 2023 06:00) (95% - 99%)    O2 Parameters below as of 18 Apr 2023 19:00  Patient On (Oxygen Delivery Method): ventilator      Mode: AC/ CMV (Assist Control/ Continuous Mandatory Ventilation)  RR (machine): 22  TV (machine): 450  FiO2: 40  PEEP: 8  ITime: 1  MAP: 11  PIP: 19    ABG - ( 19 Apr 2023 05:34 )  pH, Arterial: 7.39  pH, Blood: x     /  pCO2: 43    /  pO2: 207   / HCO3: 26    / Base Excess: 0.8   /  SaO2: 100.0     I&O's Summary    18 Apr 2023 07:01  -  19 Apr 2023 07:00  --------------------------------------------------------  IN: 1486.9 mL / OUT: 1890 mL / NET: -403.1 mL      I&O's Detail    18 Apr 2023 07:01  -  19 Apr 2023 07:00  --------------------------------------------------------  IN:    Norepinephrine: 221.9 mL    Peptamen A.F.: 1265 mL  Total IN: 1486.9 mL    OUT:    Indwelling Catheter - Urethral (mL): 1890 mL  Total OUT: 1890 mL    Total NET: -403.1 mL      PHYSICAL EXAM:     GCS 11T  follows commands, CORRAL  no acute distress  equal chest rise with b/l breath sounds noted  abdomen soft, nondistended and nontender with peg tube in place  extremities soft with SCDs in place  Right hip dressing clean and dry with no drainage or hematoma noted  urinary cath in place with scrotal edema noted

## 2023-04-20 NOTE — PROGRESS NOTE ADULT - ASSESSMENT
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   S/P ORIF of right hip using interlocking intramedullary setphon.  S/P Trach & PEG 4/5     NEURO:  #Acute pain    -Fentanyl 25mcg IVP PRN  #sedation    - off nimbex 4/12    - off propofol 4/13     - off all sedation 4/17 AM     RESP:   #Acute respiratory failure secondary to post operative arrhythmias requiring mechanical ventilation (Intubated 3/23)    - Albuterol  - BAL 4/15: little secretions appreciated, no sample was sent  #Pneumonia   - Pseudomonas on culture, sensitive to Cefepime -started on 4/10, end date 4/17     -s/p trach 4/5/23 with size 8    -s/p bronch 4/8 BAL- no organisms    -4/11 BAL - pseudomonas    -4/11 blood cx - neg    -AM CXR     -Pulm c/s - avoid volume overload and diuresis prn     -Albuterol, Robitussin    -Vent Settings: AC Volume  450/22/40/8 --> PS as tolerated   #PE w/u NEGATIVE on 3/28    CARDS:   #Tachycardia     - Propanolol - Discontinued 4/10 d/t bradycardic events  #Bradycardia       - 4/10: Episodes x2 bradycardia to 28. Atropine given second episode.      - EP: 4/11 --> signing off, re-consult PRN, will not be doing PPM since bradycardia likely due to hypoxia     - Trops: 0.08 -> 0.09-- seen by cards likely demand ischemia     - 4/18: Episode x1 bradycardia to 30s --> resolved after suctioning trach   #acute hypotension    - off levo and vaso 4/17    - d/c Midodrine 10mg q8, SBP in 160s, MAPs high 90s    - restarted Midodrine 5m q8 4/19   Intermittent hypotensive episodes 4/10 during bradycardic episodes     -off levo 4/13    - Restarted vaso at 0.03- sensitve, becomes hypertensive,  off 4/15    - Restarted on levo 4/16 as MAPs below 60s -- off 4/16 PM     - restarted Bumex 1mg q12- will hold due to intermittent use of pressors     - increased Bumex 2 mg q12 4/17     - Restarted Levo 4/18 overnight --> d/c during the day and restarted 4/19 overnight   #Labs/Imaging    -Echo 3/23- EF appears normal, mild LVH, sclerotic AoV, trace MR,     -Echo 3/29: EF 60-65%, norm LV function, borderline pulm HTN.     -EKG 4/10 most recent: , Sinus Tachycardia, low voltage QRS, incomplete RBBB    -EKG 4/11: , Sinus tach; low voltage QRS    -Echo 4/11: EF 70-75%, Hyperdynamic LV, mild MVR, aortic wall thickening    -EKG 4/18: NSR, RBBB  #HLD    -Atorvastatin 80mg HS  #HTN    -dc'd amlodipine 5mg qd for HTN (4/18)    GI/NUTR:   #Diet: restarted TF Peptamen AF increased to goal of 45 ml/h       -Provides 1350 kcal and 85.5 gms protein     -PEG 3cm at skin, 4cm at bumper  #Nausea: Zofran prn    - Aspiration precautions, HOB 30    -? dysmotility- Reglan ATC IV 5mg q8    - KUB ordered 4/15 - normal bowel gas pattern  #GI Prophylaxis    - Pepcid  #Bowel regimen    - senna    - last BM (4/19) - 1 x small blood clots  #Imaging    -CT abdomen 3/28: No definite infectious source identified      -RUQ sono (3/28): biliary sludge, mild GB wall thickening/edema    -KUB (4/15): normal bowel gas pattern     /RENAL:   #UO: Blair placed by Urology 3/22 (d/w urology and assess scrotal swelling prior to removal)     - on Doxazosin 4 milliGRAM(s) Oral at bedtime (Home rx: Flomax 0.4mg HS)    - US kidney bladder (4/14) demonstrated: No hydronephrosis or calculi, approximate 8.4 cm cyst, left kidney (present on earlier CT scan), blair catheter within collapsed urinary bladder.  #acute scrotal swelling    - U/S 3/29 w/ diffused scrotal wall edema (keep blair) - blair has minimal output 4/17, replaced 4/17    - scrotal elevation  #urine output in critically ill    -indwelling blair (placed 3/22) - replaced (4/17)     -VAISHNAVI, oliguria, improving u/o  ml/hr    -IVF LR @30cc -- D/C'd 4/17    - urine out put decreased to 10cc per hour around 2 am (4/16) given albumin 250cc bolus with improvement now 30cc/hr -- has minimal output 4/17, replaced (4/17)   - Albumin 25% 50cc x1 (4/19)    Labs:          BUN/Cr- 71/1.7  -->,  77/1.6  -->          Electrolytes-Na 144 // K 4.0 // Mg 2.0 //  Phos 2.8 (04-19 @ 20:26)  #Diuresis     -20 Lasix 4/9- unresponsive, given 40 Lasix overnight, will monitor output/ response      -80 Lasix, 5 Metozalone 4/10, responded (4/10)    -Nephrology: Bumex 1g BID, US K&B  follow up intravascular assessment             - restarted 4/16             - increased to Bumex 2mg BID 4/17    HEME/ONC:   #DVT prophylaxis    -SQH, SCDs  #H/O CAD s/p stents x3    - ASA 81    - Plavix 75 restarted 4/7    Labs: Hb/Hct:  9.8/30.8  -->,  8.7/27.4  -->                      Plts:  184  -->,  169  -->                 PTT/INR:            Home Rx: clopidogrel 75 mg oral tablet: 1 tab(s) orally once a day, Aspirin 81mg QD  T&S Expires: 4/22    ID:  #Acute clostridium difficile 3/30    Completed course of po Vanco on 4/13  WBC- 13.10  --->>,  15.67  --->>,  15.77  --->>  Temp trend- 24hrs T(F): 98 (04-20 @ 00:00), Max: 98.3 (04-19 @ 16:00)  Antibiotics:    -Ended 4/14: Caspofungin    -Ended 4/17: Cefepime IVPB 1000mg every 12 hours (started 4/10)   -Ended 4/13: vancomycin Solution 125 every 6 hours   -DC'd vanc IV 4/13  Cultures:    BCx 4/11 -negative    BAL 4/10- Pseudomonas     BAL 4/8- negative     ET Sputum 3/29: negative     BCx 3/28: NGTD    UA 3/28: Negative     MRSA 3/26: Negative    UA 3/24: negative    ET Sputum 3/24-- neg    CDiff PCR 3/30: Positive    Procalcitonin 0.32  # +Candida Auris surveillance swab on 4/6/23 (nares, axilla, groin)    ENDO:  #DM    -HA1C= 5.0    -ISS    -Glucose goal 140-180   - D/C'd 20 mg solumedrol - no current indication     MSK:  #Activity   -WBAT RLE per ortho, PT/OT when clinically appropriate   -Right hip Xray 2 views per ortho (4/9) --> s/p ORIF of comminuted intertrochanteric Rt femur fx w/out definite change compared to 3/22/23    LINES/DRAINS:    PIV  Blair (3/22) -- replaced (4/17)   Trach  PEG   Right radial a-line (4/10)  Left cephalic midline (4/18)    ADVANCED DIRECTIVES:  DNR    HCP/Emergency Contact- Song Segura (Son): [phone]    INDICATION FOR SICU: Hypotension with pressor requirements. Intubated.       DISPO: SICU

## 2023-04-20 NOTE — PROGRESS NOTE ADULT - SUBJECTIVE AND OBJECTIVE BOX
RADHA RAMON   [id number]   [birth year]  77yM    Admit Date/LOS: 03-20 (2d)  Indication for SICU: Hemodynamic monitoring. 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 Pt walks with walker and got up in middle of night to get food and slipped and fell on his R side. Pt has been having R hip pain since with inability to ambulate. Pts family finally convinced him to come to hospital today. Pt is having 10/10 pain currently. Denies dizziness, CP or LOC prior to fall. Denies head trauma. No HA. (20 Mar 2023 12:02)    3/22: Patient S/P ORIF of right hip using interlocking intramedullary stephon. - upgraded for hypotension in PACU  4/5  Trach and PEG    24HRS EVENT:  NIGHT:  -Hgb 9.8-->8.7--> AM CBC  -cloudy urine noted --> will monitor   -PM Rounds: following commands, trach and G tube in place, b/l pitting edema noted   -Phos repleted  -Last BM over 72hrs --> may want to add additional bowel reg  -Low dose levo restarted     DAY  - NE weaning  - Albumin 25% x1 given likely intravascular depletion  - Diurese if weaned of levo (afternoon?)   -SBP goal >110      [] A ten-point review of systems was otherwise negative except as noted above.  [x] Due to altered mental status/intubation, subjective information was not attained from   the patient. History was obtained, to the extent possible, from review of the chart and collateral sources of information.  =================================================================     RADHA RAMON   [id number]   [birth year]  77yM     Admit Date/LOS: 03-20 (2d)  Indication for SICU: Hemodynamic monitoring. 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 Pt walks with walker and got up in middle of night to get food and slipped and fell on his R side. Pt has been having R hip pain since with inability to ambulate. Pts family finally convinced him to come to hospital today. Pt is having 10/10 pain currently. Denies dizziness, CP or LOC prior to fall. Denies head trauma. No HA. (20 Mar 2023 12:02)    3/22: Patient S/P ORIF of right hip using interlocking intramedullary stephon. - upgraded for hypotension in PACU  4/5  Trach and PEG    24HRS EVENT:  NIGHT:  -Hgb 9.8-->8.7--> AM CBC  -cloudy urine noted --> will monitor   -PM Rounds: following commands, trach and G tube in place, b/l pitting edema noted   -Phos repleted  -Last BM over 72hrs --> may want to add additional bowel reg  -Low dose levo restarted     DAY  - NE weaning  - Albumin 25% x1 given likely intravascular depletion  - Diurese if weaned of levo (afternoon?)   -SBP goal >110      [] A ten-point review of systems was otherwise negative except as noted above.  [x] Due to altered mental status/intubation, subjective information was not attained from   the patient. History was obtained, to the extent possible, from review of the chart and collateral sources of information.  =================================================================     RADHA RAMON   [id number]   [birth year]  77yM     Admit Date/LOS:  (2d)  Indication for SICU: Hemodynamic monitoring. 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 Pt walks with walker and got up in middle of night to get food and slipped and fell on his R side. Pt has been having R hip pain since with inability to ambulate. Pts family finally convinced him to come to hospital today. Pt is having 10/10 pain currently. Denies dizziness, CP or LOC prior to fall. Denies head trauma. No HA. (20 Mar 2023 12:02)    3/22: Patient S/P ORIF of right hip using interlocking intramedullary stephon. - upgraded for hypotension in PACU  4/5  Trach and PEG    24HRS EVENT:  NIGHT  -Hgb 9.8-->8.7--> AM CBC  -cloudy urine noted --> will monitor   -PM Rounds: following commands, trach and G tube in place, b/l pitting edema noted   -Phos repleted  -Last BM over 72hrs --> may want to add miralax as additional bowel reg  -levo restarted 0.3  -AM AB.38, 42, 126, 25, 99.8  -sodium phos given     DAY  - NE weaning  - Albumin 25% x1 given likely intravascular depletion  - Diurese if weaned of levo (afternoon?)   -SBP goal >110      [] A ten-point review of systems was otherwise negative except as noted above.  [x] Due to altered mental status/intubation, subjective information was not attained from   the patient. History was obtained, to the extent possible, from review of the chart and collateral sources of information.  =================================================================    Daily     Daily     Diet, NPO with Tube Feed:   Tube Feeding Modality: Gastrostomy  Peptamen A.F. Formula  Total Volume for 24 Hours (mL): 1080  Continuous  Starting Tube Feed Rate mL per Hour: 45     Every 4 hours  Until Goal Tube Feed Rate (mL per Hour): 45  Tube Feed Duration (in Hours): 24  Tube Feed Start Time: 10:00 (23 @ 02:27)      CURRENT MEDS:  Neurologic Medications  metoclopramide Injectable 5 milliGRAM(s) IV Push every 8 hours    Respiratory Medications  albuterol    0.083% 2.5 milliGRAM(s) Nebulizer every 8 hours  albuterol    90 MICROgram(s) HFA Inhaler 1 Puff(s) Inhalation every 8 hours  guaifenesin/dextromethorphan Oral Liquid 10 milliLiter(s) Oral every 4 hours    Cardiovascular Medications  doxazosin 4 milliGRAM(s) Oral at bedtime  midodrine 5 milliGRAM(s) Oral every 8 hours  norepinephrine Infusion 0.01 MICROgram(s)/kG/Min IV Continuous <Continuous>    Gastrointestinal Medications  famotidine    Tablet 20 milliGRAM(s) Oral daily  senna 2 Tablet(s) Oral at bedtime  sodium chloride 0.9% lock flush 10 milliLiter(s) IV Push every 1 hour PRN Pre/post blood products, medications, blood draw, and to maintain line patency    Genitourinary Medications    Hematologic/Oncologic Medications  aspirin  chewable 81 milliGRAM(s) Oral daily  clopidogrel Tablet 75 milliGRAM(s) Oral daily  heparin   Injectable 5000 Unit(s) SubCutaneous every 8 hours    Antimicrobial/Immunologic Medications    Endocrine/Metabolic Medications  atorvastatin 80 milliGRAM(s) Oral at bedtime  insulin lispro (ADMELOG) corrective regimen sliding scale   SubCutaneous every 6 hours    Topical/Other Medications  atropine 1% Solution 1 Drop(s) Left EYE two times a day  chlorhexidine 0.12% Liquid 15 milliLiter(s) Oral Mucosa every 12 hours  chlorhexidine 2% Cloths 1 Application(s) Topical <User Schedule>  prednisoLONE acetate 1% Suspension 1 Drop(s) Left EYE every 6 hours    ICU Vital Signs Last 24 Hrs  T(C): 36.7 (2023 04:00), Max: 36.8 (2023 12:00)  T(F): 98 (2023 04:00), Max: 98.3 (2023 16:00)  HR: 102 (2023 06:00) (98 - 120)  BP: 122/65 (2023 06:00) (97/51 - 144/66)  BP(mean): 88 (2023 06:00) (69 - 95)  ABP: 152/51 (2023 06:00) (95/40 - 152/51)  ABP(mean): 77 (2023 06:00) (53 - 88)  RR: 32 (2023 06:00) (20 - 44)  SpO2: 100% (2023 06:00) (97% - 100%)    O2 Parameters below as of 2023 16:00  Patient On (Oxygen Delivery Method): ventilator    Mode: AC/ CMV (Assist Control/ Continuous Mandatory Ventilation)  RR (machine): 22  TV (machine): 450  FiO2: 40  PEEP: 8  ITime: 1  MAP: 12  PIP: 24    ABG - ( 2023 05:26 )  pH, Arterial: 7.38  pH, Blood: x     /  pCO2: 42    /  pO2: 126   / HCO3: 25    / Base Excess: -0.4  /  SaO2: 99.8      I&O's Summary    2023 07:01  -  2023 07:00  --------------------------------------------------------  IN: 1253.1 mL / OUT: 1520 mL / NET: -266.9 mL    I&O's Detail  2023 07:01  -  2023 07:00  --------------------------------------------------------  IN:    Norepinephrine: 54.7 mL    Norepinephrine: 163.4 mL    Peptamen A.F.: 1035 mL  Total IN: 1253.1 mL  OUT:    Indwelling Catheter - Urethral (mL): 1520 mL  Total OUT: 1520 mL  Total NET: -266.9 mL    PHYSICAL EXAM:   Daily     Daily   Diet, NPO with Tube Feed:   Tube Feeding Modality: Gastrostomy  Peptamen A.F. Formula  Total Volume for 24 Hours (mL): 1080  Continuous  Starting Tube Feed Rate mL per Hour: 45     Every 4 hours  Until Goal Tube Feed Rate (mL per Hour): 45  Tube Feed Duration (in Hours): 24  Tube Feed Start Time: 10:00 (23 @ 02:27)    CURRENT MEDS:  Neurologic Medications  metoclopramide Injectable 5 milliGRAM(s) IV Push every 8 hours    Respiratory Medications  albuterol    0.083% 2.5 milliGRAM(s) Nebulizer every 8 hours  albuterol    90 MICROgram(s) HFA Inhaler 1 Puff(s) Inhalation every 8 hours  guaifenesin/dextromethorphan Oral Liquid 10 milliLiter(s) Oral every 4 hours    Cardiovascular Medications  doxazosin 4 milliGRAM(s) Oral at bedtime  midodrine 5 milliGRAM(s) Oral every 8 hours  norepinephrine Infusion 0.01 MICROgram(s)/kG/Min IV Continuous <Continuous>    Gastrointestinal Medications  famotidine    Tablet 20 milliGRAM(s) Oral daily  senna 2 Tablet(s) Oral at bedtime  sodium chloride 0.9% lock flush 10 milliLiter(s) IV Push every 1 hour PRN Pre/post blood products, medications, blood draw, and to maintain line patency    Genitourinary Medications    Hematologic/Oncologic Medications  aspirin  chewable 81 milliGRAM(s) Oral daily  clopidogrel Tablet 75 milliGRAM(s) Oral daily  heparin   Injectable 5000 Unit(s) SubCutaneous every 8 hours    Antimicrobial/Immunologic Medications    Endocrine/Metabolic Medications  atorvastatin 80 milliGRAM(s) Oral at bedtime  insulin lispro (ADMELOG) corrective regimen sliding scale   SubCutaneous every 6 hours    Topical/Other Medications  atropine 1% Solution 1 Drop(s) Left EYE two times a day  chlorhexidine 0.12% Liquid 15 milliLiter(s) Oral Mucosa every 12 hours  chlorhexidine 2% Cloths 1 Application(s) Topical <User Schedule>  prednisoLONE acetate 1% Suspension 1 Drop(s) Left EYE every 6 hours    ICU Vital Signs Last 24 Hrs  T(C): 36.7 (2023 04:00), Max: 36.8 (2023 12:00)  T(F): 98 (2023 04:00), Max: 98.3 (2023 16:00)  HR: 102 (2023 06:00) (98 - 120)  BP: 122/65 (2023 06:00) (97/51 - 144/66)  BP(mean): 88 (2023 06:00) (69 - 95)  ABP: 152/51 (2023 06:00) (95/40 - 152/51)  ABP(mean): 77 (2023 06:00) (53 - 88)  RR: 32 (2023 06:00) (20 - 44)  SpO2: 100% (2023 06:00) (97% - 100%)    O2 Parameters below as of 2023 16:00  Patient On (Oxygen Delivery Method): ventilator      Mode: AC/ CMV (Assist Control/ Continuous Mandatory Ventilation)  RR (machine): 22  TV (machine): 450  FiO2: 40  PEEP: 8  ITime: 1  MAP: 12  PIP: 24    ABG - ( 2023 05:26 )  pH, Arterial: 7.38  pH, Blood: x     /  pCO2: 42    /  pO2: 126   / HCO3: 25    / Base Excess: -0.4  /  SaO2: 99.8      I&O's Summary    2023 07:01  -  2023 07:00  --------------------------------------------------------  IN: 1253.1 mL / OUT: 1520 mL / NET: -266.9 mL    I&O's Detail    2023 07:01  -  2023 07:00  --------------------------------------------------------  IN:    Norepinephrine: 54.7 mL    Norepinephrine: 163.4 mL    Peptamen A.F.: 1035 mL  Total IN: 1253.1 mL  OUT:    Indwelling Catheter - Urethral (mL): 1520 mL  Total OUT: 1520 mL  Total NET: -266.9 mL      PHYSICAL EXAM:   General: NAD  HEENT: NCAT; trach in place   Cardiac: S1, S2  Respiratory: normal respiratory effort, b/l breath sounds   Abdomen: Soft, non-distended  Musculoskeletal: generalized edema   Skin: no jaundice

## 2023-04-20 NOTE — PROGRESS NOTE ADULT - ATTENDING COMMENTS
Patient is arousable, follows simple commands.  On Levo 0.12 mcg/kg/min  On Vent support, FiO2 40%, PEEP 8.  Placed on CPAP this am.  Moderate secretions from trach.    ASSESSMENT:  78 y/o male, S/P Fall.  Right Intertrochanteric Femur Fracture. S/P ORIF.  Acute respiratory failure.  S/P Tracheostomy and PEG  Dysphagia.  Hypotension.    PLAN:  - pain control prn  - put on CPAP as tolerated  - keep MAP>65; Bumex 2 mg iv x1 today  - wean Levo off  - continue tube feeds; bowel regiment  - follow serum electrolytes and UOP  - ID: universal precautions  - DVT prophylaxis  Continue SICU care

## 2023-04-21 NOTE — CONSULT NOTE ADULT - ATTENDING COMMENTS
patient seen in PACU. has CAD hx and had hip surgery. tachycardic and hypotensive. will resuscitate with iv fluids and blood transfusion as needed. Resp- on mechanical ventilation- wean as toelrated. renal- will place blair catheter and monitor urine output. Sedation and pain control as needed. remains critical.
events noted, long hosp stay, sp fall, ORIF, pneumonia sp trach/ peg, fluid overload, diuresis, abx, LE doppler vent unit, GOC
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 S/P ORIF of right hip using interlocking intramedullary stephon. Hospital stay complicated by septic shock and PNA S/P Trach & PEG with recent VAISHNAVI.    VAISHNAVI/ pulmonary edema/ septic shock resolved/ Hip fracture s/p ORIF  VAISHNAVI likely ATN due to hypotension, and likely CRS  pulmonary edema, balance grossly positive since admission  on vasopressin, non oliguric  start bumex 1 mg iv bid, no IVF  Fluid and Na restriction  US kidneys and bladder r/o retention  strict Is and Os  2D Echo if not a recent one
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 presents after fall at home    Impression:  #Right hip fracture - s/p ORIF/IMN 3/22   #Post-op Fevers  #Fevers 3/28   - on zosyn since 3/24   - CTA Chest 3/28 -- occlusion of bronchus intermedius   - CT Abd/pelvis 3/28 - no definit infectious source - nonspecidic mild gallbladder edmea and distension -- intramcular hemorrhage/edema by right fluteaus - possible hematoma       Recommendations  - check GGT -- HIDA scan if persistently febrile to evaluate for acalculous cholecystitis - trend LFTs   - check testicular US -- swollen/echymottic appearing but no induration on exam   - check RUE US for asymmetric swelling   - continue zosyn for now   - follow-up blood Cx 3/27   - follow-up sputum cx 3/28     Please call or message on [employer] Teams if with any questions.  Spectra [phone]
I edited the note
Agree with above     not good candidate for aggressive cardiac ischemic workup at this point, given his underlying comorbidities and as of echo showed  normal EF 70% hyperdynamic, with no evidence of acute ischemia or cardiogenic shock at this point.   bradycardia in setting of hypoxia as per EP,   currently patient sinus tachy, no need for TVP at this point, follow up with EP     Plan as outlined above
Patient seen and examined   assessment and plan as outlined above

## 2023-04-21 NOTE — CONSULT NOTE ADULT - CONSULT REQUESTED DATE/TIME
29-Mar-2023 10:26
10-Apr-2023 14:45
10-Apr-2023 15:29
23-Mar-2023 16:10
24-Mar-2023 19:14
11-Apr-2023 13:55
16-Apr-2023 22:00
21-Apr-2023 14:40
20-Mar-2023 18:32
22-Mar-2023 14:48
22-Mar-2023 17:03
13-Apr-2023 15:11
12-Apr-2023

## 2023-04-21 NOTE — CONSULT NOTE ADULT - SUBJECTIVE AND OBJECTIVE BOX
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 Gi was consulted for rectal bleeding large episodes since yesterday with blood clots.       Prior EGD: < from: EGD (10.13.21 @ 11:00) >  Other Interventions:   Impressions:    Normal mucosa in the gastroesophageal junction.   Erythema in the stomach compatible with non-erosive gastritis.    Normal mucosa in the whole examined duodenum.     < end of copied text >    Prior Colonoscopy: < from: Colonoscopy (10.15.21 @ 14:00) >  Other Interventions:   Impressions:    Polyp (1 cm) in the rectosigmoid junction.    Moderate diverticulosis of the whole colon.    Grade/Stage IV internal hemorrhoids.     < end of copied text >        PAST MEDICAL & SURGICAL HISTORY:  CAD (coronary artery disease)      Hypercholesteremia      BPH (benign prostatic hyperplasia)      Kidney stones      Prostate cancer      Status post cardiac surgery  cardiac stents      Bilateral cataracts      History of lithotripsy          FAMILY HISTORY:  FH: colon cancer (Mother)        Social History:  Tobacco: N   Alcohol: N  Drugs: N    Home Medications:  atropine 1% ophthalmic solution: 1 drop(s) to each affected eye 2 times a day (20 Mar 2023 15:44)  erythromycin 0.5% ophthalmic ointment: 1 application to each affected eye 2 times a day (20 Mar 2023 15:44)  prednisoLONE acetate 1% ophthalmic suspension: 1 drop(s) to each affected eye every 6 hours (20 Mar 2023 15:44)  tamsulosin 0.4 mg oral capsule: 1 cap(s) orally once a day (at bedtime)  - if you continue to have your blood pressures drop when you stand, then this medication will need to be STOPPED (20 Mar 2023 15:44)    MEDICATIONS  (STANDING):  albuterol    0.083% 2.5 milliGRAM(s) Nebulizer every 8 hours  albuterol    90 MICROgram(s) HFA Inhaler 1 Puff(s) Inhalation every 8 hours  aspirin  chewable 81 milliGRAM(s) Oral daily  atorvastatin 80 milliGRAM(s) Oral at bedtime  atropine 1% Solution 1 Drop(s) Left EYE two times a day  chlorhexidine 0.12% Liquid 15 milliLiter(s) Oral Mucosa every 12 hours  chlorhexidine 2% Cloths 1 Application(s) Topical <User Schedule>  clopidogrel Tablet 75 milliGRAM(s) Oral daily  doxazosin 4 milliGRAM(s) Oral at bedtime  guaifenesin/dextromethorphan Oral Liquid 10 milliLiter(s) Oral every 4 hours  heparin   Injectable 5000 Unit(s) SubCutaneous every 8 hours  insulin lispro (ADMELOG) corrective regimen sliding scale   SubCutaneous every 6 hours  metoclopramide Injectable 5 milliGRAM(s) IV Push every 8 hours  midodrine 10 milliGRAM(s) Oral every 8 hours  norepinephrine Infusion 0.01 MICROgram(s)/kG/Min (1.19 mL/Hr) IV Continuous <Continuous>  pantoprazole  Injectable 40 milliGRAM(s) IV Push every 12 hours  prednisoLONE acetate 1% Suspension 1 Drop(s) Left EYE every 6 hours  predniSONE   Tablet 10 milliGRAM(s) Oral daily  senna 2 Tablet(s) Oral at bedtime    MEDICATIONS  (PRN):  sodium chloride 0.9% lock flush 10 milliLiter(s) IV Push every 1 hour PRN Pre/post blood products, medications, blood draw, and to maintain line patency      Allergies  No Known Allergies      Review of Systems:   unable to obtain           Physical Examination:  T(C): 36.5 (04-21-23 @ 08:00), Max: 37.1 (04-20-23 @ 20:00)  HR: 119 (04-21-23 @ 13:00) (93 - 119)  BP: 129/87 (04-21-23 @ 13:00) (108/57 - 146/66)  RR: 43 (04-21-23 @ 13:00) (21 - 43)  SpO2: 99% (04-21-23 @ 13:00) (88% - 100%)      04-19-23 @ 07:01  -  04-20-23 @ 07:00  --------------------------------------------------------  IN: 1312.5 mL / OUT: 1520 mL / NET: -207.5 mL    04-20-23 @ 07:01  -  04-21-23 @ 07:00  --------------------------------------------------------  IN: 1542.1 mL / OUT: 2305 mL / NET: -762.9 mL    04-21-23 @ 07:01  -  04-21-23 @ 14:40  --------------------------------------------------------  IN: 493.8 mL / OUT: 650 mL / NET: -156.2 mL        Constitutional: tracheostomy +  Eyes:. Conjunctivae are clear, Sclera is non-icteric.  Ears Nose and Throat: The external ears are normal appearing,  Oral mucosa is pink and moist.  Respiratory:  No signs of respiratory distress. Lung sounds are clear bilaterally.  Cardiovascular:  S1 S2, Regular rate and rhythm.  GI: Abdomen is soft, symmetric, and non-tender without distention. +PEG tube           Data:                        8.0    25.18 )-----------( 250      ( 21 Apr 2023 11:48 )             25.5     Hgb Trend:  8.0  04-21-23 @ 11:48  8.4  04-20-23 @ 20:26  8.7  04-19-23 @ 20:26  9.8  04-18-23 @ 20:48        04-20    142  |  107  |  79<HH>  ----------------------------<  163<H>  4.3   |  25  |  1.5    Ca    8.4      20 Apr 2023 20:26  Phos  3.2     04-20  Mg     1.9     04-20      Liver panel trend:  TBili 0.8   /   AST 34   /   ALT 36   /   AlkP 167   /   Tptn 4.4   /   Alb 2.3    /   DBili --      04-18  TBili 0.7   /   AST 36   /   ALT 23   /   AlkP 184   /   Tptn 4.4   /   Alb 1.9    /   DBili 0.4      04-14              Radiology:

## 2023-04-21 NOTE — PROGRESS NOTE ADULT - ASSESSMENT
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   S/P ORIF of right hip using interlocking intramedullary stephon.  S/P Trach & PEG 4/5     NEURO:  #Acute pain  - fentanyl prn  #sedation    - off all sedation 4/17 AM     RESP:   #Acute respiratory failure secondary to post operative arrhythmias requiring mechanical ventilation (Intubated 3/23)    - Albuterol  - BAL 4/15: little secretions appreciated, no sample was sent  #Pneumonia   - Pseudomonas on culture, sensitive to Cefepime -started on 4/10, end date 4/17     -s/p trach 4/5/23 with size 8    -s/p bronch 4/8 BAL- no organisms    -4/11 BAL - pseudomonas    -4/11 blood cx - neg    -AM CXR     -Pulm c/s - avoid volume overload and diuresis prn     -Albuterol, Robitussin    -Vent Settings: AC Volume  450/22/40/8 --> PS as tolerated   - 8hours CPAP on 4/20  #PE w/u NEGATIVE on 3/28    CARDS:   #Tachycardia     - Propanolol - Discontinued 4/10 d/t bradycardic events  #Bradycardia       - 4/10: Episodes x2 bradycardia to 28. Atropine given second episode.      - EP: 4/11 --> signing off, re-consult PRN, will not be doing PPM since bradycardia likely due to hypoxia     - Trops: 0.08 -> 0.09-- seen by cards likely demand ischemia     - 4/18: Episode x1 bradycardia to 30s --> resolved after suctioning trach   #acute hypotension    - off levo and vaso 4/17    - restarted Midodrine 10mg q8 4/19   Intermittent hypotensive episodes 4/10 during bradycardic episodes    - increased Bumex 2 mg q12 4/17     - Restarted Levo 4/18 overnight --> d/c during the day and restarted 4/19 overnight   #Labs/Imaging    -Echo 3/23- EF appears normal, mild LVH, sclerotic AoV, trace MR,     -Echo 3/29: EF 60-65%, norm LV function, borderline pulm HTN.     -EKG 4/10 most recent: , Sinus Tachycardia, low voltage QRS, incomplete RBBB    -EKG 4/11: , Sinus tach; low voltage QRS    -Echo 4/11: EF 70-75%, Hyperdynamic LV, mild MVR, aortic wall thickening    -EKG 4/18: NSR, RBBB  #HLD    -Atorvastatin 80mg HS  #HTN    -dc'd amlodipine 5mg qd for HTN (4/18)    GI/NUTR:   #Diet: restarted TF Peptamen AF increased to goal of 45 ml/h     -Provides 1350 kcal and 85.5 gms protein     -PEG 3cm at skin, 4cm at bumper  #Nausea    - Aspiration precautions, HOB 30    -? dysmotility- Reglan ATC IV 5mg q8    - KUB ordered 4/15 - normal bowel gas pattern  #GI Prophylaxis    - Pepcid  #Bowel regimen    - senna    - last BM (4/19) - 1 x small blood clots  #Imaging    -CT abdomen 3/28: No definite infectious source identified      -RUQ sono (3/28): biliary sludge, mild GB wall thickening/edema    -KUB (4/15): normal bowel gas pattern     /RENAL:   #UO: Blair placed by Urology 3/22 (d/w urology and assess scrotal swelling prior to removal)     - on Doxazosin 4 milliGRAM(s) Oral at bedtime (Home rx: Flomax 0.4mg HS)    - US kidney bladder (4/14) demonstrated: No hydronephrosis or calculi, approximate 8.4 cm cyst, left kidney (present on earlier CT scan), blair catheter within collapsed urinary bladder.  #acute scrotal swelling    - U/S 3/29 w/ diffused scrotal wall edema (keep blair) - blair has minimal output 4/17, replaced 4/17    - scrotal elevation  #urine output in critically ill    -indwelling blair (placed 3/22) - replaced (4/17)     -VAISHNAVI, oliguria, improving u/o  ml/hr    -IVF LR @30cc -- D/C'd 4/17    - urine out put decreased to 10cc per hour around 2 am (4/16) given albumin 250cc bolus with improvement now 30cc/hr -- has minimal output 4/17, replaced (4/17)   - Albumin 25% 50cc x1 (4/19)    Labs:          BUN/Cr- 77/1.6  -->,  79/1.5  -->          Electrolytes-Na 142 // K 4.3 // Mg 1.9 //  Phos 3.2 (04-20 @ 20:26)    #Diuresis     -Nephrology: Bumex 1g BID, US K&B  follow up intravascular assessment             - increased to Bumex 2mg BID 4/17  HEME/ONC:   #DVT prophylaxis    -SQH, SCDs  #H/O CAD s/p stents x3    - ASA 81    - Plavix 75 restarted 4/7    DVT prophylaxis-heparin   Injectable  , SCDs    Labs: Hb/Hct:  8.7/27.4  -->,  8.4/26.8  -->                      Plts:  169  -->,  209  -->                 PTT/INR:        Home Rx: clopidogrel 75 mg oral tablet: 1 tab(s) orally once a day, Aspirin 81mg QD  T&S Expires: 4/22    ID:  #Acute clostridium difficile 3/30    Completed course of po Vanco on 4/13  WBC- 15.67  --->>,  15.77  --->>,  17.46  --->>  Temp trend- 24hrs T(F): 97.5 (04-21 @ 00:00), Max: 98.7 (04-20 @ 20:00)  Antibiotics:    -Ended 4/14: Caspofungin    -Ended 4/17: Cefepime IVPB 1000mg every 12 hours (started 4/10)   -Ended 4/13: vancomycin Solution 125 every 6 hours   -DC'd vanc IV 4/13  Cultures:    BCx 4/11 -negative    BAL 4/10- Pseudomonas     BAL 4/8- negative     ET Sputum 3/29: negative     BCx 3/28: NGTD    UA 3/28: Negative     MRSA 3/26: Negative    UA 3/24: negative    ET Sputum 3/24-- neg    CDiff PCR 3/30: Positive    Procalcitonin 0.32  # +Candida Auris surveillance swab on 4/6/23 (nares, axilla, groin)    ENDO:  #DM    -HA1C= 5.0    -ISS    -Glucose goal 140-180   - D/C'd 20 mg solumedrol - no current indication     MSK:  #Activity   -WBAT RLE per ortho, PT/OT when clinically appropriate   -Right hip Xray 2 views per ortho (4/9) --> s/p ORIF of comminuted intertrochanteric Rt femur fx w/out definite change compared to 3/22/23    LINES/DRAINS:    PIV  Blair (3/22) -- replaced (4/17)   Trach  PEG   Right radial a-line (4/10)  Left cephalic midline (4/18)    ADVANCED DIRECTIVES:  DNR    HCP/Emergency Contact- Songscarlet Hindssmaninderrupal (Son): [phone]    INDICATION FOR SICU: Hypotension with pressor requirements. Intubated.       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   S/P ORIF of right hip using interlocking intramedullary stephon.  S/P Trach & PEG 4/5     NEURO:  #Acute pain  - fentanyl prn  #sedation    - off all sedation 4/17 AM     RESP:   #Acute respiratory failure secondary to post operative arrhythmias requiring mechanical ventilation (Intubated 3/23)    - Albuterol  - BAL 4/15: little secretions appreciated, no sample was sent  #Pneumonia   - Pseudomonas on culture, sensitive to Cefepime -started on 4/10, end date 4/17     -s/p trach 4/5/23 with size 8    -s/p bronch 4/8 BAL- no organisms    -4/11 BAL - pseudomonas    -4/11 blood cx - neg    -AM CXR     -Pulm c/s - avoid volume overload and diuresis prn     -Albuterol, Robitussin    -Vent Settings: AC Volume  450/22/40/8 --> PS as tolerated   - 8hours CPAP on 4/20  #PE w/u NEGATIVE on 3/28    CARDS:   #Tachycardia     - Propanolol - Discontinued 4/10 d/t bradycardic events  #Bradycardia       - 4/10: Episodes x2 bradycardia to 28. Atropine given second episode.      - EP: 4/11 --> signing off, re-consult PRN, will not be doing PPM since bradycardia likely due to hypoxia     - Trops: 0.08 -> 0.09-- seen by cards likely demand ischemia     - 4/18: Episode x1 bradycardia to 30s --> resolved after suctioning trach   #acute hypotension    - off levo and vaso 4/17    - restarted Midodrine 10mg q8 4/19   Intermittent hypotensive episodes 4/10 during bradycardic episodes    - increased Bumex 2 mg q12 4/17     - Restarted Levo 4/18 overnight --> d/c during the day and restarted 4/19 overnight   #Labs/Imaging    -Echo 3/23- EF appears normal, mild LVH, sclerotic AoV, trace MR,     -Echo 3/29: EF 60-65%, norm LV function, borderline pulm HTN.     -EKG 4/10 most recent: , Sinus Tachycardia, low voltage QRS, incomplete RBBB    -EKG 4/11: , Sinus tach; low voltage QRS    -Echo 4/11: EF 70-75%, Hyperdynamic LV, mild MVR, aortic wall thickening    -EKG 4/18: NSR, RBBB  #HLD    -Atorvastatin 80mg HS  #HTN    -dc'd amlodipine 5mg qd for HTN (4/18)    GI/NUTR:   #Diet: restarted TF Peptamen AF increased to goal of 45 ml/h     -Provides 1350 kcal and 85.5 gms protein     -PEG 3cm at skin, 4cm at bumper  #Nausea    - Aspiration precautions, HOB 30    -? dysmotility- Reglan ATC IV 5mg q8    - KUB ordered 4/15 - normal bowel gas pattern  #GI Prophylaxis    - Pepcid  #Bowel regimen    - senna    - BM (4/19) - 1 x small blood clots  -BM (4/20)- melena + small blood clots   #Imaging    -CT abdomen 3/28: No definite infectious source identified      -RUQ sono (3/28): biliary sludge, mild GB wall thickening/edema    -KUB (4/15): normal bowel gas pattern     /RENAL:   #UO: Blair placed by Urology 3/22 (d/w urology and assess scrotal swelling prior to removal)     - on Doxazosin 4 milliGRAM(s) Oral at bedtime (Home rx: Flomax 0.4mg HS)    - US kidney bladder (4/14) demonstrated: No hydronephrosis or calculi, approximate 8.4 cm cyst, left kidney (present on earlier CT scan), blari catheter within collapsed urinary bladder.  #acute scrotal swelling    - U/S 3/29 w/ diffused scrotal wall edema (keep blair) - blair has minimal output 4/17, replaced 4/17    - scrotal elevation  #urine output in critically ill    -indwelling blair (placed 3/22) - replaced (4/17)     -VAISHNAVI, oliguria, improving u/o  ml/hr    -IVF LR @30cc -- D/C'd 4/17    - urine out put decreased to 10cc per hour around 2 am (4/16) given albumin 250cc bolus with improvement now 30cc/hr -- has minimal output 4/17, replaced (4/17)   - Albumin 25% 50cc x1 (4/19)    Labs:          BUN/Cr- 77/1.6  -->,  79/1.5  -->          Electrolytes-Na 142 // K 4.3 // Mg 1.9 //  Phos 3.2 (04-20 @ 20:26)    #Diuresis     -Nephrology: Bumex 1g BID, US K&B  follow up intravascular assessment             - increased to Bumex 2mg BID 4/17  HEME/ONC:   #DVT prophylaxis    -SQH, SCDs  #H/O CAD s/p stents x3    - ASA 81    - Plavix 75 restarted 4/7    DVT prophylaxis-heparin   Injectable  , SCDs    Labs: Hb/Hct:  8.7/27.4  -->,  8.4/26.8  -->                      Plts:  169  -->,  209  -->                 PTT/INR:        Home Rx: clopidogrel 75 mg oral tablet: 1 tab(s) orally once a day, Aspirin 81mg QD  T&S Expires: 4/22    ID:  #Acute clostridium difficile 3/30    Completed course of po Vanco on 4/13  WBC- 15.67  --->>,  15.77  --->>,  17.46  --->>  Temp trend- 24hrs T(F): 97.5 (04-21 @ 00:00), Max: 98.7 (04-20 @ 20:00)  Antibiotics:    -Ended 4/14: Caspofungin    -Ended 4/17: Cefepime IVPB 1000mg every 12 hours (started 4/10)   -Ended 4/13: vancomycin Solution 125 every 6 hours   -DC'd vanc IV 4/13  Cultures:    BCx 4/11 -negative    BAL 4/10- Pseudomonas     BAL 4/8- negative     ET Sputum 3/29: negative     BCx 3/28: NGTD    UA 3/28: Negative     MRSA 3/26: Negative    UA 3/24: negative    ET Sputum 3/24-- neg    CDiff PCR 3/30: Positive    Procalcitonin 0.32  # +Candida Auris surveillance swab on 4/6/23 (nares, axilla, groin)    ENDO:  #DM    -HA1C= 5.0    -ISS    -Glucose goal 140-180   - D/C'd 20 mg solumedrol - no current indication     MSK:  #Activity   -WBAT RLE per ortho, PT/OT when clinically appropriate   -Right hip Xray 2 views per ortho (4/9) --> s/p ORIF of comminuted intertrochanteric Rt femur fx w/out definite change compared to 3/22/23    LINES/DRAINS:    PIV  Blair (3/22) -- replaced (4/17)   Trach  PEG   Right radial a-line (4/10)  Left cephalic midline (4/18)    ADVANCED DIRECTIVES:  DNR    HCP/Emergency Contact- Song Segura (Son): [phone]    INDICATION FOR SICU: Hypotension with pressor requirements. Intubated.       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   S/P ORIF of right hip using interlocking intramedullary stephon.  S/P Trach & PEG 4/5     NEURO:  #Acute pain  - fentanyl prn  #sedation    - off all sedation 4/17 AM     RESP:   #Acute respiratory failure secondary to post operative arrhythmias requiring mechanical ventilation (Intubated 3/23)    - Albuterol  - BAL 4/15: little secretions appreciated, no sample was sent  #Pneumonia   - Pseudomonas on culture, sensitive to Cefepime -started on 4/10, end date 4/17     -s/p trach 4/5/23 with size 8    -s/p bronch 4/8 BAL- no organisms    -4/11 BAL - pseudomonas    -4/11 blood cx - neg    -AM CXR     -Pulm c/s - avoid volume overload and diuresis prn     -Albuterol, Robitussin    -Vent Settings: AC Volume  450/22/40/8 --> PS as tolerated   - 8hours CPAP on 4/20  #PE w/u NEGATIVE on 3/28    CARDS:   #Tachycardia     - Propanolol - Discontinued 4/10 d/t bradycardic events  #Bradycardia       - 4/10: Episodes x2 bradycardia to 28. Atropine given second episode.      - EP: 4/11 --> signing off, re-consult PRN, will not be doing PPM since bradycardia likely due to hypoxia     - Trops: 0.08 -> 0.09-- seen by cards likely demand ischemia     - 4/18: Episode x1 bradycardia to 30s --> resolved after suctioning trach   #acute hypotension    - off levo and vaso 4/17    - restarted Midodrine 10mg q8 4/19   Intermittent hypotensive episodes 4/10 during bradycardic episodes    - increased Bumex 2 mg q12 4/17     - Restarted Levo 4/18 overnight --> d/c during the day and restarted 4/19 overnight, remains on Levo   #Labs/Imaging    -Echo 3/23- EF appears normal, mild LVH, sclerotic AoV, trace MR,     -Echo 3/29: EF 60-65%, norm LV function, borderline pulm HTN.     -EKG 4/10 most recent: , Sinus Tachycardia, low voltage QRS, incomplete RBBB    -EKG 4/11: , Sinus tach; low voltage QRS    -Echo 4/11: EF 70-75%, Hyperdynamic LV, mild MVR, aortic wall thickening    -EKG 4/18: NSR, RBBB  #HLD    -Atorvastatin 80mg HS  #HTN    -dc'd amlodipine 5mg qd for HTN (4/18)    GI/NUTR:   #Diet: restarted TF Peptamen AF increased to goal of 45 ml/h     -Provides 1350 kcal and 85.5 gms protein     -PEG 3cm at skin, 4cm at bumper  #Nausea    - Aspiration precautions, HOB 30    -? dysmotility- Reglan ATC IV 5mg q8    - KUB ordered 4/15 - normal bowel gas pattern  #GI Prophylaxis    - switched to Protonix 40 IV q12  #Bowel regimen    - senna    - BM (4/19) - 1 x small blood clots  -BM (4/20)- melena + small blood clots   #Imaging    -CT abdomen 3/28: No definite infectious source identified      -RUQ sono (3/28): biliary sludge, mild GB wall thickening/edema    -KUB (4/15): normal bowel gas pattern     /RENAL:   #UO: Blair placed by Urology 3/22 (d/w urology and assess scrotal swelling prior to removal)     - on Doxazosin 4 milliGRAM(s) Oral at bedtime (Home rx: Flomax 0.4mg HS)    - US kidney bladder (4/14) demonstrated: No hydronephrosis or calculi, approximate 8.4 cm cyst, left kidney (present on earlier CT scan), blair catheter within collapsed urinary bladder.  #acute scrotal swelling    - U/S 3/29 w/ diffused scrotal wall edema (keep blair) - blair has minimal output 4/17, replaced 4/17    - scrotal elevation  #urine output in critically ill    -indwelling blair (placed 3/22) - replaced (4/17)     -VAISHNAVI, oliguria, improving u/o  ml/hr    -IVF LR @30cc -- D/C'd 4/17    - urine out put decreased to 10cc per hour around 2 am (4/16) given albumin 250cc bolus with improvement now 30cc/hr -- has minimal output 4/17, replaced (4/17)   - Albumin 25% 50cc x1 (4/19)    Labs:          BUN/Cr- 77/1.6  -->,  79/1.5  -->          Electrolytes-Na 142 // K 4.3 // Mg 1.9 //  Phos 3.2 (04-20 @ 20:26)    #Diuresis     -Nephrology: Bumex 1g BID, US K&B  follow up intravascular assessment             - increased to Bumex 2mg BID 4/17  HEME/ONC:   #DVT prophylaxis    -SQH, SCDs  #H/O CAD s/p stents x3    - ASA 81    - Plavix 75 restarted 4/7    DVT prophylaxis-heparin   Injectable  , SCDs    Labs: Hb/Hct:  8.7/27.4  -->,  8.4/26.8  -->                      Plts:  169  -->,  209  -->                 PTT/INR:        Home Rx: clopidogrel 75 mg oral tablet: 1 tab(s) orally once a day, Aspirin 81mg QD  T&S Expires: 4/22    ID:  #Acute clostridium difficile 3/30    Completed course of po Vanco on 4/13  WBC- 15.67  --->>,  15.77  --->>,  17.46  --->>  Temp trend- 24hrs T(F): 97.5 (04-21 @ 00:00), Max: 98.7 (04-20 @ 20:00)  Antibiotics:    -Ended 4/14: Caspofungin    -Ended 4/17: Cefepime IVPB 1000mg every 12 hours (started 4/10)   -Ended 4/13: vancomycin Solution 125 every 6 hours   -DC'd vanc IV 4/13  Cultures:    BCx 4/11 -negative    BAL 4/10- Pseudomonas     BAL 4/8- negative     ET Sputum 3/29: negative     BCx 3/28: NGTD    UA 3/28: Negative     MRSA 3/26: Negative    UA 3/24: negative    ET Sputum 3/24-- neg    CDiff PCR 3/30: Positive    Procalcitonin 0.32  # +Candida Auris surveillance swab on 4/6/23 (nares, axilla, groin)    ENDO:  #DM    -HA1C= 5.0    -ISS    -Glucose goal 140-180   - D/C'd 20 mg solumedrol - no current indication    - on Prednisone 10 QD    MSK:  #Activity   -WBAT RLE per ortho, PT/OT when clinically appropriate   -Right hip Xray 2 views per ortho (4/9) --> s/p ORIF of comminuted intertrochanteric Rt femur fx w/out definite change compared to 3/22/23    LINES/DRAINS:    PIV  Blair (3/22) -- replaced (4/17)   Trach  PEG   Right radial a-line (4/10)  Left cephalic midline (4/18)    ADVANCED DIRECTIVES:  DNR    HCP/Emergency Contact- Song Yanesrupal (Son): [phone]    INDICATION FOR SICU: Hypotension with pressor requirements. Intubated.       DISPO: SICU

## 2023-04-21 NOTE — CONSULT NOTE ADULT - ASSESSMENT
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 Gi was consulted for rectal bleeding large episodes since yesterday with blood clots.     #)Hematochezia: DD includes likely Diverticulosis vs anorectal pathologies including hemorrhoids vs AVM vs Colorectal cancer vs r/o upper Gi bleed  tachycardia in 110-120 however patient has been tachycardic even before since last week   Baseline Hb 9-10 slowly dropping down, current Hb 8  RADHA showed bright red bleeding with clots   + PEG tube lavage showed gastric feeds (Pt hasbeen fed till this afternoon)  on aspirin and plavix   increase in BUN/creatinine can't completely rule out upper GI bleed  last EGD and colonoscopy in 2021   on levophed currently    Rec:  Keep NPO  Maintain active type and screen  Adequate fluid resuscitation (Keep SBP > 90)  Trend CBC every 8hrs and keep Hg > 8  PPI BID  obtain CTA if positive pls call IR stat for embolization if negative with persistent bleeding please prep with golytely for colonoscopy with or without EGD 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 Gi was consulted for rectal bleeding large episodes since yesterday with blood clots.     #)Hematochezia: DD includes likely Diverticulosis vs anorectal pathologies including hemorrhoids vs AVM vs Colorectal cancer vs r/o upper Gi bleed  tachycardia in 110-120 however patient has been tachycardic even before since last week   Baseline Hb 9-10 slowly dropping down, current Hb 8  RADHA showed bright red bleeding with clots   + PEG tube lavage showed gastric feeds (Pt hasbeen fed till this afternoon)  on aspirin and plavix   increase in BUN/creatinine can't completely rule out upper GI bleed  last EGD and colonoscopy in 2021   on levophed currently    Rec:  Keep NPO  Maintain active type and screen  Adequate fluid resuscitation (Keep SBP > 90)  Trend CBC every 8hrs and keep Hg > 8  PPI BID  obtain CTA, if positive pls call IR stat for embolization,  if negative with persistent bleeding please prep with golytely for colonoscopy with or without EGD  Risks and benefits discussed

## 2023-04-21 NOTE — CONSULT NOTE ADULT - REASON FOR ADMISSION
mechanical Fall/ Hip Fx

## 2023-04-21 NOTE — CONSULT NOTE ADULT - TIME-BASED BILLING (NON-CRITICAL CARE)
2
Time-based billing (NON-critical care)

## 2023-04-21 NOTE — CONSULT NOTE ADULT - PROVIDER SPECIALTY LIST ADULT
Nephrology
Orthopedics
Critical Care
Infectious Disease
Neurology
Palliative Care
SICU
Electrophysiology
Gastroenterology
Orthopedics
Cardiology
Cardiology
Orthopedics
Urology

## 2023-04-21 NOTE — CONSULT NOTE ADULT - CONSULT REASON
Right hip fracture
VAISHNAVI
encephalopathy
Fever workup
difficult Garrison
inability to wean from mechanical ventilation
Tachjohnathanrady
Patient with bradycardia and hemodynamic instability
Tachycardia
Hypotension, pressor dependent. Remains intubated
ortho f/u
rectal bleed
GOC

## 2023-04-21 NOTE — PROGRESS NOTE ADULT - ATTENDING COMMENTS
Patient is on a vent support at FiO2 40%, PEEP 8.  Awake, follow simple commands.  Still requires Levo at 0.22 mcg/kg/min  Has significant soft tissue swelling    ASSESSMENT:  76 y/o male, S/P Fall.  Right Intertrochanteric Femur Fracture. S/P ORIF.  Acute respiratory failure.  S/P Tracheostomy and PEG  Dysphagia.  Hypotension.    PLAN:  - use sedation as needed  - try on CPAP as tolerated  - keep MAP>65; Bumex 2 mg iv x1 today  - wean Levo off  - continue tube feeds  - follow serum electrolytes and UOP  - ID: universal precautions  - DVT prophylaxis  Continue SICU care .

## 2023-04-21 NOTE — CONSULT NOTE ADULT - CONSULT REQUESTED BY NAME
sicu
Medicine
Medicine
SICU
Dr. Hernandez
[facility] Hinds
service
Dr. Corral
Neuro critical care
SICU
sicu
Dr Gutierrez
2B

## 2023-04-21 NOTE — PROGRESS NOTE ADULT - SUBJECTIVE AND OBJECTIVE BOX
RADHA RAMON   [id number]   [birth year]  77yM    Admit Date/LOS: 03-20 (2d)  Indication for SICU: Hemodynamic monitoring. 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 Pt walks with walker and got up in middle of night to get food and slipped and fell on his R side. Pt has been having R hip pain since with inability to ambulate. Pts family finally convinced him to come to hospital today. Pt is having 10/10 pain currently. Denies dizziness, CP or LOC prior to fall. Denies head trauma. No HA. (20 Mar 2023 12:02)    3/22: Patient S/P ORIF of right hip using interlocking intramedullary stephon. - upgraded for hypotension in PACU  4/5  Trach and PEG    24HRS EVENT:  4/20  NIGHT  -PM rounds: levo 0.08, trach collar in place, PEG tube in place, pitting edema, following commands   -f/u 8pm BMP after Bumex--> Na 142, K 4.3   -Mg repleted   -GAYLA   DAY  -PS trial x 8 hrs   -bumex 2mg goal 1-1.5L negative   -Prednisone 10 mg daily  - increase midodrine to 10 q8      [] A ten-point review of systems was otherwise negative except as noted above.  [x] Due to altered mental status/intubation, subjective information was not attained from   the patient. History was obtained, to the extent possible, from review of the chart and collateral sources of information.  =================================================================   RADHA RAMON   [id number]   [birth year]  77yM    Admit Date/LOS: 03-20 (2d)  Indication for SICU: Hemodynamic monitoring. 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 Pt walks with walker and got up in middle of night to get food and slipped and fell on his R side. Pt has been having R hip pain since with inability to ambulate. Pts family finally convinced him to come to hospital today. Pt is having 10/10 pain currently. Denies dizziness, CP or LOC prior to fall. Denies head trauma. No HA. (20 Mar 2023 12:02)    3/22: Patient S/P ORIF of right hip using interlocking intramedullary stephon. - upgraded for hypotension in PACU  4/5  Trach and PEG    24HRS EVENT:  4/20  NIGHT  -PM rounds: levo 0.08, trach collar in place, PEG tube in place, pitting edema, following commands   -f/u 8pm BMP after Bumex--> Na 142, K 4.3   -Mg repleted   -GAYLA   DAY  -PS trial x 8 hrs   -bumex 2mg goal 1-1.5L negative   -Prednisone 10 mg daily  - increase midodrine to 10 q8      [] A ten-point review of systems was otherwise negative except as noted above.  [x] Due to altered mental status/intubation, subjective information was not attained from   the patient. History was obtained, to the extent possible, from review of the chart and collateral sources of information.  =================================================================  Daily     Daily   Diet, NPO with Tube Feed:   Tube Feeding Modality: Gastrostomy  Peptamen A.F. Formula  Total Volume for 24 Hours (mL): 1080  Continuous  Starting Tube Feed Rate mL per Hour: 45     Every 4 hours  Until Goal Tube Feed Rate (mL per Hour): 45  Tube Feed Duration (in Hours): 24  Tube Feed Start Time: 10:00 (04-14-23 @ 02:27)    CURRENT MEDS:  Neurologic Medications  metoclopramide Injectable 5 milliGRAM(s) IV Push every 8 hours    Respiratory Medications  albuterol    0.083% 2.5 milliGRAM(s) Nebulizer every 8 hours  albuterol    90 MICROgram(s) HFA Inhaler 1 Puff(s) Inhalation every 8 hours  guaifenesin/dextromethorphan Oral Liquid 10 milliLiter(s) Oral every 4 hours    Cardiovascular Medications  buMETAnide Injectable 2 milliGRAM(s) IV Push once  doxazosin 4 milliGRAM(s) Oral at bedtime  midodrine 10 milliGRAM(s) Oral every 8 hours  norepinephrine Infusion 0.01 MICROgram(s)/kG/Min IV Continuous <Continuous>    Gastrointestinal Medications  pantoprazole  Injectable 40 milliGRAM(s) IV Push every 12 hours  senna 2 Tablet(s) Oral at bedtime  sodium chloride 0.9% lock flush 10 milliLiter(s) IV Push every 1 hour PRN Pre/post blood products, medications, blood draw, and to maintain line patency    Genitourinary Medications    Hematologic/Oncologic Medications  aspirin  chewable 81 milliGRAM(s) Oral daily  clopidogrel Tablet 75 milliGRAM(s) Oral daily  heparin   Injectable 5000 Unit(s) SubCutaneous every 8 hours    Antimicrobial/Immunologic Medications    Endocrine/Metabolic Medications  atorvastatin 80 milliGRAM(s) Oral at bedtime  insulin lispro (ADMELOG) corrective regimen sliding scale   SubCutaneous every 6 hours  predniSONE   Tablet 10 milliGRAM(s) Oral daily    Topical/Other Medications  atropine 1% Solution 1 Drop(s) Left EYE two times a day  chlorhexidine 0.12% Liquid 15 milliLiter(s) Oral Mucosa every 12 hours  chlorhexidine 2% Cloths 1 Application(s) Topical <User Schedule>  prednisoLONE acetate 1% Suspension 1 Drop(s) Left EYE every 6 hours    ICU Vital Signs Last 24 Hrs  T(C): 36.5 (21 Apr 2023 08:00), Max: 37.1 (20 Apr 2023 20:00)  T(F): 97.7 (21 Apr 2023 08:00), Max: 98.7 (20 Apr 2023 20:00)  HR: 118 (21 Apr 2023 09:00) (93 - 118)  BP: 143/74 (21 Apr 2023 09:00) (108/57 - 146/66)  BP(mean): 101 (21 Apr 2023 09:00) (74 - 101)  ABP: 125/60 (21 Apr 2023 09:00) (102/37 - 165/54)  ABP(mean): 78 (21 Apr 2023 09:00) (51 - 82)  RR: 22 (21 Apr 2023 09:00) (21 - 41)  SpO2: 100% (21 Apr 2023 09:00) (88% - 100%)    O2 Parameters below as of 21 Apr 2023 07:00  Patient On (Oxygen Delivery Method): ventilator    O2 Concentration (%): 40      Mode: AC/ CMV (Assist Control/ Continuous Mandatory Ventilation)  RR (machine): 22  TV (machine): 450  FiO2: 40  PEEP: 8  ITime: 1  MAP: 10  PIP: 18    ABG - ( 21 Apr 2023 05:40 )  pH, Arterial: 7.46  pH, Blood: x     /  pCO2: 36    /  pO2: 158   / HCO3: 26    / Base Excess: 1.8   /  SaO2: 100.0     I&O's Summary    20 Apr 2023 07:01  -  21 Apr 2023 07:00  --------------------------------------------------------  IN: 1542.1 mL / OUT: 2305 mL / NET: -762.9 mL    21 Apr 2023 07:01  -  21 Apr 2023 10:57  --------------------------------------------------------  IN: 64 mL / OUT: 0 mL / NET: 64 mL      I&O's Detail    20 Apr 2023 07:01  -  21 Apr 2023 07:00  --------------------------------------------------------  IN:    Norepinephrine: 278.1 mL    Peptamen A.F.: 1264 mL  Total IN: 1542.1 mL    OUT:    Indwelling Catheter - Urethral (mL): 2305 mL  Total OUT: 2305 mL    Total NET: -762.9 mL      21 Apr 2023 07:01  -  21 Apr 2023 10:57  --------------------------------------------------------  IN:    Norepinephrine: 19 mL    Peptamen A.F.: 45 mL  Total IN: 64 mL    OUT:  Total OUT: 0 mL    Total NET: 64 mL      PHYSICAL EXAM:     GCS 11T  follows commands, CORRAL with equal strength b/l  no acute distress  equal chest rise b/l with crackles noted b/l  abdomen soft, nondistended and nontender with peg tube in place with no signs of infection at port site  extremities soft with SCDs in place  Right hip dressing clean and dry with no drainage or hematoma noted  urinary cath in place with significant scrotal edema

## 2023-04-22 NOTE — PROGRESS NOTE ADULT - SUBJECTIVE AND OBJECTIVE BOX
RADHA RAMON   [id number]   [birth year]  77yM    Admit Date/LOS:  (2d)  Indication for SICU: Hemodynamic monitoring. 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 Pt walks with walker and got up in middle of night to get food and slipped and fell on his R side. Pt has been having R hip pain since with inability to ambulate. Pts family finally convinced him to come to hospital today. Pt is having 10/10 pain currently. Denies dizziness, CP or LOC prior to fall. Denies head trauma. No HA. (20 Mar 2023 12:02)    3/22: Patient S/P ORIF of right hip using interlocking intramedullary stephon. - upgraded for hypotension in PACU    Trach and PEG    24HRS EVENT:    NIGHT  -PM rounds: following commands, levo @ 0.15, NGT @ 75, PEG tube in place, abd soft NT, ND   -Hgb 8-->7.1; 1 U PRBC   -No gross evidence of active GI bleeding on CTA  -f/u endoscopy with GI   - Spoke with GI fellow Dr. Muñoz --> will do bedside colonoscopy  AM, SICU attending agrees    Day  - CBC downtrendin.7 -> 8.4 -> 8.0  - dc pepcid, protonix added   - P/S in afternoon, patient unable to tolerate, remained on volume AC  - Bumex x1 --> 900 cc this shift   - f/u social work for LTAC  -another bloody BM, large dark red clots  - GI consult for melanotic BM: hold tube feeds, CT abdomen/pelvis angio with IV contrast, if positive then will go to IR, if negative will get colonoscopy, serial CBCs BID and if hgb < 8 then transfuse  - levo increased to .29 -> gave [id number]% 50cc x1  - afternoon ABG and BMP wnl   -central line to be placed       [] A ten-point review of systems was otherwise negative except as noted above.  [x] Due to altered mental status/intubation, subjective information was not attained from   the patient. History was obtained, to the extent possible, from review of the chart and collateral sources of information.   RADHA RAMON   [id number]   [birth year]  77yM    Admit Date/LOS:  (2d)  Indication for SICU: Hemodynamic monitoring. 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 Pt walks with walker and got up in middle of night to get food and slipped and fell on his R side. Pt has been having R hip pain since with inability to ambulate. Pts family finally convinced him to come to hospital today. Pt is having 10/10 pain currently. Denies dizziness, CP or LOC prior to fall. Denies head trauma. No HA. (20 Mar 2023 12:02)    3/22: Patient S/P ORIF of right hip using interlocking intramedullary stephon. - upgraded for hypotension in PACU    Trach and PEG    24HRS EVENT:    NIGHT  -PM rounds: following commands, levo @ 0.15, NGT @ 75, PEG tube in place, abd soft NT, ND   -Hgb 8-->7.1; 1 U PRBC   -No gross evidence of active GI bleeding on CTA  -f/u endoscopy with GI   - Spoke with GI fellow Dr. Muñoz --> will do bedside colonoscopy  AM, SICU attending agrees    Day  - CBC downtrendin.7 -> 8.4 -> 8.0  - dc pepcid, protonix added   - P/S in afternoon, patient unable to tolerate, remained on volume AC  - Bumex x1 --> 900 cc this shift   - f/u social work for LTAC  -another bloody BM, large dark red clots  - GI consult for melanotic BM: hold tube feeds, CT abdomen/pelvis angio with IV contrast, if positive then will go to IR, if negative will get colonoscopy, serial CBCs BID and if hgb < 8 then transfuse  - levo increased to .29 -> gave [id number]% 50cc x1  - afternoon ABG and BMP wnl   -central line to be placed       [] A ten-point review of systems was otherwise negative except as noted above.  [x] Due to altered mental status/intubation, subjective information was not attained from   the patient. History was obtained, to the extent possible, from review of the chart and collateral sources of information.            Daily     Daily   Diet, NPO:   NPO for Procedure/Test     NPO Start Date: 2023,   NPO Start Time: 07:00 (23 @ 07:20)  Diet, NPO with Tube Feed:   Tube Feeding Modality: Gastrostomy  Peptamen A.F. Formula  Total Volume for 24 Hours (mL): 1080  Continuous  Starting Tube Feed Rate mL per Hour: 45     Every 4 hours  Until Goal Tube Feed Rate (mL per Hour): 45  Tube Feed Duration (in Hours): 24  Tube Feed Start Time: 10:00 (23 @ 02:27)    CURRENT MEDS:  Neurologic Medications  metoclopramide Injectable 5 milliGRAM(s) IV Push every 8 hours    Respiratory Medications  albuterol    0.083% 2.5 milliGRAM(s) Nebulizer every 8 hours  albuterol    90 MICROgram(s) HFA Inhaler 1 Puff(s) Inhalation every 8 hours  guaifenesin/dextromethorphan Oral Liquid 10 milliLiter(s) Oral every 4 hours    Cardiovascular Medications  doxazosin 4 milliGRAM(s) Oral at bedtime  midodrine 10 milliGRAM(s) Oral every 8 hours  norepinephrine Infusion 0.01 MICROgram(s)/kG/Min IV Continuous <Continuous>    Gastrointestinal Medications  pantoprazole  Injectable 40 milliGRAM(s) IV Push every 12 hours  sodium chloride 0.9% lock flush 10 milliLiter(s) IV Push every 1 hour PRN Pre/post blood products, medications, blood draw, and to maintain line patency  sodium chloride 0.9% lock flush 10 milliLiter(s) IV Push every 1 hour PRN Pre/post blood products, medications, blood draw, and to maintain line patency    Genitourinary Medications    Hematologic/Oncologic Medications  aspirin  chewable 81 milliGRAM(s) Oral daily  clopidogrel Tablet 75 milliGRAM(s) Oral daily  heparin   Injectable 5000 Unit(s) SubCutaneous every 8 hours    Antimicrobial/Immunologic Medications    Endocrine/Metabolic Medications  atorvastatin 80 milliGRAM(s) Oral at bedtime  insulin lispro (ADMELOG) corrective regimen sliding scale   SubCutaneous every 6 hours  predniSONE   Tablet 10 milliGRAM(s) Oral daily    Topical/Other Medications  atropine 1% Solution 1 Drop(s) Left EYE two times a day  chlorhexidine 0.12% Liquid 15 milliLiter(s) Oral Mucosa every 12 hours  chlorhexidine 2% Cloths 1 Application(s) Topical <User Schedule>  prednisoLONE acetate 1% Suspension 1 Drop(s) Left EYE every 6 hours    ICU Vital Signs Last 24 Hrs  T(C): 36.2 (2023 04:00), Max: 36.7 (2023 20:00)  T(F): 97.1 (2023 04:00), Max: 98.1 (2023 20:00)  HR: 104 (2023 07:00) (79 - 123)  BP: 117/62 (2023 07:00) (87/54 - 156/77)  BP(mean): 84 (2023 07:00) (64 - 109)  ABP: 134/53 (2023 07:00) (76/32 - 167/72)  ABP(mean): 72 (:00) (43 - 99)  RR: 36 (:00) (22 - 47)  SpO2: 100% (2023 07:00) (89% - 100%)    O2 Parameters below as of 2023 04:00  Patient On (Oxygen Delivery Method): ventilator    O2 Concentration (%): 40      Mode: AC/ CMV (Assist Control/ Continuous Mandatory Ventilation)  RR (machine): 22  TV (machine): 450  FiO2: 40  PEEP: 8  ITime: 1  MAP: 13  PIP: 21    ABG - ( 2023 03:27 )  pH, Arterial: 7.43  pH, Blood: x     /  pCO2: 38    /  pO2: 133   / HCO3: 25    / Base Excess: 0.9   /  SaO2: 100.0             I&O's Summary    2023 07:01  -  2023 07:00  --------------------------------------------------------  IN: 4834.4 mL / OUT: 2780 mL / NET: 2054.4 mL      I&O's Detail    2023 07:01  -  2023 07:00  --------------------------------------------------------  IN:    Enteral Tube Flush: 4000 mL    IV PiggyBack: 50 mL    Norepinephrine: 377.4 mL    Norepinephrine: 137 mL    Peptamen A.F.: 270 mL  Total IN: 4834.4 mL    OUT:    Indwelling Catheter - Urethral (mL): 1880 mL    Rectal Tube (mL): 900 mL  Total OUT: 2780 mL    Total NET: 2054.4 mL          PHYSICAL EXAM:

## 2023-04-22 NOTE — PROGRESS NOTE ADULT - ASSESSMENT
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 Gi was consulted for rectal bleeding large episodes since yesterday with blood clots.     #)Hematochezia: DD includes likely Diverticulosis vs anorectal pathologies including hemorrhoids vs AVM vs Colorectal cancer vs less likley upper Gi bleed  tachycardia in 110-120 however patient has been tachycardic even before since last week   Baseline Hb 9-10 slowly dropping down  yesterday RADHA showed bright red bleeding with clots   on aspirin and plavix   increase in BUN/creatinine  last EGD and colonoscopy in 2021     Today:   s/p golytely overnight currently has brown stools in [facility]  Hb dropped to 7.1 s/p one unit current Hb 8.2  PEG tube lavage   CTA negative for active bleed    Rec:  Keep NPO  Maintain active type and screen  Adequate fluid resuscitation (Keep SBP > 90)  Trend CBC every 8hrs and keep Hg > 8  PPI BID  Low suspicion for upper GI bleed most likely diverticular bleed seems to be resolved at this time will monitor Hb and emergent endoscopic intervention   if there is any further bleeding episodes please inform GI   will follow

## 2023-04-22 NOTE — PROGRESS NOTE ADULT - ASSESSMENT
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   S/P ORIF of right hip using interlocking intramedullary stephon.  S/P Trach & PEG 4/5     NEURO:  #Acute pain  - fentanyl prn  #sedation    - off all sedation 4/17 AM     RESP:   #Acute respiratory failure secondary to post operative arrhythmias requiring mechanical ventilation (Intubated 3/23)    - Albuterol  - BAL 4/15: little secretions appreciated, no sample was sent  #Pneumonia   - Pseudomonas on culture, sensitive to Cefepime -started on 4/10, end date 4/17     -s/p trach 4/5/23 with size 8    -s/p bronch 4/8 BAL- no organisms    -4/11 BAL - pseudomonas    -4/11 blood cx - neg    -AM CXR     -Pulm c/s - avoid volume overload and diuresis prn     -Albuterol, Robitussin    -Vent Settings: AC Volume  450/22/40/8 --> PS as tolerated   - 8hours CPAP on 4/20  #PE w/u NEGATIVE on 3/28    CARDS:   #Tachycardia     - Propanolol - Discontinued 4/10 d/t bradycardic events  #Bradycardia       - 4/10: Episodes x2 bradycardia to 28. Atropine given second episode.      - EP: 4/11 --> signing off, re-consult PRN, will not be doing PPM since bradycardia likely due to hypoxia     - Trops: 0.08 -> 0.09-- seen by cards likely demand ischemia  #acute hypotension    - Levophed infusion    - off vaso 4/17    - restarted Midodrine 10mg q8 4/19   Intermittent hypotensive episodes 4/10 during bradycardic episodes  #Labs/Imaging    -Echo 3/23- EF appears normal, mild LVH, sclerotic AoV, trace MR,     -Echo 3/29: EF 60-65%, norm LV function, borderline pulm HTN.     -EKG 4/10 most recent: , Sinus Tachycardia, low voltage QRS, incomplete RBBB    -EKG 4/11: , Sinus tach; low voltage QRS    -Echo 4/11: EF 70-75%, Hyperdynamic LV, mild MVR, aortic wall thickening    -EKG 4/18: NSR, RBBB  #HLD    -Atorvastatin 80mg HS  #HTN    -dc'd amlodipine 5mg qd for HTN (4/18)    GI/NUTR:   #Diet: TF Peptamen AF goal of 45 ml/h     -Provides 1350 kcal and 85.5 gms protein     -PEG 3cm at skin, 4cm at bumper  #Nausea    - Aspiration precautions, HOB 30    -? dysmotility- Reglan ATC IV 5mg q8  #GI Prophylaxis    - switched to Protonix 40 IV q12  #Bowel regimen    - senna  #Upper and Lower GI Bleed  -BM (4/19) - 1 x small blood clots  -BM (4/20)- melena + small blood clots   -BM (4/21) - large dark red bloody clots per rectum, no stool  -GI consult placed for lower GI bleed: Recs: keep NPO, maintain active T&S, adequate fluid resuscitation (Keep SBP > 90), trend CBC every 8hrs and keep Hg > 8, PPI BID. Obtain CTA if positive pls call IR for possible embolization if negative with persistent bleeding please prep with golytely for colonoscopy with or without EGD  #Imaging    -CT abdomen 3/28: No definite infectious source identified      -RUQ sono (3/28): biliary sludge, mild GB wall thickening/edema    -KUB (4/15): normal bowel gas pattern     -CT angio A/P (4/21): no active bleeding     /RENAL:   #UO: Blair placed by Urology 3/22 (d/w urology and assess scrotal swelling prior to removal)     - on Doxazosin 4 milliGRAM(s) Oral at bedtime (Home rx: Flomax 0.4mg HS)    - US kidney bladder (4/14) demonstrated: No hydronephrosis or calculi, approximate 8.4 cm cyst, left kidney (present on earlier CT scan), blair catheter within collapsed urinary bladder.  #acute scrotal swelling    - U/S 3/29 w/ diffused scrotal wall edema (keep blair) - blair has minimal output 4/17, replaced 4/17    - scrotal elevation  #urine output in critically ill    -indwelling blair (placed 3/22) - replaced (4/17)     Labs:          BUN/Cr- 82/1.5  -->,  82/1.5  -->          Electrolytes-Na 148 // K 4.3 // Mg 2.0 //  Phos 3.4 (04-21 @ 22:03)  #Diuresis     -Bumex 2 mg x1 given 4/21    HEME/ONC:   #DVT prophylaxis    -HSQ, SCDs  #H/O CAD s/p stents x3    - ASA 81    - Plavix 75 restarted 4/7    Labs: Hb/Hct:  8.0/25.5  -->,  7.1/23.0  -->                      Plts:  250  -->,  242  -->                 PTT/INR:        Home Rx: clopidogrel 75 mg oral tablet: 1 tab(s) orally once a day, Aspirin 81mg QD  T&S Expires: 4/24    ID:  #Acute clostridium difficile 3/30    Completed course of po Vanco on 4/13  WBC- 17.46  --->>,  25.18  --->>,  18.94  --->>  Temp trend- 24hrs T(F): 97.7 (04-22 @ 01:00), Max: 98.1 (04-21 @ 20:00)  Antibiotics:    -Ended 4/14: Caspofungin    -Ended 4/17: Cefepime IVPB 1000mg every 12 hours (started 4/10)   -Ended 4/13: vancomycin Solution 125 every 6 hours   -DC'd vanc IV 4/13  Cultures:    BCx 4/11 -negative    BAL 4/10- Pseudomonas     BAL 4/8- negative     ET Sputum 3/29: negative     BCx 3/28: NGTD    UA 3/28: Negative     MRSA 3/26: Negative    UA 3/24: negative    ET Sputum 3/24-- neg    CDiff PCR 3/30: Positive    Procalcitonin 0.32  # +Candida Auris surveillance swab on 4/6/23 (nares, axilla, groin)    ENDO:  #DM    -HA1C= 5.0    -ISS    -Glucose goal 140-180   - D/C'd 20 mg solumedrol - no current indication    - on Prednisone 10 QD    MSK:  #Activity   -WBAT RLE per ortho, PT/OT when clinically appropriate   -Right hip Xray 2 views per ortho (4/9) --> s/p ORIF of comminuted intertrochanteric Rt femur fx w/out definite change compared to 3/22/23    LINES/DRAINS:    PIV  Blair (3/22) -- replaced (4/17)   Trach  PEG   Right radial a-line (4/10)  Left cephalic midline (4/18)    ADVANCED DIRECTIVES:  DNR    HCP/Emergency Contact- Song Segura (Son): [phone]    INDICATION FOR SICU: Hypotension with pressor requirements. Intubated.       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   S/P ORIF of right hip using interlocking intramedullary stephon.  S/P Trach & PEG 4/5     NEURO:  #Acute pain  - fentanyl prn  #sedation    - off all sedation 4/17 AM     RESP:   #Acute respiratory failure secondary to post operative arrhythmias requiring mechanical ventilation (Intubated 3/23)  RR (machine): 22, TV (machine): 450, FiO2: 40, PEEP: 8  04-22 @ 03:27--7.43 / 38 / 133 / 25 / [id number] 100.0 / Lac 0.70 / iCal 1.17   04-21 @ 16:20--7.41 / 42 / 160 / 27 / [id number] 100.0 / Lac 0.70 / iCal 1.21   - BAL 4/15: little secretions appreciated, no sample was sent  #Pneumonia   - Pseudomonas on culture, sensitive to Cefepime -started on 4/10, end date 4/17     -s/p trach 4/5/23 with size 8    -s/p bronch 4/8 BAL- no organisms    -4/11 BAL - pseudomonas    -4/11 blood cx - neg    -Pulm c/s - avoid volume overload and diuresis prn     -Albuterol, Robitussin  - 8hours CPAP on 4/20  #PE w/u NEGATIVE on 3/28    CARDS:   #Tachycardia     - Propanolol - Discontinued 4/10 d/t bradycardic events  #Bradycardia       - EP: 4/11 --> signing off, re-consult PRN, will not be doing PPM since bradycardia likely due to hypoxia     - Trops: 0.08 -> 0.09-- seen by cards likely demand ischemia  #acute hypotension    - Levophed infusion    - off vaso 4/17    - restarted Midodrine 10mg q8 4/19   Intermittent hypotensive episodes 4/10 during bradycardic episodes  #Labs/Imaging    -EKG 4/18: NSR, RBBB  #HLD    -Atorvastatin 80mg HS  #HTN    -dc'd amlodipine 5mg qd for HTN (4/18)    GI/NUTR:   #Diet: TF Peptamen AF goal of 45 ml/h    -CURRENTLY NPO for GI procedure     -Provides 1350 kcal and 85.5 gms protein     -PEG 3cm at skin, 4cm at bumper  #Nausea    - Aspiration precautions, HOB 30    -? dysmotility- Reglan ATC IV 5mg q8  #GI Prophylaxis    - switched to Protonix 40 IV q12  #Bowel regimen    - senna  #Upper and Lower GI Bleed  -BM (4/19) - 1 x small blood clots  -BM (4/20)- melena + small blood clots   -BM (4/21) - large dark red bloody clots per rectum, no stool  -GI consult placed for lower GI bleed: Recs: keep NPO, maintain active T&S, adequate fluid resuscitation (Keep SBP > 90), trend CBC every 8hrs and keep Hg > 8, PPI BID.  #Imaging    -CT abdomen 3/28: No definite infectious source identified      -RUQ sono (3/28): biliary sludge, mild GB wall thickening/edema    -KUB (4/15): normal bowel gas pattern     -CT angio A/P (4/21): no active bleeding     /RENAL:   #UO: Blair placed by Urology 3/22 (d/w urology and assess scrotal swelling prior to removal)     - on Doxazosin 4 milliGRAM(s) Oral at bedtime (Home rx: Flomax 0.4mg HS)    - US kidney bladder (4/14) demonstrated: No hydronephrosis or calculi, approximate 8.4 cm cyst, left kidney (present on earlier CT scan), blair catheter within collapsed urinary bladder.  positive 30L LOS/2L negative 24hrs  #acute scrotal swelling    - U/S 3/29 w/ diffused scrotal wall edema (keep blair) - blair has minimal output 4/17, replaced 4/17    - scrotal elevation  #urine output in critically ill    -indwelling blair (placed 3/22) - replaced (4/17)     Labs:          BUN/Cr- 82/1.5  -->,  82/1.5  -->          Electrolytes-Na 148 // K 4.3 // Mg 2.0 //  Phos 3.4 (04-21 @ 22:03)  #Diuresis     -Bumex 2 mg x1 given 4/21    HEME/ONC:   #DVT prophylaxis    -HSQ, SCDs  #H/O CAD s/p stents x3    - ASA 81    - Plavix 75mg restarted 4/7    Labs: Hb/Hct:  8.0/25.5  -->,  7.1/23.0  -->                      Plts:  250  -->,  242  -->       Home Rx: clopidogrel 75 mg oral tablet: 1 tab(s) orally once a day, Aspirin 81mg QD  T&S Expires: 4/24    ID:  #Acute clostridium difficile 3/30    Completed course of po Vanco on 4/13  WBC- 17.46  --->>,  25.18  --->>,  18.94  --->>  Temp trend- 24hrs T(F): 97.7 (04-22 @ 01:00), Max: 98.1 (04-21 @ 20:00)  Antibiotics:    -Ended 4/17: Cefepime IVPB 1000mg every 12 hours (started 4/10)   -Ended 4/13: vancomycin Solution 125 every 6 hours   -DC'd vanc IV 4/13  Cultures:    BCx 4/11 -negative    BAL 4/10- Pseudomonas     BAL 4/8- negative     ET Sputum 3/29: negative     BCx 3/28: NGTD    UA 3/28: Negative     MRSA 3/26: Negative    UA 3/24: negative    ET Sputum 3/24-- neg    CDiff PCR 3/30: Positive  # +Ramona Auris surveillance swab on 4/6/23 (nares, axilla, groin)    ENDO:  #DM    -HA1C= 5.0    -ISS    -Glucose goal 140-180   - D/C'd 20 mg solumedrol - no current indication    - on Prednisone 10 QD    MSK:  #Activity   -WBAT RLE per ortho, PT/OT when clinically appropriate   -Right hip Xray 2 views per ortho (4/9) --> s/p ORIF of comminuted intertrochanteric Rt femur fx w/out definite change compared to 3/22/23    LINES/DRAINS:    PIV  Blair (3/22) -- replaced (4/17)   Trach  PEG   Right radial a-line (4/10)  Left cephalic midline (4/18)    ADVANCED DIRECTIVES:  DNR, MOLST completed    HCP/Emergency Contact- Song Segura (Son): [phone]    INDICATION FOR SICU: Hypotension with pressor requirements. Intubated.       DISPO: SICU

## 2023-04-22 NOTE — PROGRESS NOTE ADULT - ATTENDING COMMENTS
Patient had multiple episodes of melena yesterday.  Required 1 unit PRBC yesterday, Hb 8.2 this am.  No melena today.  Remains on low dose of Levo drip.  Had CTA Abdomen - no signs of bleeding; bilateral pleural effusions, R>L    ASSESSMENT:  76 y/o male, S/P Fall.  Right Intertrochanteric Femur Fracture. S/P ORIF.  Acute respiratory failure.  S/P Tracheostomy and PEG  Dysphagia.  Hypotension.  GI bleeding.  Acute blood loss anemia.    PLAN:  - pain control  - continue Vent support; try CPAP as tolerated; follow ABG  - keep MAP>65; hold Bumex today; wean Levo  - on Protonix drip  - monitor for GI bleeding; if no bleeding, can resume tube feeds  - follow serum electrolytes and UOP  - ID - universal precautions  - DVT prophylaxis  - SS f/u for LTAC

## 2023-04-22 NOTE — PROGRESS NOTE ADULT - SUBJECTIVE AND OBJECTIVE BOX
Gastroenterology progress note:     Patient is a 77y old  Male who presents with a chief complaint of mechanical Fall/ Hip Fx (22 Apr 2023 02:19)       Admitted on: 03-20-23    We are following the patient for rectal bleeding      Interval History:  s/p golytely overnight        PAST MEDICAL & SURGICAL HISTORY:  CAD (coronary artery disease)      Hypercholesteremia      BPH (benign prostatic hyperplasia)      Kidney stones      Prostate cancer      Status post cardiac surgery  cardiac stents      Bilateral cataracts      History of lithotripsy          MEDICATIONS  (STANDING):  albuterol    0.083% 2.5 milliGRAM(s) Nebulizer every 8 hours  albuterol    90 MICROgram(s) HFA Inhaler 1 Puff(s) Inhalation every 8 hours  aspirin  chewable 81 milliGRAM(s) Oral daily  atorvastatin 80 milliGRAM(s) Oral at bedtime  atropine 1% Solution 1 Drop(s) Left EYE two times a day  chlorhexidine 0.12% Liquid 15 milliLiter(s) Oral Mucosa every 12 hours  chlorhexidine 2% Cloths 1 Application(s) Topical <User Schedule>  clopidogrel Tablet 75 milliGRAM(s) Oral daily  doxazosin 4 milliGRAM(s) Oral at bedtime  guaifenesin/dextromethorphan Oral Liquid 10 milliLiter(s) Oral every 4 hours  heparin   Injectable 5000 Unit(s) SubCutaneous every 8 hours  insulin lispro (ADMELOG) corrective regimen sliding scale   SubCutaneous every 6 hours  metoclopramide Injectable 5 milliGRAM(s) IV Push every 8 hours  midodrine 10 milliGRAM(s) Oral every 8 hours  norepinephrine Infusion 0.01 MICROgram(s)/kG/Min (0.6 mL/Hr) IV Continuous <Continuous>  pantoprazole  Injectable 40 milliGRAM(s) IV Push every 12 hours  prednisoLONE acetate 1% Suspension 1 Drop(s) Left EYE every 6 hours  predniSONE   Tablet 10 milliGRAM(s) Oral daily    MEDICATIONS  (PRN):  sodium chloride 0.9% lock flush 10 milliLiter(s) IV Push every 1 hour PRN Pre/post blood products, medications, blood draw, and to maintain line patency  sodium chloride 0.9% lock flush 10 milliLiter(s) IV Push every 1 hour PRN Pre/post blood products, medications, blood draw, and to maintain line patency      Allergies  No Known Allergies      Review of Systems:   unable to obtain     Physical Examination:  T(C): 36.2 (04-22-23 @ 04:00), Max: 36.7 (04-21-23 @ 20:00)  HR: 104 (04-22-23 @ 07:00) (79 - 123)  BP: 117/62 (04-22-23 @ 07:00) (87/54 - 156/77)  RR: 36 (04-22-23 @ 07:00) (22 - 47)  SpO2: 100% (04-22-23 @ 07:00) (89% - 100%)      04-21-23 @ 07:01  -  04-22-23 @ 07:00  --------------------------------------------------------  IN: 4834.4 mL / OUT: 2780 mL / NET: 2054.4 mL      Constitutional: No acute distress.  Respiratory:  No signs of respiratory distress. Lung sounds are clear bilaterally.  Cardiovascular:  S1 S2, Regular rate and rhythm.  Abdominal: Abdomen is soft, symmetric, and non-tender without distention. + G tube         Data:                        8.2    17.95 )-----------( 228      ( 22 Apr 2023 02:49 )             25.4     Hgb trend:  8.2  04-22-23 @ 02:49  7.1  04-21-23 @ 22:03  8.0  04-21-23 @ 11:48  8.4  04-20-23 @ 20:26  8.7  04-19-23 @ 20:26        04-22    144  |  109  |  78<HH>  ----------------------------<  127<H>  4.1   |  24  |  1.4    Ca    8.4      22 Apr 2023 04:25  Phos  3.4     04-21  Mg     2.0     04-21      Liver panel trend:  TBili 0.8   /   AST 34   /   ALT 36   /   AlkP 167   /   Tptn 4.4   /   Alb 2.3    /   DBili --      04-18  TBili 0.7   /   AST 36   /   ALT 23   /   AlkP 184   /   Tptn 4.4   /   Alb 1.9    /   DBili 0.4      04-14      PT/INR - ( 22 Apr 2023 04:25 )   PT: 12.50 sec;   INR: 1.09 ratio         PTT - ( 22 Apr 2023 04:25 )  PTT:28.6 sec       Radiology:  CT Angio Abdomen and Pelvis w/ IV Cont:   ACC: [id number] EXAM:  CT ANGIO ABD PELV (W)AW IC   ORDERED BY: CECILLE BUCKLEY     PROCEDURE DATE:  04/21/2023          INTERPRETATION:  REASON FOR EXAM / CLINICAL STATEMENT: Lower GI bleeding.   Evaluate for active extravasation. WBC _    PMHx of    TECHNIQUE: Multislice helical sections were obtained from the domes of   the diaphragm to the pubic symphysis prior to and following intravenous   administration of contrast in the arterial and venous phases, utilizing   CT angiogram protocol. 90 ml of Omnipaque 350 was administered IV with 10   ml discarded. Reformatted images in the coronal and sagittal planes were   acquired, as well as 3D, Volume rendering, and MIP reconstructed images.      COMPARISON CT: CT scan of the chest abdomen pelvis dated 3/28/2023    OTHER STUDIES USED FOR CORRELATION: None.      FINDINGS:    TUBES/LINES: None    LOWER CHEST: There are large bilateral pleural effusions with areas of   atelectasis involving the right middle lobe and the right and left lower   lobes. Coronary artery calcifications. Small pericardial effusion.    HEPATIC: The liver is normal in appearance with no evidence of mass or   bile duct dilatation. Left hepatic cyst and right hepatic calcifications.   The portal vein is patent. The hepatic veins are opacified.    BILIARY: No calcified gallstones are noted.    SPLEEN: Unremarkable.    PANCREAS: The pancreatic head is partially obscured by streak artifact.   The body and tail of the pancreas are unremarkable. No evidence of mass   or pancreatitis.    ADRENAL GLANDS: Unremarkable.    KIDNEYS: There is symmetric renal enhancement. No evidence of   hydronephrosis, calcified stones, or solid renal mass.    ABDOMINOPELVIC NODES: Unremarkable.    PELVIC ORGANS: No evidence of pelvic mass, lymphadenopathy, or fluid   collection.    BLADDER: A Garrison catheter is present within the bladder. The bladder is   decompressed limiting evaluation.    PERITONEUM/MESENTERY/BOWEL: The examination is limited due to the   presence of retained contrast material throughout the colon from prior   examination. No gross evidence of active GI bleeding.    Metallic clips/embolic material in the region of the duodenum.    A gastrostomy is in place with the retention device within the gastric   lumen.    No evidence of obstruction, colitis, inflammatory process, or ascites   within the abdomen or pelvis.    BONES/SOFT TISSUES: Degenerative changes of the spine are noted. Post   ORIF of an intertrochanteric right femur fracture. Anasarca. Soft tissue   edema/fluid is noted throughout the right lateral abdominal wall.    VASCULAR: Aortic calcifications are noted with no evidence of abdominal   aortic aneurysm or dissection.  The celiac axis, the superior mesenteric   artery, the inferior mesenteric artery, and the renal arteries are   patent. Single renal arteries are seen bilaterally.        IMPRESSION:    The examination is limited due to the presence of retained contrast   material throughout the colon from prior examination. No gross evidence   of active GI bleeding.    --- End of Report ---            TIAGO CAI MD; Attending Interventional Radiologist  This document has been electronically signed. Apr 21 2023  7:33PM (04-21-23 @ 18:33)

## 2023-04-23 NOTE — PROGRESS NOTE ADULT - SUBJECTIVE AND OBJECTIVE BOX
Gastroenterology progress note:     Patient is a 77y old  Male who presents with a chief complaint of mechanical Fall/ Hip Fx (23 Apr 2023 02:10)       Admitted on: 03-20-23    We are following the patient for rectal bleed     Interval History:  no bleeding episodes since last 24 hours  dignishield brown stool        PAST MEDICAL & SURGICAL HISTORY:  CAD (coronary artery disease)      Hypercholesteremia      BPH (benign prostatic hyperplasia)      Kidney stones      Prostate cancer      Status post cardiac surgery  cardiac stents      Bilateral cataracts      History of lithotripsy          MEDICATIONS  (STANDING):  albuterol    0.083% 2.5 milliGRAM(s) Nebulizer every 8 hours  albuterol    90 MICROgram(s) HFA Inhaler 1 Puff(s) Inhalation every 8 hours  aspirin  chewable 81 milliGRAM(s) Oral daily  atorvastatin 80 milliGRAM(s) Oral at bedtime  atropine 1% Solution 1 Drop(s) Left EYE two times a day  chlorhexidine 0.12% Liquid 15 milliLiter(s) Oral Mucosa every 12 hours  chlorhexidine 2% Cloths 1 Application(s) Topical <User Schedule>  clopidogrel Tablet 75 milliGRAM(s) Oral daily  doxazosin 4 milliGRAM(s) Oral at bedtime  guaifenesin/dextromethorphan Oral Liquid 10 milliLiter(s) Oral every 4 hours  heparin   Injectable 5000 Unit(s) SubCutaneous every 8 hours  insulin lispro (ADMELOG) corrective regimen sliding scale   SubCutaneous every 6 hours  midodrine 10 milliGRAM(s) Oral every 8 hours  norepinephrine Infusion 0.01 MICROgram(s)/kG/Min (0.6 mL/Hr) IV Continuous <Continuous>  pantoprazole  Injectable 40 milliGRAM(s) IV Push every 12 hours  prednisoLONE acetate 1% Suspension 1 Drop(s) Left EYE every 6 hours  predniSONE   Tablet 10 milliGRAM(s) Oral daily    MEDICATIONS  (PRN):  sodium chloride 0.9% lock flush 10 milliLiter(s) IV Push every 1 hour PRN Pre/post blood products, medications, blood draw, and to maintain line patency  sodium chloride 0.9% lock flush 10 milliLiter(s) IV Push every 1 hour PRN Pre/post blood products, medications, blood draw, and to maintain line patency      Allergies  No Known Allergies      Review of Systems:   unable to obtain     Physical Examination:  T(C): 36 (04-23-23 @ 08:00), Max: 36.4 (04-22-23 @ 20:00)  HR: 89 (04-23-23 @ 08:47) (89 - 111)  BP: 106/64 (04-23-23 @ 04:00) (83/54 - 137/64)  RR: 33 (04-23-23 @ 08:00) (21 - 49)  SpO2: 100% (04-23-23 @ 08:47) (95% - 100%)      04-22-23 @ 07:01  -  04-23-23 @ 07:00  --------------------------------------------------------  IN: 383.5 mL / OUT: 1995 mL / NET: -1611.5 mL    04-23-23 @ 07:01  -  04-23-23 @ 11:09  --------------------------------------------------------  IN: 46.8 mL / OUT: 75 mL / NET: -28.2 mL      Constitutional: No acute distress.  Respiratory:  No signs of respiratory distress. Lung sounds are clear bilaterally.  Cardiovascular:  S1 S2, Regular rate and rhythm.  Abdominal: Abdomen is soft, symmetric, and non-tender without distention.        Data:                        7.2    13.48 )-----------( 190      ( 23 Apr 2023 04:55 )             22.2     Hgb trend:  7.2  04-23-23 @ 04:55  7.5  04-22-23 @ 20:29  7.6  04-22-23 @ 12:54  8.2  04-22-23 @ 02:49  7.1  04-21-23 @ 22:03  8.0  04-21-23 @ 11:48  8.4  04-20-23 @ 20:26        04-23    145  |  110  |  75<HH>  ----------------------------<  114<H>  3.9   |  24  |  1.3    Ca    8.3<L>      23 Apr 2023 04:55  Phos  3.6     04-22  Mg     2.1     04-23    TPro  4.3<L>  /  Alb  2.1<L>  /  TBili  0.8  /  DBili  x   /  AST  30  /  ALT  36  /  AlkPhos  124<H>  04-23    Liver panel trend:  TBili 0.8   /   AST 30   /   ALT 36   /   AlkP 124   /   Tptn 4.3   /   Alb 2.1    /   DBili --      04-23  TBili 0.8   /   AST 34   /   ALT 36   /   AlkP 167   /   Tptn 4.4   /   Alb 2.3    /   DBili --      04-18  TBili 0.7   /   AST 36   /   ALT 23   /   AlkP 184   /   Tptn 4.4   /   Alb 1.9    /   DBili 0.4      04-14      PT/INR - ( 22 Apr 2023 04:25 )   PT: 12.50 sec;   INR: 1.09 ratio         PTT - ( 22 Apr 2023 04:25 )  PTT:28.6 sec       Radiology:

## 2023-04-23 NOTE — PROGRESS NOTE ADULT - ATTENDING COMMENTS
patient tolerated CPAP very short period yesterday.  On Vent, FiO2 40%, PEEP 8.  Awake.  No new episodes of GI bleeding, restarted on tube feeds.  CXR - bilateral pleural effusions, R>L  On Levo 0.03 mcg/kg/min    ASSESSMENT:  78 y/o male, S/P Fall.  Right Intertrochanteric Femur Fracture. S/P ORIF.  Acute respiratory failure.  S/P Tracheostomy and PEG  Dysphagia.  Hypotension.  GI bleeding.  Acute blood loss anemia.    PLAN:  - pain control prn  - try CPAP as tolerated; follow ABG; use SIMV mode  - aggressive chest PT  - keep MAP>65; euvolemic  - on tube feeds  - follow serum electrolytes and UOP  - ID - universal precautions  - DVT prophylaxis  - SS f/u for LTAC

## 2023-04-23 NOTE — PROGRESS NOTE ADULT - SUBJECTIVE AND OBJECTIVE BOX
RADHA RAMON   [id number]   [birth year]  77yM    Admit Date/LOS: 03-20 (2d)  Indication for SICU: Hemodynamic monitoring. 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 Pt walks with walker and got up in middle of night to get food and slipped and fell on his R side. Pt has been having R hip pain since with inability to ambulate. Pts family finally convinced him to come to hospital today. Pt is having 10/10 pain currently. Denies dizziness, CP or LOC prior to fall. Denies head trauma. No HA. (20 Mar 2023 12:02)    3/22: Patient S/P ORIF of right hip using interlocking intramedullary stephon. - upgraded for hypotension in PACU  4/5  Trach and PEG    24HRS EVENT:  4/22  NIGHT  -reglan d/c  - 1g mag sulfate repleted        [] A ten-point review of systems was otherwise negative except as noted above.  [x] Due to altered mental status/intubation, subjective information was not attained from   the patient. History was obtained, to the extent possible, from review of the chart and collateral sources of information.      ======================================================================     RADHA RAMON   [id number]   [birth year]  77yM    Admit Date/LOS: 03-20 (2d)  Indication for SICU: Hemodynamic monitoring. 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 Pt walks with walker and got up in middle of night to get food and slipped and fell on his R side. Pt has been having R hip pain since with inability to ambulate. Pts family finally convinced him to come to hospital today. Pt is having 10/10 pain currently. Denies dizziness, CP or LOC prior to fall. Denies head trauma. No HA. (20 Mar 2023 12:02)    3/22: Patient S/P ORIF of right hip using interlocking intramedullary stephon. - upgraded for hypotension in PACU  4/5  Trach and PEG    24HRS EVENT:  4/22  NIGHT  -reglan d/c  - 1g mag sulfate repleted        [] A ten-point review of systems was otherwise negative except as noted above.  [x] Due to altered mental status/intubation, subjective information was not attained from   the patient. History was obtained, to the extent possible, from review of the chart and collateral sources of information.      ======================================================================    Daily     Daily   Diet, NPO with Tube Feed:   Tube Feeding Modality: Gastrostomy  Peptamen A.F. Formula  Total Volume for 24 Hours (mL): 1080  Continuous  Starting Tube Feed Rate mL per Hour: 45     Every 4 hours  Until Goal Tube Feed Rate (mL per Hour): 45  Tube Feed Duration (in Hours): 24  Tube Feed Start Time: 10:00 (04-14-23 @ 02:27)    CURRENT MEDS:  Neurologic Medications    Respiratory Medications  albuterol    0.083% 2.5 milliGRAM(s) Nebulizer every 8 hours  albuterol    90 MICROgram(s) HFA Inhaler 1 Puff(s) Inhalation every 8 hours  guaifenesin/dextromethorphan Oral Liquid 10 milliLiter(s) Oral every 4 hours    Cardiovascular Medications  doxazosin 4 milliGRAM(s) Oral at bedtime  furosemide   Injectable 20 milliGRAM(s) IV Push once  midodrine 10 milliGRAM(s) Oral every 8 hours  norepinephrine Infusion 0.01 MICROgram(s)/kG/Min IV Continuous <Continuous>    Gastrointestinal Medications  pantoprazole  Injectable 40 milliGRAM(s) IV Push every 12 hours  sodium chloride 0.9% lock flush 10 milliLiter(s) IV Push every 1 hour PRN Pre/post blood products, medications, blood draw, and to maintain line patency  sodium chloride 0.9% lock flush 10 milliLiter(s) IV Push every 1 hour PRN Pre/post blood products, medications, blood draw, and to maintain line patency    Genitourinary Medications    Hematologic/Oncologic Medications  aspirin  chewable 81 milliGRAM(s) Oral daily  clopidogrel Tablet 75 milliGRAM(s) Oral daily  heparin   Injectable 5000 Unit(s) SubCutaneous every 8 hours    Antimicrobial/Immunologic Medications    Endocrine/Metabolic Medications  atorvastatin 80 milliGRAM(s) Oral at bedtime  insulin lispro (ADMELOG) corrective regimen sliding scale   SubCutaneous every 6 hours  predniSONE   Tablet 10 milliGRAM(s) Oral daily    Topical/Other Medications  atropine 1% Solution 1 Drop(s) Left EYE two times a day  chlorhexidine 0.12% Liquid 15 milliLiter(s) Oral Mucosa every 12 hours  chlorhexidine 2% Cloths 1 Application(s) Topical <User Schedule>  prednisoLONE acetate 1% Suspension 1 Drop(s) Left EYE every 6 hours    ICU Vital Signs Last 24 Hrs  T(C): 36 (23 Apr 2023 08:00), Max: 36.4 (22 Apr 2023 20:00)  T(F): 96.8 (23 Apr 2023 08:00), Max: 97.6 (23 Apr 2023 04:00)  HR: 93 (23 Apr 2023 15:00) (89 - 111)  BP: 106/64 (23 Apr 2023 04:00) (96/52 - 137/64)  BP(mean): 80 (23 Apr 2023 04:00) (65 - 92)  ABP: 143/62 (23 Apr 2023 15:00) (81/47 - 145/67)  ABP(mean): 87 (23 Apr 2023 15:00) (56 - 93)  RR: 40 (23 Apr 2023 15:00) (21 - 49)  SpO2: 98% (23 Apr 2023 15:00) (95% - 100%)    O2 Parameters below as of 23 Apr 2023 15:00  Patient On (Oxygen Delivery Method): ventilator    O2 Concentration (%): 40      Mode: AC/ CMV (Assist Control/ Continuous Mandatory Ventilation)  RR (machine): 22  TV (machine): 450  FiO2: 40  PEEP: 8  ITime: 1  MAP: 14  PIP: 27    ABG - ( 23 Apr 2023 06:00 )  pH, Arterial: 7.44  pH, Blood: x     /  pCO2: 37    /  pO2: 136   / HCO3: 25    / Base Excess: 0.9   /  SaO2: 99.0              I&O's Summary    22 Apr 2023 07:01  -  23 Apr 2023 07:00  --------------------------------------------------------  IN: 383.5 mL / OUT: 1995 mL / NET: -1611.5 mL    23 Apr 2023 07:01  -  23 Apr 2023 15:14  --------------------------------------------------------  IN: 374.4 mL / OUT: 255 mL / NET: 119.4 mL      I&O's Detail    22 Apr 2023 07:01  -  23 Apr 2023 07:00  --------------------------------------------------------  IN:    Norepinephrine: 68.5 mL    Peptamen A.F.: 315 mL  Total IN: 383.5 mL    OUT:    Indwelling Catheter - Urethral (mL): 1195 mL    Rectal Tube (mL): 800 mL  Total OUT: 1995 mL    Total NET: -1611.5 mL      23 Apr 2023 07:01  -  23 Apr 2023 15:14  --------------------------------------------------------  IN:    Norepinephrine: 14.4 mL    Peptamen A.F.: 360 mL  Total IN: 374.4 mL    OUT:    Indwelling Catheter - Urethral (mL): 255 mL  Total OUT: 255 mL    Total NET: 119.4 mL          PHYSICAL EXAM:     alert  follows commands, CORRAL  no acute distress  equal chest rise b/l on mechanival vent via trach  abdomen soft  extremities soft  urinary cath in place  improved scrotal swelling

## 2023-04-23 NOTE — PROGRESS NOTE ADULT - ASSESSMENT
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 Gi was consulted for rectal bleeding large episodes since yesterday with blood clots.     #)Hematochezia: DD includes likely Diverticulosis vs anorectal pathologies including hemorrhoids vs AVM vs Colorectal cancer  tachycardia in 110-120 however patient has been tachycardic even before since last week   Baseline Hb 9-10 slowly dropping down current hb 7.2  had BRBPR with clots on 4/21 which was stopped now  on aspirin and plavix   increase in BUN/creatinine  last EGD and colonoscopy in 2021   PEG tube lavage no bleeding   CTA negative for active bleed  on low dose pressors and high WBC count     Rec:  can have clear liquid diet today   Maintain active type and screen  Adequate fluid resuscitation (Keep SBP > 90)  Trend CBC every 8hrs and keep Hg > 8  PPI BID  will plan for colonoscopy early next week once off pressors

## 2023-04-23 NOTE — PROGRESS NOTE ADULT - ASSESSMENT
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   S/P ORIF of right hip using interlocking intramedullary stephon.  S/P Trach & PEG 4/5     NEURO:  #Acute pain  - fentanyl prn  #sedation    - off all sedation 4/17 AM     RESP:   #Acute respiratory failure secondary to post operative arrhythmias requiring mechanical ventilation (Intubated 3/23)  RR (machine): 22, TV (machine): 450, FiO2: 40, PEEP: 8  4-22 @ 18:03: pH: 7.43  /  pCO2: 38    /  pO2: 150   / HCO3: 25    / Base Excess: 0.9   /  SaO2: 99.6    04-22 @ 03:27--7.43 / 38 / 133 / 25 / [id number] 100.0 / Lac 0.70 / iCal 1.17   04-21 @ 16:20--7.41 / 42 / 160 / 27 / [id number] 100.0 / Lac 0.70 / iCal 1.21   - BAL 4/15: little secretions appreciated, no sample was sent  #Pneumonia   - Pseudomonas on culture, sensitive to Cefepime -started on 4/10, end date 4/17     -s/p trach 4/5/23 with size 8    -s/p bronch 4/8 BAL- no organisms    -4/11 BAL - pseudomonas    -4/11 blood cx - neg    -Pulm c/s - avoid volume overload and diuresis prn     -Albuterol, Robitussin  - 8hours CPAP on 4/20  #PE w/u NEGATIVE on 3/28    CARDS:   #Tachycardia     - Propanolol - Discontinued 4/10 d/t bradycardic events  #Bradycardia       - EP: 4/11 --> signing off, re-consult PRN, will not be doing PPM since bradycardia likely due to hypoxia     - Trops: 0.08 -> 0.09-- seen by cards likely demand ischemia  #acute hypotension    - Levophed infusion    - off vaso 4/17    - restarted Midodrine 10mg q8 4/19   Intermittent hypotensive episodes 4/10 during bradycardic episodes  #Labs/Imaging    -EKG 4/18: NSR, RBBB  #HLD    -Atorvastatin 80mg HS  #HTN    -dc'd amlodipine 5mg qd for HTN (4/18)    GI/NUTR:   #Diet: TF Peptamen AF goal of 45 ml/h     -Provides 1350 kcal and 85.5 gms protein     -PEG 3cm at skin, 4cm at bumper  #Nausea    - Aspiration precautions, HOB 30    - Previously on reglan for concern for dysmotility, d/c 4/23  #GI Prophylaxis    - switched to Protonix 40 IV q12  #Bowel regimen    - senna  #Upper and Lower GI Bleed  -BM (4/19) - 1 x small blood clots  -BM (4/20)- melena + small blood clots   -BM (4/21) - large dark red bloody clots per rectum, no stool  -GI consult placed for lower GI bleed: Recs: keep NPO, maintain active T&S, adequate fluid resuscitation (Keep SBP > 90), trend CBC every 8hrs and keep Hg > 8, PPI BID.  #Imaging    -CT abdomen 3/28: No definite infectious source identified      -RUQ sono (3/28): biliary sludge, mild GB wall thickening/edema    -KUB (4/15): normal bowel gas pattern     -CT angio A/P (4/21): no active bleeding     /RENAL:   #UO: Blair placed by Urology 3/22 (d/w urology and assess scrotal swelling prior to removal)     - on Doxazosin 4 milliGRAM(s) Oral at bedtime (Home rx: Flomax 0.4mg HS)    - US kidney bladder (4/14) demonstrated: No hydronephrosis or calculi, approximate 8.4 cm cyst, left kidney (present on earlier CT scan), blair catheter within collapsed urinary bladder.  positive 30L LOS/2L negative 24hrs  #acute scrotal swelling    - U/S 3/29 w/ diffused scrotal wall edema (keep blair) - blair has minimal output 4/17, replaced 4/17    - scrotal elevation  #urine output in critically ill    -indwelling blair (placed 3/22) - replaced (4/17)     Labs:          BUN/Cr- 82/1.5  -->,  82/1.5  -->          Electrolytes-Na 148 // K 4.3 // Mg 2.0 //  Phos 3.4 (04-21 @ 22:03)  #Diuresis     -Bumex 2 mg x1 given 4/21    HEME/ONC:   #DVT prophylaxis    -HSQ, SCDs  #H/O CAD s/p stents x3    - ASA 81    - Plavix 75mg restarted 4/7    Labs: Hb/Hct:  8.0/25.5  -->,  7.1/23.0  -->    7.5/23.6              Plts:  250  -->,  242  -->   204    Home Rx: clopidogrel 75 mg oral tablet: 1 tab(s) orally once a day, Aspirin 81mg QD  T&S Expires: 4/24    ID:  #Acute clostridium difficile 3/30    Completed course of po Vanco on 4/13  WBC- 17.46  --->>,  25.18  --->>,  18.94  --->> 14.5  Temp trend- 24hrs T(F): 97.7 (04-22 @ 01:00), Max: 98.1 (04-21 @ 20:00)  Antibiotics:    -Ended 4/17: Cefepime IVPB 1000mg every 12 hours (started 4/10)   -Ended 4/13: vancomycin Solution 125 every 6 hours   -DC'd vanc IV 4/13  Cultures:    BCx 4/11 -negative    BAL 4/10- Pseudomonas     BAL 4/8- negative     ET Sputum 3/29: negative     BCx 3/28: NGTD    UA 3/28: Negative     MRSA 3/26: Negative    UA 3/24: negative    ET Sputum 3/24-- neg    CDiff PCR 3/30: Positive  # +Ramona Auris surveillance swab on 4/6/23 (nares, axilla, groin)    ENDO:  #DM    -HA1C= 5.0    -ISS    -Glucose goal 140-180   - D/C'd 20 mg solumedrol - no current indication    - on Prednisone 10 QD    MSK:  #Activity   -WBAT RLE per ortho, PT/OT when clinically appropriate   -Right hip Xray 2 views per ortho (4/9) --> s/p ORIF of comminuted intertrochanteric Rt femur fx w/out definite change compared to 3/22/23    LINES/DRAINS:    PIV  Blair (3/22) -- replaced (4/17)   Trach  PEG   Right radial a-line (4/10)  Left cephalic midline (4/18)    ADVANCED DIRECTIVES:  DNR, MOLST completed    HCP/Emergency Contact- Song Segura (Son): [phone]    INDICATION FOR SICU: Hypotension with pressor requirements. Intubated.       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   S/P ORIF of right hip using interlocking intramedullary stephon.  S/P Trach & PEG 4/5     NEURO:  #Acute pain    -APAP prn    RESP:   #Acute respiratory failure secondary to post operative arrhythmias requiring mechanical ventilation (Intubated 3/23)    -s/p trach 4/5/23 with size 8    intermittent  RR (machine): 22, TV (machine): 450, FiO2: 40, PEEP: 8  04-23 @ 06:00--7.44 / 37 / 136 / 25 / [id number] 99.0 / Lac 0.70 / iCal 1.25   04-22 @ 18:03--7.43 / 38 / 150 / 25 / [id number] 99.6 / Lac 0.70 / iCal 1.27   #Pneumonia     -s/p bronch 4/8 BAL- no organisms    -4/11 BAL - pseudomonas    -4/11 blood cx - neg    -Pulm c/s - avoid volume overload and diuresis prn     -Albuterol, Robitussin  #PE w/u NEGATIVE on 3/28    CARDS:   #acute hypotension    - Levophed infusion improving < 0.05 mcg/kg/min    - restarted Midodrine 10mg q8 4/19   #Bradycardia      -EP 4/1: no indication for PPM at the current time, most likely secondary to hypoxia, reconsult PRN  #HLD    -Atorvastatin 80mg HS  #Labs/Imaging    -EKG 4/18: NSR, RBBB    GI/NUTR:   #Diet: TF Peptamen AF goal of 45 ml/h     -Provides 1350 kcal and 85.5 gms protein     -PEG 3cm at skin, 4cm at bumper  #Nausea    - Aspiration precautions, HOB 30  #GI Prophylaxis    - switched to Protonix 40 IV q12  #Bowel regimen    - senna  #Upper and Lower GI Bleed  -BM (4/19) - 1 x small blood clots  -BM (4/20)- melena + small blood clots   -BM (4/21) - large dark red bloody clots per rectum, no stool  -GI consult placed for lower GI bleed: Recs: keep NPO, maintain active T&S, adequate fluid resuscitation (Keep SBP > 90), trend CBC every 8hrs and keep Hg > 8, PPI BID.  #Imaging    -CT abdomen 3/28: No definite infectious source identified      -RUQ sono (3/28): biliary sludge, mild GB wall thickening/edema    -KUB (4/15): normal bowel gascyst, left kidney (present on earlier CT scan), blair catheter within collapsed urinary bladder.  positive 30L LOS/2L negative 24hrs  #acute scrotal swelling    - U/S 3/29 w/ diffused scrotal wall edema (keep blair) - blair has minimal output 4/17, replaced 4/17    - scrotal elevation  #urine output in critically ill    -indwelling blair (placed 3/22) - replaced (4/17)     Labs:          BUN/Cr- 82/1.5  -->,  82/1.5  -->          Electrolytes-Na 148 // K 4.3 // Mg 2.0 //  Phos 3.4 (04-21 @ 22:03)  #Diuresis     -Bumex 2 mg x1 given 4/21    HEME/ONC:   #DVT prophylaxis    -HSQ, SCDs  #H/O CAD s/p stents x3    - ASA 81    - Plavix 75mg restarted 4/7    Labs: Hb/Hct:  8.0/25.5  -->,  7.1/23.0  -->    7.5/23.6              Plts:  250  -->,  242  -->   204    Home Rx: clopidogrel 75 mg oral tablet: 1 tab(s) orally once a day, Aspirin 81mg QD  T&S Expires: 4/24    ID:  #Acute clostridium difficile 3/30    Completed course of po Vanco on 4/13  WBC- 17.46  --->>,  25.18  --->>,  18.94  --->> 14.5  Temp trend- 24hrs T(F): 97.7 (04-22 @ 01:00), Max: 98.1 (04-21 @ 20:00)  Antibiotics:    -Ended 4/17: Cefepime IVPB 1000mg every 12 hours (started 4/10)   -Ended 4/13: vancomycin Solution 125 every 6 hours   -DC'd vanc IV 4/13  Cultures:    BCx 4/11 -negative    BAL 4/10- Pseudomonas     BAL 4/8- negative     ET Sputum 3/29: negative     BCx 3/28: NGTD    UA 3/28: Negative     MRSA 3/26: Negative    UA 3/24: negative    ET Sputum 3/24-- neg    CDiff PCR 3/30: Positive  # +Ramona Auris surveillance swab on 4/6/23 (nares, axilla, groin)    ENDO:  #DM    -HA1C= 5.0    -ISS    -Glucose goal 140-180   - D/C'd 20 mg solumedrol - no current indication    - on Prednisone 10 QD    MSK:  #Activity   -WBAT RLE per ortho, PT/OT when clinically appropriate   -Right hip Xray 2 views per ortho (4/9) --> s/p ORIF of comminuted intertrochanteric Rt femur fx w/out definite change compared to 3/22/23    LINES/DRAINS:    PIV  Blair (3/22) -- replaced (4/17)   Trach  PEG   Right radial a-line (4/10)  Left cephalic midline (4/18)    ADVANCED DIRECTIVES:  DNR, MOLST completed    HCP/Emergency Contact- Song Segura (Son): [phone]    INDICATION FOR SICU: Hypotension with pressor requirements. Intubated.       DISPO: SICU

## 2023-04-24 NOTE — PROGRESS NOTE ADULT - ATTENDING COMMENTS
Critical Care: [id number]   This patient has a high probability of sudden, clinically significant deterioration, which requires the highest level of physician preparedness to intervene urgently. I managed/supervised life or organ supporting interventions that required frequent physician assessment. I devoted my full attention in the ICU to the direct care of this patient for the period of time indicated below. Time I spent with the family or surrogate(s) is included only if the patient was incapable of providing the necessary information or participating in medical decision making. Time devoted to teaching and to any procedures I billed separately is not included.     RADHA RAMON 77y Male admitted to [x ] SICU /[ ] SDU with post OP hemodynamic instability requiring pressors, vented due to respiratory failure post-OP, A.Fib with hemodynamic instability requiring pressors, electrolytes abnormalities. S/P tracheostomy and PEG 4/5/23, last week had episode of melena which resolved at this time  Patient is examined and evaluated at the bedside with SICU team. Treatment plan discussed with SICU team, nurses and primary team.   Chest X-ray and all relevant studies reviewed during rounds.  Will continue hemodynamic monitoring as per protocol in SICU.    Neuro:  GCS [10T ]   [x ]  Neurovascular checks as per SICU protocol                 [ ] 3% NaCl                     Paralysis [ ] Yes  [x ]  No      Sedated/Pain control with                 [ ] Dilaudid drip, [ ]  Ketamine drip, [ x] Fentanyl drip, [ ] Propofol, [ ] Precedex, [ ] Versed drip, [ ] Ativan drip,                           [ ] OxyContin standing,  [ ] OxyContin PRN, [ ] Dilaudid PRN pushes, [ ] Fentanyl PRN pushes, [ ] PCA,                [x ] Tylenol IV/PO, [ ] Gabapentin, [ ]  Ketorolac, [ ] Tramadol,  [ ] Lidoderm Patch       Other Medications               [ ] Seroquel, [ ] Zyprexa, [ ] Haldol,  [ ] Clonazepam [ ] Xanax, [ ] Versed/Ativan PRN, [ ] Valium [x ] None               [ ] Robaxin   [ ] Baclofen  [ ] Flexeril               [ ] Keppra  [ ] Lamictal  [ ] Depakote  [ ] Dilantin               [ ] CIWA (Ativan/Valium/ Librium)  CV: continue to monitor  On pressors [x ]  Yes  [ ]  No          [ ]  Levophed, [ ] Urbano-Synephrine, [ ] Vasopressin, [ ]  Epinephrine          [ ] Dobutamine, [ ] Milrinone, [ x]  Midodrine 10mg q8hrs,  [ ] Others    Other Cardiac Meds          [ ] Amiodarone IV/PO, [ ] Digoxin, [ ] Cardizem drip, [ ] Cleviprex drip, [ ] Esmolol drip, [ ] Cardene drip  Respiratory: Acute respiratory insufficiency -> continue monitoring, ventilatory support, CXR bilateral pleural effusions                        None Invasive Support  [ ] Incentive Spirometer                                  [ ] BiPAP   [ ] CPAP/NIV   [ ] HFNC   [ ] NR Face Mask   [ ] NC  [ ] Trach Caller                        Ventilatory support  [ x] Yes [ ] No                            [x ] SBT                                  [ ] PC    [ ] VC   [ x] AC/PRVC 450/12/6/40%  [ ] BiVent/APRV   [ ]CPAP   GI  [x ]  bowel regiment  [ x] BM + [x ] Flatus + [x] C.Diff +             [ ] Ostomy   [x ] NG tube        Prophylaxis [x ] PPI  [ ] H2 Blockers  [ ] Others  Nutrition: continue   [x ] Diet NPO  [ ] TPN/PPN   [ ] calories count   [x ]  Tube Feeds at goal to be restarted  Renal: Continue I&Os monitoring, Garrison catheter  [x ] Yes,  [ ]   No ,  [ ] Consideration for discontinuation [ ] Taxes cath/PrimaFit  [ ] TOV  [ ] HD/CVVH       Lytes/Acid-base: replete hypokalemia, hypomagnesemia, hypocalcemia, hypophosphatemia        IV Fluids   [x ] LR KVO,  [ ] NS  [ ] D5W1/2NS [ ] Bicarbonate Drip  [ ] Albumin [ ] Lasix  ID: leukocytosis -> continue to monitor:         IV Abx [x ] Yes -> completed the course 4/23/23, [ ] No;  ID consulted [x ] Yes, [ ] No        Cultures send  [ ] Respiratory   [ ] Blood   [ ] Urine  [ ] Fluids  [ ] Tissue [x] Stool [ ]  None  Lines:   [x ] Right   [ ] Left  [ ] Bilateral                     [ ] Subclavian TLC        [x ]  Internal Jugular TLC     [ ]  Femoral TLC                     [ ] Subclavian Cordis    [ ] Internal Jugular Cordis   [ ] Femoral Cordis                     [ ] HD catheter    [ ] PICC     [ x]  Midline   [ x] Peripheral IVs                                                 [x ] Right   [ ] Left   [ ] None                     [x ] Radial A-Line    [ ] Femoral A-Line   [ ] Axillary A-Line               Heme: continue to evaluate for acute blood loss anemia- trend Hg/Hct                     AC Reversal Indicated [ ] Yes  [ x] No                                      [ ] Kcentra,  [ ] 3Factors concentrate, [ ] Adnexxa                     Transfused  indicated  [ ] Yes, [x ] No    [ ] PRBCs   [ ] Platelets   [ ] FFPs   [ ] Cryoprecipitate                    Should be started on or continued with  following  [x ] Yes,  [ ] No                               [ ] Lovenox  [ ] Coumadin  [ ] Heparin drip  [ ] NOVACs  [x ] ASA  [ x] Antiplatelets   Endocrine: Prevent and treat hyperglycemia with insulin as needed,                                 Insuline drip [ ] Yes, [x ] No   PV: follow pulse exam  Skin: decub precautions  DVT Prophylaxis:  [ x] SCDs  [x ] Heparin SQ  [ ] Lovenox  SQ  Stress Gastritis Prophylaxis: PPI/H2 Blockers if indicated  Mobility: patient is evaluated at the bedside with mobility team and the goals for today are discussed with PT [ x] bed rest    PATIENT/FAMILY/SURROGATE CONFERENCE:  [x ] Yes with patient's family at the bedside. [ ] No  PURPOSE: To obtain necessary information, To discuss treatment options under consideration today.  Tracheostomy and PEG discussed with family, consent obtained     I saw and evaluated the patient personally. I have reviewed and agree with note above. Treatment plan discussed with SICU team, nurses and primary team at the time of the multidisciplinary rounds. The above note is NOT written at the time of rounds and will reflect all changes throughout management of the patient for the day note is written for.    Nancy Medrano MD, FACS  Trauma/ACS/SCC Attending

## 2023-04-24 NOTE — PROGRESS NOTE ADULT - SUBJECTIVE AND OBJECTIVE BOX
Patient is a 77y old  Male who presents with a chief complaint of mechanical Fall/ Hip Fx (24 Apr 2023 11:30)        SUBJECTIVE:  sp trach and peg, on mv, off pressors since 8pm yesterday    REVIEW OF SYSTEMS:    All Pertinent ROS are negative except per HPI       PHYSICAL EXAM  Vital Signs Last 24 Hrs  T(C): 36.6 (24 Apr 2023 08:00), Max: 36.6 (24 Apr 2023 08:00)  T(F): 97.9 (24 Apr 2023 08:00), Max: 97.9 (24 Apr 2023 08:00)  HR: 90 (24 Apr 2023 10:11) (90 - 109)  BP: --  BP(mean): --  RR: 22 (24 Apr 2023 10:00) (21 - 42)  SpO2: 100% (24 Apr 2023 10:11) (93% - 100%)    Parameters below as of 24 Apr 2023 10:00  Patient On (Oxygen Delivery Method): ventilator    O2 Concentration (%): 40    CONSTITUTIONAL:  chronically ill appearing in NAD    ENT:   Airway patent,   No thrush  +trach    CARDIAC:   Normal rate,   regular rhythm.    diffuse anasarca  MADHU 3/6      RESPIRATORY:   NO Wheezing  Normal chest expansion  Not tachypneic,  No use of accessory muscles  decreased bibasilar bs, + crackles b/l    GASTROINTESTINAL:  Abdomen soft,   non-tender,   no guarding,   +peg    NEUROLOGICAL:   alert, awake  generalized weakness    SKIN:   Skin normal color for race,   warm, dry   No evidence of rash.      04-23-23 @ 07:01  -  04-24-23 @ 07:00  --------------------------------------------------------  IN:    Norepinephrine: 19.2 mL    Peptamen A.F.: 1010 mL  Total IN: 1029.2 mL    OUT:    Indwelling Catheter - Urethral (mL): 1470 mL    Rectal Tube (mL): 0 mL  Total OUT: 1470 mL    Total NET: -440.8 mL      04-24-23 @ 07:01  -  04-24-23 @ 11:45  --------------------------------------------------------  IN:    IV PiggyBack: 250 mL    Peptamen A.F.: 55 mL  Total IN: 305 mL    OUT:    Indwelling Catheter - Urethral (mL): 100 mL    Norepinephrine: 0 mL  Total OUT: 100 mL    Total NET: 205 mL          LABS:                          8.1    14.00 )-----------( 207      ( 23 Apr 2023 20:46 )             25.3                                               04-23    146  |  110  |  74<HH>  ----------------------------<  130<H>  4.0   |  25  |  1.2    Ca    8.4      23 Apr 2023 20:46  Phos  3.2     04-23  Mg     2.0     04-23    TPro  4.5<L>  /  Alb  2.3<L>  /  TBili  0.5  /  DBili  x   /  AST  33  /  ALT  39  /  AlkPhos  144<H>  04-23                                                                                           LIVER FUNCTIONS - ( 23 Apr 2023 20:46 )  Alb: 2.3 g/dL / Pro: 4.5 g/dL / ALK PHOS: 144 U/L / ALT: 39 U/L / AST: 33 U/L / GGT: x                                                                                            ABG - ( 24 Apr 2023 09:20 )  pH, Arterial: 7.43  pH, Blood: x     /  pCO2: 39    /  pO2: 158   / HCO3: 26    / Base Excess: 1.5   /  SaO2: 100.0               MEDICATIONS  (STANDING):  albuterol    0.083% 2.5 milliGRAM(s) Nebulizer every 8 hours  albuterol    90 MICROgram(s) HFA Inhaler 1 Puff(s) Inhalation every 8 hours  aspirin  chewable 81 milliGRAM(s) Oral daily  atorvastatin 80 milliGRAM(s) Oral at bedtime  atropine 1% Solution 1 Drop(s) Left EYE two times a day  chlorhexidine 0.12% Liquid 15 milliLiter(s) Oral Mucosa every 12 hours  chlorhexidine 2% Cloths 1 Application(s) Topical <User Schedule>  clopidogrel Tablet 75 milliGRAM(s) Oral daily  doxazosin 4 milliGRAM(s) Oral at bedtime  erythromycin   IVPB 250 milliGRAM(s) IV Intermittent once  erythromycin   IVPB      erythromycin   IVPB 250 milliGRAM(s) IV Intermittent every 8 hours  guaifenesin/dextromethorphan Oral Liquid 10 milliLiter(s) Oral every 4 hours  heparin   Injectable 5000 Unit(s) SubCutaneous every 8 hours  insulin lispro (ADMELOG) corrective regimen sliding scale   SubCutaneous every 6 hours  midodrine 10 milliGRAM(s) Oral every 8 hours  pantoprazole  Injectable 40 milliGRAM(s) IV Push every 12 hours  prednisoLONE acetate 1% Suspension 1 Drop(s) Left EYE every 6 hours  predniSONE   Tablet 10 milliGRAM(s) Oral daily    MEDICATIONS  (PRN):  sodium chloride 0.9% lock flush 10 milliLiter(s) IV Push every 1 hour PRN Pre/post blood products, medications, blood draw, and to maintain line patency  sodium chloride 0.9% lock flush 10 milliLiter(s) IV Push every 1 hour PRN Pre/post blood products, medications, blood draw, and to maintain line patency      X-Rays reviewed    CXR interpreted by me: Patient is a 77y old  Male who presents with a chief complaint of mechanical Fall/ Hip Fx (24 Apr 2023 11:30)        SUBJECTIVE:  sp trach and peg, on mv, off pressors since 8pm yesterday  sp 1 unit PRBC overnight    REVIEW OF SYSTEMS:    All Pertinent ROS are negative except per HPI       PHYSICAL EXAM  Vital Signs Last 24 Hrs  T(C): 36.6 (24 Apr 2023 08:00), Max: 36.6 (24 Apr 2023 08:00)  T(F): 97.9 (24 Apr 2023 08:00), Max: 97.9 (24 Apr 2023 08:00)  HR: 90 (24 Apr 2023 10:11) (90 - 109)  BP: --  BP(mean): --  RR: 22 (24 Apr 2023 10:00) (21 - 42)  SpO2: 100% (24 Apr 2023 10:11) (93% - 100%)    Parameters below as of 24 Apr 2023 10:00  Patient On (Oxygen Delivery Method): ventilator    O2 Concentration (%): 40    CONSTITUTIONAL:  chronically ill appearing in NAD    ENT:   Airway patent,   No thrush  +trach    CARDIAC:   Normal rate,   regular rhythm.    diffuse anasarca  MADHU 3/6      RESPIRATORY:   NO Wheezing  Normal chest expansion  Not tachypneic,  No use of accessory muscles  decreased bibasilar bs, + crackles b/l    GASTROINTESTINAL:  Abdomen soft,   non-tender,   no guarding,   +peg    NEUROLOGICAL:   alert, awake  generalized weakness    SKIN:   Skin normal color for race,   warm, dry   No evidence of rash.      04-23-23 @ 07:01  -  04-24-23 @ 07:00  --------------------------------------------------------  IN:    Norepinephrine: 19.2 mL    Peptamen A.F.: 1010 mL  Total IN: 1029.2 mL    OUT:    Indwelling Catheter - Urethral (mL): 1470 mL    Rectal Tube (mL): 0 mL  Total OUT: 1470 mL    Total NET: -440.8 mL      04-24-23 @ 07:01  -  04-24-23 @ 11:45  --------------------------------------------------------  IN:    IV PiggyBack: 250 mL    Peptamen A.F.: 55 mL  Total IN: 305 mL    OUT:    Indwelling Catheter - Urethral (mL): 100 mL    Norepinephrine: 0 mL  Total OUT: 100 mL    Total NET: 205 mL          LABS:                          8.1    14.00 )-----------( 207      ( 23 Apr 2023 20:46 )             25.3                                               04-23    146  |  110  |  74<HH>  ----------------------------<  130<H>  4.0   |  25  |  1.2    Ca    8.4      23 Apr 2023 20:46  Phos  3.2     04-23  Mg     2.0     04-23    TPro  4.5<L>  /  Alb  2.3<L>  /  TBili  0.5  /  DBili  x   /  AST  33  /  ALT  39  /  AlkPhos  144<H>  04-23                                                                                           LIVER FUNCTIONS - ( 23 Apr 2023 20:46 )  Alb: 2.3 g/dL / Pro: 4.5 g/dL / ALK PHOS: 144 U/L / ALT: 39 U/L / AST: 33 U/L / GGT: x                                                                                            ABG - ( 24 Apr 2023 09:20 )  pH, Arterial: 7.43  pH, Blood: x     /  pCO2: 39    /  pO2: 158   / HCO3: 26    / Base Excess: 1.5   /  SaO2: 100.0               MEDICATIONS  (STANDING):  albuterol    0.083% 2.5 milliGRAM(s) Nebulizer every 8 hours  albuterol    90 MICROgram(s) HFA Inhaler 1 Puff(s) Inhalation every 8 hours  aspirin  chewable 81 milliGRAM(s) Oral daily  atorvastatin 80 milliGRAM(s) Oral at bedtime  atropine 1% Solution 1 Drop(s) Left EYE two times a day  chlorhexidine 0.12% Liquid 15 milliLiter(s) Oral Mucosa every 12 hours  chlorhexidine 2% Cloths 1 Application(s) Topical <User Schedule>  clopidogrel Tablet 75 milliGRAM(s) Oral daily  doxazosin 4 milliGRAM(s) Oral at bedtime  erythromycin   IVPB 250 milliGRAM(s) IV Intermittent once  erythromycin   IVPB      erythromycin   IVPB 250 milliGRAM(s) IV Intermittent every 8 hours  guaifenesin/dextromethorphan Oral Liquid 10 milliLiter(s) Oral every 4 hours  heparin   Injectable 5000 Unit(s) SubCutaneous every 8 hours  insulin lispro (ADMELOG) corrective regimen sliding scale   SubCutaneous every 6 hours  midodrine 10 milliGRAM(s) Oral every 8 hours  pantoprazole  Injectable 40 milliGRAM(s) IV Push every 12 hours  prednisoLONE acetate 1% Suspension 1 Drop(s) Left EYE every 6 hours  predniSONE   Tablet 10 milliGRAM(s) Oral daily    MEDICATIONS  (PRN):  sodium chloride 0.9% lock flush 10 milliLiter(s) IV Push every 1 hour PRN Pre/post blood products, medications, blood draw, and to maintain line patency  sodium chloride 0.9% lock flush 10 milliLiter(s) IV Push every 1 hour PRN Pre/post blood products, medications, blood draw, and to maintain line patency      X-Rays reviewed    CXR interpreted by me: Patient is a 77y old  Male who presents with a chief complaint of mechanical Fall/ Hip Fx (24 Apr 2023 11:30)      sp trach and peg, on mv, off pressors since 8pm yesterday  sp 1 unit PRBC overnight      PHYSICAL EXAM  Vital Signs Last 24 Hrs  T(C): 36.6 (24 Apr 2023 08:00), Max: 36.6 (24 Apr 2023 08:00)  T(F): 97.9 (24 Apr 2023 08:00), Max: 97.9 (24 Apr 2023 08:00)  HR: 90 (24 Apr 2023 10:11) (90 - 109)  BP: --  BP(mean): --  RR: 22 (24 Apr 2023 10:00) (21 - 42)  SpO2: 100% (24 Apr 2023 10:11) (93% - 100%)    Parameters below as of 24 Apr 2023 10:00  Patient On (Oxygen Delivery Method): ventilator    O2 Concentration (%): 40    CONSTITUTIONAL:  chronically ill appearing     ENT:   Airway patent,   No thrush  +trach    CARDIAC:   Normal rate,   regular rhythm.    diffuse anasarca  MADHU 3/6      RESPIRATORY:     decreased bibasilar bs, + crackles b/l    GASTROINTESTINAL:  Abdomen soft,   non-tender,   no guarding,   +peg    NEUROLOGICAL:   alert, awake  generalized weakness          04-23-23 @ 07:01  -  04-24-23 @ 07:00  --------------------------------------------------------  IN:    Norepinephrine: 19.2 mL    Peptamen A.F.: 1010 mL  Total IN: 1029.2 mL    OUT:    Indwelling Catheter - Urethral (mL): 1470 mL    Rectal Tube (mL): 0 mL  Total OUT: 1470 mL    Total NET: -440.8 mL      04-24-23 @ 07:01  -  04-24-23 @ 11:45  --------------------------------------------------------  IN:    IV PiggyBack: 250 mL    Peptamen A.F.: 55 mL  Total IN: 305 mL    OUT:    Indwelling Catheter - Urethral (mL): 100 mL    Norepinephrine: 0 mL  Total OUT: 100 mL    Total NET: 205 mL          LABS:                          8.1    14.00 )-----------( 207      ( 23 Apr 2023 20:46 )             25.3                                               04-23    146  |  110  |  74<HH>  ----------------------------<  130<H>  4.0   |  25  |  1.2    Ca    8.4      23 Apr 2023 20:46  Phos  3.2     04-23  Mg     2.0     04-23    TPro  4.5<L>  /  Alb  2.3<L>  /  TBili  0.5  /  DBili  x   /  AST  33  /  ALT  39  /  AlkPhos  144<H>  04-23                                                                                           LIVER FUNCTIONS - ( 23 Apr 2023 20:46 )  Alb: 2.3 g/dL / Pro: 4.5 g/dL / ALK PHOS: 144 U/L / ALT: 39 U/L / AST: 33 U/L / GGT: x                                                                                            ABG - ( 24 Apr 2023 09:20 )  pH, Arterial: 7.43  pH, Blood: x     /  pCO2: 39    /  pO2: 158   / HCO3: 26    / Base Excess: 1.5   /  SaO2: 100.0               MEDICATIONS  (STANDING):  albuterol    0.083% 2.5 milliGRAM(s) Nebulizer every 8 hours  albuterol    90 MICROgram(s) HFA Inhaler 1 Puff(s) Inhalation every 8 hours  aspirin  chewable 81 milliGRAM(s) Oral daily  atorvastatin 80 milliGRAM(s) Oral at bedtime  atropine 1% Solution 1 Drop(s) Left EYE two times a day  chlorhexidine 0.12% Liquid 15 milliLiter(s) Oral Mucosa every 12 hours  chlorhexidine 2% Cloths 1 Application(s) Topical <User Schedule>  clopidogrel Tablet 75 milliGRAM(s) Oral daily  doxazosin 4 milliGRAM(s) Oral at bedtime  erythromycin   IVPB 250 milliGRAM(s) IV Intermittent once  erythromycin   IVPB      erythromycin   IVPB 250 milliGRAM(s) IV Intermittent every 8 hours  guaifenesin/dextromethorphan Oral Liquid 10 milliLiter(s) Oral every 4 hours  heparin   Injectable 5000 Unit(s) SubCutaneous every 8 hours  insulin lispro (ADMELOG) corrective regimen sliding scale   SubCutaneous every 6 hours  midodrine 10 milliGRAM(s) Oral every 8 hours  pantoprazole  Injectable 40 milliGRAM(s) IV Push every 12 hours  prednisoLONE acetate 1% Suspension 1 Drop(s) Left EYE every 6 hours  predniSONE   Tablet 10 milliGRAM(s) Oral daily    MEDICATIONS  (PRN):  sodium chloride 0.9% lock flush 10 milliLiter(s) IV Push every 1 hour PRN Pre/post blood products, medications, blood draw, and to maintain line patency  sodium chloride 0.9% lock flush 10 milliLiter(s) IV Push every 1 hour PRN Pre/post blood products, medications, blood draw, and to maintain line patency      X-Rays reviewed    CXR interpreted by me:

## 2023-04-24 NOTE — PROGRESS NOTE ADULT - ASSESSMENT
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 Gi was consulted for rectal bleeding large episodes since yesterday with blood clots.     # Hematochezia: resolved   - patient is hemodynamically stable   - H/H stable, last pRBC was 4/23  - had BRBPR with clots on 4/21 which was stopped now  - dignishield in place with brown liquid stool   - on aspirin and Plavix   - last EGD and colonoscopy in 2021   - PEG tube lavage no bleeding   - CTA negative for active bleed    Rec:  - advance diet as tolerated   - Maintain active type and screen  - Trend CBC every 8hrs and keep Hg > 8  - PPI BID  - c/w supportive measures  - no current plans for endoscopic interventions  - please recall GI with any further hematochezia or any questions    Recall PRN    Follow up with our [facility] located at [address] [place] [address]. Phone Number: [phone]

## 2023-04-24 NOTE — CHART NOTE - NSCHARTNOTEFT_GEN_A_CORE
RN notified SICU team that patient was desaturating to the low 90s and became bradycardic and hypotensive. I went to patient's bedside immediately. Patient was responsive and following commands. He appeared to be gagging and coughing. Episode lasted about 1 minute. His HR and BP increased back to baseline without any interventions. Tube feeds were stopped due to concern for aspiration. We performed in-line tracheal suctioning. Patient's oxygen saturation increased to > 94% and was hemodynamically stable.    RN then notified SICU team that he appeared to be clenching down and tightening his extremities. I evaluated him at bedside immediately during this episode and he remained responsive, followed commands, moves bilaterally extremities, right pupil was briskly reactive to light, he has good bilateral  strength, hemodynamically stable with SpO2 in the 90s. Patient likely vomited, aspirated tube feeds, and had a vasovagal episode in response. Chest x-ray and repeat ABG were ordered.    Discussed with SICU attending Dr. Rodriguez and will continue to monitor patient.

## 2023-04-24 NOTE — PROGRESS NOTE ADULT - SUBJECTIVE AND OBJECTIVE BOX
RADHA RAMON   [id number]   [birth year]  77yM    Admit Date/LOS: 03-20 (2d)  Indication for SICU: Hemodynamic monitoring. 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 Pt walks with walker and got up in middle of night to get food and slipped and fell on his R side. Pt has been having R hip pain since with inability to ambulate. Pts family finally convinced him to come to hospital today. Pt is having 10/10 pain currently. Denies dizziness, CP or LOC prior to fall. Denies head trauma. No HA. (20 Mar 2023 12:02)    3/22: Patient S/P ORIF of right hip using interlocking intramedullary stephon. - upgraded for hypotension in PACU  4/5  Trach and PEG    24HRS EVENT:   4/23  NIGHT  - Hgb went up slightly following 1U PRBC, ordered repeat CBC at 11AM  - ABG following 2hr CPAP in daytime: 7.44/40/73/27  - On PRVC/SIMV 22/450/5/40%        [] A ten-point review of systems was otherwise negative except as noted above.  [x] Due to altered mental status/intubation, subjective information was not attained from   the patient. History was obtained, to the extent possible, from review of the chart and collateral sources of information.  =================================================================   RADHA RAMON   [id number]   [birth year]  77yM    Admit Date/LOS: 03-20 (2d)  Indication for SICU: Hemodynamic monitoring. 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 Pt walks with walker and got up in middle of night to get food and slipped and fell on his R side. Pt has been having R hip pain since with inability to ambulate. Pts family finally convinced him to come to hospital today. Pt is having 10/10 pain currently. Denies dizziness, CP or LOC prior to fall. Denies head trauma. No HA. (20 Mar 2023 12:02)    3/22: Patient S/P ORIF of right hip using interlocking intramedullary stephon. - upgraded for hypotension in PACU  4/5  Trach and PEG    24HRS EVENT:   4/23  NIGHT  - Hgb went up slightly following 1U PRBC, ordered repeat CBC at 11AM  - ABG following 2hr CPAP in daytime: 7.44/40/73/27  - On PRVC/SIMV 22/450/5/40%        [] A ten-point review of systems was otherwise negative except as noted above.  [x] Due to altered mental status/intubation, subjective information was not attained from   the patient. History was obtained, to the extent possible, from review of the chart and collateral sources of information.  =================================================================        Daily     Daily   Diet, NPO:   Except Medications  Free Water Flush Instructions:  Medication to be administered via PEG tube (04-24-23 @ 05:02)    CURRENT MEDS:  Neurologic Medications    Respiratory Medications  albuterol    0.083% 2.5 milliGRAM(s) Nebulizer every 8 hours  albuterol    90 MICROgram(s) HFA Inhaler 1 Puff(s) Inhalation every 8 hours  guaifenesin/dextromethorphan Oral Liquid 10 milliLiter(s) Oral every 4 hours    Cardiovascular Medications  doxazosin 4 milliGRAM(s) Oral at bedtime  midodrine 10 milliGRAM(s) Oral every 8 hours  norepinephrine Infusion 0.01 MICROgram(s)/kG/Min IV Continuous <Continuous>    Gastrointestinal Medications  pantoprazole  Injectable 40 milliGRAM(s) IV Push every 12 hours  sodium chloride 0.9% lock flush 10 milliLiter(s) IV Push every 1 hour PRN Pre/post blood products, medications, blood draw, and to maintain line patency  sodium chloride 0.9% lock flush 10 milliLiter(s) IV Push every 1 hour PRN Pre/post blood products, medications, blood draw, and to maintain line patency    Genitourinary Medications    Hematologic/Oncologic Medications  aspirin  chewable 81 milliGRAM(s) Oral daily  clopidogrel Tablet 75 milliGRAM(s) Oral daily  heparin   Injectable 5000 Unit(s) SubCutaneous every 8 hours    Antimicrobial/Immunologic Medications    Endocrine/Metabolic Medications  atorvastatin 80 milliGRAM(s) Oral at bedtime  insulin lispro (ADMELOG) corrective regimen sliding scale   SubCutaneous every 6 hours  predniSONE   Tablet 10 milliGRAM(s) Oral daily    Topical/Other Medications  atropine 1% Solution 1 Drop(s) Left EYE two times a day  chlorhexidine 0.12% Liquid 15 milliLiter(s) Oral Mucosa every 12 hours  chlorhexidine 2% Cloths 1 Application(s) Topical <User Schedule>  prednisoLONE acetate 1% Suspension 1 Drop(s) Left EYE every 6 hours    ICU Vital Signs Last 24 Hrs  T(C): 36.4 (24 Apr 2023 04:00), Max: 36.4 (23 Apr 2023 20:00)  T(F): 97.6 (24 Apr 2023 04:00), Max: 97.6 (23 Apr 2023 20:00)  HR: 90 (24 Apr 2023 07:26) (89 - 109)  BP: --  BP(mean): --  ABP: 122/53 (24 Apr 2023 06:00) (101/45 - 157/64)  ABP(mean): 72 (24 Apr 2023 06:00) (60 - 93)  RR: 22 (24 Apr 2023 06:00) (22 - 42)  SpO2: 100% (24 Apr 2023 07:26) (93% - 100%)    O2 Parameters below as of 24 Apr 2023 00:00  Patient On (Oxygen Delivery Method): ventilator    O2 Concentration (%): 40      Mode: SIMV (Synchronized Intermittent Mandatory Ventilation)  RR (machine): 22  TV (machine): 450  FiO2: 80  PEEP: 8  PS: 8  ITime: 1  MAP: 14  PIP: 26    ABG - ( 24 Apr 2023 05:50 )  pH, Arterial: 7.44  pH, Blood: x     /  pCO2: 40    /  pO2: 192   / HCO3: 27    / Base Excess: 2.8   /  SaO2: 99.9              I&O's Summary    23 Apr 2023 07:01  -  24 Apr 2023 07:00  --------------------------------------------------------  IN: 1029.2 mL / OUT: 1470 mL / NET: -440.8 mL      I&O's Detail    23 Apr 2023 07:01  -  24 Apr 2023 07:00  --------------------------------------------------------  IN:    Norepinephrine: 19.2 mL    Peptamen A.F.: 1010 mL  Total IN: 1029.2 mL    OUT:    Indwelling Catheter - Urethral (mL): 1470 mL    Rectal Tube (mL): 0 mL  Total OUT: 1470 mL    Total NET: -440.8 mL          PHYSICAL EXAM:     spontaneous openign of eyes  alert   follows commands, CORRAL  no acute distress  equal chest rise b/l supported by vent via tach  abdomen soft  extremities soft  +2 edema UE/LE  urinary cath in place  scrotal swelling improving

## 2023-04-24 NOTE — CHART NOTE - NSCHARTNOTEFT_GEN_A_CORE
Registered Dietitian Follow-Up     Patient Profile Reviewed                           Yes [x]   No []     Nutrition History Previously Obtained        Yes []  No [x]       Pertinent Subjective Information: Limited to chart review at this time as pt trached to vent and unable to provide hx.     Pertinent Medical Interventions: Pt continues to be managed for S/P ORIF of right hip using interlocking intramedullary stephon. GI consulted for melanotic bowel movement; CTA negative for active bleed and PEG tube lavage no bleeding -> can resume regular diet/TFs.      Diet order:   Diet, NPO with Tube Feed:   Tube Feeding Modality: Gastrostomy  Peptamen A.F. Formula  Total Volume for 24 Hours (mL): 1320  Continuous  Starting Tube Feed Rate {mL per Hour}: 55     Every 4 hours  Until Goal Tube Feed Rate (mL per Hour): 55  Tube Feed Duration (in Hours): 24  Tube Feed Start Time: 10:00  Bolus   Total Volume per Flush (mL): 100   Frequency: Every 6 Hours (04-24-23 @ 09:16) [Active]    Anthropometrics:  Height (cm): 175.3 (04-10-23 @ 18:11)  Weight (kg): 63.5 (04-10-23 @ 18:11)  BMI (kg/m2): 20.7 (04-10-23 @ 18:11)  IBW: 72.7 kg  Weight Change: no new weight to assess    MEDICATIONS  (STANDING):  albuterol    0.083% 2.5 milliGRAM(s) Nebulizer every 8 hours  albuterol    90 MICROgram(s) HFA Inhaler 1 Puff(s) Inhalation every 8 hours  aspirin  chewable 81 milliGRAM(s) Oral daily  atorvastatin 80 milliGRAM(s) Oral at bedtime  atropine 1% Solution 1 Drop(s) Left EYE two times a day  chlorhexidine 0.12% Liquid 15 milliLiter(s) Oral Mucosa every 12 hours  chlorhexidine 2% Cloths 1 Application(s) Topical <User Schedule>  clopidogrel Tablet 75 milliGRAM(s) Oral daily  doxazosin 4 milliGRAM(s) Oral at bedtime  erythromycin   IVPB      erythromycin   IVPB 250 milliGRAM(s) IV Intermittent every 8 hours  guaifenesin/dextromethorphan Oral Liquid 10 milliLiter(s) Oral every 4 hours  heparin   Injectable 5000 Unit(s) SubCutaneous every 8 hours  insulin lispro (ADMELOG) corrective regimen sliding scale   SubCutaneous every 6 hours  midodrine 10 milliGRAM(s) Oral every 8 hours  pantoprazole  Injectable 40 milliGRAM(s) IV Push every 12 hours  prednisoLONE acetate 1% Suspension 1 Drop(s) Left EYE every 6 hours  predniSONE   Tablet 10 milliGRAM(s) Oral daily    MEDICATIONS  (PRN):  sodium chloride 0.9% lock flush 10 milliLiter(s) IV Push every 1 hour PRN Pre/post blood products, medications, blood draw, and to maintain line patency  sodium chloride 0.9% lock flush 10 milliLiter(s) IV Push every 1 hour PRN Pre/post blood products, medications, blood draw, and to maintain line patency    Pertinent Labs:                         8.1    14.00 )-----------( 207      ( 23 Apr 2023 20:46 )             25.3     04-23 @ 20:46: Na 146, BUN 74<HH>, Cr 1.2, <H>, K+ 4.0, Phos 3.2, Mg 2.0, Alk Phos 144<H>, ALT/SGPT 39, AST/SGOT 33, HbA1c --    Finger Sticks:  POCT Blood Glucose.: 181 mg/dL (04-24 @ 12:09)  POCT Blood Glucose.: 147 mg/dL (04-24 @ 05:51)  POCT Blood Glucose.: 138 mg/dL (04-24 @ 01:35)  POCT Blood Glucose.: 176 mg/dL (04-23 @ 17:00)    Physical Findings:  - Appearance: 4+generalized edema and 4+ edema to scrotum  - GI function: abdomen WDL; last bowel movement 24-Apr-2023 - liquid  - Tubes: PEG  - Oral/Mouth cavity: NPO  - Skin: surgical incisions (s/p ORIF + s/p PEG), no pressure injury      Nutrition Requirements:  Weight Used: dosing weight 63.5 kg     Estimated Energy Needs    Continue [x]  Adjust []  1448 kcal/day PS 2003 equation Ve 8.4 Tm 36.6       Estimated Protein Needs    Continue [x]  Adjust []  83-95 gm/day 1.3-1.5g/kg        Estimated Fluid Needs        Continue [x]  Adjust []  2593-9933 mL/day 20-25ml/kg     Nutrient Intake: current TF provides 1584kcal 100g protein, 1069ml free water     [x] Previous Nutrition Diagnosis: Inadequate Energy Intake            [x] Ongoing          [] Resolved    [x] Previous Nutrition Diagnosis: Malnutrition            [x] Ongoing          [] Resolved     Nutrition Intervention      Goal/Expected Outcome: Pt to meet >90% & <105% estimated needs via EN in 3-5 days. RD to follow as per high risk protocol.     Indicator/Monitoring: Monitor diet order, kcal intake, body composition, NFPE, labs  (lytes, BG, renal indices)     Recommendation:  Cont with current TF order  Add Banatrol Plus 1pkt 2x/day (40kcal, 0g protein each)    Patient assessed at high nutrition risk.  RD spectra [phone], also available on Teams.

## 2023-04-24 NOTE — PROGRESS NOTE ADULT - ASSESSMENT
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   S/P ORIF of right hip using interlocking intramedullary stephon.  S/P Trach & PEG 4/5     NEURO:  #Acute pain    -APAP prn    RESP:   #Acute respiratory failure secondary to post operative arrhythmias requiring mechanical ventilation (Intubated 3/23)    -s/p trach 4/5/23 with size 8  RR (machine): On PRVC/SIMV 22/450/5/40%  4-23 21:11: pH: 7.44  /  pCO2: 40    /  pO2: 73    / HCO3: 27    / Base Excess: 2.8   /  SaO2: 97.7    04-23 @ 06:00--7.44 / 37 / 136 / 25 / [id number] 99.0 / Lac 0.70 / iCal 1.25   04-22 @ 18:03--7.43 / 38 / 150 / 25 / [id number] 99.6 / Lac 0.70 / iCal 1.27   #Pneumonia     -s/p bronch 4/8 BAL- no organisms    -4/11 BAL - pseudomonas    -4/11 blood cx - neg    -Pulm c/s - avoid volume overload and diuresis prn     -Albuterol, Robitussin  #PE w/u NEGATIVE on 3/28    CARDS:   #acute hypotension    - Levophed infusion improving < 0.05 mcg/kg/min    - restarted Midodrine 10mg q8 4/19   #Bradycardia      -EP 4/1: no indication for PPM at the current time, most likely secondary to hypoxia, reconsult PRN  #HLD    -Atorvastatin 80mg HS  #Labs/Imaging    -EKG 4/18: NSR, RBBB    GI/NUTR:   #Diet: TF Peptamen AF goal of 45 ml/h     -Provides 1350 kcal and 85.5 gms protein     -PEG 3cm at skin, 4cm at bumper  #Nausea    - Aspiration precautions, HOB 30  #GI Prophylaxis    - switched to Protonix 40 IV q12  #Bowel regimen    - senna  #Upper and Lower GI Bleed  -BM (4/19) - 1 x small blood clots  -BM (4/20)- melena + small blood clots   -BM (4/21) - large dark red bloody clots per rectum, no stool  -GI consult placed for lower GI bleed: Recs: keep NPO, maintain active T&S, adequate fluid resuscitation (Keep SBP > 90), trend CBC every 8hrs and keep Hg > 8, PPI BID.  #Imaging    -CT abdomen 3/28: No definite infectious source identified      -RUQ sono (3/28): biliary sludge, mild GB wall thickening/edema    -KUB (4/15): normal bowel gascyst, left kidney (present on earlier CT scan), blair catheter within collapsed urinary bladder.  positive 30L LOS/2L negative 24hrs  #acute scrotal swelling, gradually improving    - U/S 3/29 w/ diffused scrotal wall edema (keep blair) - blair has minimal output 4/17, replaced 4/17    - scrotal elevation  #urine output in critically ill    -indwelling blair (placed 3/22) - replaced (4/17)     Labs:          BUN/Cr- 82/1.5  -->,  82/1.5  --> 74/1.2          Electrolytes-Na 146 // K 4.0 // Mg 2.0 //  Phos 3.2 (04-23 @ 20:46)    #Diuresis     -Bumex 2 mg x1 given 4/21    HEME/ONC:   #DVT prophylaxis    -HSQ, SCDs  #H/O CAD s/p stents x3    - ASA 81    - Plavix 75mg restarted 4/7    Labs: Hb/Hct:  8.0/25.5  -->,  7.1/23.0  -->    7.5/23.6  -->   8.1/25.3              Plts:  250  -->,  242  -->   204   -->  207    Home Rx: clopidogrel 75 mg oral tablet: 1 tab(s) orally once a day, Aspirin 81mg QD  T&S Expires: 4/24    ID:  #Acute clostridium difficile 3/30    Completed course of po Vanco on 4/13  WBC- 17.46  --->>,  25.18  --->>,  18.94  --->> 14.5  -->14.0  Afebrile past 24-hrs  Antibiotics:    -Ended 4/17: Cefepime IVPB 1000mg every 12 hours (started 4/10)   -Ended 4/13: vancomycin Solution 125 every 6 hours   -DC'd vanc IV 4/13  Cultures:    BCx 4/11 -negative    BAL 4/10- Pseudomonas     BAL 4/8- negative     ET Sputum 3/29: negative     BCx 3/28: NGTD    UA 3/28: Negative     MRSA 3/26: Negative    UA 3/24: negative    ET Sputum 3/24-- neg    CDiff PCR 3/30: Positive  # +Ramona Auris surveillance swab on 4/6/23 (nares, axilla, groin)    ENDO:  #DM    -HA1C= 5.0    -ISS    -Glucose goal 140-180   - D/C'd 20 mg solumedrol - no current indication    - on Prednisone 10 QD    MSK:  #Activity   -WBAT RLE per ortho, PT/OT when clinically appropriate   -Right hip Xray 2 views per ortho (4/9) --> s/p ORIF of comminuted intertrochanteric Rt femur fx w/out definite change compared to 3/22/23    LINES/DRAINS:    PIV  Blair (3/22) -- replaced (4/17)   Trach  PEG   Right radial a-line (4/10)  Left cephalic midline (4/18)    ADVANCED DIRECTIVES:  DNR, MOLST completed    HCP/Emergency Contact- oSng Segura (Son): [phone]    INDICATION FOR SICU: Hypotension with pressor requirements. Intubated.       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     3/23---s/p ORIF of right hip using interlocking intramedullary stephon    4/5---s/p Trach size 8 & PEG 4cm at bumper     NEURO:  #Acute pain    -APAP prn    RESP:   #Acute respiratory failure secondary to post operative arrhythmias requiring mechanical ventilation     -Intubated 3/23    -Tracheostomy 4/5--size 8  RR (machine): On PRVC/SIMV   RR (machine): 22, TV (machine): 450, FiO2: 80, PEEP: 8  04-24 @ 05:50--7.44 / 40 / 192 / 27 / [id number] 99.9 / Lac 0.70 / iCal 1.26   04-24 @ 04:26--7.41 / 44 / 246 / 28 / [id number] 99.6 / Lac 0.80 / iCal 1.26   #Pneumonia     -s/p bronch 4/8 BAL- no organisms    -4/11 BAL - pseudomonas    -4/11 blood cx - neg    -Pulm c/s - avoid volume overload and diuresis prn     -Albuterol, Robitussin  #PE w/u NEGATIVE on 3/28    CARDS:   #acute hypotension    - Levophed off since 4/23 PM    -Midodrine 10mg q8 4/19   #Bradycardia      -EP 4/1: no indication for PPM at the current time, most likely secondary to hypoxia, reconsult PRN  #HLD    -Atorvastatin 80mg HS  #Labs/Imaging    -EKG 4/18: NSR, RBBB    GI/NUTR:   #Diet: TF Peptamen AF goal of 55 ml/h currently held due to aspiration, d/w team restarting     -Provides 1350 kcal and 85.5 gms protein     -PEG 3cm at skin, 4cm at bumper  #Nausea    - Aspiration precautions, HOB 30  #GI Prophylaxis    - switched to Protonix 40 IV q12  #Bowel regimen    - senna  #Upper and Lower GI Bleed    -no further BRBPR since 4/21  -GI consult placed for lower GI bleed: Recs: keep NPO, maintain active T&S, adequate fluid resuscitation (Keep SBP > 90), trend CBC every 8hrs and keep Hg > 8, PPI BID.    - no acute intervention at the current time  #Imaging    -CT abdomen 3/28: No definite infectious source identified      -RUQ sono (3/28): biliary sludge, mild GB wall thickening/edema    -KUB (4/15): normal bowel gascyst, left kidney (present on earlier CT scan), blair catheter within collapsed urinary bladder.  positive 30L LOS/2L negative 24hrs  #acute scrotal swelling, gradually improving    - U/S 3/29 w/ diffused scrotal wall edema (keep blair) - blair has minimal output 4/17, replaced 4/17    - scrotal elevation  #urine output in critically ill    -indwelling blair (placed 3/22) - replaced (4/17)     Labs:          BUN/Cr- 75/1.3  -->,  74/1.2  -->          Electrolytes-Na 146 // K 4.0 // Mg 2.0 //  Phos 3.2 (04-23 @ 20:46)  #intermittent diuresis for edema    -post PRBC diuretic Lasix 20mg IV on 4/24    -patient with poor response to Bumex    HEME/ONC:   #DVT prophylaxis    -HSQ, SCDs  #H/O CAD s/p stents x3, on DAPT     - ASA 81mg daily    - Plavix 75mg daily    -if DVT prophylaxis to be held, document and place VTE order     Labs: Hb/Hct:  7.4/22.7  -->,  8.1/25.3  -->                      Plts:  188  -->,  207  -->     T&S Expires: 4/27    ID:  #Acute clostridium difficile 3/30    Completed course of po Vanco on 4/13  Antibiotics: No indication at the current time    -If patient has fever, start Vanco/Cefe   -Ended 4/17: Cefepime IVPB 1000mg every 12 hours (started 4/10)   -Ended 4/13: vancomycin Solution 125 every 6 hours   -DC'd vanc IV 4/13  Cultures:    BCx 4/11 -negative    BAL 4/10- Pseudomonas     BCx 3/28: NGTD    UA 3/28: Negative     MRSA 3/26: Negative    ET Sputum 3/24-- neg    CDiff PCR 3/30: Positive  # +Ramona Auris surveillance swab on 4/6/23 (nares, axilla, groin)  WBC- 13.48  --->>,  14.44  --->>,  14.00  --->>  Temp trend- 24hrs T(F): 97.6 (04-24 @ 04:00), Max: 97.6 (04-23 @ 20:00)    ENDO:  #DM    -HA1C= 5.0    -ISS    -Glucose goal 140-180   - on Prednisone 10 QD    MSK:  #Activity   -WBAT RLE per ortho, PT/OT when clinically appropriate   -Right hip Xray 2 views per ortho (4/9) --> s/p ORIF of comminuted intertrochanteric Rt femur fx w/out definite change compared to 3/22/23    LINES/DRAINS:    TLC Right IJ 4/21  Blair (3/22) -- replaced (4/17)   Trach Size 8  PEG 3cm at bumper  Right radial a-line (4/10)  Left cephalic midline (4/18)  PIV    ADVANCED DIRECTIVES:  DNR, MOLST completed    HCP/Emergency Contact- Song Segura (Son): [phone]    INDICATION FOR SICU: Hypotension with pressor requirements requring mechanical ventilation       DISPO: SICU  awaiting LT[facility] placement

## 2023-04-24 NOTE — PROGRESS NOTE ADULT - ASSESSMENT
IMPRESSION:    Acute hypoxemic respiratory failure sp trach/ peg  S/p fall s/p R ORIF  Pneumonia, treated  Septic shock, resolved  Volume overload/ B/L pleural effusion  C. diff infection sp treatment  HO CAD  HO Prostate Ca    PLAN:    CNS: avoid sedatives if possible    HEENT: Oral care    PULMONARY:  HOB @ 45 degrees.  Vent changes as follows: wean O2 as tolerated monitor P/P/ DP. sp thoracentesis today 1.5 liters serous fluid removed.    CARDIOVASCULAR: Avoid volume overload. Diurese as tolerated. Strict I's and O's.     GI: GI prophylaxis. PEG feeding,     RENAL:  Follow up lytes.  Correct as needed    INFECTIOUS DISEASE: monitor off abx, (on erythromycin for gastroparesis)    HEMATOLOGICAL:  DVT prophylaxis.    ENDOCRINE:  Follow up FS. Target -180.     MUSCULOSKELETAL: PT/OT eval. Increase ambulation as tolerated.       overall poor prognosis  Garcia Zuleta   IMPRESSION:    Acute hypoxemic respiratory failure sp trach/ peg  S/p fall s/p R ORIF  Pneumonia, treated  Septic shock, resolved  UGIB likely gastritis- improved  Volume overload/ B/L pleural effusion R>L sp thoracentesis 4/24  C. diff infection sp treatment  HO CAD  HO Prostate Ca    PLAN:    CNS: avoid sedatives if possible    HEENT: Oral care    PULMONARY:  HOB @ 45 degrees.  Vent changes as follows: wean O2 as tolerated monitor P/P/ DP. sp thoracentesis today 1.5 liters serous fluid removed.    CARDIOVASCULAR: Avoid volume overload. Diurese as tolerated. Strict I's and O's. Goal Map >60. CW midodrine     GI: PPI BID. PEG feeding, GI follow up     RENAL:  Follow up lytes.  Correct as needed    INFECTIOUS DISEASE: monitor off abx, (on erythromycin for gastroparesis)    HEMATOLOGICAL:  DVT prophylaxis.    ENDOCRINE:  Follow up FS. Target -180.     MUSCULOSKELETAL: PT/OT      overall poor prognosis  DNR  Garcia Zuleta   IMPRESSION:    Acute hypoxemic respiratory failure sp trach/ peg  S/p fall s/p R ORIF  Pneumonia, treated  Septic shock, resolved  UGIB likely gastritis- improved  Volume overload/ B/L pleural effusion R>L sp thoracentesis 4/24  C. diff infection sp treatment  HO CAD  HO Prostate Ca    PLAN:    CNS: avoid CNS depressant    HEENT: Oral care    PULMONARY:  HOB @ 45 degrees.  Vent changes as follows: wean O2 as tolerated monitor P/P/ DP. sp thoracentesis today 1.5 liters serous fluid removed.    CARDIOVASCULAR: Avoid volume overload. Diurese as tolerated. Strict I's and O's. Goal Map >60. CW midodrine     GI: PPI BID. PEG feeding, GI follow up     RENAL:  Follow up lytes.  Correct as needed    INFECTIOUS DISEASE: monitor off abx, (on erythromycin for gastroparesis)    HEMATOLOGICAL:  DVT prophylaxis.    ENDOCRINE:  Follow up FS. Target -180.     MUSCULOSKELETAL: PT/OT      overall poor prognosis  DNR  Garcia Zuleta

## 2023-04-24 NOTE — PROGRESS NOTE ADULT - SUBJECTIVE AND OBJECTIVE BOX
Gastroenterology progress note:     Patient is a 77y old  Male who presents with a chief complaint of mechanical Fall/ Hip Fx (24 Apr 2023 00:19)       Admitted on: 03-20-23    We are following the patient for: hematochezia        Interval History:    No acute events overnight.   no hematochezia or reported bleeding events     PAST MEDICAL & SURGICAL HISTORY:  CAD (coronary artery disease)      Hypercholesteremia      BPH (benign prostatic hyperplasia)      Kidney stones      Prostate cancer      Status post cardiac surgery  cardiac stents      Bilateral cataracts      History of lithotripsy          MEDICATIONS  (STANDING):  albuterol    0.083% 2.5 milliGRAM(s) Nebulizer every 8 hours  albuterol    90 MICROgram(s) HFA Inhaler 1 Puff(s) Inhalation every 8 hours  aspirin  chewable 81 milliGRAM(s) Oral daily  atorvastatin 80 milliGRAM(s) Oral at bedtime  atropine 1% Solution 1 Drop(s) Left EYE two times a day  chlorhexidine 0.12% Liquid 15 milliLiter(s) Oral Mucosa every 12 hours  chlorhexidine 2% Cloths 1 Application(s) Topical <User Schedule>  clopidogrel Tablet 75 milliGRAM(s) Oral daily  doxazosin 4 milliGRAM(s) Oral at bedtime  erythromycin   IVPB      erythromycin   IVPB 250 milliGRAM(s) IV Intermittent every 8 hours  erythromycin   IVPB 250 milliGRAM(s) IV Intermittent once  guaifenesin/dextromethorphan Oral Liquid 10 milliLiter(s) Oral every 4 hours  heparin   Injectable 5000 Unit(s) SubCutaneous every 8 hours  insulin lispro (ADMELOG) corrective regimen sliding scale   SubCutaneous every 6 hours  midodrine 10 milliGRAM(s) Oral every 8 hours  pantoprazole  Injectable 40 milliGRAM(s) IV Push every 12 hours  prednisoLONE acetate 1% Suspension 1 Drop(s) Left EYE every 6 hours  predniSONE   Tablet 10 milliGRAM(s) Oral daily    MEDICATIONS  (PRN):  sodium chloride 0.9% lock flush 10 milliLiter(s) IV Push every 1 hour PRN Pre/post blood products, medications, blood draw, and to maintain line patency  sodium chloride 0.9% lock flush 10 milliLiter(s) IV Push every 1 hour PRN Pre/post blood products, medications, blood draw, and to maintain line patency      Allergies  No Known Allergies      Review of Systems:   Cardiovascular:  No Chest Pain, No Palpitations  Respiratory:  No Cough, No Dyspnea  Gastrointestinal:  As described in HPI  Skin:  No Skin Lesions, No Jaundice  Neuro:  No Syncope, No Dizziness    Physical Examination:  T(C): 36.6 (04-24-23 @ 08:00), Max: 36.6 (04-24-23 @ 08:00)  HR: 90 (04-24-23 @ 10:11) (90 - 109)  BP: --  RR: 22 (04-24-23 @ 10:00) (21 - 42)  SpO2: 100% (04-24-23 @ 10:11) (93% - 100%)      04-23-23 @ 07:01  -  04-24-23 @ 07:00  --------------------------------------------------------  IN: 1029.2 mL / OUT: 1470 mL / NET: -440.8 mL    04-24-23 @ 07:01  -  04-24-23 @ 11:31  --------------------------------------------------------  IN: 305 mL / OUT: 100 mL / NET: 205 mL        GENERAL: AAOx3, no acute distress. trached   HEAD:  Atraumatic, Normocephalic  EYES: conjunctiva and sclera clear  NECK: Supple, no JVD or thyromegaly  CHEST/LUNG: Clear to auscultation bilaterally; No wheeze, rhonchi, or rales  HEART: Regular rate and rhythm; normal S1, S2, No murmurs.  ABDOMEN: Soft, nontender, nondistended; Bowel sounds present. PEG in place   NEUROLOGY: No asterixis or tremor.   SKIN: Intact, no jaundice     Data:                        8.1    14.00 )-----------( 207      ( 23 Apr 2023 20:46 )             25.3     Hgb trend:  8.1  04-23-23 @ 20:46  7.4  04-23-23 @ 12:37  7.2  04-23-23 @ 04:55  7.5  04-22-23 @ 20:29  7.6  04-22-23 @ 12:54  8.2  04-22-23 @ 02:49  7.1  04-21-23 @ 22:03  8.0  04-21-23 @ 11:48        04-23    146  |  110  |  74<HH>  ----------------------------<  130<H>  4.0   |  25  |  1.2    Ca    8.4      23 Apr 2023 20:46  Phos  3.2     04-23  Mg     2.0     04-23    TPro  4.5<L>  /  Alb  2.3<L>  /  TBili  0.5  /  DBili  x   /  AST  33  /  ALT  39  /  AlkPhos  144<H>  04-23    Liver panel trend:  TBili 0.5   /   AST 33   /   ALT 39   /   AlkP 144   /   Tptn 4.5   /   Alb 2.3    /   DBili --      04-23  TBili 0.8   /   AST 30   /   ALT 36   /   AlkP 124   /   Tptn 4.3   /   Alb 2.1    /   DBili --      04-23  TBili 0.8   /   AST 34   /   ALT 36   /   AlkP 167   /   Tptn 4.4   /   Alb 2.3    /   DBili --      04-18  TBili 0.7   /   AST 36   /   ALT 23   /   AlkP 184   /   Tptn 4.4   /   Alb 1.9    /   DBili 0.4      04-14

## 2023-04-25 NOTE — PROGRESS NOTE ADULT - SUBJECTIVE AND OBJECTIVE BOX
Over Night Events: events noted, vent dependant, sp thora transudate    PHYSICAL EXAM    ICU Vital Signs Last 24 Hrs  T(C): 37.1 (24 Apr 2023 23:45), Max: 37.1 (24 Apr 2023 23:45)  T(F): 98.8 (24 Apr 2023 23:45), Max: 98.8 (24 Apr 2023 23:45)  HR: 99 (25 Apr 2023 04:24) (90 - 107)  ABP: 102/49 (25 Apr 2023 03:00) (98/42 - 147/69)  ABP(mean): 66 (25 Apr 2023 03:00) (58 - 93)  RR: 25 (25 Apr 2023 03:00) (19 - 26)  SpO2: 100% (25 Apr 2023 04:24) (97% - 100%)    O2 Parameters below as of 25 Apr 2023 00:00  Patient On (Oxygen Delivery Method): ventilator    O2 Concentration (%): 40        General: ill looking  HEENT: trach  Lungs: dec bs both bases  Cardiovascular: MADHU 2/6  Abdomen: Soft, Positive BS  Anasarca      04-23-23 @ 07:01  -  04-24-23 @ 07:00  --------------------------------------------------------  IN:    Norepinephrine: 19.2 mL    Peptamen A.F.: 1010 mL  Total IN: 1029.2 mL    OUT:    Indwelling Catheter - Urethral (mL): 1470 mL    Rectal Tube (mL): 0 mL  Total OUT: 1470 mL    Total NET: -440.8 mL      04-24-23 @ 07:01  -  04-25-23 @ 06:34  --------------------------------------------------------  IN:    Enteral Tube Flush: 200 mL    IV PiggyBack: 250 mL    Peptamen A.F.: 1100 mL  Total IN: 1550 mL    OUT:    Indwelling Catheter - Urethral (mL): 895 mL    Norepinephrine: 0 mL    Rectal Tube (mL): 40 mL  Total OUT: 935 mL    Total NET: 615 mL          LABS:                          7.7    12.18 )-----------( 173      ( 25 Apr 2023 04:20 )             24.5                                               04-24    150<H>  |  115<H>  |  73<HH>  ----------------------------<  127<H>  4.5   |  26  |  1.1    Ca    8.3<L>      24 Apr 2023 20:34  Phos  2.8     04-24  Mg     2.1     04-24    TPro  4.4<L>  /  Alb  2.3<L>  /  TBili  0.5  /  DBili  x   /  AST  37  /  ALT  43<H>  /  AlkPhos  141<H>  04-24                                                                                           LIVER FUNCTIONS - ( 24 Apr 2023 20:34 )  Alb: 2.3 g/dL / Pro: 4.4 g/dL / ALK PHOS: 141 U/L / ALT: 43 U/L / AST: 37 U/L / GGT: x                                                  Culture - Body Fluid with Gram Stain (collected 24 Apr 2023 12:00)  Source: Pleural Fl Pleural Fluid  Gram Stain (25 Apr 2023 05:07):    polymorphonuclear leukocytes seen    No organisms seen    by cytocentrifuge                                                   Mode: SIMV (Synchronized Intermittent Mandatory Ventilation)  RR (machine): 22  TV (machine): 450  FiO2: 40  PEEP: 8  PS: 8  ITime: 1  MAP: 12  PIP: 20                                      ABG - ( 25 Apr 2023 04:28 )  pH, Arterial: 7.44  pH, Blood: x     /  pCO2: 39    /  pO2: 147   / HCO3: 26    / Base Excess: 2.2   /  SaO2: 99.4                MEDICATIONS  (STANDING):  albuterol    0.083% 2.5 milliGRAM(s) Nebulizer every 8 hours  albuterol    90 MICROgram(s) HFA Inhaler 1 Puff(s) Inhalation every 8 hours  aspirin  chewable 81 milliGRAM(s) Oral daily  atorvastatin 80 milliGRAM(s) Oral at bedtime  atropine 1% Solution 1 Drop(s) Left EYE two times a day  chlorhexidine 0.12% Liquid 15 milliLiter(s) Oral Mucosa every 12 hours  chlorhexidine 2% Cloths 1 Application(s) Topical <User Schedule>  clopidogrel Tablet 75 milliGRAM(s) Oral daily  doxazosin 4 milliGRAM(s) Oral at bedtime  erythromycin   IVPB      erythromycin   IVPB 250 milliGRAM(s) IV Intermittent every 8 hours  guaifenesin/dextromethorphan Oral Liquid 10 milliLiter(s) Oral every 4 hours  heparin   Injectable 5000 Unit(s) SubCutaneous every 8 hours  insulin lispro (ADMELOG) corrective regimen sliding scale   SubCutaneous every 6 hours  midodrine 10 milliGRAM(s) Oral every 8 hours  pantoprazole  Injectable 40 milliGRAM(s) IV Push every 12 hours  prednisoLONE acetate 1% Suspension 1 Drop(s) Left EYE every 6 hours  predniSONE   Tablet 10 milliGRAM(s) Oral daily    MEDICATIONS  (PRN):  sodium chloride 0.9% lock flush 10 milliLiter(s) IV Push every 1 hour PRN Pre/post blood products, medications, blood draw, and to maintain line patency  sodium chloride 0.9% lock flush 10 milliLiter(s) IV Push every 1 hour PRN Pre/post blood products, medications, blood draw, and to maintain line patency

## 2023-04-25 NOTE — PROGRESS NOTE ADULT - ATTENDING COMMENTS
Critical Care: [id number]   This patient has a high probability of sudden, clinically significant deterioration, which requires the highest level of physician preparedness to intervene urgently. I managed/supervised life or organ supporting interventions that required frequent physician assessment. I devoted my full attention in the ICU to the direct care of this patient for the period of time indicated below. Time I spent with the family or surrogate(s) is included only if the patient was incapable of providing the necessary information or participating in medical decision making. Time devoted to teaching and to any procedures I billed separately is not included.     RADHA RAMON 77y Male admitted to [x ] SICU /[ ] SDU with post OP hemodynamic instability requiring pressors, vented due to respiratory failure post-OP, A.Fib with hemodynamic instability requiring pressors, electrolytes abnormalities. S/P tracheostomy and PEG 4/5/23, last week had episode of melena which resolved at this time.  Patient is examined and evaluated at the bedside with SICU team. Treatment plan discussed with SICU team, nurses and primary team.   Chest X-ray and all relevant studies reviewed during rounds.  Will continue hemodynamic monitoring as per protocol in SICU.    Neuro:  GCS [11T ]   [x ]  Neurovascular checks as per SICU protocol                 [ ] 3% NaCl                     Paralysis [ ] Yes  [x ]  No      Sedated/Pain control with                 [ ] Dilaudid drip, [ ]  Ketamine drip, [ ] Fentanyl drip, [ ] Propofol, [ ] Precedex, [ ] Versed drip, [ ] Ativan drip,                           [ ] OxyContin standing,  [ ] OxyContin PRN, [ ] Dilaudid PRN pushes, [x ] Fentanyl PRN pushes, [ ] PCA,                [x ] Tylenol IV/PO, [ ] Gabapentin, [ ]  Ketorolac, [ ] Tramadol,  [ ] Lidoderm Patch       Other Medications               [ ] Seroquel, [ ] Zyprexa, [ ] Haldol,  [ ] Clonazepam [ ] Xanax, [ ] Versed/Ativan PRN, [ ] Valium [x ] None               [ ] Robaxin   [ ] Baclofen  [ ] Flexeril               [ ] Keppra  [ ] Lamictal  [ ] Depakote  [ ] Dilantin               [ ] CIWA (Ativan/Valium/ Librium)  CV: continue to monitor  On pressors [x ]  Yes  [ ]  No          [ ]  Levophed, [ ] Urbano-Synephrine, [ ] Vasopressin, [ ]  Epinephrine          [ ] Dobutamine, [ ] Milrinone, [ x]  Midodrine 10mg q8hrs,  [ ] Others    Other Cardiac Meds          [ ] Amiodarone IV/PO, [ ] Digoxin, [ ] Cardizem drip, [ ] Cleviprex drip, [ ] Esmolol drip, [ ] Cardene drip  Respiratory: Acute respiratory insufficiency -> continue monitoring, ventilatory support,                     CXR bilateral pleural effusions, S/P tap by pulmonary team 4/24/23                        None Invasive Support  [ ] Incentive Spirometer                                  [ ] BiPAP   [ ] CPAP/NIV   [ ] HFNC   [ ] NR Face Mask   [ ] NC  [ ] Trach Caller                        Ventilatory support  [ x] Yes [ ] No                            [x ] SBT                                  [ ] PC    [ ] VC   [ x] AC/PRVC 450/22/8/40%  [ ] BiVent/APRV   [ ]CPAP   GI  [x ]  bowel regiment  [ x] BM + [x ] Flatus + [x] C.Diff +->complete treatment             [ ] Ostomy   [ ] NG tube        Prophylaxis [x ] PPI  [ ] H2 Blockers  [ ] Others  Nutrition: continue   [x ] Diet NPO  [ ] TPN/PPN   [ ] calories count   [x ]  Tube Feeds at goal to be restarted at goal  Renal: Continue I&Os monitoring, Garrison catheter  [x ] Yes,  [ ]   No ,  [ ] Consideration for discontinuation [ ] Taxes cath/PrimaFit  [ ] TOV  [ ] HD/CVVH       Lytes/Acid-base: replete hypokalemia, hypomagnesemia, hypocalcemia, hypophosphatemia        IV Fluids   [x ] LR KVO,  [ ] NS  [ ] D5W1/2NS [ ] Bicarbonate Drip  [ ] Albumin [ ] Lasix  ID: leukocytosis -> continue to monitor:         IV Abx [x ] Yes -> completed the course 4/23/23, [ ] No;  ID consulted [x ] Yes, [ ] No        Cultures send  [ ] Respiratory   [ ] Blood   [ ] Urine  [ ] Fluids  [ ] Tissue [x] Stool [ ]  None  Lines:   [x ] Right   [ ] Left  [ ] Bilateral                     [ ] Subclavian TLC        [x ]  Internal Jugular TLC     [ ]  Femoral TLC                     [ ] Subclavian Cordis    [ ] Internal Jugular Cordis   [ ] Femoral Cordis                     [ ] HD catheter    [ ] PICC     [ x]  Midline   [ x] Peripheral IVs                                                 [x ] Right   [ ] Left   [ ] None                     [x ] Radial A-Line    [ ] Femoral A-Line   [ ] Axillary A-Line               Heme: continue to evaluate for acute blood loss anemia- trend Hg/Hct                     AC Reversal Indicated [ ] Yes  [ x] No                                      [ ] Kcentra,  [ ] 3Factors concentrate, [ ] Adnexxa                     Transfused  indicated  [ ] Yes, [x ] No    [ ] PRBCs   [ ] Platelets   [ ] FFPs   [ ] Cryoprecipitate                    Should be started on or continued with  following  [x ] Yes,  [ ] No                               [ ] Lovenox  [ ] Coumadin  [ ] Heparin drip  [ ] NOVACs  [x ] ASA  [ x] Antiplatelets   Endocrine: Prevent and treat hyperglycemia with insulin as needed,                                 Insuline drip [ ] Yes, [x ] No   PV: follow pulse exam  Skin: decub precautions  DVT Prophylaxis:  [ x] SCDs  [x ] Heparin SQ  [ ] Lovenox  SQ  Stress Gastritis Prophylaxis: PPI/H2 Blockers if indicated  Mobility: patient is evaluated at the bedside with mobility team and the goals for today are discussed with PT [ x] bed rest    PATIENT/FAMILY/SURROGATE CONFERENCE:  [x ] Yes with patient's family at the bedside. [ ] No  PURPOSE: To obtain necessary information, To discuss treatment options under consideration today.  Tracheostomy and PEG discussed with family, consent obtained     I saw and evaluated the patient personally. I have reviewed and agree with note above. Treatment plan discussed with SICU team, nurses and primary team at the time of the multidisciplinary rounds. The above note is NOT written at the time of rounds and will reflect all changes throughout management of the patient for the day note is written for.    Nancy Medrano MD, FACS  Trauma/ACS/SCC Attending

## 2023-04-25 NOTE — PROGRESS NOTE ADULT - ASSESSMENT
IMPRESSION:    Acute hypoxemic respiratory failure sp trach/ peg  S/p fall s/p R ORIF  Pneumonia, treated  Septic shock, resolved  hypernatremia  UGIB likely gastritis- improved  Volume overload/ B/L pleural effusion R>L sp thoracentesis 4/24  C. diff infection sp treatment  HO CAD  HO Prostate Ca    PLAN:    CNS: avoid CNS depressant    HEENT: Oral care    PULMONARY:  HOB @ 45 degrees.  Vent changes as follows: wean O2 as tolerated monitor P/P/ DP. sp thoracentesis today 1.5 liters serous fluid removed/ transudate    CARDIOVASCULAR: Avoid volume overload. Diurese as tolerated. Strict I's and O's. Goal Map >60. CW midodrine     GI: PPI BID. PEG feeding, GI follow up     RENAL:  Follow up lytes.  Correct as needed    INFECTIOUS DISEASE: abx per ID    HEMATOLOGICAL:  DVT prophylaxis.    ENDOCRINE:  Follow up FS. Target -180.     MUSCULOSKELETAL: PT/OT      overall poor prognosis  DNR  VENT UNIT

## 2023-04-25 NOTE — PROGRESS NOTE ADULT - ASSESSMENT
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     3/23---s/p ORIF of right hip using interlocking intramedullary stephon    4/5---s/p Trach size 8 & PEG 4cm at bumper     NEURO:  #Acute pain    -APAP prn    RESP:   #Acute respiratory failure secondary to post operative arrhythmias requiring mechanical ventilation     -Intubated 3/23    -Tracheostomy 4/5--size 8  RR (machine): SIMV. RR 22, , FiO2 40, PEEP 8    #Pneumonia     -s/p bronch 4/8 BAL- no organisms    -4/11 BAL - pseudomonas    -4/11 blood cx - neg    -Pulm c/s - avoid volume overload and diuresis prn     -Albuterol, Robitussin  #PE w/u NEGATIVE on 3/28    #Concern for aspiration   - Tube feeds briefly paused 4/24, later resumed with addition to Erythromycin for pro-motility   - Monitor CXR    CARDS:   #acute hypotension    -Levophed off since 4/23 PM    -Midodrine 10mg q8 4/19   #Bradycardia      -EP 4/1: no indication for PPM at the current time, most likely secondary to hypoxia, reconsult PRN  #HLD    -Atorvastatin 80mg HS  #Labs/Imaging    -EKG 4/18: NSR, RBBB    GI/NUTR:   #Diet: TF Peptamen AF goal of 55 ml/h currently held due to aspiration, d/w team restarting     -Provides 1350 kcal and 85.5 gms protein     -PEG 3cm at skin, 4cm at bumper  #Nausea    - Aspiration precautions, HOB 30  #GI Prophylaxis    - Protonix 40 IV q12  #Bowel regimen    - senna  #Upper and Lower GI Bleed    -no further BRBPR since 4/21  -GI consult placed for lower GI bleed: Recs: keep NPO, maintain active T&S, adequate fluid resuscitation (Keep SBP > 90), trend CBC every 8hrs and keep Hg > 8, PPI BID.    - no acute intervention at the current time  #Imaging    -CT abdomen 3/28: No definite infectious source identified      -RUQ sono (3/28): biliary sludge, mild GB wall thickening/edema    -KUB (4/15): normal bowel gascyst, left kidney (present on earlier CT scan), blair catheter within collapsed urinary bladder.  positive 30L LOS/2L negative 24hrs  #acute scrotal swelling, gradually improving    - U/S 3/29 w/ diffused scrotal wall edema (keep blair) - blair has minimal output 4/17, replaced 4/17    - scrotal elevation  #urine output in critically ill    -indwelling blair (placed 3/22) - replaced (4/17)     Labs:          BUN/Cr- 75/1.3  -->,  74/1.2  --> 73/1.1          Electrolytes-Na 150 // K 4.5 // Mg 2.1 //  Phos 2.8 (04-24 @ 20:34)  #intermittent diuresis for edema    -post PRBC diuretic Lasix 20mg IV on 4/24    -patient with poor response to Bumex    HEME/ONC:   #DVT prophylaxis    -HSQ, SCDs  #H/O CAD s/p stents x3, on DAPT     - ASA 81mg daily    - Plavix 75mg daily    -if DVT prophylaxis to be held, document and place VTE order     Labs: Hb/Hct:  7.4/22.7  -->,  8.1/25.3  -->    7.7                 Plts:  188  -->,  207  -->   195  T&S Expires: 4/27    ID:  #Acute clostridium difficile 3/30    Completed course of po Vanco on 4/13  Antibiotics: No indication at the current time    -If patient has fever, start Vanco/Cefe   -Ended 4/17: Cefepime IVPB 1000mg every 12 hours (started 4/10)   -Ended 4/13: vancomycin Solution 125 every 6 hours   -DC'd vanc IV 4/13  Cultures:    BCx 4/11 -negative    BAL 4/10- Pseudomonas     BCx 3/28: NGTD    UA 3/28: Negative     MRSA 3/26: Negative    ET Sputum 3/24-- neg    CDiff PCR 3/30: Positive  # +Ramona Auris surveillance swab on 4/6/23 (nares, axilla, groin)  WBC- 13.48  --->>,  14.44  --->>,  14.00  --->> 14.50    Temp trend- T(F): 98.8 (04-24-23 @ 23:45), Max: 98.8 (04-24-23 @ 23:45)      ENDO:  #DM    -HA1C= 5.0    -ISS    -Glucose goal 140-180   - on Prednisone 10 QD    MSK:  #Activity   -WBAT RLE per ortho, PT/OT when clinically appropriate   -Right hip Xray 2 views per ortho (4/9) --> s/p ORIF of comminuted intertrochanteric Rt femur fx w/out definite change compared to 3/22/23    LINES/DRAINS:    TLC Right IJ 4/21  Blair (3/22) -- replaced (4/17)   Trach Size 8  PEG 3cm at bumper  Right radial a-line (4/10)  Left cephalic midline (4/18)  PIV    ADVANCED DIRECTIVES:  DNR, MOLST completed    HCP/Emergency Contact- Song Segura (Son): [phone]    INDICATION FOR SICU: Mechanical ventilation and HD instability       DISPO: SICU  awaiting LTAC placement   77yM w/ PMH w/ CAD, HLD, BPH, Prostate Ca and 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     3/23---s/p ORIF of right hip using interlocking intramedullary stephon    4/5---s/p Trach size 8 & PEG 4cm at bumper     NEURO:  #Acute pain    -APAP prn    RESP:   #Acute respiratory failure secondary to post operative arrhythmias requiring mechanical ventilation     -Intubated 3/23    -Tracheostomy 4/5--size 8  RR (machine): SIMV. RR 22, , FiO2 40, PEEP 8    #Pneumonia     -s/p bronch 4/8 BAL- no organisms    -4/11 BAL - pseudomonas    -4/11 blood cx - neg    -Pulm c/s - avoid volume overload and diuresis prn     -Albuterol, Robitussin  #PE w/u NEGATIVE on 3/28  #Pleural effusion  - TAP by pulm on 4/25 w/ 1.5L output    #Concern for aspiration   - Tube feeds briefly paused 4/24, later resumed with addition to Erythromycin for pro-motility   - Monitor CXR    CARDS:   #acute hypotension    -Levophed off since 4/23 PM    -Midodrine 10mg q8 4/19   #Bradycardia      -EP 4/1: no indication for PPM at the current time, most likely secondary to hypoxia, reconsult PRN  #HLD    -Atorvastatin 80mg HS  #Labs/Imaging    -EKG 4/18: NSR, RBBB    GI/NUTR:   #Diet: TF Peptamen AF goal of 55 ml/h currently held due to aspiration, d/w team restarting     -Provides 1350 kcal and 85.5 gms protein     -PEG 3cm at skin, 4cm at bumper  #Nausea    - Aspiration precautions, HOB 30    - Erythromycin 250 q8 for GI motility  #GI Prophylaxis    - Protonix 40 IV q12  #Bowel regimen    - senna  #Upper and Lower GI Bleed    -no further BRBPR since 4/21  -GI consult placed for lower GI bleed: Recs: keep NPO, maintain active T&S, adequate fluid resuscitation (Keep SBP > 90), trend CBC every 8hrs and keep Hg > 8, PPI BID.    - no acute intervention at the current time  #Imaging    -CT abdomen 3/28: No definite infectious source identified      -RUCAROLINA lamberto (3/28): biliary sludge, mild GB wall thickening/edema    -KUB (4/15): normal bowel gascyst, left kidney (present on earlier CT scan), blair catheter within collapsed urinary bladder.  positive 30L LOS/2L negative 24hrs    /Renal:  #acute scrotal swelling, gradually improving    - U/S 3/29 w/ diffused scrotal wall edema (keep blair) - blair has minimal output 4/17, replaced 4/17    - scrotal elevation  #urine output in critically ill    -indwelling blair (placed 3/22) - replaced (4/17)     Labs:          BUN/Cr- 75/1.3  -->,  74/1.2  --> 73/1.1          Electrolytes-Na 150 // K 4.5 // Mg 2.1 //  Phos 2.8 (04-24 @ 20:34)  #intermittent diuresis for edema    -post PRBC diuretic Lasix 20mg IV on 4/24    -patient with poor response to Bumex    HEME/ONC:   #DVT prophylaxis    -HSQ, SCDs  #H/O CAD s/p stents x3, on DAPT     - ASA 81mg daily    - Plavix 75mg daily    -if DVT prophylaxis to be held, document and place VTE order     Labs: Hb/Hct:  7.4/22.7  -->,  8.1/25.3  -->    7.7               Plts:  188  -->,  207  -->   195  T&S Expires: 4/27    ID:  #Acute clostridium difficile 3/30    Completed course of po Vanco on 4/13  Antibiotics: No indication at the current time    -If patient has fever, start Vanco/Cefe   -Ended 4/17: Cefepime IVPB 1000mg every 12 hours (started 4/10)   -Ended 4/13: vancomycin Solution 125 every 6 hours   -DC'd vanc IV 4/13  Cultures:    BCx 4/11 -negative    BAL 4/10- Pseudomonas     BCx 3/28: NGTD    UA 3/28: Negative     MRSA 3/26: Negative    ET Sputum 3/24-- neg    CDiff PCR 3/30: Positive  # +Ramona Auris surveillance swab on 4/6/23 (nares, axilla, groin)  WBC- 13.48  --->>,  14.44  --->>,  14.00  --->> 14.50    Temp trend- T(F): 98.8 (04-24-23 @ 23:45), Max: 98.8 (04-24-23 @ 23:45)      ENDO:  #DM    -HA1C= 5.0    -ISS    -Glucose goal 140-180   - on Prednisone 10 QD    MSK:  #Activity   -WBAT RLE per ortho, PT/OT when clinically appropriate   -Right hip Xray 2 views per ortho (4/9) --> s/p ORIF of comminuted intertrochanteric Rt femur fx w/out definite change compared to 3/22/23    LINES/DRAINS:    TLC Right IJ 4/21  Blair (3/22) -- replaced (4/17)   Trach Size 8  PEG 3cm at bumper  Right radial a-line (4/10)  Left cephalic midline (4/18)  PIV    ADVANCED DIRECTIVES:  DNR, MOLST completed    HCP/Emergency Contact- Song Segura (Son): [phone]    INDICATION FOR SICU: Mechanical ventilation and HD instability       DISPO: SICU  awaiting LTAC placement

## 2023-04-25 NOTE — PROGRESS NOTE ADULT - SUBJECTIVE AND OBJECTIVE BOX
RADHA SEGURA   [id number]   41  77yM    Admit Date/LOS:   Indication for SICU: Hemodynamic monitoring. 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 Pt walks with walker and got up in middle of night to get food and slipped and fell on his R side. Pt has been having R hip pain since with inability to ambulate. Pts family finally convinced him to come to hospital today. Pt is having 10/10 pain currently. Denies dizziness, CP or LOC prior to fall. Denies head trauma. No HA. (20 Mar 2023 12:02)    3/22: Patient S/P ORIF of right hip using interlocking intramedullary stephon. - upgraded for hypotension in PACU    Trach and PEG    24HRS EVENT:     NIGHT  -PM rounds: following commands, levo @ 0.15, NGT @ 75, PEG tube in place, abd soft NT, ND   -Hgb 8-->7.1; 1 U PRBC   -No gross evidence of active GI bleeding on CTA  -f/u endoscopy with GI   - Spoke with GI fellow Dr. Muñoz --> will do bedside colonoscopy  AM, SICU attending agrees    Day  - CBC downtrendin.7 -> 8.4 -> 8.0  - dc pepcid, protonix added   - P/S in afternoon, patient unable to tolerate, remained on volume AC  - Bumex x1 --> 900 cc this shift   - f/u social work for LTAC  -another bloody BM, large dark red clots  - GI consult for melanotic BM: hold tube feeds, CT abdomen/pelvis angio with IV contrast, if positive then will go to IR, if negative will get colonoscopy, serial CBCs BID and if hgb < 8 then transfuse  - levo increased to .29 -> gave [id number]% 50cc x1  - afternoon ABG and BMP wnl   -central line to be placed       [] A ten-point review of systems was otherwise negative except as noted above.  [x] Due to altered mental status/intubation, subjective information was not attained from   the patient. History was obtained, to the extent possible, from review of the chart and collateral sources of information.  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     3/23---s/p ORIF of right hip using interlocking intramedullary stephon    ---s/p Trach size 8 & PEG 4cm at bumper     NEURO:  #Acute pain    -APAP prn    RESP:   #Acute respiratory failure secondary to post operative arrhythmias requiring mechanical ventilation     -Intubated 3/23    -Tracheostomy --size 8  RR (machine): SIMV. RR 22, , FiO2 40, PEEP 8    #Pneumonia     -s/p bronch  BAL- no organisms    - BAL - pseudomonas    - blood cx - neg    -Pulm c/s - avoid volume overload and diuresis prn     -Albuterol, Robitussin  #PE w/u NEGATIVE on 3/28    CARDS:   #acute hypotension    - Levophed off since  PM    -Midodrine 10mg q8    #Bradycardia      -EP : no indication for PPM at the current time, most likely secondary to hypoxia, reconsult PRN  #HLD    -Atorvastatin 80mg HS  #Labs/Imaging    -EKG : NSR, RBBB    GI/NUTR:   #Diet: TF Peptamen AF goal of 55 ml/h currently held due to aspiration, d/w team restarting     -Provides 1350 kcal and 85.5 gms protein     -PEG 3cm at skin, 4cm at bumper  #Nausea    - Aspiration precautions, HOB 30  #GI Prophylaxis    - switched to Protonix 40 IV q12  #Bowel regimen    - senna  #Upper and Lower GI Bleed    -no further BRBPR since   -GI consult placed for lower GI bleed: Recs: keep NPO, maintain active T&S, adequate fluid resuscitation (Keep SBP > 90), trend CBC every 8hrs and keep Hg > 8, PPI BID.    - no acute intervention at the current time  #Imaging    -CT abdomen 3/28: No definite infectious source identified      -RUQ sono (3/28): biliary sludge, mild GB wall thickening/edema    -KUB (4/15): normal bowel gascyst, left kidney (present on earlier CT scan), blair catheter within collapsed urinary bladder.  positive 30L LOS/2L negative 24hrs  #acute scrotal swelling, gradually improving    - U/S 3/29 w/ diffused scrotal wall edema (keep blair) - blair has minimal output , replaced     - scrotal elevation  #urine output in critically ill    -indwelling blair (placed 3/22) - replaced ()     Labs:          BUN/Cr- 75/1.3  -->,  74/1.2  --> 73/1.1            Electrolytes-Na 150 // K 4.5 // Mg 2.1 //  Phos 2.8 ( @ 20:34)  #intermittent diuresis for edema    -post PRBC diuretic Lasix 20mg IV on     -patient with poor response to Bumex    HEME/ONC:   #DVT prophylaxis    -HSQ, SCDs  #H/O CAD s/p stents x3, on DAPT     - ASA 81mg daily    - Plavix 75mg daily    -if DVT prophylaxis to be held, document and place VTE order     Labs: Hb/Hct:  7.4/22.7  -->,  8.1/25.3  -->    7.7                 Plts:  188  -->,  207  -->   195  T&S Expires:     ID:  #Acute clostridium difficile 3/30    Completed course of po Vanco on   Antibiotics: No indication at the current time    -If patient has fever, start Vanco/Cefe   -Ended : Cefepime IVPB 1000mg every 12 hours (started 4/10)   -Ended : vancomycin Solution 125 every 6 hours   -DC'd vanc IV   Cultures:    BCx  -negative    BAL 4/10- Pseudomonas     BCx 3/28: NGTD    UA 3/28: Negative     MRSA 3/26: Negative    ET Sputum 3/24-- neg    CDiff PCR 3/30: Positive  # +Ramona Auris surveillance swab on 23 (nares, axilla, groin)  WBC- 13.48  --->>,  14.44  --->>,  14.00  --->> 14.50    Temp trend- T(F): 98.8 (23 @ 23:45), Max: 98.8 (23 @ 23:45)      ENDO:  #DM    -HA1C= 5.0    -ISS    -Glucose goal 140-180   - on Prednisone 10 QD    MSK:  #Activity   -WBAT RLE per ortho, PT/OT when clinically appropriate   -Right hip Xray 2 views per ortho () --> s/p ORIF of comminuted intertrochanteric Rt femur fx w/out definite change compared to 3/22/23    LINES/DRAINS:    TLC Right IJ   Blair (3/22) -- replaced ()   Trach Size 8  PEG 3cm at bumper  Right radial a-line (4/10)  Left cephalic midline ()  PIV    ADVANCED DIRECTIVES:  DNR, MOLST completed    HCP/Emergency Contact- Song Segura (Son): [phone]    INDICATION FOR SICU: Mechanical ventilation and HD instability       DISPO: SICU  awaiting LTAC placement       RADHA SEGURA   [id number]   [birth year]  77yM    Admit Date/LOS: 03-20  Indication for SICU: Hemodynamic monitoring. 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 Pt walks with walker and got up in middle of night to get food and slipped and fell on his R side. Pt has been having R hip pain since with inability to ambulate. Pts family finally convinced him to come to hospital today. Pt is having 10/10 pain currently. Denies dizziness, CP or LOC prior to fall. Denies head trauma. No HA. (20 Mar 2023 12:02)    3/22: Patient S/P ORIF of right hip using interlocking intramedullary stephon. - upgraded for hypotension in PACU  4/5  Trach and PEG    24HRS EVENT:   4/24  NIGHT  No acute events overnight.           [ X ] A ten-point review of systems was otherwise negative except as noted above.  [ ] Due to altered mental status/intubation, subjective information was not attained from   the patient. History was obtained, to the extent possible, from review of the chart and collateral sources of information.  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     3/23---s/p ORIF of right hip using interlocking intramedullary stephon    4/5---s/p Trach size 8 & PEG 4cm at bumper     NEURO:  #Acute pain    -APAP prn    RESP:   #Acute respiratory failure secondary to post operative arrhythmias requiring mechanical ventilation     -Intubated 3/23    -Tracheostomy 4/5--size 8  RR (machine): SIMV. RR 22, , FiO2 40, PEEP 8    #Pneumonia     -s/p bronch 4/8 BAL- no organisms    -4/11 BAL - pseudomonas    -4/11 blood cx - neg    -Pulm c/s - avoid volume overload and diuresis prn     -Albuterol, Robitussin  #PE w/u NEGATIVE on 3/28    #Concern for aspiration   - Tube feeds briefly paused 4/24, later resumed with addition to Erythromycin for pro-motility   - Monitor CXR    CARDS:   #acute hypotension    -Levophed off since 4/23 PM    -Midodrine 10mg q8 4/19   #Bradycardia      -EP 4/1: no indication for PPM at the current time, most likely secondary to hypoxia, reconsult PRN  #HLD    -Atorvastatin 80mg HS  #Labs/Imaging    -EKG 4/18: NSR, RBBB    GI/NUTR:   #Diet: TF Peptamen AF goal of 55 ml/h currently held due to aspiration, d/w team restarting     -Provides 1350 kcal and 85.5 gms protein     -PEG 3cm at skin, 4cm at bumper  #Nausea    - Aspiration precautions, HOB 30  #GI Prophylaxis    - Protonix 40 IV q12  #Bowel regimen    - senna  #Upper and Lower GI Bleed    -no further BRBPR since 4/21  -GI consult placed for lower GI bleed: Recs: keep NPO, maintain active T&S, adequate fluid resuscitation (Keep SBP > 90), trend CBC every 8hrs and keep Hg > 8, PPI BID.    - no acute intervention at the current time  #Imaging    -CT abdomen 3/28: No definite infectious source identified      -RUQ sono (3/28): biliary sludge, mild GB wall thickening/edema    -KUB (4/15): normal bowel gascyst, left kidney (present on earlier CT scan), blair catheter within collapsed urinary bladder.  positive 30L LOS/2L negative 24hrs  #acute scrotal swelling, gradually improving    - U/S 3/29 w/ diffused scrotal wall edema (keep blair) - blair has minimal output 4/17, replaced 4/17    - scrotal elevation  #urine output in critically ill    -indwelling blair (placed 3/22) - replaced (4/17)     Labs:          BUN/Cr- 75/1.3  -->,  74/1.2  --> 73/1.1          Electrolytes-Na 150 // K 4.5 // Mg 2.1 //  Phos 2.8 (04-24 @ 20:34)  #intermittent diuresis for edema    -post PRBC diuretic Lasix 20mg IV on 4/24    -patient with poor response to Bumex    HEME/ONC:   #DVT prophylaxis    -HSQ, SCDs  #H/O CAD s/p stents x3, on DAPT     - ASA 81mg daily    - Plavix 75mg daily    -if DVT prophylaxis to be held, document and place VTE order     Labs: Hb/Hct:  7.4/22.7  -->,  8.1/25.3  -->    7.7                 Plts:  188  -->,  207  -->   195  T&S Expires: 4/27    ID:  #Acute clostridium difficile 3/30    Completed course of po Vanco on 4/13  Antibiotics: No indication at the current time    -If patient has fever, start Vanco/Cefe   -Ended 4/17: Cefepime IVPB 1000mg every 12 hours (started 4/10)   -Ended 4/13: vancomycin Solution 125 every 6 hours   -DC'd vanc IV 4/13  Cultures:    BCx 4/11 -negative    BAL 4/10- Pseudomonas     BCx 3/28: NGTD    UA 3/28: Negative     MRSA 3/26: Negative    ET Sputum 3/24-- neg    CDiff PCR 3/30: Positive  # +Ramona Auris surveillance swab on 4/6/23 (nares, axilla, groin)  WBC- 13.48  --->>,  14.44  --->>,  14.00  --->> 14.50    Temp trend- T(F): 98.8 (04-24-23 @ 23:45), Max: 98.8 (04-24-23 @ 23:45)      ENDO:  #DM    -HA1C= 5.0    -ISS    -Glucose goal 140-180   - on Prednisone 10 QD    MSK:  #Activity   -WBAT RLE per ortho, PT/OT when clinically appropriate   -Right hip Xray 2 views per ortho (4/9) --> s/p ORIF of comminuted intertrochanteric Rt femur fx w/out definite change compared to 3/22/23    LINES/DRAINS:    TLC Right IJ 4/21  Blair (3/22) -- replaced (4/17)   Trach Size 8  PEG 3cm at bumper  Right radial a-line (4/10)  Left cephalic midline (4/18)  PIV    ADVANCED DIRECTIVES:  DNR, MOLST completed    HCP/Emergency Contact- Song Segura (Son): [phone]    INDICATION FOR SICU: Mechanical ventilation and HD instability       DISPO: SICU  awaiting LTAC placement       RADHA RAMON   [id number]   [birth year]  77yM    Admit Date/LOS: 03-20  Indication for SICU: Hemodynamic monitoring. 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 Pt walks with walker and got up in middle of night to get food and slipped and fell on his R side. Pt has been having R hip pain since with inability to ambulate. Pts family finally convinced him to come to hospital today. Pt is having 10/10 pain currently. Denies dizziness, CP or LOC prior to fall. Denies head trauma. No HA. (20 Mar 2023 12:02)    3/22: Patient S/P ORIF of right hip using interlocking intramedullary stephon. - upgraded for hypotension in PACU  4/5  Trach and PEG    24HRS EVENT:   4/24  NIGHT  - pleural fluid analysis results (albumin 0.8, glucose 145, , total protein 1.5) --> likely transudative effusion in nature based on Light's criteria  - tolerating tube feeds @ 55 cc/hr    DAY  - Inc FiO2 to 100 --> ABG 7.44/40/192/27   - Dec FiO2 to 60 --> ABG 7.43/39/158/26  - Dec FiO2 to 40 --> ABG 7.45/38/161/26  - Erythromycin 500mg qd for inc motility   - TF restarted   - f/u 11AM CBC  - Keep Dignishield due to persistent liquid stool   -thoracenthesis, 1.5L serous fluid drained (pathology sent), started on levophed --> D/C 2 hours later;   - as per nutrition, added Banatrol for more formed stool    [ X ] A ten-point review of systems was otherwise negative except as noted above.  [ ] Due to altered mental status/intubation, subjective information was not attained from   the patient. History was obtained, to the extent possible, from review of the chart and collateral sources of information.  =================================================================   RADHA RAMON   [id number]   [birth year]  77yM    Admit Date/LOS: 03-20  Indication for SICU: Hemodynamic monitoring. 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 Pt walks with walker and got up in middle of night to get food and slipped and fell on his R side. Pt has been having R hip pain since with inability to ambulate. Pts family finally convinced him to come to hospital today. Pt is having 10/10 pain currently. Denies dizziness, CP or LOC prior to fall. Denies head trauma. No HA. (20 Mar 2023 12:02)    3/22: Patient S/P ORIF of right hip using interlocking intramedullary stephon. - upgraded for hypotension in PACU  4/5  Trach and PEG    24HRS EVENT:   4/24  NIGHT  - pleural fluid analysis results (albumin 0.8, glucose 145, , total protein 1.5) --> likely transudative effusion in nature based on Light's criteria  - tolerating tube feeds @ 55 cc/hr    DAY  - Inc FiO2 to 100 --> ABG 7.44/40/192/27   - Dec FiO2 to 60 --> ABG 7.43/39/158/26  - Dec FiO2 to 40 --> ABG 7.45/38/161/26  - Erythromycin 500mg qd for inc motility   - TF restarted   - f/u 11AM CBC  - Keep Dignishield due to persistent liquid stool   -thoracenthesis, 1.5L serous fluid drained (pathology sent), started on levophed --> D/C 2 hours later;   - as per nutrition, added Banatrol for more formed stool    [ X ] A ten-point review of systems was otherwise negative except as noted above.  [ ] Due to altered mental status/intubation, subjective information was not attained from   the patient. History was obtained, to the extent possible, from review of the chart and collateral sources of information.  =================================================================    Daily     Daily     Diet, NPO with Tube Feed:   Tube Feeding Modality: Gastrostomy  Peptamen A.F. Formula  Total Volume for 24 Hours (mL): 1320  Continuous  Starting Tube Feed Rate mL per Hour: 55  Until Goal Tube Feed Rate (mL per Hour): 55  Tube Feed Duration (in Hours): 24  Tube Feed Start Time: 10:00  Bolus   Total Volume per Flush (mL): 100   Frequency: Every 6 Hours  Banatrol TF     Qty per Day:  2 (04-24-23 @ 14:41)      CURRENT MEDS:  Neurologic Medications    Respiratory Medications  albuterol    0.083% 2.5 milliGRAM(s) Nebulizer every 8 hours  albuterol    90 MICROgram(s) HFA Inhaler 1 Puff(s) Inhalation every 8 hours  guaifenesin/dextromethorphan Oral Liquid 10 milliLiter(s) Oral every 4 hours    Cardiovascular Medications  doxazosin 4 milliGRAM(s) Oral at bedtime  midodrine 10 milliGRAM(s) Oral every 8 hours    Gastrointestinal Medications  pantoprazole  Injectable 40 milliGRAM(s) IV Push every 12 hours  sodium chloride 0.9% lock flush 10 milliLiter(s) IV Push every 1 hour PRN Pre/post blood products, medications, blood draw, and to maintain line patency  sodium chloride 0.9% lock flush 10 milliLiter(s) IV Push every 1 hour PRN Pre/post blood products, medications, blood draw, and to maintain line patency    Genitourinary Medications    Hematologic/Oncologic Medications  aspirin  chewable 81 milliGRAM(s) Oral daily  clopidogrel Tablet 75 milliGRAM(s) Oral daily  heparin   Injectable 5000 Unit(s) SubCutaneous every 8 hours    Antimicrobial/Immunologic Medications  erythromycin   IVPB      erythromycin   IVPB 250 milliGRAM(s) IV Intermittent every 8 hours    Endocrine/Metabolic Medications  atorvastatin 80 milliGRAM(s) Oral at bedtime  insulin lispro (ADMELOG) corrective regimen sliding scale   SubCutaneous every 6 hours  predniSONE   Tablet 10 milliGRAM(s) Oral daily    Topical/Other Medications  atropine 1% Solution 1 Drop(s) Left EYE two times a day  chlorhexidine 0.12% Liquid 15 milliLiter(s) Oral Mucosa every 12 hours  chlorhexidine 2% Cloths 1 Application(s) Topical <User Schedule>  prednisoLONE acetate 1% Suspension 1 Drop(s) Left EYE every 6 hours      ICU Vital Signs Last 24 Hrs  T(C): 37 (25 Apr 2023 04:00), Max: 37.1 (24 Apr 2023 23:45)  T(F): 98.6 (25 Apr 2023 04:00), Max: 98.8 (24 Apr 2023 23:45)  HR: 100 (25 Apr 2023 07:41) (90 - 107)  BP: --  BP(mean): --  ABP: 117/54 (25 Apr 2023 06:00) (98/42 - 147/69)  ABP(mean): 74 (25 Apr 2023 06:00) (58 - 93)  RR: 26 (25 Apr 2023 06:00) (19 - 32)  SpO2: 100% (25 Apr 2023 07:41) (100% - 100%)    O2 Parameters below as of 25 Apr 2023 04:00  Patient On (Oxygen Delivery Method): ventilator    O2 Concentration (%): 40        Adult Advanced Hemodynamics Last 24 Hrs  CVP(mm Hg): --  CVP(cm H2O): --  CO: --  CI: --  PA: --  PA(mean): --  PCWP: --  SVR: --  SVRI: --  PVR: --  PVRI: --    Mode: CPAP with PS  FiO2: 40  PEEP: 8  PS: 10    ABG - ( 25 Apr 2023 04:28 )  pH, Arterial: 7.44  pH, Blood: x     /  pCO2: 39    /  pO2: 147   / HCO3: 26    / Base Excess: 2.2   /  SaO2: 99.4                I&O's Summary    24 Apr 2023 07:01  -  25 Apr 2023 07:00  --------------------------------------------------------  IN: 1605 mL / OUT: 1090 mL / NET: 515 mL      I&O's Detail    24 Apr 2023 07:01  -  25 Apr 2023 07:00  --------------------------------------------------------  IN:    Enteral Tube Flush: 200 mL    IV PiggyBack: 250 mL    Peptamen A.F.: 1155 mL  Total IN: 1605 mL    OUT:    Indwelling Catheter - Urethral (mL): 1050 mL    Norepinephrine: 0 mL    Rectal Tube (mL): 40 mL  Total OUT: 1090 mL    Total NET: 515 mL          PHYSICAL EXAM:    General/Neuro  GCS: 11T   Deficits: Awake and alert, CORRAL, following commands  Pupils:    Lungs:  clear to auscultation, Normal expansion/effort.     Cardiovascular : S1, S2.  Regular rate and rhythm. +3 Pitting Peripheral edema   Cardiac Rhythm: Normal Sinus Rhythm    GI: Abdomen soft, Non-tender, Non-distended.    Gastrostomy tube in place.      Extremities: Extremities warm, pink, well-perfused.     Derm: Good skin turgor, no skin breakdown.      :       Garrison catheter in place.      CXR:       LABS:  CAPILLARY BLOOD GLUCOSE      POCT Blood Glucose.: 152 mg/dL (25 Apr 2023 05:29)  POCT Blood Glucose.: 147 mg/dL (24 Apr 2023 23:55)  POCT Blood Glucose.: 118 mg/dL (24 Apr 2023 17:27)  POCT Blood Glucose.: 181 mg/dL (24 Apr 2023 12:09)                          7.7    12.18 )-----------( 173      ( 25 Apr 2023 04:20 )             24.5       04-24    150<H>  |  115<H>  |  73<HH>  ----------------------------<  127<H>  4.5   |  26  |  1.1    Ca    8.3<L>      24 Apr 2023 20:34  Phos  2.8     04-24  Mg     2.1     04-24    TPro  4.4<L>  /  Alb  2.3<L>  /  TBili  0.5  /  DBili  x   /  AST  37  /  ALT  43<H>  /  AlkPhos  141<H>  04-24                Culture - Fungal, Body Fluid (collected 24 Apr 2023 12:00)  Source: Pleural Fl Pleural Fluid  Preliminary Report (25 Apr 2023 07:26):    Testing in progress    Culture - Body Fluid with Gram Stain (collected 24 Apr 2023 12:00)  Source: Pleural Fl Pleural Fluid  Gram Stain (25 Apr 2023 05:07):    polymorphonuclear leukocytes seen    No organisms seen    by cytocentrifuge

## 2023-04-25 NOTE — PROGRESS NOTE ADULT - NUTRITIONAL ASSESSMENT
This patient has been assessed with a concern for Malnutrition and has been determined to have a diagnosis/diagnoses of Severe protein-calorie malnutrition.    This patient is being managed with:   Diet NPO with Tube Feed-  Tube Feeding Modality: Gastrostomy  Peptamen A.F. Formula  Total Volume for 24 Hours (mL): 1080  Continuous  Starting Tube Feed Rate {mL per Hour}: 45     Every 4 hours  Until Goal Tube Feed Rate (mL per Hour): 45  Tube Feed Duration (in Hours): 24  Tube Feed Start Time: 10:00  Entered: Apr 14 2023  2:28AM  
This patient has been assessed with a concern for Malnutrition and has been determined to have a diagnosis/diagnoses of Severe protein-calorie malnutrition.    This patient is being managed with:   Diet NPO with Tube Feed-  Tube Feeding Modality: Gastrostomy  Peptamen A.F. Formula  Total Volume for 24 Hours (mL): 1320  Continuous  Starting Tube Feed Rate {mL per Hour}: 45     Every 4 hours  Until Goal Tube Feed Rate (mL per Hour): 55  Tube Feed Duration (in Hours): 24  Tube Feed Start Time: 10:00  Entered: Apr 17 2023 10:52PM    Diet NPO with Tube Feed-  Tube Feeding Modality: Gastrostomy  Peptamen A.F. Formula  Total Volume for 24 Hours (mL): 1080  Continuous  Starting Tube Feed Rate {mL per Hour}: 45     Every 4 hours  Until Goal Tube Feed Rate (mL per Hour): 45  Tube Feed Duration (in Hours): 24  Tube Feed Start Time: 10:00  Entered: Apr 14 2023  2:28AM    The following pending diet order is being considered for treatment of Severe protein-calorie malnutrition:null
This patient has been assessed with a concern for Malnutrition and has been determined to have a diagnosis/diagnoses of Severe protein-calorie malnutrition.    This patient is being managed with:   Diet NPO with Tube Feed-  Tube Feeding Modality: Gastrostomy  Peptamen A.F. Formula  Total Volume for 24 Hours (mL): 1320  Continuous  Starting Tube Feed Rate {mL per Hour}: 55  Until Goal Tube Feed Rate (mL per Hour): 55  Tube Feed Duration (in Hours): 24  Tube Feed Start Time: 10:00  Bolus   Total Volume per Flush (mL): 100   Frequency: Every 6 Hours  Banatrol TF     Qty per Day:  2  Entered: Apr 24 2023  2:41PM    Diet NPO with Tube Feed-  Tube Feeding Modality: Gastrostomy  Peptamen A.F. Formula  Total Volume for 24 Hours (mL): 1320  Continuous  Starting Tube Feed Rate {mL per Hour}: 45     Every 4 hours  Until Goal Tube Feed Rate (mL per Hour): 55  Tube Feed Duration (in Hours): 24  Tube Feed Start Time: 10:00  Entered: Apr 17 2023 10:52PM    The following pending diet order is being considered for treatment of Severe protein-calorie malnutrition:null

## 2023-04-26 NOTE — PROGRESS NOTE ADULT - TIME BILLING
I have personally seen and examined this patient.    I have reviewed all pertinent clinical information and reviewed all relevant imaging and diagnostic studies personally.   I counseled the patient about diagnostic testing and treatment plan. All questions were answered.   I discussed recommendations with the primary team.
I have personally seen and examined this patient.    I have reviewed all pertinent clinical information and reviewed all relevant imaging and diagnostic studies personally.   I counseled the patient about diagnostic testing and treatment plan. All questions were answered.   I discussed recommendations with the primary team.
bradycardia    bradycardia with decrease in blood pressure and evidence of sinus arrest.  -Recommend needless pacemaker at this time. Will attempt to contact family to explain the procedure.

## 2023-04-26 NOTE — PROGRESS NOTE ADULT - SUBJECTIVE AND OBJECTIVE BOX
INTERVAL HPI/OVERNIGHT EVENTS:  EP reconsulted for episodes of bradycardia overnight, HR in the 30s. No bradycardia during the day  Patient currently trach and PEG, off pressors    MEDICATIONS  (STANDING):  albuterol    0.083% 2.5 milliGRAM(s) Nebulizer every 8 hours  albuterol    90 MICROgram(s) HFA Inhaler 1 Puff(s) Inhalation every 8 hours  aspirin  chewable 81 milliGRAM(s) Oral daily  atorvastatin 80 milliGRAM(s) Oral at bedtime  atropine 1% Solution 1 Drop(s) Left EYE two times a day  chlorhexidine 0.12% Liquid 15 milliLiter(s) Oral Mucosa every 12 hours  chlorhexidine 2% Cloths 1 Application(s) Topical <User Schedule>  clopidogrel Tablet 75 milliGRAM(s) Oral daily  doxazosin 4 milliGRAM(s) Oral at bedtime  erythromycin   IVPB      erythromycin   IVPB 250 milliGRAM(s) IV Intermittent every 8 hours  furosemide    Tablet 20 milliGRAM(s) Oral every 12 hours  guaifenesin/dextromethorphan Oral Liquid 10 milliLiter(s) Oral every 4 hours  heparin   Injectable 5000 Unit(s) SubCutaneous every 8 hours  insulin lispro (ADMELOG) corrective regimen sliding scale   SubCutaneous every 6 hours  midodrine 10 milliGRAM(s) Oral every 8 hours  pantoprazole  Injectable 40 milliGRAM(s) IV Push every 12 hours  prednisoLONE acetate 1% Suspension 1 Drop(s) Left EYE every 6 hours  predniSONE   Tablet 5 milliGRAM(s) Oral daily  predniSONE   Tablet   Oral     MEDICATIONS  (PRN):  sodium chloride 0.9% lock flush 10 milliLiter(s) IV Push every 1 hour PRN Pre/post blood products, medications, blood draw, and to maintain line patency  sodium chloride 0.9% lock flush 10 milliLiter(s) IV Push every 1 hour PRN Pre/post blood products, medications, blood draw, and to maintain line patency      Allergies  No Known Allergies    Intolerances        REVIEW OF SYSTEMS: No CP, palpitations, dizziness or SOB    Vital Signs Last 24 Hrs  T(C): 36.7 (26 Apr 2023 08:00), Max: 37.2 (25 Apr 2023 16:00)  T(F): 98 (26 Apr 2023 08:00), Max: 98.9 (25 Apr 2023 16:00)  HR: 97 (26 Apr 2023 13:14) (83 - 105)  BP: --  BP(mean): --  RR: 23 (26 Apr 2023 11:00) (21 - 27)  SpO2: 96% (26 Apr 2023 13:14) (90% - 100%)    Parameters below as of 26 Apr 2023 10:00  Patient On (Oxygen Delivery Method): ventilator    O2 Concentration (%): 40    04-25-23 @ 07:01  -  04-26-23 @ 07:00  --------------------------------------------------------  IN: 1455 mL / OUT: 990 mL / NET: 465 mL    04-26-23 @ 07:01  -  04-26-23 @ 14:23  --------------------------------------------------------  IN: 275 mL / OUT: 220 mL / NET: 55 mL        Physical Exam  GENERAL: In no apparent distress, well nourished, and hydrated.  EYES: EOMI, PERRLA, conjunctiva and sclera clear  HEART: Regular rate and rhythm; No murmurs, rubs, or gallops.  PULMONARY: Clear to auscultation and perfusion.  No rales, wheezing, or rhonchi bilaterally.  EXTREMITIES:  2+ Peripheral Pulses, 2+ pitting edema BL  NEUROLOGICAL: Awake, alert. Follows commands    LABS:                        7.2    10.60 )-----------( 167      ( 25 Apr 2023 22:05 )             23.5     04-25    148<H>  |  115<H>  |  77<HH>  ----------------------------<  129<H>  4.5   |  28  |  1.0    Ca    8.1<L>      25 Apr 2023 22:05  Phos  2.3     04-25  Mg     1.9     04-25    TPro  4.4<L>  /  Alb  2.2<L>  /  TBili  0.6  /  DBili  x   /  AST  27  /  ALT  39  /  AlkPhos  133<H>  04-25          RADIOLOGY & ADDITIONAL TESTS:

## 2023-04-26 NOTE — PROGRESS NOTE ADULT - ATTENDING COMMENTS
Critical Care: [id number]   This patient has a high probability of sudden, clinically significant deterioration, which requires the highest level of physician preparedness to intervene urgently. I managed/supervised life or organ supporting interventions that required frequent physician assessment. I devoted my full attention in the ICU to the direct care of this patient for the period of time indicated below. Time I spent with the family or surrogate(s) is included only if the patient was incapable of providing the necessary information or participating in medical decision making. Time devoted to teaching and to any procedures I billed separately is not included.     RADHA RAMON 77y Male admitted to [x ] SICU /[ ] SDU with post OP hemodynamic instability requiring pressors, vented due to respiratory failure post-OP, A.Fib with hemodynamic instability requiring pressors, electrolytes abnormalities. S/P tracheostomy and PEG 4/5/23, last week had episode of melena which resolved at this time.  Patient is examined and evaluated at the bedside with SICU team. Treatment plan discussed with SICU team, nurses and primary team.   Chest X-ray and all relevant studies reviewed during rounds.  Will continue hemodynamic monitoring as per protocol in SICU.    Neuro:  GCS [11T ]   [x ]  Neurovascular checks as per SICU protocol                 [ ] 3% NaCl                     Paralysis [ ] Yes  [x ]  No      Sedated/Pain control with                 [ ] Dilaudid drip, [ ]  Ketamine drip, [ ] Fentanyl drip, [ ] Propofol, [ ] Precedex, [ ] Versed drip, [ ] Ativan drip,                           [ ] OxyContin standing,  [ ] OxyContin PRN, [ ] Dilaudid PRN pushes, [x ] Fentanyl PRN pushes, [ ] PCA,                [x ] Tylenol IV/PO, [ ] Gabapentin, [ ]  Ketorolac, [ ] Tramadol,  [ ] Lidoderm Patch       Other Medications               [ ] Seroquel, [ ] Zyprexa, [ ] Haldol,  [ ] Clonazepam [ ] Xanax, [ ] Versed/Ativan PRN, [ ] Valium [x ] None               [ ] Robaxin   [ ] Baclofen  [ ] Flexeril               [ ] Keppra  [ ] Lamictal  [ ] Depakote  [ ] Dilantin               [ ] CIWA (Ativan/Valium/ Librium)  CV: continue to monitor  On pressors [x ]  Yes  [ ]  No          [ ]  Levophed, [ ] Urbano-Synephrine, [ ] Vasopressin, [ ]  Epinephrine          [ ] Dobutamine, [ ] Milrinone, [ x]  Midodrine 10mg q8hrs,  [ ] Others    Other Cardiac Meds          [ ] Amiodarone IV/PO, [ ] Digoxin, [ ] Cardizem drip, [ ] Cleviprex drip, [ ] Esmolol drip, [ ] Cardene drip  Respiratory: Acute respiratory insufficiency -> continue monitoring, ventilatory support,                     CXR bilateral pleural effusions, S/P tap by pulmonary team 4/24/23                        None Invasive Support  [ ] Incentive Spirometer                                  [ ] BiPAP   [ ] CPAP/NIV   [ ] HFNC   [ ] NR Face Mask   [ ] NC  [ ] Trach Caller                        Ventilatory support  [ x] Yes [ ] No                            [x ] SBT                                  [ ] PC    [ ] VC   [ x] AC/PRVC 450/22/8/40%  [ ] BiVent/APRV   [ ]CPAP   GI  [x ]  bowel regiment  [ x] BM + [x ] Flatus + [x] C.Diff +->complete treatment             [ ] Ostomy   [ ] NG tube        Prophylaxis [x ] PPI  [ ] H2 Blockers  [ ] Others  Nutrition: continue   [x ] Diet NPO  [ ] TPN/PPN   [ ] calories count   [x ]  Tube Feeds continue at goal  Renal: Continue I&Os monitoring, Garrison catheter  [x ] Yes,  [ ]   No ,  [ ] Consideration for discontinuation [ ] Taxes cath/PrimaFit  [ ] TOV  [ ] HD/CVVH       Lytes/Acid-base: replete hypokalemia, hypomagnesemia, hypocalcemia, hypophosphatemia        IV Fluids   [x ] LR KVO,  [ ] NS  [ ] D5W1/2NS [ ] Bicarbonate Drip  [ ] Albumin [ ] Lasix [x] Metolazone   ID: leukocytosis -> continue to monitor:         IV Abx [x ] Yes -> completed the course 4/23/23, [ ] No;  ID consulted [x ] Yes, [ ] No        Cultures send  [ ] Respiratory   [ ] Blood   [ ] Urine  [ ] Fluids  [ ] Tissue [x] Stool [ ]  None  Lines:   [x ] Right   [ ] Left  [ ] Bilateral                     [ ] Subclavian TLC        [x ]  Internal Jugular TLC     [ ]  Femoral TLC                     [ ] Subclavian Cordis    [ ] Internal Jugular Cordis   [ ] Femoral Cordis                     [ ] HD catheter    [ ] PICC     [ x]  Midline   [ x] Peripheral IVs                                                 [x ] Right   [ ] Left   [ ] None                     [x ] Radial A-Line    [ ] Femoral A-Line   [ ] Axillary A-Line               Heme: continue to evaluate for acute blood loss anemia- trend Hg/Hct                     AC Reversal Indicated [ ] Yes  [ x] No                                      [ ] Kcentra,  [ ] 3Factors concentrate, [ ] Adnexxa                     Transfused  indicated  [ ] Yes, [x ] No    [ ] PRBCs   [ ] Platelets   [ ] FFPs   [ ] Cryoprecipitate                    Should be started on or continued with  following  [x ] Yes,  [ ] No                               [ ] Lovenox  [ ] Coumadin  [ ] Heparin drip  [ ] NOVACs  [x ] ASA  [ x] Antiplatelets   Endocrine: Prevent and treat hyperglycemia with insulin as needed,                                 Insuline drip [ ] Yes, [x ] No   PV: follow pulse exam  Skin: decub precautions  DVT Prophylaxis:  [ x] SCDs  [x ] Heparin SQ  [ ] Lovenox  SQ  Stress Gastritis Prophylaxis: PPI/H2 Blockers if indicated  Mobility: patient is evaluated at the bedside with mobility team and the goals for today are discussed with PT [ x] bed rest    PATIENT/FAMILY/SURROGATE CONFERENCE:  [x ] Yes with patient's family at the bedside. [ ] No  PURPOSE: To obtain necessary information, To discuss treatment options under consideration today.  Tracheostomy and PEG discussed with family, consent obtained     I saw and evaluated the patient personally. I have reviewed and agree with note above. Treatment plan discussed with SICU team, nurses and primary team at the time of the multidisciplinary rounds. The above note is NOT written at the time of rounds and will reflect all changes throughout management of the patient for the day note is written for.    Nancy Medrano MD, FACS  Trauma/ACS/SCC Attending

## 2023-04-26 NOTE — PROGRESS NOTE ADULT - ASSESSMENT
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 Postop pt hypotensive and intubated, admitted to SICU for further observation. Patient had episodes of bradycardia, hypoxia driven. Patient now off pressors, trach and PEG. EP reconsulted for further episodes of bradycardia overnight    Impression:  Bradycardia overnight  Acute resp failure sp trach and PEG  Acute C. 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 Postop pt hypotensive and intubated, admitted to SICU for further observation. Patient had episodes of bradycardia, hypoxia driven. Patient now off pressors, trach and PEG. EP reconsulted for further episodes of bradycardia overnight    Impression:  Bradycardia overnight with hypotension  Acute resp failure sp trach and PEG  Acute C. diff, resolved  R hip fx sp ORIF  CAD  HLD    Plan:  - Will plan for Micra PPM implant, likely Friday  - Cont tele monitoring  - Avoid AVN blocking agents  - Monitor electrolytes, maintain WNL  - ID clearance for procedure  - Will follow

## 2023-04-26 NOTE — PROGRESS NOTE ADULT - SUBJECTIVE AND OBJECTIVE BOX
RADHA RAMON   [id number]   [birth year]  77yM    Admit Date/LOS: 03-20  Indication for SICU: Hemodynamic monitoring. 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 Pt walks with walker and got up in middle of night to get food and slipped and fell on his R side. Pt has been having R hip pain since with inability to ambulate. Pts family finally convinced him to come to hospital today. Pt is having 10/10 pain currently. Denies dizziness, CP or LOC prior to fall. Denies head trauma. No HA. (20 Mar 2023 12:02)    3/22: Patient S/P ORIF of right hip using interlocking intramedullary stephon. - upgraded for hypotension in PACU  4/5  Trach and PEG    24HRS EVENT:   NIGHT  -PM rounds: 450/22/40/8 ; TF @ 55 (goal)   -f/u with ortho regarding right hip dressing   - Pleural fluid 4/24: prelim neg  - 30kphos, 1 g mg repleted     DAY  -tolerated 1.5 hours PS 10/8   -5 metolazone for diuresis and hypernatremia  -F/U 16:00 bmp  -prednisone taper- 5 for 3 days starting bette  -PM ABG at FiO2 40% 7.40/44/160/27    [] A ten-point review of systems was otherwise negative except as noted above.  [x] Due to altered mental status/intubation, subjective information was not attained from   the patient. History was obtained, to the extent possible, from review of the chart and collateral sources of information.  =================================================================    Daily     Diet, NPO with Tube Feed:   Tube Feeding Modality: Gastrostomy  Peptamen A.F. Formula  Total Volume for 24 Hours (mL): 1320  Continuous  Starting Tube Feed Rate mL per Hour: 55  Until Goal Tube Feed Rate (mL per Hour): 55  Tube Feed Duration (in Hours): 24  Tube Feed Start Time: 10:00  Bolus   Total Volume per Flush (mL): 100   Frequency: Every 6 Hours  Banatrol TF     Qty per Day:  2 (04-24-23 @ 14:41)    CURRENT MEDS:  Neurologic Medications    Respiratory Medications  albuterol    0.083% 2.5 milliGRAM(s) Nebulizer every 8 hours  albuterol    90 MICROgram(s) HFA Inhaler 1 Puff(s) Inhalation every 8 hours  guaifenesin/dextromethorphan Oral Liquid 10 milliLiter(s) Oral every 4 hours    Cardiovascular Medications  doxazosin 4 milliGRAM(s) Oral at bedtime  midodrine 10 milliGRAM(s) Oral every 8 hours    Gastrointestinal Medications  magnesium sulfate  IVPB 1 Gram(s) IV Intermittent once  pantoprazole  Injectable 40 milliGRAM(s) IV Push every 12 hours  potassium phosphate IVPB 30 milliMole(s) IV Intermittent once  sodium chloride 0.9% lock flush 10 milliLiter(s) IV Push every 1 hour PRN Pre/post blood products, medications, blood draw, and to maintain line patency  sodium chloride 0.9% lock flush 10 milliLiter(s) IV Push every 1 hour PRN Pre/post blood products, medications, blood draw, and to maintain line patency    Genitourinary Medications    Hematologic/Oncologic Medications  aspirin  chewable 81 milliGRAM(s) Oral daily  clopidogrel Tablet 75 milliGRAM(s) Oral daily  heparin   Injectable 5000 Unit(s) SubCutaneous every 8 hours    Antimicrobial/Immunologic Medications  erythromycin   IVPB      erythromycin   IVPB 250 milliGRAM(s) IV Intermittent every 8 hours    Endocrine/Metabolic Medications  atorvastatin 80 milliGRAM(s) Oral at bedtime  insulin lispro (ADMELOG) corrective regimen sliding scale   SubCutaneous every 6 hours  predniSONE   Tablet 5 milliGRAM(s) Oral daily  predniSONE   Tablet   Oral     Topical/Other Medications  atropine 1% Solution 1 Drop(s) Left EYE two times a day  chlorhexidine 0.12% Liquid 15 milliLiter(s) Oral Mucosa every 12 hours  chlorhexidine 2% Cloths 1 Application(s) Topical <User Schedule>  prednisoLONE acetate 1% Suspension 1 Drop(s) Left EYE every 6 hours    ICU Vital Signs Last 24 Hrs  T(C): 37 (25 Apr 2023 20:00), Max: 37.2 (25 Apr 2023 16:00)  T(F): 98.6 (25 Apr 2023 20:00), Max: 98.9 (25 Apr 2023 16:00)  HR: 92 (26 Apr 2023 00:00) (92 - 107)  BP: --  BP(mean): --  ABP: 95/43 (26 Apr 2023 00:00) (95/43 - 150/59)  ABP(mean): 58 (26 Apr 2023 00:00) (58 - 87)  RR: 22 (26 Apr 2023 00:00) (22 - 37)  SpO2: 100% (26 Apr 2023 00:00) (100% - 100%)    O2 Parameters below as of 26 Apr 2023 00:00  Patient On (Oxygen Delivery Method): ventilator    O2 Concentration (%): 40    Mode: SIMV with PS  RR (machine): 22  TV (machine): 450  FiO2: 40  PEEP: 8  PS: 8  ITime: 1  MAP: 14  PIP: 26    ABG - ( 25 Apr 2023 16:32 )  pH, Arterial: 7.40  pH, Blood: x     /  pCO2: 44    /  pO2: 160   / HCO3: 27    / Base Excess: 2.1   /  SaO2: 100.0     I&O's Summary  24 Apr 2023 07:01  -  25 Apr 2023 07:00  --------------------------------------------------------  IN: 1605 mL / OUT: 1090 mL / NET: 515 mL    25 Apr 2023 07:01  -  26 Apr 2023 00:32  --------------------------------------------------------  IN: 1235 mL / OUT: 805 mL / NET: 430 mL    I&O's Detail  24 Apr 2023 07:01  -  25 Apr 2023 07:00  --------------------------------------------------------  IN:    Enteral Tube Flush: 200 mL    IV PiggyBack: 250 mL    Peptamen A.F.: 1155 mL  Total IN: 1605 mL  OUT:    Indwelling Catheter - Urethral (mL): 1050 mL    Norepinephrine: 0 mL    Rectal Tube (mL): 40 mL  Total OUT: 1090 mL  Total NET: 515 mL    25 Apr 2023 07:01  -  26 Apr 2023 00:32  --------------------------------------------------------  IN:    Enteral Tube Flush: 300 mL    Peptamen A.F.: 935 mL  Total IN: 1235 mL  OUT:    Indwelling Catheter - Urethral (mL): 785 mL    Rectal Tube (mL): 20 mL  Total OUT: 805 mL  Total NET: 430 mL    Daily     Daily   Diet, NPO with Tube Feed:   Tube Feeding Modality: Gastrostomy  Peptamen A.F. Formula  Total Volume for 24 Hours (mL): 1320  Continuous  Starting Tube Feed Rate mL per Hour: 55  Until Goal Tube Feed Rate (mL per Hour): 55  Tube Feed Duration (in Hours): 24  Tube Feed Start Time: 10:00  Bolus   Total Volume per Flush (mL): 100   Frequency: Every 6 Hours  Banatrol TF     Qty per Day:  2 (04-24-23 @ 14:41)    CURRENT MEDS:  Neurologic Medications    Respiratory Medications  albuterol    0.083% 2.5 milliGRAM(s) Nebulizer every 8 hours  albuterol    90 MICROgram(s) HFA Inhaler 1 Puff(s) Inhalation every 8 hours  guaifenesin/dextromethorphan Oral Liquid 10 milliLiter(s) Oral every 4 hours    Cardiovascular Medications  doxazosin 4 milliGRAM(s) Oral at bedtime  midodrine 10 milliGRAM(s) Oral every 8 hours    Gastrointestinal Medications  magnesium sulfate  IVPB 1 Gram(s) IV Intermittent once  pantoprazole  Injectable 40 milliGRAM(s) IV Push every 12 hours  potassium phosphate IVPB 30 milliMole(s) IV Intermittent once  sodium chloride 0.9% lock flush 10 milliLiter(s) IV Push every 1 hour PRN Pre/post blood products, medications, blood draw, and to maintain line patency  sodium chloride 0.9% lock flush 10 milliLiter(s) IV Push every 1 hour PRN Pre/post blood products, medications, blood draw, and to maintain line patency    Genitourinary Medications    Hematologic/Oncologic Medications  aspirin  chewable 81 milliGRAM(s) Oral daily  clopidogrel Tablet 75 milliGRAM(s) Oral daily  heparin   Injectable 5000 Unit(s) SubCutaneous every 8 hours    Antimicrobial/Immunologic Medications  erythromycin   IVPB      erythromycin   IVPB 250 milliGRAM(s) IV Intermittent every 8 hours    Endocrine/Metabolic Medications  atorvastatin 80 milliGRAM(s) Oral at bedtime  insulin lispro (ADMELOG) corrective regimen sliding scale   SubCutaneous every 6 hours  predniSONE   Tablet 5 milliGRAM(s) Oral daily  predniSONE   Tablet   Oral     Topical/Other Medications  atropine 1% Solution 1 Drop(s) Left EYE two times a day  chlorhexidine 0.12% Liquid 15 milliLiter(s) Oral Mucosa every 12 hours  chlorhexidine 2% Cloths 1 Application(s) Topical <User Schedule>  prednisoLONE acetate 1% Suspension 1 Drop(s) Left EYE every 6 hours    ICU Vital Signs Last 24 Hrs  T(C): 37 (25 Apr 2023 20:00), Max: 37.2 (25 Apr 2023 16:00)  T(F): 98.6 (25 Apr 2023 20:00), Max: 98.9 (25 Apr 2023 16:00)  HR: 92 (26 Apr 2023 00:00) (92 - 107)  BP: --  BP(mean): --  ABP: 95/43 (26 Apr 2023 00:00) (95/43 - 150/59)  ABP(mean): 58 (26 Apr 2023 00:00) (58 - 87)  RR: 22 (26 Apr 2023 00:00) (22 - 37)  SpO2: 100% (26 Apr 2023 00:00) (100% - 100%)    O2 Parameters below as of 26 Apr 2023 00:00  Patient On (Oxygen Delivery Method): ventilator    O2 Concentration (%): 40    Mode: SIMV with PS  RR (machine): 22  TV (machine): 450  FiO2: 40  PEEP: 8  PS: 8  ITime: 1  MAP: 14  PIP: 26    ABG - ( 25 Apr 2023 16:32 )  pH, Arterial: 7.40  pH, Blood: x     /  pCO2: 44    /  pO2: 160   / HCO3: 27    / Base Excess: 2.1   /  SaO2: 100.0     I&O's Summary  24 Apr 2023 07:01  -  25 Apr 2023 07:00  --------------------------------------------------------  IN: 1605 mL / OUT: 1090 mL / NET: 515 mL    25 Apr 2023 07:01  -  26 Apr 2023 00:33  --------------------------------------------------------  IN: 1235 mL / OUT: 805 mL / NET: 430 mL    I&O's Detail  24 Apr 2023 07:01  -  25 Apr 2023 07:00  --------------------------------------------------------  IN:    Enteral Tube Flush: 200 mL    IV PiggyBack: 250 mL    Peptamen A.F.: 1155 mL  Total IN: 1605 mL  OUT:    Indwelling Catheter - Urethral (mL): 1050 mL    Norepinephrine: 0 mL    Rectal Tube (mL): 40 mL  Total OUT: 1090 mL  Total NET: 515 mL    25 Apr 2023 07:01  -  26 Apr 2023 00:33  --------------------------------------------------------  IN:    Enteral Tube Flush: 300 mL    Peptamen A.F.: 935 mL  Total IN: 1235 mL  OUT:    Indwelling Catheter - Urethral (mL): 785 mL    Rectal Tube (mL): 20 mL  Total OUT: 805 mL  Total NET: 430 mL      PHYSICAL EXAM:   General: NAD, calm and cooperative  HEENT: NCAT; trach in place   Cardiac: S1, S2  Respiratory: b/l breath sounds; vented   Abdomen: Soft, non-distended, non-tender, no rebound, no guarding; G-tube in place   Rectal: rectal tube in place   : blair in place   Skin: no jaundice

## 2023-04-26 NOTE — PROGRESS NOTE ADULT - ASSESSMENT
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     3/23---s/p ORIF of right hip using interlocking intramedullary stephon    4/5---s/p Trach size 8 & PEG 4cm at bumper     NEURO:  #Acute pain    -APAP prn    RESP:   #Acute respiratory failure secondary to post operative arrhythmias requiring mechanical ventilation     -Intubated 3/23    -Tracheostomy 4/5--size 8  RR (machine): On PRVC/SIMV   RR (machine): 22, TV (machine): 450, FiO2: 40, PEEP: 8  #Pneumonia     -s/p bronch 4/8 BAL- no organisms    -4/11 BAL - pseudomonas    -4/11 blood cx - neg    -Pulm c/s - avoid volume overload and diuresis prn     -Albuterol, Robitussin  #PE w/u NEGATIVE on 3/28    CARDS:   #acute hypotension    - Levophed off since 4/23 PM    -Midodrine 10mg q8 4/19     -previously on solumedrol for vasopressor support; now on prednisone taper (5mg for 3 days starting 4/26)  #Bradycardia      -EP 4/1: no indication for PPM at the current time, most likely secondary to hypoxia, reconsult PRN  #HLD    -Atorvastatin 80mg HS  #Labs/Imaging    -EKG 4/24: Sinus rhythm w/ occasional premature ventricular complexes; RBBB    GI/NUTR:   #Diet: TF Peptamen AF goal of 55 ml/h      -Provides 1350 kcal and 85.5 gms protein      -PEG 3cm at skin, 4cm at bumper     -Started Erythromycin to increase motility   #Nausea    - Aspiration precautions, HOB 30  #GI Prophylaxis    - switched to Protonix 40 IV q12  #Bowel regimen    - senna  #Upper and Lower GI Bleed    -no further BRBPR since 4/21  -GI consult placed for lower GI bleed: Recs: keep NPO, maintain active T&S, adequate fluid resuscitation (Keep SBP > 90), trend CBC every 8hrs and keep Hg > 8, PPI BID.    - no acute intervention at the current time  #Imaging    -CT abdomen 3/28: No definite infectious source identified      -RUQ sono (3/28): biliary sludge, mild GB wall thickening/edema    -KUB (4/15): normal bowel gascyst, left kidney (present on earlier CT scan), blair catheter within collapsed urinary bladder.    /RENAL:   #acute scrotal swelling, gradually improving    - U/S 3/29 w/ diffused scrotal wall edema (keep blair) - blair has minimal output 4/17, replaced 4/17    - scrotal elevation  #urine output in critically ill    -indwelling blair (placed 3/22) - replaced (4/17)     Labs:          BUN/Cr- 75/1.3  -->,  74/1.2  -->          Electrolytes-Na 146 // K 4.0 // Mg 2.0 //  Phos 3.2 (04-23 @ 20:46)  #intermittent diuresis for edema    -post PRBC diuretic Lasix 20mg IV on 4/24    -patient with poor response to Bumex    -given 5mg metolazone x 1 for diuresis and hypernatremia 4/25     HEME/ONC:   #DVT prophylaxis    -HSQ, SCDs  #H/O CAD s/p stents x3, on DAPT     - ASA 81mg daily    - Plavix 75mg daily    Labs: Hb/Hct:  7.7/24.5  -->,  7.2/23.5  -->                      Plts:  173  -->,  167  -->                 PTT/INR:      T&S Expires: 4/27    ID:  #Acute clostridium difficile 3/30    Completed course of po Vanco on 4/13  Antibiotics: No indication at the current time   -If patient has fever, start Vanco/Cefe   -Ended 4/17: Cefepime IVPB 1000mg every 12 hours (started 4/10)   -Ended 4/13: vancomycin Solution 125 every 6 hours   -DC'd vanc IV 4/13  Cultures:    Pleural fluid 4/24 - prelim negative     BCx 4/11 -negative    BAL 4/10- Pseudomonas     BCx 3/28: NGTD    UA 3/28: Negative     MRSA 3/26: Negative    ET Sputum 3/24-- neg    CDiff PCR 3/30: Positive  # +Ramona Auris surveillance swab on 4/6/23 (nares, axilla, groin)    -Contact precaution   WBC- 13.19  --->>,  12.18  --->>,  10.60  --->>  Temp trend- 24hrs T(F): 98.6 (04-25 @ 20:00), Max: 98.9 (04-25 @ 16:00)    ENDO:  #DM    -HA1C= 5.0    -ISS    -Glucose goal 140-180   - on Prednisone 10 QD --> tapering 4/26 prednisone 5 QD x3days     MSK:  #Activity   -WBAT RLE per ortho, PT/OT when clinically appropriate   -Right hip Xray 2 views per ortho (4/9) --> s/p ORIF of comminuted intertrochanteric Rt femur fx w/out definite change compared to 3/22/23    LINES/DRAINS:    TLC Right IJ 4/21  Blair (3/22) -- replaced (4/17)   Trach Size 8  PEG 3cm at bumper  Right radial a-line (4/10)  Left cephalic midline (4/18)  PIV    ADVANCED DIRECTIVES:  DNR, MOLST completed    HCP/Emergency Contact- Song Segura (Son): [phone]    INDICATION FOR SICU: Hypotension with pressor requirements requring mechanical ventilation       DISPO: SICU    -awaiting [facility] placement

## 2023-04-27 NOTE — PROCEDURE NOTE - NSPROCNAME_GEN_A_CORE
Central Line Insertion
Thoracentesis
Bronchoscopy
Arterial Puncture/Cannulation
Midline Insertion
Bronchoscopy
Urinary Device Placement
Midline Insertion
Arterial Puncture/Cannulation
Urinary Device Placement
Central Line Insertion

## 2023-04-27 NOTE — PROCEDURE NOTE - NSICDXPROCEDURE_GEN_ALL_CORE_FT
PROCEDURES:  Insertion, central venous catheter without tunnel, age 5 years or older 11-Apr-2023 01:59:47 right IJ Prince Gonzales  
PROCEDURES:  Ultrasound guidance for placement of central venous catheter (CVC) 27-Apr-2023 15:19:04  Carmita Hinds  
PROCEDURES:  Insertion of midline catheter 18-Apr-2023 01:12:19  Perlita Cisneros  
PROCEDURES:  Bronchoscopy, flexible 08-Apr-2023 15:26:17  Gary Merritt  
PROCEDURES:  Insertion, arterial line, percutaneous 10-Apr-2023 20:41:47  Perlita Cisneros  
PROCEDURES:  US guided placement of non-tunneled central venous catheter 23-Mar-2023 08:09:15  Prince Gonzales  
PROCEDURES:  Insertion, central venous catheter without tunnel, age 5 years or older 22-Apr-2023 11:16:22  Nessa Bianchi

## 2023-04-27 NOTE — PROCEDURE NOTE - NSSITEPREP_SKIN_A_CORE
chlorhexidine
povidone iodine (if allergic to chlorhexidine)
chlorhexidine/Adherence to aseptic technique: hand hygiene prior to donning barriers (gown, gloves), don cap and mask, sterile drape over patient
chlorhexidine
chlorhexidine/Adherence to aseptic technique: hand hygiene prior to donning barriers (gown, gloves), don cap and mask, sterile drape over patient
chlorhexidine

## 2023-04-27 NOTE — PROCEDURE NOTE - NSPOSTPRCRAD_GEN_A_CORE
post procedure xray pending
central line located in the superior vena cava/no pneumothorax/post-procedure radiography performed
central line located in the superior vena cava/post-procedure radiography performed
central line located in the superior vena cava/no pneumothorax/post-procedure radiography performed
central line located in the superior vena cava/no pneumothorax/post-procedure radiography performed

## 2023-04-27 NOTE — PROGRESS NOTE ADULT - SUBJECTIVE AND OBJECTIVE BOX
RADHA RAMON   [id number]   [birth year]  77yM    Admit Date/LOS: 03-20  Indication for SICU: Hemodynamic monitoring. 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 Pt walks with walker and got up in middle of night to get food and slipped and fell on his R side. Pt has been having R hip pain since with inability to ambulate. Pts family finally convinced him to come to hospital today. Pt is having 10/10 pain currently. Denies dizziness, CP or LOC prior to fall. Denies head trauma. No HA. (20 Mar 2023 12:02)    3/22: Patient S/P ORIF of right hip using interlocking intramedullary stephon. - upgraded for hypotension in PACU  4/5  Trach and PEG    24HRS EVENT:   4/26  Night  NICOM responsive > 25% albumin in 50cc given   Hgb stable   Levo 0.15    4/26  Day  lasix 20bid  recall EPS for one minute of symptomatic bradycardia ovenight--> Pacemaker on Friday, needs ID clearance  tolerated PS 1.5h, became hypotensive  Tachypneic and hypotensive --> fentanyl, Levo and NICOM    [] A ten-point review of systems was otherwise negative except as noted above.  [x] Due to altered mental status/intubation, subjective information was not attained from   the patient. History was obtained, to the extent possible, from review of the chart and collateral sources of information.  ================================================================= RADHA RAMON   [id number]   [birth year]  77yM    Admit Date/LOS: 03-20  Indication for SICU: Hemodynamic monitoring. 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 Pt walks with walker and got up in middle of night to get food and slipped and fell on his R side. Pt has been having R hip pain since with inability to ambulate. Pts family finally convinced him to come to hospital today. Pt is having 10/10 pain currently. Denies dizziness, CP or LOC prior to fall. Denies head trauma. No HA. (20 Mar 2023 12:02)    3/22: Patient S/P ORIF of right hip using interlocking intramedullary stephon. - upgraded for hypotension in PACU  4/5  Trach and PEG    24HRS EVENT:   4/26  Night  NICOM responsive > 25% albumin in 50cc given   Hgb stable   Levo 0.15    4/26  Day  lasix 20bid  recall EPS for one minute of symptomatic bradycardia ovenight--> Pacemaker on Friday, needs ID clearance  tolerated PS 1.5h, became hypotensive  Tachypneic and hypotensive --> fentanyl, Levo and NICOM    [] A ten-point review of systems was otherwise negative except as noted above.  [x] Due to altered mental status/intubation, subjective information was not attained from   the patient. History was obtained, to the extent possible, from review of the chart and collateral sources of information.  =================================================================    Daily   Diet, NPO with Tube Feed:   Tube Feeding Modality: Gastrostomy  Peptamen A.F. Formula  Total Volume for 24 Hours (mL): 1320  Continuous  Starting Tube Feed Rate mL per Hour: 55  Until Goal Tube Feed Rate (mL per Hour): 55  Tube Feed Duration (in Hours): 24  Tube Feed Start Time: 10:00  Bolus   Total Volume per Flush (mL): 100   Frequency: Every 6 Hours  Banatrol TF     Qty per Day:  2 (04-24-23 @ 14:41)    CURRENT MEDS:  Neurologic Medications    Respiratory Medications  albuterol    0.083% 2.5 milliGRAM(s) Nebulizer every 8 hours  albuterol    90 MICROgram(s) HFA Inhaler 1 Puff(s) Inhalation every 8 hours  guaifenesin/dextromethorphan Oral Liquid 10 milliLiter(s) Oral every 4 hours    Cardiovascular Medications  doxazosin 4 milliGRAM(s) Oral at bedtime  furosemide    Tablet 20 milliGRAM(s) Oral every 12 hours  midodrine 10 milliGRAM(s) Oral every 8 hours  norepinephrine Infusion 0.05 MICROgram(s)/kG/Min IV Continuous <Continuous>    Gastrointestinal Medications  pantoprazole  Injectable 40 milliGRAM(s) IV Push every 12 hours  sodium chloride 0.9% lock flush 10 milliLiter(s) IV Push every 1 hour PRN Pre/post blood products, medications, blood draw, and to maintain line patency  sodium chloride 0.9% lock flush 10 milliLiter(s) IV Push every 1 hour PRN Pre/post blood products, medications, blood draw, and to maintain line patency    Genitourinary Medications    Hematologic/Oncologic Medications  aspirin  chewable 81 milliGRAM(s) Oral daily  clopidogrel Tablet 75 milliGRAM(s) Oral daily  heparin   Injectable 5000 Unit(s) SubCutaneous every 8 hours    Antimicrobial/Immunologic Medications  erythromycin   IVPB      erythromycin   IVPB 250 milliGRAM(s) IV Intermittent every 8 hours    Endocrine/Metabolic Medications  atorvastatin 80 milliGRAM(s) Oral at bedtime  insulin lispro (ADMELOG) corrective regimen sliding scale   SubCutaneous every 6 hours  predniSONE   Tablet   Oral   predniSONE   Tablet 5 milliGRAM(s) Oral daily    Topical/Other Medications  atropine 1% Solution 1 Drop(s) Left EYE two times a day  chlorhexidine 0.12% Liquid 15 milliLiter(s) Oral Mucosa every 12 hours  chlorhexidine 2% Cloths 1 Application(s) Topical <User Schedule>  prednisoLONE acetate 1% Suspension 1 Drop(s) Left EYE every 6 hours    ICU Vital Signs Last 24 Hrs  T(C): 36.7 (27 Apr 2023 07:40), Max: 37.1 (26 Apr 2023 16:00)  T(F): 98.1 (27 Apr 2023 07:40), Max: 98.7 (26 Apr 2023 16:00)  HR: 92 (27 Apr 2023 08:00) (77 - 109)  BP: 133/70 (27 Apr 2023 08:00) (112/63 - 133/70)  BP(mean): 76 (27 Apr 2023 08:00) (76 - 81)  ABP: 120/55 (27 Apr 2023 08:00) (79/40 - 160/59)  ABP(mean): 66 (27 Apr 2023 08:00) (51 - 89)  RR: 42 (27 Apr 2023 08:00) (20 - 61)  SpO2: 100% (27 Apr 2023 08:00) (77% - 100%)    O2 Parameters below as of 27 Apr 2023 08:00  Patient On (Oxygen Delivery Method): ventilator    O2 Concentration (%): 40      Mode: SIMV (Synchronized Intermittent Mandatory Ventilation)  RR (machine): 22  TV (machine): 450  FiO2: 40  PEEP: 8  PS: 8  ITime: 0.9  MAP: 11  PIP: 17    ABG - ( 27 Apr 2023 04:42 )  pH, Arterial: 7.42  pH, Blood: x     /  pCO2: 40    /  pO2: 155   / HCO3: 26    / Base Excess: 1.3   /  SaO2: 100.0     I&O's Summary    26 Apr 2023 07:01  -  27 Apr 2023 07:00  --------------------------------------------------------  IN: 2986 mL / OUT: 1545 mL / NET: 1441 mL    27 Apr 2023 07:01  -  27 Apr 2023 08:17  --------------------------------------------------------  IN: 73 mL / OUT: 150 mL / NET: -77 mL      I&O's Detail    26 Apr 2023 07:01  -  27 Apr 2023 07:00  --------------------------------------------------------  IN:    Enteral Tube Flush: 400 mL    IV PiggyBack: 350 mL    IV PiggyBack: 50 mL    IV PiggyBack: 500 mL    Lactated Ringers Bolus: 250 mL    Norepinephrine: 116 mL    Peptamen A.F.: 1320 mL  Total IN: 2986 mL    OUT:    Indwelling Catheter - Urethral (mL): 1545 mL  Total OUT: 1545 mL    Total NET: 1441 mL      27 Apr 2023 07:01  -  27 Apr 2023 08:17  --------------------------------------------------------  IN:    Norepinephrine: 18 mL    Peptamen A.F.: 55 mL  Total IN: 73 mL    OUT:    Indwelling Catheter - Urethral (mL): 150 mL    Rectal Tube (mL): 0 mL  Total OUT: 150 mL    Total NET: -77 mL    PHYSICAL EXAM:     GCS 11T  follows commands, CORRAL with equal strength B/L  no acute distress  equal chest rise b/l with equal breath sounds  abdomen soft distended with no tenderness and scattered ecchymosis noted  extremities soft with SCDs in place and 2+ pitting edema noted in B/L upper and lower extremties  Right hip dressing in place with no drainage or hematoma noted  urinary cath in place with decreased scrotal edema  Dignishield in place

## 2023-04-27 NOTE — PROCEDURE NOTE - NSASSISTBY_GEN_A_CORE
Carmita Hinds PA/PA
Dr. Valera/Resident
David Welch MD, Alexandrea Valera MD/Resident
Arias/PA
Myself
German/Resident

## 2023-04-27 NOTE — PROCEDURE NOTE - NSCOMPLICATION_GEN_A_CORE
no complications
no complications
bleeding
no complications

## 2023-04-27 NOTE — PROCEDURE NOTE - NSINFORMCONSENT_GEN_A_CORE
Benefits, risks, and possible complications of procedure explained to patient/caregiver who verbalized understanding and gave verbal consent.
Benefits, risks, and possible complications of procedure explained to patient/caregiver who verbalized understanding and gave written consent.
via phone/Benefits, risks, and possible complications of procedure explained to patient/caregiver who verbalized understanding and gave written consent.
HYPOtensive, HYPOxic, Vasopressor requirement/This was an emergent procedure.
This was an emergent procedure.
with petra Zuleta via phone/Benefits, risks, and possible complications of procedure explained to patient/caregiver who verbalized understanding and gave verbal consent.
This was an emergent procedure.
Benefits, risks, and possible complications of procedure explained to patient/caregiver who verbalized understanding and gave written consent.
This was an emergent procedure.

## 2023-04-27 NOTE — PROCEDURE NOTE - NSPERFORMEDBY_GEN_A_CORE
Myself
Dr. Olivera/Myself/Resident
Myself
Myself
Ivan Hawkins PA-C/Myself/PA
Myself
Myself

## 2023-04-27 NOTE — PROCEDURE NOTE - NSINDICATIONS_GEN_A_CORE
monitoring purposes
critical illness/emergency venous access/venous access
venous access
critical illness
critical illness
altered anatomy/history of difficult urethral catheterization/prolongerd immobilzation/sedation
pleural effusion
venous access
arterial puncture to obtain ABG's/critical patient/monitoring purposes
critical illness/hypertonic/irritant infusion
critical patient/history of difficult urethral catheterization/strict intake/output during critical illness

## 2023-04-27 NOTE — PROCEDURE NOTE - PRACTITIONER PERFORMING THE TIME OUT
Perlita Cisneros
Myself
Prince Rolon
RUTHIE Chopra
Ross
Perlita Cisneros
Nessa Bianchi, PA
Yuri
Dr. Pérez
Prince Rolon
Nessa Bianchi

## 2023-04-27 NOTE — PROGRESS NOTE ADULT - CRITICAL CARE SERVICES
31
40
65
33
35
34
45
65
38
41
43
45
45
48
65
35
35
40
44
45
65
65
36
40
65
33
36
35
36
36
35
35

## 2023-04-27 NOTE — PROCEDURE NOTE - NSPROCDETAILS_GEN_ALL_CORE
sterile technique, indwelling urinary device inserted
guidewire recovered/lumen(s) aspirated and flushed/sterile dressing applied/sterile technique, catheter placed/ultrasound guidance with use of sterile gel and probe cove
location identified, draped/prepped, sterile technique used, needle inserted/introduced/positive blood return obtained via catheter/connected to a pressurized flush line/hemostasis with direct pressure, dressing applied/Seldinger technique/all materials/supplies accounted for at end of procedure
location identified, draped/prepped, sterile technique used, needle inserted/introduced/positive blood return obtained via catheter/connected to a pressurized flush line/sutured in place/hemostasis with direct pressure, dressing applied/Seldinger technique/all materials/supplies accounted for at end of procedure
location identified, draped/prepped, sterile technique used/sterile dressing applied/supine position/ultrasound guidance
guidewire recovered/lumen(s) aspirated and flushed/sterile technique, catheter placed/ultrasound guidance with use of sterile gel and probe cove
sterile technique, indwelling urinary device inserted
location identified, draped/prepped, sterile technique used/sterile dressing applied/sterile technique, catheter placed/supine position/ultrasound guidance
guidewire recovered/lumen(s) aspirated and flushed/sterile dressing applied/sterile technique, catheter placed/ultrasound guidance with use of sterile gel and probe cove
guidewire recovered/lumen(s) aspirated and flushed/sterile dressing applied/sterile technique, catheter placed/ultrasound guidance with use of sterile gel and probe cove
location identified, draped/prepped, sterile technique used, needle inserted/introduced/Seldinger technique/catheter inserted over needle/connection to syringe/connection to evacuated glass bottle/ultrasound assessment of effusion (localization)

## 2023-04-27 NOTE — CHART NOTE - NSCHARTNOTEFT_GEN_A_CORE
ID asked to comment on clearance for cardiac device.   Pt is colonized with c. 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     Necessity of cardiac device per EP. Cleared from ID standpoint.   Precautions and OR scheduling per Infection Control #2927.   Along with routine prophylaxis can dose Caspo 70mg x1 prior to OR.     Please recall ID PRN    If any questions, please call or send a message on [employer] Teams  Please continue to update ID with any pertinent new laboratory or radiographic findings  Spectra [id number]

## 2023-04-27 NOTE — PROCEDURE NOTE - PROCEDURE DATE TIME, MLM
17-Apr-2023 12:15
24-Mar-2023 17:55
23-Mar-2023 08:06
15-Apr-2023 11:15
18-Apr-2023 01:11
27-Apr-2023 12:45
11-Apr-2023 01:56
21-Apr-2023 21:14
08-Apr-2023 14:59
24-Apr-2023 11:20
10-Apr-2023 19:00
10-Apr-2023 20:40
22-Mar-2023 17:24

## 2023-04-27 NOTE — PROCEDURE NOTE - ADDITIONAL PROCEDURE DETAILS
right sided pleuarl fluid drained 1.5 liters serous fluid
pt was serially dilated to 16 fr and a 14 fr silastic catheter was passed into the bladder draining 800 + cc of clear to slightly tinged urine.  Pt tolerated the procedure well.  keep Garrison until no longer medically needed.
guidewire visualized by Dr. Porter and EMILY Arciniega
15cm insertion depth  guidewire visualized by EMILY Luciano
ANUP Bianchi and Dr. Valera Attempted Right IJ placement but were unsuccessful. TLC ultimately placed by ANUP Hinds with attending bedside. Wire recovered and confirmed by Nurse Thornton

## 2023-04-27 NOTE — PROGRESS NOTE ADULT - ATTENDING COMMENTS
Critical Care: [id number]   This patient has a high probability of sudden, clinically significant deterioration, which requires the highest level of physician preparedness to intervene urgently. I managed/supervised life or organ supporting interventions that required frequent physician assessment. I devoted my full attention in the ICU to the direct care of this patient for the period of time indicated below. Time I spent with the family or surrogate(s) is included only if the patient was incapable of providing the necessary information or participating in medical decision making. Time devoted to teaching and to any procedures I billed separately is not included.     RADHA RAMON 77y Male admitted to [x ] SICU /[ ] SDU with post OP hemodynamic instability requiring pressors, vented due to respiratory failure post-OP, A.Fib with hemodynamic instability requiring pressors, electrolytes abnormalities. S/P tracheostomy and PEG 4/5/23, last week had episode of melena which resolved at this time.  Patient is examined and evaluated at the bedside with SICU team. Treatment plan discussed with SICU team, nurses and primary team.   Chest X-ray and all relevant studies reviewed during rounds.  Will continue hemodynamic monitoring as per protocol in SICU.    Neuro:  GCS [11T ]   [x ]  Neurovascular checks as per SICU protocol                 [ ] 3% NaCl                     Paralysis [ ] Yes  [x ]  No      Sedated/Pain control with                 [ ] Dilaudid drip, [ ]  Ketamine drip, [ ] Fentanyl drip, [ ] Propofol, [ ] Precedex, [ ] Versed drip, [ ] Ativan drip,                           [ ] OxyContin standing,  [ ] OxyContin PRN, [ ] Dilaudid PRN pushes, [x ] Fentanyl PRN pushes, [ ] PCA,                [x ] Tylenol IV/PO, [ ] Gabapentin, [ ]  Ketorolac, [ ] Tramadol,  [ ] Lidoderm Patch       Other Medications               [ ] Seroquel, [ ] Zyprexa, [ ] Haldol,  [ ] Clonazepam [ ] Xanax, [ ] Versed/Ativan PRN, [ ] Valium [x ] None               [ ] Robaxin   [ ] Baclofen  [ ] Flexeril               [ ] Keppra  [ ] Lamictal  [ ] Depakote  [ ] Dilantin               [ ] CIWA (Ativan/Valium/ Librium)  CV: continue to monitor, restarted on high dose pressors on 4/26/23 night  On pressors [x ]  Yes  [ ]  No          [x ]  Levophed, [ ] Urbano-Synephrine, [ ] Vasopressin, [ ]  Epinephrine          [ ] Dobutamine, [ ] Milrinone, [ x]  Midodrine 10mg q8hrs,  [ ] Others    Other Cardiac Meds          [ ] Amiodarone IV/PO, [ ] Digoxin, [ ] Cardizem drip, [ ] Cleviprex drip, [ ] Esmolol drip, [ ] Cardene drip  Respiratory: Acute respiratory insufficiency -> continue monitoring, ventilatory support,                     CXR bilateral pleural effusions, S/P tap by pulmonary team 4/24/23                        None Invasive Support  [ ] Incentive Spirometer                                  [ ] BiPAP   [ ] CPAP/NIV   [ ] HFNC   [ ] NR Face Mask   [ ] NC  [ ] Trach Caller                        Ventilatory support  [ x] Yes [ ] No                            [x ] SBT                                  [ ] PC    [ ] VC   [ x] AC/PRVC 450/22/8/40%  [ ] BiVent/APRV   [ ]CPAP   GI  [x ]  bowel regiment  [ x] BM + [x ] Flatus + [x] C.Diff +->complete treatment             [ ] Ostomy   [ ] NG tube        Prophylaxis [x ] PPI  [ ] H2 Blockers  [ ] Others  Nutrition: continue   [x ] Diet NPO  [ ] TPN/PPN   [ ] calories count   [x ]  Tube Feeds continue at goal  Renal: Continue I&Os monitoring, Garrison catheter  [x ] Yes,  [ ]   No ,  [ ] Consideration for discontinuation [ ] Taxes cath/PrimaFit  [ ] TOV  [ ] HD/CVVH       Lytes/Acid-base: replete hypokalemia, hypomagnesemia, hypocalcemia, hypophosphatemia        IV Fluids   [x ] LR ,  [ ] NS  [ ] D5W1/2NS [ ] Bicarbonate Drip  [ ] Albumin [ ] Lasix [x] Metolazone   ID: leukocytosis -> continue to monitor: increased to 12.3        IV Abx [x ] Yes -> completed the course 4/23/23, [ ] No;  ID consulted [x ] Yes, [ ] No        Cultures send  [ ] Respiratory   [ ] Blood   [ ] Urine  [ ] Fluids  [ ] Tissue [ ] Stool [ ]  None  Lines:   [x ] Right   [ ] Left  [ ] Bilateral                     [ ] Subclavian TLC        [x ]  Internal Jugular TLC     [ ]  Femoral TLC                     [ ] Subclavian Cordis    [ ] Internal Jugular Cordis   [ ] Femoral Cordis                     [ ] HD catheter    [ ] PICC     [ x]  Midline   [ x] Peripheral IVs                                                 [x ] Right   [ ] Left   [ ] None                     [x ] Radial A-Line    [ ] Femoral A-Line   [ ] Axillary A-Line               Heme: continue to evaluate for acute blood loss anemia- trend Hg/Hct                     AC Reversal Indicated [ ] Yes  [ x] No                                      [ ] Kcentra,  [ ] 3Factors concentrate, [ ] Adnexxa                     Transfused  indicated  [ ] Yes, [x ] No    [ ] PRBCs   [ ] Platelets   [ ] FFPs   [ ] Cryoprecipitate                    Should be started on or continued with  following  [x ] Yes,  [ ] No                               [ ] Lovenox  [ ] Coumadin  [ ] Heparin drip  [ ] NOVACs  [x ] ASA  [ x] Antiplatelets   Endocrine: Prevent and treat hyperglycemia with insulin as needed,                                 Insuline drip [ ] Yes, [x ] No   PV: follow pulse exam  Skin: decub precautions  DVT Prophylaxis:  [ x] SCDs  [x ] Heparin SQ  [ ] Lovenox  SQ  Stress Gastritis Prophylaxis: PPI/H2 Blockers if indicated  Mobility: patient is evaluated at the bedside with mobility team and the goals for today are discussed with PT [ x] bed rest    PATIENT/FAMILY/SURROGATE CONFERENCE:  [x ] Yes with patient's family at the bedside, will discuss GOC on next family meeting. [ ] No  PURPOSE: To obtain necessary information, To discuss treatment options under consideration today.  Tracheostomy and PEG discussed with family, consent obtained     I saw and evaluated the patient personally. I have reviewed and agree with note above. Treatment plan discussed with SICU team, nurses and primary team at the time of the multidisciplinary rounds. The above note is NOT written at the time of rounds and will reflect all changes throughout management of the patient for the day note is written for.    Nancy Medrano MD, FACS  Trauma/ACS/SCC Attending

## 2023-04-27 NOTE — PROGRESS NOTE ADULT - ASSESSMENT
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     3/23---s/p ORIF of right hip using interlocking intramedullary stephon    4/5---s/p Trach size 8 & PEG 4cm at bumper     NEURO:  #Acute pain    -APAP prn    RESP:   #Acute respiratory failure secondary to post operative arrhythmias requiring mechanical ventilation     -Intubated 3/23    -Tracheostomy 4/5--size 8  RR (machine): On PRVC/SIMV   RR (machine): 22, TV (machine): 450, FiO2: 40, PEEP: 8  #Pneumonia     -s/p bronch 4/8 BAL- no organisms    -4/11 BAL - pseudomonas    -4/11 blood cx - neg    -Pulm c/s - avoid volume overload and diuresis prn     -Albuterol, Robitussin  #PE w/u NEGATIVE on 3/28    CARDS:   #acute hypotension    - Levophed on 4/26    - NICOM responsive > 25% albumin in 50 cc    - Midodrine 10mg q8 4/19     -previously on solumedrol for vasopressor support; now on prednisone taper (5mg for 3 days starting 4/26)  #Bradycardia      -EP 4/1: no indication for PPM at the current time, most likely secondary to hypoxia, reconsult PRN    -EP 4/26: needs PPM, likely friday, needs ID clearance  #HLD    -Atorvastatin 80mg HS  #Labs/Imaging    -EKG 4/24: Sinus rhythm w/ occasional premature ventricular complexes; RBBB    GI/NUTR:   #Diet: TF Peptamen AF goal of 55 ml/h      -Provides 1350 kcal and 85.5 gms protein      -PEG 3cm at skin, 4cm at bumper     -Started Erythromycin to increase motility   #Nausea    - Aspiration precautions, HOB 30  #GI Prophylaxis    - switched to Protonix 40 IV q12  #Bowel regimen    - senna  #Upper and Lower GI Bleed    -no further BRBPR since 4/21  -GI consult placed for lower GI bleed: Recs: keep NPO, maintain active T&S, adequate fluid resuscitation (Keep SBP > 90), trend CBC every 8hrs and keep Hg > 8, PPI BID.    - no acute intervention at the current time  #Imaging    -CT abdomen 3/28: No definite infectious source identified      -RUQ sono (3/28): biliary sludge, mild GB wall thickening/edema    -KUB (4/15): normal bowel gascyst, left kidney (present on earlier CT scan), blair catheter within collapsed urinary bladder.    /RENAL:   #acute scrotal swelling, gradually improving    - U/S 3/29 w/ diffused scrotal wall edema (keep blair) - blair has minimal output 4/17, replaced 4/17    - scrotal elevation  #urine output in critically ill    -indwelling blair (placed 3/22) - replaced (4/17)     Labs:          BUN/Cr- 77/1.0  -->,  68/0.8  -->          Electrolytes-Na 148 // K 4.6 // Mg 1.9 //  Phos 3.0 (04-26 @ 20:54)  #intermittent diuresis for edema    -post PRBC diuretic Lasix 20mg IV on 4/24    -patient with poor response to Bumex    -given 5mg metolazone x 1 for diuresis and hypernatremia 4/25     HEME/ONC:   #DVT prophylaxis    -HSQ, SCDs  #H/O CAD s/p stents x3, on DAPT     - ASA 81mg daily    - Plavix 75mg daily    Labs: Hb/Hct:  7.2/23.5  -->,  7.6/24.5  -->                      Plts:  167  -->,  177  -->                 PTT/INR:      T&S Expires: 4/27    ID:  #Acute clostridium difficile 3/30    Completed course of po Vanco on 4/13  Antibiotics: No indication at the current time   -If patient has fever, start Vanco/Cefe   -Ended 4/17: Cefepime IVPB 1000mg every 12 hours (started 4/10)   -Ended 4/13: vancomycin Solution 125 every 6 hours   -DC'd vanc IV 4/13  Cultures:    Pleural fluid 4/24 - prelim negative     BCx 4/11 -negative    BAL 4/10- Pseudomonas     BCx 3/28: NGTD    UA 3/28: Negative     MRSA 3/26: Negative    ET Sputum 3/24-- neg    CDiff PCR 3/30: Positive  # +Ramona Auris surveillance swab on 4/6/23 (nares, axilla, groin)    -Contact precaution   WBC- 12.18  --->>,  10.60  --->>,  12.42  --->>  Temp trend- 24hrs T(F): 98.1 (04-27 @ 00:00), Max: 98.7 (04-26 @ 16:00)    ENDO:  #DM    -HA1C= 5.0    -ISS    -Glucose goal 140-180   - on Prednisone 10 QD --> tapering 4/26 prednisone 5 QD x3days     MSK:  #Activity   -WBAT RLE per ortho, PT/OT when clinically appropriate   -Right hip Xray 2 views per ortho (4/9) --> s/p ORIF of comminuted intertrochanteric Rt femur fx w/out definite change compared to 3/22/23    LINES/DRAINS:    TLC Right IJ 4/21  Blair (3/22) -- replaced (4/17)   Trach Size 8  PEG 3cm at bumper  Right radial a-line (4/10)  Left cephalic midline (4/18)  PIV    ADVANCED DIRECTIVES:  DNR, MOLST completed    HCP/Emergency Contact- Song Segura (Son): [phone]    INDICATION FOR SICU: Hypotension with pressor requirements requring mechanical ventilation       DISPO: SICU    -awaiting LTAC placement 77yM w/ PMH w/ CAD, HLD, BPH, Prostate Ca and 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     3/23---s/p ORIF of right hip using interlocking intramedullary stephon    4/5---s/p Trach size 8 & PEG 4cm at bumper     NEURO:  #Acute pain    -APAP prn    RESP:   #Acute respiratory failure secondary to post operative arrhythmias requiring mechanical ventilation     -Intubated 3/23    -Tracheostomy 4/5--size 8  RR (machine): On PRVC/SIMV   RR (machine): 22, TV (machine): 450, FiO2: 40, PEEP: 8  #Pneumonia     -s/p bronch 4/8 BAL- no organisms    -4/11 BAL - pseudomonas    -4/11 blood cx - neg    -Pulm c/s - avoid volume overload and diuresis prn     -Albuterol, Robitussin  #PE w/u NEGATIVE on 3/28    CARDS:   #acute hypotension    - Levophed on 4/26    - NICOM responsive > 25% albumin in 50 cc    - Midodrine 10mg q8 4/19     -previously on solumedrol for vasopressor support; now on prednisone taper (5mg for 3 days starting 4/26)  #Bradycardia      -EP 4/1: no indication for PPM at the current time, most likely secondary to hypoxia, reconsult PRN    -EP 4/26: needs PPM, likely friday, needs ID clearance  #HLD    -Atorvastatin 80mg HS  #Labs/Imaging    -EKG 4/24: Sinus rhythm w/ occasional premature ventricular complexes; RBBB    GI/NUTR:   #Diet: TF Peptamen AF goal of 55 ml/h      -Provides 1350 kcal and 85.5 gms protein      -PEG 3cm at skin, 4cm at bumper     -Started Erythromycin to increase motility   #Nausea    - Aspiration precautions, HOB 30  #GI Prophylaxis    - switched to Protonix 40 IV q12  #Bowel regimen    - senna  #Upper and Lower GI Bleed    -no further BRBPR since 4/21  -GI consult placed for lower GI bleed: Recs: keep NPO, maintain active T&S, adequate fluid resuscitation (Keep SBP > 90), trend CBC every 8hrs and keep Hg > 8, PPI BID.    - no acute intervention at the current time  #Imaging    -CT abdomen 3/28: No definite infectious source identified      -RUQ sono (3/28): biliary sludge, mild GB wall thickening/edema    -KUB (4/15): normal bowel gascyst, left kidney (present on earlier CT scan), blair catheter within collapsed urinary bladder.    /RENAL:   #acute scrotal swelling, gradually improving    - U/S 3/29 w/ diffused scrotal wall edema (keep blair) - blair has minimal output 4/17, replaced 4/17    - scrotal elevation  #urine output in critically ill    -indwelling blair (placed 3/22) - replaced (4/17)     Labs:          BUN/Cr- 77/1.0  -->,  68/0.8  -->          Electrolytes-Na 148 // K 4.6 // Mg 1.9 //  Phos 3.0 (04-26 @ 20:54)  #intermittent diuresis for edema    -post PRBC diuretic Lasix 20mg IV on 4/24    -patient with poor response to Bumex    -given 5mg metolazone x 1 for diuresis and hypernatremia 4/25     HEME/ONC:   #DVT prophylaxis    -HSQ, SCDs  #H/O CAD s/p stents x3, on DAPT     - ASA 81mg daily    - Plavix 75mg daily    Labs: Hb/Hct:  7.2/23.5  -->,  7.6/24.5  -->                      Plts:  167  -->,  177  -->                 PTT/INR:      T&S Expires: 4/27    ID:  #Acute clostridium difficile 3/30    Completed course of po Vanco on 4/13  Antibiotics: No indication at the current time   -If patient has fever, start Vanco/Cefe   -Ended 4/17: Cefepime IVPB 1000mg every 12 hours (started 4/10)   -Ended 4/13: vancomycin Solution 125 every 6 hours   -DC'd vanc IV 4/13  Cultures:    Pleural fluid 4/24 - prelim negative     BCx 4/11 -negative    BAL 4/10- Pseudomonas     BCx 3/28: NGTD    UA 3/28: Negative     MRSA 3/26: Negative    ET Sputum 3/24-- neg    CDiff PCR 3/30: Positive  # +Ramona [facility] surveillance swab on 4/6/23 (nares, axilla, groin)    -Contact precaution   WBC- 12.18  --->>,  10.60  --->>,  12.42  --->>  Temp trend- 24hrs T(F): 98.1 (04-27 @ 00:00), Max: 98.7 (04-26 @ 16:00)    ENDO:  #DM    -HA1C= 5.0    -ISS    -Glucose goal 140-180   - on Prednisone 10 QD --> tapering 4/26 prednisone 5 QD x3days     MSK:  #Activity   -WBAT RLE per ortho, PT/OT when clinically appropriate   -Right hip Xray 2 views per ortho (4/9) --> s/p ORIF of comminuted intertrochanteric Rt femur fx w/out definite change compared to 3/22/23    LINES/DRAINS:    TLC Right IJ 4/21  Blair (3/22) -- replaced (4/17)   Trach Size 8  PEG 3cm at bumper  Right radial a-line (4/10)  Left cephalic midline (4/18)  PIV    ADVANCED DIRECTIVES:  DNR, MOLST completed    HCP/Emergency Contact- Song Segura (Son): [phone]    INDICATION FOR SICU: Hypotension with pressor requirements requring mechanical ventilation       DISPO: SICU    -awaiting LTAC placement 77yM w/ PMH w/ CAD, HLD, BPH, Prostate Ca and 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     3/23---s/p ORIF of right hip using interlocking intramedullary stephon    4/5---s/p Trach size 8 & PEG 4cm at bumper     NEURO:  #Acute pain    -APAP prn    RESP:   #Acute respiratory failure secondary to post operative arrhythmias requiring mechanical ventilation     -Intubated 3/23    -Tracheostomy 4/5--size 8  RR (machine): On PRVC/SIMV   RR (machine): 22, TV (machine): 450, FiO2: 40, PEEP: 8  PS (4/27) tolerated for 30 mins, RR   #Pneumonia     -s/p bronch 4/8 BAL- no organisms    -4/11 BAL - pseudomonas    -4/11 blood cx - neg    -Pulm c/s - avoid volume overload and diuresis prn     -Albuterol, Robitussin  #PE w/u NEGATIVE on 3/28    CARDS:   #acute hypotension    - Levophed on 4/26    - NICOM responsive > 25% albumin in 50 cc    - Midodrine 10mg q8 4/19     -previously on solumedrol for vasopressor support; now on prednisone taper (5mg for 3 days starting 4/26)  #Bradycardia      -EP 4/1: no indication for PPM at the current time, most likely secondary to hypoxia, reconsult PRN    -EP 4/26: needs PPM, likely friday, needs ID clearance  #HLD    -Atorvastatin 80mg HS  #Labs/Imaging    -EKG 4/24: Sinus rhythm w/ occasional premature ventricular complexes; RBBB    GI/NUTR:   #Diet: TF Peptamen AF goal of 55 ml/h      -Provides 1350 kcal and 85.5 gms protein      -PEG 3cm at skin, 4cm at bumper     -Started Erythromycin to increase motility   #Nausea    - Aspiration precautions, HOB 30  #GI Prophylaxis    - switched to Protonix 40 IV q12  #Bowel regimen    - senna  #Upper and Lower GI Bleed    -no further BRBPR since 4/21  -GI consult placed for lower GI bleed: Recs: keep NPO, maintain active T&S, adequate fluid resuscitation (Keep SBP > 90), trend CBC every 8hrs and keep Hg > 8, PPI BID.    - no acute intervention at the current time  #Imaging    -CT abdomen 3/28: No definite infectious source identified      -RUQ sono (3/28): biliary sludge, mild GB wall thickening/edema    -KUB (4/15): normal bowel gascyst, left kidney (present on earlier CT scan), blair catheter within collapsed urinary bladder.    /RENAL:   #acute scrotal swelling, gradually improving    - U/S 3/29 w/ diffused scrotal wall edema (keep blair) - blair has minimal output 4/17, replaced 4/17    - scrotal elevation  #urine output in critically ill    -indwelling blair (placed 3/22) - replaced (4/17)     Labs:          BUN/Cr- 77/1.0  -->,  68/0.8  -->          Electrolytes-Na 148 // K 4.6 // Mg 1.9 //  Phos 3.0 (04-26 @ 20:54)  #intermittent diuresis for edema    -post PRBC diuretic Lasix 20mg IV on 4/24    -patient with poor response to Bumex    -given 5mg metolazone x 1 for diuresis and hypernatremia 4/25     HEME/ONC:   #DVT prophylaxis    -HSQ, SCDs  #H/O CAD s/p stents x3, on DAPT     - ASA 81mg daily    - Plavix 75mg daily    Labs: Hb/Hct:  7.2/23.5  -->,  7.6/24.5  -->                      Plts:  167  -->,  177  -->                 PTT/INR:      T&S Expires: 4/27    ID:  #Acute clostridium difficile 3/30    Completed course of po Vanco on 4/13  Antibiotics: No indication at the current time   -If patient has fever, start Vanco/Cefe   -Ended 4/17: Cefepime IVPB 1000mg every 12 hours (started 4/10)   -Ended 4/13: vancomycin Solution 125 every 6 hours   -DC'd vanc IV 4/13  Cultures:    Pleural fluid 4/24 - prelim negative     BCx 4/11 -negative    BAL 4/10- Pseudomonas     BCx 3/28: NGTD    UA 3/28: Negative     MRSA 3/26: Negative    ET Sputum 3/24-- neg    CDiff PCR 3/30: Positive  # +Ramona [facility] surveillance swab on 4/6/23 (nares, axilla, groin)    -Contact precaution   WBC- 12.18  --->>,  10.60  --->>,  12.42  --->>  Temp trend- 24hrs T(F): 98.1 (04-27 @ 00:00), Max: 98.7 (04-26 @ 16:00)    ENDO:  #DM    -HA1C= 5.0    -ISS    -Glucose goal 140-180   - on Prednisone 10 QD --> tapering 4/26 prednisone 5 QD x3days     MSK:  #Activity   -WBAT RLE per ortho, PT/OT when clinically appropriate   -Right hip Xray 2 views per ortho (4/9) --> s/p ORIF of comminuted intertrochanteric Rt femur fx w/out definite change compared to 3/22/23    LINES/DRAINS:    TLC Right IJ 4/21  Blair (3/22) -- replaced (4/17)   Trach Size 8  PEG 3cm at bumper  Right radial a-line (4/10)  Left cephalic midline (4/18)  PIV    ADVANCED DIRECTIVES:  DNR, MOLST completed    HCP/Emergency Contact- Song Segura (Son): [phone]    INDICATION FOR SICU: Hypotension with pressor requirements requring mechanical ventilation       DISPO: SICU    -awaiting LTAC placement 77yM w/ PMH w/ CAD, HLD, BPH, Prostate Ca and 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     3/23---s/p ORIF of right hip using interlocking intramedullary stephon    4/5---s/p Trach size 8 & PEG 4cm at bumper     NEURO:  #Acute pain    -APAP prn    RESP:   #Acute respiratory failure secondary to post operative arrhythmias requiring mechanical ventilation     -Intubated 3/23    -Tracheostomy 4/5--size 8  RR (machine): On PRVC/SIMV   RR (machine): 22, TV (machine): 450, FiO2: 40, PEEP: 8  PS (4/27) tolerated for 30 mins, RR 40s and was placed back on vent  #Pneumonia     -s/p bronch 4/8 BAL- no organisms    -4/11 BAL - pseudomonas    -4/11 blood cx - neg    -Pulm c/s - avoid volume overload and diuresis prn     -Albuterol, Robitussin  #PE w/u NEGATIVE on 3/28    CARDS:   #acute hypotension    - Levophed on 4/26    - NICOM responsive > 25% albumin in 50 cc x2    - Midodrine 10mg q8 4/19     -previously on solumedrol for vasopressor support; now on prednisone taper (5mg for 3 days starting 4/26)  #Bradycardia      -EP 4/1: no indication for PPM at the current time, most likely secondary to hypoxia, reconsult PRN    -EP 4/26: needs PPM, likely friday, needs ID clearance  #HLD    -Atorvastatin 80mg HS  #Labs/Imaging    -EKG 4/24: Sinus rhythm w/ occasional premature ventricular complexes; RBBB    GI/NUTR:   #Diet: TF Peptamen AF goal of 55 ml/h      -Provides 1350 kcal and 85.5 gms protein      -PEG 3cm at skin, 4cm at bumper     -Started Erythromycin to increase motility   #Nausea    - Aspiration precautions, HOB 30  #GI Prophylaxis    - switched to Protonix 40 IV q12  #Bowel regimen    - senna  #Upper and Lower GI Bleed    -no further BRBPR since 4/21  -GI consult placed for lower GI bleed: Recs: keep NPO, maintain active T&S, adequate fluid resuscitation (Keep SBP > 90), trend CBC every 8hrs and keep Hg > 8, PPI BID.    - no acute intervention at the current time  #Imaging    -CT abdomen 3/28: No definite infectious source identified      -RUQ sono (3/28): biliary sludge, mild GB wall thickening/edema    -KUB (4/15): normal bowel gascyst, left kidney (present on earlier CT scan), blair catheter within collapsed urinary bladder.    /RENAL:   #acute scrotal swelling, gradually improving    - U/S 3/29 w/ diffused scrotal wall edema (keep blair) - blair has minimal output 4/17, replaced 4/17    - scrotal elevation  #urine output in critically ill    -indwelling blair (placed 3/22) - replaced (4/17)     Labs:          BUN/Cr- 77/1.0  -->,  68/0.8  -->          Electrolytes-Na 148 // K 4.6 // Mg 1.9 //  Phos 3.0 (04-26 @ 20:54)  #intermittent diuresis for edema    -post PRBC diuretic Lasix 20mg IV on 4/24    -patient with poor response to Bumex    -given 5mg metolazone x 1 for diuresis and hypernatremia 4/25     HEME/ONC:   #DVT prophylaxis    -HSQ, SCDs  #H/O CAD s/p stents x3, on DAPT     - ASA 81mg daily    - Plavix 75mg daily    Labs: Hb/Hct:  7.2/23.5  -->,  7.6/24.5  -->                      Plts:  167  -->,  177  -->                 PTT/INR:      T&S Expires: 4/27    ID:  #Acute clostridium difficile 3/30    Completed course of po Vanco on 4/13  Antibiotics: No indication at the current time   -If patient has fever, start Vanco/Cefe   -Ended 4/17: Cefepime IVPB 1000mg every 12 hours (started 4/10)   -Ended 4/13: vancomycin Solution 125 every 6 hours   -DC'd vanc IV 4/13  Cultures:    Pleural fluid 4/24 - prelim negative     BCx 4/11 -negative    BAL 4/10- Pseudomonas     BCx 3/28: NGTD    UA 3/28: Negative     MRSA 3/26: Negative    ET Sputum 3/24-- neg    CDiff PCR 3/30: Positive  # +Ramona [facility] surveillance swab on 4/6/23 (nares, axilla, groin)    -Contact precaution   WBC- 12.18  --->>,  10.60  --->>,  12.42  --->>  Temp trend- 24hrs T(F): 98.1 (04-27 @ 00:00), Max: 98.7 (04-26 @ 16:00)    ENDO:  #DM    -HA1C= 5.0    -ISS    -Glucose goal 140-180   - on Prednisone 10 QD --> tapering 4/26 prednisone 5 QD x3days     MSK:  #Activity   -WBAT RLE per ortho, PT/OT when clinically appropriate   -Right hip Xray 2 views per ortho (4/9) --> s/p ORIF of comminuted intertrochanteric Rt femur fx w/out definite change compared to 3/22/23    LINES/DRAINS:    TLC Right IJ 4/21  Blair (3/22) -- replaced (4/17)   Trach Size 8  PEG 3cm at bumper  Right radial a-line (4/10)  Left cephalic midline (4/18)  PIV    ADVANCED DIRECTIVES:  DNR, MOLST completed    HCP/Emergency Contact- Song Hindssmaninderrupal (Son): [phone]    INDICATION FOR SICU: Hypotension with pressor requirements requring mechanical ventilation       DISPO: SICU    -awaiting LTAC placement 77yM w/ PMH w/ CAD, HLD, BPH, Prostate Ca and 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     3/23---s/p ORIF of right hip using interlocking intramedullary stephon    4/5---s/p Trach size 8 & PEG 4cm at bumper     NEURO:  #Acute pain    -APAP prn    RESP:   #Acute respiratory failure secondary to post operative arrhythmias requiring mechanical ventilation     -Intubated 3/23    -Tracheostomy 4/5--size 8  RR (machine): On PRVC/SIMV   RR (machine): 22, TV (machine): 450, FiO2: 40, PEEP: 8  PS (4/27) tolerated for 30 mins, RR 40s and was placed back on vent  #Pneumonia     -s/p bronch 4/8 BAL- no organisms    -4/11 BAL - pseudomonas    -4/11 blood cx - neg    -Pulm c/s - avoid volume overload and diuresis prn     -Albuterol, Robitussin  #PE w/u NEGATIVE on 3/28    CARDS:   #acute hypotension    - Levophed on 4/26    - NICOM responsive > 25% albumin in 50 cc x2    -NICOM #2 22.6% --> LR 500cc bolus x1    - Midodrine 10mg q8 4/19     -previously on solumedrol for vasopressor support; now on prednisone taper (5mg for 3 days starting 4/26)  #Bradycardia      -EP 4/1: no indication for PPM at the current time, most likely secondary to hypoxia, reconsult PRN    -EP 4/26: needs PPM, likely friday, needs ID clearance --> f/u ID  #HLD    -Atorvastatin 80mg HS  #Labs/Imaging    -EKG 4/24: Sinus rhythm w/ occasional premature ventricular complexes; RBBB    GI/NUTR:   #Diet: TF Peptamen AF goal of 55 ml/h      -Provides 1350 kcal and 85.5 gms protein      -PEG 3cm at skin, 4cm at bumper     -Started Erythromycin to increase motility   #Nausea    - Aspiration precautions, HOB 30  #GI Prophylaxis    - switched to Protonix 40 IV q12  #Bowel regimen    - senna  #Upper and Lower GI Bleed    -no further BRBPR since 4/21  -GI consult placed for lower GI bleed: Recs: keep NPO, maintain active T&S, adequate fluid resuscitation (Keep SBP > 90), trend CBC every 8hrs and keep Hg > 8, PPI BID.    - no acute intervention at the current time  #Imaging    -CT abdomen 3/28: No definite infectious source identified      -RUQ sono (3/28): biliary sludge, mild GB wall thickening/edema    -KUB (4/15): normal bowel gascyst, left kidney (present on earlier CT scan), blair catheter within collapsed urinary bladder.    /RENAL:   #acute scrotal swelling, gradually improving    - U/S 3/29 w/ diffused scrotal wall edema (keep blair) - blair has minimal output 4/17, replaced 4/17    - scrotal elevation  #urine output in critically ill    -indwelling blair (placed 3/22) - replaced (4/17)     Labs:          BUN/Cr- 77/1.0  -->,  68/0.8  -->          Electrolytes-Na 148 // K 4.6 // Mg 1.9 //  Phos 3.0 (04-26 @ 20:54)  #intermittent diuresis for edema    -post PRBC diuretic Lasix 20mg IV on 4/24    -on Lasix 20 q12    -patient with poor response to Bumex    -given 5mg metolazone x 1 for diuresis and hypernatremia 4/25     HEME/ONC:   #DVT prophylaxis    -HSQ, SCDs  #H/O CAD s/p stents x3, on DAPT     - ASA 81mg daily    - Plavix 75mg daily    Labs: Hb/Hct:  7.2/23.5  -->,  7.6/24.5  -->                      Plts:  167  -->,  177  -->                 PTT/INR:      T&S Expires: 4/27    ID:  #Acute clostridium difficile 3/30    Completed course of po Vanco on 4/13  Antibiotics: No indication at the current time   -If patient has fever, start Vanco/Cefe   -Ended 4/17: Cefepime IVPB 1000mg every 12 hours (started 4/10)   -Ended 4/13: vancomycin Solution 125 every 6 hours   -DC'd vanc IV 4/13  Cultures:    Pleural fluid 4/24 - prelim negative     BCx 4/11 -negative    BAL 4/10- Pseudomonas     BCx 3/28: NGTD    UA 3/28: Negative     MRSA 3/26: Negative    ET Sputum 3/24-- neg    CDiff PCR 3/30: Positive  # +Ramona Auris surveillance swab on 4/6/23 (nares, axilla, groin)    -Contact precaution   WBC- 12.18  --->>,  10.60  --->>,  12.42  --->>  Temp trend- 24hrs T(F): 98.1 (04-27 @ 00:00), Max: 98.7 (04-26 @ 16:00)    ENDO:  #DM    -HA1C= 5.0    -ISS    -Glucose goal 140-180   - on Prednisone 10 QD --> tapering 4/26 prednisone 5 QD x3days     MSK:  #Activity   -WBAT RLE per ortho, PT/OT when clinically appropriate   -Right hip Xray 2 views per ortho (4/9) --> s/p ORIF of comminuted intertrochanteric Rt femur fx w/out definite change compared to 3/22/23    LINES/DRAINS:    TLC Right IJ 4/21  Blair (3/22) -- replaced (4/17)   Trach Size 8  PEG 3cm at bumper  Right radial a-line (4/10)  Left cephalic midline (4/18)  PIV    ADVANCED DIRECTIVES:  DNR, MOLST completed    HCP/Emergency Contact- Song Hindsblossom (Son): [phone]    INDICATION FOR SICU: Hypotension with pressor requirements requiring mechanical ventilation       DISPO: SICU    -awaiting LTAC placement

## 2023-04-28 NOTE — CHART NOTE - NSCHARTNOTEFT_GEN_A_CORE
GI recalled for evaluation of hematochezia over night.  Family opted for comfort measures and patient was extubated.  no intervention.  recall as needed     Marcin Conner  PGY 5  Gastroenterology Fellow

## 2023-04-28 NOTE — PROGRESS NOTE ADULT - PROVIDER SPECIALTY LIST ADULT
Internal Medicine
Nephrology
Nephrology
Orthopedics
SICU
Hospitalist
Infectious Disease
Orthopedics
Pulmonology
SICU
Surgery
Electrophysiology
Gastroenterology
Gastroenterology
Internal Medicine
Orthopedics
Pulmonology
Pulmonology
SICU
Gastroenterology
Infectious Disease
Neurology
Orthopedics
SICU
Palliative Care

## 2023-04-28 NOTE — PROGRESS NOTE ADULT - SUBJECTIVE AND OBJECTIVE BOX
RADHA RAMON   [id number]   [birth year]  77yM     Admit Date/LOS: 03-20  Indication for SICU: Hemodynamic monitoring. 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 Pt walks with walker and got up in middle of night to get food and slipped and fell on his R side. Pt has been having R hip pain since with inability to ambulate. Pts family finally convinced him to come to hospital today. Pt is having 10/10 pain currently. Denies dizziness, CP or LOC prior to fall. Denies head trauma. No HA. (20 Mar 2023 12:02)    3/22: Patient S/P ORIF of right hip using interlocking intramedullary stephon. - upgraded for hypotension in PACU  4/5  Trach and PEG    24HRS EVENT:   4/28  NIGHT  - Hgb 7.1 -> 6.8 following PRBC, in addition to pooling of lower GI blood in bed. Levo inc 0.5 -> 1.0. MTP initiated. GI called who recommends CT angio for further deteroriation and will re-assess in AM.   -Family opted to make  patient comfort care only  -Patient removed off mechanical ventilation at 5:01am      [] A ten-point review of systems was otherwise negative except as noted above.  [x] Due to altered mental status/intubation, subjective information was not attained from   the patient. History was obtained, to the extent possible, from review of the chart and collateral sources of information.  =================================================================

## 2023-04-28 NOTE — PROGRESS NOTE ADULT - ATTENDING SUPERVISION STATEMENT
Fellow
Resident
Fellow
Resident
Resident/Fellow
Resident
Resident/Fellow
Resident/Fellow
Resident
Resident/Fellow
Resident/Fellow
Fellow
Resident
Resident/Fellow
Resident/Fellow
Resident
Resident/Fellow
Resident

## 2023-04-28 NOTE — DISCHARGE NOTE FOR THE EXPIRED PATIENT - HOSPITAL COURSE
77 y.o male with PMH significant for CAD with stents on ASA/ Plavix at home, HLD, Prostate cancer, who was admitted on 3/20 followig a falll at home 3 days PTA. He sustained and isolated right hip fracture.  He was taken to the OR  on HD #2- for ORIF rigth hip fracture.  He had an acute hypoxic event in PACU, requiring intubation and mechanical ventilation for the duration of his hospital stay. He has had a protracted hospital stay which has been complicated by C- diff colitis, Pseudomonas pneumonia, Acute renal failure with hyperkalemia and heart block with symptomatic bradycardia.  He has completed a course of po vanco for the c- diff, antibiotics for the pneumonia and has been evaluated by EP for a PPM which was planned for today. In addition, he had an episode of johann on 4/20- GI consult obtained- rec CTA abdomen and pelvis which was negative for acute bleeding nidus.  A colonsoscopy and EGD were planned, however, they were cancelled due to no further bleeding and hemodynamic instability with increasing pressor requirement. the bleeding resolved, but resumed on 4/27. overnight the pressor requirements tripled patient was found i from  a pool of blood. an MTP was called- mpatietn as transfused 6 uits PRBC.  His family was notified,  77 y.o male with PMH significant for CAD with stents on ASA/ Plavix at home, HLD, Prostate cancer, who was admitted on 3/20 following a falll at home 3 days PTA. He sustained and isolated right hip fracture.  He was taken to the OR  on HD #2- for ORIF rigth hip fracture.  He had an acute hypoxic event in PACU, requiring intubation and mechanical ventilation for the duration of his hospital stay. He has had a protracted hospital stay which has been complicated by C- diff colitis, Pseudomonas pneumonia, Acute renal failure with hyperkalemia and heart block with symptomatic bradycardia.  He has completed a course of po vanco for the c- diff, antibiotics for the pneumonia and has been evaluated by EP for a PPM which was planned for today. In addition, he had an episode of johann on 4/20- GI consult obtained- rec CTA abdomen and pelvis which was negative for acute bleeding nidus.  A colonsoscopy and EGD were planned, however, they were cancelled due to no further bleeding and hemodynamic instability with increasing pressor requirement. the bleeding resolved, but resumed on 4/27. overnight the pressor requirements tripled patient was found i from  a pool of blood. an MTP was called- patient as transfused 6 units PRBC.  His family was notified, came to the bedside where a goals of care conversation took place.  The family decided to pursue CMO.  Patient was removed from the ventiltor, pressors were discontinued and no further blood transfusions were given. Asystole was identified and patient was pulseless with no spontaneous respirations and was pronounced at 8:24 am on 4/28

## 2023-04-28 NOTE — CHART NOTE - NSCHARTNOTESELECT_GEN_ALL_CORE
Dietitian Follow-Up/Event Note
Electrophysiology PA
Event Note
Event Note
Follow-up
ID/Event Note
Anesthesia/Event Note
Dietitian Follow-Up/Event Note
Event Note
Family Discussion/Event Note
Follow-up
GI/Event Note
MTP/Event Note
Pall Care Sign Off/Event Note
Trach/PEG/Event Note
Transfer Note
Transfer Note

## 2023-04-28 NOTE — CHART NOTE - NSCHARTNOTEFT_GEN_A_CORE
Post transfusion of 1u PRBC Hg 6.8 (prior 7.1). Patient upon examination SBP <90 on increasing Levophed 0.7 mcg/kg/min. Active rectal bleeding noted with pooling of blood. STAT CBC and T&S drawn and sent Post transfusion of 1u PRBC Hg 6.8 (prior 7.1). Patient upon examination SBP <90 on increasing Levophed 0.7 mcg/kg/min. Active rectal bleeding noted with pooling of blood. STAT CBC and T&S drawn and sent. Patients SBP and MAP trended down, case discussed with Dr. Medrano, MTP activated. Patient received 8u PRBC 6 FFP 1Plt. GI consult called in, no acute intervention at the curent time, recs CTA once patient stable.  Patient son Song contacted came bedside and update given. After an extensive discussion with son and cousin Rachelle (via phone), decision was  to complete transfusion of blood products. During the second MTP cooler transfusion family updated on increasing vasopressor requirement and active rectal bleed. Family at this time opted to finish the remaining products in cooler #2 and then transition to comfort care. TAMARA updated Post transfusion of 1u PRBC Hg 6.8 (prior 7.1). Patient upon examination SBP <90 on increasing Levophed 0.7 mcg/kg/min. Active rectal bleeding noted with pooling of blood. STAT CBC and T&S drawn and sent. Patients SBP and MAP trended down, case discussed with Dr. Medrano, MTP activated. Patient received 8u PRBC 6 FFP 1Plt. GI consult called in, no acute intervention at the curent time, recs CTA once patient stable.  Patient son Song contacted came bedside and update given. After an extensive discussion with son and cousin Rachelle (via phone), decision was  to complete transfusion of blood products. During the second MTP cooler transfusion family updated on increasing vasopressor requirement and active rectal bleed. Family at this time opted to finish the remaining products in cooler #2 and then transition to comfort care. TAMARA updated. Dr. Medrano updated on status.

## 2023-04-28 NOTE — PROGRESS NOTE ADULT - ATTENDING COMMENTS
patient developed massive GI bleed overnight, MTP initiated. GOC discussed with family and CMO instituted. Patient demised @ 08:24

## 2023-04-28 NOTE — PROGRESS NOTE ADULT - ASSESSMENT
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     3/23---s/p ORIF of right hip using interlocking intramedullary stephon    4/5---s/p Trach size 8 & PEG 4cm at bumper       Patient made comfort measures on 4/28 at 0501.  Lorazepam and Morphine for anxiety and pain prn  Glycopyrrolate 0.2mg IV q6 prn for secretions    ADVANCED DIRECTIVES:  DNR/DNI MOLST completed and updated.    HCP/Emergency Contact- Song Segura (Son): [phone]    DISPO: Downgrade

## 2023-04-28 NOTE — PROGRESS NOTE ADULT - REASON FOR ADMISSION
mechanical Fall/ Hip Fx

## 2023-04-28 NOTE — DISCHARGE NOTE FOR THE EXPIRED PATIENT - OTHER SIGNIFICANT FINDINGS
6.6    13.50 )-----------( 243      ( 28 Apr 2023 00:50 )             22.0   04-28    142  |  111<H>  |  78<HH>  ----------------------------<  157<H>  5.0   |  26  |  0.9    Ca    8.2<L>      28 Apr 2023 00:10  Phos  3.1     04-28  Mg     1.9     04-28      Patient was swabbed by the [facility]                          6.6    13.50 )-----------( 243      ( 28 Apr 2023 00:50 )             22.0   04-28    142  |  111<H>  |  78<HH>  ----------------------------<  157<H>  5.0   |  26  |  0.9    Ca    8.2<L>      28 Apr 2023 00:10  Phos  3.1     04-28  Mg     1.9     04-28      Patient was swabbed by the [facility] on 4/6- patient is colonized with Candid Aeurus

## 2023-04-28 NOTE — DISCHARGE NOTE FOR THE EXPIRED PATIENT - SECONDARY DIAGNOSIS.
Acute lower GI hemorrhage Acute on chronic respiratory failure with hypoxia Symptomatic bradycardia Diverticulosis of intestine

## 2023-04-28 NOTE — CHART NOTE - NSCHARTNOTEFT_GEN_A_CORE
Patient with decrease in Hemoglobin following prior transfusion. On re-assessment there is pooling of blood in bed, likely lower GI source. Due to increasing pressor requirement, MTP called.     Discussion with GI fellow (Lupe Peter) who recommends proceeding with MTP and if patient further deteriorates, recommends CT Angio. GI will re-assess in the AM.

## 2023-04-29 LAB
CULTURE RESULTS: SIGNIFICANT CHANGE UP
SPECIMEN SOURCE: SIGNIFICANT CHANGE UP

## 2023-05-02 LAB
CULTURE RESULTS: SIGNIFICANT CHANGE UP
SPECIMEN SOURCE: SIGNIFICANT CHANGE UP

## 2023-05-08 DIAGNOSIS — E87.0 HYPEROSMOLALITY AND HYPERNATREMIA: ICD-10-CM

## 2023-05-08 DIAGNOSIS — E11.9 TYPE 2 DIABETES MELLITUS WITHOUT COMPLICATIONS: ICD-10-CM

## 2023-05-08 DIAGNOSIS — I95.81 POSTPROCEDURAL HYPOTENSION: ICD-10-CM

## 2023-05-08 DIAGNOSIS — J81.1 CHRONIC PULMONARY EDEMA: ICD-10-CM

## 2023-05-08 DIAGNOSIS — B37.9 CANDIDIASIS, UNSPECIFIED: ICD-10-CM

## 2023-05-08 DIAGNOSIS — Z99.11 DEPENDENCE ON RESPIRATOR [VENTILATOR] STATUS: ICD-10-CM

## 2023-05-08 DIAGNOSIS — I47.1 SUPRAVENTRICULAR TACHYCARDIA: ICD-10-CM

## 2023-05-08 DIAGNOSIS — Z51.5 ENCOUNTER FOR PALLIATIVE CARE: ICD-10-CM

## 2023-05-08 DIAGNOSIS — D62 ACUTE POSTHEMORRHAGIC ANEMIA: ICD-10-CM

## 2023-05-08 DIAGNOSIS — N50.89 OTHER SPECIFIED DISORDERS OF THE MALE GENITAL ORGANS: ICD-10-CM

## 2023-05-08 DIAGNOSIS — I25.10 ATHEROSCLEROTIC HEART DISEASE OF NATIVE CORONARY ARTERY WITHOUT ANGINA PECTORIS: ICD-10-CM

## 2023-05-08 DIAGNOSIS — I16.0 HYPERTENSIVE URGENCY: ICD-10-CM

## 2023-05-08 DIAGNOSIS — E78.00 PURE HYPERCHOLESTEROLEMIA, UNSPECIFIED: ICD-10-CM

## 2023-05-08 DIAGNOSIS — N17.9 ACUTE KIDNEY FAILURE, UNSPECIFIED: ICD-10-CM

## 2023-05-08 DIAGNOSIS — R45.1 RESTLESSNESS AND AGITATION: ICD-10-CM

## 2023-05-08 DIAGNOSIS — H54.42A5 BLINDNESS LEFT EYE CATEGORY 5, NORMAL VISION RIGHT EYE: ICD-10-CM

## 2023-05-08 DIAGNOSIS — A41.9 SEPSIS, UNSPECIFIED ORGANISM: ICD-10-CM

## 2023-05-08 DIAGNOSIS — I45.9 CONDUCTION DISORDER, UNSPECIFIED: ICD-10-CM

## 2023-05-08 DIAGNOSIS — Z87.891 PERSONAL HISTORY OF NICOTINE DEPENDENCE: ICD-10-CM

## 2023-05-08 DIAGNOSIS — R13.10 DYSPHAGIA, UNSPECIFIED: ICD-10-CM

## 2023-05-08 DIAGNOSIS — E43 UNSPECIFIED SEVERE PROTEIN-CALORIE MALNUTRITION: ICD-10-CM

## 2023-05-08 DIAGNOSIS — E87.5 HYPERKALEMIA: ICD-10-CM

## 2023-05-08 DIAGNOSIS — I27.20 PULMONARY HYPERTENSION, UNSPECIFIED: ICD-10-CM

## 2023-05-08 DIAGNOSIS — K57.90 DIVERTICULOSIS OF INTESTINE, PART UNSPECIFIED, WITHOUT PERFORATION OR ABSCESS WITHOUT BLEEDING: ICD-10-CM

## 2023-05-08 DIAGNOSIS — E86.1 HYPOVOLEMIA: ICD-10-CM

## 2023-05-08 DIAGNOSIS — G89.18 OTHER ACUTE POSTPROCEDURAL PAIN: ICD-10-CM

## 2023-05-08 DIAGNOSIS — J44.9 CHRONIC OBSTRUCTIVE PULMONARY DISEASE, UNSPECIFIED: ICD-10-CM

## 2023-05-08 DIAGNOSIS — E83.42 HYPOMAGNESEMIA: ICD-10-CM

## 2023-05-08 DIAGNOSIS — Z95.5 PRESENCE OF CORONARY ANGIOPLASTY IMPLANT AND GRAFT: ICD-10-CM

## 2023-05-08 DIAGNOSIS — I10 ESSENTIAL (PRIMARY) HYPERTENSION: ICD-10-CM

## 2023-05-08 DIAGNOSIS — J90 PLEURAL EFFUSION, NOT ELSEWHERE CLASSIFIED: ICD-10-CM

## 2023-05-08 DIAGNOSIS — N40.0 BENIGN PROSTATIC HYPERPLASIA WITHOUT LOWER URINARY TRACT SYMPTOMS: ICD-10-CM

## 2023-05-08 DIAGNOSIS — I97.89 OTHER POSTPROCEDURAL COMPLICATIONS AND DISORDERS OF THE CIRCULATORY SYSTEM, NOT ELSEWHERE CLASSIFIED: ICD-10-CM

## 2023-05-08 DIAGNOSIS — E87.70 FLUID OVERLOAD, UNSPECIFIED: ICD-10-CM

## 2023-05-08 DIAGNOSIS — R57.8 OTHER SHOCK: ICD-10-CM

## 2023-05-08 DIAGNOSIS — R00.1 BRADYCARDIA, UNSPECIFIED: ICD-10-CM

## 2023-05-08 DIAGNOSIS — N32.0 BLADDER-NECK OBSTRUCTION: ICD-10-CM

## 2023-05-08 DIAGNOSIS — J15.1 PNEUMONIA DUE TO PSEUDOMONAS: ICD-10-CM

## 2023-05-08 DIAGNOSIS — Z85.46 PERSONAL HISTORY OF MALIGNANT NEOPLASM OF PROSTATE: ICD-10-CM

## 2023-05-08 DIAGNOSIS — J80 ACUTE RESPIRATORY DISTRESS SYNDROME: ICD-10-CM

## 2023-05-08 DIAGNOSIS — Z79.02 LONG TERM (CURRENT) USE OF ANTITHROMBOTICS/ANTIPLATELETS: ICD-10-CM

## 2023-05-08 DIAGNOSIS — Z66 DO NOT RESUSCITATE: ICD-10-CM

## 2023-05-08 DIAGNOSIS — A04.72 ENTEROCOLITIS DUE TO CLOSTRIDIUM DIFFICILE, NOT SPECIFIED AS RECURRENT: ICD-10-CM

## 2023-05-08 DIAGNOSIS — R65.21 SEVERE SEPSIS WITH SEPTIC SHOCK: ICD-10-CM

## 2023-05-08 DIAGNOSIS — K92.1 MELENA: ICD-10-CM

## 2023-05-08 DIAGNOSIS — I48.91 UNSPECIFIED ATRIAL FIBRILLATION: ICD-10-CM

## 2023-05-08 DIAGNOSIS — M80.851A OTHER OSTEOPOROSIS WITH CURRENT PATHOLOGICAL FRACTURE, RIGHT FEMUR, INITIAL ENCOUNTER FOR FRACTURE: ICD-10-CM

## 2023-05-08 DIAGNOSIS — B96.5 PSEUDOMONAS (AERUGINOSA) (MALLEI) (PSEUDOMALLEI) AS THE CAUSE OF DISEASES CLASSIFIED ELSEWHERE: ICD-10-CM

## 2023-05-08 DIAGNOSIS — Z79.82 LONG TERM (CURRENT) USE OF ASPIRIN: ICD-10-CM

## 2023-05-08 DIAGNOSIS — Z78.1 PHYSICAL RESTRAINT STATUS: ICD-10-CM

## 2023-05-24 LAB
CULTURE RESULTS: SIGNIFICANT CHANGE UP
SPECIMEN SOURCE: SIGNIFICANT CHANGE UP

## 2023-06-21 ENCOUNTER — APPOINTMENT (OUTPATIENT)
Dept: NEUROLOGY | Facility: CLINIC | Age: 78
End: 2023-06-21

## 2023-07-10 ENCOUNTER — APPOINTMENT (OUTPATIENT)
Dept: CARDIOLOGY | Facility: CLINIC | Age: 78
End: 2023-07-10

## 2023-07-19 NOTE — ED ADULT TRIAGE NOTE - HEART RATE (BEATS/MIN)
Problem: Pain  Goal: Verbalizes/displays adequate comfort level or baseline comfort level  Outcome: Progressing     Problem: Safety - Adult  Goal: Free from fall injury  Outcome: Progressing 103

## 2024-02-22 NOTE — PRE-OP CHECKLIST - SITE MARKED BY ANESTHESIOLOGIST
Assessment & Plan   Patient is an 81-year-old female seen for transition care visit.  Patient was hospitalized from 2/8 until 214 at [facility].  She was admitted with acute on chronic heart failure patient also has stage IV kidney disease and was on dialysis up until the end of December.  We follow patient's diabetes and hypothyroidism.  She has not been on medication for diabetes for some time.  An A1c in the office was 6.1.  Recent TSH in the hospital was in range.  1. Chronic diastolic CHF (congestive heart failure) (HCC)    2. Type II diabetes mellitus with peripheral circulatory disorder ([facility])  -     POCT hemoglobin A1c    3. Dialysis patient ([facility])    4. Atypical atrial flutter (HCC)    5. Platelets decreased (HCC)    6. Diastolic CHF, acute on chronic ([facility])    7. Acquired hypothyroidism    Patient was seen by cardiology and palliative care on 2/21.  I reviewed those visit notes plus the discharge summary and hospital stay notes.  Patient's daughter accompanied to her visit and had questions about hydroxyzine prescribed for itching.  The pharmacist had mentioned that there could be an interaction with some of her other medications but I told both patient and daughter that she could take the hydroxyzine as needed for itching.  Her daughter also mentioned that they may be seeking another opinion at [place] for possible valve surgery.  I stressed to patient the need to be vigilant about her salt intake.     She has an appointment to see me in June.  Subjective     Transitional Care Management Review:   Sarah Holland is a 81 y.o. female here for TCM follow up.     During the TCM phone call patient stated:  TCM Call     Date and time call was made  2/14/2024  8:45 AM    Hospital care reviewed  Records reviewed    Patient was hospitialized at  [facility]    Date of Admission  02/08/24    Date of discharge  02/12/24    Diagnosis  Acute on chronic right-sided heart failure (HCC)    Disposition  Home    Were the  "patients medications reviewed and updated  No    Current Symptoms  --  leg pain      TCM Call     Post hospital issues  Reduced activity    Scheduled for follow up?  Yes    Did you obtain your prescribed medications  Yes    Do you need help managing your prescriptions or medications  No    Is transportation to your appointment needed  Yes    Specify why  Daughter drives her    I have advised the patient to call PCP with any new or worsening symptoms  Kath Elizondo MA          Review of Systems   Constitutional:  Negative for chills and fever.   HENT:  Negative for congestion and sore throat.    Respiratory:  Positive for shortness of breath. Negative for chest tightness.    Cardiovascular:  Positive for leg swelling. Negative for chest pain and palpitations.   Gastrointestinal:  Negative for abdominal pain, constipation, diarrhea and nausea.   Genitourinary:  Positive for frequency. Negative for difficulty urinating.   Skin:         itching   Neurological:  Negative for dizziness and headaches.   Psychiatric/Behavioral: Negative.         Objective     /78 (BP Location: Left arm, Patient Position: Sitting, Cuff Size: Standard)   Pulse 99   Temp 98.2 °F (36.8 °C) (Temporal)   Ht 4' 11.5\" (1.511 m)   Wt 55.2 kg (121 lb 12.8 oz)   SpO2 96%   BMI 24.19 kg/m²      Physical Exam  Vitals and nursing note reviewed.   Constitutional:       General: She is not in acute distress.     Appearance: She is well-developed.   HENT:      Head: Normocephalic.   Eyes:      General: No scleral icterus.  Neck:      Thyroid: No thyromegaly.      Vascular: No carotid bruit.   Cardiovascular:      Rate and Rhythm: Normal rate and regular rhythm.      Heart sounds: Normal heart sounds. No murmur heard.  Pulmonary:      Effort: Pulmonary effort is normal.      Breath sounds: Normal breath sounds.   Musculoskeletal:      Right lower leg: No edema.      Left lower leg: No edema.   Lymphadenopathy:      Cervical: No cervical " adenopathy.   Skin:     General: Skin is warm and dry.   Neurological:      Mental Status: She is alert and oriented to person, place, and time.   Psychiatric:         Mood and Affect: Mood normal.       Medications have been reviewed by provider in current encounter    Corina Oconnor DO          n/a

## 2024-03-18 NOTE — ED ADULT NURSE NOTE - ISOLATION TYPE:
I left message to pastoral care to come over tomorrow with POA paper works for patient. Provided my ext for questions.    2:42 PM- Inbound call from Hardik- he confirmed he will be here tomorrow for POA.   
None

## 2024-05-08 NOTE — PROGRESS NOTE ADULT - NS ATTEND AMEND GEN_ALL_CORE FT
Wash your foot 2 to 3 times a day warm soapy water elevate.  Take Keflex as directed.  
Neuro- will stop sedation and assess neuro function. Resp- on mechanical ventilation. Cardiac- on levophed 0.3 and sarai 5.6. will nicom and bolus if needed. if neg will check BAL and UA and toponins. required 2 litres in boluses. ID- zosyn/vanco. renal- god urine output 1,000. Sedation- versed /fentanyl. remains critical.
Resp- on mechanical ventilation, 40% O2. 5 peep. Renal- making adequate urine output, 3-0/hr. Cardaic- on levo[hed 0.08 and vasopressin, wean as toleraed. will give a fluid bolus if needed. ID- on zosyn and vanc. follow BAL. CTA chest neg for PE. Nutrition- on tube feeds.
resp- ARDS, weaning ventialtory settings, to 40% and 8 peep. ID- on abx for Pseudomonas.  cefepime/vanc/caspo. Cardiac- weaning pressors, on levophed and vasopressin. troponins coming down 0.8. Renal- in VAISHNAVI, improving urine output, cr 1.8. start tube feeds, trickle. on abx for c diff. remains critical.
resp- on mechanical ventilation. mild settings 40O2 and 8 peep. wean as toelrated. neuro- not following comamnds, on EEG. Nutrition- on tube feeds. ID- on vanc for MRSA in nares. troponin 0.04, will trend. Renal- adequate urine output.
Resp- on mechanical ventilation, wean as tolerated. Renal- adequate urine output. sedation and pain control as needed.
Resp- on mechanical ventilation. mild settings 40%o2. wean as tolerated, SBTs. Renal- adequate urine output, 50/hr. cr 0.5. cardiac- on neosy, wean as tolerated. on ASA/plavix. Nutrition- on tube feeds. Sedation- on precedex. ID- on zosyn.
Remains on mechanical ventilation, wean as tolerated to PS. Tube feeds for nutrition. good urine output (1,800), Cr improving (1.6). on midodrine. cefepime for antibiotics.
Rsp- remains on mechanical ventilation. mild settings. Cardiac- on ASA and plavix. [place]ano at 4. wean as tolerated, will also check troponins and EKG. ID- had a fever 101.7, will broaden abx coverage. check sputum and urine. c diff. LFTs. may need CT chest/abdomen. Renal- adequate urine output, 30/hr. Sedation- on precedex. remains critical.
Resp- remains on high ventilatory settings, slowly improving. 70% o2 and 16 peep. Cardiac- on vasopressors, levophed and vasopressin. wean as tolerated. Nutrition- npo for now. may start TPN if remains on vasopressors. Renal- in acute renal failure, oliguric. will consult nephrology. ID- on vanco/cefepime/caspo. Troponins elevated. on heparin gtt for possible PE as cause of deterioration of lung. remains very sick, in multisystem failure. discussed with his son yesterday in detail. he is DNR now. family to come today for possible making him comfort measures. will consult palliative care.

## 2024-06-10 NOTE — PATIENT PROFILE ADULT - NSPROEXTENSIONSOFSELF_GEN_A_NUR
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 History was given from patient's daughter. She was undergoing dialysis today and noticed she was lethargic, less alert than usual self. Pt completed dialysis and still not acting herself. EMS was called, fever of a 100.  On chart review, she did have an episode of large volume emesis around 1700.  Per EMS, she had a slightly elevated temp 100.0°F, glucose 300s.     In the ED: UA 2+ leuks, >100 WBC, many bacteria, WBC 17, CT abd/pelvis concerning for cystitis. Given vanc/zosyn    Overview/Hospital Course:  Patient was admitted to Hospital Medicine service for medical management and evaluation of urosepsis. Patient was continued on vanc/zosyn. Vascular Neurology consulted concerning mental status change with the recommendation to initiate ASA 81 QD and to obtain an MRI to r/o new stroke. Imaging pending. Nephrology was consulted for regularly scheduled dialysis. Afternoon of 4/12, patient was unable to tolerate filtration of volume during dialsis 2/2 hypotension. Patient received 500cc bolus and was transferred back to floor after finishing the session without volume removed. Will monitor for signs of volume overload. Tailoring insulin regimen while uptitrating tube feeds to goal rate. Staph Epi in all 4 bottles; ID with recommendation to continue Vanc & de-escalate meropenem to ertapenem on 04/15 through 4/16. Patient completed IV course of UTI coverage. Patient tolerated volume removal well in dialysis  throughout the rest of her hospital stay. Pending discharge to LTACH pending bed availability. Patient without BM with one episode of vomiting overnight 4/17. KUB with bowel gas and low concern for obstruction with no transition point noted. Escalating bowel regimen. Pending placement as of 4/22. Pulm consult placed to evaluate for possible trach downsize & eventual decannulation to assist placement if safe. Trach capping trial per pulm for 48h and will assess for decannulation on Monday. DVT studies negative. Pulm successfully decannulated pt 4/30, IR exchanged TDC. Pending swallow study with SLP and waiting for dispo options. Pt failed swallow study, placement pending. Working with PT on mobilize pt to sitting in a chair to expand placement options. Pt successfully mobilized using sandee lift but required 2 people to do so. Discussing dispo plan with family, likely d/c home if HD, DME needs able to be met. Pending acceptance at outpatient HD center. Ongoing placement difficulties d/t need for accepting HD facility to have sandee lift with 2 personnel to operate. Family meeting to discuss disposition options planned for Thursday 5/23 at 1 PM. KARI onboard for placement to [facility] to continue dialysis. Once patient gets accepted at [facility], next step will be to work with daughter on nursing home NH placement. Hyperkalamic, given lokelma x1. Hyperkalemic, shifted K with lokelma x 1 and regular insulin. Nephrology to take on dyalisis today. Optimizing insulin BG and insulin management. SW onboard working on paperwork for long term Medicaid application. Pending NH placement.       Interval History: Patient developed fever 100.5F with associated tachycardia overnight. Was also found to have K 6.5 from 6.1 despite shifting along with EKG changes. Emergent HD was initiated overnight with improvement in K to 4.1 this morning. Sodium Bicarb was started overnight, discontinued this morning following resolution of hyperK.      New leukocytosis on morning labs. Bcx drawn. CXR unremarkable. Started empirically on Zosyn and Vancomycin. Suspect source of sepsis to be PEG site as has been notably tender to palpation with patient expressing pain and guarding against examination. Sight concerning for purulence vs tube feeding spillage and skin breakdown despite wound care efforts to maintain barriers between PEG and skin with flexifoam. General Surgery consulted this AM regarding PEG.      Objective:     Vital Signs (Most Recent):  Temp: 99.9 °F (37.7 °C) (06/10/24 1135)  Pulse: 102 (06/10/24 1153)  Resp: 20 (06/10/24 1135)  BP: (!) 116/48 (06/10/24 1135)  SpO2: 97 % (06/10/24 1135) Vital Signs (24h Range):  Temp:  [98.1 °F (36.7 °C)-100.5 °F (38.1 °C)] 99.9 °F (37.7 °C)  Pulse:  [] 102  Resp:  [16-24] 20  SpO2:  [97 %-99 %] 97 %  BP: ()/(48-85) 116/48     Weight: 105.5 kg (232 lb 9.4 oz)  Body mass index is 36.43 kg/m².    Intake/Output Summary (Last 24 hours) at 6/10/2024 1246  Last data filed at 6/10/2024 0638  Gross per 24 hour   Intake 900 ml   Output 1900 ml   Net -1000 ml         Physical Exam  Vitals and nursing note reviewed.   Constitutional:       General: She is not in acute distress.     Appearance: She is not ill-appearing, toxic-appearing or diaphoretic.   HENT:      Head: Normocephalic and atraumatic.   Eyes:      General: No scleral icterus.        Right eye: No discharge.         Left eye: No discharge.      Conjunctiva/sclera: Conjunctivae normal.   Neck:      Comments: Decannulated  Cardiovascular:      Rate and Rhythm: Normal rate and regular rhythm.      Heart sounds: Normal heart sounds.   Pulmonary:      Effort: Pulmonary effort is normal. No respiratory distress.   Abdominal:      General: Abdomen is flat. There is no distension.      Tenderness: There is no abdominal tenderness.      Comments: PEG present     Musculoskeletal:      Right lower leg: No edema.      Left lower leg: No edema.   Skin:      General: Skin is warm and dry.   Neurological:      General: No focal deficit present.      Mental Status: She is alert.      Comments: Nonverbal             Significant Labs: All pertinent labs within the past 24 hours have been reviewed.    Significant Imaging: I have reviewed all pertinent imaging results/findings within the past 24 hours.    Assessment/Plan:      * Acute cystitis with hematuria  Resolved       Vulvovaginal candidiasis  Noted 5/29, given 150mg fluconazole x1      Irritant contact dermatitis due friction or contact with other specified body fluids  Wound care following       Debility  Needs:  Wheelchair: patient has a mobility limitation that significantly impairs her ability to participate in one or more mobility related activities of daily living (MRADL's) such as toileting, feeding, dressing, grooming, and bathing in customary locations in the home.  The mobility limitation cannot be sufficiently resolved by the use of a cane or walker.   The use of a manual wheelchair will significantly improve the patient's ability to participate in MRADLS and the patient will use it on regular basis in the home.  Family/pt has expressed her willingness to use a manual wheelchair in the home.  She also has a caregiver who is capable of assisting in mobility.    Mrs Saravia requires a hospital bed due to her requiring positioning of the body in ways not feasible with an ordinary bed to alleviate pain/ is completely immobile /or limited mobility and cannot independently make changes in body position without the use of the bed.  The positioning of the body cannot be sufficiently resolved by the use of pillows and wedges    Patient has a mobility limitation that significantly impairs their ability to participate in one or more mobility related activities of daily living, including toileting. This deficit can be resolved by using a bedside commode. Patient demonstrates mobility limitations that will cause them to  be confined to one room at home without bathroom access for up to 30 days. Using a bedside commode will greatly improve the patient's ability to participate in MRADLs       Complication of vascular dialysis catheter  Cath intermittently clogging, not resolving with cath-hedy, going for IR venogram 4/30 with possible exchange. S/p exchange with IR on 4/30      Constipation  Resolved     Moderate malnutrition  Nutrition consulted. Most recent weight and BMI monitored-     Measurements:  Wt Readings from Last 1 Encounters:   06/04/24 105.5 kg (232 lb 9.4 oz)   Body mass index is 36.43 kg/m².    Patient has been screened and assessed by RD.    Malnutrition Type:  Context: acute illness or injury  Level: moderate    Malnutrition Characteristic Summary:  Weight Loss (Malnutrition): 10% in 6 months  Subcutaneous Fat (Malnutrition): mild depletion  Muscle Mass (Malnutrition): mild depletion  Fluid Accumulation (Malnutrition): mild    Interventions/Recommendations (treatment strategy):  1.    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 Physical therapy services has been scheduled. Continue all standard measures for pressure injury prevention and consult wound care for any wounds (chronic or acute).    ESRD (end stage renal disease) on dialysis  Creatine stable for now. BMP reviewed- noted Estimated Creatinine Clearance: 11.3 mL/min (A) (based on SCr of 7.2 mg/dL (H)). according to latest data. Based on current GFR, CKD stage is end stage.  Monitor UOP and serial BMP and adjust therapy as needed. Renally dose meds. Avoid nephrotoxic  medications and procedures.    SW onboard for placement to [facility] to continue dialysis. Once patient gets accepted at [facility], next step will be to work with daughter on [facility] NH placement.     -- HD MWF (~1L fluid removal on average per session)  -- nephro following    PEG (percutaneous endoscopic gastrostomy) status  On PEG tube. Re-consulting wound care for PEG tube assistance to continue care at nursing home. Wound care with PEG dressing changes.         Status post tracheostomy  Resolved, decannulated 4/30    Acute encephalopathy  Resolved.     Type 2 diabetes mellitus with hyperglycemia, with long-term current use of insulin  Adjusting insulin to account for diabetic TF    Patient's FSGs are controlled on current medication regimen.  Last A1c reviewed-   Lab Results   Component Value Date    HGBA1C 6.2 (H) 05/27/2024     Most recent fingerstick glucose reviewed-   Recent Labs   Lab 06/09/24  1102 06/09/24  1151 06/09/24  1157 06/09/24  1235   POCTGLUCOSE 214* 437* 362* 310*       Current correctional scale  Medium  Maintain anti-hyperglycemic dose as follows-   Antihyperglycemics (From admission, onward)      Start     Stop Route Frequency Ordered    06/09/24 2100  insulin glargine U-100 (Lantus) pen 13 Units         -- SubQ Nightly 06/09/24 0756    06/09/24 1255  insulin aspart U-100 pen 0-10 Units         -- SubQ Before meals & nightly PRN 06/09/24 1155          Hold Oral hypoglycemics while patient is in the hospital.  POCT glucose checks     Hyponatremia  Patient has hyponatremia which is uncontrolled,We will aim to correct the sodium by 4-6mEq in 24 hours. We will monitor sodium Daily. The hyponatremia is due to ESRD. Discussed with nephrology, dialysate bath adjusted to account for and correct hyponatremia.  Recent Labs   Lab 06/09/24  1147   *       - Daily morning CMP  - Continue to montior    Sepsis  This patient does have evidence of infective focus  My overall impression is  "sepsis.  Source: Skin and Soft Tissue (location Abdominal)  Antibiotics given-   Antibiotics (72h ago, onward)      Start     Stop Route Frequency Ordered    06/10/24 0800  vancomycin - pharmacy to dose  (vancomycin IVPB (PEDS and ADULTS))        Placed in "And" Linked Group    -- IV pharmacy to manage frequency 06/10/24 0701    06/10/24 0730  piperacillin-tazobactam (ZOSYN) 4.5 g in dextrose 5 % in water (D5W) 100 mL IVPB (MB+)         -- IV Every 12 hours (non-standard times) 06/10/24 0628    06/09/24 0915  mupirocin 2 % ointment         06/14/24 0859 Nasl 2 times daily 06/09/24 0805          Latest lactate reviewed-  Recent Labs   Lab 06/10/24  1114   LACTATE 3.2*     Organ dysfunction indicated by Encephalopathy    Fluid challenge Contraindicated- Fluid bolus is contraindicated in this patient due to End Stage Renal Disease     Post- resuscitation assessment No - Post resuscitation assessment not needed       Will Not start Pressors- Levophed for MAP of 65  Source control achieved by: Vanc/Zosyn    Plan:  - Concerns septic source may be PEG site with associated purulent wound despite Wound Care attempts to protect with barriers. PEG placed by General Surgery 2/2024. 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 Pt underwent extensive course, currently still healing from this, but no longer has wound vac. Pt's wound currently covered with bandage. Picture in media tabs    Plan  Wound care        VTE Risk Mitigation (From admission, onward)           Ordered     heparin (porcine) injection 1,000 Units  As needed (PRN)         06/10/24 0035     heparin (porcine) injection 5,000 Units  Every 8 hours         05/29/24 0823     heparin (porcine) injection 3,000 Units  As needed " (PRN)         04/17/24 0825                    Discharge Planning   ZEKE: 6/12/2024     Code Status: Full Code   Is the patient medically ready for discharge?: No    Reason for patient still in hospital (select all that apply): Patient new problem, Treatment, and Consult recommendations  Discharge Plan A: New Nursing Home placement - shelter care facility                  Jarrett Eason DO  Department of Hospital Medicine   [facility] - Telemetry Stepdown     dentures

## 2024-07-24 NOTE — ASU PATIENT PROFILE, ADULT - WILL THE PATIENT ACCEPT THE PFIZER COVID-19 VACCINE IF ELIGIBLE AND IT IS AVAILABLE?
-- DO NOT REPLY / DO NOT REPLY ALL --  -- This inbox is not monitored  -- Message is from Engagement Center Operations (ECO) --      Message Type:  Refill Medication   Refill request for Pended medication named:   HYDROcodone-acetaminophen (NORCO)  MG per tablet 1 tablet,     Preferred pharmacy verified, and selected.   [id number] [place], [place] - [address] [place] [address]    Is the patient OUT of Medication?  Yes and Medication Refills handled by Practice Site        Message: Patient still does not have a Pain Management Doctor. Patient needs a new referral to a different Pain Management.                   
Requesting refill of HYDROcodone-acetaminophen (NORCO)  MG per tablet   Sig - Route: Take 1 tablet by mouth every 8 hours as needed for Pain.     Last Rx written: 6/26/24  Last Rx dispensed (per PDMP): 6/26/24 - 28 day supply    Last office visit: 10/11/23  Upcoming office visit: 10/23/24  Last pain contract: 8/21/23  Pain management urine performed on 5/20/24      Rx prepped, routed to provider for signature.        
Yes

## 2024-08-22 NOTE — ED ADULT NURSE NOTE - TEMPLATE
Relayed below information to patient via [facility] kerline. Waiting to see if patient wants to schedule an earlier appointment.    no yes Abdominal Pain, N/V/D

## 2024-12-09 NOTE — PATIENT PROFILE ADULT - NSPROPASSIVESMOKEEXPOSURE_GEN_A_NUR
Nutrition consulted. Most recent weight and BMI monitored-     Measurements:  Wt Readings from Last 1 Encounters:   12/08/24 56.8 kg (125 lb 3.5 oz)   Body mass index is 17.97 kg/m².    -HIV 10/14/24  -TSH 1.395 on this admission  -check pre-albumin, phosphorus, and magnesium levels  -aspiration precautions, speech therapy evaluation  -nutrition consult  -currently NPO with D5 half-normal saline at 100 mL/hr  
Patient/Collateral...
No

## 2025-05-08 NOTE — ED PROVIDER NOTE - PROGRESS NOTE DETAILS
Name: Jitendra Silva      : 1981      MRN: [id number]  Encounter Provider: Demarcus Ramirez MD  Encounter Date: 2025   Encounter department: [facility]  :  Assessment & Plan  Right inguinal pain    Orders:    CBC and Platelet; Future    Comprehensive metabolic panel; Future    Left inguinal hernia    Orders:    CBC and Platelet; Future    Comprehensive metabolic panel; Future    Preoperative examination    Orders:    CBC and Platelet; Future    Comprehensive metabolic panel; Future    Diagnosis and plan of laparoscopic TEP explained at length and all questions answered.  Patient desires to proceed.  Will order CMP and CBC to complete workup    History of Present Illness  HPI  Jitendra Silva is a 43 y.o. male who presents with a diagnosis of small bilateral inguinal hernia seen on CAT scan.  He had a surgical consultation with my partner, Dr. Maggie Ham, in 2025 where these findings were discussed.  He was referred to me for further consideration regarding laparoscopic TEP bilateral inguinal hernia repair.    He is in overall excellent health with his only chronic medical condition being hypertension which is under control.          Review of Systems   Constitutional:  Negative for chills and fever.   Respiratory: Negative.     Cardiovascular: Negative.    Gastrointestinal: Negative.    Genitourinary: Negative.    Neurological: Negative.    Hematological: Negative.    All other systems reviewed and are negative.    Medical History Reviewed by provider this encounter:     .  Past Medical History  Past Medical History:   Diagnosis Date    Chest pain     Headache(784.0)     High blood pressure     High cholesterol     Hypertension     Increased storage iron     Kidney stone     Lyme disease     Migraine      Past Surgical History:   Procedure Laterality Date    COLONOSCOPY      FL RETROGRADE PYELOGRAM  2024    [place] CYSTO/URETERO W/LITHOTRIPSY &INDWELL STENT INSRT Left  02/18/2024    Procedure: CYSTOSCOPY URETEROSCOPY WITH basket stone extraction,  RETROGRADE PYELOGRAM AND INSERTION STENT URETERAL;  Surgeon: Trevon Pelayo MD;  Location: [facility];  Service: Urology    MN NSL/SINUS NDSC MAX ANTROST W/RMVL TISS MAX SINUS Left 12/12/2024    Procedure: LEFT MAXILLARY ENDOSCOPIC ANTROSTOMY WITH REMOVAL OF TISSUE;  Surgeon: Tha Brown MD;  Location: [facility];  Service: ENT    MN TONSILLECTOMY & ADENOIDECTOMY AGE 10/> N/A 09/21/2023    Procedure: TONSILLECTOMY;  Surgeon: Tha Brown MD;  Location: [facility];  Service: ENT    TONSILLECTOMY       Family History   Problem Relation Age of Onset    Cancer Mother         Breast cancer    Arthritis Mother     Cancer Father     Early death Father         Lymphoma      reports that he has never smoked. He has never been exposed to tobacco smoke. He has never used smokeless tobacco. He reports current alcohol use. He reports that he does not use drugs.  Current Outpatient Medications   Medication Instructions    amLODIPine (NORVASC) 10 mg, Oral, Daily    Bromfenac Sodium 0.075 % SOLN     doxycycline hyclate (VIBRAMYCIN) 100 mg, Oral, Every 12 hours scheduled    ergocalciferol (VITAMIN D2) 50,000 units take 1 capsule by mouth every week with food    famotidine (PEPCID) 20 mg, Oral, 2 times daily    lisinopril (ZESTRIL) 20 mg, Oral, Daily    methylPREDNISolone 4 MG tablet therapy pack Use as directed on package     Allergies   Allergen Reactions    Penicillins Hives     Reaction Date: 11Feb2005;       Shellfish-Derived Products - Food Allergy Itching      Current Outpatient Medications on File Prior to Visit   Medication Sig Dispense Refill    amLODIPine (NORVASC) 10 mg tablet Take 1 tablet (10 mg total) by mouth daily 90 tablet 3    Bromfenac Sodium 0.075 % SOLN       doxycycline hyclate (VIBRAMYCIN) 100 mg capsule Take 1 capsule (100 mg total) by mouth every 12 (twelve) hours for 10 days 20 capsule 0    ergocalciferol  (VITAMIN D2) 50,000 units take 1 capsule by mouth every week with food      famotidine (PEPCID) 20 mg tablet Take 1 tablet (20 mg total) by mouth 2 (two) times a day 60 tablet 0    lisinopril (ZESTRIL) 20 mg tablet Take 1 tablet (20 mg total) by mouth daily 90 tablet 3    methylPREDNISolone 4 MG tablet therapy pack Use as directed on package (Patient not taking: Reported on 5/1/2025) 21 each 0     No current facility-administered medications on file prior to visit.      Social History     Tobacco Use    Smoking status: Never     Passive exposure: Never    Smokeless tobacco: Never    Tobacco comments:     Occasionally   Vaping Use    Vaping status: Never Used   Substance and Sexual Activity    Alcohol use: Yes     Comment: Occasionally    Drug use: Never    Sexual activity: Not Currently     Birth control/protection: None        Objective  There were no vitals taken for this visit.     Physical Exam  Vitals reviewed.   Constitutional:       General: He is not in acute distress.     Appearance: Normal appearance.   Cardiovascular:      Rate and Rhythm: Normal rate and regular rhythm.   Pulmonary:      Effort: Pulmonary effort is normal. No respiratory distress.      Breath sounds: Normal breath sounds.   Abdominal:      General: Abdomen is flat. There is no distension.      Palpations: Abdomen is soft.      Comments: Small, left greater than right, inguinal hernias.  Could also represent cord lipomas.   Musculoskeletal:         General: Normal range of motion.   Skin:     General: Skin is warm and dry.   Neurological:      General: No focal deficit present.      Mental Status: He is alert.   Psychiatric:         Mood and Affect: Mood normal.              FF: urology eusebio ballesteros at bedside with me. will take pt to OR.